# Patient Record
Sex: MALE | URBAN - METROPOLITAN AREA
[De-identification: names, ages, dates, MRNs, and addresses within clinical notes are randomized per-mention and may not be internally consistent; named-entity substitution may affect disease eponyms.]

---

## 2017-12-21 ENCOUNTER — EMERGENCY (EMERGENCY)
Facility: HOSPITAL | Age: 67
LOS: 1 days | Discharge: HOME | End: 2017-12-21
Attending: EMERGENCY MEDICINE
Payer: SELF-PAY

## 2017-12-21 VITALS
RESPIRATION RATE: 20 BRPM | TEMPERATURE: 98 F | DIASTOLIC BLOOD PRESSURE: 95 MMHG | SYSTOLIC BLOOD PRESSURE: 151 MMHG | HEART RATE: 99 BPM | OXYGEN SATURATION: 100 %

## 2017-12-21 VITALS
RESPIRATION RATE: 20 BRPM | DIASTOLIC BLOOD PRESSURE: 94 MMHG | HEART RATE: 93 BPM | OXYGEN SATURATION: 98 % | SYSTOLIC BLOOD PRESSURE: 139 MMHG | TEMPERATURE: 98 F

## 2017-12-21 PROCEDURE — 99283 EMERGENCY DEPT VISIT LOW MDM: CPT

## 2017-12-21 PROCEDURE — 99282 EMERGENCY DEPT VISIT SF MDM: CPT

## 2017-12-21 NOTE — ED PROVIDER NOTE - MEDICAL DECISION MAKING DETAILS
68 y/o M pt presents with inguinal hernia. Will D/C home with recommendation to wear hernia belt. Pt put in f/u book for surgery appointment. 68 y/o M pt presents with inguinal hernia, reduced in Emergency Department by me.  Will D/C home with recommendation to wear hernia belt and outpatient surgery followup Pt put in f/u book for surgery appointment.

## 2017-12-21 NOTE — ED PROVIDER NOTE - OBJECTIVE STATEMENT
66 y/o M pt with no significant PMHx and no significant PSHx presents to the ED c/o R groin pain and swelling x3 months. Pt reports a hernia that keeps coming in and out; pt has been taking Tylenol to no relief. Pt was seen at [place], where he was told to f/u with a surgeon; pt has not yet f/u with a surgeon. Pt denies fever, chills, N/V/D, abd pain or any other complaints. NKDA.

## 2017-12-27 PROBLEM — Z00.00 ENCOUNTER FOR PREVENTIVE HEALTH EXAMINATION: Status: ACTIVE | Noted: 2017-12-27

## 2018-01-08 ENCOUNTER — APPOINTMENT (OUTPATIENT)
Dept: SURGERY | Facility: CLINIC | Age: 68
End: 2018-01-08

## 2018-01-24 ENCOUNTER — APPOINTMENT (OUTPATIENT)
Dept: SURGERY | Facility: CLINIC | Age: 68
End: 2018-01-24
Payer: SELF-PAY

## 2018-01-24 VITALS
WEIGHT: 131 LBS | DIASTOLIC BLOOD PRESSURE: 92 MMHG | HEART RATE: 92 BPM | SYSTOLIC BLOOD PRESSURE: 130 MMHG | TEMPERATURE: 97.8 F | HEIGHT: 63 IN | BODY MASS INDEX: 23.21 KG/M2

## 2018-01-24 DIAGNOSIS — Z78.9 OTHER SPECIFIED HEALTH STATUS: ICD-10-CM

## 2018-01-24 DIAGNOSIS — K40.90 UNILATERAL INGUINAL HERNIA, W/OUT OBSTRUCTION OR GANGRENE, NOT SPECIFIED AS RECURRENT: ICD-10-CM

## 2018-01-24 PROCEDURE — 99203 OFFICE O/P NEW LOW 30 MIN: CPT

## 2018-03-04 ENCOUNTER — EMERGENCY (EMERGENCY)
Facility: HOSPITAL | Age: 68
LOS: 1 days | Discharge: HOME | End: 2018-03-04
Attending: EMERGENCY MEDICINE
Payer: SELF-PAY

## 2018-03-04 VITALS
OXYGEN SATURATION: 97 % | HEIGHT: 66 IN | DIASTOLIC BLOOD PRESSURE: 110 MMHG | SYSTOLIC BLOOD PRESSURE: 168 MMHG | RESPIRATION RATE: 16 BRPM | TEMPERATURE: 98 F | WEIGHT: 132.06 LBS | HEART RATE: 92 BPM

## 2018-03-04 PROCEDURE — 99283 EMERGENCY DEPT VISIT LOW MDM: CPT

## 2018-03-04 PROCEDURE — 99282 EMERGENCY DEPT VISIT SF MDM: CPT

## 2018-03-04 NOTE — ED PROVIDER NOTE - PHYSICAL EXAMINATION
Murmur of aortic stenosis Afebrile, hemodynamically stable  NAD, well appearing  Head NCAT  EOMI grossly  MMM  RRR, nml S1/S2. Murmur of aortic stenosis, which patient states he has a diagnosis of.  Lungs CTAB, no w/r/r  Abd soft, NT, ND, nml BS, no rebound or guarding   (scribe Severo Armas as chaperone): R inguinal hernia noted, soft, easily reducible, no overlying skin changes. No testicular pain, tenderness, hardness, elevation, or discoloration  AAO, CN's 3-12 grossly intact  BHATIA spontaneously, no leg cyanosis or edema  Skin warm, dry

## 2018-03-04 NOTE — ED PROVIDER NOTE - OBJECTIVE STATEMENT
68 y/o M pt with no significant PMHx presents to ED c/o R sided inguinal hernia pain x ~ 7 months. Patient was last seen at [facility] ED on 12/21/17, his hernia was reduced and he was discharged home with instructions to wear a hernia belt and follow up with outpatient surgery. Pt states that he followed up with a surgeon and was told he needed medical clearance from a cardiologist before they could proceed with hernia repair surgery. Pt states he has not followed up with cardiologist yet, but states that he will tomorrow. Pt reports taking 3 Advil PTA. Pt states that his pain is intermittent and currently does not have any pain. Pt denies fever, chills, nausea, vomiting, testicular pain, or any other complaints. Hannah Armas as .  68 y/o M pt with no significant PMHx presents to ED c/o R sided inguinal hernia pain x ~ 7 months. Patient was last seen at [facility] ED on 12/21/17, his hernia was reduced and he was discharged home with instructions to wear a hernia belt and follow up with outpatient surgery. Pt states that he followed up with a surgeon and was told he needed medical clearance from a cardiologist before they could proceed with hernia repair surgery. Pt states he has not followed up with cardiologist yet, but states that he will tomorrow. Pt reports taking 3 Advil PTA. Pt states that his pain is intermittent and currently does not have any pain. Pt denies fever, chills, nausea, vomiting, testicular pain, or any other complaints. Hannah Armsa as .  66 y/o M pt with no significant PMHx presents to ED c/o R sided inguinal hernia pain x ~ 7 months. Patient was last seen at [facility] ED on 12/21/17, his hernia was reduced and he was discharged home with instructions to wear a hernia belt and follow up with outpatient surgery. Pt states that he followed up with a surgeon and was told he needed medical clearance from a cardiologist before they could proceed with hernia repair surgery. Pt states he has not followed up with cardiologist yet, but states that he will tomorrow. Pt reports taking 3 Advil PTA. Pt states that his pain, is R groin, sharp, is intermittent, worsened by standing and improved by manual reduction and laying down. States currently does not have any pain. Pt denies fever, chills, nausea, vomiting, testicular pain, or any other complaints.

## 2018-03-04 NOTE — ED PROVIDER NOTE - MEDICAL DECISION MAKING DETAILS
R inguinal hernia with surgery evaluated in the past, awaiting cardiac clearance and states will f/u tomorrow with cardiology. Noted hernia that is easily reducible, no signs of incarceration. No e/o testicular involvement. Well appearing, hemodynamically stable, agrees that no need for further w/u at this time. Discharged with need for hernia belt and cardio and surgery clearance.

## 2018-03-07 ENCOUNTER — EMERGENCY (EMERGENCY)
Facility: HOSPITAL | Age: 68
LOS: 1 days | Discharge: HOME | End: 2018-03-07
Attending: EMERGENCY MEDICINE
Payer: SELF-PAY

## 2018-03-07 VITALS
HEIGHT: 63 IN | WEIGHT: 136.03 LBS | SYSTOLIC BLOOD PRESSURE: 135 MMHG | TEMPERATURE: 98 F | HEART RATE: 86 BPM | RESPIRATION RATE: 17 BRPM | DIASTOLIC BLOOD PRESSURE: 95 MMHG | OXYGEN SATURATION: 97 %

## 2018-03-07 PROCEDURE — 99283 EMERGENCY DEPT VISIT LOW MDM: CPT

## 2018-03-07 RX ORDER — ACETAMINOPHEN WITH CODEINE 300MG-30MG
1 TABLET ORAL
Qty: 10 | Refills: 0 | OUTPATIENT
Start: 2018-03-07

## 2018-03-07 RX ORDER — IBUPROFEN 200 MG
400 TABLET ORAL ONCE
Qty: 0 | Refills: 0 | Status: COMPLETED | OUTPATIENT
Start: 2018-03-07 | End: 2018-03-07

## 2018-03-07 RX ORDER — ACETAMINOPHEN WITH CODEINE 300MG-30MG
1 TABLET ORAL ONCE
Qty: 0 | Refills: 0 | Status: DISCONTINUED | OUTPATIENT
Start: 2018-03-07 | End: 2018-03-07

## 2018-03-07 RX ADMIN — Medication 1 TABLET(S): at 11:55

## 2018-03-07 RX ADMIN — Medication 400 MILLIGRAM(S): at 11:53

## 2018-03-07 NOTE — ED PROVIDER NOTE - OBJECTIVE STATEMENT
68 y/o M pt w/ no significant PMHx presents to the ED c/o intermittent pain and swelling to the R groin. Pt was diagnosed w/ inguinal hernia and has seen a surgeon however states that he cannot have surgery without cardiology clearance. Pt is currently uninsured. Pt had pain again this morning that resolved after lying down. Pt denies N/V/D, abd pain currently, chest pain, SOB, or any other complaints. Pt is able to pass gas. Pt is currently in the process to get medicaid. Pt is allergic to Penicillin (Rash) 68 y/o M pt w/ no significant PMHx presents to the ED c/o intermittent pain and swelling to the R groin. Pt was diagnosed w/ inguinal hernia and has seen a surgeon however states that he cannot have surgery without cardiology clearance. Pt is currently uninsured. Pt had pain again this morning that resolved after lying down. Pt denies N/V/D, abd pain currently, chest pain, SOB, dizziness or any other complaints. Pt is able to pass gas. Pt is currently in the process to get medicaid. Pt is allergic to Penicillin (Rash)

## 2018-03-07 NOTE — ED PROVIDER NOTE - PROGRESS NOTE DETAILS
discussed w/ [facility] referral coordinator. Pt given cardiology referral however will likely wait until medicaid is active before following up. Pt states he cannot afford to pay cardiologist up front. Educated on s/s hernia needing emergent eval. Answered q's.

## 2018-03-07 NOTE — ED PROVIDER NOTE - MEDICAL DECISION MAKING DETAILS
Reducible hernia here for pain now resolved. Will discuss w/ referral coordinator in obtaining cardiology referral.

## 2018-06-18 ENCOUNTER — INPATIENT (INPATIENT)
Facility: HOSPITAL | Age: 68
LOS: 31 days | DRG: 215 | End: 2018-07-20
Attending: THORACIC SURGERY (CARDIOTHORACIC VASCULAR SURGERY) | Admitting: THORACIC SURGERY (CARDIOTHORACIC VASCULAR SURGERY)
Payer: MEDICARE

## 2018-06-18 VITALS
HEART RATE: 98 BPM | OXYGEN SATURATION: 99 % | DIASTOLIC BLOOD PRESSURE: 87 MMHG | TEMPERATURE: 98 F | RESPIRATION RATE: 18 BRPM | HEIGHT: 65 IN | WEIGHT: 129.19 LBS | SYSTOLIC BLOOD PRESSURE: 102 MMHG

## 2018-06-18 PROCEDURE — 93010 ELECTROCARDIOGRAM REPORT: CPT

## 2018-06-18 PROCEDURE — 71045 X-RAY EXAM CHEST 1 VIEW: CPT | Mod: 26

## 2018-06-18 PROCEDURE — 99223 1ST HOSP IP/OBS HIGH 75: CPT

## 2018-06-18 RX ORDER — SODIUM CHLORIDE 9 MG/ML
3 INJECTION INTRAMUSCULAR; INTRAVENOUS; SUBCUTANEOUS EVERY 8 HOURS
Qty: 0 | Refills: 0 | Status: DISCONTINUED | OUTPATIENT
Start: 2018-06-18 | End: 2018-06-21

## 2018-06-19 DIAGNOSIS — I38 ENDOCARDITIS, VALVE UNSPECIFIED: ICD-10-CM

## 2018-06-19 DIAGNOSIS — I25.10 ATHEROSCLEROTIC HEART DISEASE OF NATIVE CORONARY ARTERY WITHOUT ANGINA PECTORIS: ICD-10-CM

## 2018-06-19 LAB
ALBUMIN SERPL ELPH-MCNC: 3.4 G/DL — SIGNIFICANT CHANGE UP (ref 3.3–5)
ALBUMIN SERPL ELPH-MCNC: 3.6 G/DL — SIGNIFICANT CHANGE UP (ref 3.3–5)
ALP SERPL-CCNC: 46 U/L — SIGNIFICANT CHANGE UP (ref 40–120)
ALP SERPL-CCNC: 46 U/L — SIGNIFICANT CHANGE UP (ref 40–120)
ALT FLD-CCNC: 11 U/L — SIGNIFICANT CHANGE UP (ref 10–45)
ALT FLD-CCNC: 14 U/L — SIGNIFICANT CHANGE UP (ref 10–45)
ANION GAP SERPL CALC-SCNC: 12 MMOL/L — SIGNIFICANT CHANGE UP (ref 5–17)
ANION GAP SERPL CALC-SCNC: 14 MMOL/L — SIGNIFICANT CHANGE UP (ref 5–17)
APPEARANCE UR: CLEAR — SIGNIFICANT CHANGE UP
APTT BLD: 28 SEC — SIGNIFICANT CHANGE UP (ref 27.5–37.4)
APTT BLD: 31.7 SEC — SIGNIFICANT CHANGE UP (ref 27.5–37.4)
AST SERPL-CCNC: 19 U/L — SIGNIFICANT CHANGE UP (ref 10–40)
AST SERPL-CCNC: 23 U/L — SIGNIFICANT CHANGE UP (ref 10–40)
BACTERIA # UR AUTO: PRESENT /HPF
BASOPHILS NFR BLD AUTO: 0.3 % — SIGNIFICANT CHANGE UP (ref 0–2)
BILIRUB SERPL-MCNC: 1.7 MG/DL — HIGH (ref 0.2–1.2)
BILIRUB SERPL-MCNC: 1.9 MG/DL — HIGH (ref 0.2–1.2)
BILIRUB UR-MCNC: ABNORMAL
BLD GP AB SCN SERPL QL: NEGATIVE — SIGNIFICANT CHANGE UP
BUN SERPL-MCNC: 26 MG/DL — HIGH (ref 7–23)
BUN SERPL-MCNC: 27 MG/DL — HIGH (ref 7–23)
CALCIUM SERPL-MCNC: 9.3 MG/DL — SIGNIFICANT CHANGE UP (ref 8.4–10.5)
CALCIUM SERPL-MCNC: 9.4 MG/DL — SIGNIFICANT CHANGE UP (ref 8.4–10.5)
CHLORIDE SERPL-SCNC: 97 MMOL/L — SIGNIFICANT CHANGE UP (ref 96–108)
CHLORIDE SERPL-SCNC: 99 MMOL/L — SIGNIFICANT CHANGE UP (ref 96–108)
CHOLEST SERPL-MCNC: 183 MG/DL — SIGNIFICANT CHANGE UP (ref 10–199)
CO2 SERPL-SCNC: 28 MMOL/L — SIGNIFICANT CHANGE UP (ref 22–31)
CO2 SERPL-SCNC: 29 MMOL/L — SIGNIFICANT CHANGE UP (ref 22–31)
COLOR SPEC: YELLOW — SIGNIFICANT CHANGE UP
CREAT SERPL-MCNC: 1.25 MG/DL — SIGNIFICANT CHANGE UP (ref 0.5–1.3)
CREAT SERPL-MCNC: 1.3 MG/DL — SIGNIFICANT CHANGE UP (ref 0.5–1.3)
DIFF PNL FLD: NEGATIVE — SIGNIFICANT CHANGE UP
EOSINOPHIL NFR BLD AUTO: 0.2 % — SIGNIFICANT CHANGE UP (ref 0–6)
EPI CELLS # UR: SIGNIFICANT CHANGE UP /HPF (ref 0–5)
GLUCOSE SERPL-MCNC: 110 MG/DL — HIGH (ref 70–99)
GLUCOSE SERPL-MCNC: 137 MG/DL — HIGH (ref 70–99)
GLUCOSE UR QL: NEGATIVE — SIGNIFICANT CHANGE UP
HBA1C BLD-MCNC: 6.2 % — HIGH (ref 4–5.6)
HCT VFR BLD CALC: 47.6 % — SIGNIFICANT CHANGE UP (ref 39–50)
HCT VFR BLD CALC: 48 % — SIGNIFICANT CHANGE UP (ref 39–50)
HDLC SERPL-MCNC: 33 MG/DL — LOW (ref 40–125)
HGB BLD-MCNC: 15.3 G/DL — SIGNIFICANT CHANGE UP (ref 13–17)
HGB BLD-MCNC: 15.3 G/DL — SIGNIFICANT CHANGE UP (ref 13–17)
HYALINE CASTS # UR AUTO: SIGNIFICANT CHANGE UP /LPF (ref 0–2)
INR BLD: 1.26 — HIGH (ref 0.88–1.16)
INR BLD: 1.26 — HIGH (ref 0.88–1.16)
KETONES UR-MCNC: NEGATIVE — SIGNIFICANT CHANGE UP
LEUKOCYTE ESTERASE UR-ACNC: NEGATIVE — SIGNIFICANT CHANGE UP
LIPID PNL WITH DIRECT LDL SERPL: 130 MG/DL — HIGH
LYMPHOCYTES # BLD AUTO: 26.8 % — SIGNIFICANT CHANGE UP (ref 13–44)
MAGNESIUM SERPL-MCNC: 2.2 MG/DL — SIGNIFICANT CHANGE UP (ref 1.6–2.6)
MAGNESIUM SERPL-MCNC: 2.2 MG/DL — SIGNIFICANT CHANGE UP (ref 1.6–2.6)
MCHC RBC-ENTMCNC: 29.3 PG — SIGNIFICANT CHANGE UP (ref 27–34)
MCHC RBC-ENTMCNC: 29.6 PG — SIGNIFICANT CHANGE UP (ref 27–34)
MCHC RBC-ENTMCNC: 31.9 G/DL — LOW (ref 32–36)
MCHC RBC-ENTMCNC: 32.1 G/DL — SIGNIFICANT CHANGE UP (ref 32–36)
MCV RBC AUTO: 91.8 FL — SIGNIFICANT CHANGE UP (ref 80–100)
MCV RBC AUTO: 92.1 FL — SIGNIFICANT CHANGE UP (ref 80–100)
MONOCYTES NFR BLD AUTO: 8 % — SIGNIFICANT CHANGE UP (ref 2–14)
NEUTROPHILS NFR BLD AUTO: 64.7 % — SIGNIFICANT CHANGE UP (ref 43–77)
NITRITE UR-MCNC: NEGATIVE — SIGNIFICANT CHANGE UP
NT-PROBNP SERPL-SCNC: 9864 PG/ML — HIGH (ref 0–300)
PH UR: 5.5 — SIGNIFICANT CHANGE UP (ref 5–8)
PHOSPHATE SERPL-MCNC: 3.8 MG/DL — SIGNIFICANT CHANGE UP (ref 2.5–4.5)
PHOSPHATE SERPL-MCNC: 3.9 MG/DL — SIGNIFICANT CHANGE UP (ref 2.5–4.5)
PLATELET # BLD AUTO: 152 K/UL — SIGNIFICANT CHANGE UP (ref 150–400)
PLATELET # BLD AUTO: 166 K/UL — SIGNIFICANT CHANGE UP (ref 150–400)
POTASSIUM SERPL-MCNC: 4 MMOL/L — SIGNIFICANT CHANGE UP (ref 3.5–5.3)
POTASSIUM SERPL-MCNC: 4.5 MMOL/L — SIGNIFICANT CHANGE UP (ref 3.5–5.3)
POTASSIUM SERPL-SCNC: 4 MMOL/L — SIGNIFICANT CHANGE UP (ref 3.5–5.3)
POTASSIUM SERPL-SCNC: 4.5 MMOL/L — SIGNIFICANT CHANGE UP (ref 3.5–5.3)
PROT SERPL-MCNC: 6.3 G/DL — SIGNIFICANT CHANGE UP (ref 6–8.3)
PROT SERPL-MCNC: 6.5 G/DL — SIGNIFICANT CHANGE UP (ref 6–8.3)
PROT UR-MCNC: ABNORMAL MG/DL
PROTHROM AB SERPL-ACNC: 14.1 SEC — HIGH (ref 9.8–12.7)
PROTHROM AB SERPL-ACNC: 14.1 SEC — HIGH (ref 9.8–12.7)
RBC # BLD: 5.17 M/UL — SIGNIFICANT CHANGE UP (ref 4.2–5.8)
RBC # BLD: 5.23 M/UL — SIGNIFICANT CHANGE UP (ref 4.2–5.8)
RBC # FLD: 15.2 % — SIGNIFICANT CHANGE UP (ref 10.3–16.9)
RBC # FLD: 15.6 % — SIGNIFICANT CHANGE UP (ref 10.3–16.9)
RBC CASTS # UR COMP ASSIST: < 5 /HPF — SIGNIFICANT CHANGE UP
RH IG SCN BLD-IMP: POSITIVE — SIGNIFICANT CHANGE UP
SODIUM SERPL-SCNC: 138 MMOL/L — SIGNIFICANT CHANGE UP (ref 135–145)
SODIUM SERPL-SCNC: 141 MMOL/L — SIGNIFICANT CHANGE UP (ref 135–145)
SP GR SPEC: 1.02 — SIGNIFICANT CHANGE UP (ref 1–1.03)
TOTAL CHOLESTEROL/HDL RATIO MEASUREMENT: 5.5 RATIO — SIGNIFICANT CHANGE UP (ref 3.4–9.6)
TRIGL SERPL-MCNC: 99 MG/DL — SIGNIFICANT CHANGE UP (ref 10–149)
TSH SERPL-MCNC: 3.35 UIU/ML — SIGNIFICANT CHANGE UP (ref 0.35–4.94)
UROBILINOGEN FLD QL: 1 E.U./DL — SIGNIFICANT CHANGE UP
WBC # BLD: 6.6 K/UL — SIGNIFICANT CHANGE UP (ref 3.8–10.5)
WBC # BLD: 7.4 K/UL — SIGNIFICANT CHANGE UP (ref 3.8–10.5)
WBC # FLD AUTO: 6.6 K/UL — SIGNIFICANT CHANGE UP (ref 3.8–10.5)
WBC # FLD AUTO: 7.4 K/UL — SIGNIFICANT CHANGE UP (ref 3.8–10.5)
WBC UR QL: < 5 /HPF — SIGNIFICANT CHANGE UP

## 2018-06-19 PROCEDURE — 93306 TTE W/DOPPLER COMPLETE: CPT | Mod: 26

## 2018-06-19 PROCEDURE — 94010 BREATHING CAPACITY TEST: CPT | Mod: 26

## 2018-06-19 RX ORDER — PANTOPRAZOLE SODIUM 20 MG/1
40 TABLET, DELAYED RELEASE ORAL
Qty: 0 | Refills: 0 | Status: DISCONTINUED | OUTPATIENT
Start: 2018-06-19 | End: 2018-06-21

## 2018-06-19 RX ORDER — HEPARIN SODIUM 5000 [USP'U]/ML
5000 INJECTION INTRAVENOUS; SUBCUTANEOUS EVERY 8 HOURS
Qty: 0 | Refills: 0 | Status: DISCONTINUED | OUTPATIENT
Start: 2018-06-19 | End: 2018-06-21

## 2018-06-19 RX ORDER — FUROSEMIDE 40 MG
20 TABLET ORAL DAILY
Qty: 0 | Refills: 0 | Status: DISCONTINUED | OUTPATIENT
Start: 2018-06-19 | End: 2018-06-21

## 2018-06-19 RX ORDER — FUROSEMIDE 40 MG
1 TABLET ORAL
Qty: 0 | Refills: 0 | COMMUNITY

## 2018-06-19 RX ADMIN — SODIUM CHLORIDE 3 MILLILITER(S): 9 INJECTION INTRAMUSCULAR; INTRAVENOUS; SUBCUTANEOUS at 14:01

## 2018-06-19 RX ADMIN — HEPARIN SODIUM 5000 UNIT(S): 5000 INJECTION INTRAVENOUS; SUBCUTANEOUS at 05:48

## 2018-06-19 RX ADMIN — Medication 20 MILLIGRAM(S): at 05:48

## 2018-06-19 RX ADMIN — HEPARIN SODIUM 5000 UNIT(S): 5000 INJECTION INTRAVENOUS; SUBCUTANEOUS at 23:52

## 2018-06-19 RX ADMIN — SODIUM CHLORIDE 3 MILLILITER(S): 9 INJECTION INTRAMUSCULAR; INTRAVENOUS; SUBCUTANEOUS at 05:38

## 2018-06-19 RX ADMIN — PANTOPRAZOLE SODIUM 40 MILLIGRAM(S): 20 TABLET, DELAYED RELEASE ORAL at 07:53

## 2018-06-19 RX ADMIN — SODIUM CHLORIDE 3 MILLILITER(S): 9 INJECTION INTRAMUSCULAR; INTRAVENOUS; SUBCUTANEOUS at 23:53

## 2018-06-19 RX ADMIN — HEPARIN SODIUM 5000 UNIT(S): 5000 INJECTION INTRAVENOUS; SUBCUTANEOUS at 14:01

## 2018-06-19 NOTE — PROGRESS NOTE ADULT - ASSESSMENT
This 67 year old male with PMH significant for aortic surgery with congenital causes through left thoracotomy presented to CT surgery with findings of aortic stenosis and moderate to severe mitral regurgitation and mod tricuspid regurgitation.  In addition, he underwent catheterization revealing 2-V coronary artery disease.   At visit, patient is comfortable.  afebrile.  He denies chest pain, shortness of breath, or syncopal episodes.  He is NPO and will have CTA of chest this evening.  His echo has identified  severe global hypokinesis, EF 15%, severe aortic stenosis, moderate to severe mitral regurgitation, and moderate tricuspid regurgitation.  Along with coronary CAD, patient is considered to have surgical intervention at this admission.

## 2018-06-19 NOTE — H&P ADULT - NSHPREVIEWOFSYSTEMS_GEN_ALL_CORE
CONSTITUTIONAL: No fevers / chills, sweats, fatigue, weight loss, weight gain (+ Loss of appetite.)  NEUROLOGICAL: No parathesias, seizures, syncope, confusion  EYES: No blurry vision, discharge, pain, loss of vision  ENMT: No difficulty hearing, vertigo, dysphagia, epistaxis, recent dental work  CV: No chest pain, palpitations, orthopnea  RESPIRATORY:  No wheezing, cough / sputum, hemoptysis  GI: No nausea, vomiting, diarrhea, constipation, melena  : No hematuria, dysuria, urgency, incontinence  MUSCULOSKELETAL: No arthritis, joint swelling, muscle weakness  SKIN/BREAST: No rash, itching, hair loss, masses  PSYCHIATRY: No depression, anxiety, suicidal ideation  HEMATOLOGY:  No bruising easily, enlarged lymph nodes, tender lymph nodes  ENDOCRINE: No cold intolerance, heat intolerance, polydipsia

## 2018-06-19 NOTE — H&P ADULT - HISTORY OF PRESENT ILLNESS
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 He is / was  being evaluated for ? abdominal hernia repair surgery. Given medical and surgical history cardiology clearance needed. On evaluation with  TTE / Cardiac Catheterization (Completed 06/2018.) EF = 25%, reduced LV function, severe AS, severe MR, severe TR, severe pHTN, and 2V CAD. Patient transferred for [facility] under the care of Dr. Potter for further work up and MGMT. After speaking with patient he does state a decreased in ET and increase in SOB that has been progressing over the last 6-8 month. Details hard to obtain. Patient poor historian.     PMH / PSH:  See HPI.  Appendectomy > 50 year ago.     Home Rx.:  Lasix 40mg PO QD    FH:  N/A    SH:  Lives with family. Not working. Non-smoker. Occasional ETOH use. No IVRDU.    Allergies:  PCN.

## 2018-06-19 NOTE — H&P ADULT - NSHPPHYSICALEXAM_GEN_ALL_CORE
Appearance: Normal	  HEENT:   Normal oral mucosa, PERRL, EOMI	  Neck: Supple, + JVD; - Carotid Bruit   Cardiovascular: Normal S1 S2. + murmurs.  Respiratory: Lungs clear to auscultation B/L. No Rales or Wheezing. Mild Rhonchi thought B/L. 	  Gastrointestinal:  Soft, non-tender, + BS.	  Skin: No rashes. No ecchymoses. No cyanosis.  Extremities: Normal range of motion, no clubbing, cyanosis. 2+ B/L LE edema.  Vascular: Peripheral pulses palpable 2+ bilaterally.  Neurologic: Non-focal.  Psychiatry: A & O x 3, mood & affect appropriate.

## 2018-06-19 NOTE — PROGRESS NOTE ADULT - SUBJECTIVE AND OBJECTIVE BOX
Planned Date of Surgery:                                                                                                                  Surgeon:    Procedure:    HPI:  67y Male    PAST MEDICAL & SURGICAL HISTORY:  No pertinent past medical history  No significant past surgical history      penicillin (Rash)      MEDICATIONS  (STANDING):  furosemide    Tablet 20 milliGRAM(s) Oral daily  heparin  Injectable 5000 Unit(s) SubCutaneous every 8 hours  pantoprazole    Tablet 40 milliGRAM(s) Oral before breakfast  sodium chloride 0.9% lock flush 3 milliLiter(s) IV Push every 8 hours    MEDICATIONS  (PRN):      On Beta Blocker? YES / NO / CONTRAINDICATED Reason_______________    Labs:                        15.3   7.4   )-----------( 166      ( 2018 07:31 )             47.6         138  |  97  |  27<H>  ----------------------------<  137<H>  4.0   |  29  |  1.30    Ca    9.4      2018 07:31  Phos  3.9       Mg     2.2         TPro  6.5  /  Alb  3.6  /  TBili  1.9<H>  /  DBili  x   /  AST  23  /  ALT  11  /  AlkPhos  46      PT/INR - ( 2018 07:31 )   PT: 14.1 sec;   INR: 1.26          PTT - ( 2018 07:31 )  PTT:31.7 sec  Urinalysis Basic - ( 2018 12:46 )    Color: Yellow / Appearance: Clear / S.025 / pH: x  Gluc: x / Ketone: NEGATIVE  / Bili: Small / Urobili: 1.0 E.U./dL   Blood: x / Protein: Trace mg/dL / Nitrite: NEGATIVE   Leuk Esterase: NEGATIVE / RBC: < 5 /HPF / WBC < 5 /HPF   Sq Epi: x / Non Sq Epi: 0-5 /HPF / Bacteria: Present /HPF      Hemoglobin A1C, Whole Blood: 6.2 % (18 @ 00:04)    Hgb A1C:    EKG:    CXR:    CT Scans:    Cath Report:    Echo:    PFT's:    Carotid Duplex:    Consult in Chart?  YES / NO  Consent in Chart? YES / NO  Pre-op Orders Placed? YES / NO  Blood Prodeucts Ordered? YES / NO  NPO ordered? YES / NO Planned Date of Surgery: at this admission                                                                                                                Surgeon: MD Abbey    Procedure: AVR/    HPI:  67y Male    PAST MEDICAL & SURGICAL HISTORY:  No pertinent past medical history  No significant past surgical history      penicillin (Rash)      MEDICATIONS  (STANDING):  furosemide    Tablet 20 milliGRAM(s) Oral daily  heparin  Injectable 5000 Unit(s) SubCutaneous every 8 hours  pantoprazole    Tablet 40 milliGRAM(s) Oral before breakfast  sodium chloride 0.9% lock flush 3 milliLiter(s) IV Push every 8 hours    MEDICATIONS  (PRN):      On Beta Blocker? YES / NO / CONTRAINDICATED Reason_______________    Labs:                        15.3   7.4   )-----------( 166      ( 2018 07:31 )             47.6         138  |  97  |  27<H>  ----------------------------<  137<H>  4.0   |  29  |  1.30    Ca    9.4      2018 07:31  Phos  3.9       Mg     2.2         TPro  6.5  /  Alb  3.6  /  TBili  1.9<H>  /  DBili  x   /  AST  23  /  ALT  11  /  AlkPhos  46      PT/INR - ( 2018 07:31 )   PT: 14.1 sec;   INR: 1.26          PTT - ( 2018 07:31 )  PTT:31.7 sec  Urinalysis Basic - ( 2018 12:46 )    Color: Yellow / Appearance: Clear / S.025 / pH: x  Gluc: x / Ketone: NEGATIVE  / Bili: Small / Urobili: 1.0 E.U./dL   Blood: x / Protein: Trace mg/dL / Nitrite: NEGATIVE   Leuk Esterase: NEGATIVE / RBC: < 5 /HPF / WBC < 5 /HPF   Sq Epi: x / Non Sq Epi: 0-5 /HPF / Bacteria: Present /HPF      Hemoglobin A1C, Whole Blood: 6.2 % (18 @ 00:04)    Hgb A1C:    EKG:    CXR:    CT Scans:    Cath Report:    Echo:    PFT's:    Carotid Duplex:    Consult in Chart?  YES / NO  Consent in Chart? YES / NO  Pre-op Orders Placed? YES / NO  Blood Prodeucts Ordered? YES / NO  NPO ordered? 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. He is / was  being evaluated for ? abdominal hernia repair surgery. Given medical and surgical history cardiology clearance needed. On evaluation with  TTE / Cardiac Catheterization (Completed 2018.) EF = 25%, reduced LV function, severe AS, severe MR, severe TR, severe pHTN, and 2V CAD. [place] [facility] [place]. Potter for further work up and MGMT. After speaking with patient he does state a decreased in ET and increase in SOB that has been progressing over the last 6-8 month. Details hard to obtain. Patient poor historian.   PAST MEDICAL & SURGICAL HISTORY:  No pertinent past medical history  No significant past surgical history      penicillin (Rash)      MEDICATIONS  (STANDING):  furosemide    Tablet 20 milliGRAM(s) Oral daily  heparin  Injectable 5000 Unit(s) SubCutaneous every 8 hours  pantoprazole    Tablet 40 milliGRAM(s) Oral before breakfast  sodium chloride 0.9% lock flush 3 milliLiter(s) IV Push every 8 hours    MEDICATIONS  (PRN):      On Beta Blocker? YES / NO / CONTRAINDICATED Reason_______________    Labs:                        15.3   7.4   )-----------( 166      ( 2018 07:31 )             47.6     -    138  |  97  |  27<H>  ----------------------------<  137<H>  4.0   |  29  |  1.30    Ca    9.4      2018 07:31  Phos  3.9     06-  Mg     2.2     -    TPro  6.5  /  Alb  3.6  /  TBili  1.9<H>  /  DBili  x   /  AST  23  /  ALT  11  /  AlkPhos  46  06-19    PT/INR - ( 2018 07:31 )   PT: 14.1 sec;   INR: 1.26          PTT - ( 2018 07:31 )  PTT:31.7 sec  Urinalysis Basic - ( 2018 12:46 )    Color: Yellow / Appearance: Clear / S.025 / pH: x  Gluc: x / Ketone: NEGATIVE  / Bili: Small / Urobili: 1.0 E.U./dL   Blood: x / Protein: Trace mg/dL / Nitrite: NEGATIVE   Leuk Esterase: NEGATIVE / RBC: < 5 /HPF / WBC < 5 /HPF   Sq Epi: x / Non Sq Epi: 0-5 /HPF / Bacteria: Present /HPF      Hemoglobin A1C, Whole Blood: 6.2 % (18 @ 00:04)    Hgb A1C:    EKG:    CXR:    CT Scans:    Cath Report:    Echo:    PFT's:    Carotid Duplex:    Consult in Chart?  YES / NO  Consent in Chart? YES / NO  Pre-op Orders Placed? YES / NO  Blood Prodeucts Ordered? YES / NO  NPO ordered? YES / NO Patient discussed on morning rounds with       Operation / Date:     SUBJECTIVE ASSESSMENT:  This 67 year old male with PMH significant for aortic surgery with congenital causes through left thoracotomy presented to CT surgery with findings of aortic stenosis and moderate to severe mitral regurgitation and mod tricuspid regurgitation.  In addition, he underwent catheterization revealing 2-V coronary artery disease.    At visit, patient is comfortable.  afebrile.  He denies chest pain, shortness of breath, or syncopal episodes.  He is NPO and will have CTA of chest this evening.      Vital Signs Last 24 Hrs  T(C): 36.3 (2018 18:02), Max: 36.8 (2018 22:00)  T(F): 97.3 (2018 18:02), Max: 98.2 (2018 22:00)  HR: 92 (2018 16:40) (92 - 98)  BP: 124/81 (2018 16:40) (88/73 - 124/81)  BP(mean): 96 (2018 16:40) (82 - 101)  RR: 17 (2018 16:40) (17 - 19)  SpO2: 93% (2018 16:40) (93% - 99%)  I&O's Detail    2018 07:01  -  2018 18:45  --------------------------------------------------------  IN:    Oral Fluid: 300 mL  Total IN: 300 mL    OUT:  Total OUT: 0 mL    Total NET: 300 mL      PHYSICAL EXAM:    General: NAD.  AOx3    Neurological: grossly intact    Cardiovascular: positive murmur best heard at right upper chest    Respiratory: decrease breath sound at base.  no wheezing.  there is left thoracotomy incision from prior surgery    Gastrointestinal: BM and BS are intact    Extremities: positive for edema +1/+1    Vascular: pulses are intact bilaterally     Incision Sites: n/a    LABS:                        15.3   7.4   )-----------( 166      ( 2018 07:31 )             47.6       PT/INR - ( 2018 07:31 )   PT: 14.1 sec;   INR: 1.26     PTT - ( 2018 07:31 )  PTT: 31.7 sec        138  |  97  |  27<H>  ----------------------------<  137<H>  4.0   |  29  |  1.30    Ca    9.4      2018 07:31  Phos  3.9       Mg     2.2         TPro  6.5  /  Alb  3.6  /  TBili  1.9<H>  /  DBili  x   /  AST  23  /  ALT  11  /  AlkPhos  46        Urinalysis Basic - ( 2018 12:46 )    Color: Yellow / Appearance: Clear / S.025 / pH: x  Gluc: x / Ketone: NEGATIVE  / Bili: Small / Urobili: 1.0 E.U./dL   Blood: x / Protein: Trace mg/dL / Nitrite: NEGATIVE   Leuk Esterase: NEGATIVE / RBC: < 5 /HPF / WBC < 5 /HPF   Sq Epi: x / Non Sq Epi: 0-5 /HPF / Bacteria: Present /HPF        MEDICATIONS  (STANDING):  furosemide    Tablet 20 milliGRAM(s) Oral daily  heparin  Injectable 5000 Unit(s) SubCutaneous every 8 hours  pantoprazole    Tablet 40 milliGRAM(s) Oral before breakfast  sodium chloride 0.9% lock flush 3 milliLiter(s) IV Push every 8 hours    MEDICATIONS  (PRN):        RADIOLOGY & ADDITIONAL TESTS:  CTA of chest  pending   Echo  Left ventricular hypertrophy presentThere is severe global hypokinesis of   the   left ventricle.The left ventricular ejection fraction is severely   reduced.The   left ventricular ejection fraction is estimated to be <15%The right   ventricle   is mildly dilated.The right ventricular systolic function is mildly   reduced.The left atrium is moderately dilated.The left atrial volume   index is   43 cc/m2 (normal <34cc/m2)The right atrium is severely dilated.The right   atrial volume index is 58 cc/m2 (normal range 21+/-6 for women, 25 +/- 7   for   men)  Calcified aortic valve.There is mild aortic regurgitation.There is   Severe aortic stenosis.The peak pressure gradient is 72 mmHg.The mean   pressure   gradient is 40 mmHg.The calculated aortic valve area using the continuity   equation is 0.8 cm2.Mitral annular calcification noted.Mildly calcified   mitral   valve leaflets.There is moderate to severe mitral   regurgitation.Structurally   normal tricuspid valve.There is moderate tricuspid regurgitation.There is   severe pulmonary hypertension.The pulmonary artery systolic pressure is   estimated to be 72 mmHg.Structurally normal pulmonic valve.There is mild   pulmonic regurgitation.There is no pericardial effusion.Bilateral pleural   effusion noted. Ascites noted. Patient discussed on morning rounds with Dr. 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  In addition, he underwent catheterization revealing 2-V coronary artery disease.    At visit, patient is comfortable.  afebrile.  He denies chest pain, shortness of breath, or syncopal episodes.  He is NPO and will have CTA of chest this evening.      Vital Signs Last 24 Hrs  T(C): 36.3 (2018 18:02), Max: 36.8 (2018 22:00)  T(F): 97.3 (2018 18:02), Max: 98.2 (2018 22:00)  HR: 92 (2018 16:40) (92 - 98)  BP: 124/81 (2018 16:40) (88/73 - 124/81)  BP(mean): 96 (2018 16:40) (82 - 101)  RR: 17 (2018 16:40) (17 - 19)  SpO2: 93% (2018 16:40) (93% - 99%)  I&O's Detail    2018 07:01  -  2018 18:45  --------------------------------------------------------  IN:    Oral Fluid: 300 mL  Total IN: 300 mL    OUT:  Total OUT: 0 mL    Total NET: 300 mL      PHYSICAL EXAM:    General: NAD.  AOx3    Neurological: grossly intact    Cardiovascular: positive murmur best heard at right upper chest    Respiratory: decrease breath sound at base.  no wheezing.  there is left thoracotomy incision from prior surgery    Gastrointestinal: BM and BS are intact    Extremities: positive for edema +1/+1    Vascular: pulses are intact bilaterally     Incision Sites: n/a    LABS:                        15.3   7.4   )-----------( 166      ( 2018 07:31 )             47.6       PT/INR - ( 2018 07:31 )   PT: 14.1 sec;   INR: 1.26     PTT - ( 2018 07:31 )  PTT: 31.7 sec        138  |  97  |  27<H>  ----------------------------<  137<H>  4.0   |  29  |  1.30    Ca    9.4      2018 07:31  Phos  3.9       Mg     2.2         TPro  6.5  /  Alb  3.6  /  TBili  1.9<H>  /  DBili  x   /  AST  23  /  ALT  11  /  AlkPhos  46        Urinalysis Basic - ( 2018 12:46 )    Color: Yellow / Appearance: Clear / S.025 / pH: x  Gluc: x / Ketone: NEGATIVE  / Bili: Small / Urobili: 1.0 E.U./dL   Blood: x / Protein: Trace mg/dL / Nitrite: NEGATIVE   Leuk Esterase: NEGATIVE / RBC: < 5 /HPF / WBC < 5 /HPF   Sq Epi: x / Non Sq Epi: 0-5 /HPF / Bacteria: Present /HPF        MEDICATIONS  (STANDING):  furosemide    Tablet 20 milliGRAM(s) Oral daily  heparin  Injectable 5000 Unit(s) SubCutaneous every 8 hours  pantoprazole    Tablet 40 milliGRAM(s) Oral before breakfast  sodium chloride 0.9% lock flush 3 milliLiter(s) IV Push every 8 hours    MEDICATIONS  (PRN):        RADIOLOGY & ADDITIONAL TESTS:  CTA of chest  pending   Echo  Left ventricular hypertrophy presentThere is severe global hypokinesis of   the   left ventricle.The left ventricular ejection fraction is severely   reduced.The   left ventricular ejection fraction is estimated to be <15%The right   ventricle   is mildly dilated.The right ventricular systolic function is mildly   reduced.The left atrium is moderately dilated.The left atrial volume   index is   43 cc/m2 (normal <34cc/m2)The right atrium is severely dilated.The right   atrial volume index is 58 cc/m2 (normal range 21+/-6 for women, 25 +/- 7   for   men)  Calcified aortic valve.There is mild aortic regurgitation.There is   Severe aortic stenosis.The peak pressure gradient is 72 mmHg.The mean   pressure   gradient is 40 mmHg.The calculated aortic valve area using the continuity   equation is 0.8 cm2.Mitral annular calcification noted.Mildly calcified   mitral   valve leaflets.There is moderate to severe mitral   regurgitation.Structurally   normal tricuspid valve.There is moderate tricuspid regurgitation.There is   severe pulmonary hypertension.The pulmonary artery systolic pressure is   estimated to be 72 mmHg.Structurally normal pulmonic valve.There is mild   pulmonic regurgitation.There is no pericardial effusion.Bilateral pleural   effusion noted. Ascites noted.

## 2018-06-19 NOTE — PROGRESS NOTE ADULT - PROBLEM SELECTOR PLAN 1
AVR and MVR to be considered at this admission  echo performed  CTA of chest with IV contrast to be performed

## 2018-06-19 NOTE — H&P ADULT - ASSESSMENT
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 He is / was  being evaluated for ? abdominal hernia repair surgery. Given medical and surgical history cardiology clearance needed. On evaluation with  TTE / Cardiac Catheterization (Completed 06/2018.) EF = 25%, reduced LV function, severe AS, severe MR, severe TR, severe pHTN, and 2V CAD. Patient transferred for [facility] under the care of Dr. Potter for further work up and MGMT. After speaking with patient he does state a decreased in ET and increase in SOB that has been progressing over the last 6-8 month. Details hard to obtain. Patient poor historian.     Neurovascular: No delirium. Pain well controlled with current regimen.  -PRN's.    Cardiovascular: Hemodynamically stable. HR controlled. AS CAD. Pre-OR evaluation.   -PST. c/w Lasix.     Respiratory: 02 Sat = 98% on RA.  -If on oxygen wean to RA from for O2 Sat > 93%.  -Encourage C+DB and Use of IS 10x / hr while awake.  -CXR.    GI: Stable.  -PPX.  -PO Diet.    Renal / :  -Monitor renal function.  -Monitor I/O's.    Endocrine:    -A1c.  -TSH.    Hematologic:  -CBC.  -Coagulation Panel.  -T/Sx2.    ID: Stable.  -Temperature.  -CBC.  -Observe for SIRS/Sepsis Syndrome.    Prophylaxis:  -DVT prophylaxis with 5000 SubQ Heparin q8h.  -SCD's    Disposition:  -9L for frequent monitoring.

## 2018-06-20 PROCEDURE — 94010 BREATHING CAPACITY TEST: CPT | Mod: 26

## 2018-06-20 PROCEDURE — 71275 CT ANGIOGRAPHY CHEST: CPT | Mod: 26

## 2018-06-20 PROCEDURE — 93880 EXTRACRANIAL BILAT STUDY: CPT | Mod: 26

## 2018-06-20 RX ORDER — CHLORHEXIDINE GLUCONATE 213 G/1000ML
1 SOLUTION TOPICAL ONCE
Qty: 0 | Refills: 0 | Status: COMPLETED | OUTPATIENT
Start: 2018-06-20 | End: 2018-06-20

## 2018-06-20 RX ORDER — METOPROLOL TARTRATE 50 MG
12.5 TABLET ORAL EVERY 12 HOURS
Qty: 0 | Refills: 0 | Status: DISCONTINUED | OUTPATIENT
Start: 2018-06-20 | End: 2018-06-21

## 2018-06-20 RX ORDER — CHLORHEXIDINE GLUCONATE 213 G/1000ML
1 SOLUTION TOPICAL ONCE
Qty: 0 | Refills: 0 | Status: COMPLETED | OUTPATIENT
Start: 2018-06-21 | End: 2018-06-21

## 2018-06-20 RX ORDER — CHLORHEXIDINE GLUCONATE 213 G/1000ML
25 SOLUTION TOPICAL ONCE
Qty: 0 | Refills: 0 | Status: COMPLETED | OUTPATIENT
Start: 2018-06-20 | End: 2018-06-20

## 2018-06-20 RX ORDER — ASPIRIN/CALCIUM CARB/MAGNESIUM 324 MG
81 TABLET ORAL DAILY
Qty: 0 | Refills: 0 | Status: DISCONTINUED | OUTPATIENT
Start: 2018-06-20 | End: 2018-06-21

## 2018-06-20 RX ADMIN — Medication 12.5 MILLIGRAM(S): at 23:29

## 2018-06-20 RX ADMIN — SODIUM CHLORIDE 3 MILLILITER(S): 9 INJECTION INTRAMUSCULAR; INTRAVENOUS; SUBCUTANEOUS at 20:50

## 2018-06-20 RX ADMIN — SODIUM CHLORIDE 3 MILLILITER(S): 9 INJECTION INTRAMUSCULAR; INTRAVENOUS; SUBCUTANEOUS at 06:26

## 2018-06-20 RX ADMIN — CHLORHEXIDINE GLUCONATE 25 MILLILITER(S): 213 SOLUTION TOPICAL at 16:30

## 2018-06-20 RX ADMIN — CHLORHEXIDINE GLUCONATE 1 APPLICATION(S): 213 SOLUTION TOPICAL at 22:04

## 2018-06-20 RX ADMIN — Medication 81 MILLIGRAM(S): at 23:29

## 2018-06-20 RX ADMIN — CHLORHEXIDINE GLUCONATE 1 APPLICATION(S): 213 SOLUTION TOPICAL at 20:50

## 2018-06-20 RX ADMIN — PANTOPRAZOLE SODIUM 40 MILLIGRAM(S): 20 TABLET, DELAYED RELEASE ORAL at 06:26

## 2018-06-20 RX ADMIN — HEPARIN SODIUM 5000 UNIT(S): 5000 INJECTION INTRAVENOUS; SUBCUTANEOUS at 06:25

## 2018-06-20 RX ADMIN — HEPARIN SODIUM 5000 UNIT(S): 5000 INJECTION INTRAVENOUS; SUBCUTANEOUS at 14:30

## 2018-06-20 RX ADMIN — Medication 20 MILLIGRAM(S): at 06:26

## 2018-06-20 RX ADMIN — SODIUM CHLORIDE 3 MILLILITER(S): 9 INJECTION INTRAMUSCULAR; INTRAVENOUS; SUBCUTANEOUS at 16:30

## 2018-06-20 RX ADMIN — HEPARIN SODIUM 5000 UNIT(S): 5000 INJECTION INTRAVENOUS; SUBCUTANEOUS at 22:04

## 2018-06-20 NOTE — PROGRESS NOTE ADULT - SUBJECTIVE AND OBJECTIVE BOX
Planned Date of Surgery: 18                                                                                                                  Surgeon: Abbey/Lupe Benedict    Procedure: redo sternotomy with AVR    HPI:  67y Male    PAST MEDICAL & SURGICAL HISTORY:  No pertinent past medical history  No significant past surgical history      penicillin (Rash)      MEDICATIONS  (STANDING):  furosemide    Tablet 20 milliGRAM(s) Oral daily  heparin  Injectable 5000 Unit(s) SubCutaneous every 8 hours  pantoprazole    Tablet 40 milliGRAM(s) Oral before breakfast  sodium chloride 0.9% lock flush 3 milliLiter(s) IV Push every 8 hours    MEDICATIONS  (PRN):      On Beta Blocker? NO  Hypotensive SBP at 100-110's    Labs:                        15.3   7.4   )-----------( 166      ( 2018 07:31 )             47.6         138  |  97  |  27<H>  ----------------------------<  137<H>  4.0   |  29  |  1.30    Ca    9.4      2018 07:31  Phos  3.9       Mg     2.2         TPro  6.5  /  Alb  3.6  /  TBili  1.9<H>  /  DBili  x   /  AST  23  /  ALT  11  /  AlkPhos  46      PT/INR - ( 2018 07:31 )   PT: 14.1 sec;   INR: 1.26          PTT - ( 2018 07:31 )  PTT:31.7 sec  Urinalysis Basic - ( 2018 12:46 )    Color: Yellow / Appearance: Clear / S.025 / pH: x  Gluc: x / Ketone: NEGATIVE  / Bili: Small / Urobili: 1.0 E.U./dL   Blood: x / Protein: Trace mg/dL / Nitrite: NEGATIVE   Leuk Esterase: NEGATIVE / RBC: < 5 /HPF / WBC < 5 /HPF   Sq Epi: x / Non Sq Epi: 0-5 /HPF / Bacteria: Present /HPF      Hemoglobin A1C, Whole Blood: 6.2 % (18 @ 00:04)    EKG:  Ventricular Rate 93 BPM    Atrial Rate 93 BPM    P-R Interval 174 ms    QRS Duration 138 ms    Q-T Interval 388 ms    QTC Calculation(Bezet) 482 ms    P Axis 25 degrees    R Axis 171 degrees    T Axis 68 degrees    Diagnosis Line Normal sinus rhythm  Right axis deviation  Nonspecific intraventricular block  CXR:  Cardiomegaly, mild congestion and bilateral pleural effusions, right   greater than left.    Opacity overlying the left lung apex may represent loculated pleural   effusion or a mass. Recommend correlation with CT.  CT Scans:  CTA performed and reviewed  CT chest without con performed and reviewed   Cath Report:  2-V disease   Echo:  Left ventricular hypertrophy presentThere is severe global hypokinesis of   the   left ventricle.The left ventricular ejection fraction is severely   reduced.The   left ventricular ejection fraction is estimated to be <15%The right   ventricle   is mildly dilated.The right ventricular systolic function is mildly   reduced.The left atrium is moderately dilated.The left atrial volume   index is   43 cc/m2 (normal <34cc/m2)The right atrium is severely dilated.The right   atrial volume index is 58 cc/m2 (normal range 21+/-6 for women, 25 +/- 7   for   men)  Calcified aortic valve.There is mild aortic regurgitation.There is   Severe aortic stenosis.The peak pressure gradient is 72 mmHg.The mean   pressure   gradient is 40 mmHg.The calculated aortic valve area using the continuity   equation is 0.8 cm2.Mitral annular calcification noted.Mildly calcified   mitral   valve leaflets.There is moderate to severe mitral   regurgitation.Structurally   normal tricuspid valve.There is moderate tricuspid regurgitation.There is   severe pulmonary hypertension.The pulmonary artery systolic pressure is   estimated to be 72 mmHg.Structurally normal pulmonic valve.There is mild   pulmonic regurgitation.There is no pericardial effusion.Bilateral pleural   effusion noted.Ascites noted  PFT's:  performed   Carotid Duplex:  pending     Consult in Chart? YES  Consent in Chart? YES  Pre-op Orders Placed? YES  Blood Prodeucts Ordered? YES  NPO ordered? YES Planned Date of Surgery: 18                                                                                                                  Surgeon: Abbey/Lupe Benedict    Procedure: redo sternotomy with AVR, MVR, TVR and CABG    HPI:  67y Male with PMH significant for aortic surgery with s/p thoracotomy left presented to aortic center with aortic stenosis and EF of    PAST MEDICAL & SURGICAL HISTORY:  No pertinent past medical history  No significant past surgical history      penicillin (Rash)      MEDICATIONS  (STANDING):  furosemide    Tablet 20 milliGRAM(s) Oral daily  heparin  Injectable 5000 Unit(s) SubCutaneous every 8 hours  pantoprazole    Tablet 40 milliGRAM(s) Oral before breakfast  sodium chloride 0.9% lock flush 3 milliLiter(s) IV Push every 8 hours    MEDICATIONS  (PRN):      On Beta Blocker? NO  Hypotensive SBP at 100-110's    Labs:                        15.3   7.4   )-----------( 166      ( 2018 07:31 )             47.6         138  |  97  |  27<H>  ----------------------------<  137<H>  4.0   |  29  |  1.30    Ca    9.4      2018 07:31  Phos  3.9       Mg     2.2         TPro  6.5  /  Alb  3.6  /  TBili  1.9<H>  /  DBili  x   /  AST  23  /  ALT  11  /  AlkPhos  46      PT/INR - ( 2018 07:31 )   PT: 14.1 sec;   INR: 1.26          PTT - ( 2018 07:31 )  PTT:31.7 sec  Urinalysis Basic - ( 2018 12:46 )    Color: Yellow / Appearance: Clear / S.025 / pH: x  Gluc: x / Ketone: NEGATIVE  / Bili: Small / Urobili: 1.0 E.U./dL   Blood: x / Protein: Trace mg/dL / Nitrite: NEGATIVE   Leuk Esterase: NEGATIVE / RBC: < 5 /HPF / WBC < 5 /HPF   Sq Epi: x / Non Sq Epi: 0-5 /HPF / Bacteria: Present /HPF      Hemoglobin A1C, Whole Blood: 6.2 % (18 @ 00:04)    EKG:  Ventricular Rate 93 BPM    Atrial Rate 93 BPM    P-R Interval 174 ms    QRS Duration 138 ms    Q-T Interval 388 ms    QTC Calculation(Bezet) 482 ms    P Axis 25 degrees    R Axis 171 degrees    T Axis 68 degrees    Diagnosis Line Normal sinus rhythm  Right axis deviation  Nonspecific intraventricular block  CXR:  Cardiomegaly, mild congestion and bilateral pleural effusions, right   greater than left.    Opacity overlying the left lung apex may represent loculated pleural   effusion or a mass. Recommend correlation with CT.  CT Scans:  CTA performed and reviewed  CT chest without con performed and reviewed   Cath Report:  2-V disease   Echo:  Left ventricular hypertrophy presentThere is severe global hypokinesis of   the   left ventricle.The left ventricular ejection fraction is severely   reduced.The   left ventricular ejection fraction is estimated to be <15%The right   ventricle   is mildly dilated.The right ventricular systolic function is mildly   reduced.The left atrium is moderately dilated.The left atrial volume   index is   43 cc/m2 (normal <34cc/m2)The right atrium is severely dilated.The right   atrial volume index is 58 cc/m2 (normal range 21+/-6 for women, 25 +/- 7   for   men)  Calcified aortic valve.There is mild aortic regurgitation.There is   Severe aortic stenosis.The peak pressure gradient is 72 mmHg.The mean   pressure   gradient is 40 mmHg.The calculated aortic valve area using the continuity   equation is 0.8 cm2.Mitral annular calcification noted.Mildly calcified   mitral   valve leaflets.There is moderate to severe mitral   regurgitation.Structurally   normal tricuspid valve.There is moderate tricuspid regurgitation.There is   severe pulmonary hypertension.The pulmonary artery systolic pressure is   estimated to be 72 mmHg.Structurally normal pulmonic valve.There is mild   pulmonic regurgitation.There is no pericardial effusion.Bilateral pleural   effusion noted.Ascites noted  PFT's:  performed   Carotid Duplex:  pending     Consult in Chart? YES  Consent in Chart? YES  Pre-op Orders Placed? YES  Blood Prodeucts Ordered? YES  NPO ordered? YES Planned Date of Surgery: 18                                                                                                                  Surgeon: Abbey/Lupe Benedict    Procedure: redo sternotomy with AVR, MVR, TVR and CABG    HPI:  67y Male with PMH significant for aortic surgery with s/p thoracotomy left presented to aortic center with aortic stenosis and Systolic CHF with EF 15%.  Patient underwent further evaluation revealing CAD along with mitral valve and tricuspid valve regurgitation.    At visit, patient is comfortable.  He denies chest pain, shortness of breath or syncope episodes.      PAST MEDICAL & SURGICAL HISTORY:  No pertinent past medical history  No significant past surgical history      penicillin (Rash)      MEDICATIONS  (STANDING):  furosemide    Tablet 20 milliGRAM(s) Oral daily  heparin  Injectable 5000 Unit(s) SubCutaneous every 8 hours  pantoprazole    Tablet 40 milliGRAM(s) Oral before breakfast  sodium chloride 0.9% lock flush 3 milliLiter(s) IV Push every 8 hours    MEDICATIONS  (PRN):      On Beta Blocker? NO  Hypotensive SBP at 100-110's    Labs:                        15.3   7.4   )-----------( 166      ( 2018 07:31 )             47.6         138  |  97  |  27<H>  ----------------------------<  137<H>  4.0   |  29  |  1.30    Ca    9.4      2018 07:31  Phos  3.9       Mg     2.2         TPro  6.5  /  Alb  3.6  /  TBili  1.9<H>  /  DBili  x   /  AST  23  /  ALT  11  /  AlkPhos  46      PT/INR - ( 2018 07:31 )   PT: 14.1 sec;   INR: 1.26          PTT - ( 2018 07:31 )  PTT:31.7 sec  Urinalysis Basic - ( 2018 12:46 )    Color: Yellow / Appearance: Clear / S.025 / pH: x  Gluc: x / Ketone: NEGATIVE  / Bili: Small / Urobili: 1.0 E.U./dL   Blood: x / Protein: Trace mg/dL / Nitrite: NEGATIVE   Leuk Esterase: NEGATIVE / RBC: < 5 /HPF / WBC < 5 /HPF   Sq Epi: x / Non Sq Epi: 0-5 /HPF / Bacteria: Present /HPF      Hemoglobin A1C, Whole Blood: 6.2 % (18 @ 00:04)    EKG:  Ventricular Rate 93 BPM    Atrial Rate 93 BPM    P-R Interval 174 ms    QRS Duration 138 ms    Q-T Interval 388 ms    QTC Calculation(Bezet) 482 ms    P Axis 25 degrees    R Axis 171 degrees    T Axis 68 degrees    Diagnosis Line Normal sinus rhythm  Right axis deviation  Nonspecific intraventricular block  CXR:  Cardiomegaly, mild congestion and bilateral pleural effusions, right   greater than left.    Opacity overlying the left lung apex may represent loculated pleural   effusion or a mass. Recommend correlation with CT.  CT Scans:  CTA performed and reviewed  CT chest without con performed and reviewed   Cath Report:  2-V disease   Echo:  Left ventricular hypertrophy presentThere is severe global hypokinesis of   the   left ventricle.The left ventricular ejection fraction is severely   reduced.The   left ventricular ejection fraction is estimated to be <15%The right   ventricle   is mildly dilated.The right ventricular systolic function is mildly   reduced.The left atrium is moderately dilated.The left atrial volume   index is   43 cc/m2 (normal <34cc/m2)The right atrium is severely dilated.The right   atrial volume index is 58 cc/m2 (normal range 21+/-6 for women, 25 +/- 7   for   men)  Calcified aortic valve.There is mild aortic regurgitation.There is   Severe aortic stenosis.The peak pressure gradient is 72 mmHg.The mean   pressure   gradient is 40 mmHg.The calculated aortic valve area using the continuity   equation is 0.8 cm2.Mitral annular calcification noted.Mildly calcified   mitral   valve leaflets.There is moderate to severe mitral   regurgitation.Structurally   normal tricuspid valve.There is moderate tricuspid regurgitation.There is   severe pulmonary hypertension.The pulmonary artery systolic pressure is   estimated to be 72 mmHg.Structurally normal pulmonic valve.There is mild   pulmonic regurgitation.There is no pericardial effusion.Bilateral pleural   effusion noted.Ascites noted  PFT's:  performed   Carotid Duplex:  pending     Consult in Chart? YES  Consent in Chart? YES  Pre-op Orders Placed? YES  Blood Prodeucts Ordered? YES  NPO ordered? YES

## 2018-06-20 NOTE — PROGRESS NOTE ADULT - ASSESSMENT
67y Male with PMH significant for aortic surgery with s/p thoracotomy left presented to aortic center with aortic stenosis and Systolic CHF with EF 15%.  Patient underwent further evaluation revealing CAD along with mitral valve and tricuspid valve regurgitation.  At visit, patient is comfortable.  He denies chest pain, shortness of breath or syncope episodes.  The details regarding surgery are explained to the patient.  He understands that.

## 2018-06-21 ENCOUNTER — APPOINTMENT (OUTPATIENT)
Dept: CARDIOTHORACIC SURGERY | Facility: HOSPITAL | Age: 68
End: 2018-06-21
Payer: MEDICARE

## 2018-06-21 ENCOUNTER — RESULT REVIEW (OUTPATIENT)
Age: 68
End: 2018-06-21

## 2018-06-21 LAB
ALBUMIN SERPL ELPH-MCNC: 2.3 G/DL — LOW (ref 3.3–5)
ALP SERPL-CCNC: 33 U/L — LOW (ref 40–120)
ALT FLD-CCNC: 24 U/L — SIGNIFICANT CHANGE UP (ref 10–45)
ANION GAP SERPL CALC-SCNC: 19 MMOL/L — HIGH (ref 5–17)
APTT BLD: 146.3 SEC — CRITICAL HIGH (ref 27.5–37.4)
APTT BLD: 54.1 SEC — HIGH (ref 27.5–37.4)
AST SERPL-CCNC: 72 U/L — HIGH (ref 10–40)
BILIRUB SERPL-MCNC: 2.3 MG/DL — HIGH (ref 0.2–1.2)
BUN SERPL-MCNC: 15 MG/DL — SIGNIFICANT CHANGE UP (ref 7–23)
CALCIUM SERPL-MCNC: 10.7 MG/DL — HIGH (ref 8.4–10.5)
CHLORIDE SERPL-SCNC: 105 MMOL/L — SIGNIFICANT CHANGE UP (ref 96–108)
CO2 SERPL-SCNC: 19 MMOL/L — LOW (ref 22–31)
CREAT SERPL-MCNC: 0.84 MG/DL — SIGNIFICANT CHANGE UP (ref 0.5–1.3)
D DIMER BLD IA.RAPID-MCNC: 884 NG/ML DDU — HIGH
FIBRINOGEN PPP-MCNC: 200 MG/DL — LOW (ref 258–438)
GAS PNL BLDA: SIGNIFICANT CHANGE UP
GAS PNL BLDA: SIGNIFICANT CHANGE UP
GAS PNL BLDV: SIGNIFICANT CHANGE UP
GLUCOSE SERPL-MCNC: 231 MG/DL — HIGH (ref 70–99)
HCT VFR BLD CALC: 28.9 % — LOW (ref 39–50)
HCT VFR BLD CALC: 30.5 % — LOW (ref 39–50)
HCT VFR BLD CALC: 34.8 % — LOW (ref 39–50)
HGB BLD-MCNC: 10 G/DL — LOW (ref 13–17)
HGB BLD-MCNC: 10.2 G/DL — LOW (ref 13–17)
HGB BLD-MCNC: 12 G/DL — LOW (ref 13–17)
INR BLD: 0.75 — LOW (ref 0.88–1.16)
INR BLD: 1.21 — HIGH (ref 0.88–1.16)
LACTATE SERPL-SCNC: 6.5 MMOL/L — CRITICAL HIGH (ref 0.5–2)
LACTATE SERPL-SCNC: 8.2 MMOL/L — CRITICAL HIGH (ref 0.5–2)
LACTATE SERPL-SCNC: 8.7 MMOL/L — CRITICAL HIGH (ref 0.5–2)
LYMPHOCYTES # BLD AUTO: 5.2 % — LOW (ref 13–44)
MCHC RBC-ENTMCNC: 29.3 PG — SIGNIFICANT CHANGE UP (ref 27–34)
MCHC RBC-ENTMCNC: 29.5 PG — SIGNIFICANT CHANGE UP (ref 27–34)
MCHC RBC-ENTMCNC: 29.7 PG — SIGNIFICANT CHANGE UP (ref 27–34)
MCHC RBC-ENTMCNC: 33.4 G/DL — SIGNIFICANT CHANGE UP (ref 32–36)
MCHC RBC-ENTMCNC: 34.5 G/DL — SIGNIFICANT CHANGE UP (ref 32–36)
MCHC RBC-ENTMCNC: 34.6 G/DL — SIGNIFICANT CHANGE UP (ref 32–36)
MCV RBC AUTO: 84.8 FL — SIGNIFICANT CHANGE UP (ref 80–100)
MCV RBC AUTO: 85.5 FL — SIGNIFICANT CHANGE UP (ref 80–100)
MCV RBC AUTO: 88.9 FL — SIGNIFICANT CHANGE UP (ref 80–100)
MONOCYTES NFR BLD AUTO: 17.2 % — HIGH (ref 2–14)
NEUTROPHILS NFR BLD AUTO: 77.6 % — HIGH (ref 43–77)
PLATELET # BLD AUTO: 104 K/UL — LOW (ref 150–400)
PLATELET # BLD AUTO: 104 K/UL — LOW (ref 150–400)
PLATELET # BLD AUTO: 130 K/UL — LOW (ref 150–400)
POTASSIUM SERPL-MCNC: 3.2 MMOL/L — LOW (ref 3.5–5.3)
POTASSIUM SERPL-SCNC: 3.2 MMOL/L — LOW (ref 3.5–5.3)
PROT SERPL-MCNC: 4.2 G/DL — LOW (ref 6–8.3)
PROTHROM AB SERPL-ACNC: 13.5 SEC — HIGH (ref 9.8–12.7)
PROTHROM AB SERPL-ACNC: 8.3 SEC — LOW (ref 9.8–12.7)
RBC # BLD: 3.41 M/UL — LOW (ref 4.2–5.8)
RBC # BLD: 3.43 M/UL — LOW (ref 4.2–5.8)
RBC # BLD: 4.07 M/UL — LOW (ref 4.2–5.8)
RBC # FLD: 14 % — SIGNIFICANT CHANGE UP (ref 10.3–16.9)
RBC # FLD: 14.4 % — SIGNIFICANT CHANGE UP (ref 10.3–16.9)
RBC # FLD: 14.7 % — SIGNIFICANT CHANGE UP (ref 10.3–16.9)
SODIUM SERPL-SCNC: 143 MMOL/L — SIGNIFICANT CHANGE UP (ref 135–145)
WBC # BLD: 4.7 K/UL — SIGNIFICANT CHANGE UP (ref 3.8–10.5)
WBC # BLD: 5 K/UL — SIGNIFICANT CHANGE UP (ref 3.8–10.5)
WBC # BLD: 6.8 K/UL — SIGNIFICANT CHANGE UP (ref 3.8–10.5)
WBC # FLD AUTO: 4.7 K/UL — SIGNIFICANT CHANGE UP (ref 3.8–10.5)
WBC # FLD AUTO: 5 K/UL — SIGNIFICANT CHANGE UP (ref 3.8–10.5)
WBC # FLD AUTO: 6.8 K/UL — SIGNIFICANT CHANGE UP (ref 3.8–10.5)

## 2018-06-21 PROCEDURE — 33860: CPT | Mod: AS

## 2018-06-21 PROCEDURE — 33405 REPLACEMENT AORTIC VALVE OPN: CPT | Mod: 59

## 2018-06-21 PROCEDURE — 33517 CABG ARTERY-VEIN SINGLE: CPT | Mod: 80

## 2018-06-21 PROCEDURE — 33967 INSERT I-AORT PERCUT DEVICE: CPT | Mod: 80,59

## 2018-06-21 PROCEDURE — 33517 CABG ARTERY-VEIN SINGLE: CPT

## 2018-06-21 PROCEDURE — 33860: CPT

## 2018-06-21 PROCEDURE — 33426 REPAIR OF MITRAL VALVE: CPT | Mod: AS

## 2018-06-21 PROCEDURE — 33405 REPLACEMENT AORTIC VALVE OPN: CPT | Mod: AS

## 2018-06-21 PROCEDURE — 99291 CRITICAL CARE FIRST HOUR: CPT

## 2018-06-21 PROCEDURE — 33533 CABG ARTERIAL SINGLE: CPT | Mod: 80,59

## 2018-06-21 PROCEDURE — 33426 REPAIR OF MITRAL VALVE: CPT | Mod: 59

## 2018-06-21 PROCEDURE — 33517 CABG ARTERY-VEIN SINGLE: CPT | Mod: AS

## 2018-06-21 PROCEDURE — 76998 US GUIDE INTRAOP: CPT | Mod: 26,59

## 2018-06-21 PROCEDURE — 33426 REPAIR OF MITRAL VALVE: CPT | Mod: 80,59

## 2018-06-21 PROCEDURE — 33533 CABG ARTERIAL SINGLE: CPT | Mod: 59

## 2018-06-21 PROCEDURE — 99292 CRITICAL CARE ADDL 30 MIN: CPT

## 2018-06-21 PROCEDURE — 33533 CABG ARTERIAL SINGLE: CPT | Mod: AS

## 2018-06-21 PROCEDURE — 71045 X-RAY EXAM CHEST 1 VIEW: CPT | Mod: 26

## 2018-06-21 PROCEDURE — 33967 INSERT I-AORT PERCUT DEVICE: CPT | Mod: AS

## 2018-06-21 PROCEDURE — 33967 INSERT I-AORT PERCUT DEVICE: CPT | Mod: 59

## 2018-06-21 PROCEDURE — 33860: CPT | Mod: 80

## 2018-06-21 PROCEDURE — 33405 REPLACEMENT AORTIC VALVE OPN: CPT | Mod: 80,59

## 2018-06-21 RX ORDER — CISATRACURIUM BESYLATE 2 MG/ML
3 INJECTION INTRAVENOUS
Qty: 200 | Refills: 0 | Status: DISCONTINUED | OUTPATIENT
Start: 2018-06-21 | End: 2018-07-01

## 2018-06-21 RX ORDER — MEPERIDINE HYDROCHLORIDE 50 MG/ML
25 INJECTION INTRAMUSCULAR; INTRAVENOUS; SUBCUTANEOUS ONCE
Qty: 0 | Refills: 0 | Status: DISCONTINUED | OUTPATIENT
Start: 2018-06-21 | End: 2018-06-21

## 2018-06-21 RX ORDER — PROPOFOL 10 MG/ML
5 INJECTION, EMULSION INTRAVENOUS
Qty: 1000 | Refills: 0 | Status: DISCONTINUED | OUTPATIENT
Start: 2018-06-21 | End: 2018-07-01

## 2018-06-21 RX ORDER — VASOPRESSIN 20 [USP'U]/ML
0.04 INJECTION INTRAVENOUS
Qty: 100 | Refills: 0 | Status: DISCONTINUED | OUTPATIENT
Start: 2018-06-21 | End: 2018-06-21

## 2018-06-21 RX ORDER — EPINEPHRINE 0.3 MG/.3ML
0.05 INJECTION INTRAMUSCULAR; SUBCUTANEOUS
Qty: 4 | Refills: 0 | Status: DISCONTINUED | OUTPATIENT
Start: 2018-06-21 | End: 2018-06-22

## 2018-06-21 RX ORDER — ALBUMIN HUMAN 25 %
250 VIAL (ML) INTRAVENOUS
Qty: 0 | Refills: 0 | Status: COMPLETED | OUTPATIENT
Start: 2018-06-21 | End: 2018-06-22

## 2018-06-21 RX ORDER — FENTANYL CITRATE 50 UG/ML
0.5 INJECTION INTRAVENOUS
Qty: 5000 | Refills: 0 | Status: DISCONTINUED | OUTPATIENT
Start: 2018-06-21 | End: 2018-06-21

## 2018-06-21 RX ORDER — DEXTROSE 50 % IN WATER 50 %
50 SYRINGE (ML) INTRAVENOUS
Qty: 0 | Refills: 0 | Status: DISCONTINUED | OUTPATIENT
Start: 2018-06-21 | End: 2018-07-20

## 2018-06-21 RX ORDER — CHLORHEXIDINE GLUCONATE 213 G/1000ML
5 SOLUTION TOPICAL EVERY 4 HOURS
Qty: 0 | Refills: 0 | Status: DISCONTINUED | OUTPATIENT
Start: 2018-06-21 | End: 2018-07-01

## 2018-06-21 RX ORDER — DEXTROSE 50 % IN WATER 50 %
25 SYRINGE (ML) INTRAVENOUS
Qty: 0 | Refills: 0 | Status: DISCONTINUED | OUTPATIENT
Start: 2018-06-21 | End: 2018-07-20

## 2018-06-21 RX ORDER — VASOPRESSIN 20 [USP'U]/ML
0.03 INJECTION INTRAVENOUS
Qty: 100 | Refills: 0 | Status: DISCONTINUED | OUTPATIENT
Start: 2018-06-21 | End: 2018-07-01

## 2018-06-21 RX ORDER — FENTANYL CITRATE 50 UG/ML
0.5 INJECTION INTRAVENOUS
Qty: 2500 | Refills: 0 | Status: DISCONTINUED | OUTPATIENT
Start: 2018-06-21 | End: 2018-06-25

## 2018-06-21 RX ORDER — SODIUM CHLORIDE 9 MG/ML
500 INJECTION, SOLUTION INTRAVENOUS ONCE
Qty: 0 | Refills: 0 | Status: COMPLETED | OUTPATIENT
Start: 2018-06-21 | End: 2018-06-21

## 2018-06-21 RX ORDER — NOREPINEPHRINE BITARTRATE/D5W 8 MG/250ML
0.05 PLASTIC BAG, INJECTION (ML) INTRAVENOUS
Qty: 8 | Refills: 0 | Status: DISCONTINUED | OUTPATIENT
Start: 2018-06-21 | End: 2018-06-28

## 2018-06-21 RX ORDER — INSULIN HUMAN 100 [IU]/ML
5 INJECTION, SOLUTION SUBCUTANEOUS
Qty: 100 | Refills: 0 | Status: DISCONTINUED | OUTPATIENT
Start: 2018-06-21 | End: 2018-07-01

## 2018-06-21 RX ORDER — MILRINONE LACTATE 1 MG/ML
0.5 INJECTION, SOLUTION INTRAVENOUS
Qty: 20 | Refills: 0 | Status: DISCONTINUED | OUTPATIENT
Start: 2018-06-21 | End: 2018-07-01

## 2018-06-21 RX ORDER — SODIUM CHLORIDE 9 MG/ML
1000 INJECTION INTRAMUSCULAR; INTRAVENOUS; SUBCUTANEOUS
Qty: 0 | Refills: 0 | Status: DISCONTINUED | OUTPATIENT
Start: 2018-06-21 | End: 2018-07-20

## 2018-06-21 RX ORDER — POTASSIUM CHLORIDE 20 MEQ
20 PACKET (EA) ORAL
Qty: 0 | Refills: 0 | Status: COMPLETED | OUTPATIENT
Start: 2018-06-21 | End: 2018-06-22

## 2018-06-21 RX ORDER — PANTOPRAZOLE SODIUM 20 MG/1
8 TABLET, DELAYED RELEASE ORAL
Qty: 80 | Refills: 0 | Status: DISCONTINUED | OUTPATIENT
Start: 2018-06-21 | End: 2018-06-23

## 2018-06-21 RX ORDER — HEPARIN SODIUM 5000 [USP'U]/ML
5000 INJECTION INTRAVENOUS; SUBCUTANEOUS EVERY 12 HOURS
Qty: 0 | Refills: 0 | Status: DISCONTINUED | OUTPATIENT
Start: 2018-06-22 | End: 2018-06-22

## 2018-06-21 RX ADMIN — SODIUM CHLORIDE 3 MILLILITER(S): 9 INJECTION INTRAMUSCULAR; INTRAVENOUS; SUBCUTANEOUS at 05:09

## 2018-06-21 RX ADMIN — CHLORHEXIDINE GLUCONATE 1 APPLICATION(S): 213 SOLUTION TOPICAL at 05:07

## 2018-06-21 RX ADMIN — Medication 100 MILLIEQUIVALENT(S): at 22:45

## 2018-06-21 RX ADMIN — Medication 20 MILLIGRAM(S): at 05:07

## 2018-06-21 RX ADMIN — Medication 125 MILLILITER(S): at 23:15

## 2018-06-21 RX ADMIN — PANTOPRAZOLE SODIUM 40 MILLIGRAM(S): 20 TABLET, DELAYED RELEASE ORAL at 05:07

## 2018-06-21 RX ADMIN — Medication 12.5 MILLIGRAM(S): at 05:07

## 2018-06-21 RX ADMIN — Medication 100 MILLIEQUIVALENT(S): at 23:55

## 2018-06-21 RX ADMIN — SODIUM CHLORIDE 1000 MILLILITER(S): 9 INJECTION, SOLUTION INTRAVENOUS at 23:21

## 2018-06-21 RX ADMIN — HEPARIN SODIUM 5000 UNIT(S): 5000 INJECTION INTRAVENOUS; SUBCUTANEOUS at 05:08

## 2018-06-21 RX ADMIN — Medication 125 MILLILITER(S): at 22:45

## 2018-06-21 NOTE — PROGRESS NOTE ADULT - SUBJECTIVE AND OBJECTIVE BOX
Date of surgery : 06/21/18   Surgeon : Dr. Potter    Anesthesia : Rupert Naranjo MD    Procedure : Replacement of AA.                      AVR                      MV Repair                      TV repair                      CABG x 2.     Pump Time :      272                                Aortic X -Clamp :       206                                        Circulatory Arrest :    EF :     Pre OP :                                   Post OP :                                                      Assist Device : Impella.    Airway :      Difficult Airway :                   [ ] Yes     [x ] No      ETT             Size :  8                       Lip Line :   22                        Ventilator setting :              [ ] ON          [ ] BS +           [ ] ETCO2       Mode: AC/ CMV (Assist Control/ Continuous Mandatory Ventilation)  RR (machine): 12  TV (machine): 500  FiO2: 50  PEEP: 5  ITime: 1  MAP: 14  PIP: 28          Lines :      [ x] PA catheter      [ ] Radial Arterial Line              [ x ] Right              [  ] Left       [ ] Femoral Arterial Line          [  ] Right              [  ] Left      [ x] Central Line   IJ                     [ x ] Right              [  ] Left      [ ] Femoral Central line            [  ] Right              [  ] Left     Tubes :      Blakes :                                      [  ] Med.             [  ] Pleural      Chest Tubes :                           [  ] Med.             [  ] Pleural       Pacing     Wires :             [  x ] Atrial                  [ x  ]  Venticular      Pacer Setting :                   Threshold  :                Sensitivity :       Intake     Drips :        IVF :     Albumin :     Product :             [ 12  ]  PRBC       [ 4 ] Platelet     [ 8  ] FFP          [ 20  ] Cryoprecipitate                                  [   ]  Cell saver                                  [   ]  Hemostatic agent : FEIBA                                  [ x  ]   Factors :  VII x 2     Output      EBL :      Urine :       Antibiotics :   ICU Vital Signs Last 24 Hrs  T(C): 34.3 (06-21-18 @ 23:00), Max: 36.2 (06-21-18 @ 01:06)  T(F): 93.7 (06-21-18 @ 23:00), Max: 97.2 (06-21-18 @ 01:06)  HR: 98 (06-21-18 @ 23:00) (96 - 106)  BP: 112/84 (06-21-18 @ 04:43) (107/85 - 112/84)  BP(mean): 95 (06-21-18 @ 04:43) (93 - 95)  ABP: 116/48 (06-21-18 @ 23:00) (98/42 - 132/52)  ABP(mean): 74 (06-21-18 @ 23:00) (64 - 80)  RR: 13 (06-21-18 @ 23:00) (11 - 22)  SpO2: 100% (06-21-18 @ 23:00) (93% - 100%)    I&O's Summary    20 Jun 2018 07:01  -  21 Jun 2018 07:00  --------------------------------------------------------  IN: 700 mL / OUT: 900 mL / NET: -200 mL    21 Jun 2018 07:01  -  21 Jun 2018 23:15  --------------------------------------------------------  IN: 1025.9 mL / OUT: 860 mL / NET: 165.9 mL      ABG - ( 21 Jun 2018 21:32 )  pH: 7.43  /  pCO2: 30    /  pO2: 280   / HCO3: 19    / Base Excess: -4.3  /  SaO2: 99                                      12.0   5.0   )-----------( 130      ( 21 Jun 2018 21:33 )             34.8     21 Jun 2018 21:33    143    |  105    |  15     ----------------------------<  231    3.2     |  19     |  0.84     Ca    10.7       21 Jun 2018 21:33    TPro  4.2    /  Alb  2.3    /  TBili  2.3    /  DBili  x      /  AST  72     /  ALT  24     /  AlkPhos  33     21 Jun 2018 21:33    PT/INR - ( 21 Jun 2018 21:33 )   PT: 8.3 sec;   INR: 0.75          PTT - ( 21 Jun 2018 21:33 )  PTT:146.3 sec  MEDICATIONS  (STANDING):  albumin human  5% IVPB 250 milliLiter(s) IV Intermittent every 10 minutes  chlorhexidine 0.12% Liquid 5 milliLiter(s) Swish and Spit every 4 hours  cisatracurium Infusion 3 MICROgram(s)/kG/Min IV Continuous <Continuous>  dextrose 50% Injectable 50 milliLiter(s) IV Push every 15 minutes  dextrose 50% Injectable 25 milliLiter(s) IV Push every 15 minutes  fentaNYL   Infusion. 0.5 MICROgram(s)/kG/Hr IV Continuous <Continuous>  lactated ringers Bolus 500 milliLiter(s) IV Bolus once  potassium chloride  20 mEq/100 mL IVPB 20 milliEquivalent(s) IV Intermittent every 1 hour  propofol Infusion 5 MICROgram(s)/kG/Min IV Continuous <Continuous>  sodium chloride 0.9%. 1000 milliLiter(s) IV Continuous <Continuous>      PHYSICAL EXAM:  Gen : no acute distress  Neck: No LAD, No JVD  Respiratory: decreased in the bases  Cardiovascular: S1 and S2, RRR, no M/G/R  Gastrointestinal: BS+, soft, NT/ND  Extremities: No peripheral edema  Vascular: 2+ peripheral pulses  Neurological: A/O x 3, no focal deficits  Incision: clean dry/ no sign of infection  Lines: no sign of infection

## 2018-06-21 NOTE — BRIEF OPERATIVE NOTE - OPERATION/FINDINGS
AS, MR, TR, Thin Walled Ascending aorta AS, MR, TR, Thin Walled Ascending aorta  EF15%  Aortic clamp time: 206 mins

## 2018-06-21 NOTE — PROGRESS NOTE ADULT - SUBJECTIVE AND OBJECTIVE BOX
CTICU  CRITICAL  CARE  attending     Hand off received 					   Pertinent clinical, laboratory, radiographic, hemodynamic, echocardiographic, respiratory data, microbiologic data and chart were reviewed and analyzed frequently throughout the course of the day and night  Patient seen and examined with CTS/ SH attending at bedside    Patient is a 67 year old male with history of AV (Congenital heartdisease)s/p AV repair / replacement at the age of 12 in [facility]. H/O coarctation of aorta S/P repair.  He is / was  being evaluated for ? abdominal hernia repair surgery. Given medical and surgical history cardiology clearance needed. On evaluation with  TTE / Cardiac Catheterization (Completed 2018.) EF = 25%, reduced LV function, severe AS, severe MR, severe TR, severe pHTN, and 2V CAD. Patient transferred for [facility] [place] under the care of Dr. Potter for further work up and Management.  After speaking with patient he does state a decreased in Exercise tolerance and increase in SOB that has been progressing over the last 6-8 month. Details hard to obtain.   Patient poor historian.        HEALTH ISSUES - PROBLEM Dx:  CAD (coronary artery disease): CAD (coronary artery disease)  Valvular disease: Valvular disease      FAMILY HISTORY:  PAST MEDICAL & SURGICAL HISTORY:  No pertinent past medical history  No significant past surgical history    Patient is a 67y old  Male who presents with a chief complaint of AS (2018 00:06)      14 system review was unremarkable  acute changes include acute respiratory failure  Vital signs, hemodynamic and respiratory parameters were reviewed from the bedside nursing flow sheet.  ICU Vital Signs Last 24 Hrs  T(C): 34.3 (2018 23:00), Max: 36.2 (2018 01:06)  T(F): 93.7 (2018 23:00), Max: 97.2 (2018 01:06)  HR: 98 (2018 23:00) (96 - 106)  BP: 112/84 (2018 04:43) (107/85 - 112/84)  BP(mean): 95 (2018 04:43) (93 - 95)  ABP: 116/48 (2018 23:00) (98/42 - 132/52)  ABP(mean): 74 (2018 23:00) (64 - 80)  RR: 13 (2018 23:00) (11 - 22)  SpO2: 100% (2018 23:00) (93% - 100%)    Adult Advanced Hemodynamics Last 24 Hrs  CVP(mm Hg): 10 (2018 23:00) (4 - 10)  CVP(cm H2O): --  CO: --  CI: --  PA: --  PA(mean): --  PCWP: --  SVR: --  SVRI: --  PVR: --  PVRI: --, ABG - ( 2018 23:28 )  pH, Arterial: 7.52  pH, Blood: x     /  pCO2: 23    /  pO2: 276   / HCO3: 18    / Base Excess: -3.6  /  SaO2: 99                Mode: AC/ CMV (Assist Control/ Continuous Mandatory Ventilation)  RR (machine): 12  TV (machine): 500  FiO2: 50  PEEP: 5  ITime: 1  MAP: 14  PIP: 28    Intake and output was reviewed and the fluid balance was calculated  Daily Height in cm: 165.1 (2018 01:07)    Daily Weight in k.3 (2018 05:36)  I&O's Summary    2018 07:01  -  2018 07:00  --------------------------------------------------------  IN: 700 mL / OUT: 900 mL / NET: -200 mL    2018 07:01  -  2018 23:34  --------------------------------------------------------  IN: 1025.9 mL / OUT: 860 mL / NET: 165.9 mL        All lines and drain sites were assessed  Glycemic trend was reviewed CAPILLARY BLOOD GLUCOSE        NEURO: No acute change in mental status NOTICED. SEDATED  AND PARALYSED.  CHEST: STERNUM is open. Covered with silastic.  CVS: S1, S2, No S3.  RESPIRATORY: Auscultation of the chest reveals equal breath sounds.  GI: Abdomen is soft  Extremities are warm and well perfused.  VASCULAR: + Distal pulses.  Wounds appear clean and unremarkable  Antibiotics are perioperative cefazolin.    labs  CBC Full  -  ( 2018 21:33 )  WBC Count : 5.0 K/uL  Hemoglobin : 12.0 g/dL  Hematocrit : 34.8 %  Platelet Count - Automated : 130 K/uL  Mean Cell Volume : 85.5 fL  Mean Cell Hemoglobin : 29.5 pg  Mean Cell Hemoglobin Concentration : 34.5 g/dL  Auto Neutrophil # : x  Auto Lymphocyte # : x  Auto Monocyte # : x  Auto Eosinophil # : x  Auto Basophil # : x  Auto Neutrophil % : 77.6 %  Auto Lymphocyte % : 5.2 %  Auto Monocyte % : 17.2 %  Auto Eosinophil % : x  Auto Basophil % : x        143  |  105  |  15  ----------------------------<  231<H>  3.2<L>   |  19<L>  |  0.84    Ca    10.7<H>      2018 21:33    TPro  4.2<L>  /  Alb  2.3<L>  /  TBili  2.3<H>  /  DBili  x   /  AST  72<H>  /  ALT  24  /  AlkPhos  33<L>      PT/INR - ( 2018 21:33 )   PT: 8.3 sec;   INR: 0.75          PTT - ( 2018 21:33 )  PTT:146.3 sec  The current medications were reviewed   MEDICATIONS  (STANDING):  albumin human  5% IVPB 250 milliLiter(s) IV Intermittent every 10 minutes  chlorhexidine 0.12% Liquid 5 milliLiter(s) Swish and Spit every 4 hours  cisatracurium Infusion 3 MICROgram(s)/kG/Min (10.548 mL/Hr) IV Continuous <Continuous>  dextrose 50% Injectable 50 milliLiter(s) IV Push every 15 minutes  dextrose 50% Injectable 25 milliLiter(s) IV Push every 15 minutes  EPINEPHrine     Infusion 0.05 MICROgram(s)/kG/Min (10.988 mL/Hr) IV Continuous <Continuous>  fentaNYL   Infusion. 0.5 MICROgram(s)/kG/Hr (2.93 mL/Hr) IV Continuous <Continuous>  insulin Infusion 5 Unit(s)/Hr (5 mL/Hr) IV Continuous <Continuous>  milrinone Infusion 0.5 MICROgram(s)/kG/Min (8.79 mL/Hr) IV Continuous <Continuous>  norepinephrine Infusion 0.05 MICROgram(s)/kG/Min (5.494 mL/Hr) IV Continuous <Continuous>  pantoprazole Infusion 8 mG/Hr (10 mL/Hr) IV Continuous <Continuous>  potassium chloride  20 mEq/100 mL IVPB 20 milliEquivalent(s) IV Intermittent every 1 hour  propofol Infusion 5 MICROgram(s)/kG/Min (1.758 mL/Hr) IV Continuous <Continuous>  sodium chloride 0.9%. 1000 milliLiter(s) (10 mL/Hr) IV Continuous <Continuous>  vasopressin Infusion 0.03 Unit(s)/Min (1.8 mL/Hr) IV Continuous <Continuous>    MEDICATIONS  (PRN):       PROBLEM LIST/ ASSESSMENT:  HEALTH ISSUES - PROBLEM Dx:  CAD (coronary artery disease): CAD (coronary artery disease)  Valvular disease: Valvular disease        Patient is a 67y old  Male who presents with CHF  S/P AVR, MV REPAIR, TV repair.  S/P CABG X2.  S/P Replacement of AA.  Hemodynamics holding on mechanical and inotropic  support with epinephrine, norepinephrine, milrinone.  + Metabolic acidosis.  Good oxygenation.  Fair urine output.  Overall satisfactory clinical picture.            My plan includes :  Close hemodynamic, ventilatory and drain monitoring and management.  Continue full  vent support and nitric oxide.  Monitor for arrhythmias and monitor parameters for organ perfusion  monitor neurologic status  Head of the bed should remain elevated to 45 deg .   chest PT and IS will be encouraged  monitor adequacy of oxygenation and ventilation and attempt to wean oxygen  Nutritional goals will be met using po eventually , ensure adequate caloric intake and monitor  the same  Stress ulcer and VTE prophylaxis will be achieved    Glycemic control is satisfactory  Electrolytes have been repleted as necessary and wound care has been carried out. Pain control has been achieved.   Aggressive  physical therapy and early mobility and ambulation goals will be met   The family was updated about the course and plan  CRITICAL CARE TIME SPENT in evaluation and management, reassessments, review and interpretation of labs and x-rays, ventilator and hemodynamic management, formulating a plan and coordinating care: ___120____ MIN.  Time does not include procedural time.  CTICU ATTENDING     					    Jaylon Rahman MD

## 2018-06-21 NOTE — BRIEF OPERATIVE NOTE - PROCEDURE
<<-----Click on this checkbox to enter Procedure Ascending aorta graft  06/21/2018    Active  MODONOGUE  CABG  06/21/2018  LIMA-LAD, SVG-Diag  Active  MODONOGUE  Tricuspid valve repair  06/21/2018    Active  MODONOGUE  Mitral valve repair  06/21/2018    Active  MODONOGUE  AVR (aortic valve replacement)  06/21/2018    Active  MODONOGUE

## 2018-06-22 ENCOUNTER — APPOINTMENT (OUTPATIENT)
Dept: CARDIOTHORACIC SURGERY | Facility: HOSPITAL | Age: 68
End: 2018-06-22

## 2018-06-22 LAB
ALBUMIN SERPL ELPH-MCNC: 2.1 G/DL — LOW (ref 3.3–5)
ALBUMIN SERPL ELPH-MCNC: 2.4 G/DL — LOW (ref 3.3–5)
ALBUMIN SERPL ELPH-MCNC: 2.6 G/DL — LOW (ref 3.3–5)
ALBUMIN SERPL ELPH-MCNC: 2.6 G/DL — LOW (ref 3.3–5)
ALBUMIN SERPL ELPH-MCNC: 2.7 G/DL — LOW (ref 3.3–5)
ALBUMIN SERPL ELPH-MCNC: 2.8 G/DL — LOW (ref 3.3–5)
ALBUMIN SERPL ELPH-MCNC: 2.9 G/DL — LOW (ref 3.3–5)
ALP SERPL-CCNC: 30 U/L — LOW (ref 40–120)
ALP SERPL-CCNC: 30 U/L — LOW (ref 40–120)
ALP SERPL-CCNC: 32 U/L — LOW (ref 40–120)
ALP SERPL-CCNC: 35 U/L — LOW (ref 40–120)
ALP SERPL-CCNC: 35 U/L — LOW (ref 40–120)
ALP SERPL-CCNC: 37 U/L — LOW (ref 40–120)
ALP SERPL-CCNC: 37 U/L — LOW (ref 40–120)
ALT FLD-CCNC: 16 U/L — SIGNIFICANT CHANGE UP (ref 10–45)
ALT FLD-CCNC: 19 U/L — SIGNIFICANT CHANGE UP (ref 10–45)
ALT FLD-CCNC: 20 U/L — SIGNIFICANT CHANGE UP (ref 10–45)
ALT FLD-CCNC: 20 U/L — SIGNIFICANT CHANGE UP (ref 10–45)
ALT FLD-CCNC: 21 U/L — SIGNIFICANT CHANGE UP (ref 10–45)
ANION GAP SERPL CALC-SCNC: 10 MMOL/L — SIGNIFICANT CHANGE UP (ref 5–17)
ANION GAP SERPL CALC-SCNC: 11 MMOL/L — SIGNIFICANT CHANGE UP (ref 5–17)
ANION GAP SERPL CALC-SCNC: 12 MMOL/L — SIGNIFICANT CHANGE UP (ref 5–17)
ANION GAP SERPL CALC-SCNC: 15 MMOL/L — SIGNIFICANT CHANGE UP (ref 5–17)
ANION GAP SERPL CALC-SCNC: 16 MMOL/L — SIGNIFICANT CHANGE UP (ref 5–17)
ANION GAP SERPL CALC-SCNC: 19 MMOL/L — HIGH (ref 5–17)
ANION GAP SERPL CALC-SCNC: 23 MMOL/L — HIGH (ref 5–17)
APTT BLD: 106.9 SEC — HIGH (ref 27.5–37.4)
APTT BLD: 116.6 SEC — SIGNIFICANT CHANGE UP (ref 27.5–37.4)
APTT BLD: 68.3 SEC — HIGH (ref 27.5–37.4)
APTT BLD: 75.1 SEC — HIGH (ref 27.5–37.4)
APTT BLD: >200 SEC — CRITICAL HIGH (ref 27.5–37.4)
AST SERPL-CCNC: 68 U/L — HIGH (ref 10–40)
AST SERPL-CCNC: 68 U/L — HIGH (ref 10–40)
AST SERPL-CCNC: 72 U/L — HIGH (ref 10–40)
AST SERPL-CCNC: 82 U/L — HIGH (ref 10–40)
AST SERPL-CCNC: 83 U/L — HIGH (ref 10–40)
AST SERPL-CCNC: 91 U/L — HIGH (ref 10–40)
AST SERPL-CCNC: 93 U/L — HIGH (ref 10–40)
BASE EXCESS BLDA CALC-SCNC: 1.8 MMOL/L — SIGNIFICANT CHANGE UP (ref -2–3)
BASE EXCESS BLDV CALC-SCNC: -0.3 MMOL/L — SIGNIFICANT CHANGE UP
BASE EXCESS BLDV CALC-SCNC: -1 MMOL/L — SIGNIFICANT CHANGE UP
BASE EXCESS BLDV CALC-SCNC: 0.8 MMOL/L — SIGNIFICANT CHANGE UP
BILIRUB DIRECT SERPL-MCNC: 1.1 MG/DL — HIGH (ref 0–0.2)
BILIRUB DIRECT SERPL-MCNC: 2 MG/DL — HIGH (ref 0–0.2)
BILIRUB INDIRECT FLD-MCNC: 2.1 MG/DL — HIGH (ref 0.2–1)
BILIRUB INDIRECT FLD-MCNC: 2.8 MG/DL — HIGH (ref 0.2–1)
BILIRUB SERPL-MCNC: 3 MG/DL — HIGH (ref 0.2–1.2)
BILIRUB SERPL-MCNC: 3.4 MG/DL — HIGH (ref 0.2–1.2)
BILIRUB SERPL-MCNC: 3.7 MG/DL — HIGH (ref 0.2–1.2)
BILIRUB SERPL-MCNC: 3.9 MG/DL — HIGH (ref 0.2–1.2)
BILIRUB SERPL-MCNC: 4 MG/DL — HIGH (ref 0.2–1.2)
BILIRUB SERPL-MCNC: 4 MG/DL — HIGH (ref 0.2–1.2)
BILIRUB SERPL-MCNC: 4.1 MG/DL — HIGH (ref 0.2–1.2)
BUN SERPL-MCNC: 14 MG/DL — SIGNIFICANT CHANGE UP (ref 7–23)
BUN SERPL-MCNC: 16 MG/DL — SIGNIFICANT CHANGE UP (ref 7–23)
CALCIUM SERPL-MCNC: 7.5 MG/DL — LOW (ref 8.4–10.5)
CALCIUM SERPL-MCNC: 8.2 MG/DL — LOW (ref 8.4–10.5)
CALCIUM SERPL-MCNC: 8.6 MG/DL — SIGNIFICANT CHANGE UP (ref 8.4–10.5)
CALCIUM SERPL-MCNC: 8.6 MG/DL — SIGNIFICANT CHANGE UP (ref 8.4–10.5)
CALCIUM SERPL-MCNC: 9.2 MG/DL — SIGNIFICANT CHANGE UP (ref 8.4–10.5)
CALCIUM SERPL-MCNC: 9.6 MG/DL — SIGNIFICANT CHANGE UP (ref 8.4–10.5)
CALCIUM SERPL-MCNC: 9.7 MG/DL — SIGNIFICANT CHANGE UP (ref 8.4–10.5)
CHLORIDE SERPL-SCNC: 103 MMOL/L — SIGNIFICANT CHANGE UP (ref 96–108)
CHLORIDE SERPL-SCNC: 103 MMOL/L — SIGNIFICANT CHANGE UP (ref 96–108)
CHLORIDE SERPL-SCNC: 106 MMOL/L — SIGNIFICANT CHANGE UP (ref 96–108)
CHLORIDE SERPL-SCNC: 106 MMOL/L — SIGNIFICANT CHANGE UP (ref 96–108)
CHLORIDE SERPL-SCNC: 107 MMOL/L — SIGNIFICANT CHANGE UP (ref 96–108)
CHLORIDE SERPL-SCNC: 108 MMOL/L — SIGNIFICANT CHANGE UP (ref 96–108)
CHLORIDE SERPL-SCNC: 108 MMOL/L — SIGNIFICANT CHANGE UP (ref 96–108)
CO2 SERPL-SCNC: 18 MMOL/L — LOW (ref 22–31)
CO2 SERPL-SCNC: 20 MMOL/L — LOW (ref 22–31)
CO2 SERPL-SCNC: 23 MMOL/L — SIGNIFICANT CHANGE UP (ref 22–31)
CO2 SERPL-SCNC: 23 MMOL/L — SIGNIFICANT CHANGE UP (ref 22–31)
CO2 SERPL-SCNC: 24 MMOL/L — SIGNIFICANT CHANGE UP (ref 22–31)
CO2 SERPL-SCNC: 25 MMOL/L — SIGNIFICANT CHANGE UP (ref 22–31)
CO2 SERPL-SCNC: 25 MMOL/L — SIGNIFICANT CHANGE UP (ref 22–31)
CREAT SERPL-MCNC: 0.85 MG/DL — SIGNIFICANT CHANGE UP (ref 0.5–1.3)
CREAT SERPL-MCNC: 0.88 MG/DL — SIGNIFICANT CHANGE UP (ref 0.5–1.3)
CREAT SERPL-MCNC: 0.91 MG/DL — SIGNIFICANT CHANGE UP (ref 0.5–1.3)
CREAT SERPL-MCNC: 0.98 MG/DL — SIGNIFICANT CHANGE UP (ref 0.5–1.3)
GAS PNL BLDA: SIGNIFICANT CHANGE UP
GAS PNL BLDV: SIGNIFICANT CHANGE UP
GLUCOSE BLDC GLUCOMTR-MCNC: 146 MG/DL — HIGH (ref 70–99)
GLUCOSE BLDC GLUCOMTR-MCNC: 158 MG/DL — HIGH (ref 70–99)
GLUCOSE BLDC GLUCOMTR-MCNC: 192 MG/DL — HIGH (ref 70–99)
GLUCOSE BLDC GLUCOMTR-MCNC: 201 MG/DL — HIGH (ref 70–99)
GLUCOSE BLDC GLUCOMTR-MCNC: 234 MG/DL — HIGH (ref 70–99)
GLUCOSE BLDC GLUCOMTR-MCNC: 244 MG/DL — HIGH (ref 70–99)
GLUCOSE BLDC GLUCOMTR-MCNC: 247 MG/DL — HIGH (ref 70–99)
GLUCOSE BLDC GLUCOMTR-MCNC: 253 MG/DL — HIGH (ref 70–99)
GLUCOSE BLDC GLUCOMTR-MCNC: 255 MG/DL — HIGH (ref 70–99)
GLUCOSE BLDC GLUCOMTR-MCNC: 261 MG/DL — HIGH (ref 70–99)
GLUCOSE BLDC GLUCOMTR-MCNC: 60 MG/DL — LOW (ref 70–99)
GLUCOSE BLDC GLUCOMTR-MCNC: 67 MG/DL — LOW (ref 70–99)
GLUCOSE BLDC GLUCOMTR-MCNC: 82 MG/DL — SIGNIFICANT CHANGE UP (ref 70–99)
GLUCOSE BLDC GLUCOMTR-MCNC: 89 MG/DL — SIGNIFICANT CHANGE UP (ref 70–99)
GLUCOSE SERPL-MCNC: 101 MG/DL — HIGH (ref 70–99)
GLUCOSE SERPL-MCNC: 141 MG/DL — HIGH (ref 70–99)
GLUCOSE SERPL-MCNC: 215 MG/DL — HIGH (ref 70–99)
GLUCOSE SERPL-MCNC: 238 MG/DL — HIGH (ref 70–99)
GLUCOSE SERPL-MCNC: 243 MG/DL — HIGH (ref 70–99)
GLUCOSE SERPL-MCNC: 262 MG/DL — HIGH (ref 70–99)
GLUCOSE SERPL-MCNC: 56 MG/DL — LOW (ref 70–99)
HCO3 BLDA-SCNC: 24 MMOL/L — SIGNIFICANT CHANGE UP (ref 21–28)
HCO3 BLDV-SCNC: 21 MMOL/L — SIGNIFICANT CHANGE UP (ref 20–27)
HCO3 BLDV-SCNC: 22 MMOL/L — SIGNIFICANT CHANGE UP (ref 20–27)
HCO3 BLDV-SCNC: 26 MMOL/L — SIGNIFICANT CHANGE UP (ref 20–27)
HCT VFR BLD CALC: 21.7 % — LOW (ref 39–50)
HCT VFR BLD CALC: 22.2 % — LOW (ref 39–50)
HCT VFR BLD CALC: 27.4 % — LOW (ref 39–50)
HCT VFR BLD CALC: 29.6 % — LOW (ref 39–50)
HCT VFR BLD CALC: 29.9 % — LOW (ref 39–50)
HCT VFR BLD CALC: 31.4 % — LOW (ref 39–50)
HGB BLD-MCNC: 10.7 G/DL — LOW (ref 13–17)
HGB BLD-MCNC: 10.7 G/DL — LOW (ref 13–17)
HGB BLD-MCNC: 10.9 G/DL — LOW (ref 13–17)
HGB BLD-MCNC: 7.6 G/DL — LOW (ref 13–17)
HGB BLD-MCNC: 8 G/DL — LOW (ref 13–17)
HGB BLD-MCNC: 9.7 G/DL — LOW (ref 13–17)
INR BLD: 0.94 — SIGNIFICANT CHANGE UP (ref 0.88–1.16)
INR BLD: 0.96 — SIGNIFICANT CHANGE UP (ref 0.88–1.16)
INR BLD: 1.05 — SIGNIFICANT CHANGE UP (ref 0.88–1.16)
INR BLD: 1.3 — HIGH (ref 0.88–1.16)
INR BLD: 1.46 — HIGH (ref 0.88–1.16)
INR BLD: 1.69 — HIGH (ref 0.88–1.16)
LACTATE SERPL-SCNC: 1.9 MMOL/L — SIGNIFICANT CHANGE UP (ref 0.5–2)
LACTATE SERPL-SCNC: 2.1 MMOL/L — HIGH (ref 0.5–2)
LACTATE SERPL-SCNC: 2.3 MMOL/L — HIGH (ref 0.5–2)
LACTATE SERPL-SCNC: 2.6 MMOL/L — HIGH (ref 0.5–2)
LACTATE SERPL-SCNC: 4.1 MMOL/L — CRITICAL HIGH (ref 0.5–2)
LACTATE SERPL-SCNC: 5.5 MMOL/L — CRITICAL HIGH (ref 0.5–2)
LACTATE SERPL-SCNC: 7.6 MMOL/L — CRITICAL HIGH (ref 0.5–2)
MAGNESIUM SERPL-MCNC: 1.7 MG/DL — SIGNIFICANT CHANGE UP (ref 1.6–2.6)
MAGNESIUM SERPL-MCNC: 1.8 MG/DL — SIGNIFICANT CHANGE UP (ref 1.6–2.6)
MAGNESIUM SERPL-MCNC: 1.9 MG/DL — SIGNIFICANT CHANGE UP (ref 1.6–2.6)
MAGNESIUM SERPL-MCNC: 2 MG/DL — SIGNIFICANT CHANGE UP (ref 1.6–2.6)
MAGNESIUM SERPL-MCNC: 2.1 MG/DL — SIGNIFICANT CHANGE UP (ref 1.6–2.6)
MAGNESIUM SERPL-MCNC: 2.3 MG/DL — SIGNIFICANT CHANGE UP (ref 1.6–2.6)
MCHC RBC-ENTMCNC: 28.5 PG — SIGNIFICANT CHANGE UP (ref 27–34)
MCHC RBC-ENTMCNC: 29.2 PG — SIGNIFICANT CHANGE UP (ref 27–34)
MCHC RBC-ENTMCNC: 29.5 PG — SIGNIFICANT CHANGE UP (ref 27–34)
MCHC RBC-ENTMCNC: 29.6 PG — SIGNIFICANT CHANGE UP (ref 27–34)
MCHC RBC-ENTMCNC: 30.1 PG — SIGNIFICANT CHANGE UP (ref 27–34)
MCHC RBC-ENTMCNC: 30.4 PG — SIGNIFICANT CHANGE UP (ref 27–34)
MCHC RBC-ENTMCNC: 34.7 G/DL — SIGNIFICANT CHANGE UP (ref 32–36)
MCHC RBC-ENTMCNC: 35 G/DL — SIGNIFICANT CHANGE UP (ref 32–36)
MCHC RBC-ENTMCNC: 35.4 G/DL — SIGNIFICANT CHANGE UP (ref 32–36)
MCHC RBC-ENTMCNC: 35.8 G/DL — SIGNIFICANT CHANGE UP (ref 32–36)
MCHC RBC-ENTMCNC: 36 G/DL — SIGNIFICANT CHANGE UP (ref 32–36)
MCHC RBC-ENTMCNC: 36.1 G/DL — HIGH (ref 32–36)
MCV RBC AUTO: 82 FL — SIGNIFICANT CHANGE UP (ref 80–100)
MCV RBC AUTO: 82.8 FL — SIGNIFICANT CHANGE UP (ref 80–100)
MCV RBC AUTO: 83.3 FL — SIGNIFICANT CHANGE UP (ref 80–100)
MCV RBC AUTO: 83.4 FL — SIGNIFICANT CHANGE UP (ref 80–100)
MCV RBC AUTO: 83.5 FL — SIGNIFICANT CHANGE UP (ref 80–100)
MCV RBC AUTO: 84.4 FL — SIGNIFICANT CHANGE UP (ref 80–100)
PCO2 BLDA: 30 MMHG — LOW (ref 35–48)
PCO2 BLDV: 25 MMHG — LOW (ref 41–51)
PCO2 BLDV: 30 MMHG — LOW (ref 41–51)
PCO2 BLDV: 45 MMHG — SIGNIFICANT CHANGE UP (ref 41–51)
PH BLDA: 7.52 — HIGH (ref 7.35–7.45)
PH BLDV: 7.38 — SIGNIFICANT CHANGE UP (ref 7.32–7.43)
PH BLDV: 7.49 — HIGH (ref 7.32–7.43)
PH BLDV: 7.54 — HIGH (ref 7.32–7.43)
PHOSPHATE SERPL-MCNC: 1 MG/DL — CRITICAL LOW (ref 2.5–4.5)
PHOSPHATE SERPL-MCNC: 1.2 MG/DL — LOW (ref 2.5–4.5)
PHOSPHATE SERPL-MCNC: 2.1 MG/DL — LOW (ref 2.5–4.5)
PHOSPHATE SERPL-MCNC: 4.1 MG/DL — SIGNIFICANT CHANGE UP (ref 2.5–4.5)
PHOSPHATE SERPL-MCNC: 5.1 MG/DL — HIGH (ref 2.5–4.5)
PHOSPHATE SERPL-MCNC: 5.4 MG/DL — HIGH (ref 2.5–4.5)
PLATELET # BLD AUTO: 118 K/UL — LOW (ref 150–400)
PLATELET # BLD AUTO: 135 K/UL — LOW (ref 150–400)
PLATELET # BLD AUTO: 146 K/UL — LOW (ref 150–400)
PLATELET # BLD AUTO: 157 K/UL — SIGNIFICANT CHANGE UP (ref 150–400)
PLATELET # BLD AUTO: 65 K/UL — LOW (ref 150–400)
PLATELET # BLD AUTO: 93 K/UL — LOW (ref 150–400)
PO2 BLDA: 241 MMHG — HIGH (ref 83–108)
PO2 BLDV: 33 MMHG — SIGNIFICANT CHANGE UP
POTASSIUM SERPL-MCNC: 3.2 MMOL/L — LOW (ref 3.5–5.3)
POTASSIUM SERPL-MCNC: 3.5 MMOL/L — SIGNIFICANT CHANGE UP (ref 3.5–5.3)
POTASSIUM SERPL-MCNC: 3.6 MMOL/L — SIGNIFICANT CHANGE UP (ref 3.5–5.3)
POTASSIUM SERPL-MCNC: 3.7 MMOL/L — SIGNIFICANT CHANGE UP (ref 3.5–5.3)
POTASSIUM SERPL-MCNC: 3.7 MMOL/L — SIGNIFICANT CHANGE UP (ref 3.5–5.3)
POTASSIUM SERPL-MCNC: 4 MMOL/L — SIGNIFICANT CHANGE UP (ref 3.5–5.3)
POTASSIUM SERPL-MCNC: 5.3 MMOL/L — SIGNIFICANT CHANGE UP (ref 3.5–5.3)
POTASSIUM SERPL-SCNC: 3.2 MMOL/L — LOW (ref 3.5–5.3)
POTASSIUM SERPL-SCNC: 3.5 MMOL/L — SIGNIFICANT CHANGE UP (ref 3.5–5.3)
POTASSIUM SERPL-SCNC: 3.6 MMOL/L — SIGNIFICANT CHANGE UP (ref 3.5–5.3)
POTASSIUM SERPL-SCNC: 3.7 MMOL/L — SIGNIFICANT CHANGE UP (ref 3.5–5.3)
POTASSIUM SERPL-SCNC: 3.7 MMOL/L — SIGNIFICANT CHANGE UP (ref 3.5–5.3)
POTASSIUM SERPL-SCNC: 4 MMOL/L — SIGNIFICANT CHANGE UP (ref 3.5–5.3)
POTASSIUM SERPL-SCNC: 5.3 MMOL/L — SIGNIFICANT CHANGE UP (ref 3.5–5.3)
PROT SERPL-MCNC: 3.1 G/DL — LOW (ref 6–8.3)
PROT SERPL-MCNC: 3.5 G/DL — LOW (ref 6–8.3)
PROT SERPL-MCNC: 4 G/DL — LOW (ref 6–8.3)
PROT SERPL-MCNC: 4.1 G/DL — LOW (ref 6–8.3)
PROT SERPL-MCNC: 4.2 G/DL — LOW (ref 6–8.3)
PROT SERPL-MCNC: 4.4 G/DL — LOW (ref 6–8.3)
PROT SERPL-MCNC: 4.7 G/DL — LOW (ref 6–8.3)
PROTHROM AB SERPL-ACNC: 10.4 SEC — SIGNIFICANT CHANGE UP (ref 9.8–12.7)
PROTHROM AB SERPL-ACNC: 10.7 SEC — SIGNIFICANT CHANGE UP (ref 9.8–12.7)
PROTHROM AB SERPL-ACNC: 11.7 SEC — SIGNIFICANT CHANGE UP (ref 9.8–12.7)
PROTHROM AB SERPL-ACNC: 14.5 SEC — HIGH (ref 9.8–12.7)
PROTHROM AB SERPL-ACNC: 16.3 SEC — HIGH (ref 9.8–12.7)
PROTHROM AB SERPL-ACNC: 19 SEC — HIGH (ref 9.8–12.7)
RBC # BLD: 2.6 M/UL — LOW (ref 4.2–5.8)
RBC # BLD: 2.63 M/UL — LOW (ref 4.2–5.8)
RBC # BLD: 3.29 M/UL — LOW (ref 4.2–5.8)
RBC # BLD: 3.55 M/UL — LOW (ref 4.2–5.8)
RBC # BLD: 3.61 M/UL — LOW (ref 4.2–5.8)
RBC # BLD: 3.83 M/UL — LOW (ref 4.2–5.8)
RBC # FLD: 14 % — SIGNIFICANT CHANGE UP (ref 10.3–16.9)
RBC # FLD: 14.2 % — SIGNIFICANT CHANGE UP (ref 10.3–16.9)
RBC # FLD: 14.6 % — SIGNIFICANT CHANGE UP (ref 10.3–16.9)
RBC # FLD: 14.7 % — SIGNIFICANT CHANGE UP (ref 10.3–16.9)
SAO2 % BLDA: 99 % — SIGNIFICANT CHANGE UP (ref 95–100)
SAO2 % BLDV: 62 % — SIGNIFICANT CHANGE UP
SAO2 % BLDV: 72 % — SIGNIFICANT CHANGE UP
SAO2 % BLDV: 73 % — SIGNIFICANT CHANGE UP
SODIUM SERPL-SCNC: 142 MMOL/L — SIGNIFICANT CHANGE UP (ref 135–145)
SODIUM SERPL-SCNC: 144 MMOL/L — SIGNIFICANT CHANGE UP (ref 135–145)
SODIUM SERPL-SCNC: 147 MMOL/L — HIGH (ref 135–145)
WBC # BLD: 4.1 K/UL — SIGNIFICANT CHANGE UP (ref 3.8–10.5)
WBC # BLD: 4.6 K/UL — SIGNIFICANT CHANGE UP (ref 3.8–10.5)
WBC # BLD: 5.2 K/UL — SIGNIFICANT CHANGE UP (ref 3.8–10.5)
WBC # BLD: 6.2 K/UL — SIGNIFICANT CHANGE UP (ref 3.8–10.5)
WBC # BLD: 6.7 K/UL — SIGNIFICANT CHANGE UP (ref 3.8–10.5)
WBC # BLD: 6.7 K/UL — SIGNIFICANT CHANGE UP (ref 3.8–10.5)
WBC # FLD AUTO: 4.1 K/UL — SIGNIFICANT CHANGE UP (ref 3.8–10.5)
WBC # FLD AUTO: 4.6 K/UL — SIGNIFICANT CHANGE UP (ref 3.8–10.5)
WBC # FLD AUTO: 5.2 K/UL — SIGNIFICANT CHANGE UP (ref 3.8–10.5)
WBC # FLD AUTO: 6.2 K/UL — SIGNIFICANT CHANGE UP (ref 3.8–10.5)
WBC # FLD AUTO: 6.7 K/UL — SIGNIFICANT CHANGE UP (ref 3.8–10.5)
WBC # FLD AUTO: 6.7 K/UL — SIGNIFICANT CHANGE UP (ref 3.8–10.5)

## 2018-06-22 PROCEDURE — 99292 CRITICAL CARE ADDL 30 MIN: CPT

## 2018-06-22 PROCEDURE — 71045 X-RAY EXAM CHEST 1 VIEW: CPT | Mod: 26,77

## 2018-06-22 PROCEDURE — 32120 RE-EXPLORATION OF CHEST: CPT | Mod: 78

## 2018-06-22 PROCEDURE — 93010 ELECTROCARDIOGRAM REPORT: CPT

## 2018-06-22 PROCEDURE — 99291 CRITICAL CARE FIRST HOUR: CPT

## 2018-06-22 PROCEDURE — 71045 X-RAY EXAM CHEST 1 VIEW: CPT | Mod: 26

## 2018-06-22 PROCEDURE — 21750 REPAIR OF STERNUM SEPARATION: CPT | Mod: 78

## 2018-06-22 RX ORDER — AZTREONAM 2 G
1000 VIAL (EA) INJECTION EVERY 8 HOURS
Qty: 0 | Refills: 0 | Status: DISCONTINUED | OUTPATIENT
Start: 2018-06-22 | End: 2018-06-22

## 2018-06-22 RX ORDER — PHENYLEPHRINE HYDROCHLORIDE 10 MG/ML
0.5 INJECTION INTRAVENOUS
Qty: 40 | Refills: 0 | Status: DISCONTINUED | OUTPATIENT
Start: 2018-06-22 | End: 2018-06-23

## 2018-06-22 RX ORDER — EPINEPHRINE 0.3 MG/.3ML
0.05 INJECTION INTRAMUSCULAR; SUBCUTANEOUS
Qty: 4 | Refills: 0 | Status: DISCONTINUED | OUTPATIENT
Start: 2018-06-22 | End: 2018-06-23

## 2018-06-22 RX ORDER — METRONIDAZOLE 500 MG
500 TABLET ORAL EVERY 8 HOURS
Qty: 0 | Refills: 0 | Status: DISCONTINUED | OUTPATIENT
Start: 2018-06-22 | End: 2018-07-01

## 2018-06-22 RX ORDER — ALBUMIN HUMAN 25 %
250 VIAL (ML) INTRAVENOUS ONCE
Qty: 0 | Refills: 0 | Status: COMPLETED | OUTPATIENT
Start: 2018-06-22 | End: 2018-06-22

## 2018-06-22 RX ORDER — ALBUMIN HUMAN 25 %
250 VIAL (ML) INTRAVENOUS ONCE
Qty: 0 | Refills: 0 | Status: COMPLETED | OUTPATIENT
Start: 2018-06-22 | End: 2018-06-23

## 2018-06-22 RX ORDER — DOBUTAMINE HCL 250MG/20ML
2.5 VIAL (ML) INTRAVENOUS
Qty: 500 | Refills: 0 | Status: DISCONTINUED | OUTPATIENT
Start: 2018-06-22 | End: 2018-06-23

## 2018-06-22 RX ORDER — HEPARIN SODIUM 5000 [USP'U]/ML
500 INJECTION INTRAVENOUS; SUBCUTANEOUS
Qty: 25000 | Refills: 0 | Status: DISCONTINUED | OUTPATIENT
Start: 2018-06-22 | End: 2018-06-22

## 2018-06-22 RX ORDER — FUROSEMIDE 40 MG
20 TABLET ORAL ONCE
Qty: 0 | Refills: 0 | Status: COMPLETED | OUTPATIENT
Start: 2018-06-22 | End: 2018-06-22

## 2018-06-22 RX ORDER — CALCIUM CHLORIDE
1000 POWDER (GRAM) MISCELLANEOUS ONCE
Qty: 0 | Refills: 0 | Status: COMPLETED | OUTPATIENT
Start: 2018-06-22 | End: 2018-06-22

## 2018-06-22 RX ORDER — POTASSIUM CHLORIDE 20 MEQ
20 PACKET (EA) ORAL
Qty: 0 | Refills: 0 | Status: COMPLETED | OUTPATIENT
Start: 2018-06-22 | End: 2018-06-22

## 2018-06-22 RX ORDER — POTASSIUM PHOSPHATE, MONOBASIC POTASSIUM PHOSPHATE, DIBASIC 236; 224 MG/ML; MG/ML
30 INJECTION, SOLUTION INTRAVENOUS ONCE
Qty: 0 | Refills: 0 | Status: COMPLETED | OUTPATIENT
Start: 2018-06-22 | End: 2018-06-22

## 2018-06-22 RX ORDER — FUROSEMIDE 40 MG
5 TABLET ORAL
Qty: 500 | Refills: 0 | Status: DISCONTINUED | OUTPATIENT
Start: 2018-06-22 | End: 2018-06-28

## 2018-06-22 RX ORDER — POTASSIUM PHOSPHATE, MONOBASIC POTASSIUM PHOSPHATE, DIBASIC 236; 224 MG/ML; MG/ML
15 INJECTION, SOLUTION INTRAVENOUS ONCE
Qty: 0 | Refills: 0 | Status: COMPLETED | OUTPATIENT
Start: 2018-06-22 | End: 2018-06-22

## 2018-06-22 RX ORDER — MAGNESIUM SULFATE 500 MG/ML
2 VIAL (ML) INJECTION ONCE
Qty: 0 | Refills: 0 | Status: COMPLETED | OUTPATIENT
Start: 2018-06-22 | End: 2018-06-22

## 2018-06-22 RX ORDER — AZTREONAM 2 G
VIAL (EA) INJECTION
Qty: 0 | Refills: 0 | Status: DISCONTINUED | OUTPATIENT
Start: 2018-06-22 | End: 2018-06-22

## 2018-06-22 RX ORDER — FLUCONAZOLE 150 MG/1
400 TABLET ORAL EVERY 24 HOURS
Qty: 0 | Refills: 0 | Status: DISCONTINUED | OUTPATIENT
Start: 2018-06-22 | End: 2018-07-01

## 2018-06-22 RX ORDER — AZTREONAM 2 G
1000 VIAL (EA) INJECTION EVERY 8 HOURS
Qty: 0 | Refills: 0 | Status: DISCONTINUED | OUTPATIENT
Start: 2018-06-22 | End: 2018-07-01

## 2018-06-22 RX ORDER — DOPAMINE HYDROCHLORIDE 40 MG/ML
3 INJECTION, SOLUTION, CONCENTRATE INTRAVENOUS
Qty: 400 | Refills: 0 | Status: DISCONTINUED | OUTPATIENT
Start: 2018-06-22 | End: 2018-06-23

## 2018-06-22 RX ORDER — VANCOMYCIN HCL 1 G
1000 VIAL (EA) INTRAVENOUS EVERY 12 HOURS
Qty: 0 | Refills: 0 | Status: DISCONTINUED | OUTPATIENT
Start: 2018-06-22 | End: 2018-06-24

## 2018-06-22 RX ADMIN — FLUCONAZOLE 100 MILLIGRAM(S): 150 TABLET ORAL at 11:30

## 2018-06-22 RX ADMIN — Medication 125 MILLILITER(S): at 21:00

## 2018-06-22 RX ADMIN — Medication 5.49 MICROGRAM(S)/KG/MIN: at 07:21

## 2018-06-22 RX ADMIN — Medication 50 MILLIGRAM(S): at 10:45

## 2018-06-22 RX ADMIN — PANTOPRAZOLE SODIUM 10 MG/HR: 20 TABLET, DELAYED RELEASE ORAL at 07:21

## 2018-06-22 RX ADMIN — CHLORHEXIDINE GLUCONATE 5 MILLILITER(S): 213 SOLUTION TOPICAL at 22:42

## 2018-06-22 RX ADMIN — POTASSIUM PHOSPHATE, MONOBASIC POTASSIUM PHOSPHATE, DIBASIC 62.5 MILLIMOLE(S): 236; 224 INJECTION, SOLUTION INTRAVENOUS at 05:22

## 2018-06-22 RX ADMIN — Medication 20 MILLIGRAM(S): at 03:05

## 2018-06-22 RX ADMIN — DOPAMINE HYDROCHLORIDE 6.59 MICROGRAM(S)/KG/MIN: 40 INJECTION, SOLUTION, CONCENTRATE INTRAVENOUS at 22:34

## 2018-06-22 RX ADMIN — MILRINONE LACTATE 8.79 MICROGRAM(S)/KG/MIN: 1 INJECTION, SOLUTION INTRAVENOUS at 09:15

## 2018-06-22 RX ADMIN — MILRINONE LACTATE 8.79 MICROGRAM(S)/KG/MIN: 1 INJECTION, SOLUTION INTRAVENOUS at 07:24

## 2018-06-22 RX ADMIN — Medication 250 MILLIGRAM(S): at 19:15

## 2018-06-22 RX ADMIN — Medication 250 MILLIGRAM(S): at 07:14

## 2018-06-22 RX ADMIN — Medication 50 GRAM(S): at 02:11

## 2018-06-22 RX ADMIN — CHLORHEXIDINE GLUCONATE 5 MILLILITER(S): 213 SOLUTION TOPICAL at 10:42

## 2018-06-22 RX ADMIN — Medication 50 MILLIGRAM(S): at 02:51

## 2018-06-22 RX ADMIN — CHLORHEXIDINE GLUCONATE 5 MILLILITER(S): 213 SOLUTION TOPICAL at 19:20

## 2018-06-22 RX ADMIN — Medication 20 MILLIGRAM(S): at 04:24

## 2018-06-22 RX ADMIN — EPINEPHRINE 10.99 MICROGRAM(S)/KG/MIN: 0.3 INJECTION INTRAMUSCULAR; SUBCUTANEOUS at 07:20

## 2018-06-22 RX ADMIN — CISATRACURIUM BESYLATE 10 MILLIGRAM(S): 2 INJECTION INTRAVENOUS at 23:00

## 2018-06-22 RX ADMIN — CHLORHEXIDINE GLUCONATE 5 MILLILITER(S): 213 SOLUTION TOPICAL at 03:18

## 2018-06-22 RX ADMIN — SODIUM CHLORIDE 10 MILLILITER(S): 9 INJECTION INTRAMUSCULAR; INTRAVENOUS; SUBCUTANEOUS at 07:17

## 2018-06-22 RX ADMIN — Medication 100 MILLIEQUIVALENT(S): at 10:15

## 2018-06-22 RX ADMIN — EPINEPHRINE 10.99 MICROGRAM(S)/KG/MIN: 0.3 INJECTION INTRAMUSCULAR; SUBCUTANEOUS at 19:00

## 2018-06-22 RX ADMIN — VASOPRESSIN 1.8 UNIT(S)/MIN: 20 INJECTION INTRAVENOUS at 07:25

## 2018-06-22 RX ADMIN — Medication 50 MILLIGRAM(S): at 19:30

## 2018-06-22 RX ADMIN — CHLORHEXIDINE GLUCONATE 5 MILLILITER(S): 213 SOLUTION TOPICAL at 06:18

## 2018-06-22 RX ADMIN — CISATRACURIUM BESYLATE 10.55 MICROGRAM(S)/KG/MIN: 2 INJECTION INTRAVENOUS at 21:45

## 2018-06-22 RX ADMIN — Medication 125 MILLILITER(S): at 21:40

## 2018-06-22 RX ADMIN — Medication 100 MILLIEQUIVALENT(S): at 21:30

## 2018-06-22 RX ADMIN — Medication 1000 MILLIGRAM(S): at 21:20

## 2018-06-22 RX ADMIN — INSULIN HUMAN 5 UNIT(S)/HR: 100 INJECTION, SOLUTION SUBCUTANEOUS at 07:23

## 2018-06-22 RX ADMIN — Medication 20 MILLIGRAM(S): at 21:20

## 2018-06-22 RX ADMIN — FENTANYL CITRATE 2.93 MICROGRAM(S)/KG/HR: 50 INJECTION INTRAVENOUS at 07:24

## 2018-06-22 RX ADMIN — PHENYLEPHRINE HYDROCHLORIDE 10.99 MICROGRAM(S)/KG/MIN: 10 INJECTION INTRAVENOUS at 21:22

## 2018-06-22 RX ADMIN — PROPOFOL 1.76 MICROGRAM(S)/KG/MIN: 10 INJECTION, EMULSION INTRAVENOUS at 07:20

## 2018-06-22 RX ADMIN — CHLORHEXIDINE GLUCONATE 5 MILLILITER(S): 213 SOLUTION TOPICAL at 00:52

## 2018-06-22 RX ADMIN — Medication 2.5 MG/HR: at 22:01

## 2018-06-22 RX ADMIN — Medication 100 MILLIEQUIVALENT(S): at 04:24

## 2018-06-22 NOTE — DIETITIAN INITIAL EVALUATION ADULT. - ENERGY NEEDS
IBW 61.8Kg  %IBW 95%  BMI 21.5    Utilized IBW to calculate needs 2/2 vent. Fluids per team 2/2 CHF.

## 2018-06-22 NOTE — DIETITIAN INITIAL EVALUATION ADULT. - NS AS NUTRI INTERV ENTERAL NUTRITION
Composition/With continued intubation, recommend EN initiation as medically feasible with hemodynamic stability; route per MD; Jevity 1.5 with goal rate of 42ml/hr x 24hr + 2x prostat (1008ml TV, 1712kcal, 94g, 766ml water)./Rate/Route

## 2018-06-22 NOTE — PROGRESS NOTE ADULT - ASSESSMENT
66yo with history of congenital heart disease  s/p AV repair vs replacement at the age of 12 recent cardiac evaluation for pre-op clearance.  On workup pt diagnosed with VHD ( severe prosthetic AS, severe MR, Severe TR) as well 2 vessel CAD in the setting of low EF 20-25%.      Pre-op CT showing left subclavian artery stenosis, possible coarctation of aorta (focal narrowing 3.5 cm segment of proximal descending aorta distal to the subclavian artery)    Pt underwent complex surgery yesterday : AVR, ascending aorta, TV/MV repair, CABG X2.  received multiple blood products/volume arrived with open chest.  IABP and Impella for LV support.      Chest closed today, Impella assist increased form 2 to 4 L/min flow       Problems  1. s/p complex cardiac surgery  2. acute on systolic CHF  3. post op cardiogenic shock on multiple ionotropes/pressors/assist device  4. post op resp failure      Plan   Neuro -- Sedation weaned to assess neuro status on arrival from OR.  Pt moved all extremities followed commands.  Sedation /fentanyl started   CVS - tolerated chest closure LV assist device + (Impella and IABP).  Impella support increased form 2 liters to 4 post chest closure  Discussed with team pt has possible coarctation of descending aorta -- position confirmed at time of placement.    Pulm - vent support, stable gases  Ana Luisa @ 20ppm    GI - NPO, LFTs nl    - monitor UOP, stable Cr, responding briskly to diuretics  Endo - glycemic control,  Heme - heparin infusion through Impella -- goal 50-70  pt s/p massive transfusion   ID - periop antibiotics. -- Aztreonam, flagyl/vanc/fluconazole for open chest managment       Critical post op.    Critical care time spent 50 min 66yo with history of congenital heart disease  s/p AV repair vs replacement at the age of 12 recent cardiac evaluation for pre-op clearance.  On workup pt diagnosed with VHD ( severe prosthetic AS, severe MR, Severe TR) as well 2 vessel CAD in the setting of low EF 20-25%.      Pre-op CT showing left subclavian artery stenosis, possible coarctation of aorta (focal narrowing 3.5 cm segment of proximal descending aorta distal to the subclavian artery)    Pt underwent complex surgery yesterday : AVR, ascending aorta, TV/MV repair, CABG X2.  received multiple blood products/volume arrived with open chest.  IABP and Impella for LV support.      Chest closed today, Impella assist increased form 2 to 4 L/min flow       Problems  1. s/p complex cardiac surgery  2. acute on systolic CHF  3. post op cardiogenic shock on multiple ionotropes/pressors/assist device  4. post op resp failure      Plan   Neuro -- Sedation weaned to assess neuro status on arrival from OR.  Pt moved all extremities followed commands.  Sedation /fentanyl started   CVS - tolerated chest closure LV assist device + (Impella and IABP).  Impella support increased form 2 liters to 4 post chest closure  Discussed with team pt has possible coarctation of descending aorta -- position confirmed at time of placement.    Pulm - vent support, stable gases  Ana Luisa @ 20ppm    GI - NPO, LFTs nl    - monitor UOP, stable Cr, responding briskly to diuretics  Endo - glycemic control,  Heme - heparin infusion through Impella -- goal 50-70  pt s/p massive transfusion   ID - periop antibiotics. -- Aztreonam, flagyl/vanc/fluconazole for open chest managment       Critical post op.    Critical care time spent 90 min

## 2018-06-22 NOTE — PROGRESS NOTE ADULT - SUBJECTIVE AND OBJECTIVE BOX
Date of surgery :    Surgeon :    Anesthesia :    Procedure :    Pump Time :                                      Aortic X -Clamp :                                                Circulatory Arrest :    EF :     Pre OP :                                   Post OP :                                                      Assist Device :    Airway :      Difficult Airway :                   [ ] Yes     [ ] No      ETT             Size :                         Lip Line :                           Ventilator setting :              [ ] ON          [ ] BS +           [ ] ETCO2       Mode: AC/ CMV (Assist Control/ Continuous Mandatory Ventilation)  RR (machine): 13  TV (machine): 500  FiO2: 50  PEEP: 5  ITime: 1  MAP: 10  PIP: 26          Lines :      [ ] PA catheter      [ ] Radial Arterial Line              [  ] Right              [  ] Left       [ ] Femoral Arterial Line          [  ] Right              [  ] Left      [ ] Central Line   IJ                     [  ] Right              [  ] Left      [ ] Femoral Central line            [  ] Right              [  ] Left     Tubes :      Blakes :                                      [  ] Med.             [  ] Pleural      Chest Tubes :                           [  ] Med.             [  ] Pleural       Pacing     Wires :             [   ] Atrial                  [   ]  Venticular      Pacer Setting :                   Threshold  :                Sensitivity :       Intake     Drips :        IVF :     Albumin :     Product :             [   ]  PRBC       [  ] Platelet     [   ] FFP          [   ] Cryoprecipitate                                  [   ]  Cell saver                                  [   ]  Hemostatic agent :                                  [   ]   Factors :       Output      EBL :      Urine :       Antibiotics :   ICU Vital Signs Last 24 Hrs  T(C): 36.9 (06-22-18 @ 13:00), Max: 36.9 (06-22-18 @ 12:00)  T(F): 98.4 (06-22-18 @ 13:00), Max: 98.4 (06-22-18 @ 12:00)  HR: 98 (06-22-18 @ 20:00) (97 - 108)  BP: --  BP(mean): --  ABP: 123/41 (06-22-18 @ 20:00) (94/44 - 164/52)  ABP(mean): 60 (06-22-18 @ 20:00) (60 - 82)  RR: 11 (06-22-18 @ 20:00) (10 - 22)  SpO2: 99% (06-22-18 @ 20:00) (96% - 100%)    I&O's Summary    21 Jun 2018 07:01  -  22 Jun 2018 07:00  --------------------------------------------------------  IN: 3837.6 mL / OUT: 4545 mL / NET: -707.4 mL    22 Jun 2018 07:01  -  22 Jun 2018 20:16  --------------------------------------------------------  IN: 2169.1 mL / OUT: 1920 mL / NET: 249.1 mL      ABG - ( 22 Jun 2018 17:09 )  pH: 7.43  /  pCO2: 36    /  pO2: 221   / HCO3: 24    / Base Excess: -0.3  /  SaO2: 99                                      10.9   6.2   )-----------( 93       ( 22 Jun 2018 17:01 )             31.4     22 Jun 2018 17:01    144    |  108    |  16     ----------------------------<  101    4.0     |  25     |  0.91     Ca    8.2        22 Jun 2018 17:01  Phos  5.1       22 Jun 2018 17:01  Mg     1.7       22 Jun 2018 17:01    TPro  3.5    /  Alb  2.4    /  TBili  4.1    /  DBili  2.0    /  AST  93     /  ALT  20     /  AlkPhos  35     22 Jun 2018 17:01    PT/INR - ( 22 Jun 2018 17:01 )   PT: 16.3 sec;   INR: 1.46          PTT - ( 22 Jun 2018 17:01 )  PTT:68.3 sec  MEDICATIONS  (STANDING):  aztreonam  IVPB 1000 milliGRAM(s) IV Intermittent every 8 hours  chlorhexidine 0.12% Liquid 5 milliLiter(s) Swish and Spit every 4 hours  cisatracurium Infusion 3 MICROgram(s)/kG/Min IV Continuous <Continuous>  dextrose 50% Injectable 50 milliLiter(s) IV Push every 15 minutes  dextrose 50% Injectable 25 milliLiter(s) IV Push every 15 minutes  EPINEPHrine     Infusion 0.05 MICROgram(s)/kG/Min IV Continuous <Continuous>  fentaNYL   Infusion. 0.5 MICROgram(s)/kG/Hr IV Continuous <Continuous>  fluconAZOLE IVPB 400 milliGRAM(s) IV Intermittent every 24 hours  Heparin 56400 Unit(s),Dextrose 5% 1000 milliLiter(s) 05549 milliLiter(s) IV Continuous <Continuous>  insulin Infusion 5 Unit(s)/Hr IV Continuous <Continuous>  metroNIDAZOLE  IVPB 500 milliGRAM(s) IV Intermittent every 8 hours  milrinone Infusion 0.5 MICROgram(s)/kG/Min IV Continuous <Continuous>  norepinephrine Infusion 0.05 MICROgram(s)/kG/Min IV Continuous <Continuous>  pantoprazole Infusion 8 mG/Hr IV Continuous <Continuous>  phenylephrine    Infusion 0.5 MICROgram(s)/kG/Min IV Continuous <Continuous>  potassium phosphate IVPB 30 milliMole(s) IV Intermittent once  propofol Infusion 5 MICROgram(s)/kG/Min IV Continuous <Continuous>  sodium chloride 0.9%. 1000 milliLiter(s) IV Continuous <Continuous>  vancomycin  IVPB 1000 milliGRAM(s) IV Intermittent every 12 hours  vasopressin Infusion 0.03 Unit(s)/Min IV Continuous <Continuous>    CHF  Handoff  MEWS Score  No pertinent past medical history  No pertinent past medical history  Tricuspid valve insufficiency, unspecified etiology  Coronary artery disease with other form of angina pectoris  Mitral valve insufficiency, unspecified etiology  Aortic valve stenosis, etiology of cardiac valve disease unspecified  Tricuspid valve insufficiency, unspecified etiology  Coronary artery disease with other form of angina pectoris  Mitral valve insufficiency, unspecified etiology  Aortic valve stenosis, etiology of cardiac valve disease unspecified  CAD (coronary artery disease)  Valvular disease  Chest surgery  AVR (aortic valve replacement)  Mitral valve repair  Tricuspid valve repair  CABG  Ascending aorta graft  No significant past surgical history  No significant past surgical history      PHYSICAL EXAM:  Gen : no acute distress  Neck: No LAD, No JVD  Respiratory: decreased in the bases  Cardiovascular: S1 and S2, RRR, no M/G/R  Gastrointestinal: BS+, soft, NT/ND  Extremities: No peripheral edema  Vascular: 2+ peripheral pulses  Neurological: A/O x 3, no focal deficits  Incision: clean dry/ no sign of infection  Lines: no sign of infection Date of surgery : 6/22/2018    Surgeon : Abbey    Anesthesia : Judi    Procedure : Chest closure    EF :     Pre OP :     10-15%                             Post OP :   10-15%                                         Assist Device : IABP 1: 3, Impella LV assist device @ 4-4.2 liter/min flow    Airway : Intubated       Mode: AC/ CMV (Assist Control/ Continuous Mandatory Ventilation)  RR (machine): 13  TV (machine): 500  FiO2: 50  PEEP: 5  ITime: 1  MAP: 10  PIP: 26          Lines :      [ ] PA catheter      [x ] Radial Arterial Line              [  ] Right              [ x ] Left       [ ] Femoral Arterial Line          [  ] Right              [  ] Left      [x ] Central Line   IJ                     [ x ] Right              [  ] Left      [ ] Femoral Central line            [  ] Right              [  ] Left     Tubes :      Blakes :                                      [  ] Med.             [  ] Pleural      Chest Tubes :                           [ 3 ] Med.             [ 2 ] Pleural       Pacing     Wires :             [x   ] Atrial                  [ x  ]  Venticular      Pacer Setting :    AV paced @ 90                Threshold  :                Sensitivity :       Intake     Drips :   Levo @ 0.04, Epi 0.08, Milrinone 0.25, Vaso 0.067     IVF :     Albumin :     Product :             [  2 ]  PRBC       [  ] Platelet     [   ] FFP          [   ] Cryoprecipitate                                  [   ]  Cell saver                                  [   ]  Hemostatic agent :                                  [   ]   Factors :       Output      EBL :      Urine :       Antibiotics : Clinda   ICU Vital Signs Last 24 Hrs  T(C): 36.9 (06-22-18 @ 13:00), Max: 36.9 (06-22-18 @ 12:00)  T(F): 98.4 (06-22-18 @ 13:00), Max: 98.4 (06-22-18 @ 12:00)  HR: 98 (06-22-18 @ 20:00) (97 - 108)  ABP: 123/41 (06-22-18 @ 20:00) (94/44 - 164/52)  ABP(mean): 60 (06-22-18 @ 20:00) (60 - 82)  RR: 11 (06-22-18 @ 20:00) (10 - 22)  SpO2: 99% (06-22-18 @ 20:00) (96% - 100%)    I&O's Summary    21 Jun 2018 07:01  -  22 Jun 2018 07:00  --------------------------------------------------------  IN: 3837.6 mL / OUT: 4545 mL / NET: -707.4 mL    22 Jun 2018 07:01  -  22 Jun 2018 20:16  --------------------------------------------------------  IN: 2169.1 mL / OUT: 1920 mL / NET: 249.1 mL      ABG - ( 22 Jun 2018 17:09 )  pH: 7.43  /  pCO2: 36    /  pO2: 221   / HCO3: 24    / Base Excess: -0.3  /  SaO2: 99                     10.9   6.2   )-----------( 93       ( 22 Jun 2018 17:01 )             31.4     22 Jun 2018 17:01    144    |  108    |  16     ----------------------------<  101    4.0     |  25     |  0.91     Ca    8.2        22 Jun 2018 17:01  Phos  5.1       22 Jun 2018 17:01  Mg     1.7       22 Jun 2018 17:01    TPro  3.5    /  Alb  2.4    /  TBili  4.1    /  DBili  2.0    /  AST  93     /  ALT  20     /  AlkPhos  35     22 Jun 2018 17:01    PT/INR - ( 22 Jun 2018 17:01 )   PT: 16.3 sec;   INR: 1.46     PTT - ( 22 Jun 2018 17:01 )  PTT:68.3 sec    MEDICATIONS  (STANDING):  aztreonam  IVPB 1000 milliGRAM(s) IV Intermittent every 8 hours  cisatracurium Infusion 3 MICROgram(s)/kG/Min IV Continuous <Continuous>  EPINEPHrine     Infusion 0.05 MICROgram(s)/kG/Min IV Continuous <Continuous>  fentaNYL   Infusion. 0.5 MICROgram(s)/kG/Hr IV Continuous <Continuous>  fluconAZOLE IVPB 400 milliGRAM(s) IV Intermittent every 24 hours  Heparin 74848 Unit(s),Dextrose 5% 1000 milliLiter(s) 86629 milliLiter(s) IV Continuous <Continuous>  insulin Infusion 5 Unit(s)/Hr IV Continuous <Continuous>  metroNIDAZOLE  IVPB 500 milliGRAM(s) IV Intermittent every 8 hours  milrinone Infusion 0.5 MICROgram(s)/kG/Min IV Continuous <Continuous>  norepinephrine Infusion 0.05 MICROgram(s)/kG/Min IV Continuous <Continuous>  pantoprazole Infusion 8 mG/Hr IV Continuous <Continuous>  phenylephrine    Infusion 0.5 MICROgram(s)/kG/Min IV Continuous <Continuous>  potassium phosphate IVPB 30 milliMole(s) IV Intermittent once  propofol Infusion 5 MICROgram(s)/kG/Min IV Continuous <Continuous>  sodium chloride 0.9%. 1000 milliLiter(s) IV Continuous <Continuous>  vancomycin  IVPB 1000 milliGRAM(s) IV Intermittent every 12 hours  vasopressin Infusion 0.03 Unit(s)/Min IV Continuous <Continuous>    CHF  Mitral valve insufficiency, unspecified etiology  Aortic valve stenosis, etiology of cardiac valve disease unspecified  Tricuspid valve insufficiency, unspecified etiology  Coronary artery disease with other form of angina pectoris  Aortic valve stenosis, etiology of cardiac valve disease unspecified  CAD (coronary artery disease)  Valvular disease  Chest surgery  AVR (aortic valve replacement)  Mitral valve repair  Tricuspid valve repair  CABG      PHYSICAL EXAM:  Gen : intubated/sedated   Respiratory: decreased in the bases  Cardiovascular: S1 and S2, RRR, no M/G/R  Gastrointestinal: BS+, soft, NT/ND  Extremities: + peripheral edema  Left axillary Impella - insertion site + hematoma  left groin IABP  Vascular: 2+ peripheral pulses  Neurological: sedation weaned/ pt followed commands, moved all extremities  Incision: chest closed   Lines: no sign of infection

## 2018-06-22 NOTE — BRIEF OPERATIVE NOTE - PROCEDURE
<<-----Click on this checkbox to enter Procedure Chest surgery  06/22/2018  Chest closure  Active  MODONOGUE

## 2018-06-22 NOTE — DIETITIAN INITIAL EVALUATION ADULT. - OTHER INFO
67y/p M s/p AVR, MV REPAIR, TV repair, CABG x2, replacement of AA. Pt remains intubated and sedated of full vent. Propofol infusing at 10.8ml/hr; provides 285kcal from lipids. With continued intubation, recommend early EN initiation as medically feasible with hemodynamic stability; MAP consistently >65 with pressors and lactate trending down. Will follow.

## 2018-06-22 NOTE — PROGRESS NOTE ADULT - SUBJECTIVE AND OBJECTIVE BOX
CTICU  CRITICAL  CARE  attending     Hand off received 					   Pertinent clinical, laboratory, radiographic, hemodynamic, echocardiographic, respiratory data, microbiologic data and chart were reviewed and analyzed frequently throughout the course of the day and night  Patient seen and examined with CTS/ SH attending at bedside  Pt is a 67y , Male, HEALTH ISSUES - PROBLEM Dx:  CAD (coronary artery disease): CAD (coronary artery disease)  Valvular disease: Valvular disease      , FAMILY HISTORY:  PAST MEDICAL & SURGICAL HISTORY:  No pertinent past medical history  No significant past surgical history    Patient is a 67y old  Male who presents with a chief complaint of AS (19 Jun 2018 00:06)      14 system review was unremarkable  acute changes include acute respiratory failure  Vital signs, hemodynamic and respiratory parameters were reviewed from the bedside nursing flowsheet.  ICU Vital Signs Last 24 Hrs  T(C): 35 (26 Jun 2018 02:00), Max: 36.1 (26 Jun 2018 01:00)  T(F): 95 (26 Jun 2018 02:00), Max: 96.9 (26 Jun 2018 01:00)  HR: 98 (26 Jun 2018 07:00) (88 - 98)  BP: --  BP(mean): --  ABP: 122/60 (26 Jun 2018 07:00) (94/58 - 122/68)  ABP(mean): 78 (26 Jun 2018 07:00) (68 - 84)  RR: 12 (26 Jun 2018 07:00) (12 - 20)  SpO2: 98% (26 Jun 2018 07:00) (97% - 100%)    Adult Advanced Hemodynamics Last 24 Hrs  CVP(mm Hg): 29 (26 Jun 2018 07:00) (23 - 32)  CVP(cm H2O): --  CO: 3.4 (25 Jun 2018 21:00) (3.3 - 3.6)  CI: 2 (25 Jun 2018 21:00) (2 - 2.1)  PA: 49/30 (26 Jun 2018 07:00) (30/27 - 54/32)  PA(mean): 38 (26 Jun 2018 07:00) (29 - 41)  PCWP: --  SVR: 1104 (25 Jun 2018 21:00) (1104 - 1234)  SVRI: 1877 (25 Jun 2018 21:00) (1877 - 2092)  PVR: --  PVRI: --, ABG - ( 26 Jun 2018 06:14 )  pH, Arterial: 7.44  pH, Blood: x     /  pCO2: 33    /  pO2: 170   / HCO3: 21    / Base Excess: -2.2  /  SaO2: 99                Mode: AC/ CMV (Assist Control/ Continuous Mandatory Ventilation)  RR (machine): 12  TV (machine): 400  FiO2: 40  PEEP: 5  ITime: 1.4  MAP: 14  PIP: 29    Intake and output was reviewed and the fluid balance was calculated  Daily     Daily   I&O's Summary    25 Jun 2018 07:01  -  26 Jun 2018 07:00  --------------------------------------------------------  IN: 5083.2 mL / OUT: 5300 mL / NET: -216.8 mL        All lines and drain sites were assessed  Glycemic trend was reviewedCAPILLARY BLOOD GLUCOSE      POCT Blood Glucose.: 106 mg/dL (26 Jun 2018 06:00)    No acute change in mental status  Auscultation of the chest reveals equal bs  Abdomen is soft  Extremities are warm and well perfused  Wounds appear clean and unremarkable  Antibiotics are periop    labs  CBC Full  -  ( 26 Jun 2018 06:25 )  WBC Count : 7.9 K/uL  Hemoglobin : 9.9 g/dL  Hematocrit : 28.6 %  Platelet Count - Automated : 45 K/uL  Mean Cell Volume : 83.4 fL  Mean Cell Hemoglobin : 28.9 pg  Mean Cell Hemoglobin Concentration : 34.6 g/dL  Auto Neutrophil # : x  Auto Lymphocyte # : x  Auto Monocyte # : x  Auto Eosinophil # : x  Auto Basophil # : x  Auto Neutrophil % : x  Auto Lymphocyte % : x  Auto Monocyte % : x  Auto Eosinophil % : x  Auto Basophil % : x    06-26    124<L>  |  88<L>  |  15  ----------------------------<  108<H>  3.6   |  24  |  0.74    Ca    8.2<L>      26 Jun 2018 06:25  Phos  3.1     06-26  Mg     2.3     06-26    TPro  4.5<L>  /  Alb  3.0<L>  /  TBili  10.1<H>  /  DBili  x   /  AST  37  /  ALT  15  /  AlkPhos  45  06-26    PT/INR - ( 26 Jun 2018 06:25 )   PT: 15.4 sec;   INR: 1.38          PTT - ( 26 Jun 2018 06:25 )  PTT:73.5 sec  The current medications were reviewed   MEDICATIONS  (STANDING):  albumin human  5% IVPB 250 milliLiter(s) IV Intermittent once  artificial  tears Solution 1 Drop(s) Both EYES two times a day  aztreonam  IVPB 1000 milliGRAM(s) IV Intermittent every 8 hours  chlorhexidine 0.12% Liquid 5 milliLiter(s) Swish and Spit every 4 hours  cisatracurium Infusion 3 MICROgram(s)/kG/Min (10.548 mL/Hr) IV Continuous <Continuous>  dexmedetomidine Infusion 0.3 MICROgram(s)/kG/Hr (4.395 mL/Hr) IV Continuous <Continuous>  dextrose 50% Injectable 50 milliLiter(s) IV Push every 15 minutes  dextrose 50% Injectable 25 milliLiter(s) IV Push every 15 minutes  dextrose 50% Injectable 25 milliLiter(s) IV Push once  DOBUTamine Infusion 5 MICROgram(s)/kG/Min (8.79 mL/Hr) IV Continuous <Continuous>  EPINEPHrine     Infusion 0.03 MICROgram(s)/kG/Min (6.593 mL/Hr) IV Continuous <Continuous>  Epinephrine 8 milliGRAM(s),D5W 250 milliLiter(s) 8 milliGRAM(s) (6.75 mL/Hr) IV Continuous <Continuous>  epoprostenol Infusion 2 NANOGram(s)/kG/Min (1.406 mL/Hr) IV Continuous <Continuous>  fentaNYL    Injectable 100 MICROGram(s) IV Push once  fentaNYL   Infusion. 0.5 MICROgram(s)/kG/Hr (2.93 mL/Hr) IV Continuous <Continuous>  fluconAZOLE IVPB 400 milliGRAM(s) IV Intermittent every 24 hours  furosemide Infusion 5 mG/Hr (2.5 mL/Hr) IV Continuous <Continuous>  heparin  Infusion 300 Unit(s)/Hr (3 mL/Hr) IV Continuous <Continuous>  Heparin 94199 Unit(s),Dextrose 5% 1000 milliLiter(s) 43460 Unit(s) (25 mL/Hr) IV Continuous <Continuous>  insulin Infusion 5 Unit(s)/Hr (5 mL/Hr) IV Continuous <Continuous>  metroNIDAZOLE  IVPB 500 milliGRAM(s) IV Intermittent every 8 hours  milrinone Infusion 0.5 MICROgram(s)/kG/Min (8.79 mL/Hr) IV Continuous <Continuous>  nitroglycerin  Infusion 5 MICROgram(s)/Min (1.5 mL/Hr) IV Continuous <Continuous>  norepinephrine Infusion 0.05 MICROgram(s)/kG/Min (5.494 mL/Hr) IV Continuous <Continuous>  phenylephrine    Infusion 0.5 MICROgram(s)/kG/Min (5.494 mL/Hr) IV Continuous <Continuous>  propofol Infusion 5 MICROgram(s)/kG/Min (1.758 mL/Hr) IV Continuous <Continuous>  propofol Injectable 50 milliGRAM(s) IV Push once  sodium chloride 0.9%. 1000 milliLiter(s) (10 mL/Hr) IV Continuous <Continuous>  vasopressin Infusion 0.03 Unit(s)/Min (1.8 mL/Hr) IV Continuous <Continuous>    MEDICATIONS  (PRN):  potassium chloride  20 mEq/100 mL IVPB 20 milliEquivalent(s) IV Intermittent every 1 hour PRN K<4.0       PROBLEM LIST/ ASSESSMENT:  HEALTH ISSUES - PROBLEM Dx:  CAD (coronary artery disease): CAD (coronary artery disease)  Valvular disease: Valvular disease      ,   Patient is a 67y old  Male who presents with a chief complaint of AS (19 Jun 2018 00:06)     s/p cardiac surgery      My plan includes :  close hemodynamic, ventilatory and drain monitoring and management per post op routine    Monitor for arrhythmias and monitor parameters for organ perfusion  monitor neurologic status  Head of the bed should remain elevated to 45 deg .   chest PT and IS will be encouraged  monitor adequacy of oxygenation and ventilation and attempt to wean oxygen  Nutritional goals will be met using po eventually , ensure adequate caloric intake and montior the same  Stress ulcer and VTE prophylaxis will be achieved    Glycemic control is satisfactory  Electrolytes have been repleted as necessary and wound care has been carried out. Pain control has been achieved.   agressive physical therapy and early mobility and ambulation goals will be met   The family was updated about the course and plan  CRITICAL CARE TIME SPENT in evaluation and management, reassessments, review and interpretation of labs and x-rays, ventilator and hemodynamic management, formulating a plan and coordinating care: ___90____ MIN.  Time does not include procedural time.  CTICU ATTENDING     					    Clement Ahn MD

## 2018-06-22 NOTE — PROGRESS NOTE ADULT - SUBJECTIVE AND OBJECTIVE BOX
CTICU  CRITICAL  CARE  attending     Hand off received @ 7p					   Pertinent clinical, laboratory, radiographic, hemodynamic, echocardiographic, respiratory data, microbiologic data and chart were reviewed and analyzed frequently throughout the course of the day and night  Patient seen and examined with CTS/ SH attending at bedside    Pt is a 67y , Male,  post op day # 1 s/p AVR, ascending aorta, TV/MV repair, CABG X2.  received multiple blood products/volume arrived with open chest.  IABP and Impella for LV support.      today:    Chest closure  Impella flow increased from 2 to 4 L/min    post chest closure: ( from 8p)    drop in Hct to 21 ( from 31)  escalating pressor requirements  Oliguria  SVO2 of 48 ( down from 62)  CVP 22-24 ( up from mid 10's)    chest opened semi-urgently by CT surgeon at the bedside with almost instant improvement in Hemodynamics  Evacuation of 1L of blood from Right pleural space  Started on lasix drip  Continued Impella support/IABP 1: 3      CAD (coronary artery disease): CAD (coronary artery disease)  Valvular disease: Valvular disease      , FAMILY HISTORY:  PAST MEDICAL & SURGICAL HISTORY:  No pertinent past medical history  No significant past surgical history    Patient is a 67y old  Male who presents with a chief complaint of AS (2018 00:06)      14 system review was unremarkable  acute changes include acute respiratory failure  Vital signs, hemodynamic and respiratory parameters were reviewed from the bedside nursing flowsheet.  ICU Vital Signs Last 24 Hrs  T(C): 37 (2018 03:00), Max: 37.1 (2018 23:00)  T(F): 98.6 (2018 03:00), Max: 98.8 (2018 23:00)  HR: 98 (2018 05:00) (78 - 104)  BP: --  BP(mean): --  ABP: 100/36 (2018 05:00) (86/28 - 164/52)  ABP(mean): 60 (2018 05:00) (42 - 82)  RR: 16 (2018 05:00) (10 - 16)  SpO2: 98% (2018 05:00) (93% - 100%)    Adult Advanced Hemodynamics Last 24 Hrs  CVP(mm Hg): 13 (2018 05:00) (8 - 24)  CVP(cm H2O): --  CO: 2.8 (2018 17:30) (2.8 - 2.8)  CI: 1.7 (2018 17:30) (1.7 - 1.7)  PA: 30/12 (2018 05:00) (23/10 - )  PA(mean): 19 (2018 05:00) (15 - 32)  PCWP: --  SVR: 1483 (2018 17:30) (1483 - 1483)  SVRI: 2444 (2018 17:30) (2444 - 2444)  PVR: --  PVRI: --, ABG - ( 2018 05:27 )  pH, Arterial: 7.52  pH, Blood: x     /  pCO2: 28    /  pO2: 191   / HCO3: 23    / Base Excess: 0.4   /  SaO2: 99                Mode: AC/ CMV (Assist Control/ Continuous Mandatory Ventilation)  RR (machine): 10  TV (machine): 500  FiO2: 50  PEEP: 5  ITime: 1  MAP: 13  PIP: 31    Intake and output was reviewed and the fluid balance was calculated  Daily     Daily Weight in k.8 (2018 15:09)  I&O's Summary    2018 07:  -  2018 07:00  --------------------------------------------------------  IN: 3837.6 mL / OUT: 4545 mL / NET: -707.4 mL    2018 07:01  -  2018 05:38  --------------------------------------------------------  IN: 5424.3 mL / OUT: 5735 mL / NET: -310.7 mL        All lines and drain sites were assessed  Glycemic trend was reviewedCAPILLARY BLOOD GLUCOSE      POCT Blood Glucose.: 163 mg/dL (2018 03:40)    No acute change in mental status  Auscultation of the chest reveals equal bs  Abdomen is soft  Extremities are warm and well perfused  Wounds appear clean and unremarkable  Antibiotics are periop    labs  CBC Full  -  ( 2018 05:26 )  WBC Count : x  Hemoglobin : 8.8 g/dL  Hematocrit : 25.0 %  Platelet Count - Automated : 39 K/uL  Mean Cell Volume : 83.6 fL  Mean Cell Hemoglobin : 29.4 pg  Mean Cell Hemoglobin Concentration : 35.2 g/dL  Auto Neutrophil # : x  Auto Lymphocyte # : x  Auto Monocyte # : x  Auto Eosinophil # : x  Auto Basophil # : x  Auto Neutrophil % : x  Auto Lymphocyte % : x  Auto Monocyte % : x  Auto Eosinophil % : x  Auto Basophil % : x        138  |  102  |  16  ----------------------------<  184<H>  4.7   |  22  |  0.93    Ca    8.4      2018 01:26  Phos  6.0       Mg     1.8         TPro  3.2<L>  /  Alb  2.4<L>  /  TBili  4.9<H>  /  DBili  x   /  AST  68<H>  /  ALT  14  /  AlkPhos  27<L>  23    PT/INR - ( 2018 02:15 )   PT: 20.1 sec;   INR: 1.79          PTT - ( 2018 02:15 )  PTT:108.6 sec  The current medications were reviewed   MEDICATIONS  (STANDING):  aztreonam  IVPB 1000 milliGRAM(s) IV Intermittent every 8 hours  chlorhexidine 0.12% Liquid 5 milliLiter(s) Swish and Spit every 4 hours  cisatracurium Infusion 3 MICROgram(s)/kG/Min (10.548 mL/Hr) IV Continuous <Continuous>  dextrose 50% Injectable 50 milliLiter(s) IV Push every 15 minutes  dextrose 50% Injectable 25 milliLiter(s) IV Push every 15 minutes  DOBUTamine Infusion 2.5 MICROgram(s)/kG/Min (4.395 mL/Hr) IV Continuous <Continuous>  DOPamine Infusion 3 MICROgram(s)/kG/Min (6.593 mL/Hr) IV Continuous <Continuous>  EPINEPHrine     Infusion 0.05 MICROgram(s)/kG/Min (10.988 mL/Hr) IV Continuous <Continuous>  fentaNYL   Infusion. 0.5 MICROgram(s)/kG/Hr (2.93 mL/Hr) IV Continuous <Continuous>  fluconAZOLE IVPB 400 milliGRAM(s) IV Intermittent every 24 hours  furosemide Infusion 5 mG/Hr (2.5 mL/Hr) IV Continuous <Continuous>  Heparin 40497 Unit(s),Dextrose 5% 1000 milliLiter(s) 01344 Unit(s) (25 mL/Hr) IV Continuous <Continuous>  insulin Infusion 5 Unit(s)/Hr (5 mL/Hr) IV Continuous <Continuous>  metroNIDAZOLE  IVPB 500 milliGRAM(s) IV Intermittent every 8 hours  milrinone Infusion 0.5 MICROgram(s)/kG/Min (8.79 mL/Hr) IV Continuous <Continuous>  norepinephrine Infusion 0.05 MICROgram(s)/kG/Min (5.494 mL/Hr) IV Continuous <Continuous>  pantoprazole Infusion 8 mG/Hr (10 mL/Hr) IV Continuous <Continuous>  phenylephrine    Infusion 0.5 MICROgram(s)/kG/Min (10.988 mL/Hr) IV Continuous <Continuous>  potassium phosphate IVPB 30 milliMole(s) IV Intermittent once  propofol Infusion 5 MICROgram(s)/kG/Min (1.758 mL/Hr) IV Continuous <Continuous>  sodium chloride 0.9%. 1000 milliLiter(s) (10 mL/Hr) IV Continuous <Continuous>  vancomycin  IVPB 1000 milliGRAM(s) IV Intermittent every 12 hours  vasopressin Infusion 0.03 Unit(s)/Min (1.8 mL/Hr) IV Continuous <Continuous>    MEDICATIONS  (PRN):       PROBLEM LIST/ ASSESSMENT:  HEALTH ISSUES - PROBLEM Dx:     s/p complex cardiac surgery   acute on systolic CHF   post op cardiogenic shock on multiple ionotropes/pressors/assist device   post op resp failure  Cardiogenic shock post op  LV assist device  CAD (coronary artery disease): CAD (coronary artery disease)  Valvular disease: Valvular disease      ,   Patient is a 67y old  Male who presents with a chief complaint of AS (2018 00:06)     s/p AVR, ascending aorta, TV/MV repair, CABG X2.  received multiple blood products/volume arrived with open chest.  IABP and Impella for LV support.          My plan includes :  close hemodynamic, ventilatory and drain monitoring and management per post op routine    Monitor for arrhythmias and monitor parameters for organ perfusion  monitor neurologic status  Head of the bed should remain elevated to 45 deg .   chest PT and IS will be encouraged  monitor adequacy of oxygenation and ventilation and attempt to wean oxygen  Nutritional goals will be met using po eventually , ensure adequate caloric intake and montior the same  Stress ulcer and VTE prophylaxis will be achieved    Glycemic control is satisfactory  Electrolytes have been repleted as necessary and wound care has been carried out. Pain control has been achieved.   agressive physical therapy and early mobility and ambulation goals will be met   The family was updated about the course and plan  CRITICAL CARE TIME SPENT in evaluation and management, reassessments, review and interpretation of labs and x-rays, ventilator and hemodynamic management, formulating a plan and coordinating care: ___60____ MIN.  Time does not include procedural time.  CTICU ATTENDING     					    Rai Singer M.D.

## 2018-06-23 LAB
ALBUMIN SERPL ELPH-MCNC: 2.1 G/DL — LOW (ref 3.3–5)
ALBUMIN SERPL ELPH-MCNC: 2.1 G/DL — LOW (ref 3.3–5)
ALBUMIN SERPL ELPH-MCNC: 2.2 G/DL — LOW (ref 3.3–5)
ALBUMIN SERPL ELPH-MCNC: 2.3 G/DL — LOW (ref 3.3–5)
ALBUMIN SERPL ELPH-MCNC: 2.3 G/DL — LOW (ref 3.3–5)
ALBUMIN SERPL ELPH-MCNC: 2.4 G/DL — LOW (ref 3.3–5)
ALP SERPL-CCNC: 26 U/L — LOW (ref 40–120)
ALP SERPL-CCNC: 27 U/L — LOW (ref 40–120)
ALP SERPL-CCNC: 31 U/L — LOW (ref 40–120)
ALP SERPL-CCNC: 32 U/L — LOW (ref 40–120)
ALP SERPL-CCNC: 40 U/L — SIGNIFICANT CHANGE UP (ref 40–120)
ALP SERPL-CCNC: 43 U/L — SIGNIFICANT CHANGE UP (ref 40–120)
ALT FLD-CCNC: 14 U/L — SIGNIFICANT CHANGE UP (ref 10–45)
ALT FLD-CCNC: 14 U/L — SIGNIFICANT CHANGE UP (ref 10–45)
ALT FLD-CCNC: 15 U/L — SIGNIFICANT CHANGE UP (ref 10–45)
ALT FLD-CCNC: 15 U/L — SIGNIFICANT CHANGE UP (ref 10–45)
ALT FLD-CCNC: 18 U/L — SIGNIFICANT CHANGE UP (ref 10–45)
ALT FLD-CCNC: 19 U/L — SIGNIFICANT CHANGE UP (ref 10–45)
ANION GAP SERPL CALC-SCNC: 11 MMOL/L — SIGNIFICANT CHANGE UP (ref 5–17)
ANION GAP SERPL CALC-SCNC: 11 MMOL/L — SIGNIFICANT CHANGE UP (ref 5–17)
ANION GAP SERPL CALC-SCNC: 12 MMOL/L — SIGNIFICANT CHANGE UP (ref 5–17)
ANION GAP SERPL CALC-SCNC: 13 MMOL/L — SIGNIFICANT CHANGE UP (ref 5–17)
ANION GAP SERPL CALC-SCNC: 14 MMOL/L — SIGNIFICANT CHANGE UP (ref 5–17)
ANION GAP SERPL CALC-SCNC: 14 MMOL/L — SIGNIFICANT CHANGE UP (ref 5–17)
APTT BLD: 108.6 SEC — HIGH (ref 27.5–37.4)
APTT BLD: 126.2 SEC — CRITICAL HIGH (ref 27.5–37.4)
APTT BLD: 47.2 SEC — HIGH (ref 27.5–37.4)
APTT BLD: 59.6 SEC — HIGH (ref 27.5–37.4)
APTT BLD: 61.6 SEC — HIGH (ref 27.5–37.4)
APTT BLD: 65.5 SEC — HIGH (ref 27.5–37.4)
APTT BLD: 97.7 SEC — HIGH (ref 27.5–37.4)
AST SERPL-CCNC: 65 U/L — HIGH (ref 10–40)
AST SERPL-CCNC: 67 U/L — HIGH (ref 10–40)
AST SERPL-CCNC: 68 U/L — HIGH (ref 10–40)
AST SERPL-CCNC: 74 U/L — HIGH (ref 10–40)
AST SERPL-CCNC: 75 U/L — HIGH (ref 10–40)
AST SERPL-CCNC: 79 U/L — HIGH (ref 10–40)
BILIRUB SERPL-MCNC: 4.1 MG/DL — HIGH (ref 0.2–1.2)
BILIRUB SERPL-MCNC: 4.5 MG/DL — HIGH (ref 0.2–1.2)
BILIRUB SERPL-MCNC: 4.6 MG/DL — HIGH (ref 0.2–1.2)
BILIRUB SERPL-MCNC: 4.9 MG/DL — HIGH (ref 0.2–1.2)
BILIRUB SERPL-MCNC: 6.2 MG/DL — HIGH (ref 0.2–1.2)
BILIRUB SERPL-MCNC: 6.2 MG/DL — HIGH (ref 0.2–1.2)
BLD GP AB SCN SERPL QL: NEGATIVE — SIGNIFICANT CHANGE UP
BUN SERPL-MCNC: 16 MG/DL — SIGNIFICANT CHANGE UP (ref 7–23)
BUN SERPL-MCNC: 17 MG/DL — SIGNIFICANT CHANGE UP (ref 7–23)
CALCIUM SERPL-MCNC: 8.2 MG/DL — LOW (ref 8.4–10.5)
CALCIUM SERPL-MCNC: 8.3 MG/DL — LOW (ref 8.4–10.5)
CALCIUM SERPL-MCNC: 8.4 MG/DL — SIGNIFICANT CHANGE UP (ref 8.4–10.5)
CALCIUM SERPL-MCNC: 8.9 MG/DL — SIGNIFICANT CHANGE UP (ref 8.4–10.5)
CHLORIDE SERPL-SCNC: 102 MMOL/L — SIGNIFICANT CHANGE UP (ref 96–108)
CHLORIDE SERPL-SCNC: 104 MMOL/L — SIGNIFICANT CHANGE UP (ref 96–108)
CHLORIDE SERPL-SCNC: 104 MMOL/L — SIGNIFICANT CHANGE UP (ref 96–108)
CHLORIDE SERPL-SCNC: 106 MMOL/L — SIGNIFICANT CHANGE UP (ref 96–108)
CO2 SERPL-SCNC: 20 MMOL/L — LOW (ref 22–31)
CO2 SERPL-SCNC: 21 MMOL/L — LOW (ref 22–31)
CO2 SERPL-SCNC: 22 MMOL/L — SIGNIFICANT CHANGE UP (ref 22–31)
CO2 SERPL-SCNC: 23 MMOL/L — SIGNIFICANT CHANGE UP (ref 22–31)
CO2 SERPL-SCNC: 23 MMOL/L — SIGNIFICANT CHANGE UP (ref 22–31)
CO2 SERPL-SCNC: 24 MMOL/L — SIGNIFICANT CHANGE UP (ref 22–31)
CREAT SERPL-MCNC: 0.93 MG/DL — SIGNIFICANT CHANGE UP (ref 0.5–1.3)
CREAT SERPL-MCNC: 0.93 MG/DL — SIGNIFICANT CHANGE UP (ref 0.5–1.3)
CREAT SERPL-MCNC: 0.94 MG/DL — SIGNIFICANT CHANGE UP (ref 0.5–1.3)
CREAT SERPL-MCNC: 0.94 MG/DL — SIGNIFICANT CHANGE UP (ref 0.5–1.3)
CREAT SERPL-MCNC: 0.96 MG/DL — SIGNIFICANT CHANGE UP (ref 0.5–1.3)
CREAT SERPL-MCNC: 0.99 MG/DL — SIGNIFICANT CHANGE UP (ref 0.5–1.3)
FIBRINOGEN PPP-MCNC: 280 MG/DL — SIGNIFICANT CHANGE UP (ref 258–438)
GAS PNL BLDA: SIGNIFICANT CHANGE UP
GAS PNL BLDV: SIGNIFICANT CHANGE UP
GLUCOSE BLDC GLUCOMTR-MCNC: 118 MG/DL — HIGH (ref 70–99)
GLUCOSE BLDC GLUCOMTR-MCNC: 139 MG/DL — HIGH (ref 70–99)
GLUCOSE BLDC GLUCOMTR-MCNC: 139 MG/DL — HIGH (ref 70–99)
GLUCOSE BLDC GLUCOMTR-MCNC: 145 MG/DL — HIGH (ref 70–99)
GLUCOSE BLDC GLUCOMTR-MCNC: 147 MG/DL — HIGH (ref 70–99)
GLUCOSE BLDC GLUCOMTR-MCNC: 155 MG/DL — HIGH (ref 70–99)
GLUCOSE BLDC GLUCOMTR-MCNC: 163 MG/DL — HIGH (ref 70–99)
GLUCOSE BLDC GLUCOMTR-MCNC: 164 MG/DL — HIGH (ref 70–99)
GLUCOSE BLDC GLUCOMTR-MCNC: 167 MG/DL — HIGH (ref 70–99)
GLUCOSE BLDC GLUCOMTR-MCNC: 172 MG/DL — HIGH (ref 70–99)
GLUCOSE BLDC GLUCOMTR-MCNC: 173 MG/DL — HIGH (ref 70–99)
GLUCOSE BLDC GLUCOMTR-MCNC: 191 MG/DL — HIGH (ref 70–99)
GLUCOSE BLDC GLUCOMTR-MCNC: 192 MG/DL — HIGH (ref 70–99)
GLUCOSE BLDC GLUCOMTR-MCNC: 207 MG/DL — HIGH (ref 70–99)
GLUCOSE BLDC GLUCOMTR-MCNC: 48 MG/DL — LOW (ref 70–99)
GLUCOSE BLDC GLUCOMTR-MCNC: 52 MG/DL — LOW (ref 70–99)
GLUCOSE SERPL-MCNC: 123 MG/DL — HIGH (ref 70–99)
GLUCOSE SERPL-MCNC: 163 MG/DL — HIGH (ref 70–99)
GLUCOSE SERPL-MCNC: 181 MG/DL — HIGH (ref 70–99)
GLUCOSE SERPL-MCNC: 182 MG/DL — HIGH (ref 70–99)
GLUCOSE SERPL-MCNC: 184 MG/DL — HIGH (ref 70–99)
GLUCOSE SERPL-MCNC: 199 MG/DL — HIGH (ref 70–99)
HCT VFR BLD CALC: 22.5 % — LOW (ref 39–50)
HCT VFR BLD CALC: 25 % — LOW (ref 39–50)
HCT VFR BLD CALC: 28.3 % — LOW (ref 39–50)
HCT VFR BLD CALC: 29.4 % — LOW (ref 39–50)
HCT VFR BLD CALC: 30.4 % — LOW (ref 39–50)
HCT VFR BLD CALC: 32.2 % — LOW (ref 39–50)
HGB BLD-MCNC: 10.5 G/DL — LOW (ref 13–17)
HGB BLD-MCNC: 10.6 G/DL — LOW (ref 13–17)
HGB BLD-MCNC: 11.6 G/DL — LOW (ref 13–17)
HGB BLD-MCNC: 8 G/DL — LOW (ref 13–17)
HGB BLD-MCNC: 8.8 G/DL — LOW (ref 13–17)
HGB BLD-MCNC: 9.7 G/DL — LOW (ref 13–17)
INR BLD: 1.18 — HIGH (ref 0.88–1.16)
INR BLD: 1.22 — HIGH (ref 0.88–1.16)
INR BLD: 1.3 — HIGH (ref 0.88–1.16)
INR BLD: 1.31 — HIGH (ref 0.88–1.16)
INR BLD: 1.61 — HIGH (ref 0.88–1.16)
INR BLD: 1.79 — HIGH (ref 0.88–1.16)
INR BLD: 1.8 — HIGH (ref 0.88–1.16)
LACTATE SERPL-SCNC: 2.7 MMOL/L — HIGH (ref 0.5–2)
LACTATE SERPL-SCNC: 2.8 MMOL/L — HIGH (ref 0.5–2)
LACTATE SERPL-SCNC: 3.1 MMOL/L — HIGH (ref 0.5–2)
LACTATE SERPL-SCNC: 3.1 MMOL/L — HIGH (ref 0.5–2)
LACTATE SERPL-SCNC: 3.3 MMOL/L — HIGH (ref 0.5–2)
LACTATE SERPL-SCNC: 3.6 MMOL/L — HIGH (ref 0.5–2)
MAGNESIUM SERPL-MCNC: 1.7 MG/DL — SIGNIFICANT CHANGE UP (ref 1.6–2.6)
MAGNESIUM SERPL-MCNC: 1.8 MG/DL — SIGNIFICANT CHANGE UP (ref 1.6–2.6)
MAGNESIUM SERPL-MCNC: 1.9 MG/DL — SIGNIFICANT CHANGE UP (ref 1.6–2.6)
MAGNESIUM SERPL-MCNC: 2 MG/DL — SIGNIFICANT CHANGE UP (ref 1.6–2.6)
MAGNESIUM SERPL-MCNC: 2 MG/DL — SIGNIFICANT CHANGE UP (ref 1.6–2.6)
MAGNESIUM SERPL-MCNC: 2.2 MG/DL — SIGNIFICANT CHANGE UP (ref 1.6–2.6)
MCHC RBC-ENTMCNC: 29 PG — SIGNIFICANT CHANGE UP (ref 27–34)
MCHC RBC-ENTMCNC: 29.3 PG — SIGNIFICANT CHANGE UP (ref 27–34)
MCHC RBC-ENTMCNC: 29.4 PG — SIGNIFICANT CHANGE UP (ref 27–34)
MCHC RBC-ENTMCNC: 29.4 PG — SIGNIFICANT CHANGE UP (ref 27–34)
MCHC RBC-ENTMCNC: 29.6 PG — SIGNIFICANT CHANGE UP (ref 27–34)
MCHC RBC-ENTMCNC: 29.9 PG — SIGNIFICANT CHANGE UP (ref 27–34)
MCHC RBC-ENTMCNC: 34.3 G/DL — SIGNIFICANT CHANGE UP (ref 32–36)
MCHC RBC-ENTMCNC: 34.9 G/DL — SIGNIFICANT CHANGE UP (ref 32–36)
MCHC RBC-ENTMCNC: 35.2 G/DL — SIGNIFICANT CHANGE UP (ref 32–36)
MCHC RBC-ENTMCNC: 35.6 G/DL — SIGNIFICANT CHANGE UP (ref 32–36)
MCHC RBC-ENTMCNC: 35.7 G/DL — SIGNIFICANT CHANGE UP (ref 32–36)
MCHC RBC-ENTMCNC: 36 G/DL — SIGNIFICANT CHANGE UP (ref 32–36)
MCV RBC AUTO: 82.1 FL — SIGNIFICANT CHANGE UP (ref 80–100)
MCV RBC AUTO: 82.4 FL — SIGNIFICANT CHANGE UP (ref 80–100)
MCV RBC AUTO: 83.6 FL — SIGNIFICANT CHANGE UP (ref 80–100)
MCV RBC AUTO: 84 FL — SIGNIFICANT CHANGE UP (ref 80–100)
MCV RBC AUTO: 84 FL — SIGNIFICANT CHANGE UP (ref 80–100)
MCV RBC AUTO: 84.7 FL — SIGNIFICANT CHANGE UP (ref 80–100)
PHOSPHATE SERPL-MCNC: 3.7 MG/DL — SIGNIFICANT CHANGE UP (ref 2.5–4.5)
PHOSPHATE SERPL-MCNC: 3.9 MG/DL — SIGNIFICANT CHANGE UP (ref 2.5–4.5)
PHOSPHATE SERPL-MCNC: 4.3 MG/DL — SIGNIFICANT CHANGE UP (ref 2.5–4.5)
PHOSPHATE SERPL-MCNC: 4.6 MG/DL — HIGH (ref 2.5–4.5)
PHOSPHATE SERPL-MCNC: 5.2 MG/DL — HIGH (ref 2.5–4.5)
PHOSPHATE SERPL-MCNC: 6 MG/DL — HIGH (ref 2.5–4.5)
PLATELET # BLD AUTO: 114 K/UL — LOW (ref 150–400)
PLATELET # BLD AUTO: 125 K/UL — LOW (ref 150–400)
PLATELET # BLD AUTO: 39 K/UL — LOW (ref 150–400)
PLATELET # BLD AUTO: 42 K/UL — LOW (ref 150–400)
PLATELET # BLD AUTO: 76 K/UL — LOW (ref 150–400)
PLATELET # BLD AUTO: 80 K/UL — LOW (ref 150–400)
POTASSIUM SERPL-MCNC: 3.5 MMOL/L — SIGNIFICANT CHANGE UP (ref 3.5–5.3)
POTASSIUM SERPL-MCNC: 3.6 MMOL/L — SIGNIFICANT CHANGE UP (ref 3.5–5.3)
POTASSIUM SERPL-MCNC: 3.7 MMOL/L — SIGNIFICANT CHANGE UP (ref 3.5–5.3)
POTASSIUM SERPL-MCNC: 3.9 MMOL/L — SIGNIFICANT CHANGE UP (ref 3.5–5.3)
POTASSIUM SERPL-MCNC: 4 MMOL/L — SIGNIFICANT CHANGE UP (ref 3.5–5.3)
POTASSIUM SERPL-MCNC: 4.7 MMOL/L — SIGNIFICANT CHANGE UP (ref 3.5–5.3)
POTASSIUM SERPL-SCNC: 3.5 MMOL/L — SIGNIFICANT CHANGE UP (ref 3.5–5.3)
POTASSIUM SERPL-SCNC: 3.6 MMOL/L — SIGNIFICANT CHANGE UP (ref 3.5–5.3)
POTASSIUM SERPL-SCNC: 3.7 MMOL/L — SIGNIFICANT CHANGE UP (ref 3.5–5.3)
POTASSIUM SERPL-SCNC: 3.9 MMOL/L — SIGNIFICANT CHANGE UP (ref 3.5–5.3)
POTASSIUM SERPL-SCNC: 4 MMOL/L — SIGNIFICANT CHANGE UP (ref 3.5–5.3)
POTASSIUM SERPL-SCNC: 4.7 MMOL/L — SIGNIFICANT CHANGE UP (ref 3.5–5.3)
PROT SERPL-MCNC: 3.1 G/DL — LOW (ref 6–8.3)
PROT SERPL-MCNC: 3.2 G/DL — LOW (ref 6–8.3)
PROT SERPL-MCNC: 3.3 G/DL — LOW (ref 6–8.3)
PROT SERPL-MCNC: 3.6 G/DL — LOW (ref 6–8.3)
PROT SERPL-MCNC: 3.8 G/DL — LOW (ref 6–8.3)
PROT SERPL-MCNC: 3.9 G/DL — LOW (ref 6–8.3)
PROTHROM AB SERPL-ACNC: 13.1 SEC — HIGH (ref 9.8–12.7)
PROTHROM AB SERPL-ACNC: 13.6 SEC — HIGH (ref 9.8–12.7)
PROTHROM AB SERPL-ACNC: 14.5 SEC — HIGH (ref 9.8–12.7)
PROTHROM AB SERPL-ACNC: 14.6 SEC — HIGH (ref 9.8–12.7)
PROTHROM AB SERPL-ACNC: 18.1 SEC — HIGH (ref 9.8–12.7)
PROTHROM AB SERPL-ACNC: 20.1 SEC — HIGH (ref 9.8–12.7)
PROTHROM AB SERPL-ACNC: 20.2 SEC — HIGH (ref 9.8–12.7)
RBC # BLD: 2.68 M/UL — LOW (ref 4.2–5.8)
RBC # BLD: 2.99 M/UL — LOW (ref 4.2–5.8)
RBC # BLD: 3.34 M/UL — LOW (ref 4.2–5.8)
RBC # BLD: 3.57 M/UL — LOW (ref 4.2–5.8)
RBC # BLD: 3.62 M/UL — LOW (ref 4.2–5.8)
RBC # BLD: 3.92 M/UL — LOW (ref 4.2–5.8)
RBC # FLD: 14.3 % — SIGNIFICANT CHANGE UP (ref 10.3–16.9)
RBC # FLD: 14.6 % — SIGNIFICANT CHANGE UP (ref 10.3–16.9)
RBC # FLD: 14.9 % — SIGNIFICANT CHANGE UP (ref 10.3–16.9)
RBC # FLD: 14.9 % — SIGNIFICANT CHANGE UP (ref 10.3–16.9)
RBC # FLD: 15 % — SIGNIFICANT CHANGE UP (ref 10.3–16.9)
RBC # FLD: 15.3 % — SIGNIFICANT CHANGE UP (ref 10.3–16.9)
RH IG SCN BLD-IMP: POSITIVE — SIGNIFICANT CHANGE UP
SODIUM SERPL-SCNC: 136 MMOL/L — SIGNIFICANT CHANGE UP (ref 135–145)
SODIUM SERPL-SCNC: 136 MMOL/L — SIGNIFICANT CHANGE UP (ref 135–145)
SODIUM SERPL-SCNC: 138 MMOL/L — SIGNIFICANT CHANGE UP (ref 135–145)
SODIUM SERPL-SCNC: 138 MMOL/L — SIGNIFICANT CHANGE UP (ref 135–145)
SODIUM SERPL-SCNC: 139 MMOL/L — SIGNIFICANT CHANGE UP (ref 135–145)
SODIUM SERPL-SCNC: 141 MMOL/L — SIGNIFICANT CHANGE UP (ref 135–145)
VANCOMYCIN TROUGH SERPL-MCNC: 16.4 UG/ML — SIGNIFICANT CHANGE UP (ref 10–20)
WBC # BLD: 10.1 K/UL — SIGNIFICANT CHANGE UP (ref 3.8–10.5)
WBC # BLD: 6.6 K/UL — SIGNIFICANT CHANGE UP (ref 3.8–10.5)
WBC # BLD: 7.2 K/UL — SIGNIFICANT CHANGE UP (ref 3.8–10.5)
WBC # BLD: 7.5 K/UL — SIGNIFICANT CHANGE UP (ref 3.8–10.5)
WBC # BLD: 7.6 K/UL — SIGNIFICANT CHANGE UP (ref 3.8–10.5)
WBC # BLD: 9.3 K/UL — SIGNIFICANT CHANGE UP (ref 3.8–10.5)
WBC # FLD AUTO: 10.1 K/UL — SIGNIFICANT CHANGE UP (ref 3.8–10.5)
WBC # FLD AUTO: 6.6 K/UL — SIGNIFICANT CHANGE UP (ref 3.8–10.5)
WBC # FLD AUTO: 7.2 K/UL — SIGNIFICANT CHANGE UP (ref 3.8–10.5)
WBC # FLD AUTO: 7.5 K/UL — SIGNIFICANT CHANGE UP (ref 3.8–10.5)
WBC # FLD AUTO: 7.6 K/UL — SIGNIFICANT CHANGE UP (ref 3.8–10.5)
WBC # FLD AUTO: 9.3 K/UL — SIGNIFICANT CHANGE UP (ref 3.8–10.5)

## 2018-06-23 PROCEDURE — 99292 CRITICAL CARE ADDL 30 MIN: CPT

## 2018-06-23 PROCEDURE — 32120 RE-EXPLORATION OF CHEST: CPT | Mod: 78

## 2018-06-23 PROCEDURE — 99291 CRITICAL CARE FIRST HOUR: CPT

## 2018-06-23 PROCEDURE — 71045 X-RAY EXAM CHEST 1 VIEW: CPT | Mod: 26

## 2018-06-23 RX ORDER — FUROSEMIDE 40 MG
20 TABLET ORAL ONCE
Qty: 0 | Refills: 0 | Status: COMPLETED | OUTPATIENT
Start: 2018-06-23 | End: 2018-06-22

## 2018-06-23 RX ORDER — POTASSIUM CHLORIDE 20 MEQ
20 PACKET (EA) ORAL
Qty: 0 | Refills: 0 | Status: COMPLETED | OUTPATIENT
Start: 2018-06-23 | End: 2018-06-23

## 2018-06-23 RX ORDER — MIDAZOLAM HYDROCHLORIDE 1 MG/ML
2 INJECTION, SOLUTION INTRAMUSCULAR; INTRAVENOUS ONCE
Qty: 0 | Refills: 0 | Status: DISCONTINUED | OUTPATIENT
Start: 2018-06-23 | End: 2018-06-23

## 2018-06-23 RX ORDER — MAGNESIUM SULFATE 500 MG/ML
2 VIAL (ML) INJECTION ONCE
Qty: 0 | Refills: 0 | Status: COMPLETED | OUTPATIENT
Start: 2018-06-23 | End: 2018-06-23

## 2018-06-23 RX ORDER — POTASSIUM CHLORIDE 20 MEQ
20 PACKET (EA) ORAL ONCE
Qty: 0 | Refills: 0 | Status: COMPLETED | OUTPATIENT
Start: 2018-06-23 | End: 2018-06-23

## 2018-06-23 RX ORDER — DEXMEDETOMIDINE HYDROCHLORIDE IN 0.9% SODIUM CHLORIDE 4 UG/ML
0.3 INJECTION INTRAVENOUS
Qty: 200 | Refills: 0 | Status: DISCONTINUED | OUTPATIENT
Start: 2018-06-23 | End: 2018-06-28

## 2018-06-23 RX ORDER — CISATRACURIUM BESYLATE 2 MG/ML
10 INJECTION INTRAVENOUS
Qty: 0 | Refills: 0 | Status: COMPLETED | OUTPATIENT
Start: 2018-06-23 | End: 2018-06-23

## 2018-06-23 RX ORDER — POTASSIUM CHLORIDE 20 MEQ
20 PACKET (EA) ORAL
Qty: 0 | Refills: 0 | Status: DISCONTINUED | OUTPATIENT
Start: 2018-06-23 | End: 2018-06-23

## 2018-06-23 RX ORDER — CISATRACURIUM BESYLATE 2 MG/ML
10 INJECTION INTRAVENOUS ONCE
Qty: 0 | Refills: 0 | Status: COMPLETED | OUTPATIENT
Start: 2018-06-23 | End: 2018-06-23

## 2018-06-23 RX ORDER — ACETAZOLAMIDE 250 MG/1
250 TABLET ORAL ONCE
Qty: 0 | Refills: 0 | Status: COMPLETED | OUTPATIENT
Start: 2018-06-23 | End: 2018-06-23

## 2018-06-23 RX ORDER — CALCIUM GLUCONATE 100 MG/ML
2 VIAL (ML) INTRAVENOUS ONCE
Qty: 0 | Refills: 0 | Status: COMPLETED | OUTPATIENT
Start: 2018-06-23 | End: 2018-06-23

## 2018-06-23 RX ORDER — FENTANYL CITRATE 50 UG/ML
25 INJECTION INTRAVENOUS ONCE
Qty: 0 | Refills: 0 | Status: DISCONTINUED | OUTPATIENT
Start: 2018-06-23 | End: 2018-06-23

## 2018-06-23 RX ORDER — PHENYLEPHRINE HYDROCHLORIDE 10 MG/ML
0.5 INJECTION INTRAVENOUS
Qty: 160 | Refills: 0 | Status: DISCONTINUED | OUTPATIENT
Start: 2018-06-23 | End: 2018-06-27

## 2018-06-23 RX ORDER — DEXTROSE 50 % IN WATER 50 %
50 SYRINGE (ML) INTRAVENOUS ONCE
Qty: 0 | Refills: 0 | Status: COMPLETED | OUTPATIENT
Start: 2018-06-23 | End: 2018-06-23

## 2018-06-23 RX ORDER — FENTANYL CITRATE 50 UG/ML
50 INJECTION INTRAVENOUS ONCE
Qty: 0 | Refills: 0 | Status: DISCONTINUED | OUTPATIENT
Start: 2018-06-23 | End: 2018-06-23

## 2018-06-23 RX ADMIN — Medication 200 GRAM(S): at 23:52

## 2018-06-23 RX ADMIN — CHLORHEXIDINE GLUCONATE 5 MILLILITER(S): 213 SOLUTION TOPICAL at 06:16

## 2018-06-23 RX ADMIN — Medication 100 MILLIGRAM(S): at 17:30

## 2018-06-23 RX ADMIN — Medication 100 MILLIEQUIVALENT(S): at 18:30

## 2018-06-23 RX ADMIN — Medication 250 MILLIGRAM(S): at 13:00

## 2018-06-23 RX ADMIN — Medication 100 MILLIGRAM(S): at 21:57

## 2018-06-23 RX ADMIN — CISATRACURIUM BESYLATE 10 MILLIGRAM(S): 2 INJECTION INTRAVENOUS at 00:30

## 2018-06-23 RX ADMIN — Medication 50 MILLIGRAM(S): at 18:30

## 2018-06-23 RX ADMIN — VASOPRESSIN 1.8 UNIT(S)/MIN: 20 INJECTION INTRAVENOUS at 13:30

## 2018-06-23 RX ADMIN — MILRINONE LACTATE 8.79 MICROGRAM(S)/KG/MIN: 1 INJECTION, SOLUTION INTRAVENOUS at 02:54

## 2018-06-23 RX ADMIN — Medication 100 MILLIEQUIVALENT(S): at 17:30

## 2018-06-23 RX ADMIN — Medication 50 MILLIGRAM(S): at 13:00

## 2018-06-23 RX ADMIN — FLUCONAZOLE 100 MILLIGRAM(S): 150 TABLET ORAL at 13:00

## 2018-06-23 RX ADMIN — CISATRACURIUM BESYLATE 10.55 MICROGRAM(S)/KG/MIN: 2 INJECTION INTRAVENOUS at 02:21

## 2018-06-23 RX ADMIN — CHLORHEXIDINE GLUCONATE 5 MILLILITER(S): 213 SOLUTION TOPICAL at 02:47

## 2018-06-23 RX ADMIN — Medication 100 MILLIEQUIVALENT(S): at 22:58

## 2018-06-23 RX ADMIN — Medication 100 MILLIEQUIVALENT(S): at 14:00

## 2018-06-23 RX ADMIN — CISATRACURIUM BESYLATE 10 MILLIGRAM(S): 2 INJECTION INTRAVENOUS at 20:08

## 2018-06-23 RX ADMIN — Medication 200 GRAM(S): at 10:00

## 2018-06-23 RX ADMIN — Medication 50 GRAM(S): at 23:09

## 2018-06-23 RX ADMIN — Medication 50 GRAM(S): at 13:15

## 2018-06-23 RX ADMIN — PROPOFOL 1.76 MICROGRAM(S)/KG/MIN: 10 INJECTION, EMULSION INTRAVENOUS at 17:30

## 2018-06-23 RX ADMIN — CHLORHEXIDINE GLUCONATE 5 MILLILITER(S): 213 SOLUTION TOPICAL at 22:51

## 2018-06-23 RX ADMIN — CHLORHEXIDINE GLUCONATE 5 MILLILITER(S): 213 SOLUTION TOPICAL at 13:11

## 2018-06-23 RX ADMIN — INSULIN HUMAN 5 UNIT(S)/HR: 100 INJECTION, SOLUTION SUBCUTANEOUS at 01:01

## 2018-06-23 RX ADMIN — FENTANYL CITRATE 25 MICROGRAM(S): 50 INJECTION INTRAVENOUS at 00:00

## 2018-06-23 RX ADMIN — Medication 100 MILLIGRAM(S): at 00:00

## 2018-06-23 RX ADMIN — Medication 100 MILLIGRAM(S): at 08:00

## 2018-06-23 RX ADMIN — Medication 50 MILLIGRAM(S): at 02:30

## 2018-06-23 RX ADMIN — Medication 50 MILLILITER(S): at 10:30

## 2018-06-23 RX ADMIN — MIDAZOLAM HYDROCHLORIDE 2 MILLIGRAM(S): 1 INJECTION, SOLUTION INTRAMUSCULAR; INTRAVENOUS at 20:08

## 2018-06-23 RX ADMIN — Medication 100 MILLIEQUIVALENT(S): at 21:55

## 2018-06-23 RX ADMIN — PHENYLEPHRINE HYDROCHLORIDE 5.49 MICROGRAM(S)/KG/MIN: 10 INJECTION INTRAVENOUS at 10:00

## 2018-06-23 RX ADMIN — Medication 125 MILLILITER(S): at 00:30

## 2018-06-23 RX ADMIN — CHLORHEXIDINE GLUCONATE 5 MILLILITER(S): 213 SOLUTION TOPICAL at 17:36

## 2018-06-23 RX ADMIN — Medication 200 GRAM(S): at 02:30

## 2018-06-23 RX ADMIN — FENTANYL CITRATE 25 MICROGRAM(S): 50 INJECTION INTRAVENOUS at 00:15

## 2018-06-23 RX ADMIN — ACETAZOLAMIDE 105 MILLIGRAM(S): 250 TABLET ORAL at 15:05

## 2018-06-23 NOTE — PROGRESS NOTE ADULT - SUBJECTIVE AND OBJECTIVE BOX
INTERVAL HPI/OVERNIGHT EVENTS:    POD#2 Reop AVR/ascending aortic replacement/ MV and TV repair; CABG x 2  Impella & IABP placement  EF 15%    Allergies: PCN - reaction unknown  Abx: Aztreonam/Flagyl/Vancomycin    66yo male with Hx congenital AV dz -  repair/replacement (13yo - [facility]) being evaluated for hernia repair.  Preop risk assessment performed  ECHO/Cath (Completed 06/2018.) EF 25%, severe AS, severe MR, severe TR, severe pHTN, and 2V CAD.   Patient with sxs progressive FLORENCE and dec exercise tolerance for several months    transferred to [facility] for assessment and intervention  To OR 6/21 - 12 UpRBC/8 FFP/4 platelet/FEIBA and Mary 7 given  6/22 - washout and chest closure performed  later in day/evening on 6/22 - inc pressor requirements; inc CVP; drop in UO --> tamponade  chest opened at bedside (Dr Benedict) with improvement - reportedly approx 1L blood noted right chest  Impella and IABP remain in place; chest open  still with significant output from mediastinal and sohail tubes (/h)  remains sedate/paralyzed    ICU Vital Signs Last 24 Hrs  T(C): 36.6 (23 Jun 2018 09:55), Max: 37.1 (22 Jun 2018 23:00)  T(F): 97.8 (23 Jun 2018 09:55), Max: 98.8 (22 Jun 2018 23:00)  HR: 92 (23 Jun 2018 09:07) (78 - 104) paced - underlying junctional with drop in BP when assessed  ABP: 108/36 (23 Jun 2018 09:00) (86/28 - 164/52)  ABP(mean): 64 (23 Jun 2018 09:00) (42 - 82)  SpO2: 99% (23 Jun 2018 09:07) (93% - 100%) vent40%/PEEP 4; Ana Luisa (For RV dysfxn) 20PPM    Qtts:   Milrinone 0.25  Epi 0.06  Dio 0.7  Vaso (4) 0.067  Nimbex 4  insulin 3- now off with glucose 45  lasix 2.5 mg/h  propofol  fentanyl 0.714 mcg/kg/h    I&O's Summary    22 Jun 2018 07:01  -  23 Jun 2018 07:00  --------------------------------------------------------  IN: 6030.5 mL / OUT: 6345 mL / NET: -314.5 mL    23 Jun 2018 07:01  -  23 Jun 2018 10:17  --------------------------------------------------------  IN: 819.1 mL / OUT: 585 mL / NET: 234.1 mL    Vent settings: AC 14/500/40/5; Ana Luisa 20 PPM    Physical Exam    Heart - regular (-)rub/gallop  Lungs - dec BS bases; no wheeze  Abd - difficult to auscultate BS; soft - NTND  Ext - cool feet bilaterally; warm legs to ankles bilaterally; dopplerable   Chest  Neuro  Skin    LABS:                        8.8    7.5   )-----------( 39       ( 23 Jun 2018 05:26 )             25.0     06-23    141  |  106  |  17  ----------------------------<  163<H>  4.0   |  23  |  0.99    Ca    8.2<L>      23 Jun 2018 05:26  Phos  4.6     06-23  Mg     2.0     06-23    TPro  3.1<L>  /  Alb  2.2<L>  /  TBili  4.6<H>  /  DBili  x   /  AST  74<H>  /  ALT  15  /  AlkPhos  26<L>  06-23    PT/INR - ( 23 Jun 2018 05:26 )   PT: 18.1 sec;   INR: 1.61          PTT - ( 23 Jun 2018 05:26 )  PTT:97.7 sec    ABG - ( 23 Jun 2018 10:04 )  pH, Arterial: 7.56  pH, Blood: x     /  pCO2: 26    /  pO2: 200   / HCO3: 23    / Base Excess: 1.1   /  SaO2: 99                  RADIOLOGY & ADDITIONAL STUDIES:    I have spent/provided stated minutes of critical care time to this patient: INTERVAL HPI/OVERNIGHT EVENTS:    POD#2 Reop AVR/ascending aortic replacement/ MV and TV repair; CABG x 2  Impella & IABP placement  EF 15%    Allergies: PCN - reaction unknown  Abx: Aztreonam/Flagyl/Vancomycin    66yo male with Hx congenital AV dz -  repair/replacement (13yo - [facility]) being evaluated for hernia repair.  Preop risk assessment performed  ECHO/Cath (Completed 06/2018.) EF 25%, severe AS, severe MR, severe TR, severe pHTN, and 2V CAD.   Patient with sxs progressive FLORENCE and dec exercise tolerance for several months    transferred to [facility] for assessment and intervention  To OR 6/21 - 12 UpRBC/8 FFP/4 platelet/FEIBA and Mary 7 given  6/22 - washout and chest closure performed  later in day/evening on 6/22 - inc pressor requirements; inc CVP; drop in UO --> tamponade  chest opened at bedside (Dr Benedict) with improvement - reportedly approx 1L blood noted right chest  Impella and IABP remain in place; chest open  still with significant output from mediastinal and sohail tubes (/h)  remains sedate/paralyzed    ICU Vital Signs Last 24 Hrs  T(C): 36.6 (23 Jun 2018 09:55), Max: 37.1 (22 Jun 2018 23:00)  T(F): 97.8 (23 Jun 2018 09:55), Max: 98.8 (22 Jun 2018 23:00)  HR: 92 (23 Jun 2018 09:07) (78 - 104) paced - underlying junctional with drop in BP when assessed  ABP: 108/36 (23 Jun 2018 09:00) (86/28 - 164/52)  ABP(mean): 64 (23 Jun 2018 09:00) (42 - 82)  SpO2: 99% (23 Jun 2018 09:07) (93% - 100%) vent40%/PEEP 4; Ana Luisa (For RV dysfxn) 20PPM    Qtts:   Milrinone 0.25  Epi 0.06  Dio 0.7  Vaso (4) 0.067  Nimbex 4  insulin 3- now off with glucose 45  lasix 2.5 mg/h  propofol  fentanyl 0.714 mcg/kg/h    I&O's Summary    22 Jun 2018 07:01  -  23 Jun 2018 07:00  --------------------------------------------------------  IN: 6030.5 mL / OUT: 6345 mL / NET: -314.5 mL    23 Jun 2018 07:01  -  23 Jun 2018 10:17  --------------------------------------------------------  IN: 819.1 mL / OUT: 585 mL / NET: 234.1 mL    Vent settings: AC 14/500/40/5; Ana Luisa 20 PPM    Physical Exam    Heart - regular (-)rub/gallop  Lungs - dec BS bases; no wheeze  Abd - difficult to auscultate BS; soft - NTND  Ext - cool feet bilaterally; warm legs to ankles bilaterally; dopplerable pulses appreciated - unable to palpate pulse - 1-2+ pittting edema  Chest -open chest - bandage intact in place  Neuro - pupils reactive bilaterally; otherwise unable at this time - patient paralyzed  Skin - no rash    LABS:                        8.8    7.5   )-----------( 39       ( 23 Jun 2018 05:26 )             25.0     06-23    141  |  106  |  17  ----------------------------<  163<H>  4.0   |  23  |  0.99    Ca    8.2<L>      23 Jun 2018 05:26  Phos  4.6     06-23  Mg     2.0     06-23    TPro  3.1<L>  /  Alb  2.2<L>  /  TBili  4.6<H>  /  DBili  x   /  AST  74<H>  /  ALT  15  /  AlkPhos  26<L>  06-23    PT/INR - ( 23 Jun 2018 05:26 )   PT: 18.1 sec;   INR: 1.61     PTT - ( 23 Jun 2018 05:26 )  PTT:97.7 sec    ABG - ( 23 Jun 2018 10:04 ) 7.56/26/200/99    RADIOLOGY & ADDITIONAL STUDIES: reviewed  Vanco 16.4    POD#2 Reop AVR/ascending aortic replacement/ MV and TV repair; CABG x 2  Impella & IABP placement  EF 15%    66yo male with Hx congenital AV dz -  repair/replacement (13yo - [facility]) being evaluated for hernia repair found to have EF 25%, severe AS, severe MR, severe TR, severe pHTN, and 2V CAD on Preop risk assessment Now POD#2 with post-op cardiogenic shock/biventricular dysfunction requiring significant support    1. CV  continue impella ; anticipate removal on IABP today but shealth to remain in place in light of coagulopathy and thrombocytopenia  Ana Luisa for RV dysfunction  I have spent/provided stated minutes of critical care time to this patient: 90 min INTERVAL HPI/OVERNIGHT EVENTS:    POD#2 Reop AVR/ascending aortic replacement/ MV and TV repair; CABG x 2  Impella & IABP placement  EF 15%    Allergies: PCN - reaction unknown  Abx: Aztreonam/Flagyl/Vancomycin    68yo male with Hx congenital AV dz -  repair/replacement (13yo - [facility]) being evaluated for hernia repair.  Preop risk assessment performed  ECHO/Cath (Completed 06/2018.) EF 25%, severe AS, severe MR, severe TR, severe pHTN, and 2V CAD.   Patient with sxs progressive FLORENCE and dec exercise tolerance for several months    transferred to [facility] for assessment and intervention  To OR 6/21 - 12 UpRBC/8 FFP/4 platelet/FEIBA and Mary 7 given  6/22 - washout and chest closure performed  later in day/evening on 6/22 - inc pressor requirements; inc CVP; drop in UO --> tamponade  chest opened at bedside (Dr Benedict) with improvement - reportedly approx 1L blood noted right chest  Impella and IABP remain in place; chest open  still with significant output from mediastinal and sohail tubes (/h)  remains sedate/paralyzed    ICU Vital Signs Last 24 Hrs  T(C): 36.6 (23 Jun 2018 09:55), Max: 37.1 (22 Jun 2018 23:00)  T(F): 97.8 (23 Jun 2018 09:55), Max: 98.8 (22 Jun 2018 23:00)  HR: 92 (23 Jun 2018 09:07) (78 - 104) paced - underlying junctional with drop in BP when assessed  ABP: 108/36 (23 Jun 2018 09:00) (86/28 - 164/52)  ABP(mean): 64 (23 Jun 2018 09:00) (42 - 82)  SpO2: 99% (23 Jun 2018 09:07) (93% - 100%) vent40%/PEEP 4; Ana Luisa (For RV dysfxn) 20PPM    Qtts:   Milrinone 0.25  Epi 0.06  Dio 0.7  Vaso (4) 0.067  Nimbex 4  insulin 3- now off with glucose 45  lasix 2.5 mg/h  propofol  fentanyl 0.714 mcg/kg/h    I&O's Summary    22 Jun 2018 07:01  -  23 Jun 2018 07:00  --------------------------------------------------------  IN: 6030.5 mL / OUT: 6345 mL / NET: -314.5 mL    23 Jun 2018 07:01  -  23 Jun 2018 10:17  --------------------------------------------------------  IN: 819.1 mL / OUT: 585 mL / NET: 234.1 mL    Vent settings: AC 14/500/40/5; Ana Luisa 20 PPM    Physical Exam    Heart - regular (-)rub/gallop  Lungs - dec BS bases; no wheeze  Abd - difficult to auscultate BS; soft - NTND  Ext - cool feet bilaterally; warm legs to ankles bilaterally; dopplerable pulses appreciated - unable to palpate pulse - 1-2+ pittting edema  Chest -open chest - bandage intact in place  Neuro - pupils reactive bilaterally; otherwise unable at this time - patient paralyzed  Skin - no rash    LABS:                        8.8    7.5   )-----------( 39       ( 23 Jun 2018 05:26 )             25.0     06-23    141  |  106  |  17  ----------------------------<  163<H>  4.0   |  23  |  0.99    Ca    8.2<L>      23 Jun 2018 05:26  Phos  4.6     06-23  Mg     2.0     06-23    TPro  3.1<L>  /  Alb  2.2<L>  /  TBili  4.6<H>  /  DBili  x   /  AST  74<H>  /  ALT  15  /  AlkPhos  26<L>  06-23    PT/INR - ( 23 Jun 2018 05:26 )   PT: 18.1 sec;   INR: 1.61     PTT - ( 23 Jun 2018 05:26 )  PTT:97.7 sec    ABG - ( 23 Jun 2018 10:04 ) 7.56/26/200/99    RADIOLOGY & ADDITIONAL STUDIES: reviewed  Vanco 16.4    POD#2 Reop AVR/ascending aortic replacement/ MV and TV repair; CABG x 2  Impella & IABP placement  EF 15%    68yo male with Hx congenital AV dz -  repair/replacement (13yo - [facility]) being evaluated for hernia repair found to have EF 25%, severe AS, severe MR, severe TR, severe pHTN, and 2V CAD on Preop risk assessment Now POD#2 with post-op cardiogenic shock/biventricular dysfunction requiring significant support    1. CV  continue impella ; anticipate removal on IABP today but shealth to remain in place in light of coagulopathy and thrombocytopenia  Ana Luisa for RV dysfunction  Heparin adjusted - both concentration and rate for Impella - goal 60-70  plan bedside washout (Hemli) today  con't pacing - underlying rhythm junctional with drop in BP when tested  cont pressors and inotropes - maintain MAP and trend LA; double concentrate infusions as able - d/w pharmacy  eventual chest closure per CTS - Abx to target potential pathogens     2. Pulm   monitor chest tube output - correct coagulopathy as much as able - heparin with pTT goal 65-70  vent with minimal requirements - serial ABG  Ana Luisa for RV dysfunction NOT hypoxemia  monitor fluid balance    3. Renal   on lasix infusion   monitor UO/Lytes and Cr - supplement lytes prn    4. Endocrine  insulin infusion per protocol - noted hypoglycemic this am   dextrose given per protocol and insulin infusion off  HgA1c 6.2 preop   maintain glycemic control/euglycemia    Change IV protonix infusion to IV qd - no evidence of GIB  I have spent/provided stated minutes of critical care time to this patient: 90 min INTERVAL HPI/OVERNIGHT EVENTS:    POD#2 Reop AVR/ascending aortic replacement/ MV and TV repair; CABG x 2  Impella & IABP placement  EF 15%    Allergies: PCN - reaction unknown  Abx: Aztreonam/Flagyl/Vancomycin    68yo male with Hx congenital AV dz -  repair/replacement (11yo - [facility]) being evaluated for hernia repair.  Preop risk assessment performed  ECHO/Cath (Completed 06/2018.) EF 25%, severe AS, severe MR, severe TR, severe pHTN, and 2V CAD.   Patient with sxs progressive FLORENCE and dec exercise tolerance for several months    transferred to [facility] for assessment and intervention  To OR 6/21 - 12 UpRBC/8 FFP/4 platelet/FEIBA and Mary 7 given  6/22 - washout and chest closure performed  later in day/evening on 6/22 - inc pressor requirements; inc CVP; drop in UO --> tamponade  chest opened at bedside (Dr Benedict) with improvement - reportedly approx 1L blood noted right chest  Impella (p8) and IABP (1:3) remain in place; chest open  still with significant output from mediastinal and sohail tubes (/h)  remains sedate/paralyzed    ICU Vital Signs Last 24 Hrs  T(C): 36.6 (23 Jun 2018 09:55), Max: 37.1 (22 Jun 2018 23:00)  T(F): 97.8 (23 Jun 2018 09:55), Max: 98.8 (22 Jun 2018 23:00)  HR: 92 (23 Jun 2018 09:07) (78 - 104) paced - underlying junctional with drop in BP when assessed  ABP: 108/36 (23 Jun 2018 09:00) (86/28 - 164/52)  ABP(mean): 64 (23 Jun 2018 09:00) (42 - 82)  SpO2: 99% (23 Jun 2018 09:07) (93% - 100%) vent40%/PEEP 4; Ana Luisa (For RV dysfxn) 20PPM    Qtts:   Milrinone 0.25  Epi 0.06  Dio 0.7  Vaso (4) 0.067  Nimbex 4  insulin 3- now off with glucose 45  lasix 2.5 mg/h  propofol  fentanyl 0.714 mcg/kg/h    I&O's Summary    22 Jun 2018 07:01  -  23 Jun 2018 07:00  --------------------------------------------------------  IN: 6030.5 mL / OUT: 6345 mL / NET: -314.5 mL    23 Jun 2018 07:01  -  23 Jun 2018 10:17  --------------------------------------------------------  IN: 819.1 mL / OUT: 585 mL / NET: 234.1 mL    Vent settings: AC 14/500/40/5; Ana Luisa 20 PPM    Physical Exam    Heart - regular (-)rub/gallop  Lungs - dec BS bases; no wheeze  Abd - difficult to auscultate BS; soft - NTND  Ext - cool feet bilaterally; warm legs to ankles bilaterally; dopplerable pulses appreciated - unable to palpate pulse - 1-2+ pittting edema  Chest -open chest - bandage intact in place  Neuro - pupils reactive bilaterally; otherwise unable at this time - patient paralyzed  Skin - no rash    LABS:                        8.8    7.5   )-----------( 39       ( 23 Jun 2018 05:26 )             25.0     06-23    141  |  106  |  17  ----------------------------<  163<H>  4.0   |  23  |  0.99    Ca    8.2<L>      23 Jun 2018 05:26  Phos  4.6     06-23  Mg     2.0     06-23    TPro  3.1<L>  /  Alb  2.2<L>  /  TBili  4.6<H>  /  DBili  x   /  AST  74<H>  /  ALT  15  /  AlkPhos  26<L>  06-23    PT/INR - ( 23 Jun 2018 05:26 )   PT: 18.1 sec;   INR: 1.61     PTT - ( 23 Jun 2018 05:26 )  PTT:97.7 sec    ABG - ( 23 Jun 2018 10:04 ) 7.56/26/200/99    RADIOLOGY & ADDITIONAL STUDIES: reviewed  Vanco 16.4    POD#2 Reop AVR/ascending aortic replacement/ MV and TV repair; CABG x 2  Impella & IABP placement  EF 15%    68yo male with Hx congenital AV dz -  repair/replacement (11yo - [facility]) being evaluated for hernia repair found to have EF 25%, severe AS, severe MR, severe TR, severe pHTN, and 2V CAD on Preop risk assessment Now POD#2 with post-op cardiogenic shock/biventricular dysfunction requiring significant support    1. CV  continue impella ; anticipate removal on IABP today but shealth to remain in place in light of coagulopathy and thrombocytopenia  Ana Luisa for RV dysfunction  Heparin adjusted - both concentration and rate for Impella - goal 60-70  plan bedside washout (Hemli) today  con't pacing - underlying rhythm junctional with drop in BP when tested  cont pressors and inotropes - maintain MAP and trend LA; double concentrate infusions as able - d/w pharmacy  eventual chest closure per CTS - Abx to target potential pathogens   maintain Impella at p8 - monitor volume status closely - additional volume (pRBC) given    2. Pulm   monitor chest tube output - correct coagulopathy as much as able - heparin with pTT goal 65-70  vent with minimal requirements - serial ABG  Ana Luisa for RV dysfunction NOT hypoxemia  monitor fluid balance    3. Renal   on lasix infusion   monitor UO/Lytes and Cr - supplement lytes prn    4. Endocrine  insulin infusion per protocol - noted hypoglycemic this am   dextrose given per protocol and insulin infusion off  HgA1c 6.2 preop   maintain glycemic control/euglycemia    Change IV protonix infusion to IV qd - no evidence of GIB and not infusing at this time  d/w staff and CTS    I have spent/provided stated minutes of critical care time to this patient: 90 min

## 2018-06-23 NOTE — BRIEF OPERATIVE NOTE - OPERATION/FINDINGS
Procedure performed in CTICU at bedside.  Timeout performed by Dr. Benedict.  Patient prepped and draped in sterile fashion.  Mediastinal exploration.  Thorough washout of mediastinum.  Clips applied to venous bleeding in AP window.  Chest drains irrigated and placed back in appropriate positions.  Sterile esmark and dressing placed.  IABP removed.

## 2018-06-23 NOTE — PROGRESS NOTE ADULT - SUBJECTIVE AND OBJECTIVE BOX
CTICU  CRITICAL  CARE  attending     Hand off received @ 7p	  				   Pertinent clinical, laboratory, radiographic, hemodynamic, echocardiographic, respiratory data, microbiologic data and chart were reviewed and analyzed frequently throughout the course of the day and night  Patient seen and examined with CTS/ SH attending at bedside    Pt is a 67y , Male,  post op day # 2 s/p AVR, ascending aorta, TV/MV repair, CABG X2.  received multiple blood products/volume arrived with open chest.  IABP and Impella for LV support.      today:    Chest washout in the am  remains on pressor/inotrope support  remains on Impella support  IABP d/cd in the am  on Ana Luisa @ 20 ppm      CAD (coronary artery disease): CAD (coronary artery disease)  Valvular disease: Valvular disease      , FAMILY HISTORY:  PAST MEDICAL & SURGICAL HISTORY:  No pertinent past medical history  No significant past surgical history    Patient is a 67y old  Male who presents with a chief complaint of AS (19 Jun 2018 00:06)      14 system review was unremarkable  acute changes include acute respiratory failure  Vital signs, hemodynamic and respiratory parameters were reviewed from the bedside nursing flowsheet.  ICU Vital Signs Last 24 Hrs  T(C): 36.3 (24 Jun 2018 01:00), Max: 37 (23 Jun 2018 04:00)  T(F): 97.3 (24 Jun 2018 01:00), Max: 98.6 (23 Jun 2018 04:00)  HR: 88 (24 Jun 2018 03:00) (72 - 98)  BP: --  BP(mean): --  ABP: 106/62 (24 Jun 2018 03:00) (88/66 - 158/52)  ABP(mean): 74 (24 Jun 2018 03:00) (54 - 106)  RR: 22 (24 Jun 2018 03:00) (12 - 23)  SpO2: 98% (24 Jun 2018 03:00) (93% - 100%)    Adult Advanced Hemodynamics Last 24 Hrs  CVP(mm Hg): 21 (24 Jun 2018 03:00) (12 - 40)  CVP(cm H2O): --  CO: --  CI: --  PA: 42/24 (24 Jun 2018 03:00) (29/13 - 52/33)  PA(mean): 32 (24 Jun 2018 03:00) (19 - 39)  PCWP: --  SVR: --  SVRI: --  PVR: --  PVRI: --, ABG - ( 24 Jun 2018 02:03 )  pH, Arterial: 7.52  pH, Blood: x     /  pCO2: 26    /  pO2: 200   / HCO3: 20    / Base Excess: -1.0  /  SaO2: 100               Mode: AC/ CMV (Assist Control/ Continuous Mandatory Ventilation)  RR (machine): 12  TV (machine): 500  FiO2: 40  PEEP: 5  ITime: 0.2  MAP: 16  PIP: 32    Intake and output was reviewed and the fluid balance was calculated  Daily     Daily   I&O's Summary    22 Jun 2018 07:01  -  23 Jun 2018 07:00  --------------------------------------------------------  IN: 6030.5 mL / OUT: 6345 mL / NET: -314.5 mL    23 Jun 2018 07:01  -  24 Jun 2018 03:35  --------------------------------------------------------  IN: 6153.7 mL / OUT: 6555 mL / NET: -401.4 mL        All lines and drain sites were assessed  [facility]      POCT Blood Glucose.: 159 mg/dL (24 Jun 2018 02:45)    No acute change in mental status  Auscultation of the chest reveals equal bs  Abdomen is soft  Extremities are warm and well perfused  Wounds appear clean and unremarkable  Antibiotics are periop    labs  CBC Full  -  ( 24 Jun 2018 02:00 )  WBC Count : 10.3 K/uL  Hemoglobin : 12.0 g/dL  Hematocrit : 32.8 %  Platelet Count - Automated : 103 K/uL  Mean Cell Volume : 82.0 fL  Mean Cell Hemoglobin : 30.0 pg  Mean Cell Hemoglobin Concentration : 36.6 g/dL  Auto Neutrophil # : x  Auto Lymphocyte # : x  Auto Monocyte # : x  Auto Eosinophil # : x  Auto Basophil # : x  Auto Neutrophil % : x  Auto Lymphocyte % : x  Auto Monocyte % : x  Auto Eosinophil % : x  Auto Basophil % : x    06-24    133<L>  |  99  |  17  ----------------------------<  202<H>  3.5   |  21<L>  |  0.88    Ca    8.9      24 Jun 2018 02:00  Phos  3.6     06-24  Mg     2.3     06-24    TPro  3.9<L>  /  Alb  2.4<L>  /  TBili  6.5<H>  /  DBili  x   /  AST  80<H>  /  ALT  21  /  AlkPhos  46  06-24    PT/INR - ( 24 Jun 2018 02:00 )   PT: 12.5 sec;   INR: 1.12          PTT - ( 24 Jun 2018 02:00 )  PTT:45.6 sec  The current medications were reviewed   MEDICATIONS  (STANDING):  aztreonam  IVPB 1000 milliGRAM(s) IV Intermittent every 8 hours  chlorhexidine 0.12% Liquid 5 milliLiter(s) Swish and Spit every 4 hours  cisatracurium Infusion 3 MICROgram(s)/kG/Min (10.548 mL/Hr) IV Continuous <Continuous>  dexmedetomidine Infusion 0.3 MICROgram(s)/kG/Hr (4.395 mL/Hr) IV Continuous <Continuous>  dextrose 50% Injectable 50 milliLiter(s) IV Push every 15 minutes  dextrose 50% Injectable 25 milliLiter(s) IV Push every 15 minutes  Epinephrine 8 milliGRAM(s),D5W 250 milliLiter(s) 8 milliGRAM(s) (6.75 mL/Hr) IV Continuous <Continuous>  fentaNYL   Infusion. 0.5 MICROgram(s)/kG/Hr (2.93 mL/Hr) IV Continuous <Continuous>  fluconAZOLE IVPB 400 milliGRAM(s) IV Intermittent every 24 hours  furosemide Infusion 5 mG/Hr (2.5 mL/Hr) IV Continuous <Continuous>  Heparin 40411 Unit(s),Dextrose 5% 1000 milliLiter(s) 48663 Unit(s) (25 mL/Hr) IV Continuous <Continuous>  insulin Infusion 5 Unit(s)/Hr (5 mL/Hr) IV Continuous <Continuous>  metroNIDAZOLE  IVPB 500 milliGRAM(s) IV Intermittent every 8 hours  milrinone Infusion 0.5 MICROgram(s)/kG/Min (8.79 mL/Hr) IV Continuous <Continuous>  norepinephrine Infusion 0.05 MICROgram(s)/kG/Min (5.494 mL/Hr) IV Continuous <Continuous>  phenylephrine    Infusion 0.5 MICROgram(s)/kG/Min (5.494 mL/Hr) IV Continuous <Continuous>  potassium chloride  20 mEq/100 mL IVPB 20 milliEquivalent(s) IV Intermittent every 1 hour  potassium chloride  20 mEq/100 mL IVPB 20 milliEquivalent(s) IV Intermittent once  propofol Infusion 5 MICROgram(s)/kG/Min (1.758 mL/Hr) IV Continuous <Continuous>  sodium chloride 0.9%. 1000 milliLiter(s) (10 mL/Hr) IV Continuous <Continuous>  vancomycin  IVPB 1000 milliGRAM(s) IV Intermittent every 12 hours  vasopressin Infusion 0.03 Unit(s)/Min (1.8 mL/Hr) IV Continuous <Continuous>    MEDICATIONS  (PRN):       PROBLEM LIST/ ASSESSMENT:  HEALTH ISSUES - PROBLEM Dx:  s/p complex cardiac surgery   acute on systolic CHF   post op cardiogenic shock on multiple ionotropes/pressors/assist device   post op resp failure  Cardiogenic shock post op  LV assist device  CAD (coronary artery disease): CAD (coronary artery disease)  Valvular disease: Valvular disease              ,   Patient is a 67y old  Male who presents with a chief complaint of AS (19 Jun 2018 00:06)     s/p  s/p AVR, ascending aorta, TV/MV repair, CABG X2.  acute changes include acute respiratory failure    My plan includes :  close hemodynamic, ventilatory and drain monitoring and management per post op routine    Monitor for arrhythmias and monitor parameters for organ perfusion  monitor neurologic status  Head of the bed should remain elevated to 45 deg .   chest PT and IS will be encouraged  monitor adequacy of oxygenation and ventilation and attempt to wean oxygen  Nutritional goals will be met using po eventually , ensure adequate caloric intake and montior the same  Stress ulcer and VTE prophylaxis will be achieved    Glycemic control is satisfactory  Electrolytes have been repleted as necessary and wound care has been carried out. Pain control has been achieved.   agressive physical therapy and early mobility and ambulation goals will be met   The family was updated about the course and plan  CRITICAL CARE TIME SPENT in evaluation and management, reassessments, review and interpretation of labs and x-rays, ventilator and hemodynamic management, formulating a plan and coordinating care: ___30___ MIN.  Time does not include procedural time.  CTICU ATTENDING     					    Rai Singer MD

## 2018-06-23 NOTE — PROGRESS NOTE ADULT - SUBJECTIVE AND OBJECTIVE BOX
Critical care Attending addendum    IABP paused and then removed - shealth remains in place as mentioned prior    Bedside washout chest performed - given total 3 U pRBC ; impella flows 3.1-3.3    pseudotamponade noted per Dr Benedict with clot noted and removed    additional 60 min spent at bedside monitoring patient/vent during washout

## 2018-06-23 NOTE — BRIEF OPERATIVE NOTE - PROCEDURE
<<-----Click on this checkbox to enter Procedure Washout of thoracic cavity  06/23/2018    Active  SUNIL

## 2018-06-24 LAB
ALBUMIN SERPL ELPH-MCNC: 2.1 G/DL — LOW (ref 3.3–5)
ALBUMIN SERPL ELPH-MCNC: 2.2 G/DL — LOW (ref 3.3–5)
ALBUMIN SERPL ELPH-MCNC: 2.2 G/DL — LOW (ref 3.3–5)
ALBUMIN SERPL ELPH-MCNC: 2.4 G/DL — LOW (ref 3.3–5)
ALBUMIN SERPL ELPH-MCNC: 2.5 G/DL — LOW (ref 3.3–5)
ALP SERPL-CCNC: 46 U/L — SIGNIFICANT CHANGE UP (ref 40–120)
ALP SERPL-CCNC: 52 U/L — SIGNIFICANT CHANGE UP (ref 40–120)
ALP SERPL-CCNC: 54 U/L — SIGNIFICANT CHANGE UP (ref 40–120)
ALP SERPL-CCNC: 60 U/L — SIGNIFICANT CHANGE UP (ref 40–120)
ALP SERPL-CCNC: 62 U/L — SIGNIFICANT CHANGE UP (ref 40–120)
ALT FLD-CCNC: 21 U/L — SIGNIFICANT CHANGE UP (ref 10–45)
ALT FLD-CCNC: 22 U/L — SIGNIFICANT CHANGE UP (ref 10–45)
ALT FLD-CCNC: 22 U/L — SIGNIFICANT CHANGE UP (ref 10–45)
ALT FLD-CCNC: 23 U/L — SIGNIFICANT CHANGE UP (ref 10–45)
ALT FLD-CCNC: 23 U/L — SIGNIFICANT CHANGE UP (ref 10–45)
ANION GAP SERPL CALC-SCNC: 11 MMOL/L — SIGNIFICANT CHANGE UP (ref 5–17)
ANION GAP SERPL CALC-SCNC: 13 MMOL/L — SIGNIFICANT CHANGE UP (ref 5–17)
ANION GAP SERPL CALC-SCNC: 9 MMOL/L — SIGNIFICANT CHANGE UP (ref 5–17)
APTT BLD: 45.6 SEC — HIGH (ref 27.5–37.4)
APTT BLD: 47.2 SEC — HIGH (ref 27.5–37.4)
APTT BLD: 47.8 SEC — HIGH (ref 27.5–37.4)
APTT BLD: 49.2 SEC — HIGH (ref 27.5–37.4)
APTT BLD: 50.3 SEC — HIGH (ref 27.5–37.4)
AST SERPL-CCNC: 56 U/L — HIGH (ref 10–40)
AST SERPL-CCNC: 64 U/L — HIGH (ref 10–40)
AST SERPL-CCNC: 70 U/L — HIGH (ref 10–40)
AST SERPL-CCNC: 75 U/L — HIGH (ref 10–40)
AST SERPL-CCNC: 80 U/L — HIGH (ref 10–40)
BASE EXCESS BLDA CALC-SCNC: -1.8 MMOL/L — SIGNIFICANT CHANGE UP (ref -2–3)
BASE EXCESS BLDA CALC-SCNC: -2.3 MMOL/L — LOW (ref -2–3)
BILIRUB DIRECT SERPL-MCNC: 6 MG/DL — HIGH (ref 0–0.2)
BILIRUB INDIRECT FLD-MCNC: 1.4 MG/DL — HIGH (ref 0.2–1)
BILIRUB SERPL-MCNC: 6.5 MG/DL — HIGH (ref 0.2–1.2)
BILIRUB SERPL-MCNC: 7.1 MG/DL — HIGH (ref 0.2–1.2)
BILIRUB SERPL-MCNC: 7.4 MG/DL — HIGH (ref 0.2–1.2)
BUN SERPL-MCNC: 15 MG/DL — SIGNIFICANT CHANGE UP (ref 7–23)
BUN SERPL-MCNC: 16 MG/DL — SIGNIFICANT CHANGE UP (ref 7–23)
BUN SERPL-MCNC: 17 MG/DL — SIGNIFICANT CHANGE UP (ref 7–23)
CALCIUM SERPL-MCNC: 8.1 MG/DL — LOW (ref 8.4–10.5)
CALCIUM SERPL-MCNC: 8.1 MG/DL — LOW (ref 8.4–10.5)
CALCIUM SERPL-MCNC: 8.5 MG/DL — SIGNIFICANT CHANGE UP (ref 8.4–10.5)
CALCIUM SERPL-MCNC: 8.5 MG/DL — SIGNIFICANT CHANGE UP (ref 8.4–10.5)
CALCIUM SERPL-MCNC: 8.9 MG/DL — SIGNIFICANT CHANGE UP (ref 8.4–10.5)
CHLORIDE SERPL-SCNC: 100 MMOL/L — SIGNIFICANT CHANGE UP (ref 96–108)
CHLORIDE SERPL-SCNC: 101 MMOL/L — SIGNIFICANT CHANGE UP (ref 96–108)
CHLORIDE SERPL-SCNC: 94 MMOL/L — LOW (ref 96–108)
CHLORIDE SERPL-SCNC: 99 MMOL/L — SIGNIFICANT CHANGE UP (ref 96–108)
CHLORIDE SERPL-SCNC: 99 MMOL/L — SIGNIFICANT CHANGE UP (ref 96–108)
CO2 SERPL-SCNC: 21 MMOL/L — LOW (ref 22–31)
CO2 SERPL-SCNC: 22 MMOL/L — SIGNIFICANT CHANGE UP (ref 22–31)
CO2 SERPL-SCNC: 23 MMOL/L — SIGNIFICANT CHANGE UP (ref 22–31)
CREAT SERPL-MCNC: 0.83 MG/DL — SIGNIFICANT CHANGE UP (ref 0.5–1.3)
CREAT SERPL-MCNC: 0.87 MG/DL — SIGNIFICANT CHANGE UP (ref 0.5–1.3)
CREAT SERPL-MCNC: 0.88 MG/DL — SIGNIFICANT CHANGE UP (ref 0.5–1.3)
CREAT SERPL-MCNC: 0.88 MG/DL — SIGNIFICANT CHANGE UP (ref 0.5–1.3)
CREAT SERPL-MCNC: 0.91 MG/DL — SIGNIFICANT CHANGE UP (ref 0.5–1.3)
GAS PNL BLDA: SIGNIFICANT CHANGE UP
GAS PNL BLDV: SIGNIFICANT CHANGE UP
GLUCOSE BLDC GLUCOMTR-MCNC: 101 MG/DL — HIGH (ref 70–99)
GLUCOSE BLDC GLUCOMTR-MCNC: 103 MG/DL — HIGH (ref 70–99)
GLUCOSE BLDC GLUCOMTR-MCNC: 103 MG/DL — HIGH (ref 70–99)
GLUCOSE BLDC GLUCOMTR-MCNC: 107 MG/DL — HIGH (ref 70–99)
GLUCOSE BLDC GLUCOMTR-MCNC: 108 MG/DL — HIGH (ref 70–99)
GLUCOSE BLDC GLUCOMTR-MCNC: 112 MG/DL — HIGH (ref 70–99)
GLUCOSE BLDC GLUCOMTR-MCNC: 113 MG/DL — HIGH (ref 70–99)
GLUCOSE BLDC GLUCOMTR-MCNC: 127 MG/DL — HIGH (ref 70–99)
GLUCOSE BLDC GLUCOMTR-MCNC: 129 MG/DL — HIGH (ref 70–99)
GLUCOSE BLDC GLUCOMTR-MCNC: 132 MG/DL — HIGH (ref 70–99)
GLUCOSE BLDC GLUCOMTR-MCNC: 134 MG/DL — HIGH (ref 70–99)
GLUCOSE BLDC GLUCOMTR-MCNC: 134 MG/DL — HIGH (ref 70–99)
GLUCOSE BLDC GLUCOMTR-MCNC: 136 MG/DL — HIGH (ref 70–99)
GLUCOSE BLDC GLUCOMTR-MCNC: 137 MG/DL — HIGH (ref 70–99)
GLUCOSE BLDC GLUCOMTR-MCNC: 147 MG/DL — HIGH (ref 70–99)
GLUCOSE BLDC GLUCOMTR-MCNC: 159 MG/DL — HIGH (ref 70–99)
GLUCOSE BLDC GLUCOMTR-MCNC: 178 MG/DL — HIGH (ref 70–99)
GLUCOSE BLDC GLUCOMTR-MCNC: 27 MG/DL — CRITICAL LOW (ref 70–99)
GLUCOSE BLDC GLUCOMTR-MCNC: 57 MG/DL — LOW (ref 70–99)
GLUCOSE BLDC GLUCOMTR-MCNC: 83 MG/DL — SIGNIFICANT CHANGE UP (ref 70–99)
GLUCOSE BLDC GLUCOMTR-MCNC: 99 MG/DL — SIGNIFICANT CHANGE UP (ref 70–99)
GLUCOSE SERPL-MCNC: 100 MG/DL — HIGH (ref 70–99)
GLUCOSE SERPL-MCNC: 114 MG/DL — HIGH (ref 70–99)
GLUCOSE SERPL-MCNC: 124 MG/DL — HIGH (ref 70–99)
GLUCOSE SERPL-MCNC: 144 MG/DL — HIGH (ref 70–99)
GLUCOSE SERPL-MCNC: 202 MG/DL — HIGH (ref 70–99)
HCO3 BLDA-SCNC: 22 MMOL/L — SIGNIFICANT CHANGE UP (ref 21–28)
HCO3 BLDA-SCNC: 22 MMOL/L — SIGNIFICANT CHANGE UP (ref 21–28)
HCT VFR BLD CALC: 31.9 % — LOW (ref 39–50)
HCT VFR BLD CALC: 32.8 % — LOW (ref 39–50)
HCT VFR BLD CALC: 33.9 % — LOW (ref 39–50)
HCT VFR BLD CALC: 34.2 % — LOW (ref 39–50)
HCT VFR BLD CALC: 34.3 % — LOW (ref 39–50)
HGB BLD-MCNC: 11.6 G/DL — LOW (ref 13–17)
HGB BLD-MCNC: 12 G/DL — LOW (ref 13–17)
HGB BLD-MCNC: 12.2 G/DL — LOW (ref 13–17)
HGB BLD-MCNC: 12.4 G/DL — LOW (ref 13–17)
HGB BLD-MCNC: 12.5 G/DL — LOW (ref 13–17)
INR BLD: 1.08 — SIGNIFICANT CHANGE UP (ref 0.88–1.16)
INR BLD: 1.09 — SIGNIFICANT CHANGE UP (ref 0.88–1.16)
INR BLD: 1.11 — SIGNIFICANT CHANGE UP (ref 0.88–1.16)
INR BLD: 1.12 — SIGNIFICANT CHANGE UP (ref 0.88–1.16)
INR BLD: 1.13 — SIGNIFICANT CHANGE UP (ref 0.88–1.16)
LACTATE SERPL-SCNC: 1.5 MMOL/L — SIGNIFICANT CHANGE UP (ref 0.5–2)
LACTATE SERPL-SCNC: 1.7 MMOL/L — SIGNIFICANT CHANGE UP (ref 0.5–2)
LACTATE SERPL-SCNC: 1.7 MMOL/L — SIGNIFICANT CHANGE UP (ref 0.5–2)
LACTATE SERPL-SCNC: 2 MMOL/L — SIGNIFICANT CHANGE UP (ref 0.5–2)
LACTATE SERPL-SCNC: 3.3 MMOL/L — HIGH (ref 0.5–2)
MAGNESIUM SERPL-MCNC: 2 MG/DL — SIGNIFICANT CHANGE UP (ref 1.6–2.6)
MAGNESIUM SERPL-MCNC: 2 MG/DL — SIGNIFICANT CHANGE UP (ref 1.6–2.6)
MAGNESIUM SERPL-MCNC: 2.2 MG/DL — SIGNIFICANT CHANGE UP (ref 1.6–2.6)
MAGNESIUM SERPL-MCNC: 2.3 MG/DL — SIGNIFICANT CHANGE UP (ref 1.6–2.6)
MAGNESIUM SERPL-MCNC: 2.3 MG/DL — SIGNIFICANT CHANGE UP (ref 1.6–2.6)
MCHC RBC-ENTMCNC: 29.6 PG — SIGNIFICANT CHANGE UP (ref 27–34)
MCHC RBC-ENTMCNC: 29.8 PG — SIGNIFICANT CHANGE UP (ref 27–34)
MCHC RBC-ENTMCNC: 30 PG — SIGNIFICANT CHANGE UP (ref 27–34)
MCHC RBC-ENTMCNC: 30 PG — SIGNIFICANT CHANGE UP (ref 27–34)
MCHC RBC-ENTMCNC: 30.2 PG — SIGNIFICANT CHANGE UP (ref 27–34)
MCHC RBC-ENTMCNC: 36 G/DL — SIGNIFICANT CHANGE UP (ref 32–36)
MCHC RBC-ENTMCNC: 36.2 G/DL — HIGH (ref 32–36)
MCHC RBC-ENTMCNC: 36.4 G/DL — HIGH (ref 32–36)
MCHC RBC-ENTMCNC: 36.5 G/DL — HIGH (ref 32–36)
MCHC RBC-ENTMCNC: 36.6 G/DL — HIGH (ref 32–36)
MCV RBC AUTO: 81.9 FL — SIGNIFICANT CHANGE UP (ref 80–100)
MCV RBC AUTO: 82 FL — SIGNIFICANT CHANGE UP (ref 80–100)
MCV RBC AUTO: 82 FL — SIGNIFICANT CHANGE UP (ref 80–100)
MCV RBC AUTO: 82.9 FL — SIGNIFICANT CHANGE UP (ref 80–100)
MCV RBC AUTO: 83.1 FL — SIGNIFICANT CHANGE UP (ref 80–100)
PCO2 BLDA: 32 MMHG — LOW (ref 35–48)
PCO2 BLDA: 34 MMHG — LOW (ref 35–48)
PH BLDA: 7.43 — SIGNIFICANT CHANGE UP (ref 7.35–7.45)
PH BLDA: 7.44 — SIGNIFICANT CHANGE UP (ref 7.35–7.45)
PHOSPHATE SERPL-MCNC: 3.1 MG/DL — SIGNIFICANT CHANGE UP (ref 2.5–4.5)
PHOSPHATE SERPL-MCNC: 3.6 MG/DL — SIGNIFICANT CHANGE UP (ref 2.5–4.5)
PHOSPHATE SERPL-MCNC: 3.6 MG/DL — SIGNIFICANT CHANGE UP (ref 2.5–4.5)
PHOSPHATE SERPL-MCNC: 3.7 MG/DL — SIGNIFICANT CHANGE UP (ref 2.5–4.5)
PHOSPHATE SERPL-MCNC: 3.8 MG/DL — SIGNIFICANT CHANGE UP (ref 2.5–4.5)
PLATELET # BLD AUTO: 103 K/UL — LOW (ref 150–400)
PLATELET # BLD AUTO: 55 K/UL — LOW (ref 150–400)
PLATELET # BLD AUTO: 60 K/UL — LOW (ref 150–400)
PLATELET # BLD AUTO: 74 K/UL — LOW (ref 150–400)
PLATELET # BLD AUTO: 94 K/UL — LOW (ref 150–400)
PO2 BLDA: 193 MMHG — HIGH (ref 83–108)
PO2 BLDA: 193 MMHG — HIGH (ref 83–108)
POTASSIUM SERPL-MCNC: 3.3 MMOL/L — LOW (ref 3.5–5.3)
POTASSIUM SERPL-MCNC: 3.5 MMOL/L — SIGNIFICANT CHANGE UP (ref 3.5–5.3)
POTASSIUM SERPL-MCNC: 3.6 MMOL/L — SIGNIFICANT CHANGE UP (ref 3.5–5.3)
POTASSIUM SERPL-MCNC: 3.7 MMOL/L — SIGNIFICANT CHANGE UP (ref 3.5–5.3)
POTASSIUM SERPL-MCNC: 4.4 MMOL/L — SIGNIFICANT CHANGE UP (ref 3.5–5.3)
POTASSIUM SERPL-SCNC: 3.3 MMOL/L — LOW (ref 3.5–5.3)
POTASSIUM SERPL-SCNC: 3.5 MMOL/L — SIGNIFICANT CHANGE UP (ref 3.5–5.3)
POTASSIUM SERPL-SCNC: 3.6 MMOL/L — SIGNIFICANT CHANGE UP (ref 3.5–5.3)
POTASSIUM SERPL-SCNC: 3.7 MMOL/L — SIGNIFICANT CHANGE UP (ref 3.5–5.3)
POTASSIUM SERPL-SCNC: 4.4 MMOL/L — SIGNIFICANT CHANGE UP (ref 3.5–5.3)
PROT SERPL-MCNC: 3.9 G/DL — LOW (ref 6–8.3)
PROT SERPL-MCNC: 3.9 G/DL — LOW (ref 6–8.3)
PROT SERPL-MCNC: 4.1 G/DL — LOW (ref 6–8.3)
PROT SERPL-MCNC: 4.2 G/DL — LOW (ref 6–8.3)
PROT SERPL-MCNC: 4.2 G/DL — LOW (ref 6–8.3)
PROTHROM AB SERPL-ACNC: 12 SEC — SIGNIFICANT CHANGE UP (ref 9.8–12.7)
PROTHROM AB SERPL-ACNC: 12.1 SEC — SIGNIFICANT CHANGE UP (ref 9.8–12.7)
PROTHROM AB SERPL-ACNC: 12.3 SEC — SIGNIFICANT CHANGE UP (ref 9.8–12.7)
PROTHROM AB SERPL-ACNC: 12.5 SEC — SIGNIFICANT CHANGE UP (ref 9.8–12.7)
PROTHROM AB SERPL-ACNC: 12.6 SEC — SIGNIFICANT CHANGE UP (ref 9.8–12.7)
RBC # BLD: 3.84 M/UL — LOW (ref 4.2–5.8)
RBC # BLD: 4 M/UL — LOW (ref 4.2–5.8)
RBC # BLD: 4.09 M/UL — LOW (ref 4.2–5.8)
RBC # BLD: 4.17 M/UL — LOW (ref 4.2–5.8)
RBC # BLD: 4.19 M/UL — LOW (ref 4.2–5.8)
RBC # FLD: 14.7 % — SIGNIFICANT CHANGE UP (ref 10.3–16.9)
RBC # FLD: 15.1 % — SIGNIFICANT CHANGE UP (ref 10.3–16.9)
RBC # FLD: 15.5 % — SIGNIFICANT CHANGE UP (ref 10.3–16.9)
RBC # FLD: 15.9 % — SIGNIFICANT CHANGE UP (ref 10.3–16.9)
RBC # FLD: 16 % — SIGNIFICANT CHANGE UP (ref 10.3–16.9)
SAO2 % BLDA: 98 % — SIGNIFICANT CHANGE UP (ref 95–100)
SAO2 % BLDA: 99 % — SIGNIFICANT CHANGE UP (ref 95–100)
SODIUM SERPL-SCNC: 127 MMOL/L — LOW (ref 135–145)
SODIUM SERPL-SCNC: 132 MMOL/L — LOW (ref 135–145)
SODIUM SERPL-SCNC: 132 MMOL/L — LOW (ref 135–145)
SODIUM SERPL-SCNC: 133 MMOL/L — LOW (ref 135–145)
SODIUM SERPL-SCNC: 134 MMOL/L — LOW (ref 135–145)
VANCOMYCIN FLD-MCNC: 20.7 UG/ML — SIGNIFICANT CHANGE UP
VANCOMYCIN FLD-MCNC: 71.3 UG/ML
WBC # BLD: 10.2 K/UL — SIGNIFICANT CHANGE UP (ref 3.8–10.5)
WBC # BLD: 10.3 K/UL — SIGNIFICANT CHANGE UP (ref 3.8–10.5)
WBC # BLD: 11 K/UL — HIGH (ref 3.8–10.5)
WBC # BLD: 11.4 K/UL — HIGH (ref 3.8–10.5)
WBC # BLD: 11.5 K/UL — HIGH (ref 3.8–10.5)
WBC # FLD AUTO: 10.2 K/UL — SIGNIFICANT CHANGE UP (ref 3.8–10.5)
WBC # FLD AUTO: 10.3 K/UL — SIGNIFICANT CHANGE UP (ref 3.8–10.5)
WBC # FLD AUTO: 11 K/UL — HIGH (ref 3.8–10.5)
WBC # FLD AUTO: 11.4 K/UL — HIGH (ref 3.8–10.5)
WBC # FLD AUTO: 11.5 K/UL — HIGH (ref 3.8–10.5)

## 2018-06-24 PROCEDURE — 99291 CRITICAL CARE FIRST HOUR: CPT

## 2018-06-24 PROCEDURE — 99292 CRITICAL CARE ADDL 30 MIN: CPT

## 2018-06-24 PROCEDURE — 71045 X-RAY EXAM CHEST 1 VIEW: CPT | Mod: 26

## 2018-06-24 RX ORDER — POTASSIUM CHLORIDE 20 MEQ
20 PACKET (EA) ORAL ONCE
Qty: 0 | Refills: 0 | Status: COMPLETED | OUTPATIENT
Start: 2018-06-24 | End: 2018-06-24

## 2018-06-24 RX ORDER — MIDAZOLAM HYDROCHLORIDE 1 MG/ML
2 INJECTION, SOLUTION INTRAMUSCULAR; INTRAVENOUS ONCE
Qty: 0 | Refills: 0 | Status: DISCONTINUED | OUTPATIENT
Start: 2018-06-24 | End: 2018-06-24

## 2018-06-24 RX ORDER — POTASSIUM CHLORIDE 20 MEQ
20 PACKET (EA) ORAL
Qty: 0 | Refills: 0 | Status: COMPLETED | OUTPATIENT
Start: 2018-06-25 | End: 2018-06-25

## 2018-06-24 RX ORDER — VANCOMYCIN HCL 1 G
750 VIAL (EA) INTRAVENOUS EVERY 12 HOURS
Qty: 0 | Refills: 0 | Status: DISCONTINUED | OUTPATIENT
Start: 2018-06-24 | End: 2018-06-25

## 2018-06-24 RX ORDER — MIDAZOLAM HYDROCHLORIDE 1 MG/ML
5 INJECTION, SOLUTION INTRAMUSCULAR; INTRAVENOUS ONCE
Qty: 0 | Refills: 0 | Status: DISCONTINUED | OUTPATIENT
Start: 2018-06-24 | End: 2018-06-24

## 2018-06-24 RX ORDER — CALCIUM GLUCONATE 100 MG/ML
2 VIAL (ML) INTRAVENOUS ONCE
Qty: 0 | Refills: 0 | Status: COMPLETED | OUTPATIENT
Start: 2018-06-24 | End: 2018-06-24

## 2018-06-24 RX ORDER — CISATRACURIUM BESYLATE 2 MG/ML
10 INJECTION INTRAVENOUS ONCE
Qty: 0 | Refills: 0 | Status: COMPLETED | OUTPATIENT
Start: 2018-06-24 | End: 2018-06-24

## 2018-06-24 RX ORDER — MAGNESIUM SULFATE 500 MG/ML
2 VIAL (ML) INJECTION ONCE
Qty: 0 | Refills: 0 | Status: COMPLETED | OUTPATIENT
Start: 2018-06-24 | End: 2018-06-24

## 2018-06-24 RX ORDER — POTASSIUM CHLORIDE 20 MEQ
20 PACKET (EA) ORAL
Qty: 0 | Refills: 0 | Status: COMPLETED | OUTPATIENT
Start: 2018-06-24 | End: 2018-06-25

## 2018-06-24 RX ORDER — POTASSIUM CHLORIDE 20 MEQ
20 PACKET (EA) ORAL
Qty: 0 | Refills: 0 | Status: COMPLETED | OUTPATIENT
Start: 2018-06-24 | End: 2018-06-24

## 2018-06-24 RX ADMIN — Medication 100 MILLIEQUIVALENT(S): at 22:57

## 2018-06-24 RX ADMIN — Medication 100 MILLIGRAM(S): at 22:57

## 2018-06-24 RX ADMIN — Medication 250 MILLIGRAM(S): at 00:51

## 2018-06-24 RX ADMIN — Medication 100 MILLIGRAM(S): at 06:45

## 2018-06-24 RX ADMIN — CHLORHEXIDINE GLUCONATE 5 MILLILITER(S): 213 SOLUTION TOPICAL at 02:39

## 2018-06-24 RX ADMIN — CISATRACURIUM BESYLATE 10.55 MICROGRAM(S)/KG/MIN: 2 INJECTION INTRAVENOUS at 12:15

## 2018-06-24 RX ADMIN — Medication 100 MILLIEQUIVALENT(S): at 06:00

## 2018-06-24 RX ADMIN — Medication 250 MILLIGRAM(S): at 17:45

## 2018-06-24 RX ADMIN — Medication 100 MILLIEQUIVALENT(S): at 12:00

## 2018-06-24 RX ADMIN — CHLORHEXIDINE GLUCONATE 5 MILLILITER(S): 213 SOLUTION TOPICAL at 10:39

## 2018-06-24 RX ADMIN — VASOPRESSIN 1.8 UNIT(S)/MIN: 20 INJECTION INTRAVENOUS at 18:00

## 2018-06-24 RX ADMIN — FLUCONAZOLE 100 MILLIGRAM(S): 150 TABLET ORAL at 10:45

## 2018-06-24 RX ADMIN — Medication 100 MILLIEQUIVALENT(S): at 02:30

## 2018-06-24 RX ADMIN — MIDAZOLAM HYDROCHLORIDE 5 MILLIGRAM(S): 1 INJECTION, SOLUTION INTRAMUSCULAR; INTRAVENOUS at 10:30

## 2018-06-24 RX ADMIN — FENTANYL CITRATE 2.93 MICROGRAM(S)/KG/HR: 50 INJECTION INTRAVENOUS at 09:30

## 2018-06-24 RX ADMIN — INSULIN HUMAN 5 UNIT(S)/HR: 100 INJECTION, SOLUTION SUBCUTANEOUS at 09:30

## 2018-06-24 RX ADMIN — DEXMEDETOMIDINE HYDROCHLORIDE IN 0.9% SODIUM CHLORIDE 4.39 MICROGRAM(S)/KG/HR: 4 INJECTION INTRAVENOUS at 09:30

## 2018-06-24 RX ADMIN — CHLORHEXIDINE GLUCONATE 5 MILLILITER(S): 213 SOLUTION TOPICAL at 06:46

## 2018-06-24 RX ADMIN — FENTANYL CITRATE 50 MICROGRAM(S): 50 INJECTION INTRAVENOUS at 00:41

## 2018-06-24 RX ADMIN — Medication 100 MILLIGRAM(S): at 13:30

## 2018-06-24 RX ADMIN — CISATRACURIUM BESYLATE 10 MILLIGRAM(S): 2 INJECTION INTRAVENOUS at 01:00

## 2018-06-24 RX ADMIN — Medication 50 MILLIGRAM(S): at 10:30

## 2018-06-24 RX ADMIN — MIDAZOLAM HYDROCHLORIDE 2 MILLIGRAM(S): 1 INJECTION, SOLUTION INTRAMUSCULAR; INTRAVENOUS at 01:25

## 2018-06-24 RX ADMIN — Medication 50 MILLIGRAM(S): at 02:39

## 2018-06-24 RX ADMIN — Medication 100 MILLIEQUIVALENT(S): at 03:35

## 2018-06-24 RX ADMIN — Medication 50 MILLIGRAM(S): at 17:00

## 2018-06-24 RX ADMIN — CHLORHEXIDINE GLUCONATE 5 MILLILITER(S): 213 SOLUTION TOPICAL at 13:20

## 2018-06-24 RX ADMIN — PROPOFOL 1.76 MICROGRAM(S)/KG/MIN: 10 INJECTION, EMULSION INTRAVENOUS at 12:15

## 2018-06-24 RX ADMIN — FENTANYL CITRATE 50 MICROGRAM(S): 50 INJECTION INTRAVENOUS at 00:00

## 2018-06-24 RX ADMIN — CHLORHEXIDINE GLUCONATE 5 MILLILITER(S): 213 SOLUTION TOPICAL at 17:08

## 2018-06-24 NOTE — PROGRESS NOTE ADULT - SUBJECTIVE AND OBJECTIVE BOX
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 He is / was  being evaluated for ? abdominal hernia repair surgery. Given medical and surgical history cardiology clearance needed. On evaluation with  TTE / Cardiac Catheterization (Completed 06/2018.) EF = 25%, reduced LV function, severe AS, severe MR, severe TR, severe pHTN, and 2V CAD. Patient transferred for [facility] under the care of Dr. Potter for further work up and MGMT. After speaking with patient he does state a decreased in ET and increase in SOB that has been progressing over the last 6-8 month. Details hard to obtain. Patient poor historian.         HEALTH ISSUES - PROBLEM Dx:  CAD (coronary artery disease): CAD (coronary artery disease)  Valvular disease: Valvular disease      , FAMILY HISTORY:  PAST MEDICAL & SURGICAL HISTORY:  No pertinent past medical history  No significant past surgical history    Patient is a 67y old  Male who presents with a chief complaint of AS (19 Jun 2018 00:06)      14 system review was unremarkable  acute changes include acute respiratory failure  Vital signs, hemodynamic and respiratory parameters were reviewed from the bedside nursing flowsheet.  ICU Vital Signs Last 24 Hrs  T(C): 35.7 (24 Jun 2018 11:00), Max: 36.3 (24 Jun 2018 01:00)  T(F): 96.3 (24 Jun 2018 11:00), Max: 97.3 (24 Jun 2018 01:00)  HR: 88 (24 Jun 2018 21:00) (71 - 92)  BP: --  BP(mean): --  ABP: 136/70 (24 Jun 2018 21:00) (94/74 - 144/78)  ABP(mean): 88 (24 Jun 2018 21:00) (74 - 96)  RR: 18 (24 Jun 2018 21:00) (18 - 23)  SpO2: 98% (24 Jun 2018 21:00) (96% - 98%)    Adult Advanced Hemodynamics Last 24 Hrs  CVP(mm Hg): 13 (24 Jun 2018 21:00) (12 - 26)  CVP(cm H2O): --  CO: 2 (24 Jun 2018 20:00) (2 - 2.1)  CI: 1.2 (24 Jun 2018 20:00) (1.2 - 1.2)  PA: 36/18 (24 Jun 2018 21:00) (34/13 - 52/33)  PA(mean): 26 (24 Jun 2018 21:00) (21 - 86)  PCWP: --  SVR: 3036 (24 Jun 2018 20:00) (2244 - 3036)  SVRI: 5060 (24 Jun 2018 20:00) (7333 - 5061)  PVR: --  PVRI: --, ABG - ( 24 Jun 2018 15:57 )  pH, Arterial: 7.43  pH, Blood: x     /  pCO2: 34    /  pO2: 193   / HCO3: 22    / Base Excess: -1.8  /  SaO2: 99                Mode: AC/ CMV (Assist Control/ Continuous Mandatory Ventilation)  RR (machine): 12  TV (machine): 370  FiO2: 40  PEEP: 5  ITime: 1.4  MAP: 14  PIP: 26    Intake and output was reviewed and the fluid balance was calculated  Daily     Daily   I&O's Summary    23 Jun 2018 07:01  -  24 Jun 2018 07:00  --------------------------------------------------------  IN: 7010.2 mL / OUT: 7335 mL / NET: -324.9 mL    24 Jun 2018 07:01  -  24 Jun 2018 22:31  --------------------------------------------------------  IN: 2085.4 mL / OUT: 3020 mL / NET: -934.6 mL        All lines and drain sites were assessed  Glycemic trend was reviewedCAPILLARY BLOOD GLUCOSE      POCT Blood Glucose.: 134 mg/dL (24 Jun 2018 21:07)    NEURo; No acute change in mental status. PUPILS 2-3 mm (reactive).  CHEST: STERNUM is open. No significant accumulation in the pericardial well.  CVS: s1, s2, nO s3.  RESPIRATORY: Auscultation of the chest reveals equal breath sounds.  Abdomen is soft. No tenderness. + Bowel sounds.  Extremities are warm and well perfused  Wounds appear clean and unremarkable  Antibiotics are perioperative azreonam, vanco, diflucan  and metronidazole.    labs  CBC Full  -  ( 24 Jun 2018 16:00 )  WBC Count : 11.0 K/uL  Hemoglobin : 12.4 g/dL  Hematocrit : 34.3 %  Platelet Count - Automated : 60 K/uL  Mean Cell Volume : 81.9 fL  Mean Cell Hemoglobin : 29.6 pg  Mean Cell Hemoglobin Concentration : 36.2 g/dL  Auto Neutrophil # : x  Auto Lymphocyte # : x  Auto Monocyte # : x  Auto Eosinophil # : x  Auto Basophil # : x  Auto Neutrophil % : x  Auto Lymphocyte % : x  Auto Monocyte % : x  Auto Eosinophil % : x  Auto Basophil % : x    06-24    132<L>  |  99  |  16  ----------------------------<  100<H>  3.6   |  22  |  0.87    Ca    8.1<L>      24 Jun 2018 16:00  Phos  3.6     06-24  Mg     2.0     06-24    TPro  4.1<L>  /  Alb  2.1<L>  /  TBili  7.4<H>  /  DBili  6.0<H>  /  AST  64<H>  /  ALT  23  /  AlkPhos  62  06-24    PT/INR - ( 24 Jun 2018 16:00 )   PT: 12.3 sec;   INR: 1.11          PTT - ( 24 Jun 2018 16:00 )  PTT:47.8 sec  The current medications were reviewed   MEDICATIONS  (STANDING):  aztreonam  IVPB 1000 milliGRAM(s) IV Intermittent every 8 hours  chlorhexidine 0.12% Liquid 5 milliLiter(s) Swish and Spit every 4 hours  cisatracurium Infusion 3 MICROgram(s)/kG/Min (10.548 mL/Hr) IV Continuous <Continuous>  dexmedetomidine Infusion 0.3 MICROgram(s)/kG/Hr (4.395 mL/Hr) IV Continuous <Continuous>  dextrose 50% Injectable 50 milliLiter(s) IV Push every 15 minutes  dextrose 50% Injectable 25 milliLiter(s) IV Push every 15 minutes  Epinephrine 8 milliGRAM(s),D5W 250 milliLiter(s) 8 milliGRAM(s) (6.75 mL/Hr) IV Continuous <Continuous>  fentaNYL   Infusion. 0.5 MICROgram(s)/kG/Hr (2.93 mL/Hr) IV Continuous <Continuous>  fluconAZOLE IVPB 400 milliGRAM(s) IV Intermittent every 24 hours  furosemide Infusion 5 mG/Hr (2.5 mL/Hr) IV Continuous <Continuous>  Heparin 54152 Unit(s),Dextrose 5% 1000 milliLiter(s) 91330 Unit(s) (25 mL/Hr) IV Continuous <Continuous>  insulin Infusion 5 Unit(s)/Hr (5 mL/Hr) IV Continuous <Continuous>  metroNIDAZOLE  IVPB 500 milliGRAM(s) IV Intermittent every 8 hours  milrinone Infusion 0.5 MICROgram(s)/kG/Min (8.79 mL/Hr) IV Continuous <Continuous>  norepinephrine Infusion 0.05 MICROgram(s)/kG/Min (5.494 mL/Hr) IV Continuous <Continuous>  phenylephrine    Infusion 0.5 MICROgram(s)/kG/Min (5.494 mL/Hr) IV Continuous <Continuous>  propofol Infusion 5 MICROgram(s)/kG/Min (1.758 mL/Hr) IV Continuous <Continuous>  sodium chloride 0.9%. 1000 milliLiter(s) (10 mL/Hr) IV Continuous <Continuous>  vancomycin  IVPB 750 milliGRAM(s) IV Intermittent every 12 hours  vasopressin Infusion 0.03 Unit(s)/Min (1.8 mL/Hr) IV Continuous <Continuous>    MEDICATIONS  (PRN):       PROBLEM LIST/ ASSESSMENT:  HEALTH ISSUES - PROBLEM Dx:  CAD (coronary artery disease): CAD (coronary artery disease)  Valvular disease: Valvular disease        Patient is a 67y old  Male who presented with  AS       POD#3 Reop AVR/ascending aortic replacement/ MV and TV repair; CABG x 2  Impella & IABP placement - IABP out 6/23; impella remains in place  EF 15%    Allergies: PCN - reaction unknown  Abx: Aztreonam/Flagyl/Vancomycin    68yo male with Hx congenital AV dz -  repair/replacement (11yo - [facility]) being evaluated for hernia repair.  Preop risk assessment performed  ECHO/Cath (Completed 06/2018.) EF 25%, severe AS, severe MR, severe TR, severe pHTN, and 2V CAD.   Patient with sxs progressive FLORENCE and dec exercise tolerance for several months    transferred to [facility] for assessment and intervention  To OR 6/21 - 12 UpRBC/8 FFP/4 platelet/FEIBA and Mary 7 given  6/22 - washout and chest closure performed  later in day/evening on 6/22 - inc pressor requirements; inc CVP; drop in UO --> tamponade  chest opened at bedside (Dr Benedict) with improvement - reportedly approx 1L blood noted right chest  repeat wash out performed 6/23 with removal of clot per d/w Dr Benedict - pseudo tamponade  IABP and sheath removed - required 3 U pRBC and platelets given 6/23    alkalotic - adjustments made to vent and sedation with improvement  drips concentrated - and titrating down, obligate hourly input (not including Abx) - 95.7cc  good urine output  no acute events reported overnight - impella titrated down and now at p4; remains AV paced   Hemodynamically stable.  Good oxygenation.   Diuresing well.  Overall satisfactory progress.      My plan includes :  close hemodynamic, ventilatory and drain monitoring and management.  Continue IV antibiotics.  Attempt chest closure in AM.  Monitor for arrhythmias and monitor parameters for organ perfusion  Monitor neurologic status  Head of the bed should remain elevated to 45 deg .   Chest PT and IS will be encouraged  Monitor adequacy of oxygenation and ventilation and attempt to wean oxygen  Nutritional goals will be met using po eventually , ensure adequate caloric intake and monitor the same  Stress ulcer and VTE prophylaxis will be achieved    Glycemic control is satisfactory  Electrolytes have been repleted as necessary and wound care has been carried out. Pain control has been achieved.   Aggressive physical therapy and early mobility and ambulation goals will be met   The family was updated about the course and plan  CRITICAL CARE TIME SPENT in evaluation and management, reassessments, review and interpretation of labs and x-rays, ventilator and hemodynamic management, formulating a plan and coordinating care: ___30____ MIN.  Time does not include procedural time.  CTICU ATTENDING     					    Jaylon Rahman MD

## 2018-06-24 NOTE — PROGRESS NOTE ADULT - SUBJECTIVE AND OBJECTIVE BOX
INTERVAL HPI/OVERNIGHT EVENTS:    POD#3 Reop AVR/ascending aortic replacement/ MV and TV repair; CABG x 2  Impella & IABP placement - IABP out 6/23; impella remains in place  EF 15%    Allergies: PCN - reaction unknown  Abx: Aztreonam/Flagyl/Vancomycin    66yo male with Hx congenital AV dz -  repair/replacement (11yo - [facility]) being evaluated for hernia repair.  Preop risk assessment performed  ECHO/Cath (Completed 06/2018.) EF 25%, severe AS, severe MR, severe TR, severe pHTN, and 2V CAD.   Patient with sxs progressive FLORENCE and dec exercise tolerance for several months    transferred to [facility] for assessment and intervention  To OR 6/21 - 12 UpRBC/8 FFP/4 platelet/FEIBA and Mary 7 given  6/22 - washout and chest closure performed  later in day/evening on 6/22 - inc pressor requirements; inc CVP; drop in UO --> tamponade  chest opened at bedside (Dr Benedict) with improvement - reportedly approx 1L blood noted right chest  repeat wash out performed 6/23 with removal of clot per d/w Dr Benedict - pseudotamponade  IABP and sheath removed - required 3 U pRBC and platelets given 6/23    alkalotic - adjustments made to vent and sedation with improvement  drips concentrated - and titrating down, obligate hourly input (not including Abx) - 95.7cc  good urine output  no acute events reported overnight - impella titrated down and now at p4; remains AV paced    ICU Vital Signs Last 24 Hrs  T(C): 36 (24 Jun 2018 05:00), Max: 36.5 (23 Jun 2018 11:30)  T(F): 96.8 (24 Jun 2018 05:00), Max: 97.7 (23 Jun 2018 11:30)  HR: 88 (24 Jun 2018 09:00) (71 - 92)  BP: --  BP(mean): --  ABP: 106/64 (24 Jun 2018 09:00) (88/66 - 158/52)  ABP(mean): 76 (24 Jun 2018 09:00) (54 - 106)  RR: 21 (24 Jun 2018 09:00) (12 - 23)  SpO2: 96% (24 Jun 2018 09:00) (93% - 100%)    Qtts:     I&O's Summary    23 Jun 2018 07:01  -  24 Jun 2018 07:00  --------------------------------------------------------  IN: 7010.2 mL / OUT: 7335 mL / NET: -324.9 mL    24 Jun 2018 07:01  -  24 Jun 2018 09:56  --------------------------------------------------------  IN: 275.7 mL / OUT: 350 mL / NET: -74.3 mL        Mode: AC/ CMV (Assist Control/ Continuous Mandatory Ventilation)  RR (machine): 12  TV (machine): 370  FiO2: 40  PEEP: 7  ITime: 1.4  MAP: 16  PIP: 27      Physical Exam    Heart  Lungs  Abd  Ext  Chest  Neuro  Skin    LABS:                        12.5   11.4  )-----------( 94       ( 24 Jun 2018 06:30 )             34.2     06-24    134<L>  |  101  |  17  ----------------------------<  144<H>  4.4   |  22  |  0.91    Ca    8.5      24 Jun 2018 06:29  Phos  3.8     06-24  Mg     2.3     06-24    TPro  4.2<L>  /  Alb  2.5<L>  /  TBili  7.4<H>  /  DBili  x   /  AST  75<H>  /  ALT  23  /  AlkPhos  54  06-24    PT/INR - ( 24 Jun 2018 06:30 )   PT: 12.0 sec;   INR: 1.08          PTT - ( 24 Jun 2018 06:30 )  PTT:47.2 sec    ABG - ( 24 Jun 2018 06:30 )  pH, Arterial: 7.44  pH, Blood: x     /  pCO2: 31    /  pO2: 188   / HCO3: 20    / Base Excess: -2.6  /  SaO2: 99                  RADIOLOGY & ADDITIONAL STUDIES:    I have spent/provided stated minutes of critical care time to this patient: INTERVAL HPI/OVERNIGHT EVENTS:    POD#3 Reop AVR/ascending aortic replacement/ MV and TV repair; CABG x 2  Impella & IABP placement - IABP out 6/23; impella remains in place  EF 15%    Allergies: PCN - reaction unknown  Abx: Aztreonam/Flagyl/Vancomycin    66yo male with Hx congenital AV dz -  repair/replacement (13yo - [facility]) being evaluated for hernia repair.  Preop risk assessment performed  ECHO/Cath (Completed 06/2018.) EF 25%, severe AS, severe MR, severe TR, severe pHTN, and 2V CAD.   Patient with sxs progressive FLORENCE and dec exercise tolerance for several months    transferred to [facility] for assessment and intervention  To OR 6/21 - 12 UpRBC/8 FFP/4 platelet/FEIBA and Mary 7 given  6/22 - washout and chest closure performed  later in day/evening on 6/22 - inc pressor requirements; inc CVP; drop in UO --> tamponade  chest opened at bedside (Dr Benedict) with improvement - reportedly approx 1L blood noted right chest  repeat wash out performed 6/23 with removal of clot per d/w Dr Benedict - pseudotamponade  IABP and sheath removed - required 3 U pRBC and platelets given 6/23    alkalotic - adjustments made to vent and sedation with improvement  drips concentrated - and titrating down, obligate hourly input (not including Abx) - 95.7cc  good urine output  no acute events reported overnight - impella titrated down and now at p4; remains AV paced     ICU Vital Signs Last 24 Hrs  T(C): 36 (24 Jun 2018 05:00), Max: 36.5 (23 Jun 2018 11:30)  T(F): 96.8 (24 Jun 2018 05:00), Max: 97.7 (23 Jun 2018 11:30)  HR: 88 (24 Jun 2018 09:00) (71 - 92) - AV paced - underlying native rhythm accelerated junctional   ABP: 106/64 (24 Jun 2018 09:00) (88/66 - 158/52)  ABP(mean): 76 (24 Jun 2018 09:00) (54 - 106)  SpO2: 96% (24 Jun 2018 09:00) (93% - 100%) Fi02 40/PEEP 7 Ana Luisa 20PPM    Qtts:   Precedex 0.6  Insulin  Milrinone 0.25  Vaso 3 (0.05)  nimbex 7  lasix 5mg/h  propofol 45  Fentanyl 1.4  Dio 0.2   Epi0.03    I&O's Summary    23 Jun 2018 07:01  -  24 Jun 2018 07:00  --------------------------------------------------------  IN: 7010.2 mL / OUT: 7335 mL / NET: -324.9 mL    24 Jun 2018 07:01  -  24 Jun 2018 09:56  --------------------------------------------------------  IN: 275.7 mL / OUT: 350 mL / NET: -74.3 mL    Vent settings: AC 12/370/40/7    Mode: AC/ CMV (Assist Control/ Continuous Mandatory Ventilation)    Physical Exam    Heart - regular (-)rub/gallop  Lungs - lateral - dec bs bases (-)wheeze  Abd - soft NTND  Ext (+)pitting edema all extremities; RLE warm - (+)palpable pulse; LLE warm to ankle - foot cool - dopplerable pulse appreciated (unchanged from prior) serous oozing at prior intro/IABP right femoral - no palpable hematoma/fluctuance  Chest - open ; bandage intact - flat  Neuro - pupils reactive bilateral -scleral swelling; otherwise unable  Skin - no rash    LABS: repeat labs pending                        12.5   11.4  )-----------( 94       ( 24 Jun 2018 06:30 )             34.2     06-24    134<L>  |  101  |  17  ----------------------------<  144<H>  4.4   |  22  |  0.91    Ca    8.5      24 Jun 2018 06:29  Phos  3.8     06-24  Mg     2.3     06-24    TPro  4.2<L>  /  Alb  2.5<L>  /  TBili  7.4<H>  /  DBili  x   /  AST  75<H>  /  ALT  23  /  AlkPhos  54  06-24    PT/INR - ( 24 Jun 2018 06:30 )   PT: 12.0 sec;   INR: 1.08     PTT - ( 24 Jun 2018 06:30 )  PTT:47.2 sec    ABG - ( 24 Jun 2018 06:30 ) 7.44/31/188/99  Vanco random - 71 (?) - to be rechecked for possible lab error    RADIOLOGY & ADDITIONAL STUDIES: reviewed    POD#3 Reop AVR/ascending aortic replacement/ MV and TV repair; CABG x 2  Impella & IABP placement  EF 15%    66yo male with Hx congenital AV dz -  repair/replacement (13yo - [facility]) being evaluated for hernia repair found to have EF 25%, severe AS, severe MR, severe TR, severe pHTN, and 2V CAD on Preop risk assessment Now POD#2 with post-op cardiogenic shock/biventricular dysfunction requiring significant support    1. CV  continue impella ; IABP removed 6/23 - [facility] out - monitor site  Ana Luisa for RV dysfunction  Heparin adjusted - both concentration and rate for Impella - goal 60-70  bedside washout (Hemli) 6/22 and 6/23 - plan OR wash out in am 6/25 with possible chest closure  con't pacing - underlying rhythm accelerated junctional - cont to monitor native rhythm  cont pressors and inotropes - maintain MAP and trend LA; drips concentrated - obligate intake 95cc/h  Abx inlight of open chest - normal Cr and excellent UO - vanco random level noted - ? lab error - to repeat =- hold vanco pending repeat level  Impella at p4 - monitor volume status closely   plan negative I/O to facilitate potential chest closure    2. Pulm   monitor chest tube output - correct coagulopathy as much as able - heparin with pTT goal 65-70  vent with minimal requirements - serial ABG  Ana Luisa for RV dysfunction NOT hypoxemia  monitor fluid balance  alkalosis 6/23 - responded to inc sedation/paralytic and vent adjustments - serial abg to monitor.     3. Renal   on lasix infusion   monitor UO/Lytes and Cr - supplement lytes prn  diuresis and negative fluid balance the goal over next 24 hours    4. Endocrine  insulin infusion per protocol - noted hypoglycemic this am   dextrose given per protocol and insulin infusion off  HgA1c 6.2 preop   maintain glycemic control/euglycemia - 112/136; noted glucose 27 error    Change IV protonix infusion to IV qd - no evidence of GIB and not infusing at this time  d/w staff and CTS    I have spent/provided stated minutes of critical care time to this patient: 90 min

## 2018-06-25 ENCOUNTER — APPOINTMENT (OUTPATIENT)
Dept: CARDIOTHORACIC SURGERY | Facility: HOSPITAL | Age: 68
End: 2018-06-25

## 2018-06-25 LAB
ALBUMIN SERPL ELPH-MCNC: 2.1 G/DL — LOW (ref 3.3–5)
ALBUMIN SERPL ELPH-MCNC: 2.4 G/DL — LOW (ref 3.3–5)
ALBUMIN SERPL ELPH-MCNC: 2.6 G/DL — LOW (ref 3.3–5)
ALBUMIN SERPL ELPH-MCNC: 2.6 G/DL — LOW (ref 3.3–5)
ALBUMIN SERPL ELPH-MCNC: 2.9 G/DL — LOW (ref 3.3–5)
ALBUMIN SERPL ELPH-MCNC: 3 G/DL — LOW (ref 3.3–5)
ALP SERPL-CCNC: 46 U/L — SIGNIFICANT CHANGE UP (ref 40–120)
ALP SERPL-CCNC: 52 U/L — SIGNIFICANT CHANGE UP (ref 40–120)
ALP SERPL-CCNC: 52 U/L — SIGNIFICANT CHANGE UP (ref 40–120)
ALP SERPL-CCNC: 55 U/L — SIGNIFICANT CHANGE UP (ref 40–120)
ALP SERPL-CCNC: 57 U/L — SIGNIFICANT CHANGE UP (ref 40–120)
ALP SERPL-CCNC: 63 U/L — SIGNIFICANT CHANGE UP (ref 40–120)
ALT FLD-CCNC: 15 U/L — SIGNIFICANT CHANGE UP (ref 10–45)
ALT FLD-CCNC: 16 U/L — SIGNIFICANT CHANGE UP (ref 10–45)
ALT FLD-CCNC: 17 U/L — SIGNIFICANT CHANGE UP (ref 10–45)
ALT FLD-CCNC: 17 U/L — SIGNIFICANT CHANGE UP (ref 10–45)
ALT FLD-CCNC: 20 U/L — SIGNIFICANT CHANGE UP (ref 10–45)
ALT FLD-CCNC: 22 U/L — SIGNIFICANT CHANGE UP (ref 10–45)
ANION GAP SERPL CALC-SCNC: 10 MMOL/L — SIGNIFICANT CHANGE UP (ref 5–17)
ANION GAP SERPL CALC-SCNC: 10 MMOL/L — SIGNIFICANT CHANGE UP (ref 5–17)
ANION GAP SERPL CALC-SCNC: 11 MMOL/L — SIGNIFICANT CHANGE UP (ref 5–17)
ANION GAP SERPL CALC-SCNC: 11 MMOL/L — SIGNIFICANT CHANGE UP (ref 5–17)
ANION GAP SERPL CALC-SCNC: 12 MMOL/L — SIGNIFICANT CHANGE UP (ref 5–17)
ANION GAP SERPL CALC-SCNC: 7 MMOL/L — SIGNIFICANT CHANGE UP (ref 5–17)
APTT BLD: 51.3 SEC — HIGH (ref 27.5–37.4)
APTT BLD: 52.2 SEC — HIGH (ref 27.5–37.4)
APTT BLD: 52.5 SEC — HIGH (ref 27.5–37.4)
APTT BLD: 62 SEC — HIGH (ref 27.5–37.4)
APTT BLD: 70.1 SEC — HIGH (ref 27.5–37.4)
APTT BLD: 73.5 SEC — HIGH (ref 27.5–37.4)
AST SERPL-CCNC: 39 U/L — SIGNIFICANT CHANGE UP (ref 10–40)
AST SERPL-CCNC: 39 U/L — SIGNIFICANT CHANGE UP (ref 10–40)
AST SERPL-CCNC: 43 U/L — HIGH (ref 10–40)
AST SERPL-CCNC: 43 U/L — HIGH (ref 10–40)
AST SERPL-CCNC: 48 U/L — HIGH (ref 10–40)
AST SERPL-CCNC: 55 U/L — HIGH (ref 10–40)
BASE EXCESS BLDA CALC-SCNC: -1.2 MMOL/L — SIGNIFICANT CHANGE UP (ref -2–3)
BASE EXCESS BLDV CALC-SCNC: -0.3 MMOL/L — SIGNIFICANT CHANGE UP
BASE EXCESS BLDV CALC-SCNC: -1.7 MMOL/L — SIGNIFICANT CHANGE UP
BILIRUB SERPL-MCNC: 6.8 MG/DL — HIGH (ref 0.2–1.2)
BILIRUB SERPL-MCNC: 7.2 MG/DL — HIGH (ref 0.2–1.2)
BILIRUB SERPL-MCNC: 7.6 MG/DL — HIGH (ref 0.2–1.2)
BILIRUB SERPL-MCNC: 8.4 MG/DL — HIGH (ref 0.2–1.2)
BILIRUB SERPL-MCNC: 8.6 MG/DL — HIGH (ref 0.2–1.2)
BILIRUB SERPL-MCNC: 9 MG/DL — HIGH (ref 0.2–1.2)
BUN SERPL-MCNC: 14 MG/DL — SIGNIFICANT CHANGE UP (ref 7–23)
BUN SERPL-MCNC: 14 MG/DL — SIGNIFICANT CHANGE UP (ref 7–23)
BUN SERPL-MCNC: 15 MG/DL — SIGNIFICANT CHANGE UP (ref 7–23)
CA-I SERPL-SCNC: 1.04 MMOL/L — LOW (ref 1.12–1.3)
CA-I SERPL-SCNC: 1.12 MMOL/L — SIGNIFICANT CHANGE UP (ref 1.12–1.3)
CALCIUM SERPL-MCNC: 8 MG/DL — LOW (ref 8.4–10.5)
CALCIUM SERPL-MCNC: 8.2 MG/DL — LOW (ref 8.4–10.5)
CALCIUM SERPL-MCNC: 8.2 MG/DL — LOW (ref 8.4–10.5)
CALCIUM SERPL-MCNC: 8.3 MG/DL — LOW (ref 8.4–10.5)
CALCIUM SERPL-MCNC: 8.3 MG/DL — LOW (ref 8.4–10.5)
CALCIUM SERPL-MCNC: 9 MG/DL — SIGNIFICANT CHANGE UP (ref 8.4–10.5)
CHLORIDE SERPL-SCNC: 91 MMOL/L — LOW (ref 96–108)
CHLORIDE SERPL-SCNC: 92 MMOL/L — LOW (ref 96–108)
CHLORIDE SERPL-SCNC: 93 MMOL/L — LOW (ref 96–108)
CHLORIDE SERPL-SCNC: 93 MMOL/L — LOW (ref 96–108)
CO2 SERPL-SCNC: 22 MMOL/L — SIGNIFICANT CHANGE UP (ref 22–31)
CO2 SERPL-SCNC: 22 MMOL/L — SIGNIFICANT CHANGE UP (ref 22–31)
CO2 SERPL-SCNC: 23 MMOL/L — SIGNIFICANT CHANGE UP (ref 22–31)
CO2 SERPL-SCNC: 24 MMOL/L — SIGNIFICANT CHANGE UP (ref 22–31)
CO2 SERPL-SCNC: 24 MMOL/L — SIGNIFICANT CHANGE UP (ref 22–31)
CO2 SERPL-SCNC: 32 MMOL/L — HIGH (ref 22–31)
CREAT SERPL-MCNC: 0.73 MG/DL — SIGNIFICANT CHANGE UP (ref 0.5–1.3)
CREAT SERPL-MCNC: 0.74 MG/DL — SIGNIFICANT CHANGE UP (ref 0.5–1.3)
CREAT SERPL-MCNC: 0.75 MG/DL — SIGNIFICANT CHANGE UP (ref 0.5–1.3)
CREAT SERPL-MCNC: 0.78 MG/DL — SIGNIFICANT CHANGE UP (ref 0.5–1.3)
CREAT SERPL-MCNC: 0.78 MG/DL — SIGNIFICANT CHANGE UP (ref 0.5–1.3)
CREAT SERPL-MCNC: 0.79 MG/DL — SIGNIFICANT CHANGE UP (ref 0.5–1.3)
GAS PNL BLDA: SIGNIFICANT CHANGE UP
GAS PNL BLDV: 123 MMOL/L — LOW (ref 138–146)
GAS PNL BLDV: 124 MMOL/L — LOW (ref 138–146)
GAS PNL BLDV: SIGNIFICANT CHANGE UP
GLUCOSE BLDC GLUCOMTR-MCNC: 100 MG/DL — HIGH (ref 70–99)
GLUCOSE BLDC GLUCOMTR-MCNC: 101 MG/DL — HIGH (ref 70–99)
GLUCOSE BLDC GLUCOMTR-MCNC: 109 MG/DL — HIGH (ref 70–99)
GLUCOSE BLDC GLUCOMTR-MCNC: 114 MG/DL — HIGH (ref 70–99)
GLUCOSE BLDC GLUCOMTR-MCNC: 126 MG/DL — HIGH (ref 70–99)
GLUCOSE BLDC GLUCOMTR-MCNC: 137 MG/DL — HIGH (ref 70–99)
GLUCOSE BLDC GLUCOMTR-MCNC: 146 MG/DL — HIGH (ref 70–99)
GLUCOSE BLDC GLUCOMTR-MCNC: 155 MG/DL — HIGH (ref 70–99)
GLUCOSE BLDC GLUCOMTR-MCNC: 157 MG/DL — HIGH (ref 70–99)
GLUCOSE BLDC GLUCOMTR-MCNC: 168 MG/DL — HIGH (ref 70–99)
GLUCOSE BLDC GLUCOMTR-MCNC: 87 MG/DL — SIGNIFICANT CHANGE UP (ref 70–99)
GLUCOSE BLDC GLUCOMTR-MCNC: 92 MG/DL — SIGNIFICANT CHANGE UP (ref 70–99)
GLUCOSE BLDC GLUCOMTR-MCNC: 97 MG/DL — SIGNIFICANT CHANGE UP (ref 70–99)
GLUCOSE SERPL-MCNC: 109 MG/DL — HIGH (ref 70–99)
GLUCOSE SERPL-MCNC: 109 MG/DL — HIGH (ref 70–99)
GLUCOSE SERPL-MCNC: 117 MG/DL — HIGH (ref 70–99)
GLUCOSE SERPL-MCNC: 145 MG/DL — HIGH (ref 70–99)
GLUCOSE SERPL-MCNC: 145 MG/DL — HIGH (ref 70–99)
GLUCOSE SERPL-MCNC: 171 MG/DL — HIGH (ref 70–99)
HCO3 BLDA-SCNC: 23 MMOL/L — SIGNIFICANT CHANGE UP (ref 21–28)
HCO3 BLDV-SCNC: 24 MMOL/L — SIGNIFICANT CHANGE UP (ref 20–27)
HCO3 BLDV-SCNC: 24 MMOL/L — SIGNIFICANT CHANGE UP (ref 20–27)
HCT VFR BLD CALC: 28.2 % — LOW (ref 39–50)
HCT VFR BLD CALC: 29.3 % — LOW (ref 39–50)
HCT VFR BLD CALC: 30.3 % — LOW (ref 39–50)
HCT VFR BLD CALC: 31.1 % — LOW (ref 39–50)
HCT VFR BLD CALC: 31.5 % — LOW (ref 39–50)
HCT VFR BLD CALC: 32.6 % — LOW (ref 39–50)
HGB BLD-MCNC: 10.1 G/DL — LOW (ref 13–17)
HGB BLD-MCNC: 10.3 G/DL — LOW (ref 13–17)
HGB BLD-MCNC: 10.4 G/DL — LOW (ref 13–17)
HGB BLD-MCNC: 11 G/DL — LOW (ref 13–17)
HGB BLD-MCNC: 11.2 G/DL — LOW (ref 13–17)
HGB BLD-MCNC: 11.5 G/DL — LOW (ref 13–17)
INR BLD: 1.11 — SIGNIFICANT CHANGE UP (ref 0.88–1.16)
INR BLD: 1.18 — HIGH (ref 0.88–1.16)
INR BLD: 1.2 — HIGH (ref 0.88–1.16)
INR BLD: 1.21 — HIGH (ref 0.88–1.16)
INR BLD: 1.29 — HIGH (ref 0.88–1.16)
INR BLD: 1.32 — HIGH (ref 0.88–1.16)
LACTATE SERPL-SCNC: 1.3 MMOL/L — SIGNIFICANT CHANGE UP (ref 0.5–2)
LACTATE SERPL-SCNC: 1.4 MMOL/L — SIGNIFICANT CHANGE UP (ref 0.5–2)
LACTATE SERPL-SCNC: 1.6 MMOL/L — SIGNIFICANT CHANGE UP (ref 0.5–2)
LACTATE SERPL-SCNC: 1.6 MMOL/L — SIGNIFICANT CHANGE UP (ref 0.5–2)
LACTATE SERPL-SCNC: 1.8 MMOL/L — SIGNIFICANT CHANGE UP (ref 0.5–2)
MAGNESIUM SERPL-MCNC: 2 MG/DL — SIGNIFICANT CHANGE UP (ref 1.6–2.6)
MAGNESIUM SERPL-MCNC: 2.4 MG/DL — SIGNIFICANT CHANGE UP (ref 1.6–2.6)
MAGNESIUM SERPL-MCNC: 2.6 MG/DL — SIGNIFICANT CHANGE UP (ref 1.6–2.6)
MCHC RBC-ENTMCNC: 29.2 PG — SIGNIFICANT CHANGE UP (ref 27–34)
MCHC RBC-ENTMCNC: 29.5 PG — SIGNIFICANT CHANGE UP (ref 27–34)
MCHC RBC-ENTMCNC: 29.6 PG — SIGNIFICANT CHANGE UP (ref 27–34)
MCHC RBC-ENTMCNC: 29.7 PG — SIGNIFICANT CHANGE UP (ref 27–34)
MCHC RBC-ENTMCNC: 29.9 PG — SIGNIFICANT CHANGE UP (ref 27–34)
MCHC RBC-ENTMCNC: 29.9 PG — SIGNIFICANT CHANGE UP (ref 27–34)
MCHC RBC-ENTMCNC: 34.3 G/DL — SIGNIFICANT CHANGE UP (ref 32–36)
MCHC RBC-ENTMCNC: 35.2 G/DL — SIGNIFICANT CHANGE UP (ref 32–36)
MCHC RBC-ENTMCNC: 35.3 G/DL — SIGNIFICANT CHANGE UP (ref 32–36)
MCHC RBC-ENTMCNC: 35.4 G/DL — SIGNIFICANT CHANGE UP (ref 32–36)
MCHC RBC-ENTMCNC: 35.6 G/DL — SIGNIFICANT CHANGE UP (ref 32–36)
MCHC RBC-ENTMCNC: 35.8 G/DL — SIGNIFICANT CHANGE UP (ref 32–36)
MCV RBC AUTO: 82.9 FL — SIGNIFICANT CHANGE UP (ref 80–100)
MCV RBC AUTO: 83.4 FL — SIGNIFICANT CHANGE UP (ref 80–100)
MCV RBC AUTO: 83.8 FL — SIGNIFICANT CHANGE UP (ref 80–100)
MCV RBC AUTO: 84.2 FL — SIGNIFICANT CHANGE UP (ref 80–100)
MCV RBC AUTO: 85.1 FL — SIGNIFICANT CHANGE UP (ref 80–100)
MCV RBC AUTO: 85.2 FL — SIGNIFICANT CHANGE UP (ref 80–100)
PCO2 BLDA: 35 MMHG — SIGNIFICANT CHANGE UP (ref 35–48)
PCO2 BLDV: 35 MMHG — LOW (ref 41–51)
PCO2 BLDV: 48 MMHG — SIGNIFICANT CHANGE UP (ref 41–51)
PH BLDA: 7.43 — SIGNIFICANT CHANGE UP (ref 7.35–7.45)
PH BLDV: 7.33 — SIGNIFICANT CHANGE UP (ref 7.32–7.43)
PH BLDV: 7.44 — HIGH (ref 7.32–7.43)
PHOSPHATE SERPL-MCNC: 2.9 MG/DL — SIGNIFICANT CHANGE UP (ref 2.5–4.5)
PHOSPHATE SERPL-MCNC: 3 MG/DL — SIGNIFICANT CHANGE UP (ref 2.5–4.5)
PHOSPHATE SERPL-MCNC: 3.1 MG/DL — SIGNIFICANT CHANGE UP (ref 2.5–4.5)
PHOSPHATE SERPL-MCNC: 3.2 MG/DL — SIGNIFICANT CHANGE UP (ref 2.5–4.5)
PHOSPHATE SERPL-MCNC: 3.3 MG/DL — SIGNIFICANT CHANGE UP (ref 2.5–4.5)
PLATELET # BLD AUTO: 47 K/UL — LOW (ref 150–400)
PLATELET # BLD AUTO: 50 K/UL — LOW (ref 150–400)
PLATELET # BLD AUTO: 54 K/UL — LOW (ref 150–400)
PLATELET # BLD AUTO: 63 K/UL — LOW (ref 150–400)
PLATELET # BLD AUTO: 70 K/UL — LOW (ref 150–400)
PLATELET # BLD AUTO: 82 K/UL — LOW (ref 150–400)
PO2 BLDA: 195 MMHG — HIGH (ref 83–108)
PO2 BLDV: 40 MMHG — SIGNIFICANT CHANGE UP
PO2 BLDV: 45 MMHG — SIGNIFICANT CHANGE UP
POTASSIUM BLDV-SCNC: 3.6 MMOL/L — SIGNIFICANT CHANGE UP (ref 3.5–4.9)
POTASSIUM BLDV-SCNC: 3.8 MMOL/L — SIGNIFICANT CHANGE UP (ref 3.5–4.9)
POTASSIUM SERPL-MCNC: 3.4 MMOL/L — LOW (ref 3.5–5.3)
POTASSIUM SERPL-MCNC: 3.4 MMOL/L — LOW (ref 3.5–5.3)
POTASSIUM SERPL-MCNC: 3.6 MMOL/L — SIGNIFICANT CHANGE UP (ref 3.5–5.3)
POTASSIUM SERPL-MCNC: 3.9 MMOL/L — SIGNIFICANT CHANGE UP (ref 3.5–5.3)
POTASSIUM SERPL-MCNC: 4.2 MMOL/L — SIGNIFICANT CHANGE UP (ref 3.5–5.3)
POTASSIUM SERPL-MCNC: 4.6 MMOL/L — SIGNIFICANT CHANGE UP (ref 3.5–5.3)
POTASSIUM SERPL-SCNC: 3.4 MMOL/L — LOW (ref 3.5–5.3)
POTASSIUM SERPL-SCNC: 3.4 MMOL/L — LOW (ref 3.5–5.3)
POTASSIUM SERPL-SCNC: 3.6 MMOL/L — SIGNIFICANT CHANGE UP (ref 3.5–5.3)
POTASSIUM SERPL-SCNC: 3.9 MMOL/L — SIGNIFICANT CHANGE UP (ref 3.5–5.3)
POTASSIUM SERPL-SCNC: 4.2 MMOL/L — SIGNIFICANT CHANGE UP (ref 3.5–5.3)
POTASSIUM SERPL-SCNC: 4.6 MMOL/L — SIGNIFICANT CHANGE UP (ref 3.5–5.3)
PROT SERPL-MCNC: 4 G/DL — LOW (ref 6–8.3)
PROT SERPL-MCNC: 4 G/DL — LOW (ref 6–8.3)
PROT SERPL-MCNC: 4.1 G/DL — LOW (ref 6–8.3)
PROT SERPL-MCNC: 4.4 G/DL — LOW (ref 6–8.3)
PROT SERPL-MCNC: 4.4 G/DL — LOW (ref 6–8.3)
PROT SERPL-MCNC: 4.6 G/DL — LOW (ref 6–8.3)
PROTHROM AB SERPL-ACNC: 12.4 SEC — SIGNIFICANT CHANGE UP (ref 9.8–12.7)
PROTHROM AB SERPL-ACNC: 13.1 SEC — HIGH (ref 9.8–12.7)
PROTHROM AB SERPL-ACNC: 13.4 SEC — HIGH (ref 9.8–12.7)
PROTHROM AB SERPL-ACNC: 13.5 SEC — HIGH (ref 9.8–12.7)
PROTHROM AB SERPL-ACNC: 14.4 SEC — HIGH (ref 9.8–12.7)
PROTHROM AB SERPL-ACNC: 14.7 SEC — HIGH (ref 9.8–12.7)
RBC # BLD: 3.4 M/UL — LOW (ref 4.2–5.8)
RBC # BLD: 3.44 M/UL — LOW (ref 4.2–5.8)
RBC # BLD: 3.56 M/UL — LOW (ref 4.2–5.8)
RBC # BLD: 3.73 M/UL — LOW (ref 4.2–5.8)
RBC # BLD: 3.74 M/UL — LOW (ref 4.2–5.8)
RBC # BLD: 3.89 M/UL — LOW (ref 4.2–5.8)
RBC # FLD: 15.6 % — SIGNIFICANT CHANGE UP (ref 10.3–16.9)
RBC # FLD: 15.8 % — SIGNIFICANT CHANGE UP (ref 10.3–16.9)
RBC # FLD: 15.8 % — SIGNIFICANT CHANGE UP (ref 10.3–16.9)
RBC # FLD: 16.1 % — SIGNIFICANT CHANGE UP (ref 10.3–16.9)
RBC # FLD: 16.1 % — SIGNIFICANT CHANGE UP (ref 10.3–16.9)
RBC # FLD: 16.2 % — SIGNIFICANT CHANGE UP (ref 10.3–16.9)
SAO2 % BLDA: 99 % — SIGNIFICANT CHANGE UP (ref 95–100)
SAO2 % BLDV: 74 % — SIGNIFICANT CHANGE UP
SAO2 % BLDV: 81 % — SIGNIFICANT CHANGE UP
SODIUM SERPL-SCNC: 125 MMOL/L — LOW (ref 135–145)
SODIUM SERPL-SCNC: 126 MMOL/L — LOW (ref 135–145)
SODIUM SERPL-SCNC: 127 MMOL/L — LOW (ref 135–145)
SODIUM SERPL-SCNC: 131 MMOL/L — LOW (ref 135–145)
VANCOMYCIN TROUGH SERPL-MCNC: 17 UG/ML — SIGNIFICANT CHANGE UP (ref 10–20)
WBC # BLD: 10.1 K/UL — SIGNIFICANT CHANGE UP (ref 3.8–10.5)
WBC # BLD: 10.8 K/UL — HIGH (ref 3.8–10.5)
WBC # BLD: 11.1 K/UL — HIGH (ref 3.8–10.5)
WBC # BLD: 7.7 K/UL — SIGNIFICANT CHANGE UP (ref 3.8–10.5)
WBC # BLD: 7.9 K/UL — SIGNIFICANT CHANGE UP (ref 3.8–10.5)
WBC # BLD: 8.4 K/UL — SIGNIFICANT CHANGE UP (ref 3.8–10.5)
WBC # FLD AUTO: 10.1 K/UL — SIGNIFICANT CHANGE UP (ref 3.8–10.5)
WBC # FLD AUTO: 10.8 K/UL — HIGH (ref 3.8–10.5)
WBC # FLD AUTO: 11.1 K/UL — HIGH (ref 3.8–10.5)
WBC # FLD AUTO: 7.7 K/UL — SIGNIFICANT CHANGE UP (ref 3.8–10.5)
WBC # FLD AUTO: 7.9 K/UL — SIGNIFICANT CHANGE UP (ref 3.8–10.5)
WBC # FLD AUTO: 8.4 K/UL — SIGNIFICANT CHANGE UP (ref 3.8–10.5)

## 2018-06-25 PROCEDURE — 99291 CRITICAL CARE FIRST HOUR: CPT

## 2018-06-25 PROCEDURE — 71045 X-RAY EXAM CHEST 1 VIEW: CPT | Mod: 26,76

## 2018-06-25 PROCEDURE — 99292 CRITICAL CARE ADDL 30 MIN: CPT

## 2018-06-25 PROCEDURE — 32120 RE-EXPLORATION OF CHEST: CPT | Mod: 78

## 2018-06-25 RX ORDER — CALCIUM GLUCONATE 100 MG/ML
2 VIAL (ML) INTRAVENOUS ONCE
Qty: 0 | Refills: 0 | Status: COMPLETED | OUTPATIENT
Start: 2018-06-25 | End: 2018-06-25

## 2018-06-25 RX ORDER — ALBUMIN HUMAN 25 %
50 VIAL (ML) INTRAVENOUS ONCE
Qty: 0 | Refills: 0 | Status: COMPLETED | OUTPATIENT
Start: 2018-06-25 | End: 2018-06-25

## 2018-06-25 RX ORDER — EPOPROSTENOL 0.5 MG/10ML
2 INJECTION, POWDER, LYOPHILIZED, FOR SOLUTION INTRAVENOUS
Qty: 500 | Refills: 0 | Status: DISCONTINUED | OUTPATIENT
Start: 2018-06-25 | End: 2018-06-28

## 2018-06-25 RX ORDER — HEPARIN SODIUM 5000 [USP'U]/ML
300 INJECTION INTRAVENOUS; SUBCUTANEOUS
Qty: 25000 | Refills: 0 | Status: DISCONTINUED | OUTPATIENT
Start: 2018-06-25 | End: 2018-06-26

## 2018-06-25 RX ORDER — SODIUM BICARBONATE 1 MEQ/ML
50 SYRINGE (ML) INTRAVENOUS ONCE
Qty: 0 | Refills: 0 | Status: COMPLETED | OUTPATIENT
Start: 2018-06-25 | End: 2018-06-25

## 2018-06-25 RX ORDER — ALBUMIN HUMAN 25 %
250 VIAL (ML) INTRAVENOUS
Qty: 0 | Refills: 0 | Status: COMPLETED | OUTPATIENT
Start: 2018-06-25 | End: 2018-06-25

## 2018-06-25 RX ORDER — FENTANYL CITRATE 50 UG/ML
100 INJECTION INTRAVENOUS ONCE
Qty: 0 | Refills: 0 | Status: DISCONTINUED | OUTPATIENT
Start: 2018-06-25 | End: 2018-06-25

## 2018-06-25 RX ORDER — ALBUMIN HUMAN 25 %
250 VIAL (ML) INTRAVENOUS ONCE
Qty: 0 | Refills: 0 | Status: COMPLETED | OUTPATIENT
Start: 2018-06-25 | End: 2018-06-26

## 2018-06-25 RX ORDER — EPINEPHRINE 0.3 MG/.3ML
0.03 INJECTION INTRAMUSCULAR; SUBCUTANEOUS
Qty: 4 | Refills: 0 | Status: DISCONTINUED | OUTPATIENT
Start: 2018-06-25 | End: 2018-06-27

## 2018-06-25 RX ORDER — POTASSIUM CHLORIDE 20 MEQ
20 PACKET (EA) ORAL
Qty: 0 | Refills: 0 | Status: DISCONTINUED | OUTPATIENT
Start: 2018-06-25 | End: 2018-06-27

## 2018-06-25 RX ORDER — FENTANYL CITRATE 50 UG/ML
100 INJECTION INTRAVENOUS ONCE
Qty: 0 | Refills: 0 | Status: DISCONTINUED | OUTPATIENT
Start: 2018-06-25 | End: 2018-06-27

## 2018-06-25 RX ORDER — VANCOMYCIN HCL 1 G
500 VIAL (EA) INTRAVENOUS ONCE
Qty: 0 | Refills: 0 | Status: COMPLETED | OUTPATIENT
Start: 2018-06-25 | End: 2018-06-25

## 2018-06-25 RX ORDER — POTASSIUM CHLORIDE 20 MEQ
20 PACKET (EA) ORAL
Qty: 0 | Refills: 0 | Status: COMPLETED | OUTPATIENT
Start: 2018-06-25 | End: 2018-06-26

## 2018-06-25 RX ORDER — POTASSIUM CHLORIDE 20 MEQ
20 PACKET (EA) ORAL
Qty: 0 | Refills: 0 | Status: COMPLETED | OUTPATIENT
Start: 2018-06-25 | End: 2018-06-25

## 2018-06-25 RX ORDER — ALBUMIN HUMAN 25 %
50 VIAL (ML) INTRAVENOUS
Qty: 0 | Refills: 0 | Status: COMPLETED | OUTPATIENT
Start: 2018-06-25 | End: 2018-06-25

## 2018-06-25 RX ORDER — PROPOFOL 10 MG/ML
50 INJECTION, EMULSION INTRAVENOUS ONCE
Qty: 0 | Refills: 0 | Status: DISCONTINUED | OUTPATIENT
Start: 2018-06-25 | End: 2018-06-27

## 2018-06-25 RX ORDER — DOBUTAMINE HCL 250MG/20ML
5 VIAL (ML) INTRAVENOUS
Qty: 500 | Refills: 0 | Status: DISCONTINUED | OUTPATIENT
Start: 2018-06-25 | End: 2018-07-01

## 2018-06-25 RX ORDER — NITROGLYCERIN 6.5 MG
5 CAPSULE, EXTENDED RELEASE ORAL
Qty: 50 | Refills: 0 | Status: DISCONTINUED | OUTPATIENT
Start: 2018-06-25 | End: 2018-06-28

## 2018-06-25 RX ADMIN — Medication 50 GRAM(S): at 00:01

## 2018-06-25 RX ADMIN — Medication 100 MILLIEQUIVALENT(S): at 00:59

## 2018-06-25 RX ADMIN — Medication 50 MILLILITER(S): at 16:12

## 2018-06-25 RX ADMIN — Medication 100 MILLIEQUIVALENT(S): at 14:57

## 2018-06-25 RX ADMIN — Medication 1 DROP(S): at 17:24

## 2018-06-25 RX ADMIN — Medication 50 MILLIEQUIVALENT(S): at 09:30

## 2018-06-25 RX ADMIN — Medication 250 MILLIGRAM(S): at 05:44

## 2018-06-25 RX ADMIN — FENTANYL CITRATE 100 MICROGRAM(S): 50 INJECTION INTRAVENOUS at 08:30

## 2018-06-25 RX ADMIN — Medication 2.5 MG/HR: at 21:38

## 2018-06-25 RX ADMIN — Medication 8.79 MICROGRAM(S)/KG/MIN: at 10:55

## 2018-06-25 RX ADMIN — CHLORHEXIDINE GLUCONATE 5 MILLILITER(S): 213 SOLUTION TOPICAL at 11:29

## 2018-06-25 RX ADMIN — EPOPROSTENOL 1.41 NANOGRAM(S)/KG/MIN: 0.5 INJECTION, POWDER, LYOPHILIZED, FOR SOLUTION INTRAVENOUS at 11:17

## 2018-06-25 RX ADMIN — Medication 100 MILLIGRAM(S): at 21:35

## 2018-06-25 RX ADMIN — Medication 100 MILLIEQUIVALENT(S): at 03:05

## 2018-06-25 RX ADMIN — Medication 100 MILLIEQUIVALENT(S): at 04:02

## 2018-06-25 RX ADMIN — Medication 50 MILLIGRAM(S): at 02:10

## 2018-06-25 RX ADMIN — HEPARIN SODIUM 3 UNIT(S)/HR: 5000 INJECTION INTRAVENOUS; SUBCUTANEOUS at 21:36

## 2018-06-25 RX ADMIN — FENTANYL CITRATE 2.93 MICROGRAM(S)/KG/HR: 50 INJECTION INTRAVENOUS at 15:09

## 2018-06-25 RX ADMIN — MILRINONE LACTATE 8.79 MICROGRAM(S)/KG/MIN: 1 INJECTION, SOLUTION INTRAVENOUS at 16:12

## 2018-06-25 RX ADMIN — CISATRACURIUM BESYLATE 10.55 MICROGRAM(S)/KG/MIN: 2 INJECTION INTRAVENOUS at 06:21

## 2018-06-25 RX ADMIN — PROPOFOL 1.76 MICROGRAM(S)/KG/MIN: 10 INJECTION, EMULSION INTRAVENOUS at 09:49

## 2018-06-25 RX ADMIN — PROPOFOL 1.76 MICROGRAM(S)/KG/MIN: 10 INJECTION, EMULSION INTRAVENOUS at 21:35

## 2018-06-25 RX ADMIN — Medication 125 MILLILITER(S): at 04:48

## 2018-06-25 RX ADMIN — MILRINONE LACTATE 8.79 MICROGRAM(S)/KG/MIN: 1 INJECTION, SOLUTION INTRAVENOUS at 21:40

## 2018-06-25 RX ADMIN — CHLORHEXIDINE GLUCONATE 5 MILLILITER(S): 213 SOLUTION TOPICAL at 00:02

## 2018-06-25 RX ADMIN — Medication 100 MILLIEQUIVALENT(S): at 23:24

## 2018-06-25 RX ADMIN — CHLORHEXIDINE GLUCONATE 5 MILLILITER(S): 213 SOLUTION TOPICAL at 17:24

## 2018-06-25 RX ADMIN — INSULIN HUMAN 5 UNIT(S)/HR: 100 INJECTION, SOLUTION SUBCUTANEOUS at 15:09

## 2018-06-25 RX ADMIN — FENTANYL CITRATE 100 MICROGRAM(S): 50 INJECTION INTRAVENOUS at 08:24

## 2018-06-25 RX ADMIN — CISATRACURIUM BESYLATE 10.55 MICROGRAM(S)/KG/MIN: 2 INJECTION INTRAVENOUS at 15:09

## 2018-06-25 RX ADMIN — CISATRACURIUM BESYLATE 10.55 MICROGRAM(S)/KG/MIN: 2 INJECTION INTRAVENOUS at 21:38

## 2018-06-25 RX ADMIN — Medication 50 MILLILITER(S): at 10:44

## 2018-06-25 RX ADMIN — FLUCONAZOLE 100 MILLIGRAM(S): 150 TABLET ORAL at 12:47

## 2018-06-25 RX ADMIN — Medication 50 MILLILITER(S): at 15:51

## 2018-06-25 RX ADMIN — Medication 8.79 MICROGRAM(S)/KG/MIN: at 21:37

## 2018-06-25 RX ADMIN — FENTANYL CITRATE 2.93 MICROGRAM(S)/KG/HR: 50 INJECTION INTRAVENOUS at 21:37

## 2018-06-25 RX ADMIN — CHLORHEXIDINE GLUCONATE 5 MILLILITER(S): 213 SOLUTION TOPICAL at 13:45

## 2018-06-25 RX ADMIN — HEPARIN SODIUM 3 UNIT(S)/HR: 5000 INJECTION INTRAVENOUS; SUBCUTANEOUS at 10:57

## 2018-06-25 RX ADMIN — INSULIN HUMAN 5 UNIT(S)/HR: 100 INJECTION, SOLUTION SUBCUTANEOUS at 05:44

## 2018-06-25 RX ADMIN — Medication 100 MILLIGRAM(S): at 05:44

## 2018-06-25 RX ADMIN — Medication 50 MILLIGRAM(S): at 17:24

## 2018-06-25 RX ADMIN — FENTANYL CITRATE 100 MICROGRAM(S): 50 INJECTION INTRAVENOUS at 10:12

## 2018-06-25 RX ADMIN — CHLORHEXIDINE GLUCONATE 5 MILLILITER(S): 213 SOLUTION TOPICAL at 05:44

## 2018-06-25 RX ADMIN — Medication 200 GRAM(S): at 00:01

## 2018-06-25 RX ADMIN — CHLORHEXIDINE GLUCONATE 5 MILLILITER(S): 213 SOLUTION TOPICAL at 03:12

## 2018-06-25 RX ADMIN — INSULIN HUMAN 5 UNIT(S)/HR: 100 INJECTION, SOLUTION SUBCUTANEOUS at 21:35

## 2018-06-25 RX ADMIN — FENTANYL CITRATE 100 MICROGRAM(S): 50 INJECTION INTRAVENOUS at 10:33

## 2018-06-25 RX ADMIN — Medication 1 DROP(S): at 05:44

## 2018-06-25 RX ADMIN — CHLORHEXIDINE GLUCONATE 5 MILLILITER(S): 213 SOLUTION TOPICAL at 23:24

## 2018-06-25 RX ADMIN — Medication 200 GRAM(S): at 06:21

## 2018-06-25 RX ADMIN — EPOPROSTENOL 1.41 NANOGRAM(S)/KG/MIN: 0.5 INJECTION, POWDER, LYOPHILIZED, FOR SOLUTION INTRAVENOUS at 21:36

## 2018-06-25 RX ADMIN — Medication 125 MILLILITER(S): at 03:08

## 2018-06-25 RX ADMIN — VASOPRESSIN 1.8 UNIT(S)/MIN: 20 INJECTION INTRAVENOUS at 21:39

## 2018-06-25 RX ADMIN — Medication 100 MILLIGRAM(S): at 13:45

## 2018-06-25 RX ADMIN — Medication 100 MILLIEQUIVALENT(S): at 11:18

## 2018-06-25 RX ADMIN — Medication 50 MILLIGRAM(S): at 11:29

## 2018-06-25 RX ADMIN — Medication 200 GRAM(S): at 16:56

## 2018-06-25 NOTE — PROGRESS NOTE ADULT - SUBJECTIVE AND OBJECTIVE BOX
CTICU  CRITICAL  CARE  attending     Hand off received 					   Pertinent clinical, laboratory, radiographic, hemodynamic, echocardiographic, respiratory data, microbiologic data and chart were reviewed and analyzed frequently throughout the course of the day and night  Patient seen and examined with CTS/ SH attending at bedside  Pt is a 67y , Male, HEALTH ISSUES - PROBLEM Dx:  CAD (coronary artery disease): CAD (coronary artery disease)  Valvular disease: Valvular disease      , FAMILY HISTORY:  PAST MEDICAL & SURGICAL HISTORY:  No pertinent past medical history  No significant past surgical history    Patient is a 67y old  Male who presents with a chief complaint of AS (19 Jun 2018 00:06)      14 system review was unremarkable  acute changes include acute respiratory failure  Vital signs, hemodynamic and respiratory parameters were reviewed from the bedside nursing flowsheet.  ICU Vital Signs Last 24 Hrs  T(C): 35.5 (25 Jun 2018 10:00), Max: 35.6 (24 Jun 2018 23:52)  T(F): 95.9 (25 Jun 2018 10:00), Max: 96.1 (24 Jun 2018 23:52)  HR: 89 (25 Jun 2018 12:08) (88 - 89)  BP: 118/62 (24 Jun 2018 23:52) (118/62 - 118/62)  BP(mean): --  ABP: 116/70 (25 Jun 2018 12:00) (96/56 - 150/94)  ABP(mean): 84 (25 Jun 2018 12:00) (66 - 112)  RR: 19 (25 Jun 2018 12:00) (17 - 22)  SpO2: 100% (25 Jun 2018 12:08) (96% - 100%)    Adult Advanced Hemodynamics Last 24 Hrs  CVP(mm Hg): 29 (25 Jun 2018 12:00) (11 - 31)  CVP(cm H2O): --  CO: 3.6 (25 Jun 2018 12:00) (2 - 3.6)  CI: 2.1 (25 Jun 2018 12:00) (1.2 - 2.1)  PA: 49/27 (25 Jun 2018 12:00) (31/15 - 56/33)  PA(mean): 36 (25 Jun 2018 12:00) (21 - 86)  PCWP: --  SVR: 1220 (25 Jun 2018 12:00) (1220 - 3036)  SVRI: 2092 (25 Jun 2018 12:00) (2092 - 5060)  PVR: --  PVRI: --, ABG - ( 25 Jun 2018 12:25 )  pH, Arterial: 7.47  pH, Blood: x     /  pCO2: 32    /  pO2: 216   / HCO3: 23    / Base Excess: -0.3  /  SaO2: 100               Mode: AC/ CMV (Assist Control/ Continuous Mandatory Ventilation)  RR (machine): 12  TV (machine): 350  FiO2: 100  PEEP: 5  ITime: 1.4  MAP: 14  PIP: 26    Intake and output was reviewed and the fluid balance was calculated  Daily Height in cm: 165.1 (24 Jun 2018 23:52)    Daily   I&O's Summary    24 Jun 2018 07:01  -  25 Jun 2018 07:00  --------------------------------------------------------  IN: 5073.5 mL / OUT: 6155 mL / NET: -1081.5 mL    25 Jun 2018 07:01  -  25 Jun 2018 12:47  --------------------------------------------------------  IN: 929.8 mL / OUT: 1525 mL / NET: -595.2 mL        All lines and drain sites were assessed  Glycemic trend was reviewedCAPILLARY BLOOD GLUCOSE      POCT Blood Glucose.: 92 mg/dL (25 Jun 2018 05:18)    No acute change in mental status  Auscultation of the chest reveals equal bs  Abdomen is soft  Extremities are warm and well perfused  Wounds appear clean and unremarkable  Antibiotics are periop    labs  CBC Full  -  ( 25 Jun 2018 12:10 )  WBC Count : 7.9 K/uL  Hemoglobin : 11.0 g/dL  Hematocrit : 31.1 %  Platelet Count - Automated : 70 K/uL  Mean Cell Volume : 83.4 fL  Mean Cell Hemoglobin : 29.5 pg  Mean Cell Hemoglobin Concentration : 35.4 g/dL  Auto Neutrophil # : x  Auto Lymphocyte # : x  Auto Monocyte # : x  Auto Eosinophil # : x  Auto Basophil # : x  Auto Neutrophil % : x  Auto Lymphocyte % : x  Auto Monocyte % : x  Auto Eosinophil % : x  Auto Basophil % : x    06-25    131<L>  |  92<L>  |  14  ----------------------------<  145<H>  3.6   |  32<H>  |  0.75    Ca    8.2<L>      25 Jun 2018 08:49  Phos  2.9     06-25  Mg     2.0     06-25    TPro  4.0<L>  /  Alb  2.4<L>  /  TBili  7.2<H>  /  DBili  x   /  AST  43<H>  /  ALT  17  /  AlkPhos  52  06-25    PT/INR - ( 25 Jun 2018 12:10 )   PT: 13.4 sec;   INR: 1.20          PTT - ( 25 Jun 2018 12:10 )  PTT:62.0 sec  The current medications were reviewed   MEDICATIONS  (STANDING):  artificial  tears Solution 1 Drop(s) Both EYES two times a day  aztreonam  IVPB 1000 milliGRAM(s) IV Intermittent every 8 hours  chlorhexidine 0.12% Liquid 5 milliLiter(s) Swish and Spit every 4 hours  cisatracurium Infusion 3 MICROgram(s)/kG/Min (10.548 mL/Hr) IV Continuous <Continuous>  dexmedetomidine Infusion 0.3 MICROgram(s)/kG/Hr (4.395 mL/Hr) IV Continuous <Continuous>  dextrose 50% Injectable 50 milliLiter(s) IV Push every 15 minutes  dextrose 50% Injectable 25 milliLiter(s) IV Push every 15 minutes  DOBUTamine Infusion 5 MICROgram(s)/kG/Min (8.79 mL/Hr) IV Continuous <Continuous>  Epinephrine 8 milliGRAM(s),D5W 250 milliLiter(s) 8 milliGRAM(s) (6.75 mL/Hr) IV Continuous <Continuous>  epoprostenol Infusion 2 NANOGram(s)/kG/Min (1.406 mL/Hr) IV Continuous <Continuous>  fentaNYL    Injectable 100 MICROGram(s) IV Push once  fentaNYL   Infusion. 0.5 MICROgram(s)/kG/Hr (2.93 mL/Hr) IV Continuous <Continuous>  fluconAZOLE IVPB 400 milliGRAM(s) IV Intermittent every 24 hours  furosemide Infusion 5 mG/Hr (2.5 mL/Hr) IV Continuous <Continuous>  heparin  Infusion 300 Unit(s)/Hr (3 mL/Hr) IV Continuous <Continuous>  Heparin 23166 Unit(s),Dextrose 5% 1000 milliLiter(s) 46397 Unit(s) (25 mL/Hr) IV Continuous <Continuous>  insulin Infusion 5 Unit(s)/Hr (5 mL/Hr) IV Continuous <Continuous>  metroNIDAZOLE  IVPB 500 milliGRAM(s) IV Intermittent every 8 hours  milrinone Infusion 0.5 MICROgram(s)/kG/Min (8.79 mL/Hr) IV Continuous <Continuous>  nitroglycerin  Infusion 5 MICROgram(s)/Min (1.5 mL/Hr) IV Continuous <Continuous>  norepinephrine Infusion 0.05 MICROgram(s)/kG/Min (5.494 mL/Hr) IV Continuous <Continuous>  phenylephrine    Infusion 0.5 MICROgram(s)/kG/Min (5.494 mL/Hr) IV Continuous <Continuous>  potassium chloride  20 mEq/100 mL IVPB 20 milliEquivalent(s) IV Intermittent every 1 hour  propofol Infusion 5 MICROgram(s)/kG/Min (1.758 mL/Hr) IV Continuous <Continuous>  propofol Injectable 50 milliGRAM(s) IV Push once  sodium chloride 0.9%. 1000 milliLiter(s) (10 mL/Hr) IV Continuous <Continuous>  vancomycin  IVPB 750 milliGRAM(s) IV Intermittent every 12 hours  vasopressin Infusion 0.03 Unit(s)/Min (1.8 mL/Hr) IV Continuous <Continuous>    MEDICATIONS  (PRN):       PROBLEM LIST/ ASSESSMENT:  HEALTH ISSUES - PROBLEM Dx:  CAD (coronary artery disease): CAD (coronary artery disease)  Valvular disease: Valvular disease      ,   Patient is a 67y old  Male who presents with a chief complaint of AS (19 Jun 2018 00:06)     s/p cardiac surgery      My plan includes :  close hemodynamic, ventilatory and drain monitoring and management per post op routine  rise in phtn and drop in bp   Monitor for arrhythmias and monitor parameters for organ perfusion  monitor neurologic status  Head of the bed should remain elevated to 45 deg .   chest PT and IS will be encouraged  monitor adequacy of oxygenation and ventilation and attempt to wean oxygen  Nutritional goals will be met using po eventually , ensure adequate caloric intake and montior the same  Stress ulcer and VTE prophylaxis will be achieved    Glycemic control is satisfactory  Electrolytes have been repleted as necessary and wound care has been carried out. Pain control has been achieved.   agressive physical therapy and early mobility and ambulation goals will be met   The family was updated about the course and plan  CRITICAL CARE TIME SPENT in evaluation and management, reassessments, review and interpretation of labs and x-rays, ventilator and hemodynamic management, formulating a plan and coordinating care: ___90____ MIN.  Time does not include procedural time.  CTICU ATTENDING     					    Clement hAn MD

## 2018-06-25 NOTE — CONSULT NOTE ADULT - ASSESSMENT
67 M with h/o congenital AV dz (repair/ replacement at 13y/o), found to have severe AS/MR/TR/ pulm HTN and 2vCAD.  6/21: s/p Reop AVR/ascending aortic replacement/ MV and TV repair; CABG x 2 with Impella & IABP placement.  EF 15%.  6/22: chest opened at bedside 2nd to tamponade. 6/23: repeat thoracic cavity washout with removal of clot (bedside).  IABP was removed. Pt on multiple pressors.  Has AV temporary epicardial pacing set at 80 bpm postop.  Underlying rhythm is likely sinus with heart block and ventricular bigeminy.  This morning, while preparing the pt to go to the OR for chest wall closure, the patient suddenly became hypotensive.  Bedside chest wall opened without evidence of tamponade.  During this hypotensive episode he remained pacing via temp epicardial wires.   EPS is called to comment on his rhythm condition and to evaluate for low EF.     Tele with frequent PVC so we increased pacing rate to 95 bpm and extended the AVD out. Still intubate 67 M with h/o congenital AV dz (repair/ replacement at 11y/o), found to have severe AS/MR/TR/ pulm HTN and 2vCAD.  6/21: s/p Reop AVR/ascending aortic replacement/ MV and TV repair; CABG x 2 with Impella & IABP placement.  EF 15%.  6/22: chest opened at bedside 2nd to tamponade. 6/23: repeat thoracic cavity washout with removal of clot (bedside).  IABP was removed. Pt on multiple pressors.  Has AV temporary epicardial pacing set at 80 bpm postop.  Underlying rhythm is likely sinus with heart block and ventricular bigeminy.  This morning, while preparing the pt to go to the OR for chest wall closure, the patient suddenly became hypotensive.  Bedside chest wall opened without evidence of tamponade.  During this hypotensive episode he remained pacing via temp epicardial wires.   - Episode this morning not related to rhythm issue as he was still having AV pacing and no VT / VF noted. He does have PVCs which have seemed to "quiet down" after PPM pacing rate was increased from 80 bpm to 95 bpm and AV delay was extended out.  No EP procedural intervention at this time as he is still in critical stage postop.  Once he is more stable and extubated, we can discuss permanent biventricular pacing option +/- defibrillator given LBBB and low EF.

## 2018-06-25 NOTE — PROGRESS NOTE ADULT - SUBJECTIVE AND OBJECTIVE BOX
CTICU  CRITICAL  CARE  attending     Hand off received 					   Pertinent clinical, laboratory, radiographic, hemodynamic, echocardiographic, respiratory data, microbiologic data and chart were reviewed and analyzed frequently throughout the course of the day and night  Patient seen and examined with CTS/ SH attending at bedside        Patient is a 67 year old male with history of AV (Congenital.) disease s/p AV repair / replacement at the age of 12 in [facility]. He is / was  being evaluated for  abdominal hernia repair surgery. He required medical clearance  On evaluation with  TTE / Cardiac Catheterization (Completed 06/2018.) EF = 25%, reduced LV function, severe AS, severe MR, severe TR, severe pHTN, and 2V CAD. Patient transferred for [facility] under the care of Dr. oPtter for further work up and MGMT. After speaking with patient he does state a decreased in ET and increase in SOB that has been progressing over the last 6-8 month. Details hard to obtain. Patient poor historian.         HEALTH ISSUES - PROBLEM Dx:  CAD (coronary artery disease): CAD (coronary artery disease)  Valvular disease: Valvular disease      FAMILY HISTORY:  PAST MEDICAL & SURGICAL HISTORY:  No pertinent past medical history  No significant past surgical history    Patient is a 67y old  Male who presents with a chief complaint of AS (19 Jun 2018 00:06)      14 system review was unremarkable  acute changes include acute respiratory failure  Vital signs, hemodynamic and respiratory parameters were reviewed from the bedside nursing flow sheet.  ICU Vital Signs Last 24 Hrs  T(C): 35.2 (25 Jun 2018 21:00), Max: 35.6 (24 Jun 2018 23:52)  T(F): 95.4 (25 Jun 2018 21:00), Max: 96.1 (24 Jun 2018 23:52)  HR: 98 (25 Jun 2018 21:32) (88 - 98)  BP: 118/62 (24 Jun 2018 23:52) (118/62 - 118/62)  BP(mean): --  ABP: 96/60 (25 Jun 2018 21:00) (96/56 - 150/94)  ABP(mean): 70 (25 Jun 2018 21:00) (66 - 112)  RR: 16 (25 Jun 2018 22:00) (13 - 22)  SpO2: 97% (25 Jun 2018 21:32) (96% - 100%)    Adult Advanced Hemodynamics Last 24 Hrs  CVP(mm Hg): 23 (25 Jun 2018 21:00) (11 - 31)  CVP(cm H2O): --  CO: 3.4 (25 Jun 2018 21:00) (2.1 - 3.6)  CI: 2 (25 Jun 2018 21:00) (1.3 - 2.1)  PA: 43/28 (25 Jun 2018 21:00) (30/27 - 56/33)  PA(mean): 34 (25 Jun 2018 21:00) (21 - 43)  PCWP: --  SVR: 1104 (25 Jun 2018 21:00) (1104 - 2267)  SVRI: 1877 (25 Jun 2018 21:00) (0867 - 8891)  PVR: --  PVRI: --, ABG - ( 25 Jun 2018 21:27 )  pH, Arterial: 7.43  pH, Blood: x     /  pCO2: 35    /  pO2: 195   / HCO3: 23    / Base Excess: -1.2  /  SaO2: 99                Mode: AC/ CMV (Assist Control/ Continuous Mandatory Ventilation)  RR (machine): 12  TV (machine): 400  FiO2: 40  PEEP: 5  ITime: 1.4  MAP: 13  PIP: 26    Intake and output was reviewed and the fluid balance was calculated  Daily Height in cm: 165.1 (24 Jun 2018 23:52)    Daily   I&O's Summary    24 Jun 2018 07:01  -  25 Jun 2018 07:00  --------------------------------------------------------  IN: 5073.5 mL / OUT: 6155 mL / NET: -1081.5 mL    25 Jun 2018 07:01  -  25 Jun 2018 22:25  --------------------------------------------------------  IN: 2751.8 mL / OUT: 3535 mL / NET: -783.2 mL        All lines and drain sites were assessed  Glycemic trend was reviewed CAPILLARY BLOOD GLUCOSE      POCT Blood Glucose.: 109 mg/dL (25 Jun 2018 22:15)        NEURO; No acute change in mental status. PUPILS 2-3 mm (reactive). + ICTERUS.  CHEST: STERNUM is open. No significant accumulation in the pericardial well.  CVS: S1, S2, No  S3.  RESPIRATORY: Auscultation of the chest reveals equal breath sounds.  Abdomen is soft. No tenderness. + Bowel sounds.  Extremities are warm and well perfused. + lower  extremity edema.  Wounds appear clean and unremarkable.  VASCULAR: + Distal pulses.  Antibiotics are perioperative aztreonam, vanco, diflucan  and metronidazole.         labs  CBC Full  -  ( 25 Jun 2018 21:18 )  WBC Count : 7.7 K/uL  Hemoglobin : 10.3 g/dL  Hematocrit : 29.3 %  Platelet Count - Automated : 54 K/uL  Mean Cell Volume : 85.2 fL  Mean Cell Hemoglobin : 29.9 pg  Mean Cell Hemoglobin Concentration : 35.2 g/dL  Auto Neutrophil # : x  Auto Lymphocyte # : x  Auto Monocyte # : x  Auto Eosinophil # : x  Auto Basophil # : x  Auto Neutrophil % : x  Auto Lymphocyte % : x  Auto Monocyte % : x  Auto Eosinophil % : x  Auto Basophil % : x    06-25    126<L>  |  91<L>  |  15  ----------------------------<  117<H>  3.4<L>   |  24  |  0.78    Ca    8.3<L>      25 Jun 2018 21:18  Phos  3.0     06-25  Mg     2.0     06-25    TPro  4.1<L>  /  Alb  2.6<L>  /  TBili  8.4<H>  /  DBili  x   /  AST  39  /  ALT  15  /  AlkPhos  46  06-25    PT/INR - ( 25 Jun 2018 21:18 )   PT: 14.7 sec;   INR: 1.32          PTT - ( 25 Jun 2018 21:18 )  PTT:73.5 sec  The current medications were reviewed   MEDICATIONS  (STANDING):  artificial  tears Solution 1 Drop(s) Both EYES two times a day  aztreonam  IVPB 1000 milliGRAM(s) IV Intermittent every 8 hours  chlorhexidine 0.12% Liquid 5 milliLiter(s) Swish and Spit every 4 hours  cisatracurium Infusion 3 MICROgram(s)/kG/Min (10.548 mL/Hr) IV Continuous <Continuous>  dexmedetomidine Infusion 0.3 MICROgram(s)/kG/Hr (4.395 mL/Hr) IV Continuous <Continuous>  dextrose 50% Injectable 50 milliLiter(s) IV Push every 15 minutes  dextrose 50% Injectable 25 milliLiter(s) IV Push every 15 minutes  DOBUTamine Infusion 5 MICROgram(s)/kG/Min (8.79 mL/Hr) IV Continuous <Continuous>  EPINEPHrine     Infusion 0.03 MICROgram(s)/kG/Min (6.593 mL/Hr) IV Continuous <Continuous>  Epinephrine 8 milliGRAM(s),D5W 250 milliLiter(s) 8 milliGRAM(s) (6.75 mL/Hr) IV Continuous <Continuous>  epoprostenol Infusion 2 NANOGram(s)/kG/Min (1.406 mL/Hr) IV Continuous <Continuous>  fentaNYL    Injectable 100 MICROGram(s) IV Push once  fentaNYL   Infusion. 0.5 MICROgram(s)/kG/Hr (2.93 mL/Hr) IV Continuous <Continuous>  fluconAZOLE IVPB 400 milliGRAM(s) IV Intermittent every 24 hours  furosemide Infusion 5 mG/Hr (2.5 mL/Hr) IV Continuous <Continuous>  heparin  Infusion 300 Unit(s)/Hr (3 mL/Hr) IV Continuous <Continuous>  Heparin 20497 Unit(s),Dextrose 5% 1000 milliLiter(s) 19463 Unit(s) (25 mL/Hr) IV Continuous <Continuous>  insulin Infusion 5 Unit(s)/Hr (5 mL/Hr) IV Continuous <Continuous>  metroNIDAZOLE  IVPB 500 milliGRAM(s) IV Intermittent every 8 hours  milrinone Infusion 0.5 MICROgram(s)/kG/Min (8.79 mL/Hr) IV Continuous <Continuous>  nitroglycerin  Infusion 5 MICROgram(s)/Min (1.5 mL/Hr) IV Continuous <Continuous>  norepinephrine Infusion 0.05 MICROgram(s)/kG/Min (5.494 mL/Hr) IV Continuous <Continuous>  phenylephrine    Infusion 0.5 MICROgram(s)/kG/Min (5.494 mL/Hr) IV Continuous <Continuous>  potassium chloride  20 mEq/100 mL IVPB 20 milliEquivalent(s) IV Intermittent every 1 hour  potassium chloride  20 mEq/100 mL IVPB 20 milliEquivalent(s) IV Intermittent every 2 hours  propofol Infusion 5 MICROgram(s)/kG/Min (1.758 mL/Hr) IV Continuous <Continuous>  propofol Injectable 50 milliGRAM(s) IV Push once  sodium chloride 0.9%. 1000 milliLiter(s) (10 mL/Hr) IV Continuous <Continuous>  vasopressin Infusion 0.03 Unit(s)/Min (1.8 mL/Hr) IV Continuous <Continuous>    MEDICATIONS  (PRN):  potassium chloride  20 mEq/100 mL IVPB 20 milliEquivalent(s) IV Intermittent every 1 hour PRN K<4.0       PROBLEM LIST/ ASSESSMENT:  HEALTH ISSUES - PROBLEM Dx:  CAD (coronary artery disease): CAD (coronary artery disease)  Valvular disease: Valvular disease        66yo male with Hx congenital AV dz -  repair/replacement (11yo - [facility]) being evaluated for hernia repair.  Preop risk assessment performed  ECHO/Cath (Completed 06/2018.) EF 25%, severe AS, severe MR, severe TR, severe pHTN, and 2V CAD.   Patient with sxs progressive FLORENCE and dec exercise tolerance for several months    transferred to [facility] for REPLACEMENT OF ASCENDING AORTA, MITRAL AND TRICUSPID REPAIR.  To OR 6/21 - 12 UpRBC/8 FFP/4 platelet/FEIBA and Mary 7 given  6/22 - washout and chest closure performed  later in day/evening on 6/22 - inc pressor requirements; inc CVP; drop in UO --> tamponade  chest opened at bedside (Dr Benedict) with improvement - reportedly approx 1L blood noted right chest  repeat wash out performed 6/23 with removal of clot per d/w Dr Benedict - pseudo tamponade  IABP and sheath removed - required 3 U pRBC and platelets given 6/23      drips concentrated - and titrating down, obligate hourly input (not including Abx) - 95.7cc    + Jaundice secondary to multiple transfusions.   DIURESING well.  Hypokalemic and hyponatremic metabolic alkalosis.  Impella titrated down and now at p5;   Stable hemodynamics  (AV paced).   Hemodynamically stable.  Good oxygenation.   Diuresing well.          My plan includes :  Close hemodynamic, ventilatory and drain monitoring and management.  Continue full vent support and nitric oxide.  Broad spectrum IV antibiotics.  attempt chest closure in AM.  Monitor for arrhythmias and monitor parameters for organ perfusion  Monitor neurologic status  Head of the bed should remain elevated to 45 deg .   Chest PT and IS will be encouraged  Monitor adequacy of oxygenation and ventilation and attempt to wean oxygen  Nutritional goals will be met using po eventually , ensure adequate caloric intake and monitor the same  Stress ulcer and VTE prophylaxis will be achieved    Glycemic control is satisfactory  Electrolytes have been repleted as necessary and wound care has been carried out. Pain control has been achieved.   Aggressive physical therapy and early mobility and ambulation goals will be met   The family was updated about the course and plan  CRITICAL CARE TIME SPENT in evaluation and management, reassessments, review and interpretation of labs and x-rays, ventilator and hemodynamic management, formulating a plan and coordinating care: ___30____ MIN.  Time does not include procedural time.  CTICU ATTENDING     					    Jaylon Rahman MD

## 2018-06-25 NOTE — CONSULT NOTE ADULT - SUBJECTIVE AND OBJECTIVE BOX
HISTORY OF PRESENT ILLNESS:   67 M with h/o congenital AV dz (repair/ replacement at 13y/o), found to have severe AS/MR/TR/ pulm HTN and 2vCAD.  6/21: s/p Reop AVR/ascending aortic replacement/ MV and TV repair; CABG x 2 with Impella & IABP placement.  EF 15%.  6/22: chest opened at bedside 2nd to tamponade. 6/23: repeat thoracic cavity washout with removal of clot (bedside).  IABP was removed. Pt on multiple pressors.  Has AV temporary epicardial pacing set at 80 bpm postop.  Underlying rhythm is likely sinus with heart block and ventricular bigeminy.  This morning, while preparing the pt to go to the OR for chest wall closure, the patient suddenly became hypotensive.  Bedside chest wall opened without evidence of tamponade.  During this hypotensive episode he remained pacing via temp epicardial wires.   EPS is called to comment on his rhythm condition and to   Tele with frequent PVC so we increased pacing rate to 95 bpm and extended the AVD out. Still intubate    PMH / PSH:  See HPI.  Appendectomy > 50 year ago.     Home Rx.:  Lasix 40mg PO QD    FH:  N/A    SH:  Lives with family. Not working. Non-smoker. Occasional ETOH use. No IVRDU.    Allergies:  PCN. (19 Jun 2018 00:06)          PAST MEDICAL AND SURGICAL HISTORY:  PAST MEDICAL & SURGICAL HISTORY:  No pertinent past medical history  No significant past surgical history      FAMILY HISTORY: FAMILY HISTORY:      SOCIAL HISTORY:   Denies ETOH  Denies TOB  Denies illicit drugs    REVIEW OF SYSTEM: REVIEW OF SYSTEMS:    CONSTITUTIONAL: No weakness, fevers or chills  EYES/ENT: No visual changes;  No vertigo or throat pain   NECK: No pain or stiffness  RESPIRATORY: No cough, wheezing, hemoptysis; No shortness of breath  CARDIOVASCULAR: No chest pain or palpitations  GASTROINTESTINAL: No abdominal or epigastric pain. No nausea, vomiting, or hematemesis; No diarrhea or constipation. No melena or hematochezia.  GENITOURINARY: No dysuria, frequency or hematuria  NEUROLOGICAL: No numbness or weakness  SKIN: No itching, burning, rashes, or lesions   All other review of systems is negative unless indicated above.    ALLERGY:  penicillin (Rash)      INPATIENT MEDICATIONS:  artificial  tears Solution 1 Drop(s) Both EYES two times a day  aztreonam  IVPB 1000 milliGRAM(s) IV Intermittent every 8 hours  chlorhexidine 0.12% Liquid 5 milliLiter(s) Swish and Spit every 4 hours  cisatracurium Infusion 3 MICROgram(s)/kG/Min IV Continuous <Continuous>  dexmedetomidine Infusion 0.3 MICROgram(s)/kG/Hr IV Continuous <Continuous>  dextrose 50% Injectable 50 milliLiter(s) IV Push every 15 minutes  dextrose 50% Injectable 25 milliLiter(s) IV Push every 15 minutes  DOBUTamine Infusion 5 MICROgram(s)/kG/Min IV Continuous <Continuous>  EPINEPHrine     Infusion 0.03 MICROgram(s)/kG/Min IV Continuous <Continuous>  Epinephrine 8 milliGRAM(s),D5W 250 milliLiter(s) 8 milliGRAM(s) IV Continuous <Continuous>  epoprostenol Infusion 2 NANOGram(s)/kG/Min IV Continuous <Continuous>  fentaNYL    Injectable 100 MICROGram(s) IV Push once  fentaNYL   Infusion. 0.5 MICROgram(s)/kG/Hr IV Continuous <Continuous>  fluconAZOLE IVPB 400 milliGRAM(s) IV Intermittent every 24 hours  furosemide Infusion 5 mG/Hr IV Continuous <Continuous>  heparin  Infusion 300 Unit(s)/Hr IV Continuous <Continuous>  Heparin 70461 Unit(s),Dextrose 5% 1000 milliLiter(s) 56932 Unit(s) IV Continuous <Continuous>  insulin Infusion 5 Unit(s)/Hr IV Continuous <Continuous>  metroNIDAZOLE  IVPB 500 milliGRAM(s) IV Intermittent every 8 hours  milrinone Infusion 0.5 MICROgram(s)/kG/Min IV Continuous <Continuous>  nitroglycerin  Infusion 5 MICROgram(s)/Min IV Continuous <Continuous>  norepinephrine Infusion 0.05 MICROgram(s)/kG/Min IV Continuous <Continuous>  phenylephrine    Infusion 0.5 MICROgram(s)/kG/Min IV Continuous <Continuous>  potassium chloride  20 mEq/100 mL IVPB 20 milliEquivalent(s) IV Intermittent every 1 hour  propofol Infusion 5 MICROgram(s)/kG/Min IV Continuous <Continuous>  propofol Injectable 50 milliGRAM(s) IV Push once  sodium chloride 0.9%. 1000 milliLiter(s) IV Continuous <Continuous>  vancomycin  IVPB 750 milliGRAM(s) IV Intermittent every 12 hours  vasopressin Infusion 0.03 Unit(s)/Min IV Continuous <Continuous>      VITAL SIGNS:   T(C): 35.3 (06-25-18 @ 13:00), Max: 35.6 (06-24-18 @ 23:52)  HR: 98 (06-25-18 @ 13:00) (88 - 98)  BP: 118/62 (06-24-18 @ 23:52) (118/62 - 118/62)  RR: 16 (06-25-18 @ 13:00) (16 - 22)  SpO2: 97% (06-25-18 @ 13:00) (96% - 100%)  Wt(kg): --    PHYSICAL EXAM:   Appearance:   CVS: S1/S2 normal,   Pulm:   Abd:  +BS, Soft, NT/ND	  Ext: No edema    LABS:                        11.0   7.9   )-----------( 70       ( 25 Jun 2018 12:10 )             31.1     06-25    127<L>  |  92<L>  |  14  ----------------------------<  171<H>  3.9   |  23  |  0.73    Ca    8.2<L>      25 Jun 2018 12:10  Phos  3.1     06-25  Mg     2.0     06-25    TPro  4.4<L>  /  Alb  3.0<L>  /  TBili  8.6<H>  /  DBili  x   /  AST  43<H>  /  ALT  17  /  AlkPhos  55  06-25    PT/INR - ( 25 Jun 2018 12:10 )   PT: 13.4 sec;   INR: 1.20          PTT - ( 25 Jun 2018 12:10 )  PTT:62.0 sec  TSH  Troponin   LIVER FUNCTIONS - ( 25 Jun 2018 12:10 )  Alb: 3.0 g/dL / Pro: 4.4 g/dL / ALK PHOS: 55 U/L / ALT: 17 U/L / AST: 43 U/L / GGT: x             EKG:    Telemetry:    ECHO: HISTORY OF PRESENT ILLNESS:   67 M with h/o congenital AV dz (repair/ replacement at 13y/o), found to have severe AS/MR/TR/ pulm HTN and 2vCAD when he went for clearance for hernia repair.  6/21: s/p Reop AVR/ascending aortic replacement/ MV and TV repair; CABG x 2 with Impella & IABP placement.  EF 15%.  6/22: chest opened at bedside 2nd to tamponade. 6/23: repeat thoracic cavity washout with removal of clot (bedside).  IABP was removed. Pt on multiple pressors.  Has AV temporary epicardial pacing set at 80 bpm postop.  Underlying rhythm is likely sinus with heart block and ventricular bigeminy.  This morning, while preparing the pt to go to the OR for chest wall closure, the patient suddenly became hypotensive.  Bedside chest wall opened without evidence of tamponade.  During this hypotensive episode he remained pacing via temp epicardial wires.   EPS is called to comment on his rhythm condition and to evaluate for low EF.         PAST MEDICAL AND SURGICAL HISTORY:  as stated in HPI   Hernia (awaiting repair)   Appendectomy > 50 year ago.     FAMILY HISTORY:  No cardiac disease in immediate family     SOCIAL HISTORY:   Lives with family. Not working.   Non-smoker.   Occasional ETOH use. No IVRDU.      REVIEW OF SYSTEMS: -- unable to obtain as pt is still intubated and sedated.     ALLERGY:  penicillin (Rash)      INPATIENT MEDICATIONS:  artificial  tears Solution 1 Drop(s) Both EYES two times a day  aztreonam  IVPB 1000 milliGRAM(s) IV Intermittent every 8 hours  chlorhexidine 0.12% Liquid 5 milliLiter(s) Swish and Spit every 4 hours  cisatracurium Infusion 3 MICROgram(s)/kG/Min IV Continuous <Continuous>  dexmedetomidine Infusion 0.3 MICROgram(s)/kG/Hr IV Continuous <Continuous>  DOBUTamine Infusion 5 MICROgram(s)/kG/Min IV Continuous <Continuous>  EPINEPHrine     Infusion 0.03 MICROgram(s)/kG/Min IV Continuous <Continuous>  Epinephrine 8 milliGRAM(s),D5W 250 milliLiter(s) 8 milliGRAM(s) IV Continuous <Continuous>  epoprostenol Infusion 2 NANOGram(s)/kG/Min IV Continuous <Continuous>  fentaNYL    Injectable 100 MICROGram(s) IV Push once  fentaNYL   Infusion. 0.5 MICROgram(s)/kG/Hr IV Continuous <Continuous>  fluconAZOLE IVPB 400 milliGRAM(s) IV Intermittent every 24 hours  furosemide Infusion 5 mG/Hr IV Continuous <Continuous>  heparin  Infusion 300 Unit(s)/Hr IV Continuous <Continuous>  Heparin 99794 Unit(s),Dextrose 5% 1000 milliLiter(s) 29535 Unit(s) IV Continuous <Continuous>  insulin Infusion 5 Unit(s)/Hr IV Continuous <Continuous>  metroNIDAZOLE  IVPB 500 milliGRAM(s) IV Intermittent every 8 hours  milrinone Infusion 0.5 MICROgram(s)/kG/Min IV Continuous <Continuous>  nitroglycerin  Infusion 5 MICROgram(s)/Min IV Continuous <Continuous>  norepinephrine Infusion 0.05 MICROgram(s)/kG/Min IV Continuous <Continuous>  phenylephrine    Infusion 0.5 MICROgram(s)/kG/Min IV Continuous <Continuous>  potassium chloride  20 mEq/100 mL IVPB 20 milliEquivalent(s) IV Intermittent every 1 hour  propofol Infusion 5 MICROgram(s)/kG/Min IV Continuous <Continuous>  propofol Injectable 50 milliGRAM(s) IV Push once  sodium chloride 0.9%. 1000 milliLiter(s) IV Continuous <Continuous>  vancomycin  IVPB 750 milliGRAM(s) IV Intermittent every 12 hours  vasopressin Infusion 0.03 Unit(s)/Min IV Continuous <Continuous>      VITAL SIGNS:   T(C): 35.3 (06-25-18 @ 13:00), Max: 35.6 (06-24-18 @ 23:52)  HR: 98 (06-25-18 @ 13:00) (88 - 98)  BP: 118/62 (06-24-18 @ 23:52) (118/62 - 118/62)  RR: 16 (06-25-18 @ 13:00) (16 - 22)  SpO2: 97% (06-25-18 @ 13:00) (96% - 100%)      PHYSICAL EXAM:   Appearance: sedated / intubated. Multiple lines  CVS: S1/S2 normal, pacing/ regular. Sternal wall open; covered with bandage. Epicardial pacing wires connected to external pacing system.  Pulm: CTA anteriorly. Chest tubes and mediastinal sohail drains  Abd:  +BS, Soft, ND.    Ext: Edema in all extremities.       LABS:                        11.0   7.9   )-----------( 70       ( 25 Jun 2018 12:10 )             31.1     06-25    127<L>  |  92<L>  |  14  ----------------------------<  171<H>  3.9   |  23  |  0.73    Ca    8.2<L>      25 Jun 2018 12:10  Phos  3.1     06-25  Mg     2.0     06-25    TPro  4.4<L>  /  Alb  3.0<L>  /  TBili  8.6<H>  /  DBili  x   /  AST  43<H>  /  ALT  17  /  AlkPhos  55  06-25    PT/INR - ( 25 Jun 2018 12:10 )   PT: 13.4 sec;   INR: 1.20          PTT - ( 25 Jun 2018 12:10 )  PTT:62.0 sec   LIVER FUNCTIONS - ( 25 Jun 2018 12:10 )  Alb: 3.0 g/dL / Pro: 4.4 g/dL / ALK PHOS: 55 U/L / ALT: 17 U/L / AST: 43 U/L / GGT: x             EKG:    Telemetry:    ECHO: (06.19.18 @ 11:52)   Left ventricular hypertrophy present. There is severe global hypokinesis of   the left ventricle. The left ventricular ejection fraction is severely   reduced. The left ventricular ejection fraction is estimated to be <15%. The right   ventricle is mildly dilated. The right ventricular systolic function is mildly   reduced. The left atrium is moderately dilated. The left atrial volume   index is 43 cc/m2 (normal <34cc/m2)The right atrium is severely dilated. The right   atrial volume index is 58 cc/m2 (normal range 21+/-6 for women, 25 +/- 7   for men)  Calcified aortic valve. There is mild aortic regurgitation. There is   Severe aortic stenosis. The peak pressure gradient is 72 mmHg. The mean   pressure gradient is 40 mmHg.The calculated aortic valve area using the continuity   equation is 0.8 cm2.Mitral annular calcification noted.Mildly calcified   mitral valve leaflets. There is moderate to severe mitral   regurgitation. Structurally normal tricuspid valve. There is moderate tricuspid regurgitation.  There is severe pulmonary hypertension. The pulmonary artery systolic pressure is   estimated to be 72 mmHg. Structurally normal pulmonic valve. There is mild   pulmonic regurgitation .There is no pericardial effusion.Bilateral pleural   effusion noted. Ascites noted. HISTORY OF PRESENT ILLNESS:   67 M with h/o congenital AV dz (repair/ replacement at 13y/o), found to have severe AS/MR/TR/ pulm HTN and 2vCAD when he went for clearance for hernia repair.  : s/p Reop AVR/ascending aortic replacement/ MV and TV repair; CABG x 2 with Impella & IABP placement.  EF 15%.  : chest opened at bedside 2nd to tamponade. : repeat thoracic cavity washout with removal of clot (bedside).  IABP was removed. Pt on multiple pressors.  Has AV temporary epicardial pacing set at 80 bpm postop.  Underlying rhythm is likely sinus with heart block and ventricular bigeminy.  This morning, while preparing the pt to go to the OR for chest wall closure, the patient suddenly became hypotensive.  Bedside chest wall opened without evidence of tamponade.  During this hypotensive episode he remained pacing via temp epicardial wires.   EPS is called to comment on his rhythm condition and to evaluate for low EF.         PAST MEDICAL AND SURGICAL HISTORY:  as stated in HPI   Hernia (awaiting repair)   Appendectomy > 50 year ago.     FAMILY HISTORY:  No cardiac disease in immediate family     SOCIAL HISTORY:   Lives with family. Not working.   Non-smoker.   Occasional ETOH use. No IVRDU.      REVIEW OF SYSTEMS: -- unable to obtain as pt is still intubated and sedated.     ALLERGY:  penicillin (Rash)      INPATIENT MEDICATIONS:  artificial  tears Solution 1 Drop(s) Both EYES two times a day  aztreonam  IVPB 1000 milliGRAM(s) IV Intermittent every 8 hours  chlorhexidine 0.12% Liquid 5 milliLiter(s) Swish and Spit every 4 hours  cisatracurium Infusion 3 MICROgram(s)/kG/Min IV Continuous <Continuous>  dexmedetomidine Infusion 0.3 MICROgram(s)/kG/Hr IV Continuous <Continuous>  DOBUTamine Infusion 5 MICROgram(s)/kG/Min IV Continuous <Continuous>  EPINEPHrine     Infusion 0.03 MICROgram(s)/kG/Min IV Continuous <Continuous>  Epinephrine 8 milliGRAM(s),D5W 250 milliLiter(s) 8 milliGRAM(s) IV Continuous <Continuous>  epoprostenol Infusion 2 NANOGram(s)/kG/Min IV Continuous <Continuous>  fentaNYL    Injectable 100 MICROGram(s) IV Push once  fentaNYL   Infusion. 0.5 MICROgram(s)/kG/Hr IV Continuous <Continuous>  fluconAZOLE IVPB 400 milliGRAM(s) IV Intermittent every 24 hours  furosemide Infusion 5 mG/Hr IV Continuous <Continuous>  heparin  Infusion 300 Unit(s)/Hr IV Continuous <Continuous>  Heparin 24754 Unit(s),Dextrose 5% 1000 milliLiter(s) 76380 Unit(s) IV Continuous <Continuous>  insulin Infusion 5 Unit(s)/Hr IV Continuous <Continuous>  metroNIDAZOLE  IVPB 500 milliGRAM(s) IV Intermittent every 8 hours  milrinone Infusion 0.5 MICROgram(s)/kG/Min IV Continuous <Continuous>  nitroglycerin  Infusion 5 MICROgram(s)/Min IV Continuous <Continuous>  norepinephrine Infusion 0.05 MICROgram(s)/kG/Min IV Continuous <Continuous>  phenylephrine    Infusion 0.5 MICROgram(s)/kG/Min IV Continuous <Continuous>  potassium chloride  20 mEq/100 mL IVPB 20 milliEquivalent(s) IV Intermittent every 1 hour  propofol Infusion 5 MICROgram(s)/kG/Min IV Continuous <Continuous>  propofol Injectable 50 milliGRAM(s) IV Push once  sodium chloride 0.9%. 1000 milliLiter(s) IV Continuous <Continuous>  vancomycin  IVPB 750 milliGRAM(s) IV Intermittent every 12 hours  vasopressin Infusion 0.03 Unit(s)/Min IV Continuous <Continuous>      VITAL SIGNS:   T(C): 35.3 (18 @ 13:00), Max: 35.6 (18 @ 23:52)  HR: 98 (18 @ 13:00) (88 - 98)  BP: 118/62 (18 @ 23:52) (118/62 - 118/62)  RR: 16 (18 @ 13:00) (16 - 22)  SpO2: 97% (18 @ 13:00) (96% - 100%)      PHYSICAL EXAM:   Appearance: sedated / intubated. Multiple lines  CVS: S1/S2 normal, pacing/ regular. Sternal wall open; covered with bandage. Epicardial pacing wires connected to external pacing system.  Pulm: CTA anteriorly. Chest tubes and mediastinal sohail drains  Abd:  +BS, Soft, ND.    Ext: Edema in all extremities.       LABS:                        11.0   7.9   )-----------( 70       ( 2018 12:10 )             31.1     06-25    127<L>  |  92<L>  |  14  ----------------------------<  171<H>  3.9   |  23  |  0.73    Ca    8.2<L>      2018 12:10  Phos  3.1       Mg     2.0         TPro  4.4<L>  /  Alb  3.0<L>  /  TBili  8.6<H>  /  DBili  x   /  AST  43<H>  /  ALT  17  /  AlkPhos  55      PT/INR - ( 2018 12:10 )   PT: 13.4 sec;   INR: 1.20          PTT - ( 2018 12:10 )  PTT:62.0 sec   LIVER FUNCTIONS - ( 2018 12:10 )  Alb: 3.0 g/dL / Pro: 4.4 g/dL / ALK PHOS: 55 U/L / ALT: 17 U/L / AST: 43 U/L / GGT: x             EK18: NSR 93 bpm. LBBB  ms.     ECHO: (18 @ 11:52)   Left ventricular hypertrophy present. There is severe global hypokinesis of   the left ventricle. The left ventricular ejection fraction is severely   reduced. The left ventricular ejection fraction is estimated to be <15%. The right   ventricle is mildly dilated. The right ventricular systolic function is mildly   reduced. The left atrium is moderately dilated. The left atrial volume   index is 43 cc/m2 (normal <34cc/m2)The right atrium is severely dilated. The right   atrial volume index is 58 cc/m2 (normal range 21+/-6 for women, 25 +/- 7   for men)  Calcified aortic valve. There is mild aortic regurgitation. There is   Severe aortic stenosis. The peak pressure gradient is 72 mmHg. The mean   pressure gradient is 40 mmHg.The calculated aortic valve area using the continuity   equation is 0.8 cm2.Mitral annular calcification noted.Mildly calcified   mitral valve leaflets. There is moderate to severe mitral   regurgitation. Structurally normal tricuspid valve. There is moderate tricuspid regurgitation.  There is severe pulmonary hypertension. The pulmonary artery systolic pressure is   estimated to be 72 mmHg. Structurally normal pulmonic valve. There is mild   pulmonic regurgitation .There is no pericardial effusion.Bilateral pleural   effusion noted. Ascites noted.

## 2018-06-26 ENCOUNTER — APPOINTMENT (OUTPATIENT)
Dept: CARDIOTHORACIC SURGERY | Facility: HOSPITAL | Age: 68
End: 2018-06-26

## 2018-06-26 LAB
ALBUMIN SERPL ELPH-MCNC: 2.1 G/DL — LOW (ref 3.3–5)
ALBUMIN SERPL ELPH-MCNC: 2.3 G/DL — LOW (ref 3.3–5)
ALBUMIN SERPL ELPH-MCNC: 2.5 G/DL — LOW (ref 3.3–5)
ALBUMIN SERPL ELPH-MCNC: 3 G/DL — LOW (ref 3.3–5)
ALBUMIN SERPL ELPH-MCNC: 3 G/DL — LOW (ref 3.3–5)
ALP SERPL-CCNC: 27 U/L — LOW (ref 40–120)
ALP SERPL-CCNC: 35 U/L — LOW (ref 40–120)
ALP SERPL-CCNC: 45 U/L — SIGNIFICANT CHANGE UP (ref 40–120)
ALP SERPL-CCNC: 45 U/L — SIGNIFICANT CHANGE UP (ref 40–120)
ALP SERPL-CCNC: 47 U/L — SIGNIFICANT CHANGE UP (ref 40–120)
ALT FLD-CCNC: 10 U/L — SIGNIFICANT CHANGE UP (ref 10–45)
ALT FLD-CCNC: 11 U/L — SIGNIFICANT CHANGE UP (ref 10–45)
ALT FLD-CCNC: 14 U/L — SIGNIFICANT CHANGE UP (ref 10–45)
ALT FLD-CCNC: 14 U/L — SIGNIFICANT CHANGE UP (ref 10–45)
ALT FLD-CCNC: 15 U/L — SIGNIFICANT CHANGE UP (ref 10–45)
AMYLASE P1 CFR SERPL: 76 U/L — SIGNIFICANT CHANGE UP (ref 25–125)
ANION GAP SERPL CALC-SCNC: 12 MMOL/L — SIGNIFICANT CHANGE UP (ref 5–17)
ANION GAP SERPL CALC-SCNC: 12 MMOL/L — SIGNIFICANT CHANGE UP (ref 5–17)
ANION GAP SERPL CALC-SCNC: 13 MMOL/L — SIGNIFICANT CHANGE UP (ref 5–17)
ANION GAP SERPL CALC-SCNC: 14 MMOL/L — SIGNIFICANT CHANGE UP (ref 5–17)
APTT BLD: 57.7 SEC — HIGH (ref 27.5–37.4)
APTT BLD: 68.8 SEC — HIGH (ref 27.5–37.4)
APTT BLD: 69.5 SEC — HIGH (ref 27.5–37.4)
APTT BLD: 73.5 SEC — HIGH (ref 27.5–37.4)
APTT BLD: 75.8 SEC — HIGH (ref 27.5–37.4)
APTT BLD: 83.8 SEC — HIGH (ref 27.5–37.4)
APTT BLD: >200 SEC — CRITICAL HIGH (ref 27.5–37.4)
AST SERPL-CCNC: 35 U/L — SIGNIFICANT CHANGE UP (ref 10–40)
AST SERPL-CCNC: 37 U/L — SIGNIFICANT CHANGE UP (ref 10–40)
AST SERPL-CCNC: 37 U/L — SIGNIFICANT CHANGE UP (ref 10–40)
AST SERPL-CCNC: 38 U/L — SIGNIFICANT CHANGE UP (ref 10–40)
AST SERPL-CCNC: 42 U/L — HIGH (ref 10–40)
BASE EXCESS BLDA CALC-SCNC: -2.2 MMOL/L — LOW (ref -2–3)
BASE EXCESS BLDA CALC-SCNC: -3.3 MMOL/L — LOW (ref -2–3)
BASE EXCESS BLDA CALC-SCNC: -3.3 MMOL/L — LOW (ref -2–3)
BASE EXCESS BLDV CALC-SCNC: -4.4 MMOL/L — SIGNIFICANT CHANGE UP
BILIRUB DIRECT SERPL-MCNC: 7 MG/DL — HIGH (ref 0–0.2)
BILIRUB INDIRECT FLD-MCNC: 2.2 MG/DL — HIGH (ref 0.2–1)
BILIRUB SERPL-MCNC: 10 MG/DL — HIGH (ref 0.2–1.2)
BILIRUB SERPL-MCNC: 10.1 MG/DL — HIGH (ref 0.2–1.2)
BILIRUB SERPL-MCNC: 8.7 MG/DL — HIGH (ref 0.2–1.2)
BILIRUB SERPL-MCNC: 9.2 MG/DL — HIGH (ref 0.2–1.2)
BILIRUB SERPL-MCNC: 9.4 MG/DL — HIGH (ref 0.2–1.2)
BUN SERPL-MCNC: 14 MG/DL — SIGNIFICANT CHANGE UP (ref 7–23)
BUN SERPL-MCNC: 14 MG/DL — SIGNIFICANT CHANGE UP (ref 7–23)
BUN SERPL-MCNC: 15 MG/DL — SIGNIFICANT CHANGE UP (ref 7–23)
CALCIUM SERPL-MCNC: 7.7 MG/DL — LOW (ref 8.4–10.5)
CALCIUM SERPL-MCNC: 8 MG/DL — LOW (ref 8.4–10.5)
CALCIUM SERPL-MCNC: 8.2 MG/DL — LOW (ref 8.4–10.5)
CALCIUM SERPL-MCNC: 8.6 MG/DL — SIGNIFICANT CHANGE UP (ref 8.4–10.5)
CALCIUM SERPL-MCNC: 8.8 MG/DL — SIGNIFICANT CHANGE UP (ref 8.4–10.5)
CALCIUM SERPL-MCNC: 9.2 MG/DL — SIGNIFICANT CHANGE UP (ref 8.4–10.5)
CHLORIDE SERPL-SCNC: 88 MMOL/L — LOW (ref 96–108)
CHLORIDE SERPL-SCNC: 88 MMOL/L — LOW (ref 96–108)
CHLORIDE SERPL-SCNC: 89 MMOL/L — LOW (ref 96–108)
CHLORIDE SERPL-SCNC: 90 MMOL/L — LOW (ref 96–108)
CHLORIDE SERPL-SCNC: 91 MMOL/L — LOW (ref 96–108)
CHLORIDE SERPL-SCNC: 93 MMOL/L — LOW (ref 96–108)
CO2 SERPL-SCNC: 20 MMOL/L — LOW (ref 22–31)
CO2 SERPL-SCNC: 22 MMOL/L — SIGNIFICANT CHANGE UP (ref 22–31)
CO2 SERPL-SCNC: 23 MMOL/L — SIGNIFICANT CHANGE UP (ref 22–31)
CO2 SERPL-SCNC: 24 MMOL/L — SIGNIFICANT CHANGE UP (ref 22–31)
CREAT SERPL-MCNC: 0.71 MG/DL — SIGNIFICANT CHANGE UP (ref 0.5–1.3)
CREAT SERPL-MCNC: 0.72 MG/DL — SIGNIFICANT CHANGE UP (ref 0.5–1.3)
CREAT SERPL-MCNC: 0.74 MG/DL — SIGNIFICANT CHANGE UP (ref 0.5–1.3)
CREAT SERPL-MCNC: 0.75 MG/DL — SIGNIFICANT CHANGE UP (ref 0.5–1.3)
CREAT SERPL-MCNC: 0.76 MG/DL — SIGNIFICANT CHANGE UP (ref 0.5–1.3)
CREAT SERPL-MCNC: 0.78 MG/DL — SIGNIFICANT CHANGE UP (ref 0.5–1.3)
GAS PNL BLDA: SIGNIFICANT CHANGE UP
GAS PNL BLDV: SIGNIFICANT CHANGE UP
GLUCOSE BLDC GLUCOMTR-MCNC: 102 MG/DL — HIGH (ref 70–99)
GLUCOSE BLDC GLUCOMTR-MCNC: 106 MG/DL — HIGH (ref 70–99)
GLUCOSE BLDC GLUCOMTR-MCNC: 106 MG/DL — HIGH (ref 70–99)
GLUCOSE BLDC GLUCOMTR-MCNC: 120 MG/DL — HIGH (ref 70–99)
GLUCOSE BLDC GLUCOMTR-MCNC: 125 MG/DL — HIGH (ref 70–99)
GLUCOSE BLDC GLUCOMTR-MCNC: 127 MG/DL — HIGH (ref 70–99)
GLUCOSE BLDC GLUCOMTR-MCNC: 135 MG/DL — HIGH (ref 70–99)
GLUCOSE BLDC GLUCOMTR-MCNC: 135 MG/DL — HIGH (ref 70–99)
GLUCOSE BLDC GLUCOMTR-MCNC: 142 MG/DL — HIGH (ref 70–99)
GLUCOSE BLDC GLUCOMTR-MCNC: 156 MG/DL — HIGH (ref 70–99)
GLUCOSE BLDC GLUCOMTR-MCNC: 177 MG/DL — HIGH (ref 70–99)
GLUCOSE BLDC GLUCOMTR-MCNC: 61 MG/DL — LOW (ref 70–99)
GLUCOSE BLDC GLUCOMTR-MCNC: 70 MG/DL — SIGNIFICANT CHANGE UP (ref 70–99)
GLUCOSE BLDC GLUCOMTR-MCNC: 71 MG/DL — SIGNIFICANT CHANGE UP (ref 70–99)
GLUCOSE BLDC GLUCOMTR-MCNC: 85 MG/DL — SIGNIFICANT CHANGE UP (ref 70–99)
GLUCOSE BLDC GLUCOMTR-MCNC: 92 MG/DL — SIGNIFICANT CHANGE UP (ref 70–99)
GLUCOSE BLDC GLUCOMTR-MCNC: 93 MG/DL — SIGNIFICANT CHANGE UP (ref 70–99)
GLUCOSE SERPL-MCNC: 108 MG/DL — HIGH (ref 70–99)
GLUCOSE SERPL-MCNC: 120 MG/DL — HIGH (ref 70–99)
GLUCOSE SERPL-MCNC: 127 MG/DL — HIGH (ref 70–99)
GLUCOSE SERPL-MCNC: 143 MG/DL — HIGH (ref 70–99)
GLUCOSE SERPL-MCNC: 152 MG/DL — HIGH (ref 70–99)
GLUCOSE SERPL-MCNC: 73 MG/DL — SIGNIFICANT CHANGE UP (ref 70–99)
HCO3 BLDA-SCNC: 20 MMOL/L — LOW (ref 21–28)
HCO3 BLDA-SCNC: 21 MMOL/L — SIGNIFICANT CHANGE UP (ref 21–28)
HCO3 BLDA-SCNC: 22 MMOL/L — SIGNIFICANT CHANGE UP (ref 21–28)
HCO3 BLDV-SCNC: 20 MMOL/L — SIGNIFICANT CHANGE UP (ref 20–27)
HCT VFR BLD CALC: 28.1 % — LOW (ref 39–50)
HCT VFR BLD CALC: 28.6 % — LOW (ref 39–50)
HCT VFR BLD CALC: 29.6 % — LOW (ref 39–50)
HCT VFR BLD CALC: 30.7 % — LOW (ref 39–50)
HCT VFR BLD CALC: 31.3 % — LOW (ref 39–50)
HGB BLD-MCNC: 10.2 G/DL — LOW (ref 13–17)
HGB BLD-MCNC: 10.7 G/DL — LOW (ref 13–17)
HGB BLD-MCNC: 11.1 G/DL — LOW (ref 13–17)
HGB BLD-MCNC: 9.7 G/DL — LOW (ref 13–17)
HGB BLD-MCNC: 9.9 G/DL — LOW (ref 13–17)
INR BLD: 1.38 — HIGH (ref 0.88–1.16)
INR BLD: 1.4 — HIGH (ref 0.88–1.16)
INR BLD: 1.6 — HIGH (ref 0.88–1.16)
INR BLD: 1.61 — HIGH (ref 0.88–1.16)
INR BLD: 1.68 — HIGH (ref 0.88–1.16)
INR BLD: 1.68 — HIGH (ref 0.88–1.16)
INR BLD: 1.73 — HIGH (ref 0.88–1.16)
LACTATE SERPL-SCNC: 1.3 MMOL/L — SIGNIFICANT CHANGE UP (ref 0.5–2)
LACTATE SERPL-SCNC: 1.6 MMOL/L — SIGNIFICANT CHANGE UP (ref 0.5–2)
LACTATE SERPL-SCNC: 3.1 MMOL/L — HIGH (ref 0.5–2)
LACTATE SERPL-SCNC: 3.5 MMOL/L — HIGH (ref 0.5–2)
LACTATE SERPL-SCNC: 4.1 MMOL/L — CRITICAL HIGH (ref 0.5–2)
LACTATE SERPL-SCNC: 4.9 MMOL/L — CRITICAL HIGH (ref 0.5–2)
LIDOCAIN IGE QN: 12 U/L — SIGNIFICANT CHANGE UP (ref 7–60)
MAGNESIUM SERPL-MCNC: 1.7 MG/DL — SIGNIFICANT CHANGE UP (ref 1.6–2.6)
MAGNESIUM SERPL-MCNC: 1.8 MG/DL — SIGNIFICANT CHANGE UP (ref 1.6–2.6)
MAGNESIUM SERPL-MCNC: 2 MG/DL — SIGNIFICANT CHANGE UP (ref 1.6–2.6)
MAGNESIUM SERPL-MCNC: 2 MG/DL — SIGNIFICANT CHANGE UP (ref 1.6–2.6)
MAGNESIUM SERPL-MCNC: 2.3 MG/DL — SIGNIFICANT CHANGE UP (ref 1.6–2.6)
MCHC RBC-ENTMCNC: 28.9 PG — SIGNIFICANT CHANGE UP (ref 27–34)
MCHC RBC-ENTMCNC: 29.4 PG — SIGNIFICANT CHANGE UP (ref 27–34)
MCHC RBC-ENTMCNC: 29.4 PG — SIGNIFICANT CHANGE UP (ref 27–34)
MCHC RBC-ENTMCNC: 29.5 PG — SIGNIFICANT CHANGE UP (ref 27–34)
MCHC RBC-ENTMCNC: 29.6 PG — SIGNIFICANT CHANGE UP (ref 27–34)
MCHC RBC-ENTMCNC: 34.5 G/DL — SIGNIFICANT CHANGE UP (ref 32–36)
MCHC RBC-ENTMCNC: 34.5 G/DL — SIGNIFICANT CHANGE UP (ref 32–36)
MCHC RBC-ENTMCNC: 34.6 G/DL — SIGNIFICANT CHANGE UP (ref 32–36)
MCHC RBC-ENTMCNC: 34.9 G/DL — SIGNIFICANT CHANGE UP (ref 32–36)
MCHC RBC-ENTMCNC: 35.5 G/DL — SIGNIFICANT CHANGE UP (ref 32–36)
MCV RBC AUTO: 83.4 FL — SIGNIFICANT CHANGE UP (ref 80–100)
MCV RBC AUTO: 83.5 FL — SIGNIFICANT CHANGE UP (ref 80–100)
MCV RBC AUTO: 84.3 FL — SIGNIFICANT CHANGE UP (ref 80–100)
MCV RBC AUTO: 85.2 FL — SIGNIFICANT CHANGE UP (ref 80–100)
MCV RBC AUTO: 85.5 FL — SIGNIFICANT CHANGE UP (ref 80–100)
PCO2 BLDA: 30 MMHG — LOW (ref 35–48)
PCO2 BLDA: 33 MMHG — LOW (ref 35–48)
PCO2 BLDA: 41 MMHG — SIGNIFICANT CHANGE UP (ref 35–48)
PCO2 BLDV: 35 MMHG — LOW (ref 41–51)
PH BLDA: 7.35 — SIGNIFICANT CHANGE UP (ref 7.35–7.45)
PH BLDA: 7.44 — SIGNIFICANT CHANGE UP (ref 7.35–7.45)
PH BLDA: 7.45 — SIGNIFICANT CHANGE UP (ref 7.35–7.45)
PH BLDV: 7.38 — SIGNIFICANT CHANGE UP (ref 7.32–7.43)
PHOSPHATE SERPL-MCNC: 3 MG/DL — SIGNIFICANT CHANGE UP (ref 2.5–4.5)
PHOSPHATE SERPL-MCNC: 3.1 MG/DL — SIGNIFICANT CHANGE UP (ref 2.5–4.5)
PHOSPHATE SERPL-MCNC: 3.4 MG/DL — SIGNIFICANT CHANGE UP (ref 2.5–4.5)
PHOSPHATE SERPL-MCNC: 4.7 MG/DL — HIGH (ref 2.5–4.5)
PLATELET # BLD AUTO: 23 K/UL — LOW (ref 150–400)
PLATELET # BLD AUTO: 31 K/UL — LOW (ref 150–400)
PLATELET # BLD AUTO: 34 K/UL — LOW (ref 150–400)
PLATELET # BLD AUTO: 45 K/UL — LOW (ref 150–400)
PLATELET # BLD AUTO: 47 K/UL — LOW (ref 150–400)
PO2 BLDA: 139 MMHG — HIGH (ref 83–108)
PO2 BLDA: 170 MMHG — HIGH (ref 83–108)
PO2 BLDA: 87 MMHG — SIGNIFICANT CHANGE UP (ref 83–108)
PO2 BLDV: 38 MMHG — SIGNIFICANT CHANGE UP
POTASSIUM SERPL-MCNC: 3.4 MMOL/L — LOW (ref 3.5–5.3)
POTASSIUM SERPL-MCNC: 3.5 MMOL/L — SIGNIFICANT CHANGE UP (ref 3.5–5.3)
POTASSIUM SERPL-MCNC: 3.6 MMOL/L — SIGNIFICANT CHANGE UP (ref 3.5–5.3)
POTASSIUM SERPL-MCNC: 3.7 MMOL/L — SIGNIFICANT CHANGE UP (ref 3.5–5.3)
POTASSIUM SERPL-MCNC: 3.8 MMOL/L — SIGNIFICANT CHANGE UP (ref 3.5–5.3)
POTASSIUM SERPL-MCNC: 4.3 MMOL/L — SIGNIFICANT CHANGE UP (ref 3.5–5.3)
POTASSIUM SERPL-SCNC: 3.4 MMOL/L — LOW (ref 3.5–5.3)
POTASSIUM SERPL-SCNC: 3.5 MMOL/L — SIGNIFICANT CHANGE UP (ref 3.5–5.3)
POTASSIUM SERPL-SCNC: 3.6 MMOL/L — SIGNIFICANT CHANGE UP (ref 3.5–5.3)
POTASSIUM SERPL-SCNC: 3.7 MMOL/L — SIGNIFICANT CHANGE UP (ref 3.5–5.3)
POTASSIUM SERPL-SCNC: 3.8 MMOL/L — SIGNIFICANT CHANGE UP (ref 3.5–5.3)
POTASSIUM SERPL-SCNC: 4.3 MMOL/L — SIGNIFICANT CHANGE UP (ref 3.5–5.3)
PROT SERPL-MCNC: 3.2 G/DL — LOW (ref 6–8.3)
PROT SERPL-MCNC: 3.2 G/DL — LOW (ref 6–8.3)
PROT SERPL-MCNC: 3.6 G/DL — LOW (ref 6–8.3)
PROT SERPL-MCNC: 4.3 G/DL — LOW (ref 6–8.3)
PROT SERPL-MCNC: 4.5 G/DL — LOW (ref 6–8.3)
PROTHROM AB SERPL-ACNC: 15.4 SEC — HIGH (ref 9.8–12.7)
PROTHROM AB SERPL-ACNC: 15.6 SEC — HIGH (ref 9.8–12.7)
PROTHROM AB SERPL-ACNC: 17.9 SEC — HIGH (ref 9.8–12.7)
PROTHROM AB SERPL-ACNC: 18 SEC — HIGH (ref 9.8–12.7)
PROTHROM AB SERPL-ACNC: 18.8 SEC — HIGH (ref 9.8–12.7)
PROTHROM AB SERPL-ACNC: 18.8 SEC — HIGH (ref 9.8–12.7)
PROTHROM AB SERPL-ACNC: 19.4 SEC — HIGH (ref 9.8–12.7)
RBC # BLD: 3.3 M/UL — LOW (ref 4.2–5.8)
RBC # BLD: 3.43 M/UL — LOW (ref 4.2–5.8)
RBC # BLD: 3.46 M/UL — LOW (ref 4.2–5.8)
RBC # BLD: 3.64 M/UL — LOW (ref 4.2–5.8)
RBC # BLD: 3.75 M/UL — LOW (ref 4.2–5.8)
RBC # FLD: 14.9 % — SIGNIFICANT CHANGE UP (ref 10.3–16.9)
RBC # FLD: 15.2 % — SIGNIFICANT CHANGE UP (ref 10.3–16.9)
RBC # FLD: 15.2 % — SIGNIFICANT CHANGE UP (ref 10.3–16.9)
RBC # FLD: 15.7 % — SIGNIFICANT CHANGE UP (ref 10.3–16.9)
RBC # FLD: 16.2 % — SIGNIFICANT CHANGE UP (ref 10.3–16.9)
SAO2 % BLDA: 97 % — SIGNIFICANT CHANGE UP (ref 95–100)
SAO2 % BLDA: 99 % — SIGNIFICANT CHANGE UP (ref 95–100)
SAO2 % BLDA: 99 % — SIGNIFICANT CHANGE UP (ref 95–100)
SAO2 % BLDV: 76 % — SIGNIFICANT CHANGE UP
SODIUM SERPL-SCNC: 123 MMOL/L — LOW (ref 135–145)
SODIUM SERPL-SCNC: 124 MMOL/L — LOW (ref 135–145)
SODIUM SERPL-SCNC: 124 MMOL/L — LOW (ref 135–145)
SODIUM SERPL-SCNC: 125 MMOL/L — LOW (ref 135–145)
SODIUM SERPL-SCNC: 126 MMOL/L — LOW (ref 135–145)
SODIUM SERPL-SCNC: 127 MMOL/L — LOW (ref 135–145)
WBC # BLD: 6.9 K/UL — SIGNIFICANT CHANGE UP (ref 3.8–10.5)
WBC # BLD: 7.9 K/UL — SIGNIFICANT CHANGE UP (ref 3.8–10.5)
WBC # BLD: 7.9 K/UL — SIGNIFICANT CHANGE UP (ref 3.8–10.5)
WBC # BLD: 8.9 K/UL — SIGNIFICANT CHANGE UP (ref 3.8–10.5)
WBC # BLD: 9.7 K/UL — SIGNIFICANT CHANGE UP (ref 3.8–10.5)
WBC # FLD AUTO: 6.9 K/UL — SIGNIFICANT CHANGE UP (ref 3.8–10.5)
WBC # FLD AUTO: 7.9 K/UL — SIGNIFICANT CHANGE UP (ref 3.8–10.5)
WBC # FLD AUTO: 7.9 K/UL — SIGNIFICANT CHANGE UP (ref 3.8–10.5)
WBC # FLD AUTO: 8.9 K/UL — SIGNIFICANT CHANGE UP (ref 3.8–10.5)
WBC # FLD AUTO: 9.7 K/UL — SIGNIFICANT CHANGE UP (ref 3.8–10.5)

## 2018-06-26 PROCEDURE — 21750 REPAIR OF STERNUM SEPARATION: CPT | Mod: 78,59

## 2018-06-26 PROCEDURE — 99291 CRITICAL CARE FIRST HOUR: CPT

## 2018-06-26 PROCEDURE — 99292 CRITICAL CARE ADDL 30 MIN: CPT

## 2018-06-26 PROCEDURE — 94621 CARDIOPULM EXERCISE TESTING: CPT | Mod: 26

## 2018-06-26 PROCEDURE — 32120 RE-EXPLORATION OF CHEST: CPT | Mod: 58

## 2018-06-26 PROCEDURE — 71045 X-RAY EXAM CHEST 1 VIEW: CPT | Mod: 26,77

## 2018-06-26 PROCEDURE — 33202 INSERT EPICARD ELTRD OPEN: CPT | Mod: 58

## 2018-06-26 PROCEDURE — 71045 X-RAY EXAM CHEST 1 VIEW: CPT | Mod: 26

## 2018-06-26 PROCEDURE — 99222 1ST HOSP IP/OBS MODERATE 55: CPT

## 2018-06-26 PROCEDURE — 32110 EXPLORE/REPAIR CHEST: CPT | Mod: 80,58

## 2018-06-26 RX ORDER — POTASSIUM CHLORIDE 20 MEQ
20 PACKET (EA) ORAL ONCE
Qty: 0 | Refills: 0 | Status: COMPLETED | OUTPATIENT
Start: 2018-06-26 | End: 2018-06-26

## 2018-06-26 RX ORDER — ALBUMIN HUMAN 25 %
250 VIAL (ML) INTRAVENOUS ONCE
Qty: 0 | Refills: 0 | Status: COMPLETED | OUTPATIENT
Start: 2018-06-26 | End: 2018-06-26

## 2018-06-26 RX ORDER — PANTOPRAZOLE SODIUM 20 MG/1
40 TABLET, DELAYED RELEASE ORAL DAILY
Qty: 0 | Refills: 0 | Status: DISCONTINUED | OUTPATIENT
Start: 2018-06-26 | End: 2018-07-01

## 2018-06-26 RX ORDER — MAGNESIUM SULFATE 500 MG/ML
2 VIAL (ML) INJECTION ONCE
Qty: 0 | Refills: 0 | Status: COMPLETED | OUTPATIENT
Start: 2018-06-26 | End: 2018-06-27

## 2018-06-26 RX ORDER — HYDROCORTISONE 20 MG
100 TABLET ORAL EVERY 8 HOURS
Qty: 0 | Refills: 0 | Status: DISCONTINUED | OUTPATIENT
Start: 2018-06-26 | End: 2018-06-30

## 2018-06-26 RX ORDER — FUROSEMIDE 40 MG
20 TABLET ORAL ONCE
Qty: 0 | Refills: 0 | Status: COMPLETED | OUTPATIENT
Start: 2018-06-26 | End: 2018-06-26

## 2018-06-26 RX ORDER — CALCIUM GLUCONATE 100 MG/ML
2 VIAL (ML) INTRAVENOUS ONCE
Qty: 0 | Refills: 0 | Status: COMPLETED | OUTPATIENT
Start: 2018-06-26 | End: 2018-06-26

## 2018-06-26 RX ORDER — MAGNESIUM SULFATE 500 MG/ML
2 VIAL (ML) INJECTION ONCE
Qty: 0 | Refills: 0 | Status: COMPLETED | OUTPATIENT
Start: 2018-06-26 | End: 2018-06-26

## 2018-06-26 RX ORDER — PHENYLEPHRINE HYDROCHLORIDE 10 MG/ML
0.5 INJECTION INTRAVENOUS
Qty: 160 | Refills: 0 | Status: DISCONTINUED | OUTPATIENT
Start: 2018-06-26 | End: 2018-07-01

## 2018-06-26 RX ORDER — DEXTROSE 50 % IN WATER 50 %
25 SYRINGE (ML) INTRAVENOUS ONCE
Qty: 0 | Refills: 0 | Status: DISCONTINUED | OUTPATIENT
Start: 2018-06-26 | End: 2018-06-28

## 2018-06-26 RX ORDER — POTASSIUM CHLORIDE 20 MEQ
20 PACKET (EA) ORAL
Qty: 0 | Refills: 0 | Status: COMPLETED | OUTPATIENT
Start: 2018-06-26 | End: 2018-06-27

## 2018-06-26 RX ADMIN — Medication 100 MILLIGRAM(S): at 21:51

## 2018-06-26 RX ADMIN — Medication 125 MILLILITER(S): at 00:28

## 2018-06-26 RX ADMIN — Medication 200 GRAM(S): at 13:50

## 2018-06-26 RX ADMIN — VASOPRESSIN 1.8 UNIT(S)/MIN: 20 INJECTION INTRAVENOUS at 21:52

## 2018-06-26 RX ADMIN — Medication 100 MILLIEQUIVALENT(S): at 17:57

## 2018-06-26 RX ADMIN — Medication 50 GRAM(S): at 03:03

## 2018-06-26 RX ADMIN — PROPOFOL 1.76 MICROGRAM(S)/KG/MIN: 10 INJECTION, EMULSION INTRAVENOUS at 21:51

## 2018-06-26 RX ADMIN — Medication 100 MILLIGRAM(S): at 06:34

## 2018-06-26 RX ADMIN — Medication 50 MILLIEQUIVALENT(S): at 00:27

## 2018-06-26 RX ADMIN — PANTOPRAZOLE SODIUM 40 MILLIGRAM(S): 20 TABLET, DELAYED RELEASE ORAL at 16:07

## 2018-06-26 RX ADMIN — CISATRACURIUM BESYLATE 10.55 MICROGRAM(S)/KG/MIN: 2 INJECTION INTRAVENOUS at 21:52

## 2018-06-26 RX ADMIN — Medication 8.79 MICROGRAM(S)/KG/MIN: at 21:53

## 2018-06-26 RX ADMIN — CHLORHEXIDINE GLUCONATE 5 MILLILITER(S): 213 SOLUTION TOPICAL at 19:42

## 2018-06-26 RX ADMIN — Medication 50 MILLIGRAM(S): at 01:03

## 2018-06-26 RX ADMIN — Medication 1 DROP(S): at 06:34

## 2018-06-26 RX ADMIN — Medication 50 MILLIEQUIVALENT(S): at 23:06

## 2018-06-26 RX ADMIN — Medication 125 MILLILITER(S): at 01:30

## 2018-06-26 RX ADMIN — Medication 100 MILLIEQUIVALENT(S): at 16:17

## 2018-06-26 RX ADMIN — SODIUM CHLORIDE 10 MILLILITER(S): 9 INJECTION INTRAMUSCULAR; INTRAVENOUS; SUBCUTANEOUS at 21:52

## 2018-06-26 RX ADMIN — Medication 100 MILLIGRAM(S): at 16:26

## 2018-06-26 RX ADMIN — CHLORHEXIDINE GLUCONATE 5 MILLILITER(S): 213 SOLUTION TOPICAL at 06:34

## 2018-06-26 RX ADMIN — Medication 1 DROP(S): at 19:45

## 2018-06-26 RX ADMIN — Medication 20 MILLIGRAM(S): at 16:00

## 2018-06-26 RX ADMIN — FLUCONAZOLE 100 MILLIGRAM(S): 150 TABLET ORAL at 14:45

## 2018-06-26 RX ADMIN — MILRINONE LACTATE 8.79 MICROGRAM(S)/KG/MIN: 1 INJECTION, SOLUTION INTRAVENOUS at 21:51

## 2018-06-26 RX ADMIN — CHLORHEXIDINE GLUCONATE 5 MILLILITER(S): 213 SOLUTION TOPICAL at 15:52

## 2018-06-26 RX ADMIN — Medication 50 MILLIEQUIVALENT(S): at 01:02

## 2018-06-26 RX ADMIN — Medication 50 MILLIGRAM(S): at 19:48

## 2018-06-26 RX ADMIN — CHLORHEXIDINE GLUCONATE 5 MILLILITER(S): 213 SOLUTION TOPICAL at 01:03

## 2018-06-26 RX ADMIN — Medication 125 MILLILITER(S): at 13:00

## 2018-06-26 RX ADMIN — CHLORHEXIDINE GLUCONATE 5 MILLILITER(S): 213 SOLUTION TOPICAL at 21:52

## 2018-06-26 RX ADMIN — INSULIN HUMAN 5 UNIT(S)/HR: 100 INJECTION, SOLUTION SUBCUTANEOUS at 21:53

## 2018-06-26 RX ADMIN — Medication 2.5 MG/HR: at 21:53

## 2018-06-26 NOTE — PROGRESS NOTE ADULT - SUBJECTIVE AND OBJECTIVE BOX
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 He is / was  being evaluated for  abdominal hernia repair surgery. He required medical clearance  On evaluation with  TTE / Cardiac Catheterization (Completed 06/2018.) EF = 25%, reduced LV function, severe AS, severe MR, severe TR, severe pHTN, and 2V CAD. Patient transferred for [facility] under the care of Dr. Potter for further work up and MGMT. After speaking with patient he does state a decreased in ET and increase in SOB that has been progressing over the last 6-8 month. Details hard to obtain. Patient poor historian.         HEALTH ISSUES - PROBLEM Dx:  CAD (coronary artery disease): CAD (coronary artery disease)  Valvular disease: Valvular disease      , FAMILY HISTORY:  PAST MEDICAL & SURGICAL HISTORY:  No pertinent past medical history  No significant past surgical history    Patient is a 67y old  Male who presents with a chief complaint of AS (19 Jun 2018 00:06)      14 system review was unremarkable  acute changes include acute respiratory failure  Vital signs, hemodynamic and respiratory parameters were reviewed from the bedside nursing flow sheet.  ICU Vital Signs Last 24 Hrs  T(C): 35.9 (26 Jun 2018 20:57), Max: 36.1 (26 Jun 2018 01:00)  T(F): 96.7 (26 Jun 2018 20:57), Max: 96.9 (26 Jun 2018 01:00)  HR: 94 (26 Jun 2018 22:00) (94 - 98)  BP: --  BP(mean): --  ABP: 100/66 (26 Jun 2018 22:00) (94/58 - 186/78)  ABP(mean): 74 (26 Jun 2018 22:00) (56 - 104)  RR: 19 (26 Jun 2018 21:00) (12 - 19)  SpO2: 97% (26 Jun 2018 22:00) (91% - 99%)    Adult Advanced Hemodynamics Last 24 Hrs  CVP(mm Hg): 20 (26 Jun 2018 22:00) (12 - 32)  CVP(cm H2O): --  CO: --  CI: --  PA: 40/21 (26 Jun 2018 22:00) (33/15 - 63/31)  PA(mean): 29 (26 Jun 2018 22:00) (22 - 42)  PCWP: --  SVR: --  SVRI: --  PVR: --  PVRI: --, ABG - ( 26 Jun 2018 22:21 )  pH, Arterial: 7.45  pH, Blood: x     /  pCO2: 30    /  pO2: 139   / HCO3: 20    / Base Excess: -3.3  /  SaO2: 99                Mode: AC/ CMV (Assist Control/ Continuous Mandatory Ventilation)  RR (machine): 18  TV (machine): 400  FiO2: 40  PEEP: 5  ITime: 1.4  MAP: 14  PIP: 29    Intake and output was reviewed and the fluid balance was calculated  Daily     Daily   I&O's Summary    25 Jun 2018 07:01  -  26 Jun 2018 07:00  --------------------------------------------------------  IN: 5083.2 mL / OUT: 5300 mL / NET: -216.8 mL    26 Jun 2018 07:01  -  26 Jun 2018 23:14  --------------------------------------------------------  IN: 4470.6 mL / OUT: 3355 mL / NET: 1115.6 mL        All lines and drain sites were assessed  [facility]      POCT Blood Glucose.: 120 mg/dL (26 Jun 2018 22:04)      NEURO; No acute change in mental status. PUPILS 2-3 mm (reactive). + ICTERUS.  CHEST: STERNUM is closed. + wound vac in sternal incision.  CVS: S1, S2, No  S3.  RESPIRATORY: Auscultation of the chest reveals equal breath sounds.  Abdomen is soft. No tenderness. Hypoactive Bowel sounds. + Scrotal edema.  Extremities are warm and well perfused. + lower  extremity edema.  Wounds appear clean and unremarkable.  VASCULAR: + Distal pulses.  Antibiotics are perioperative aztreonam, vanco, diflucan  and metronidazole.        labs  CBC Full  -  ( 26 Jun 2018 18:25 )  WBC Count : 6.9 K/uL  Hemoglobin : 10.7 g/dL  Hematocrit : 30.7 %  Platelet Count - Automated : 23 K/uL  Mean Cell Volume : 84.3 fL  Mean Cell Hemoglobin : 29.4 pg  Mean Cell Hemoglobin Concentration : 34.9 g/dL  Auto Neutrophil # : x  Auto Lymphocyte # : x  Auto Monocyte # : x  Auto Eosinophil # : x  Auto Basophil # : x  Auto Neutrophil % : x  Auto Lymphocyte % : x  Auto Monocyte % : x  Auto Eosinophil % : x  Auto Basophil % : x    06-26    126<L>  |  91<L>  |  15  ----------------------------<  127<H>  3.4<L>   |  22  |  0.72    Ca    8.6      26 Jun 2018 22:24  Phos  3.4     06-26  Mg     1.7     06-26    TPro  3.2<L>  /  Alb  2.3<L>  /  TBili  9.2<H>  /  DBili  7.0<H>  /  AST  35  /  ALT  10  /  AlkPhos  27<L>  06-26    PT/INR - ( 26 Jun 2018 22:23 )   PT: 18.8 sec;   INR: 1.68          PTT - ( 26 Jun 2018 22:23 )  PTT:57.7 sec  The current medications were reviewed   MEDICATIONS  (STANDING):  artificial  tears Solution 1 Drop(s) Both EYES two times a day  aztreonam  IVPB 1000 milliGRAM(s) IV Intermittent every 8 hours  chlorhexidine 0.12% Liquid 5 milliLiter(s) Swish and Spit every 4 hours  cisatracurium Infusion 3 MICROgram(s)/kG/Min (10.548 mL/Hr) IV Continuous <Continuous>  dexmedetomidine Infusion 0.3 MICROgram(s)/kG/Hr (4.395 mL/Hr) IV Continuous <Continuous>  dextrose 50% Injectable 50 milliLiter(s) IV Push every 15 minutes  dextrose 50% Injectable 25 milliLiter(s) IV Push every 15 minutes  dextrose 50% Injectable 25 milliLiter(s) IV Push once  DOBUTamine Infusion 5 MICROgram(s)/kG/Min (8.79 mL/Hr) IV Continuous <Continuous>  EPINEPHrine     Infusion 0.03 MICROgram(s)/kG/Min (6.593 mL/Hr) IV Continuous <Continuous>  Epinephrine 8 milliGRAM(s),D5W 250 milliLiter(s) 8 milliGRAM(s) (6.75 mL/Hr) IV Continuous <Continuous>  epoprostenol Infusion 2 NANOGram(s)/kG/Min (1.406 mL/Hr) IV Continuous <Continuous>  fentaNYL    Injectable 100 MICROGram(s) IV Push once  fentaNYL   Infusion. 0.5 MICROgram(s)/kG/Hr (2.93 mL/Hr) IV Continuous <Continuous>  fluconAZOLE IVPB 400 milliGRAM(s) IV Intermittent every 24 hours  furosemide Infusion 5 mG/Hr (2.5 mL/Hr) IV Continuous <Continuous>  Heparin 64343 Unit(s),Dextrose 5% 1000 milliLiter(s) 14498 Unit(s) (25 mL/Hr) IV Continuous <Continuous>  hydrocortisone sodium succinate Injectable 100 milliGRAM(s) IV Push every 8 hours  insulin Infusion 5 Unit(s)/Hr (5 mL/Hr) IV Continuous <Continuous>  magnesium sulfate  IVPB 2 Gram(s) IV Intermittent once  metroNIDAZOLE  IVPB 500 milliGRAM(s) IV Intermittent every 8 hours  milrinone Infusion 0.5 MICROgram(s)/kG/Min (8.79 mL/Hr) IV Continuous <Continuous>  nitroglycerin  Infusion 5 MICROgram(s)/Min (1.5 mL/Hr) IV Continuous <Continuous>  norepinephrine Infusion 0.05 MICROgram(s)/kG/Min (5.494 mL/Hr) IV Continuous <Continuous>  pantoprazole  Injectable 40 milliGRAM(s) IV Push daily  phenylephrine    Infusion 0.5 MICROgram(s)/kG/Min (5.494 mL/Hr) IV Continuous <Continuous>  potassium chloride  20 mEq/100 mL IVPB 20 milliEquivalent(s) IV Intermittent every 2 hours  propofol Infusion 5 MICROgram(s)/kG/Min (1.758 mL/Hr) IV Continuous <Continuous>  propofol Injectable 50 milliGRAM(s) IV Push once  sodium chloride 0.9%. 1000 milliLiter(s) (10 mL/Hr) IV Continuous <Continuous>  vasopressin Infusion 0.03 Unit(s)/Min (1.8 mL/Hr) IV Continuous <Continuous>    MEDICATIONS  (PRN):  potassium chloride  20 mEq/100 mL IVPB 20 milliEquivalent(s) IV Intermittent every 1 hour PRN K<4.0       PROBLEM LIST/ ASSESSMENT:  HEALTH ISSUES - PROBLEM Dx:  CAD (coronary artery disease): CAD (coronary artery disease)  Valvular disease: Valvular disease          66yo male with Hx congenital AV dz -> coarctation of the aorta S/P   repair at age 13yo -> [place] [facility])   He was being evaluated for hernia repair.  Preop risk assessment performed for medical clearance.  ECHO/Cath (Completed 06/2018.) EF 25%, severe AS, severe MR, severe TR, severe pHTN, and 2V CAD.   Patient with sxs progressive FLORENCE and dec exercise tolerance for several months    06/21/18: S/P  REPLACEMENT OF ASCENDING AORTA, MITRAL AND TRICUSPID REPAIR. S/P CABG x 2.  Got 12 UpRBC/8 FFP/4 platelet/FEIBA and Mary 7 intraoperatively.   6/22 - washout and chest closure performed  Later in day/evening on 6/22 - inc pressor requirements; inc CVP; drop in UO --> tamponade  chest opened at bedside (Dr Benedict) with improvement - reportedly approx 1L blood noted right chest  repeat wash out performed 6/23 with removal of clot per d/w Dr Benedict - pseudo tamponade  IABP and sheath removed - required 3 U pRBC and platelets given 6/23.  06/26/18: Placement of epicardial pacemaker leads and chest closure after mediastinal washout.  Hemodynamically stable on multiple drips.  Decreasing jaundice.  + ANASARCA.  Hypokalemic metabolic acidosis.  Good oxygenation.   Diuresing well.  + Thrombocytopenia.  Decreasing lactic acidosis.   Overall  satisfactory progress.        My plan includes :  Close hemodynamic, ventilatory and drain monitoring and management.  Continue Full vent support.  IV antibiotics.  Monitor for arrhythmias and monitor parameters for organ perfusion.  Try to mobilize the third space.  Consider Aquaphoresis.   Monitor Renal function.  Monitor neurologic status  Head of the bed should remain elevated to 45 deg .   Chest PT and IS will be encouraged  Monitor adequacy of oxygenation and ventilation and attempt to wean oxygen  Nutritional goals will be met using po eventually , ensure adequate caloric intake and monitor the same  Stress ulcer and VTE prophylaxis will be achieved    Glycemic control is satisfactory  Electrolytes have been repleted as necessary and wound care has been carried out. Pain control has been achieved.   Aggressive physical therapy and early mobility and ambulation goals will be met   The family was updated about the course and plan  CRITICAL CARE TIME SPENT in evaluation and management, reassessments, review and interpretation of labs and x-rays, ventilator and hemodynamic management, formulating a plan and coordinating care: ___60____ MIN.  Time does not include procedural time.  CTICU ATTENDING     					    Jaylon Rahman MD

## 2018-06-26 NOTE — PROGRESS NOTE ADULT - SUBJECTIVE AND OBJECTIVE BOX
CTICU  CRITICAL  CARE  attending     Hand off received 					   Pertinent clinical, laboratory, radiographic, hemodynamic, echocardiographic, respiratory data, microbiologic data and chart were reviewed and analyzed frequently throughout the course of the day and night  Patient seen and examined with CTS/ SH attending at bedside  Pt is a 67y , Male, HEALTH ISSUES - PROBLEM Dx:  CAD (coronary artery disease): CAD (coronary artery disease)  Valvular disease: Valvular disease      , FAMILY HISTORY:  PAST MEDICAL & SURGICAL HISTORY:  No pertinent past medical history  No significant past surgical history    Patient is a 67y old  Male who presents with a chief complaint of AS (19 Jun 2018 00:06)      14 system review was unremarkable  acute changes include acute respiratory failure  Vital signs, hemodynamic and respiratory parameters were reviewed from the bedside nursing flowsheet.  ICU Vital Signs Last 24 Hrs  T(C): 35.9 (26 Jun 2018 16:51), Max: 36.1 (26 Jun 2018 01:00)  T(F): 96.7 (26 Jun 2018 16:51), Max: 96.9 (26 Jun 2018 01:00)  HR: 94 (26 Jun 2018 18:00) (94 - 98)  BP: --  BP(mean): --  ABP: 174/78 (26 Jun 2018 18:00) (94/58 - 186/78)  ABP(mean): 104 (26 Jun 2018 18:00) (56 - 104)  RR: 12 (26 Jun 2018 18:00) (12 - 20)  SpO2: 96% (26 Jun 2018 18:00) (91% - 99%)    Adult Advanced Hemodynamics Last 24 Hrs  CVP(mm Hg): 25 (26 Jun 2018 18:00) (12 - 32)  CVP(cm H2O): --  CO: 3.4 (25 Jun 2018 21:00) (3.4 - 3.4)  CI: 2 (25 Jun 2018 21:00) (2 - 2)  PA: 52/27 (26 Jun 2018 18:00) (34/15 - 63/31)  PA(mean): 36 (26 Jun 2018 18:00) (22 - 42)  PCWP: --  SVR: 1104 (25 Jun 2018 21:00) (1104 - 1104)  SVRI: 1877 (25 Jun 2018 21:00) (1877 - 1877)  PVR: --  PVRI: --, ABG - ( 26 Jun 2018 18:19 )  pH, Arterial: 7.41  pH, Blood: x     /  pCO2: 32    /  pO2: 102   / HCO3: 20    / Base Excess: -4.2  /  SaO2: 98                Mode: AC/ CMV (Assist Control/ Continuous Mandatory Ventilation)  RR (machine): 18  TV (machine): 400  FiO2: 40  PEEP: 5  ITime: 1.4  MAP: 15  PIP: 31    Intake and output was reviewed and the fluid balance was calculated  Daily     Daily   I&O's Summary    25 Jun 2018 07:01  -  26 Jun 2018 07:00  --------------------------------------------------------  IN: 5083.2 mL / OUT: 5300 mL / NET: -216.8 mL    26 Jun 2018 07:01  -  26 Jun 2018 19:31  --------------------------------------------------------  IN: 3063.5 mL / OUT: 1935 mL / NET: 1128.5 mL        All lines and drain sites were assessed  [facility]      POCT Blood Glucose.: 177 mg/dL (26 Jun 2018 19:20)    No acute change in mental status  Auscultation of the chest reveals equal bs  Abdomen is soft  Extremities are warm and well perfused  Wounds appear clean and unremarkable  Antibiotics are periop    labs  CBC Full  -  ( 26 Jun 2018 18:25 )  WBC Count : 6.9 K/uL  Hemoglobin : 10.7 g/dL  Hematocrit : 30.7 %  Platelet Count - Automated : 23 K/uL  Mean Cell Volume : 84.3 fL  Mean Cell Hemoglobin : 29.4 pg  Mean Cell Hemoglobin Concentration : 34.9 g/dL  Auto Neutrophil # : x  Auto Lymphocyte # : x  Auto Monocyte # : x  Auto Eosinophil # : x  Auto Basophil # : x  Auto Neutrophil % : x  Auto Lymphocyte % : x  Auto Monocyte % : x  Auto Eosinophil % : x  Auto Basophil % : x    06-26    127<L>  |  93<L>  |  14  ----------------------------<  152<H>  3.8   |  20<L>  |  0.71    Ca    7.7<L>      26 Jun 2018 18:25  Phos  4.7     06-26  Mg     2.0     06-26    TPro  3.2<L>  /  Alb  2.3<L>  /  TBili  9.2<H>  /  DBili  7.0<H>  /  AST  35  /  ALT  10  /  AlkPhos  27<L>  06-26    PT/INR - ( 26 Jun 2018 18:25 )   PT: 19.4 sec;   INR: 1.73          PTT - ( 26 Jun 2018 18:25 )  PTT:68.8 sec  The current medications were reviewed   MEDICATIONS  (STANDING):  albumin human  5% IVPB 250 milliLiter(s) IV Intermittent once  albumin human  5% IVPB 250 milliLiter(s) IV Intermittent once  artificial  tears Solution 1 Drop(s) Both EYES two times a day  aztreonam  IVPB 1000 milliGRAM(s) IV Intermittent every 8 hours  chlorhexidine 0.12% Liquid 5 milliLiter(s) Swish and Spit every 4 hours  cisatracurium Infusion 3 MICROgram(s)/kG/Min (10.548 mL/Hr) IV Continuous <Continuous>  dexmedetomidine Infusion 0.3 MICROgram(s)/kG/Hr (4.395 mL/Hr) IV Continuous <Continuous>  dextrose 50% Injectable 50 milliLiter(s) IV Push every 15 minutes  dextrose 50% Injectable 25 milliLiter(s) IV Push every 15 minutes  dextrose 50% Injectable 25 milliLiter(s) IV Push once  DOBUTamine Infusion 5 MICROgram(s)/kG/Min (8.79 mL/Hr) IV Continuous <Continuous>  EPINEPHrine     Infusion 0.03 MICROgram(s)/kG/Min (6.593 mL/Hr) IV Continuous <Continuous>  Epinephrine 8 milliGRAM(s),D5W 250 milliLiter(s) 8 milliGRAM(s) (6.75 mL/Hr) IV Continuous <Continuous>  epoprostenol Infusion 2 NANOGram(s)/kG/Min (1.406 mL/Hr) IV Continuous <Continuous>  fentaNYL    Injectable 100 MICROGram(s) IV Push once  fentaNYL   Infusion. 0.5 MICROgram(s)/kG/Hr (2.93 mL/Hr) IV Continuous <Continuous>  fluconAZOLE IVPB 400 milliGRAM(s) IV Intermittent every 24 hours  furosemide Infusion 5 mG/Hr (2.5 mL/Hr) IV Continuous <Continuous>  Heparin 75319 Unit(s),Dextrose 5% 1000 milliLiter(s) 43073 Unit(s) (25 mL/Hr) IV Continuous <Continuous>  insulin Infusion 5 Unit(s)/Hr (5 mL/Hr) IV Continuous <Continuous>  metroNIDAZOLE  IVPB 500 milliGRAM(s) IV Intermittent every 8 hours  milrinone Infusion 0.5 MICROgram(s)/kG/Min (8.79 mL/Hr) IV Continuous <Continuous>  nitroglycerin  Infusion 5 MICROgram(s)/Min (1.5 mL/Hr) IV Continuous <Continuous>  norepinephrine Infusion 0.05 MICROgram(s)/kG/Min (5.494 mL/Hr) IV Continuous <Continuous>  pantoprazole  Injectable 40 milliGRAM(s) IV Push daily  phenylephrine    Infusion 0.5 MICROgram(s)/kG/Min (5.494 mL/Hr) IV Continuous <Continuous>  propofol Infusion 5 MICROgram(s)/kG/Min (1.758 mL/Hr) IV Continuous <Continuous>  propofol Injectable 50 milliGRAM(s) IV Push once  sodium chloride 0.9%. 1000 milliLiter(s) (10 mL/Hr) IV Continuous <Continuous>  vasopressin Infusion 0.03 Unit(s)/Min (1.8 mL/Hr) IV Continuous <Continuous>    MEDICATIONS  (PRN):  potassium chloride  20 mEq/100 mL IVPB 20 milliEquivalent(s) IV Intermittent every 1 hour PRN K<4.0       PROBLEM LIST/ ASSESSMENT:  HEALTH ISSUES - PROBLEM Dx:  CAD (coronary artery disease): CAD (coronary artery disease)  Valvular disease: Valvular disease      ,   Patient is a 67y old  Male who presents with a chief complaint of AS (19 Jun 2018 00:06)     s/p cardiac surgery      My plan includes :  close hemodynamic, ventilatory and drain monitoring and management per post op routine  s/p chest closure today     Monitor for arrhythmias and monitor parameters for organ perfusion  monitor neurologic status  Head of the bed should remain elevated to 45 deg .   chest PT and IS will be encouraged  monitor adequacy of oxygenation and ventilation and attempt to wean oxygen  Nutritional goals will be met using po eventually , ensure adequate caloric intake and montior the same  Stress ulcer and VTE prophylaxis will be achieved    Glycemic control is satisfactory  Electrolytes have been repleted as necessary and wound care has been carried out. Pain control has been achieved.   thaissive physical therapy and early mobility and ambulation goals will be met   The family was updated about the course and plan  CRITICAL CARE TIME SPENT in evaluation and management, reassessments, review and interpretation of labs and x-rays, ventilator and hemodynamic management, formulating a plan and coordinating care: ___90____ MIN.  Time does not include procedural time.  CTICU ATTENDING     					    Clement Ahn MD

## 2018-06-26 NOTE — BRIEF OPERATIVE NOTE - COMMENTS
EF:
Arrived to CTICU HD stable on multiple pressors, impella, intubated (Vaso, Epi, Milrinone, Dobutamine)

## 2018-06-26 NOTE — BRIEF OPERATIVE NOTE - PROCEDURE
<<-----Click on this checkbox to enter Procedure Washout of thoracic cavity  06/23/2018    Active  Anthony Barrios Washout of thoracic cavity  06/23/2018  and placement of permanent pacemaker  Active  Anthony Barrios

## 2018-06-27 LAB
ALBUMIN SERPL ELPH-MCNC: 2.2 G/DL — LOW (ref 3.3–5)
ALBUMIN SERPL ELPH-MCNC: 2.6 G/DL — LOW (ref 3.3–5)
ALBUMIN SERPL ELPH-MCNC: 2.6 G/DL — LOW (ref 3.3–5)
ALBUMIN SERPL ELPH-MCNC: 2.7 G/DL — LOW (ref 3.3–5)
ALBUMIN SERPL ELPH-MCNC: 3 G/DL — LOW (ref 3.3–5)
ALBUMIN SERPL ELPH-MCNC: 3 G/DL — LOW (ref 3.3–5)
ALP SERPL-CCNC: 24 U/L — LOW (ref 40–120)
ALP SERPL-CCNC: 24 U/L — LOW (ref 40–120)
ALP SERPL-CCNC: 29 U/L — LOW (ref 40–120)
ALP SERPL-CCNC: 29 U/L — LOW (ref 40–120)
ALP SERPL-CCNC: 31 U/L — LOW (ref 40–120)
ALP SERPL-CCNC: 35 U/L — LOW (ref 40–120)
ALT FLD-CCNC: 10 U/L — SIGNIFICANT CHANGE UP (ref 10–45)
ALT FLD-CCNC: 12 U/L — SIGNIFICANT CHANGE UP (ref 10–45)
ALT FLD-CCNC: 12 U/L — SIGNIFICANT CHANGE UP (ref 10–45)
ALT FLD-CCNC: 13 U/L — SIGNIFICANT CHANGE UP (ref 10–45)
ALT FLD-CCNC: 14 U/L — SIGNIFICANT CHANGE UP (ref 10–45)
ALT FLD-CCNC: 18 U/L — SIGNIFICANT CHANGE UP (ref 10–45)
ANION GAP SERPL CALC-SCNC: 11 MMOL/L — SIGNIFICANT CHANGE UP (ref 5–17)
ANION GAP SERPL CALC-SCNC: 11 MMOL/L — SIGNIFICANT CHANGE UP (ref 5–17)
ANION GAP SERPL CALC-SCNC: 12 MMOL/L — SIGNIFICANT CHANGE UP (ref 5–17)
ANION GAP SERPL CALC-SCNC: 14 MMOL/L — SIGNIFICANT CHANGE UP (ref 5–17)
ANION GAP SERPL CALC-SCNC: 14 MMOL/L — SIGNIFICANT CHANGE UP (ref 5–17)
APTT BLD: 30.2 SEC — SIGNIFICANT CHANGE UP (ref 27.5–37.4)
APTT BLD: 38.2 SEC — HIGH (ref 27.5–37.4)
APTT BLD: 40 SEC — HIGH (ref 27.5–37.4)
APTT BLD: 40.3 SEC — HIGH (ref 27.5–37.4)
APTT BLD: 43.6 SEC — HIGH (ref 27.5–37.4)
APTT BLD: 48.8 SEC — HIGH (ref 27.5–37.4)
APTT BLD: 70.6 SEC — HIGH (ref 27.5–37.4)
AST SERPL-CCNC: 38 U/L — SIGNIFICANT CHANGE UP (ref 10–40)
AST SERPL-CCNC: 39 U/L — SIGNIFICANT CHANGE UP (ref 10–40)
AST SERPL-CCNC: 43 U/L — HIGH (ref 10–40)
AST SERPL-CCNC: 55 U/L — HIGH (ref 10–40)
AST SERPL-CCNC: 59 U/L — HIGH (ref 10–40)
AST SERPL-CCNC: 60 U/L — HIGH (ref 10–40)
BASE EXCESS BLDA CALC-SCNC: -1.2 MMOL/L — SIGNIFICANT CHANGE UP (ref -2–3)
BASE EXCESS BLDA CALC-SCNC: -1.7 MMOL/L — SIGNIFICANT CHANGE UP (ref -2–3)
BASE EXCESS BLDV CALC-SCNC: -1.6 MMOL/L — SIGNIFICANT CHANGE UP
BASE EXCESS BLDV CALC-SCNC: -4 MMOL/L — SIGNIFICANT CHANGE UP
BILIRUB SERPL-MCNC: 10.4 MG/DL — HIGH (ref 0.2–1.2)
BILIRUB SERPL-MCNC: 10.6 MG/DL — HIGH (ref 0.2–1.2)
BILIRUB SERPL-MCNC: 10.8 MG/DL — HIGH (ref 0.2–1.2)
BILIRUB SERPL-MCNC: 11.8 MG/DL — HIGH (ref 0.2–1.2)
BILIRUB SERPL-MCNC: 12.7 MG/DL — HIGH (ref 0.2–1.2)
BILIRUB SERPL-MCNC: 9 MG/DL — HIGH (ref 0.2–1.2)
BUN SERPL-MCNC: 16 MG/DL — SIGNIFICANT CHANGE UP (ref 7–23)
BUN SERPL-MCNC: 16 MG/DL — SIGNIFICANT CHANGE UP (ref 7–23)
BUN SERPL-MCNC: 17 MG/DL — SIGNIFICANT CHANGE UP (ref 7–23)
BUN SERPL-MCNC: 17 MG/DL — SIGNIFICANT CHANGE UP (ref 7–23)
BUN SERPL-MCNC: 20 MG/DL — SIGNIFICANT CHANGE UP (ref 7–23)
BUN SERPL-MCNC: 23 MG/DL — SIGNIFICANT CHANGE UP (ref 7–23)
BUN SERPL-MCNC: 23 MG/DL — SIGNIFICANT CHANGE UP (ref 7–23)
CA-I BLDA-SCNC: 0.87 MMOL/L — LOW (ref 1.12–1.3)
CA-I SERPL-SCNC: 0.9 MMOL/L — LOW (ref 1.12–1.3)
CALCIUM SERPL-MCNC: 7.5 MG/DL — LOW (ref 8.4–10.5)
CALCIUM SERPL-MCNC: 7.5 MG/DL — LOW (ref 8.4–10.5)
CALCIUM SERPL-MCNC: 7.6 MG/DL — LOW (ref 8.4–10.5)
CALCIUM SERPL-MCNC: 7.7 MG/DL — LOW (ref 8.4–10.5)
CALCIUM SERPL-MCNC: 8.6 MG/DL — SIGNIFICANT CHANGE UP (ref 8.4–10.5)
CALCIUM SERPL-MCNC: 8.6 MG/DL — SIGNIFICANT CHANGE UP (ref 8.4–10.5)
CALCIUM SERPL-MCNC: 9.1 MG/DL — SIGNIFICANT CHANGE UP (ref 8.4–10.5)
CHLORIDE SERPL-SCNC: 87 MMOL/L — LOW (ref 96–108)
CHLORIDE SERPL-SCNC: 89 MMOL/L — LOW (ref 96–108)
CHLORIDE SERPL-SCNC: 90 MMOL/L — LOW (ref 96–108)
CHLORIDE SERPL-SCNC: 91 MMOL/L — LOW (ref 96–108)
CHLORIDE SERPL-SCNC: 93 MMOL/L — LOW (ref 96–108)
CO2 SERPL-SCNC: 21 MMOL/L — LOW (ref 22–31)
CO2 SERPL-SCNC: 22 MMOL/L — SIGNIFICANT CHANGE UP (ref 22–31)
CO2 SERPL-SCNC: 23 MMOL/L — SIGNIFICANT CHANGE UP (ref 22–31)
CO2 SERPL-SCNC: 24 MMOL/L — SIGNIFICANT CHANGE UP (ref 22–31)
COHGB MFR BLDA: 0.8 % — SIGNIFICANT CHANGE UP
CREAT SERPL-MCNC: 0.71 MG/DL — SIGNIFICANT CHANGE UP (ref 0.5–1.3)
CREAT SERPL-MCNC: 0.74 MG/DL — SIGNIFICANT CHANGE UP (ref 0.5–1.3)
CREAT SERPL-MCNC: 0.79 MG/DL — SIGNIFICANT CHANGE UP (ref 0.5–1.3)
CREAT SERPL-MCNC: 0.8 MG/DL — SIGNIFICANT CHANGE UP (ref 0.5–1.3)
CREAT SERPL-MCNC: 0.91 MG/DL — SIGNIFICANT CHANGE UP (ref 0.5–1.3)
CREAT SERPL-MCNC: 1.01 MG/DL — SIGNIFICANT CHANGE UP (ref 0.5–1.3)
CREAT SERPL-MCNC: 1.04 MG/DL — SIGNIFICANT CHANGE UP (ref 0.5–1.3)
EXTRA BLUE TOP TUBE: SIGNIFICANT CHANGE UP
FIBRINOGEN PPP-MCNC: 206 MG/DL — LOW (ref 258–438)
GAS PNL BLDA: SIGNIFICANT CHANGE UP
GAS PNL BLDV: 126 MMOL/L — LOW (ref 138–146)
GAS PNL BLDV: SIGNIFICANT CHANGE UP
GLUCOSE BLDC GLUCOMTR-MCNC: 104 MG/DL — HIGH (ref 70–99)
GLUCOSE BLDC GLUCOMTR-MCNC: 106 MG/DL — HIGH (ref 70–99)
GLUCOSE BLDC GLUCOMTR-MCNC: 130 MG/DL — HIGH (ref 70–99)
GLUCOSE BLDC GLUCOMTR-MCNC: 66 MG/DL — LOW (ref 70–99)
GLUCOSE BLDC GLUCOMTR-MCNC: 75 MG/DL — SIGNIFICANT CHANGE UP (ref 70–99)
GLUCOSE BLDC GLUCOMTR-MCNC: 93 MG/DL — SIGNIFICANT CHANGE UP (ref 70–99)
GLUCOSE BLDC GLUCOMTR-MCNC: 94 MG/DL — SIGNIFICANT CHANGE UP (ref 70–99)
GLUCOSE SERPL-MCNC: 134 MG/DL — HIGH (ref 70–99)
GLUCOSE SERPL-MCNC: 188 MG/DL — HIGH (ref 70–99)
GLUCOSE SERPL-MCNC: 193 MG/DL — HIGH (ref 70–99)
GLUCOSE SERPL-MCNC: 231 MG/DL — HIGH (ref 70–99)
GLUCOSE SERPL-MCNC: 258 MG/DL — HIGH (ref 70–99)
GLUCOSE SERPL-MCNC: 285 MG/DL — HIGH (ref 70–99)
GLUCOSE SERPL-MCNC: 98 MG/DL — SIGNIFICANT CHANGE UP (ref 70–99)
HCO3 BLDA-SCNC: 22 MMOL/L — SIGNIFICANT CHANGE UP (ref 21–28)
HCO3 BLDA-SCNC: 23 MMOL/L — SIGNIFICANT CHANGE UP (ref 21–28)
HCO3 BLDV-SCNC: 22 MMOL/L — SIGNIFICANT CHANGE UP (ref 20–27)
HCO3 BLDV-SCNC: 25 MMOL/L — SIGNIFICANT CHANGE UP (ref 20–27)
HCT VFR BLD CALC: 21.3 % — LOW (ref 39–50)
HCT VFR BLD CALC: 25.4 % — LOW (ref 39–50)
HCT VFR BLD CALC: 25.9 % — LOW (ref 39–50)
HCT VFR BLD CALC: 27.3 % — LOW (ref 39–50)
HCT VFR BLD CALC: 27.7 % — LOW (ref 39–50)
HCT VFR BLD CALC: 28.7 % — LOW (ref 39–50)
HGB BLD-MCNC: 10.1 G/DL — LOW (ref 13–17)
HGB BLD-MCNC: 10.2 G/DL — LOW (ref 13–17)
HGB BLD-MCNC: 7.7 G/DL — LOW (ref 13–17)
HGB BLD-MCNC: 9 G/DL — LOW (ref 13–17)
HGB BLD-MCNC: 9.1 G/DL — LOW (ref 13–17)
HGB BLD-MCNC: 9.8 G/DL — LOW (ref 13–17)
HGB BLDA-MCNC: 10.5 G/DL — LOW (ref 13–17)
INR BLD: 1.18 — HIGH (ref 0.88–1.16)
INR BLD: 1.46 — HIGH (ref 0.88–1.16)
INR BLD: 1.49 — HIGH (ref 0.88–1.16)
INR BLD: 1.52 — HIGH (ref 0.88–1.16)
INR BLD: 1.53 — HIGH (ref 0.88–1.16)
INR BLD: 1.92 — HIGH (ref 0.88–1.16)
LACTATE SERPL-SCNC: 1.6 MMOL/L — SIGNIFICANT CHANGE UP (ref 0.5–2)
LACTATE SERPL-SCNC: 1.9 MMOL/L — SIGNIFICANT CHANGE UP (ref 0.5–2)
LACTATE SERPL-SCNC: 2.3 MMOL/L — HIGH (ref 0.5–2)
LACTATE SERPL-SCNC: 2.4 MMOL/L — HIGH (ref 0.5–2)
LACTATE SERPL-SCNC: 2.6 MMOL/L — HIGH (ref 0.5–2)
LACTATE SERPL-SCNC: 3.1 MMOL/L — HIGH (ref 0.5–2)
LACTATE SERPL-SCNC: 3.3 MMOL/L — HIGH (ref 0.5–2)
LACTATE SERPL-SCNC: 3.4 MMOL/L — HIGH (ref 0.5–2)
MAGNESIUM SERPL-MCNC: 1.9 MG/DL — SIGNIFICANT CHANGE UP (ref 1.6–2.6)
MAGNESIUM SERPL-MCNC: 1.9 MG/DL — SIGNIFICANT CHANGE UP (ref 1.6–2.6)
MAGNESIUM SERPL-MCNC: 2 MG/DL — SIGNIFICANT CHANGE UP (ref 1.6–2.6)
MAGNESIUM SERPL-MCNC: 2 MG/DL — SIGNIFICANT CHANGE UP (ref 1.6–2.6)
MAGNESIUM SERPL-MCNC: 2.4 MG/DL — SIGNIFICANT CHANGE UP (ref 1.6–2.6)
MAGNESIUM SERPL-MCNC: 2.6 MG/DL — SIGNIFICANT CHANGE UP (ref 1.6–2.6)
MCHC RBC-ENTMCNC: 29.1 PG — SIGNIFICANT CHANGE UP (ref 27–34)
MCHC RBC-ENTMCNC: 29.3 PG — SIGNIFICANT CHANGE UP (ref 27–34)
MCHC RBC-ENTMCNC: 29.7 PG — SIGNIFICANT CHANGE UP (ref 27–34)
MCHC RBC-ENTMCNC: 29.7 PG — SIGNIFICANT CHANGE UP (ref 27–34)
MCHC RBC-ENTMCNC: 29.8 PG — SIGNIFICANT CHANGE UP (ref 27–34)
MCHC RBC-ENTMCNC: 29.9 PG — SIGNIFICANT CHANGE UP (ref 27–34)
MCHC RBC-ENTMCNC: 35.1 G/DL — SIGNIFICANT CHANGE UP (ref 32–36)
MCHC RBC-ENTMCNC: 35.4 G/DL — SIGNIFICANT CHANGE UP (ref 32–36)
MCHC RBC-ENTMCNC: 35.5 G/DL — SIGNIFICANT CHANGE UP (ref 32–36)
MCHC RBC-ENTMCNC: 35.9 G/DL — SIGNIFICANT CHANGE UP (ref 32–36)
MCHC RBC-ENTMCNC: 36.2 G/DL — HIGH (ref 32–36)
MCHC RBC-ENTMCNC: 36.5 G/DL — HIGH (ref 32–36)
MCV RBC AUTO: 81.7 FL — SIGNIFICANT CHANGE UP (ref 80–100)
MCV RBC AUTO: 82 FL — SIGNIFICANT CHANGE UP (ref 80–100)
MCV RBC AUTO: 82.2 FL — SIGNIFICANT CHANGE UP (ref 80–100)
MCV RBC AUTO: 82.7 FL — SIGNIFICANT CHANGE UP (ref 80–100)
MCV RBC AUTO: 83.8 FL — SIGNIFICANT CHANGE UP (ref 80–100)
MCV RBC AUTO: 83.9 FL — SIGNIFICANT CHANGE UP (ref 80–100)
METHGB MFR BLDA: 0.4 % — SIGNIFICANT CHANGE UP
O2 CT VFR BLDA CALC: SIGNIFICANT CHANGE UP (ref 15–23)
OXYHGB MFR BLDA: 98 % — SIGNIFICANT CHANGE UP (ref 94–100)
PCO2 BLDA: 33 MMHG — LOW (ref 35–48)
PCO2 BLDA: 36 MMHG — SIGNIFICANT CHANGE UP (ref 35–48)
PCO2 BLDA: 64 MMHG — CRITICAL HIGH (ref 35–48)
PCO2 BLDV: 46 MMHG — SIGNIFICANT CHANGE UP (ref 41–51)
PCO2 BLDV: 52 MMHG — HIGH (ref 41–51)
PH BLDA: 7.13 — CRITICAL LOW (ref 7.35–7.45)
PH BLDA: 7.42 — SIGNIFICANT CHANGE UP (ref 7.35–7.45)
PH BLDA: 7.44 — SIGNIFICANT CHANGE UP (ref 7.35–7.45)
PH BLDV: 7.3 — LOW (ref 7.32–7.43)
PH BLDV: 7.3 — LOW (ref 7.32–7.43)
PHOSPHATE SERPL-MCNC: 3.5 MG/DL — SIGNIFICANT CHANGE UP (ref 2.5–4.5)
PHOSPHATE SERPL-MCNC: 4.6 MG/DL — HIGH (ref 2.5–4.5)
PHOSPHATE SERPL-MCNC: 4.9 MG/DL — HIGH (ref 2.5–4.5)
PHOSPHATE SERPL-MCNC: 5.2 MG/DL — HIGH (ref 2.5–4.5)
PHOSPHATE SERPL-MCNC: 5.5 MG/DL — HIGH (ref 2.5–4.5)
PHOSPHATE SERPL-MCNC: 5.7 MG/DL — HIGH (ref 2.5–4.5)
PLATELET # BLD AUTO: 40 K/UL — LOW (ref 150–400)
PLATELET # BLD AUTO: 53 K/UL — LOW (ref 150–400)
PLATELET # BLD AUTO: 53 K/UL — LOW (ref 150–400)
PLATELET # BLD AUTO: 59 K/UL — LOW (ref 150–400)
PLATELET # BLD AUTO: 71 K/UL — LOW (ref 150–400)
PLATELET # BLD AUTO: 89 K/UL — LOW (ref 150–400)
PO2 BLDA: 113 MMHG — HIGH (ref 83–108)
PO2 BLDA: 127 MMHG — HIGH (ref 83–108)
PO2 BLDA: 170 MMHG — HIGH (ref 83–108)
PO2 BLDV: 39 MMHG — SIGNIFICANT CHANGE UP
PO2 BLDV: 44 MMHG — SIGNIFICANT CHANGE UP
POTASSIUM BLDA-SCNC: 4.9 MMOL/L — SIGNIFICANT CHANGE UP (ref 3.5–4.9)
POTASSIUM BLDV-SCNC: 4.1 MMOL/L — SIGNIFICANT CHANGE UP (ref 3.5–4.9)
POTASSIUM SERPL-MCNC: 3.6 MMOL/L — SIGNIFICANT CHANGE UP (ref 3.5–5.3)
POTASSIUM SERPL-MCNC: 4.2 MMOL/L — SIGNIFICANT CHANGE UP (ref 3.5–5.3)
POTASSIUM SERPL-MCNC: 4.4 MMOL/L — SIGNIFICANT CHANGE UP (ref 3.5–5.3)
POTASSIUM SERPL-MCNC: 4.5 MMOL/L — SIGNIFICANT CHANGE UP (ref 3.5–5.3)
POTASSIUM SERPL-MCNC: 4.6 MMOL/L — SIGNIFICANT CHANGE UP (ref 3.5–5.3)
POTASSIUM SERPL-MCNC: 4.7 MMOL/L — SIGNIFICANT CHANGE UP (ref 3.5–5.3)
POTASSIUM SERPL-MCNC: 5.1 MMOL/L — SIGNIFICANT CHANGE UP (ref 3.5–5.3)
POTASSIUM SERPL-SCNC: 3.6 MMOL/L — SIGNIFICANT CHANGE UP (ref 3.5–5.3)
POTASSIUM SERPL-SCNC: 4.2 MMOL/L — SIGNIFICANT CHANGE UP (ref 3.5–5.3)
POTASSIUM SERPL-SCNC: 4.4 MMOL/L — SIGNIFICANT CHANGE UP (ref 3.5–5.3)
POTASSIUM SERPL-SCNC: 4.5 MMOL/L — SIGNIFICANT CHANGE UP (ref 3.5–5.3)
POTASSIUM SERPL-SCNC: 4.6 MMOL/L — SIGNIFICANT CHANGE UP (ref 3.5–5.3)
POTASSIUM SERPL-SCNC: 4.7 MMOL/L — SIGNIFICANT CHANGE UP (ref 3.5–5.3)
POTASSIUM SERPL-SCNC: 5.1 MMOL/L — SIGNIFICANT CHANGE UP (ref 3.5–5.3)
PROT SERPL-MCNC: 3.3 G/DL — LOW (ref 6–8.3)
PROT SERPL-MCNC: 3.6 G/DL — LOW (ref 6–8.3)
PROT SERPL-MCNC: 3.7 G/DL — LOW (ref 6–8.3)
PROT SERPL-MCNC: 3.8 G/DL — LOW (ref 6–8.3)
PROT SERPL-MCNC: 4.2 G/DL — LOW (ref 6–8.3)
PROT SERPL-MCNC: 4.2 G/DL — LOW (ref 6–8.3)
PROTHROM AB SERPL-ACNC: 13.1 SEC — HIGH (ref 9.8–12.7)
PROTHROM AB SERPL-ACNC: 16.3 SEC — HIGH (ref 9.8–12.7)
PROTHROM AB SERPL-ACNC: 16.7 SEC — HIGH (ref 9.8–12.7)
PROTHROM AB SERPL-ACNC: 17 SEC — HIGH (ref 9.8–12.7)
PROTHROM AB SERPL-ACNC: 17.1 SEC — HIGH (ref 9.8–12.7)
PROTHROM AB SERPL-ACNC: 21.6 SEC — HIGH (ref 9.8–12.7)
RBC # BLD: 2.59 M/UL — LOW (ref 4.2–5.8)
RBC # BLD: 3.03 M/UL — LOW (ref 4.2–5.8)
RBC # BLD: 3.13 M/UL — LOW (ref 4.2–5.8)
RBC # BLD: 3.34 M/UL — LOW (ref 4.2–5.8)
RBC # BLD: 3.38 M/UL — LOW (ref 4.2–5.8)
RBC # BLD: 3.42 M/UL — LOW (ref 4.2–5.8)
RBC # FLD: 14 % — SIGNIFICANT CHANGE UP (ref 10.3–16.9)
RBC # FLD: 14.1 % — SIGNIFICANT CHANGE UP (ref 10.3–16.9)
RBC # FLD: 14.4 % — SIGNIFICANT CHANGE UP (ref 10.3–16.9)
RBC # FLD: 14.8 % — SIGNIFICANT CHANGE UP (ref 10.3–16.9)
RBC # FLD: 14.9 % — SIGNIFICANT CHANGE UP (ref 10.3–16.9)
RBC # FLD: 14.9 % — SIGNIFICANT CHANGE UP (ref 10.3–16.9)
SAO2 % BLDA: 98 % — SIGNIFICANT CHANGE UP (ref 95–100)
SAO2 % BLDA: 99 % — SIGNIFICANT CHANGE UP (ref 95–100)
SAO2 % BLDA: 99 % — SIGNIFICANT CHANGE UP (ref 95–100)
SAO2 % BLDV: 72 % — SIGNIFICANT CHANGE UP
SAO2 % BLDV: 79 % — SIGNIFICANT CHANGE UP
SODIUM BLDA-SCNC: 123 MMOL/L — LOW (ref 138–146)
SODIUM SERPL-SCNC: 122 MMOL/L — LOW (ref 135–145)
SODIUM SERPL-SCNC: 122 MMOL/L — LOW (ref 135–145)
SODIUM SERPL-SCNC: 123 MMOL/L — LOW (ref 135–145)
SODIUM SERPL-SCNC: 124 MMOL/L — LOW (ref 135–145)
SODIUM SERPL-SCNC: 126 MMOL/L — LOW (ref 135–145)
SODIUM SERPL-SCNC: 127 MMOL/L — LOW (ref 135–145)
SODIUM SERPL-SCNC: 129 MMOL/L — LOW (ref 135–145)
TSH SERPL-MCNC: 0.32 UIU/ML — LOW (ref 0.35–4.94)
WBC # BLD: 6.2 K/UL — SIGNIFICANT CHANGE UP (ref 3.8–10.5)
WBC # BLD: 7.3 K/UL — SIGNIFICANT CHANGE UP (ref 3.8–10.5)
WBC # BLD: 7.3 K/UL — SIGNIFICANT CHANGE UP (ref 3.8–10.5)
WBC # BLD: 7.8 K/UL — SIGNIFICANT CHANGE UP (ref 3.8–10.5)
WBC # BLD: 8 K/UL — SIGNIFICANT CHANGE UP (ref 3.8–10.5)
WBC # BLD: 9.2 K/UL — SIGNIFICANT CHANGE UP (ref 3.8–10.5)
WBC # FLD AUTO: 6.2 K/UL — SIGNIFICANT CHANGE UP (ref 3.8–10.5)
WBC # FLD AUTO: 7.3 K/UL — SIGNIFICANT CHANGE UP (ref 3.8–10.5)
WBC # FLD AUTO: 7.3 K/UL — SIGNIFICANT CHANGE UP (ref 3.8–10.5)
WBC # FLD AUTO: 7.8 K/UL — SIGNIFICANT CHANGE UP (ref 3.8–10.5)
WBC # FLD AUTO: 8 K/UL — SIGNIFICANT CHANGE UP (ref 3.8–10.5)
WBC # FLD AUTO: 9.2 K/UL — SIGNIFICANT CHANGE UP (ref 3.8–10.5)

## 2018-06-27 PROCEDURE — 32120 RE-EXPLORATION OF CHEST: CPT | Mod: 78

## 2018-06-27 PROCEDURE — 99292 CRITICAL CARE ADDL 30 MIN: CPT

## 2018-06-27 PROCEDURE — 71045 X-RAY EXAM CHEST 1 VIEW: CPT | Mod: 26

## 2018-06-27 PROCEDURE — 99291 CRITICAL CARE FIRST HOUR: CPT

## 2018-06-27 RX ORDER — CISATRACURIUM BESYLATE 2 MG/ML
10 INJECTION INTRAVENOUS ONCE
Qty: 0 | Refills: 0 | Status: COMPLETED | OUTPATIENT
Start: 2018-06-27 | End: 2018-06-27

## 2018-06-27 RX ORDER — POTASSIUM CHLORIDE 20 MEQ
20 PACKET (EA) ORAL
Qty: 0 | Refills: 0 | Status: COMPLETED | OUTPATIENT
Start: 2018-06-27 | End: 2018-06-27

## 2018-06-27 RX ORDER — ALBUMIN HUMAN 25 %
250 VIAL (ML) INTRAVENOUS ONCE
Qty: 0 | Refills: 0 | Status: COMPLETED | OUTPATIENT
Start: 2018-06-27 | End: 2018-06-27

## 2018-06-27 RX ORDER — FENTANYL CITRATE 50 UG/ML
25 INJECTION INTRAVENOUS ONCE
Qty: 0 | Refills: 0 | Status: DISCONTINUED | OUTPATIENT
Start: 2018-06-27 | End: 2018-06-27

## 2018-06-27 RX ORDER — DESMOPRESSIN ACETATE 0.1 MG/1
20 TABLET ORAL ONCE
Qty: 0 | Refills: 0 | Status: COMPLETED | OUTPATIENT
Start: 2018-06-27 | End: 2018-06-27

## 2018-06-27 RX ORDER — BUMETANIDE 0.25 MG/ML
4 INJECTION INTRAMUSCULAR; INTRAVENOUS ONCE
Qty: 0 | Refills: 0 | Status: COMPLETED | OUTPATIENT
Start: 2018-06-27 | End: 2018-06-27

## 2018-06-27 RX ORDER — CALCIUM CHLORIDE
1000 POWDER (GRAM) MISCELLANEOUS ONCE
Qty: 0 | Refills: 0 | Status: COMPLETED | OUTPATIENT
Start: 2018-06-27 | End: 2018-06-27

## 2018-06-27 RX ORDER — MAGNESIUM SULFATE 500 MG/ML
2 VIAL (ML) INJECTION ONCE
Qty: 0 | Refills: 0 | Status: COMPLETED | OUTPATIENT
Start: 2018-06-27 | End: 2018-06-27

## 2018-06-27 RX ORDER — ALBUMIN HUMAN 25 %
50 VIAL (ML) INTRAVENOUS
Qty: 0 | Refills: 0 | Status: COMPLETED | OUTPATIENT
Start: 2018-06-27 | End: 2018-06-27

## 2018-06-27 RX ORDER — CALCIUM GLUCONATE 100 MG/ML
2 VIAL (ML) INTRAVENOUS ONCE
Qty: 0 | Refills: 0 | Status: COMPLETED | OUTPATIENT
Start: 2018-06-27 | End: 2018-06-27

## 2018-06-27 RX ADMIN — Medication 50 MILLIGRAM(S): at 18:30

## 2018-06-27 RX ADMIN — Medication 50 MILLILITER(S): at 08:06

## 2018-06-27 RX ADMIN — Medication 1000 MILLIGRAM(S): at 19:30

## 2018-06-27 RX ADMIN — PANTOPRAZOLE SODIUM 40 MILLIGRAM(S): 20 TABLET, DELAYED RELEASE ORAL at 13:26

## 2018-06-27 RX ADMIN — FENTANYL CITRATE 25 MICROGRAM(S): 50 INJECTION INTRAVENOUS at 08:12

## 2018-06-27 RX ADMIN — Medication 100 MILLIGRAM(S): at 22:28

## 2018-06-27 RX ADMIN — Medication 100 MILLIGRAM(S): at 13:32

## 2018-06-27 RX ADMIN — Medication 50 MILLILITER(S): at 07:30

## 2018-06-27 RX ADMIN — Medication 50 MILLIGRAM(S): at 03:07

## 2018-06-27 RX ADMIN — Medication 125 MILLILITER(S): at 08:07

## 2018-06-27 RX ADMIN — DESMOPRESSIN ACETATE 220 MICROGRAM(S): 0.1 TABLET ORAL at 23:37

## 2018-06-27 RX ADMIN — Medication 100 MILLIGRAM(S): at 16:30

## 2018-06-27 RX ADMIN — Medication 8.79 MICROGRAM(S)/KG/MIN: at 23:04

## 2018-06-27 RX ADMIN — BUMETANIDE 4 MILLIGRAM(S): 0.25 INJECTION INTRAMUSCULAR; INTRAVENOUS at 22:25

## 2018-06-27 RX ADMIN — Medication 50 MILLIEQUIVALENT(S): at 06:41

## 2018-06-27 RX ADMIN — CHLORHEXIDINE GLUCONATE 5 MILLILITER(S): 213 SOLUTION TOPICAL at 03:08

## 2018-06-27 RX ADMIN — Medication 100 MILLIGRAM(S): at 05:40

## 2018-06-27 RX ADMIN — CHLORHEXIDINE GLUCONATE 5 MILLILITER(S): 213 SOLUTION TOPICAL at 05:40

## 2018-06-27 RX ADMIN — Medication 1 DROP(S): at 06:10

## 2018-06-27 RX ADMIN — Medication 50 MILLIGRAM(S): at 11:00

## 2018-06-27 RX ADMIN — FENTANYL CITRATE 25 MICROGRAM(S): 50 INJECTION INTRAVENOUS at 07:30

## 2018-06-27 RX ADMIN — Medication 50 MILLIEQUIVALENT(S): at 02:07

## 2018-06-27 RX ADMIN — CISATRACURIUM BESYLATE 10 MILLIGRAM(S): 2 INJECTION INTRAVENOUS at 02:05

## 2018-06-27 RX ADMIN — Medication 100 MILLIGRAM(S): at 23:37

## 2018-06-27 RX ADMIN — FENTANYL CITRATE 25 MICROGRAM(S): 50 INJECTION INTRAVENOUS at 13:00

## 2018-06-27 RX ADMIN — Medication 50 MILLIEQUIVALENT(S): at 04:31

## 2018-06-27 RX ADMIN — Medication 50 GRAM(S): at 22:26

## 2018-06-27 RX ADMIN — CHLORHEXIDINE GLUCONATE 5 MILLILITER(S): 213 SOLUTION TOPICAL at 22:29

## 2018-06-27 RX ADMIN — FENTANYL CITRATE 25 MICROGRAM(S): 50 INJECTION INTRAVENOUS at 13:30

## 2018-06-27 RX ADMIN — Medication 50 GRAM(S): at 00:07

## 2018-06-27 RX ADMIN — Medication 1 DROP(S): at 19:30

## 2018-06-27 RX ADMIN — CHLORHEXIDINE GLUCONATE 5 MILLILITER(S): 213 SOLUTION TOPICAL at 14:00

## 2018-06-27 RX ADMIN — CHLORHEXIDINE GLUCONATE 5 MILLILITER(S): 213 SOLUTION TOPICAL at 17:27

## 2018-06-27 RX ADMIN — FLUCONAZOLE 100 MILLIGRAM(S): 150 TABLET ORAL at 13:28

## 2018-06-27 RX ADMIN — Medication 50 MILLIEQUIVALENT(S): at 05:40

## 2018-06-27 RX ADMIN — CHLORHEXIDINE GLUCONATE 5 MILLILITER(S): 213 SOLUTION TOPICAL at 17:26

## 2018-06-27 RX ADMIN — Medication 200 GRAM(S): at 08:12

## 2018-06-27 RX ADMIN — Medication 50 MILLIEQUIVALENT(S): at 00:06

## 2018-06-27 NOTE — PROGRESS NOTE ADULT - SUBJECTIVE AND OBJECTIVE BOX
INTERVAL HPI/OVERNIGHT EVENTS:    POD#6 Reop AVR/ascending aortic replacement/ MV and TV repair; CABG x 2  Impella & IABP placement - IABP out 6/23; impella remains in place  EF 15%    Allergies: PCN - reaction unknown  Abx: Aztreonam/Flagyl/Vancomycin    68yo male with Hx congenital AV dz -  repair/replacement (11yo - [facility]) being evaluated for hernia repair.  Preop risk assessment performed  ECHO/Cath (Completed 06/2018.) EF 25%, severe AS, severe MR, severe TR, severe pHTN, and 2V CAD.   Patient with sxs progressive FLORENCE and dec exercise tolerance for several months    transferred to [facility] for assessment and intervention  To OR 6/21 - 12 UpRBC/8 FFP/4 platelet/FEIBA and Mary 7 given  6/22 - washout and chest closure performed  later in day/evening on 6/22 - inc pressor requirements; inc CVP; drop in UO --> tamponade  chest opened at bedside (Dr Benedict) with improvement - reportedly approx 1L blood noted right chest  repeat wash out performed 6/23 with removal of clot per d/w Dr Benedict - pseudotamponade  IABP and sheath removed - 6/23    serial attempts at closure, but reopened chest 22/23 - closure 6/25  overnight - increase in PA systolic pressure (near systemic); inc pressor requirements/inotropic support and difficulty ventilating patient    HCP - son Hernando contacted and informed of clinical deterioration and poor prognosis prior to re-opening chest today  significant clot noted in chest tubes and bleeding from chest wall - given 4 UpRBC/1 platelet/1 FFP/5 Cryo during procedure  defer details of findings to CTS procedure note    chest left open   still with significant pressor requirements and       ICU Vital Signs Last 24 Hrs  T(C): 36.2 (27 Jun 2018 10:00), Max: 36.2 (27 Jun 2018 10:00)  T(F): 97.2 (27 Jun 2018 10:00), Max: 97.2 (27 Jun 2018 10:00)  HR: 94 (27 Jun 2018 13:00) (94 - 99)  BP: --  BP(mean): --  ABP: 114/72 (27 Jun 2018 13:00) (88/58 - 186/78)  ABP(mean): 82 (27 Jun 2018 13:00) (62 - 104)  RR: 20 (27 Jun 2018 11:00) (12 - 20)  SpO2: 98% (27 Jun 2018 13:00) (90% - 100%)    Qtts:     I&O's Summary    26 Jun 2018 07:01  -  27 Jun 2018 07:00  --------------------------------------------------------  IN: 7583.5 mL / OUT: 5680 mL / NET: 1903.5 mL    27 Jun 2018 07:01  -  27 Jun 2018 13:50  --------------------------------------------------------  IN: 3204.3 mL / OUT: 2535 mL / NET: 669.3 mL        Mode: AC/ CMV (Assist Control/ Continuous Mandatory Ventilation)  RR (machine): 20  TV (machine): 400  FiO2: 100  PEEP: 5  ITime: 10  MAP: 16  PIP: 36      Physical Exam    Heart  Lungs  Abd  Ext  Chest  Neuro  Skin    LABS:                        9.0    6.2   )-----------( 89       ( 27 Jun 2018 10:59 )             25.4     06-27    126<L>  |  91<L>  |  17  ----------------------------<  193<H>  4.2   |  23  |  0.80    Ca    7.5<L>      27 Jun 2018 10:59  Phos  4.6     06-27  Mg     2.0     06-27    TPro  3.8<L>  /  Alb  2.6<L>  /  TBili  9.0<H>  /  DBili  x   /  AST  38  /  ALT  12  /  AlkPhos  24<L>  06-27    PT/INR - ( 27 Jun 2018 10:59 )   PT: 17.0 sec;   INR: 1.52          PTT - ( 27 Jun 2018 10:59 )  PTT:43.6 sec    ABG - ( 27 Jun 2018 10:53 )  pH, Arterial: 7.38  pH, Blood: x     /  pCO2: 41    /  pO2: 225   / HCO3: 24    / Base Excess: -1.3  /  SaO2: 99                  RADIOLOGY & ADDITIONAL STUDIES:    I have spent/provided stated minutes of critical care time to this patient: INTERVAL HPI/OVERNIGHT EVENTS:    POD#6 Reop AVR/ascending aortic replacement/ MV and TV repair; CABG x 2  Impella & IABP placement - IABP out ; impella remains in place  EF 15%    Allergies: PCN - reaction unknown  Abx: Aztreonam/Flagyl/Vancomycin    68yo male with Hx congenital AV dz -  repair/replacement (11yo - [facility]) being evaluated for hernia repair.  Preop risk assessment performed  ECHO/Cath (Completed 2018.) EF 25%, severe AS, severe MR, severe TR, severe pHTN, and 2V CAD.   Patient with sxs progressive FLORENCE and dec exercise tolerance for several months    transferred to [facility] for assessment and intervention  To OR  - 12 UpRBC/8 FFP/4 platelet/FEIBA and Mary 7 given   - washout and chest closure performed  later in day/evening on  - inc pressor requirements; inc CVP; drop in UO --> tamponade  chest opened at bedside (Dr Benedict) with improvement - reportedly approx 1L blood noted right chest  repeat wash out performed  with removal of clot per d/w Dr Benedict - pseudotamponade  IABP and sheath removed -     serial attempts at closure, but reopened chest  - closure   overnight - increase in PA systolic pressure (near systemic); inc pressor requirements/inotropic support and difficulty ventilating patient    HCP - son Hernando contacted and informed of clinical deterioration and poor prognosis prior to re-opening chest today  significant clot noted in chest tubes and bleeding from chest wall - given 4 UpRBC/1 platelet/1 FFP/5 Cryo during procedure  defer details of findings to CTS procedure note    chest left open   still with significant pressor requirements and open chest bandage suggestive of ongoing bleeding  family updated at bedside -     ICU Vital Signs Last 24 Hrs  T(C): 36.2 (2018 10:00), Max: 36.2 (2018 10:00)  T(F): 97.2 (2018 10:00), Max: 97.2 (2018 10:00)  HR: 94 (2018 13:00) (94 - 99) paced  ABP: 114/72 (2018 13:00) (88/58 - 186/78)  ABP(mean): 82 (2018 13:00) (62 - 104)  SpO2: 98% (2018 13:00) (90% - 100%)    Qtts:   Epi 0.1  Dio 1.5  Vaso 6   15  Primacor 0.5  Nimbex    I&O's Summary    2018 07:  -  2018 07:00  --------------------------------------------------------  IN: 7583.5 mL / OUT: 5680 mL / NET: 1903.5 mL    2018 07:  -  2018 13:50  --------------------------------------------------------  IN: 3204.3 mL / OUT: 2535 mL / NET: 669.3 mL    Vent settings AC 20/400/100/5 Ana Luisa 20 PPM    Physical Exam    Heart - regular (-)rub/gallop  Lungs - scattered rhonchi - no wheeze - BS appreciated bilaterally  Abd - dec BS - ND - (+)edema  Ext - cool to touch; dopplerable pulses LE (unchanged from prior)  Chest - open chest - good seal  Neuro - pupils small but reactive; otherwise unable at this time  Skin - no rash    LABS:                        9.0    6.2   )-----------( 89       ( 2018 10:59 )             25.4         126<L>  |  91<L>  |  17  ----------------------------<  193<H>  4.2   |  23  |  0.80    Ca    7.5<L>      2018 10:59  Phos  4.6       Mg     2.0         TPro  3.8<L>  /  Alb  2.6<L>  /  TBili  9.0<H>  /  DBili  x   /  AST  38  /  ALT  12  /  AlkPhos  24<L>      PT/INR - ( 2018 10:59 )   PT: 17.0 sec;   INR: 1.52     PTT - ( 2018 10:59 )  PTT:43.6 sec    ABG - ( 2018 10:53 ) 7.38/41/225/99    RADIOLOGY & ADDITIONAL STUDIES: reviewed    POD#3 Reop AVR/ascending aortic replacement/ MV and TV repair; CABG x 2  Impella & IABP placement  EF 15%    68yo male with Hx congenital AV dz -  repair/replacement (11yo - [facility]) being evaluated for hernia repair found to have EF 25%, severe AS, severe MR, severe TR, severe pHTN, and 2V CAD on Preop risk assessment Now POD#6 with post-op cardiogenic shock/biventricular dysfunction requiring significant support and ongoing deterioration despite multiple pressors/inotropes/mechanical assist device and serial washouts    1. CV  profound cardiogenic shock - ongoing deterioration despite near max supportive efforts - family aware of poor prognosis   remains on impella ; dobutamine inc to 15 and primacor inc to 0.5  paced; remains on Ana Luisa for RV dysfunction  Heparin adjusted - and placed back in impella circuit - pTT correlated with ACT with perfusionist assistance  bedside washout (Hemli)  and  and again today - significant clot- plan OR wash out in am  with possible chest closure -   maintain comfort - fentanyl restarted farzad while remains on paralytic  Abx in light of open chest - normal Cr and excellent UO - vanco random level noted - ? lab error - to repeat =- hold vanco pending repeat level  Impella inc back up to p8 in light of deterioration     2. Pulm   chest tube clotted this am - replaced by CTS  output - correct coagulopathy as much as able - heparin with pTT goal 65-70  attempted serial vent adjustments and modes with minimal requirements - serial ABG  Ana Luisa for RV dysfunction NOT hypoxemia  monitor fluid balance  alkalosis  - responded to inc sedation/paralytic and vent adjustments - serial abg to monitor.     3. Renal   on lasix infusion   monitor UO/Lytes and Cr - supplement lytes prn  diuresis and negative fluid balance the goal over next 24 hours    4. Endocrine  insulin infusion per protocol - noted hypoglycemic this am   dextrose given per protocol and insulin infusion off  HgA1c 6.2 preop   maintain glycemic control/euglycemia - 112/136; noted glucose 27 error    Change IV protonix infusion to IV qd - no evidence of GIB and not infusing at this time  d/w staff and CTS        I have spent/provided stated minutes of critical care time to this patient: 2.5 hours INTERVAL HPI/OVERNIGHT EVENTS:    POD#6 Reop AVR/ascending aortic replacement/ MV and TV repair; CABG x 2  Impella & IABP placement - IABP out ; impella remains in place  EF 15%    Allergies: PCN - reaction unknown  Abx: Aztreonam/Flagyl/Vancomycin    68yo male with Hx congenital AV dz -  repair/replacement (13yo - [facility]) being evaluated for hernia repair.  Preop risk assessment performed  ECHO/Cath (Completed 2018.) EF 25%, severe AS, severe MR, severe TR, severe pHTN, and 2V CAD.   Patient with sxs progressive FLORENCE and dec exercise tolerance for several months    transferred to [facility] for assessment and intervention  To OR  - 12 UpRBC/8 FFP/4 platelet/FEIBA and Mary 7 given   - washout and chest closure performed  later in day/evening on  - inc pressor requirements; inc CVP; drop in UO --> tamponade  chest opened at bedside (Dr Benedict) with improvement - reportedly approx 1L blood noted right chest  repeat wash out performed  with removal of clot per d/w Dr Benedict - pseudotamponade  IABP and sheath removed -     serial attempts at closure, but reopened chest  - closure   overnight - increase in PA systolic pressure (near systemic); inc pressor requirements/inotropic support and difficulty ventilating patient    HCP - son Hernando contacted and informed of clinical deterioration and poor prognosis prior to re-opening chest today  significant clot noted in chest tubes and bleeding from chest wall - given 4 UpRBC/1 platelet/1 FFP/5 Cryo during procedure  defer details of findings to CTS procedure note    chest left open   still with significant pressor requirements and open chest bandage suggestive of ongoing bleeding  now not making urine or responsive to diuretic given  - infusion stil running and no response to 100mg lasix  HD cath placed  family updated at bedside -     ICU Vital Signs Last 24 Hrs  T(C): 36.2 (2018 10:00), Max: 36.2 (2018 10:00)  T(F): 97.2 (2018 10:00), Max: 97.2 (2018 10:00)  HR: 94 (2018 13:00) (94 - 99) paced  ABP: 114/72 (2018 13:00) (88/58 - 186/78)  ABP(mean): 82 (2018 13:00) (62 - 104)  SpO2: 98% (2018 13:00) (90% - 100%)    Qtts:   Epi 0.1  Dio 1.5  Vaso 6   15  Primacor 0.5  Nimbex    I&O's Summary    2018 07:  -  2018 07:00  --------------------------------------------------------  IN: 7583.5 mL / OUT: 5680 mL / NET: 1903.5 mL    2018 07:  -  2018 13:50  --------------------------------------------------------  IN: 3204.3 mL / OUT: 2535 mL / NET: 669.3 mL    Vent settings AC 20/400/100/5 Ana Luisa 20 PPM    Physical Exam    Heart - regular (-)rub/gallop  Lungs - scattered rhonchi - no wheeze - BS appreciated bilaterally  Abd - dec BS - ND - (+)edema  Ext - cool to touch; dopplerable pulses LE (unchanged from prior)  Chest - open chest - good seal  Neuro - pupils small but reactive; otherwise unable at this time  Skin - no rash    LABS:                        9.0    6.2   )-----------( 89       ( 2018 10:59 )             25.4     06-27    126<L>  |  91<L>  |  17  ----------------------------<  193<H>  4.2   |  23  |  0.80    Ca    7.5<L>      2018 10:59  Phos  4.6       Mg     2.0         TPro  3.8<L>  /  Alb  2.6<L>  /  TBili  9.0<H>  /  DBili  x   /  AST  38  /  ALT  12  /  AlkPhos  24<L>  06-27    PT/INR - ( 2018 10:59 )   PT: 17.0 sec;   INR: 1.52     PTT - ( 2018 10:59 )  PTT:43.6 sec    ABG - ( 2018 10:53 ) 7.38/41/225/99    RADIOLOGY & ADDITIONAL STUDIES: reviewed    POD#3 Reop AVR/ascending aortic replacement/ MV and TV repair; CABG x 2  Impella & IABP placement  EF 15%    68yo male with Hx congenital AV dz -  repair/replacement (13yo - [facility]) being evaluated for hernia repair found to have EF 25%, severe AS, severe MR, severe TR, severe pHTN, and 2V CAD on Preop risk assessment Now POD#6 with post-op cardiogenic shock/biventricular dysfunction requiring significant support and ongoing deterioration despite multiple pressors/inotropes/mechanical assist device and serial washouts    1. CV  profound cardiogenic shock - ongoing deterioration despite near max supportive efforts - family aware of poor prognosis   remains on impella ; dobutamine inc to 15 and primacor inc to 0.5  paced; remains on Ana Luisa for RV dysfunction  Heparin adjusted - and placed back in impella circuit - pTT correlated with ACT with perfusionist assistance  bedside washout (Hemli)  and  and again today - significant clot- plan OR wash out in am  with possible chest closure -   maintain comfort - fentanyl restarted farzad while remains on paralytic  Abx in light of open chest -      2. Pulm   chest tube clotted this am - stripped/additional CT placed and chest opened  multiple vent modes attempted in light of high airway pressures/difficulty ventilating  attempted serial vent adjustments and modes with minimal requirements - serial ABG  Ana Luisa for RV dysfunction     3. Renal   on lasix infusion ; HD cath placed - aquapharesis started - may need CVVH  monitor UO/Lytes and Cr -     4. Endocrine  insulin infusion per protocol - noted hypoglycemic this am   dextrose given per protocol and insulin infusion off  HgA1c 6.2 preop   maintain glycemic control/euglycemia     ongoing clinical deterioration despite supportive efforts - family made aware of very guarded prognosis  d/w multiple family members, staff and CTS    I have spent/provided stated minutes of critical care time to this patient: 2.5 hours

## 2018-06-27 NOTE — PROGRESS NOTE ADULT - SUBJECTIVE AND OBJECTIVE BOX
CTICU  CRITICAL  CARE  attending     Hand off received @ 7p					   Pertinent clinical, laboratory, radiographic, hemodynamic, echocardiographic, respiratory data, microbiologic data and chart were reviewed and analyzed frequently throughout the course of the day and night  Patient seen and examined with CTS/ SH attending at bedside    Pt is a 67y , Male,  CAD (coronary artery disease): CAD (coronary artery disease)  Valvular disease: Valvular disease      , FAMILY HISTORY:  PAST MEDICAL & SURGICAL HISTORY:  No pertinent past medical history  No significant past surgical history    Patient is a 67y old  Male who presents with a chief complaint of AS (19 Jun 2018 00:06)      14 system review was unremarkable  acute changes include acute respiratory failure  Vital signs, hemodynamic and respiratory parameters were reviewed from the bedside nursing flowsheet.  ICU Vital Signs Last 24 Hrs  T(C): 35.7 (27 Jun 2018 20:00), Max: 36.2 (27 Jun 2018 10:00)  T(F): 96.3 (27 Jun 2018 20:00), Max: 97.2 (27 Jun 2018 10:00)  HR: 100 (27 Jun 2018 23:00) (94 - 100)  BP: --  BP(mean): --  ABP: 138/74 (27 Jun 2018 23:00) (72/60 - 178/114)  ABP(mean): 90 (27 Jun 2018 23:00) (62 - 130)  RR: 20 (27 Jun 2018 21:00) (18 - 20)  SpO2: 88% (27 Jun 2018 23:00) (73% - 100%)    Adult Advanced Hemodynamics Last 24 Hrs  CVP(mm Hg): 25 (27 Jun 2018 23:00) (17 - 44)  CVP(cm H2O): --  CO: --  CI: --  PA: 53/30 (27 Jun 2018 23:00) (40/20 - 99/42)  PA(mean): 40 (27 Jun 2018 23:00) (28 - 60)  PCWP: --  SVR: --  SVRI: --  PVR: --  PVRI: --, ABG - ( 27 Jun 2018 23:17 )  pH, Arterial: 7.39  pH, Blood: x     /  pCO2: 36    /  pO2: 125   / HCO3: 21    / Base Excess: -3.1  /  SaO2: 99                Mode: AC/ CMV (Assist Control/ Continuous Mandatory Ventilation)  RR (machine): 20  TV (machine): 400  FiO2: 100  PEEP: 5  ITime: 1.2  MAP: 17  PIP: 37    Intake and output was reviewed and the fluid balance was calculated  Daily     Daily   I&O's Summary    26 Jun 2018 07:01  -  27 Jun 2018 07:00  --------------------------------------------------------  IN: 7583.5 mL / OUT: 5680 mL / NET: 1903.5 mL    27 Jun 2018 07:01  -  27 Jun 2018 23:40  --------------------------------------------------------  IN: 5463.5 mL / OUT: 4966 mL / NET: 497.5 mL        All lines and drain sites were assessed  [facility]      POCT Blood Glucose.: 130 mg/dL (27 Jun 2018 06:47)    No acute change in mental status  Auscultation of the chest reveals equal bs  Abdomen is soft  Extremities are warm and well perfused  Wounds appear clean and unremarkable  Antibiotics are periop    labs  CBC Full  -  ( 27 Jun 2018 19:01 )  WBC Count : 7.8 K/uL  Hemoglobin : 7.7 g/dL  Hematocrit : 21.3 %  Platelet Count - Automated : 59 K/uL  Mean Cell Volume : 82.2 fL  Mean Cell Hemoglobin : 29.7 pg  Mean Cell Hemoglobin Concentration : 36.2 g/dL  Auto Neutrophil # : x  Auto Lymphocyte # : x  Auto Monocyte # : x  Auto Eosinophil # : x  Auto Basophil # : x  Auto Neutrophil % : x  Auto Lymphocyte % : x  Auto Monocyte % : x  Auto Eosinophil % : x  Auto Basophil % : x    06-27    122<L>  |  89<L>  |  23  ----------------------------<  258<H>  4.6   |  22  |  1.01    Ca    7.7<L>      27 Jun 2018 19:01  Phos  4.9     06-27  Mg     1.9     06-27    TPro  3.3<L>  /  Alb  2.2<L>  /  TBili  10.8<H>  /  DBili  x   /  AST  59<H>  /  ALT  14  /  AlkPhos  31<L>  06-27    PT/INR - ( 27 Jun 2018 19:01 )   PT: 16.3 sec;   INR: 1.46          PTT - ( 27 Jun 2018 19:01 )  PTT:38.2 sec  The current medications were reviewed   MEDICATIONS  (STANDING):  artificial  tears Solution 1 Drop(s) Both EYES two times a day  aztreonam  IVPB 1000 milliGRAM(s) IV Intermittent every 8 hours  chlorhexidine 0.12% Liquid 5 milliLiter(s) Swish and Spit every 4 hours  cisatracurium Infusion 3 MICROgram(s)/kG/Min (10.548 mL/Hr) IV Continuous <Continuous>  dexmedetomidine Infusion 0.3 MICROgram(s)/kG/Hr (4.395 mL/Hr) IV Continuous <Continuous>  dextrose 50% Injectable 50 milliLiter(s) IV Push every 15 minutes  dextrose 50% Injectable 25 milliLiter(s) IV Push every 15 minutes  dextrose 50% Injectable 25 milliLiter(s) IV Push once  DOBUTamine Infusion 5 MICROgram(s)/kG/Min (8.79 mL/Hr) IV Continuous <Continuous>  epinephrine 8 milliGRAM(s),dextrose 5% in water 250 milliLiter(s) 8 milliGRAM(s) (10.42 mL/Hr) IV Continuous <Continuous>  epoprostenol Infusion 2 NANOGram(s)/kG/Min (1.406 mL/Hr) IV Continuous <Continuous>  fentaNYL   Infusion. 0.5 MICROgram(s)/kG/Hr (2.93 mL/Hr) IV Continuous <Continuous>  fluconAZOLE IVPB 400 milliGRAM(s) IV Intermittent every 24 hours  furosemide Infusion 5 mG/Hr (2.5 mL/Hr) IV Continuous <Continuous>  Heparin 22375 Unit(s),Dextrose 5% 1000 milliLiter(s) 66288 Unit(s) (25 mL/Hr) IV Continuous <Continuous>  hydrocortisone sodium succinate Injectable 100 milliGRAM(s) IV Push every 8 hours  insulin Infusion 5 Unit(s)/Hr (5 mL/Hr) IV Continuous <Continuous>  metroNIDAZOLE  IVPB 500 milliGRAM(s) IV Intermittent every 8 hours  milrinone Infusion 0.5 MICROgram(s)/kG/Min (8.79 mL/Hr) IV Continuous <Continuous>  nitroglycerin  Infusion 5 MICROgram(s)/Min (1.5 mL/Hr) IV Continuous <Continuous>  norepinephrine Infusion 0.05 MICROgram(s)/kG/Min (5.494 mL/Hr) IV Continuous <Continuous>  pantoprazole  Injectable 40 milliGRAM(s) IV Push daily  phenylephrine    Infusion 0.5 MICROgram(s)/kG/Min (5.494 mL/Hr) IV Continuous <Continuous>  propofol Infusion 5 MICROgram(s)/kG/Min (1.758 mL/Hr) IV Continuous <Continuous>  sodium chloride 0.9%. 1000 milliLiter(s) (10 mL/Hr) IV Continuous <Continuous>  vasopressin Infusion 0.03 Unit(s)/Min (1.8 mL/Hr) IV Continuous <Continuous>    MEDICATIONS  (PRN):       PROBLEM LIST/ ASSESSMENT:  HEALTH ISSUES - PROBLEM Dx:  CAD (coronary artery disease): CAD (coronary artery disease)  Valvular disease: Valvular disease      ,   Patient is a 67y old  Male who presents with a chief complaint of AS (19 Jun 2018 00:06)     s/p   acute changes include acute respiratory failure    My plan includes :  close hemodynamic, ventilatory and drain monitoring and management per post op routine    Monitor for arrhythmias and monitor parameters for organ perfusion  monitor neurologic status  Head of the bed should remain elevated to 45 deg .   chest PT and IS will be encouraged  monitor adequacy of oxygenation and ventilation and attempt to wean oxygen  Nutritional goals will be met using po eventually , ensure adequate caloric intake and montior the same  Stress ulcer and VTE prophylaxis will be achieved    Glycemic control is satisfactory  Electrolytes have been repleted as necessary and wound care has been carried out. Pain control has been achieved.   agressive physical therapy and early mobility and ambulation goals will be met   The family was updated about the course and plan  CRITICAL CARE TIME SPENT in evaluation and management, reassessments, review and interpretation of labs and x-rays, ventilator and hemodynamic management, formulating a plan and coordinating care: __30__ MIN.  Time does not include procedural time.  CTICU ATTENDING     					    Rai Singer MD CTICU  CRITICAL  CARE  attending     Hand off received @ 7p					   Pertinent clinical, laboratory, radiographic, hemodynamic, echocardiographic, respiratory data, microbiologic data and chart were reviewed and analyzed frequently throughout the course of the day and night  Patient seen and examined with CTS/ SH attending at bedside    Pt is a 67y , Male,  post op day # 6 s/p AVR, ascending aorta, TV/MV repair, CABG X2.  received multiple blood products/volume arrived with open chest.  IABP and Impella for LV support.        current problems:    cardiogenic shock  LV assist with Impella support (p6)  Bleeding    7-11p    4 units PRBC  Cryo precipitate 10 units  1 unit each platelets/plasma  Aquapheresis UF rate up to 150 ml/he    CAD (coronary artery disease): CAD (coronary artery disease)  Valvular disease: Valvular disease      , FAMILY HISTORY:  PAST MEDICAL & SURGICAL HISTORY:  No pertinent past medical history  No significant past surgical history    Patient is a 67y old  Male who presents with a chief complaint of AS (19 Jun 2018 00:06)      14 system review was unremarkable  acute changes include acute respiratory failure  Vital signs, hemodynamic and respiratory parameters were reviewed from the bedside nursing flowsheet.  ICU Vital Signs Last 24 Hrs  T(C): 35.7 (27 Jun 2018 20:00), Max: 36.2 (27 Jun 2018 10:00)  T(F): 96.3 (27 Jun 2018 20:00), Max: 97.2 (27 Jun 2018 10:00)  HR: 100 (27 Jun 2018 23:00) (94 - 100)  BP: --  BP(mean): --  ABP: 138/74 (27 Jun 2018 23:00) (72/60 - 178/114)  ABP(mean): 90 (27 Jun 2018 23:00) (62 - 130)  RR: 20 (27 Jun 2018 21:00) (18 - 20)  SpO2: 88% (27 Jun 2018 23:00) (73% - 100%)    Adult Advanced Hemodynamics Last 24 Hrs  CVP(mm Hg): 25 (27 Jun 2018 23:00) (17 - 44)  CVP(cm H2O): --  CO: --  CI: --  PA: 53/30 (27 Jun 2018 23:00) (40/20 - 99/42)  PA(mean): 40 (27 Jun 2018 23:00) (28 - 60)  PCWP: --  SVR: --  SVRI: --  PVR: --  PVRI: --, ABG - ( 27 Jun 2018 23:17 )  pH, Arterial: 7.39  pH, Blood: x     /  pCO2: 36    /  pO2: 125   / HCO3: 21    / Base Excess: -3.1  /  SaO2: 99                Mode: AC/ CMV (Assist Control/ Continuous Mandatory Ventilation)  RR (machine): 20  TV (machine): 400  FiO2: 100  PEEP: 5  ITime: 1.2  MAP: 17  PIP: 37    Intake and output was reviewed and the fluid balance was calculated  Daily     Daily   I&O's Summary    26 Jun 2018 07:01  -  27 Jun 2018 07:00  --------------------------------------------------------  IN: 7583.5 mL / OUT: 5680 mL / NET: 1903.5 mL    27 Jun 2018 07:01  -  27 Jun 2018 23:40  --------------------------------------------------------  IN: 5463.5 mL / OUT: 4966 mL / NET: 497.5 mL        All lines and drain sites were assessed  [facility]      POCT Blood Glucose.: 130 mg/dL (27 Jun 2018 06:47)    No acute change in mental status  Auscultation of the chest reveals equal bs  Abdomen is soft  Extremities are warm and well perfused  Wounds appear clean and unremarkable  Antibiotics are periop    labs  CBC Full  -  ( 27 Jun 2018 19:01 )  WBC Count : 7.8 K/uL  Hemoglobin : 7.7 g/dL  Hematocrit : 21.3 %  Platelet Count - Automated : 59 K/uL  Mean Cell Volume : 82.2 fL  Mean Cell Hemoglobin : 29.7 pg  Mean Cell Hemoglobin Concentration : 36.2 g/dL  Auto Neutrophil # : x  Auto Lymphocyte # : x  Auto Monocyte # : x  Auto Eosinophil # : x  Auto Basophil # : x  Auto Neutrophil % : x  Auto Lymphocyte % : x  Auto Monocyte % : x  Auto Eosinophil % : x  Auto Basophil % : x    06-27    122<L>  |  89<L>  |  23  ----------------------------<  258<H>  4.6   |  22  |  1.01    Ca    7.7<L>      27 Jun 2018 19:01  Phos  4.9     06-27  Mg     1.9     06-27    TPro  3.3<L>  /  Alb  2.2<L>  /  TBili  10.8<H>  /  DBili  x   /  AST  59<H>  /  ALT  14  /  AlkPhos  31<L>  06-27    PT/INR - ( 27 Jun 2018 19:01 )   PT: 16.3 sec;   INR: 1.46          PTT - ( 27 Jun 2018 19:01 )  PTT:38.2 sec  The current medications were reviewed   MEDICATIONS  (STANDING):  artificial  tears Solution 1 Drop(s) Both EYES two times a day  aztreonam  IVPB 1000 milliGRAM(s) IV Intermittent every 8 hours  chlorhexidine 0.12% Liquid 5 milliLiter(s) Swish and Spit every 4 hours  cisatracurium Infusion 3 MICROgram(s)/kG/Min (10.548 mL/Hr) IV Continuous <Continuous>  dexmedetomidine Infusion 0.3 MICROgram(s)/kG/Hr (4.395 mL/Hr) IV Continuous <Continuous>  dextrose 50% Injectable 50 milliLiter(s) IV Push every 15 minutes  dextrose 50% Injectable 25 milliLiter(s) IV Push every 15 minutes  dextrose 50% Injectable 25 milliLiter(s) IV Push once  DOBUTamine Infusion 5 MICROgram(s)/kG/Min (8.79 mL/Hr) IV Continuous <Continuous>  epinephrine 8 milliGRAM(s),dextrose 5% in water 250 milliLiter(s) 8 milliGRAM(s) (10.42 mL/Hr) IV Continuous <Continuous>  epoprostenol Infusion 2 NANOGram(s)/kG/Min (1.406 mL/Hr) IV Continuous <Continuous>  fentaNYL   Infusion. 0.5 MICROgram(s)/kG/Hr (2.93 mL/Hr) IV Continuous <Continuous>  fluconAZOLE IVPB 400 milliGRAM(s) IV Intermittent every 24 hours  furosemide Infusion 5 mG/Hr (2.5 mL/Hr) IV Continuous <Continuous>  Heparin 56709 Unit(s),Dextrose 5% 1000 milliLiter(s) 85834 Unit(s) (25 mL/Hr) IV Continuous <Continuous>  hydrocortisone sodium succinate Injectable 100 milliGRAM(s) IV Push every 8 hours  insulin Infusion 5 Unit(s)/Hr (5 mL/Hr) IV Continuous <Continuous>  metroNIDAZOLE  IVPB 500 milliGRAM(s) IV Intermittent every 8 hours  milrinone Infusion 0.5 MICROgram(s)/kG/Min (8.79 mL/Hr) IV Continuous <Continuous>  nitroglycerin  Infusion 5 MICROgram(s)/Min (1.5 mL/Hr) IV Continuous <Continuous>  norepinephrine Infusion 0.05 MICROgram(s)/kG/Min (5.494 mL/Hr) IV Continuous <Continuous>  pantoprazole  Injectable 40 milliGRAM(s) IV Push daily  phenylephrine    Infusion 0.5 MICROgram(s)/kG/Min (5.494 mL/Hr) IV Continuous <Continuous>  propofol Infusion 5 MICROgram(s)/kG/Min (1.758 mL/Hr) IV Continuous <Continuous>  sodium chloride 0.9%. 1000 milliLiter(s) (10 mL/Hr) IV Continuous <Continuous>  vasopressin Infusion 0.03 Unit(s)/Min (1.8 mL/Hr) IV Continuous <Continuous>    MEDICATIONS  (PRN):       PROBLEM LIST/ ASSESSMENT:  HEALTH ISSUES - PROBLEM Dx:    cardiogenic shock  acute post hemorrhagic anemia  resp failure with hypoxemia  fluid over;load  acute oliguric renal failure      CAD (coronary artery disease): CAD (coronary artery disease)  Valvular disease: Valvular disease      ,   Patient is a 67y old  Male who presents with a chief complaint of AS (19 Jun 2018 00:06)     s/p AVR, ascending aorta, TV/MV repair, CABG X2.      My plan includes :  close hemodynamic, ventilatory and drain monitoring and management per post op routine    Monitor for arrhythmias and monitor parameters for organ perfusion  monitor neurologic status  Head of the bed should remain elevated to 45 deg .   chest PT and IS will be encouraged  monitor adequacy of oxygenation and ventilation and attempt to wean oxygen  Nutritional goals will be met using po eventually , ensure adequate caloric intake and montior the same  Stress ulcer and VTE prophylaxis will be achieved    Glycemic control is satisfactory  Electrolytes have been repleted as necessary and wound care has been carried out. Pain control has been achieved.   agressive physical therapy and early mobility and ambulation goals will be met   The family was updated about the course and plan  CRITICAL CARE TIME SPENT in evaluation and management, reassessments, review and interpretation of labs and x-rays, ventilator and hemodynamic management, formulating a plan and coordinating care: __30__ MIN.  Time does not include procedural time.  CTICU ATTENDING     					    Rai Singer MD

## 2018-06-28 DIAGNOSIS — D64.9 ANEMIA, UNSPECIFIED: ICD-10-CM

## 2018-06-28 DIAGNOSIS — E87.1 HYPO-OSMOLALITY AND HYPONATREMIA: ICD-10-CM

## 2018-06-28 DIAGNOSIS — N17.9 ACUTE KIDNEY FAILURE, UNSPECIFIED: ICD-10-CM

## 2018-06-28 LAB
ALBUMIN SERPL ELPH-MCNC: 2.6 G/DL — LOW (ref 3.3–5)
ALBUMIN SERPL ELPH-MCNC: 3.2 G/DL — LOW (ref 3.3–5)
ALBUMIN SERPL ELPH-MCNC: 3.2 G/DL — LOW (ref 3.3–5)
ALP SERPL-CCNC: 32 U/L — LOW (ref 40–120)
ALP SERPL-CCNC: 34 U/L — LOW (ref 40–120)
ALP SERPL-CCNC: 36 U/L — LOW (ref 40–120)
ALT FLD-CCNC: 18 U/L — SIGNIFICANT CHANGE UP (ref 10–45)
ALT FLD-CCNC: 19 U/L — SIGNIFICANT CHANGE UP (ref 10–45)
ALT FLD-CCNC: 20 U/L — SIGNIFICANT CHANGE UP (ref 10–45)
ANION GAP SERPL CALC-SCNC: 13 MMOL/L — SIGNIFICANT CHANGE UP (ref 5–17)
ANION GAP SERPL CALC-SCNC: 9 MMOL/L — SIGNIFICANT CHANGE UP (ref 5–17)
APTT BLD: 29.4 SEC — SIGNIFICANT CHANGE UP (ref 27.5–37.4)
APTT BLD: 29.8 SEC — SIGNIFICANT CHANGE UP (ref 27.5–37.4)
APTT BLD: 29.9 SEC — SIGNIFICANT CHANGE UP (ref 27.5–37.4)
APTT BLD: 31.1 SEC — SIGNIFICANT CHANGE UP (ref 27.5–37.4)
AST SERPL-CCNC: 69 U/L — HIGH (ref 10–40)
AST SERPL-CCNC: 69 U/L — HIGH (ref 10–40)
AST SERPL-CCNC: 79 U/L — HIGH (ref 10–40)
BILIRUB SERPL-MCNC: 11.8 MG/DL — HIGH (ref 0.2–1.2)
BILIRUB SERPL-MCNC: 12.8 MG/DL — HIGH (ref 0.2–1.2)
BILIRUB SERPL-MCNC: 14.3 MG/DL — HIGH (ref 0.2–1.2)
BUN SERPL-MCNC: 26 MG/DL — HIGH (ref 7–23)
BUN SERPL-MCNC: 28 MG/DL — HIGH (ref 7–23)
BUN SERPL-MCNC: 29 MG/DL — HIGH (ref 7–23)
BUN SERPL-MCNC: 32 MG/DL — HIGH (ref 7–23)
CALCIUM SERPL-MCNC: 10.1 MG/DL — SIGNIFICANT CHANGE UP (ref 8.4–10.5)
CALCIUM SERPL-MCNC: 9.2 MG/DL — SIGNIFICANT CHANGE UP (ref 8.4–10.5)
CALCIUM SERPL-MCNC: 9.5 MG/DL — SIGNIFICANT CHANGE UP (ref 8.4–10.5)
CALCIUM SERPL-MCNC: 9.6 MG/DL — SIGNIFICANT CHANGE UP (ref 8.4–10.5)
CHLORIDE SERPL-SCNC: 88 MMOL/L — LOW (ref 96–108)
CHLORIDE SERPL-SCNC: 90 MMOL/L — LOW (ref 96–108)
CHLORIDE SERPL-SCNC: 92 MMOL/L — LOW (ref 96–108)
CHLORIDE SERPL-SCNC: 94 MMOL/L — LOW (ref 96–108)
CO2 SERPL-SCNC: 21 MMOL/L — LOW (ref 22–31)
CO2 SERPL-SCNC: 21 MMOL/L — LOW (ref 22–31)
CO2 SERPL-SCNC: 23 MMOL/L — SIGNIFICANT CHANGE UP (ref 22–31)
CO2 SERPL-SCNC: 24 MMOL/L — SIGNIFICANT CHANGE UP (ref 22–31)
CORTIS AM PEAK SERPL-MCNC: 166.3 UG/DL — HIGH (ref 3.9–37.5)
CREAT SERPL-MCNC: 1.14 MG/DL — SIGNIFICANT CHANGE UP (ref 0.5–1.3)
CREAT SERPL-MCNC: 1.15 MG/DL — SIGNIFICANT CHANGE UP (ref 0.5–1.3)
CREAT SERPL-MCNC: 1.23 MG/DL — SIGNIFICANT CHANGE UP (ref 0.5–1.3)
CREAT SERPL-MCNC: 1.35 MG/DL — HIGH (ref 0.5–1.3)
GAS PNL BLDA: SIGNIFICANT CHANGE UP
GAS PNL BLDV: SIGNIFICANT CHANGE UP
GLUCOSE BLDC GLUCOMTR-MCNC: 101 MG/DL — HIGH (ref 70–99)
GLUCOSE BLDC GLUCOMTR-MCNC: 131 MG/DL — HIGH (ref 70–99)
GLUCOSE BLDC GLUCOMTR-MCNC: 143 MG/DL — HIGH (ref 70–99)
GLUCOSE BLDC GLUCOMTR-MCNC: 154 MG/DL — HIGH (ref 70–99)
GLUCOSE BLDC GLUCOMTR-MCNC: 162 MG/DL — HIGH (ref 70–99)
GLUCOSE BLDC GLUCOMTR-MCNC: 201 MG/DL — HIGH (ref 70–99)
GLUCOSE BLDC GLUCOMTR-MCNC: 253 MG/DL — HIGH (ref 70–99)
GLUCOSE BLDC GLUCOMTR-MCNC: 316 MG/DL — HIGH (ref 70–99)
GLUCOSE BLDC GLUCOMTR-MCNC: 54 MG/DL — LOW (ref 70–99)
GLUCOSE BLDC GLUCOMTR-MCNC: 56 MG/DL — LOW (ref 70–99)
GLUCOSE BLDC GLUCOMTR-MCNC: 83 MG/DL — SIGNIFICANT CHANGE UP (ref 70–99)
GLUCOSE BLDC GLUCOMTR-MCNC: 92 MG/DL — SIGNIFICANT CHANGE UP (ref 70–99)
GLUCOSE BLDC GLUCOMTR-MCNC: 93 MG/DL — SIGNIFICANT CHANGE UP (ref 70–99)
GLUCOSE SERPL-MCNC: 134 MG/DL — HIGH (ref 70–99)
GLUCOSE SERPL-MCNC: 198 MG/DL — HIGH (ref 70–99)
GLUCOSE SERPL-MCNC: 253 MG/DL — HIGH (ref 70–99)
GLUCOSE SERPL-MCNC: 56 MG/DL — LOW (ref 70–99)
HCT VFR BLD CALC: 26.1 % — LOW (ref 39–50)
HCT VFR BLD CALC: 27.9 % — LOW (ref 39–50)
HCT VFR BLD CALC: 28.8 % — LOW (ref 39–50)
HCT VFR BLD CALC: 30.1 % — LOW (ref 39–50)
HCT VFR BLD CALC: 30.6 % — LOW (ref 39–50)
HGB BLD-MCNC: 10 G/DL — LOW (ref 13–17)
HGB BLD-MCNC: 10.3 G/DL — LOW (ref 13–17)
HGB BLD-MCNC: 10.8 G/DL — LOW (ref 13–17)
HGB BLD-MCNC: 11 G/DL — LOW (ref 13–17)
HGB BLD-MCNC: 9.5 G/DL — LOW (ref 13–17)
INR BLD: 1.07 — SIGNIFICANT CHANGE UP (ref 0.88–1.16)
INR BLD: 1.11 — SIGNIFICANT CHANGE UP (ref 0.88–1.16)
INR BLD: 1.12 — SIGNIFICANT CHANGE UP (ref 0.88–1.16)
INR BLD: 1.16 — SIGNIFICANT CHANGE UP (ref 0.88–1.16)
LACTATE SERPL-SCNC: 2.2 MMOL/L — HIGH (ref 0.5–2)
LACTATE SERPL-SCNC: 2.6 MMOL/L — HIGH (ref 0.5–2)
LACTATE SERPL-SCNC: 2.8 MMOL/L — HIGH (ref 0.5–2)
LACTATE SERPL-SCNC: 3 MMOL/L — HIGH (ref 0.5–2)
MAGNESIUM SERPL-MCNC: 2.3 MG/DL — SIGNIFICANT CHANGE UP (ref 1.6–2.6)
MAGNESIUM SERPL-MCNC: 2.4 MG/DL — SIGNIFICANT CHANGE UP (ref 1.6–2.6)
MCHC RBC-ENTMCNC: 29.8 PG — SIGNIFICANT CHANGE UP (ref 27–34)
MCHC RBC-ENTMCNC: 30.2 PG — SIGNIFICANT CHANGE UP (ref 27–34)
MCHC RBC-ENTMCNC: 30.3 PG — SIGNIFICANT CHANGE UP (ref 27–34)
MCHC RBC-ENTMCNC: 30.3 PG — SIGNIFICANT CHANGE UP (ref 27–34)
MCHC RBC-ENTMCNC: 30.5 PG — SIGNIFICANT CHANGE UP (ref 27–34)
MCHC RBC-ENTMCNC: 35.8 G/DL — SIGNIFICANT CHANGE UP (ref 32–36)
MCHC RBC-ENTMCNC: 35.8 G/DL — SIGNIFICANT CHANGE UP (ref 32–36)
MCHC RBC-ENTMCNC: 35.9 G/DL — SIGNIFICANT CHANGE UP (ref 32–36)
MCHC RBC-ENTMCNC: 35.9 G/DL — SIGNIFICANT CHANGE UP (ref 32–36)
MCHC RBC-ENTMCNC: 36.4 G/DL — HIGH (ref 32–36)
MCV RBC AUTO: 81.8 FL — SIGNIFICANT CHANGE UP (ref 80–100)
MCV RBC AUTO: 84.3 FL — SIGNIFICANT CHANGE UP (ref 80–100)
MCV RBC AUTO: 84.3 FL — SIGNIFICANT CHANGE UP (ref 80–100)
MCV RBC AUTO: 84.7 FL — SIGNIFICANT CHANGE UP (ref 80–100)
MCV RBC AUTO: 84.8 FL — SIGNIFICANT CHANGE UP (ref 80–100)
PHOSPHATE SERPL-MCNC: 4.8 MG/DL — HIGH (ref 2.5–4.5)
PHOSPHATE SERPL-MCNC: 4.9 MG/DL — HIGH (ref 2.5–4.5)
PHOSPHATE SERPL-MCNC: 4.9 MG/DL — HIGH (ref 2.5–4.5)
PHOSPHATE SERPL-MCNC: 5.2 MG/DL — HIGH (ref 2.5–4.5)
PLATELET # BLD AUTO: 51 K/UL — LOW (ref 150–400)
PLATELET # BLD AUTO: 55 K/UL — LOW (ref 150–400)
PLATELET # BLD AUTO: 57 K/UL — LOW (ref 150–400)
PLATELET # BLD AUTO: 58 K/UL — LOW (ref 150–400)
PLATELET # BLD AUTO: 61 K/UL — LOW (ref 150–400)
POTASSIUM SERPL-MCNC: 3.8 MMOL/L — SIGNIFICANT CHANGE UP (ref 3.5–5.3)
POTASSIUM SERPL-MCNC: 4.2 MMOL/L — SIGNIFICANT CHANGE UP (ref 3.5–5.3)
POTASSIUM SERPL-MCNC: 4.3 MMOL/L — SIGNIFICANT CHANGE UP (ref 3.5–5.3)
POTASSIUM SERPL-MCNC: 4.5 MMOL/L — SIGNIFICANT CHANGE UP (ref 3.5–5.3)
POTASSIUM SERPL-SCNC: 3.8 MMOL/L — SIGNIFICANT CHANGE UP (ref 3.5–5.3)
POTASSIUM SERPL-SCNC: 4.2 MMOL/L — SIGNIFICANT CHANGE UP (ref 3.5–5.3)
POTASSIUM SERPL-SCNC: 4.3 MMOL/L — SIGNIFICANT CHANGE UP (ref 3.5–5.3)
POTASSIUM SERPL-SCNC: 4.5 MMOL/L — SIGNIFICANT CHANGE UP (ref 3.5–5.3)
PROT SERPL-MCNC: 4.1 G/DL — LOW (ref 6–8.3)
PROT SERPL-MCNC: 4.3 G/DL — LOW (ref 6–8.3)
PROT SERPL-MCNC: 4.3 G/DL — LOW (ref 6–8.3)
PROTHROM AB SERPL-ACNC: 11.9 SEC — SIGNIFICANT CHANGE UP (ref 9.8–12.7)
PROTHROM AB SERPL-ACNC: 12.4 SEC — SIGNIFICANT CHANGE UP (ref 9.8–12.7)
PROTHROM AB SERPL-ACNC: 12.5 SEC — SIGNIFICANT CHANGE UP (ref 9.8–12.7)
PROTHROM AB SERPL-ACNC: 12.9 SEC — HIGH (ref 9.8–12.7)
RBC # BLD: 3.19 M/UL — LOW (ref 4.2–5.8)
RBC # BLD: 3.31 M/UL — LOW (ref 4.2–5.8)
RBC # BLD: 3.4 M/UL — LOW (ref 4.2–5.8)
RBC # BLD: 3.57 M/UL — LOW (ref 4.2–5.8)
RBC # BLD: 3.61 M/UL — LOW (ref 4.2–5.8)
RBC # FLD: 14.9 % — SIGNIFICANT CHANGE UP (ref 10.3–16.9)
RBC # FLD: 15 % — SIGNIFICANT CHANGE UP (ref 10.3–16.9)
RBC # FLD: 15 % — SIGNIFICANT CHANGE UP (ref 10.3–16.9)
RBC # FLD: 15.2 % — SIGNIFICANT CHANGE UP (ref 10.3–16.9)
RBC # FLD: 15.2 % — SIGNIFICANT CHANGE UP (ref 10.3–16.9)
SODIUM SERPL-SCNC: 122 MMOL/L — LOW (ref 135–145)
SODIUM SERPL-SCNC: 124 MMOL/L — LOW (ref 135–145)
SODIUM SERPL-SCNC: 127 MMOL/L — LOW (ref 135–145)
SODIUM SERPL-SCNC: 128 MMOL/L — LOW (ref 135–145)
VANCOMYCIN TROUGH SERPL-MCNC: 8.4 UG/ML — LOW (ref 10–20)
WBC # BLD: 6.8 K/UL — SIGNIFICANT CHANGE UP (ref 3.8–10.5)
WBC # BLD: 7.3 K/UL — SIGNIFICANT CHANGE UP (ref 3.8–10.5)
WBC # BLD: 7.5 K/UL — SIGNIFICANT CHANGE UP (ref 3.8–10.5)
WBC # BLD: 8.5 K/UL — SIGNIFICANT CHANGE UP (ref 3.8–10.5)
WBC # BLD: 9.3 K/UL — SIGNIFICANT CHANGE UP (ref 3.8–10.5)
WBC # FLD AUTO: 6.8 K/UL — SIGNIFICANT CHANGE UP (ref 3.8–10.5)
WBC # FLD AUTO: 7.3 K/UL — SIGNIFICANT CHANGE UP (ref 3.8–10.5)
WBC # FLD AUTO: 7.5 K/UL — SIGNIFICANT CHANGE UP (ref 3.8–10.5)
WBC # FLD AUTO: 8.5 K/UL — SIGNIFICANT CHANGE UP (ref 3.8–10.5)
WBC # FLD AUTO: 9.3 K/UL — SIGNIFICANT CHANGE UP (ref 3.8–10.5)

## 2018-06-28 PROCEDURE — 99291 CRITICAL CARE FIRST HOUR: CPT

## 2018-06-28 PROCEDURE — 71045 X-RAY EXAM CHEST 1 VIEW: CPT | Mod: 26

## 2018-06-28 RX ORDER — SODIUM BICARBONATE 1 MEQ/ML
50 SYRINGE (ML) INTRAVENOUS ONCE
Qty: 0 | Refills: 0 | Status: COMPLETED | OUTPATIENT
Start: 2018-06-28 | End: 2018-06-28

## 2018-06-28 RX ORDER — DEXTROSE 50 % IN WATER 50 %
50 SYRINGE (ML) INTRAVENOUS ONCE
Qty: 0 | Refills: 0 | Status: COMPLETED | OUTPATIENT
Start: 2018-06-28 | End: 2018-06-28

## 2018-06-28 RX ORDER — SODIUM BICARBONATE 1 MEQ/ML
0.04 SYRINGE (ML) INTRAVENOUS
Qty: 50 | Refills: 0 | Status: DISCONTINUED | OUTPATIENT
Start: 2018-06-28 | End: 2018-06-29

## 2018-06-28 RX ORDER — BUMETANIDE 0.25 MG/ML
4 INJECTION INTRAMUSCULAR; INTRAVENOUS
Qty: 10 | Refills: 0 | Status: DISCONTINUED | OUTPATIENT
Start: 2018-06-28 | End: 2018-06-28

## 2018-06-28 RX ORDER — ALBUMIN HUMAN 25 %
50 VIAL (ML) INTRAVENOUS ONCE
Qty: 0 | Refills: 0 | Status: COMPLETED | OUTPATIENT
Start: 2018-06-28 | End: 2018-06-28

## 2018-06-28 RX ORDER — MORPHINE SULFATE 50 MG/1
1 CAPSULE, EXTENDED RELEASE ORAL ONCE
Qty: 0 | Refills: 0 | Status: DISCONTINUED | OUTPATIENT
Start: 2018-06-28 | End: 2018-06-28

## 2018-06-28 RX ORDER — FENTANYL CITRATE 50 UG/ML
0.5 INJECTION INTRAVENOUS
Qty: 2500 | Refills: 0 | Status: DISCONTINUED | OUTPATIENT
Start: 2018-06-29 | End: 2018-07-05

## 2018-06-28 RX ORDER — POTASSIUM CHLORIDE 20 MEQ
20 PACKET (EA) ORAL ONCE
Qty: 0 | Refills: 0 | Status: COMPLETED | OUTPATIENT
Start: 2018-06-28 | End: 2018-06-28

## 2018-06-28 RX ORDER — EPINEPHRINE 0.3 MG/.3ML
0.1 INJECTION INTRAMUSCULAR; SUBCUTANEOUS
Qty: 4 | Refills: 0 | Status: DISCONTINUED | OUTPATIENT
Start: 2018-06-28 | End: 2018-06-29

## 2018-06-28 RX ADMIN — Medication 100 MILLIGRAM(S): at 06:20

## 2018-06-28 RX ADMIN — Medication 50 MILLIGRAM(S): at 01:49

## 2018-06-28 RX ADMIN — Medication 50 MILLILITER(S): at 13:39

## 2018-06-28 RX ADMIN — Medication 50 MILLILITER(S): at 04:14

## 2018-06-28 RX ADMIN — CHLORHEXIDINE GLUCONATE 5 MILLILITER(S): 213 SOLUTION TOPICAL at 06:21

## 2018-06-28 RX ADMIN — CHLORHEXIDINE GLUCONATE 5 MILLILITER(S): 213 SOLUTION TOPICAL at 01:47

## 2018-06-28 RX ADMIN — CHLORHEXIDINE GLUCONATE 5 MILLILITER(S): 213 SOLUTION TOPICAL at 22:28

## 2018-06-28 RX ADMIN — CHLORHEXIDINE GLUCONATE 5 MILLILITER(S): 213 SOLUTION TOPICAL at 10:45

## 2018-06-28 RX ADMIN — MORPHINE SULFATE 1 MILLIGRAM(S): 50 CAPSULE, EXTENDED RELEASE ORAL at 23:31

## 2018-06-28 RX ADMIN — Medication 50 MILLILITER(S): at 04:13

## 2018-06-28 RX ADMIN — Medication 50 MILLIGRAM(S): at 10:45

## 2018-06-28 RX ADMIN — CHLORHEXIDINE GLUCONATE 5 MILLILITER(S): 213 SOLUTION TOPICAL at 14:09

## 2018-06-28 RX ADMIN — CHLORHEXIDINE GLUCONATE 5 MILLILITER(S): 213 SOLUTION TOPICAL at 18:14

## 2018-06-28 RX ADMIN — PANTOPRAZOLE SODIUM 40 MILLIGRAM(S): 20 TABLET, DELAYED RELEASE ORAL at 11:59

## 2018-06-28 RX ADMIN — Medication 100 MILLIGRAM(S): at 22:28

## 2018-06-28 RX ADMIN — BUMETANIDE 40 MG/HR: 0.25 INJECTION INTRAMUSCULAR; INTRAVENOUS at 02:45

## 2018-06-28 RX ADMIN — Medication 1 DROP(S): at 18:17

## 2018-06-28 RX ADMIN — Medication 1 DROP(S): at 06:21

## 2018-06-28 RX ADMIN — Medication 100 MILLIGRAM(S): at 14:10

## 2018-06-28 RX ADMIN — Medication 50 MILLIEQUIVALENT(S): at 07:36

## 2018-06-28 RX ADMIN — FLUCONAZOLE 100 MILLIGRAM(S): 150 TABLET ORAL at 11:59

## 2018-06-28 RX ADMIN — Medication 100 MILLIEQUIVALENT(S): at 05:21

## 2018-06-28 RX ADMIN — Medication 100 MILLIGRAM(S): at 14:09

## 2018-06-28 RX ADMIN — Medication 50 MILLIGRAM(S): at 18:14

## 2018-06-28 NOTE — CONSULT NOTE ADULT - SUBJECTIVE AND OBJECTIVE BOX
This is 67 year old male with PMH for s/p Repair of AA, AVR, MV repair, TV repair, CABG x2; currently with open chest. The patient is s/p washout of the thoracic cacvity. Now in shock requiring mutiple pressors. In the past 24 hours noticed declining of the renal function in the setting of ongoing shock, the patient in the morning oliguric despite the diuretic therapy. The CT ICU team started the patient on aquaphoresis. In the morning when I saw the patient on mutiple pressors , on Aquaphoresis - blood flow 40 ml/min and UF of 20 ml/h. The patient intubated and in shock, no family member at bedside at the time of the consult. No known history for renal disease      Patient is a 67y Male admitted for     PAST MEDICAL & SURGICAL HISTORY:  No pertinent past medical history  No significant past surgical history      MEDICATIONS  (STANDING):  artificial  tears Solution 1 Drop(s) Both EYES two times a day  aztreonam  IVPB 1000 milliGRAM(s) IV Intermittent every 8 hours  chlorhexidine 0.12% Liquid 5 milliLiter(s) Swish and Spit every 4 hours  cisatracurium Infusion 3 MICROgram(s)/kG/Min (10.548 mL/Hr) IV Continuous <Continuous>  dextrose 50% Injectable 50 milliLiter(s) IV Push every 15 minutes  dextrose 50% Injectable 25 milliLiter(s) IV Push every 15 minutes  DOBUTamine Infusion 5 MICROgram(s)/kG/Min (8.79 mL/Hr) IV Continuous <Continuous>  EPINEPHrine     Infusion 0.1 MICROgram(s)/kG/Min (21.975 mL/Hr) IV Continuous <Continuous>  epinephrine 8 milliGRAM(s),dextrose 5% in water 250 milliLiter(s) 8 milliGRAM(s) (10.42 mL/Hr) IV Continuous <Continuous>  fentaNYL   Infusion. 0.5 MICROgram(s)/kG/Hr (2.93 mL/Hr) IV Continuous <Continuous>  fluconAZOLE IVPB 400 milliGRAM(s) IV Intermittent every 24 hours  Heparin 66858 Unit(s),Dextrose 5% 1000 milliLiter(s) 37935 Unit(s) (25 mL/Hr) IV Continuous <Continuous>  hydrocortisone sodium succinate Injectable 100 milliGRAM(s) IV Push every 8 hours  insulin Infusion 5 Unit(s)/Hr (5 mL/Hr) IV Continuous <Continuous>  metroNIDAZOLE  IVPB 500 milliGRAM(s) IV Intermittent every 8 hours  milrinone Infusion 0.5 MICROgram(s)/kG/Min (8.79 mL/Hr) IV Continuous <Continuous>  pantoprazole  Injectable 40 milliGRAM(s) IV Push daily  phenylephrine    Infusion 0.5 MICROgram(s)/kG/Min (5.494 mL/Hr) IV Continuous <Continuous>  propofol Infusion 5 MICROgram(s)/kG/Min (1.758 mL/Hr) IV Continuous <Continuous>  PureFlow Dialysate RFP-400 (K 2 / Ca 3) 5000 milliLiter(s) (1500 mL/Hr) CRRT <Continuous>  sodium bicarbonate  Infusion 0.043 mEq/kG/Hr (50 mL/Hr) IV Continuous <Continuous>  sodium chloride 0.9%. 1000 milliLiter(s) (10 mL/Hr) IV Continuous <Continuous>  vasopressin Infusion 0.03 Unit(s)/Min (1.8 mL/Hr) IV Continuous <Continuous>    MEDICATIONS  (PRN):      Allergies    penicillin (Rash)    Intolerances        SOCIAL HISTORY:    FAMILY HISTORY:      T(C): , Max: 37.2 (06-28-18 @ 15:00)  T(F): , Max: 99 (06-28-18 @ 15:00)  HR: 94 (06-28-18 @ 20:00)  BP: --  BP(mean): --  RR: 26 (06-28-18 @ 20:00)  SpO2: 81% (06-28-18 @ 20:00)  Wt(kg): --    06-27 @ 07:01  -  06-28 @ 07:00  --------------------------------------------------------  IN: 7518.8 mL / OUT: 6741 mL / NET: 777.8 mL    06-28 @ 07:01  -  06-28 @ 20:50  --------------------------------------------------------  IN: 1926.6 mL / OUT: 1201 mL / NET: 725.6 mL      Physical exam:   Intubated and sedated   ANASARCA   difficult to evaluate for JVD   Chest: s/p thoractomy, open chest, multiple drainages from the chest   RRR, normal s1/s2, systolic murmur  Abdmen - swollen, not tender, not distended   EXtremities: 3+ peripheral edema  Hd cathter   Desai minimal urine output     LABS:                        9.5    9.3   )-----------( 58       ( 28 Jun 2018 13:55 )             26.1     06-28    128<L>  |  92<L>  |  32<H>  ----------------------------<  56<L>  4.3   |  23  |  1.35<H>    Ca    9.2      28 Jun 2018 13:55  Phos  4.9     06-28  Mg     2.3     06-28    TPro  4.3<L>  /  Alb  3.2<L>  /  TBili  14.3<H>  /  DBili  x   /  AST  79<H>  /  ALT  19  /  AlkPhos  34<L>  06-28      PT/INR - ( 28 Jun 2018 13:55 )   PT: 11.9 sec;   INR: 1.07          PTT - ( 28 Jun 2018 13:55 )  PTT:29.4 sec          RADIOLOGY & ADDITIONAL STUDIES:

## 2018-06-28 NOTE — CONSULT NOTE ADULT - PROBLEM SELECTOR RECOMMENDATION 2
- hypervolemic hyponatremia   - please check the serum osm   - from fluid overload   - try to concetrate the infusion   - on aquaphoresis, also possibility to be started on CVVHD

## 2018-06-28 NOTE — PROGRESS NOTE ADULT - SUBJECTIVE AND OBJECTIVE BOX
Critical Care attending addendum    lengthy conversation initially with HCP (son - Hernando) updating him on current status and progressive deterioration  options discussed with him including withdrawl of care ; keeping current measures; comfort care etc    other family members joined conversation at HCP request    family members not on same page with some wanting to withdraw care and others wanting to continue current course    all members agree that they wish to keep patient comfortable and do not want chest compressions or shock  they wish to let "nature take its course if God decides to take him"    HCP confirms this is the course of action they wish to pursue at this time   overall prognosis poor and minimal to no chance or a meaning;ful recovery    staff made aware of HCP/family decision

## 2018-06-28 NOTE — CONSULT NOTE ADULT - ASSESSMENT
The patient is 67 year old male with PMH stated above, now s/p  Repair of AA, AVR, MV repair, TV repair, CABG x2, now in shock requiring pressor support - mutiple pressors. The patient with deterioration of the renal function in the setting of the shock - oliguric, no response from the diuretics. The CT ICU team started the patient on aquaphoresis and today requested CVVHD. The ordered placed from the renal and ICU team can initiated at nay time when the patient's hemodynamic can support this mode of renal replacement therapy

## 2018-06-28 NOTE — CONSULT NOTE ADULT - PROBLEM SELECTOR RECOMMENDATION 9
- the oliguric EMILY due to ATN - most likely due to cardiogenic shock, can not exclude also a septic shock   - oliguric in the morning   - the primary team started aquaphoresis in the morning   - and the renal service was called with request from the primary team   - the patient with overt fluid overload - anasarca   - the CVVHD ordered placed from the renal fellow in the morning   - this mode of RRT - CVVHD can be initiated at any moment when the patient's hemodynamics can support it. Minimal ordered for UF - the UF can be titrated up from the primary team if the MAP is above 65 mmHg   - electrolytes noted   - the patient of bicar drip - please consider stopping it - serum bicarb accepatable and if acidosis is getting worse the order for CVVHD is active can be satrted on that mode of RRT   - in and outs   - daily weight   - renal diet when the patient is not NPO

## 2018-06-28 NOTE — PROGRESS NOTE ADULT - SUBJECTIVE AND OBJECTIVE BOX
CTICU  CRITICAL  CARE  ATTENDING:   				   Pertinent clinical, laboratory, radiographic, hemodynamic, echocardiographic, respiratory data, microbiologic data and chart were reviewed and analyzed frequently throughout the course of the day and night    Patient seen and examined with CTS/ SH attending at bedside    Pt is a 67y male admitted s/p Repair of AA, AVR, MV repair, TV repair, CABG x2; currently with open chest and aquapharesis.;      HEALTH ISSUES - PROBLEM Dx:  CAD (coronary artery disease): CAD (coronary artery disease)  Valvular disease: Valvular disease         FAMILY HISTORY:  PAST MEDICAL & SURGICAL HISTORY:  No pertinent past medical history  No significant past surgical history      14 system review was unremarkable  acute changes include acute respiratory failure    Vital signs, hemodynamic and respiratory parameters were reviewed from the bedside nursing flowsheet.  ICU Vital Signs Last 24 Hrs  T(C): 37.2 (28 Jun 2018 16:00), Max: 37.2 (28 Jun 2018 16:00)  T(F): 99 (28 Jun 2018 16:00), Max: 99 (28 Jun 2018 16:00)  HR: 94 (28 Jun 2018 16:00) (84 - 102)  BP: --  BP(mean): --  ABP: 88/54 (28 Jun 2018 16:00) (62/54 - 144/82)  ABP(mean): 62 (28 Jun 2018 16:00) (52 - 100)  RR: 26 (28 Jun 2018 16:00) (20 - 26)  SpO2: 90% (28 Jun 2018 16:00) (63% - 100%)    Adult Advanced Hemodynamics Last 24 Hrs  CVP(mm Hg): 22 (28 Jun 2018 16:00) (17 - 31)  CVP(cm H2O): --  CO: --  CI: --  PA: 41/36 (28 Jun 2018 16:00) (41/21 - 70/31)  PA(mean): 39 (28 Jun 2018 16:00) (29 - 49)  PCWP: --  SVR: --  SVRI: --  PVR: --  PVRI: --, ABG - ( 28 Jun 2018 14:01 )  pH, Arterial: 7.33  pH, Blood: x     /  pCO2: 46    /  pO2: 222   / HCO3: 24    / Base Excess: -2.4  /  SaO2: 100               Mode: AC/ CMV (Assist Control/ Continuous Mandatory Ventilation)  RR (machine): 26  TV (machine): 370  FiO2: 100  PEEP: 5  ITime: 1  MAP: 17  PIP: 36    Intake and output was reviewed and the fluid balance was calculated  Daily     Daily   I&O's Summary    27 Jun 2018 07:01  -  28 Jun 2018 07:00  --------------------------------------------------------  IN: 7518.8 mL / OUT: 6741 mL / NET: 777.8 mL    28 Jun 2018 07:01  -  28 Jun 2018 16:50  --------------------------------------------------------  IN: 1431.8 mL / OUT: 905 mL / NET: 526.8 mL        All lines and drain sites were assessed  [facility]      POCT Blood Glucose.: 83 mg/dL (28 Jun 2018 16:27)        Exam:   Gen: gross anasarca  Neuro: pt sedated and intubated; was on paralytics.   Lungs: Auscultation of the chest reveals equal bs  Abd: gross anasarca  Ext: b/l upper and lower ext swollen with pitting edema  Antibiotics are periop      labs  CBC Full  -  ( 28 Jun 2018 13:55 )  WBC Count : 9.3 K/uL  Hemoglobin : 9.5 g/dL  Hematocrit : 26.1 %  Platelet Count - Automated : 58 K/uL  Mean Cell Volume : 81.8 fL  Mean Cell Hemoglobin : 29.8 pg  Mean Cell Hemoglobin Concentration : 36.4 g/dL  Auto Neutrophil # : x  Auto Lymphocyte # : x  Auto Monocyte # : x  Auto Eosinophil # : x  Auto Basophil # : x  Auto Neutrophil % : x  Auto Lymphocyte % : x  Auto Monocyte % : x  Auto Eosinophil % : x  Auto Basophil % : x    06-28    128<L>  |  92<L>  |  32<H>  ----------------------------<  56<L>  4.3   |  23  |  1.35<H>    Ca    9.2      28 Jun 2018 13:55  Phos  4.9     06-28  Mg     2.3     06-28    TPro  4.3<L>  /  Alb  3.2<L>  /  TBili  14.3<H>  /  DBili  x   /  AST  79<H>  /  ALT  19  /  AlkPhos  34<L>  06-28    PT/INR - ( 28 Jun 2018 13:55 )   PT: 11.9 sec;   INR: 1.07          PTT - ( 28 Jun 2018 13:55 )  PTT:29.4 sec    The current medications were reviewed   MEDICATIONS  (STANDING):  artificial  tears Solution 1 Drop(s) Both EYES two times a day  aztreonam  IVPB 1000 milliGRAM(s) IV Intermittent every 8 hours  chlorhexidine 0.12% Liquid 5 milliLiter(s) Swish and Spit every 4 hours  cisatracurium Infusion 3 MICROgram(s)/kG/Min (10.548 mL/Hr) IV Continuous <Continuous>  dextrose 50% Injectable 50 milliLiter(s) IV Push every 15 minutes  dextrose 50% Injectable 25 milliLiter(s) IV Push every 15 minutes  DOBUTamine Infusion 5 MICROgram(s)/kG/Min (8.79 mL/Hr) IV Continuous <Continuous>  EPINEPHrine     Infusion 0.1 MICROgram(s)/kG/Min (21.975 mL/Hr) IV Continuous <Continuous>  epinephrine 8 milliGRAM(s),dextrose 5% in water 250 milliLiter(s) 8 milliGRAM(s) (10.42 mL/Hr) IV Continuous <Continuous>  fentaNYL   Infusion. 0.5 MICROgram(s)/kG/Hr (2.93 mL/Hr) IV Continuous <Continuous>  fluconAZOLE IVPB 400 milliGRAM(s) IV Intermittent every 24 hours  Heparin 34206 Unit(s),Dextrose 5% 1000 milliLiter(s) 17835 Unit(s) (25 mL/Hr) IV Continuous <Continuous>  hydrocortisone sodium succinate Injectable 100 milliGRAM(s) IV Push every 8 hours  insulin Infusion 5 Unit(s)/Hr (5 mL/Hr) IV Continuous <Continuous>  metroNIDAZOLE  IVPB 500 milliGRAM(s) IV Intermittent every 8 hours  milrinone Infusion 0.5 MICROgram(s)/kG/Min (8.79 mL/Hr) IV Continuous <Continuous>  pantoprazole  Injectable 40 milliGRAM(s) IV Push daily  phenylephrine    Infusion 0.5 MICROgram(s)/kG/Min (5.494 mL/Hr) IV Continuous <Continuous>  propofol Infusion 5 MICROgram(s)/kG/Min (1.758 mL/Hr) IV Continuous <Continuous>  PureFlow Dialysate RFP-400 (K 2 / Ca 3) 5000 milliLiter(s) (1500 mL/Hr) CRRT <Continuous>  sodium bicarbonate  Infusion 0.043 mEq/kG/Hr (50 mL/Hr) IV Continuous <Continuous>  sodium chloride 0.9%. 1000 milliLiter(s) (10 mL/Hr) IV Continuous <Continuous>  vasopressin Infusion 0.03 Unit(s)/Min (1.8 mL/Hr) IV Continuous <Continuous>    MEDICATIONS  (PRN):       PROBLEM LIST/ ASSESSMENT:  HEALTH ISSUES - PROBLEM Dx:  CAD (coronary artery disease): CAD (coronary artery disease)  Valvular disease: Valvular disease         Pt is a 67y male admitted s/p Repair of AA, AVR, MV repair, TV repair, CABG x2; currently with open chest and aquapharesis;     This morning pt was noted to be hypotensive despite being on maximal pressors. Discussed at length with the entire team and will continue current management. Pt aneuric and with gross anasarca and would ideally require CVVHD, but with low BP in the 50s and 60s, pt cannot tolerate CVVHD. Renal was consulted as well. Diuretics d/c-ed as pt is not making any urine despite being on them overnight. Aquapharesis UF was decreased due to severe hypotension. Family notified and upto date on the overall prognosis of the patient.     My plan includes :  -Close hemodynamic, ventilatory and drain monitoring and management per post op routine  -Monitor for arrhythmias and monitor parameters for organ perfusion  -Monitor neurologic status  -Head of the bed should remain elevated to 45 deg .   -Chest PT and IS will be encouraged  -Monitor adequacy of oxygenation and ventilation and attempt to wean oxygen  -Nutritional goals will be met using po eventually , ensure adequate caloric intake and montior the same  -Stress ulcer and VTE prophylaxis will be achieved    -Glycemic control is satisfactory  -Electrolytes have been repleated as necessary and wound care has been carried out. Pain control has been achieved.   -Agressive physical therapy and early mobility and ambulation goals will be met   The family was updated about the course and plan    CRITICAL CARE TIME SPENT in evaluation and management, reassessments, review and interpretation of labs and x-rays, ventilator and hemodynamic management, formulating a plan and coordinating care: ___60____ MIN.  Time does not include procedural time.      CTICU ATTENDING:      		  Uriel Camarillo MD

## 2018-06-29 LAB
ALBUMIN SERPL ELPH-MCNC: 2.6 G/DL — LOW (ref 3.3–5)
ALBUMIN SERPL ELPH-MCNC: 2.7 G/DL — LOW (ref 3.3–5)
ALBUMIN SERPL ELPH-MCNC: 2.8 G/DL — LOW (ref 3.3–5)
ALP SERPL-CCNC: 38 U/L — LOW (ref 40–120)
ALP SERPL-CCNC: 42 U/L — SIGNIFICANT CHANGE UP (ref 40–120)
ALP SERPL-CCNC: 50 U/L — SIGNIFICANT CHANGE UP (ref 40–120)
ALT FLD-CCNC: 25 U/L — SIGNIFICANT CHANGE UP (ref 10–45)
ALT FLD-CCNC: <5 U/L — LOW (ref 10–45)
ALT FLD-CCNC: <5 U/L — LOW (ref 10–45)
ANION GAP SERPL CALC-SCNC: 16 MMOL/L — SIGNIFICANT CHANGE UP (ref 5–17)
ANION GAP SERPL CALC-SCNC: 16 MMOL/L — SIGNIFICANT CHANGE UP (ref 5–17)
ANION GAP SERPL CALC-SCNC: 17 MMOL/L — SIGNIFICANT CHANGE UP (ref 5–17)
APTT BLD: 26.8 SEC — LOW (ref 27.5–37.4)
APTT BLD: 27.1 SEC — LOW (ref 27.5–37.4)
APTT BLD: 28.4 SEC — SIGNIFICANT CHANGE UP (ref 27.5–37.4)
APTT BLD: 28.4 SEC — SIGNIFICANT CHANGE UP (ref 27.5–37.4)
AST SERPL-CCNC: 121 U/L — HIGH (ref 10–40)
AST SERPL-CCNC: 148 U/L — HIGH (ref 10–40)
AST SERPL-CCNC: 149 U/L — HIGH (ref 10–40)
BASE EXCESS BLDV CALC-SCNC: -4.9 MMOL/L — SIGNIFICANT CHANGE UP
BILIRUB DIRECT SERPL-MCNC: >10 MG/DL — HIGH (ref 0–0.2)
BILIRUB DIRECT SERPL-MCNC: >10 MG/DL — SIGNIFICANT CHANGE UP (ref 0–0.2)
BILIRUB DIRECT SERPL-MCNC: >13.9 MG/DL — HIGH (ref 0–0.2)
BILIRUB INDIRECT FLD-MCNC: < 7.4 MG/DL — SIGNIFICANT CHANGE UP (ref 0.2–1)
BILIRUB INDIRECT FLD-MCNC: <6.8 MG/DL — HIGH (ref 0.2–1)
BILIRUB INDIRECT FLD-MCNC: <9.4 MG/DL — HIGH (ref 0.2–1)
BILIRUB SERPL-MCNC: 17.4 MG/DL — HIGH (ref 0.2–1.2)
BILIRUB SERPL-MCNC: 19.4 MG/DL — HIGH (ref 0.2–1.2)
BILIRUB SERPL-MCNC: 20.7 MG/DL — HIGH (ref 0.2–1.2)
BUN SERPL-MCNC: 46 MG/DL — HIGH (ref 7–23)
BUN SERPL-MCNC: 49 MG/DL — HIGH (ref 7–23)
BUN SERPL-MCNC: 49 MG/DL — HIGH (ref 7–23)
CALCIUM SERPL-MCNC: 9 MG/DL — SIGNIFICANT CHANGE UP (ref 8.4–10.5)
CALCIUM SERPL-MCNC: 9.2 MG/DL — SIGNIFICANT CHANGE UP (ref 8.4–10.5)
CALCIUM SERPL-MCNC: 9.3 MG/DL — SIGNIFICANT CHANGE UP (ref 8.4–10.5)
CHLORIDE SERPL-SCNC: 84 MMOL/L — LOW (ref 96–108)
CHLORIDE SERPL-SCNC: 87 MMOL/L — LOW (ref 96–108)
CHLORIDE SERPL-SCNC: 88 MMOL/L — LOW (ref 96–108)
CO2 SERPL-SCNC: 19 MMOL/L — LOW (ref 22–31)
CO2 SERPL-SCNC: 20 MMOL/L — LOW (ref 22–31)
CO2 SERPL-SCNC: 21 MMOL/L — LOW (ref 22–31)
CREAT SERPL-MCNC: 1.75 MG/DL — HIGH (ref 0.5–1.3)
CREAT SERPL-MCNC: 1.83 MG/DL — HIGH (ref 0.5–1.3)
CREAT SERPL-MCNC: 1.89 MG/DL — HIGH (ref 0.5–1.3)
GAS PNL BLDA: SIGNIFICANT CHANGE UP
GAS PNL BLDV: SIGNIFICANT CHANGE UP
GLUCOSE BLDC GLUCOMTR-MCNC: 116 MG/DL — HIGH (ref 70–99)
GLUCOSE BLDC GLUCOMTR-MCNC: 117 MG/DL — HIGH (ref 70–99)
GLUCOSE BLDC GLUCOMTR-MCNC: 122 MG/DL — HIGH (ref 70–99)
GLUCOSE BLDC GLUCOMTR-MCNC: 122 MG/DL — HIGH (ref 70–99)
GLUCOSE BLDC GLUCOMTR-MCNC: 134 MG/DL — HIGH (ref 70–99)
GLUCOSE BLDC GLUCOMTR-MCNC: 136 MG/DL — HIGH (ref 70–99)
GLUCOSE BLDC GLUCOMTR-MCNC: 137 MG/DL — HIGH (ref 70–99)
GLUCOSE BLDC GLUCOMTR-MCNC: 139 MG/DL — HIGH (ref 70–99)
GLUCOSE BLDC GLUCOMTR-MCNC: 147 MG/DL — HIGH (ref 70–99)
GLUCOSE BLDC GLUCOMTR-MCNC: 153 MG/DL — HIGH (ref 70–99)
GLUCOSE BLDC GLUCOMTR-MCNC: 161 MG/DL — HIGH (ref 70–99)
GLUCOSE BLDC GLUCOMTR-MCNC: 170 MG/DL — HIGH (ref 70–99)
GLUCOSE BLDC GLUCOMTR-MCNC: 181 MG/DL — HIGH (ref 70–99)
GLUCOSE BLDC GLUCOMTR-MCNC: 49 MG/DL — LOW (ref 70–99)
GLUCOSE BLDC GLUCOMTR-MCNC: 99 MG/DL — SIGNIFICANT CHANGE UP (ref 70–99)
GLUCOSE SERPL-MCNC: 129 MG/DL — HIGH (ref 70–99)
GLUCOSE SERPL-MCNC: 132 MG/DL — HIGH (ref 70–99)
GLUCOSE SERPL-MCNC: 169 MG/DL — HIGH (ref 70–99)
HCO3 BLDV-SCNC: 22 MMOL/L — SIGNIFICANT CHANGE UP (ref 20–27)
HCT VFR BLD CALC: 22.7 % — LOW (ref 39–50)
HCT VFR BLD CALC: 24.4 % — LOW (ref 39–50)
HCT VFR BLD CALC: 24.7 % — LOW (ref 39–50)
HCT VFR BLD CALC: 24.8 % — LOW (ref 39–50)
HGB BLD-MCNC: 8.5 G/DL — LOW (ref 13–17)
HGB BLD-MCNC: 9 G/DL — LOW (ref 13–17)
HGB BLD-MCNC: 9.1 G/DL — LOW (ref 13–17)
HGB BLD-MCNC: 9.1 G/DL — LOW (ref 13–17)
INR BLD: 1.01 — SIGNIFICANT CHANGE UP (ref 0.88–1.16)
INR BLD: 1.01 — SIGNIFICANT CHANGE UP (ref 0.88–1.16)
INR BLD: 1.02 — SIGNIFICANT CHANGE UP (ref 0.88–1.16)
LACTATE SERPL-SCNC: 1.6 MMOL/L — SIGNIFICANT CHANGE UP (ref 0.5–2)
LACTATE SERPL-SCNC: 1.8 MMOL/L — SIGNIFICANT CHANGE UP (ref 0.5–2)
LACTATE SERPL-SCNC: 1.9 MMOL/L — SIGNIFICANT CHANGE UP (ref 0.5–2)
LACTATE SERPL-SCNC: 2.3 MMOL/L — HIGH (ref 0.5–2)
LACTATE SERPL-SCNC: 2.6 MMOL/L — HIGH (ref 0.5–2)
MAGNESIUM SERPL-MCNC: 2.4 MG/DL — SIGNIFICANT CHANGE UP (ref 1.6–2.6)
MAGNESIUM SERPL-MCNC: 2.4 MG/DL — SIGNIFICANT CHANGE UP (ref 1.6–2.6)
MCHC RBC-ENTMCNC: 30 PG — SIGNIFICANT CHANGE UP (ref 27–34)
MCHC RBC-ENTMCNC: 30.7 PG — SIGNIFICANT CHANGE UP (ref 27–34)
MCHC RBC-ENTMCNC: 31 PG — SIGNIFICANT CHANGE UP (ref 27–34)
MCHC RBC-ENTMCNC: 31.2 PG — SIGNIFICANT CHANGE UP (ref 27–34)
MCHC RBC-ENTMCNC: 36.7 G/DL — HIGH (ref 32–36)
MCHC RBC-ENTMCNC: 36.8 G/DL — HIGH (ref 32–36)
MCHC RBC-ENTMCNC: 36.9 G/DL — HIGH (ref 32–36)
MCHC RBC-ENTMCNC: 37.4 G/DL — HIGH (ref 32–36)
MCV RBC AUTO: 81.8 FL — SIGNIFICANT CHANGE UP (ref 80–100)
MCV RBC AUTO: 81.9 FL — SIGNIFICANT CHANGE UP (ref 80–100)
MCV RBC AUTO: 84.1 FL — SIGNIFICANT CHANGE UP (ref 80–100)
MCV RBC AUTO: 84.6 FL — SIGNIFICANT CHANGE UP (ref 80–100)
PCO2 BLDV: 47 MMHG — SIGNIFICANT CHANGE UP (ref 41–51)
PH BLDV: 7.28 — LOW (ref 7.32–7.43)
PLATELET # BLD AUTO: 47 K/UL — LOW (ref 150–400)
PLATELET # BLD AUTO: 47 K/UL — LOW (ref 150–400)
PLATELET # BLD AUTO: 58 K/UL — LOW (ref 150–400)
PLATELET # BLD AUTO: 74 K/UL — LOW (ref 150–400)
PO2 BLDV: 38 MMHG — SIGNIFICANT CHANGE UP
POTASSIUM SERPL-MCNC: 4.6 MMOL/L — SIGNIFICANT CHANGE UP (ref 3.5–5.3)
POTASSIUM SERPL-MCNC: 4.8 MMOL/L — SIGNIFICANT CHANGE UP (ref 3.5–5.3)
POTASSIUM SERPL-MCNC: 4.8 MMOL/L — SIGNIFICANT CHANGE UP (ref 3.5–5.3)
POTASSIUM SERPL-SCNC: 4.6 MMOL/L — SIGNIFICANT CHANGE UP (ref 3.5–5.3)
POTASSIUM SERPL-SCNC: 4.8 MMOL/L — SIGNIFICANT CHANGE UP (ref 3.5–5.3)
POTASSIUM SERPL-SCNC: 4.8 MMOL/L — SIGNIFICANT CHANGE UP (ref 3.5–5.3)
PROT SERPL-MCNC: 4 G/DL — LOW (ref 6–8.3)
PROT SERPL-MCNC: 4 G/DL — LOW (ref 6–8.3)
PROT SERPL-MCNC: 4.2 G/DL — LOW (ref 6–8.3)
PROTHROM AB SERPL-ACNC: 11.2 SEC — SIGNIFICANT CHANGE UP (ref 9.8–12.7)
PROTHROM AB SERPL-ACNC: 11.2 SEC — SIGNIFICANT CHANGE UP (ref 9.8–12.7)
PROTHROM AB SERPL-ACNC: 11.3 SEC — SIGNIFICANT CHANGE UP (ref 9.8–12.7)
RBC # BLD: 2.77 M/UL — LOW (ref 4.2–5.8)
RBC # BLD: 2.9 M/UL — LOW (ref 4.2–5.8)
RBC # BLD: 2.92 M/UL — LOW (ref 4.2–5.8)
RBC # BLD: 3.03 M/UL — LOW (ref 4.2–5.8)
RBC # FLD: 15.4 % — SIGNIFICANT CHANGE UP (ref 10.3–16.9)
RBC # FLD: 15.4 % — SIGNIFICANT CHANGE UP (ref 10.3–16.9)
RBC # FLD: 15.5 % — SIGNIFICANT CHANGE UP (ref 10.3–16.9)
RBC # FLD: 15.8 % — SIGNIFICANT CHANGE UP (ref 10.3–16.9)
SAO2 % BLDV: 66 % — SIGNIFICANT CHANGE UP
SODIUM SERPL-SCNC: 119 MMOL/L — CRITICAL LOW (ref 135–145)
SODIUM SERPL-SCNC: 124 MMOL/L — LOW (ref 135–145)
SODIUM SERPL-SCNC: 125 MMOL/L — LOW (ref 135–145)
SURGICAL PATHOLOGY STUDY: SIGNIFICANT CHANGE UP
WBC # BLD: 16.6 K/UL — HIGH (ref 3.8–10.5)
WBC # BLD: 16.8 K/UL — HIGH (ref 3.8–10.5)
WBC # BLD: 17 K/UL — HIGH (ref 3.8–10.5)
WBC # BLD: 18.8 K/UL — HIGH (ref 3.8–10.5)
WBC # FLD AUTO: 16.6 K/UL — HIGH (ref 3.8–10.5)
WBC # FLD AUTO: 16.8 K/UL — HIGH (ref 3.8–10.5)
WBC # FLD AUTO: 17 K/UL — HIGH (ref 3.8–10.5)
WBC # FLD AUTO: 18.8 K/UL — HIGH (ref 3.8–10.5)

## 2018-06-29 PROCEDURE — 71045 X-RAY EXAM CHEST 1 VIEW: CPT | Mod: 26

## 2018-06-29 PROCEDURE — 99292 CRITICAL CARE ADDL 30 MIN: CPT

## 2018-06-29 PROCEDURE — 99291 CRITICAL CARE FIRST HOUR: CPT

## 2018-06-29 RX ORDER — CISATRACURIUM BESYLATE 2 MG/ML
10 INJECTION INTRAVENOUS ONCE
Qty: 0 | Refills: 0 | Status: COMPLETED | OUTPATIENT
Start: 2018-06-29 | End: 2018-06-29

## 2018-06-29 RX ORDER — VANCOMYCIN HCL 1 G
1000 VIAL (EA) INTRAVENOUS EVERY 24 HOURS
Qty: 0 | Refills: 0 | Status: DISCONTINUED | OUTPATIENT
Start: 2018-06-29 | End: 2018-07-03

## 2018-06-29 RX ORDER — FUROSEMIDE 40 MG
100 TABLET ORAL ONCE
Qty: 0 | Refills: 0 | Status: COMPLETED | OUTPATIENT
Start: 2018-06-29 | End: 2018-06-29

## 2018-06-29 RX ORDER — ALBUMIN HUMAN 25 %
250 VIAL (ML) INTRAVENOUS ONCE
Qty: 0 | Refills: 0 | Status: COMPLETED | OUTPATIENT
Start: 2018-06-29 | End: 2018-06-29

## 2018-06-29 RX ORDER — GENTAMICIN SULFATE 40 MG/ML
80 VIAL (ML) INJECTION ONCE
Qty: 0 | Refills: 0 | Status: COMPLETED | OUTPATIENT
Start: 2018-06-29 | End: 2018-06-29

## 2018-06-29 RX ORDER — SODIUM CHLORIDE 5 G/100ML
500 INJECTION, SOLUTION INTRAVENOUS
Qty: 0 | Refills: 0 | Status: DISCONTINUED | OUTPATIENT
Start: 2018-06-29 | End: 2018-07-01

## 2018-06-29 RX ORDER — ALBUMIN HUMAN 25 %
50 VIAL (ML) INTRAVENOUS ONCE
Qty: 0 | Refills: 0 | Status: COMPLETED | OUTPATIENT
Start: 2018-06-29 | End: 2018-06-29

## 2018-06-29 RX ADMIN — Medication 250 MILLIGRAM(S): at 13:40

## 2018-06-29 RX ADMIN — PHENYLEPHRINE HYDROCHLORIDE 5.49 MICROGRAM(S)/KG/MIN: 10 INJECTION INTRAVENOUS at 23:29

## 2018-06-29 RX ADMIN — INSULIN HUMAN 5 UNIT(S)/HR: 100 INJECTION, SOLUTION SUBCUTANEOUS at 23:24

## 2018-06-29 RX ADMIN — CHLORHEXIDINE GLUCONATE 5 MILLILITER(S): 213 SOLUTION TOPICAL at 05:35

## 2018-06-29 RX ADMIN — Medication 100 MILLIGRAM(S): at 21:39

## 2018-06-29 RX ADMIN — CISATRACURIUM BESYLATE 10 MILLIGRAM(S): 2 INJECTION INTRAVENOUS at 15:58

## 2018-06-29 RX ADMIN — CISATRACURIUM BESYLATE 10.55 MICROGRAM(S)/KG/MIN: 2 INJECTION INTRAVENOUS at 16:20

## 2018-06-29 RX ADMIN — Medication 120 MILLIGRAM(S): at 20:25

## 2018-06-29 RX ADMIN — PHENYLEPHRINE HYDROCHLORIDE 5.49 MICROGRAM(S)/KG/MIN: 10 INJECTION INTRAVENOUS at 13:21

## 2018-06-29 RX ADMIN — PROPOFOL 1.76 MICROGRAM(S)/KG/MIN: 10 INJECTION, EMULSION INTRAVENOUS at 06:39

## 2018-06-29 RX ADMIN — Medication 100 MILLIGRAM(S): at 05:35

## 2018-06-29 RX ADMIN — Medication 50 MILLILITER(S): at 19:41

## 2018-06-29 RX ADMIN — Medication 50 MILLIGRAM(S): at 02:01

## 2018-06-29 RX ADMIN — PROPOFOL 1.76 MICROGRAM(S)/KG/MIN: 10 INJECTION, EMULSION INTRAVENOUS at 23:28

## 2018-06-29 RX ADMIN — CHLORHEXIDINE GLUCONATE 5 MILLILITER(S): 213 SOLUTION TOPICAL at 09:43

## 2018-06-29 RX ADMIN — MILRINONE LACTATE 8.79 MICROGRAM(S)/KG/MIN: 1 INJECTION, SOLUTION INTRAVENOUS at 23:24

## 2018-06-29 RX ADMIN — PANTOPRAZOLE SODIUM 40 MILLIGRAM(S): 20 TABLET, DELAYED RELEASE ORAL at 11:21

## 2018-06-29 RX ADMIN — CHLORHEXIDINE GLUCONATE 5 MILLILITER(S): 213 SOLUTION TOPICAL at 13:22

## 2018-06-29 RX ADMIN — CHLORHEXIDINE GLUCONATE 5 MILLILITER(S): 213 SOLUTION TOPICAL at 02:15

## 2018-06-29 RX ADMIN — INSULIN HUMAN 5 UNIT(S)/HR: 100 INJECTION, SOLUTION SUBCUTANEOUS at 05:34

## 2018-06-29 RX ADMIN — CHLORHEXIDINE GLUCONATE 5 MILLILITER(S): 213 SOLUTION TOPICAL at 23:18

## 2018-06-29 RX ADMIN — MORPHINE SULFATE 1 MILLIGRAM(S): 50 CAPSULE, EXTENDED RELEASE ORAL at 00:00

## 2018-06-29 RX ADMIN — FENTANYL CITRATE 2.93 MICROGRAM(S)/KG/HR: 50 INJECTION INTRAVENOUS at 23:32

## 2018-06-29 RX ADMIN — CHLORHEXIDINE GLUCONATE 5 MILLILITER(S): 213 SOLUTION TOPICAL at 17:44

## 2018-06-29 RX ADMIN — Medication 1 DROP(S): at 17:44

## 2018-06-29 RX ADMIN — Medication 50 MILLIGRAM(S): at 09:43

## 2018-06-29 RX ADMIN — CISATRACURIUM BESYLATE 10 MILLIGRAM(S): 2 INJECTION INTRAVENOUS at 14:37

## 2018-06-29 RX ADMIN — Medication 200 MILLIGRAM(S): at 15:58

## 2018-06-29 RX ADMIN — Medication 1 DROP(S): at 05:36

## 2018-06-29 RX ADMIN — Medication 100 MILLIGRAM(S): at 14:37

## 2018-06-29 RX ADMIN — Medication 8.79 MICROGRAM(S)/KG/MIN: at 06:40

## 2018-06-29 RX ADMIN — MILRINONE LACTATE 8.79 MICROGRAM(S)/KG/MIN: 1 INJECTION, SOLUTION INTRAVENOUS at 17:49

## 2018-06-29 RX ADMIN — Medication 100 MILLIGRAM(S): at 13:21

## 2018-06-29 RX ADMIN — SODIUM CHLORIDE 50 MILLILITER(S): 5 INJECTION, SOLUTION INTRAVENOUS at 23:23

## 2018-06-29 RX ADMIN — Medication 125 MILLILITER(S): at 20:26

## 2018-06-29 RX ADMIN — FLUCONAZOLE 100 MILLIGRAM(S): 150 TABLET ORAL at 11:21

## 2018-06-29 RX ADMIN — CISATRACURIUM BESYLATE 10.55 MICROGRAM(S)/KG/MIN: 2 INJECTION INTRAVENOUS at 23:31

## 2018-06-29 RX ADMIN — VASOPRESSIN 1.8 UNIT(S)/MIN: 20 INJECTION INTRAVENOUS at 23:22

## 2018-06-29 RX ADMIN — MILRINONE LACTATE 8.79 MICROGRAM(S)/KG/MIN: 1 INJECTION, SOLUTION INTRAVENOUS at 01:38

## 2018-06-29 RX ADMIN — Medication 50 MILLIGRAM(S): at 17:45

## 2018-06-29 NOTE — PROGRESS NOTE ADULT - ASSESSMENT
The patient is 67 year old male with PMH stated above, now s/p  Repair of AA, AVR, MV repair, TV repair, CABG x2, now in shock requiring pressor support - mutiple pressors. The patient with deterioration of the renal function in the setting of the shock - oliguric, no response from the diuretics. The CT ICU team started the patient on aquaphoresis and the CVVHD ordered, never been started from the primary team the BP unstable. Still on aquaphoresis in the morning 40 ml of uf per hour

## 2018-06-29 NOTE — PROGRESS NOTE ADULT - SUBJECTIVE AND OBJECTIVE BOX
66yo with history of congenital heart disease  s/p AV repair vs replacement at the age of 12 recent cardiac evaluation for pre-op clearance.  On workup pt diagnosed with VHD (severe prosthetic AS, severe MR, Severe TR) as well 2 vessel CAD in the setting of low EF 20-25%.      Pre-op CT showing left subclavian artery stenosis, possible coarctation of aorta (focal narrowing 3.5 cm segment of proximal descending aorta distal to the subclavian artery)     AVR, ascending aorta, TV/MV repair, CABG X2.  received multiple blood products/volume arrived with open chest.  IABP and Impella for LV support.     Chest closure but re-explored for tamponade on  Chest closure but again re-explored by bedside for acute hemodynamic failure    Impella @ 3.5 L flow    Drips   Epi 0.2 -- now down to 0.1   @ 15 --> off  milrinone @ 0.25mcg/min  Vaso @ 0.067unit/min  Dio @ 2mcg/kg/min        PMH :  CHF  Cardiac tamponade  Tricuspid valve insufficiency, unspecified etiology  Coronary artery disease with other form of angina pectoris  Mitral valve insufficiency, unspecified etiology  Aortic valve stenosis, etiology of cardiac valve disease unspecified  Open wound of chest wall, unspecified laterality, sequela  Cardiac tamponade  Hyponatremia  Acute kidney failure  CAD (coronary artery disease)  Valvular disease  Washout of thoracic cavity  Chest surgery  AVR (aortic valve replacement)  Mitral valve repair  Tricuspid valve repair  CABG      ROS intubated sedated   All other systems reviewed and negative.    ICU Vital Signs Last 24 Hrs  T(C): 34.8 (18 @ 15:00), Max: 36.9 (18 @ 18:00)  T(F): 94.6 (18 @ 15:00), Max: 98.4 (18 @ 18:00)  HR: 94 (18 @ 17:00) (94 - 95)  BP: 125/68 (18 @ 11:00) (117/62 - 125/68)  BP(mean): 90 (18 @ 11:00) (90 - 93)  ABP: 112/68 (18 @ 17:00) (84/72 - 146/68)  ABP(mean): 80 (18 @ 17:00) (60 - 86)  RR: 26 (18 @ 17:00) (26 - 30)  SpO2: 100% (18 @ 17:00) (72% - 100%)    I&O's Summary    2018 07:  -  2018 07:00  --------------------------------------------------------  IN: 3166.1 mL / OUT: 2179 mL / NET: 987.1 mL    2018 07:  -  2018 17:43  --------------------------------------------------------  IN: 1345.1 mL / OUT: 2665 mL / NET: -1319.9 mL      Mode: AC/ CMV (Assist Control/ Continuous Mandatory Ventilation)  RR (machine): 26  TV (machine): 370  FiO2: 60  PEEP: 6  ITime: 1  MAP: 16  PIP: 30    ABG - ( 2018 14:52 )  pH: 7.42  /  pCO2: 31    /  pO2: 141   / HCO3: 20    / Base Excess: -4.2  /  SaO2: 99                            8.5    17.0  )-----------( 47       ( 2018 14:53 )             22.7     2018 14:53    119    |  84     |  49     ----------------------------<  132    4.6     |  19     |  1.89     Ca    9.2        2018 14:53  Phos  4.9       2018 13:55  Mg     2.4       2018 14:53    TPro  4.0    /  Alb  2.7    /  TBili  20.7   /  DBili  >13.9  /  AST  149    /  ALT  <5     /  AlkPhos  50     2018 14:53    PT/INR - ( 2018 14:53 )   PT: 11.2 sec;   INR: 1.01     PTT - ( 2018 14:53 )  PTT:28.4 sec    MEDICATIONS  (STANDING):  aztreonam  IVPB 1000 milliGRAM(s) IV Intermittent every 8 hours  cisatracurium Infusion 3 MICROgram(s)/kG/Min IV Continuous <Continuous>  DOBUTamine Infusion 5 MICROgram(s)/kG/Min IV Continuous <Continuous>  epinephrine 8 milliGRAM(s),dextrose 5% in water 250 milliLiter(s) 8 milliGRAM(s) IV Continuous <Continuous>  fentaNYL   Infusion. 0.5 MICROgram(s)/kG/Hr IV Continuous <Continuous>  fluconAZOLE IVPB 400 milliGRAM(s) IV Intermittent every 24 hours  Heparin 97616 Unit(s),Dextrose 5% 1000 milliLiter(s) 70137 Unit(s) IV Continuous <Continuous>  hydrocortisone sodium succinate Injectable 100 milliGRAM(s) IV Push every 8 hours  insulin Infusion 5 Unit(s)/Hr IV Continuous <Continuous>  metroNIDAZOLE  IVPB 500 milliGRAM(s) IV Intermittent every 8 hours  milrinone Infusion 0.5 MICROgram(s)/kG/Min IV Continuous <Continuous>  pantoprazole  Injectable 40 milliGRAM(s) IV Push daily  phenylephrine    Infusion 0.5 MICROgram(s)/kG/Min IV Continuous <Continuous>  propofol Infusion 5 MICROgram(s)/kG/Min IV Continuous <Continuous>  vancomycin  IVPB 1000 milliGRAM(s) IV Intermittent every 24 hours  vasopressin Infusion 0.03 Unit(s)/Min IV Continuous <Continuous>    Home Medications:  Lasix 40 mg oral tablet: 1 tab(s) orally once a day (2018 00:25)    PHYSICAL EXAM:  Gen : Anasarca, + jaundice  Neck: No LAD, No JVD  Respiratory: decreased in the bases  Cardiovascular: S1 and S2, RRR, no M/G/R  Gastrointestinal: BS+, soft, NT/ND  Extremities: +edema , wheeping serous fluid   Vascular: 2+ peripheral pulses  Incision: open chest   Lines: no sign of infection

## 2018-06-29 NOTE — PROGRESS NOTE ADULT - SUBJECTIVE AND OBJECTIVE BOX
Seen in the morning, still intubated, requiring pressor support, on aquaphoresis, the CVVHD was never satrted from the primary team     The renal service follows the case for EMILY      Patient seen and examined at bedside.     artificial  tears Solution 1 Drop(s) two times a day  aztreonam  IVPB 1000 milliGRAM(s) every 8 hours  chlorhexidine 0.12% Liquid 5 milliLiter(s) every 4 hours  cisatracurium Infusion 3 MICROgram(s)/kG/Min <Continuous>  dextrose 50% Injectable 50 milliLiter(s) every 15 minutes  dextrose 50% Injectable 25 milliLiter(s) every 15 minutes  DOBUTamine Infusion 5 MICROgram(s)/kG/Min <Continuous>  EPINEPHrine     Infusion 0.1 MICROgram(s)/kG/Min <Continuous>  epinephrine 8 milliGRAM(s),dextrose 5% in water 250 milliLiter(s) 8 milliGRAM(s) <Continuous>  fentaNYL   Infusion. 0.5 MICROgram(s)/kG/Hr <Continuous>  fluconAZOLE IVPB 400 milliGRAM(s) every 24 hours  Heparin 06733 Unit(s),Dextrose 5% 1000 milliLiter(s) 77965 Unit(s) <Continuous>  hydrocortisone sodium succinate Injectable 100 milliGRAM(s) every 8 hours  insulin Infusion 5 Unit(s)/Hr <Continuous>  metroNIDAZOLE  IVPB 500 milliGRAM(s) every 8 hours  milrinone Infusion 0.5 MICROgram(s)/kG/Min <Continuous>  pantoprazole  Injectable 40 milliGRAM(s) daily  phenylephrine    Infusion 0.5 MICROgram(s)/kG/Min <Continuous>  propofol Infusion 5 MICROgram(s)/kG/Min <Continuous>  PureFlow Dialysate RFP-400 (K 2 / Ca 3) 5000 milliLiter(s) <Continuous>  sodium bicarbonate  Infusion 0.043 mEq/kG/Hr <Continuous>  sodium chloride 0.9%. 1000 milliLiter(s) <Continuous>  vasopressin Infusion 0.03 Unit(s)/Min <Continuous>      Allergies    penicillin (Rash)    Intolerances        T(C): , Max: 37.2 (06-28-18 @ 15:00)  T(F): , Max: 99 (06-28-18 @ 15:00)  HR: 94 (06-29-18 @ 10:00)  BP: 117/62 (06-29-18 @ 09:00)  BP(mean): 93 (06-29-18 @ 09:00)  RR: 27 (06-29-18 @ 10:00)  SpO2: 96% (06-29-18 @ 10:00)  Wt(kg): --    06-28 @ 07:01  -  06-29 @ 07:00  --------------------------------------------------------  IN:    cisatracurium Infusion: 56.5 mL    DOBUTamine Infusion: 638.4 mL    EPINEPHrine Infusion: 477.5 mL    fentaNYL Infusion.: 28 mL    fentaNYL Infusion.: 107.2 mL    freetext medication -  Infusion: 22.1 mL    insulin Infusion: 33 mL    milrinone  Infusion: 105.6 mL    phenylephrine   Infusion: 503.8 mL    propofol Infusion: 178 mL    sodium chloride 0.9%.: 420 mL    Solution: 500 mL    vasopressin Infusion: 96 mL  Total IN: 3166.1 mL    OUT:    Bulb: 800 mL    Bulb: 75 mL    Chest Tube: 190 mL    Chest Tube: 205 mL    Indwelling Catheter - Urethral: 15 mL    Nasoenteral Tube: 10 mL    Other: 884 mL  Total OUT: 2179 mL    Total NET: 987.1 mL      06-29 @ 07:01  -  06-29 @ 10:59  --------------------------------------------------------  IN:    DOBUTamine Infusion: 79.8 mL    EPINEPHrine Infusion: 53.1 mL    fentaNYL Infusion.: 16.8 mL    insulin Infusion: 6 mL    milrinone  Infusion: 13.2 mL    phenylephrine   Infusion: 65.4 mL    propofol Infusion: 25.2 mL    sodium chloride 0.9%.: 30 mL    vasopressin Infusion: 12 mL  Total IN: 301.5 mL    OUT:    Bulb: 100 mL    Chest Tube: 30 mL    Chest Tube: 40 mL    Indwelling Catheter - Urethral: 20 mL    Other: 282 mL  Total OUT: 472 mL    Total NET: -170.5 mL      Physical exam:   Intubated and sedated   ANASARCA   difficult to evaluate for JVD   Chest: s/p thoractomy, open chest, multiple drainages from the chest   RRR, normal s1/s2, systolic murmur  Abdmen - swollen, not tender, not distended   EXtremities: 3+ peripheral edema  Hd cathter   Desai minimal urine output         LABS:                        9.0    16.8  )-----------( 74       ( 29 Jun 2018 10:16 )             24.4     06-29    124<L>  |  87<L>  |  49<H>  ----------------------------<  129<H>  4.8   |  20<L>  |  1.83<H>    Ca    9.3      29 Jun 2018 10:16  Phos  4.9     06-28  Mg     2.4     06-29    TPro  4.0<L>  /  Alb  2.8<L>  /  TBili  17.4<H>  /  DBili  >10.0  /  AST  121<H>  /  ALT  25  /  AlkPhos  38<L>  06-29      PT/INR - ( 29 Jun 2018 10:16 )   PT: 11.3 sec;   INR: 1.02          PTT - ( 29 Jun 2018 10:16 )  PTT:26.8 sec          RADIOLOGY & ADDITIONAL STUDIES:

## 2018-06-29 NOTE — PROGRESS NOTE ADULT - PROBLEM SELECTOR PLAN 3
- hemoglobin stable   - - from the primary disease   - no need for EPO at present  - blood transfusion as per primary team.

## 2018-06-29 NOTE — PROGRESS NOTE ADULT - ASSESSMENT
68yo with history of congenital hyman disease s/p AVR @ age of 12, Ef 20% on preop cardiac clearance diagnosed with 2 vessel CAD, VHD (severe prosthetic AS, Severe MR, severe TR, severe pulm hypertension.).    Pt s/p complex surgery on 6/21 with CABG x2, AVR, MV/TV repair, Impella insertion, IABP  -- open chest  6/22 Chest closed  6/23 Chest re-explored for tamponade  6/26 chest closure   6/27 chest re-explored by bedside for pseudo-tamponade.      Pt remains critically ill in multiorgan failure.  Multiple family discussions held by CTICU team regarding prognosis.  Continuing current care with no escalation.  Over the course of the day, improvement in pressor requirement.  Able to tolerate fluid removal with Ultrafiltration pt negative 1300 ml     Problems  1. Cardiogenic shock, supported with pressors/iontropes, Impella  2. Open chest  3. Multiorgan failure    Plan   Neuro -- sedated and paralyzed.  Noted to have frequent vagal events without paralytics. which were resumed.   CVS - open chest --  possible chest washout tmr  Continue Impella support @ P6/3.5L flow  Weaning pressors as tolerated   Pulm - vent support  - vent down to 60% FIO2  GI - GI proph, elevated Bili likely due to hemolysis.  AST/ALT normal    - anuric - did not tolerate CVVH yesterday.  Tolerating fluid removal for now.  Currently no metabolic indication for CVVH but likely will need CVVHD tmr  Endo - glycemic control  Heme - heparin stopped previous times due to bleeding.  Currently no active bleeding.  Will monitor pt count.   ID - On empiric antibiotics -- Aztreonam, fluc, vanc, flagyl and One dose of gent given for open chest and multiple bedside washouts.       Critical post op.    Critical care time spent 80 min   Wife updated, remains critical with poor prognosis but will continue aggressive care.

## 2018-06-29 NOTE — PROGRESS NOTE ADULT - PROBLEM SELECTOR PLAN 1
- the oliguric EMILY due to ATN - most likely due to cardiogenic shock, can not exclude also a septic shock, requiring pressors - epinephrine, vasopressin, phenylephrine. on dobutamine, on milrinone   - oliguric in the morning - anuric   - the primary team started aquaphoresis now on 40 ml/h of UF  - the CVVHD ordered from renal fellow but never been started from the primary team yesterday the BP borderline   - the patient with overt fluid overload - anasarca   - this mode of RRT - CVVHD can be initiated at any moment when the patient's hemodynamics can support it. Minimal ordered for UF - the UF can be titrated up from the primary team if the MAP is above 65 mmHg   - electrolytes noted   - the patient of bicar drip - please consider stopping it - serum bicarb accepatable and if acidosis is getting worse the order for CVVHD is active can be satrted on that mode of RRT   - in and outs   - daily weight

## 2018-06-30 LAB
ALBUMIN SERPL ELPH-MCNC: 3 G/DL — LOW (ref 3.3–5)
ALBUMIN SERPL ELPH-MCNC: 3.1 G/DL — LOW (ref 3.3–5)
ALBUMIN SERPL ELPH-MCNC: 3.2 G/DL — LOW (ref 3.3–5)
ALBUMIN SERPL ELPH-MCNC: 3.4 G/DL — SIGNIFICANT CHANGE UP (ref 3.3–5)
ALP SERPL-CCNC: 51 U/L — SIGNIFICANT CHANGE UP (ref 40–120)
ALP SERPL-CCNC: 52 U/L — SIGNIFICANT CHANGE UP (ref 40–120)
ALP SERPL-CCNC: 69 U/L — SIGNIFICANT CHANGE UP (ref 40–120)
ALP SERPL-CCNC: 81 U/L — SIGNIFICANT CHANGE UP (ref 40–120)
ALT FLD-CCNC: 23 U/L — SIGNIFICANT CHANGE UP (ref 10–45)
ALT FLD-CCNC: 26 U/L — SIGNIFICANT CHANGE UP (ref 10–45)
ALT FLD-CCNC: 27 U/L — SIGNIFICANT CHANGE UP (ref 10–45)
ALT FLD-CCNC: <5 U/L — LOW (ref 10–45)
ANION GAP SERPL CALC-SCNC: 17 MMOL/L — SIGNIFICANT CHANGE UP (ref 5–17)
ANION GAP SERPL CALC-SCNC: 17 MMOL/L — SIGNIFICANT CHANGE UP (ref 5–17)
ANION GAP SERPL CALC-SCNC: 18 MMOL/L — HIGH (ref 5–17)
ANION GAP SERPL CALC-SCNC: 18 MMOL/L — HIGH (ref 5–17)
ANION GAP SERPL CALC-SCNC: 19 MMOL/L — HIGH (ref 5–17)
APTT BLD: 28.8 SEC — SIGNIFICANT CHANGE UP (ref 27.5–37.4)
APTT BLD: 30.6 SEC — SIGNIFICANT CHANGE UP (ref 27.5–37.4)
APTT BLD: 33.3 SEC — SIGNIFICANT CHANGE UP (ref 27.5–37.4)
APTT BLD: 34.7 SEC — SIGNIFICANT CHANGE UP (ref 27.5–37.4)
APTT BLD: 36.6 SEC — SIGNIFICANT CHANGE UP (ref 27.5–37.4)
AST SERPL-CCNC: 103 U/L — HIGH (ref 10–40)
AST SERPL-CCNC: 128 U/L — HIGH (ref 10–40)
AST SERPL-CCNC: 133 U/L — HIGH (ref 10–40)
AST SERPL-CCNC: 92 U/L — HIGH (ref 10–40)
BASE EXCESS BLDV CALC-SCNC: -7.7 MMOL/L — SIGNIFICANT CHANGE UP
BASE EXCESS BLDV CALC-SCNC: -8.9 MMOL/L — SIGNIFICANT CHANGE UP
BASOPHILS NFR BLD AUTO: 0.3 % — SIGNIFICANT CHANGE UP (ref 0–2)
BILIRUB DIRECT SERPL-MCNC: >10 MG/DL — HIGH (ref 0–0.2)
BILIRUB INDIRECT FLD-MCNC: <10.4 MG/DL — HIGH (ref 0.2–1)
BILIRUB SERPL-MCNC: 20.3 MG/DL — HIGH (ref 0.2–1.2)
BILIRUB SERPL-MCNC: 20.4 MG/DL — HIGH (ref 0.2–1.2)
BILIRUB SERPL-MCNC: 21.5 MG/DL — HIGH (ref 0.2–1.2)
BILIRUB SERPL-MCNC: 24.2 MG/DL — HIGH (ref 0.2–1.2)
BLD GP AB SCN SERPL QL: NEGATIVE — SIGNIFICANT CHANGE UP
BUN SERPL-MCNC: 51 MG/DL — HIGH (ref 7–23)
BUN SERPL-MCNC: 53 MG/DL — HIGH (ref 7–23)
BUN SERPL-MCNC: 56 MG/DL — HIGH (ref 7–23)
BUN SERPL-MCNC: 56 MG/DL — HIGH (ref 7–23)
BUN SERPL-MCNC: 58 MG/DL — HIGH (ref 7–23)
CALCIUM SERPL-MCNC: 8.5 MG/DL — SIGNIFICANT CHANGE UP (ref 8.4–10.5)
CALCIUM SERPL-MCNC: 8.6 MG/DL — SIGNIFICANT CHANGE UP (ref 8.4–10.5)
CALCIUM SERPL-MCNC: 9 MG/DL — SIGNIFICANT CHANGE UP (ref 8.4–10.5)
CALCIUM SERPL-MCNC: 9.1 MG/DL — SIGNIFICANT CHANGE UP (ref 8.4–10.5)
CALCIUM SERPL-MCNC: 9.1 MG/DL — SIGNIFICANT CHANGE UP (ref 8.4–10.5)
CHLORIDE SERPL-SCNC: 89 MMOL/L — LOW (ref 96–108)
CHLORIDE SERPL-SCNC: 90 MMOL/L — LOW (ref 96–108)
CHLORIDE SERPL-SCNC: 90 MMOL/L — LOW (ref 96–108)
CHLORIDE SERPL-SCNC: 94 MMOL/L — LOW (ref 96–108)
CHLORIDE SERPL-SCNC: 95 MMOL/L — LOW (ref 96–108)
CO2 SERPL-SCNC: 16 MMOL/L — LOW (ref 22–31)
CO2 SERPL-SCNC: 17 MMOL/L — LOW (ref 22–31)
CO2 SERPL-SCNC: 17 MMOL/L — LOW (ref 22–31)
CO2 SERPL-SCNC: 18 MMOL/L — LOW (ref 22–31)
CO2 SERPL-SCNC: 18 MMOL/L — LOW (ref 22–31)
CREAT SERPL-MCNC: 1.7 MG/DL — HIGH (ref 0.5–1.3)
CREAT SERPL-MCNC: 1.83 MG/DL — HIGH (ref 0.5–1.3)
CREAT SERPL-MCNC: 1.9 MG/DL — HIGH (ref 0.5–1.3)
CREAT SERPL-MCNC: 1.98 MG/DL — HIGH (ref 0.5–1.3)
CREAT SERPL-MCNC: 2.02 MG/DL — HIGH (ref 0.5–1.3)
EOSINOPHIL NFR BLD AUTO: 0.4 % — SIGNIFICANT CHANGE UP (ref 0–6)
GAS PNL BLDA: SIGNIFICANT CHANGE UP
GAS PNL BLDV: SIGNIFICANT CHANGE UP
GLUCOSE BLDC GLUCOMTR-MCNC: 100 MG/DL — HIGH (ref 70–99)
GLUCOSE BLDC GLUCOMTR-MCNC: 103 MG/DL — HIGH (ref 70–99)
GLUCOSE BLDC GLUCOMTR-MCNC: 104 MG/DL — HIGH (ref 70–99)
GLUCOSE BLDC GLUCOMTR-MCNC: 110 MG/DL — HIGH (ref 70–99)
GLUCOSE BLDC GLUCOMTR-MCNC: 110 MG/DL — HIGH (ref 70–99)
GLUCOSE BLDC GLUCOMTR-MCNC: 120 MG/DL — HIGH (ref 70–99)
GLUCOSE BLDC GLUCOMTR-MCNC: 126 MG/DL — HIGH (ref 70–99)
GLUCOSE BLDC GLUCOMTR-MCNC: 129 MG/DL — HIGH (ref 70–99)
GLUCOSE BLDC GLUCOMTR-MCNC: 134 MG/DL — HIGH (ref 70–99)
GLUCOSE BLDC GLUCOMTR-MCNC: 150 MG/DL — HIGH (ref 70–99)
GLUCOSE BLDC GLUCOMTR-MCNC: 151 MG/DL — HIGH (ref 70–99)
GLUCOSE BLDC GLUCOMTR-MCNC: 154 MG/DL — HIGH (ref 70–99)
GLUCOSE BLDC GLUCOMTR-MCNC: 71 MG/DL — SIGNIFICANT CHANGE UP (ref 70–99)
GLUCOSE BLDC GLUCOMTR-MCNC: 73 MG/DL — SIGNIFICANT CHANGE UP (ref 70–99)
GLUCOSE BLDC GLUCOMTR-MCNC: 77 MG/DL — SIGNIFICANT CHANGE UP (ref 70–99)
GLUCOSE SERPL-MCNC: 102 MG/DL — HIGH (ref 70–99)
GLUCOSE SERPL-MCNC: 112 MG/DL — HIGH (ref 70–99)
GLUCOSE SERPL-MCNC: 134 MG/DL — HIGH (ref 70–99)
GLUCOSE SERPL-MCNC: 148 MG/DL — HIGH (ref 70–99)
GLUCOSE SERPL-MCNC: 88 MG/DL — SIGNIFICANT CHANGE UP (ref 70–99)
HCO3 BLDV-SCNC: 17 MMOL/L — LOW (ref 20–27)
HCO3 BLDV-SCNC: 18 MMOL/L — LOW (ref 20–27)
HCT VFR BLD CALC: 29 % — LOW (ref 39–50)
HCT VFR BLD CALC: 29.3 % — LOW (ref 39–50)
HCT VFR BLD CALC: 29.4 % — LOW (ref 39–50)
HCT VFR BLD CALC: 29.7 % — LOW (ref 39–50)
HCT VFR BLD CALC: 29.8 % — LOW (ref 39–50)
HGB BLD-MCNC: 10.5 G/DL — LOW (ref 13–17)
HGB BLD-MCNC: 10.6 G/DL — LOW (ref 13–17)
HGB BLD-MCNC: 10.6 G/DL — LOW (ref 13–17)
HGB BLD-MCNC: 10.7 G/DL — LOW (ref 13–17)
HGB BLD-MCNC: 10.8 G/DL — LOW (ref 13–17)
INR BLD: 0.98 — SIGNIFICANT CHANGE UP (ref 0.88–1.16)
INR BLD: 1.13 — SIGNIFICANT CHANGE UP (ref 0.88–1.16)
INR BLD: 1.15 — SIGNIFICANT CHANGE UP (ref 0.88–1.16)
INR BLD: 1.2 — HIGH (ref 0.88–1.16)
INR BLD: 1.26 — HIGH (ref 0.88–1.16)
LACTATE SERPL-SCNC: 1.7 MMOL/L — SIGNIFICANT CHANGE UP (ref 0.5–2)
LACTATE SERPL-SCNC: 2.1 MMOL/L — HIGH (ref 0.5–2)
LACTATE SERPL-SCNC: 2.1 MMOL/L — HIGH (ref 0.5–2)
LACTATE SERPL-SCNC: 2.8 MMOL/L — HIGH (ref 0.5–2)
LACTATE SERPL-SCNC: 2.9 MMOL/L — HIGH (ref 0.5–2)
LACTATE SERPL-SCNC: 3 MMOL/L — HIGH (ref 0.5–2)
LYMPHOCYTES # BLD AUTO: 3.8 % — LOW (ref 13–44)
MAGNESIUM SERPL-MCNC: 2.2 MG/DL — SIGNIFICANT CHANGE UP (ref 1.6–2.6)
MAGNESIUM SERPL-MCNC: 2.5 MG/DL — SIGNIFICANT CHANGE UP (ref 1.6–2.6)
MAGNESIUM SERPL-MCNC: 2.5 MG/DL — SIGNIFICANT CHANGE UP (ref 1.6–2.6)
MCHC RBC-ENTMCNC: 29.7 PG — SIGNIFICANT CHANGE UP (ref 27–34)
MCHC RBC-ENTMCNC: 29.8 PG — SIGNIFICANT CHANGE UP (ref 27–34)
MCHC RBC-ENTMCNC: 29.9 PG — SIGNIFICANT CHANGE UP (ref 27–34)
MCHC RBC-ENTMCNC: 30.2 PG — SIGNIFICANT CHANGE UP (ref 27–34)
MCHC RBC-ENTMCNC: 30.6 PG — SIGNIFICANT CHANGE UP (ref 27–34)
MCHC RBC-ENTMCNC: 35.6 G/DL — SIGNIFICANT CHANGE UP (ref 32–36)
MCHC RBC-ENTMCNC: 35.7 G/DL — SIGNIFICANT CHANGE UP (ref 32–36)
MCHC RBC-ENTMCNC: 36.2 G/DL — HIGH (ref 32–36)
MCHC RBC-ENTMCNC: 36.4 G/DL — HIGH (ref 32–36)
MCHC RBC-ENTMCNC: 36.9 G/DL — HIGH (ref 32–36)
MCV RBC AUTO: 82.3 FL — SIGNIFICANT CHANGE UP (ref 80–100)
MCV RBC AUTO: 82.9 FL — SIGNIFICANT CHANGE UP (ref 80–100)
MCV RBC AUTO: 83 FL — SIGNIFICANT CHANGE UP (ref 80–100)
MCV RBC AUTO: 83.1 FL — SIGNIFICANT CHANGE UP (ref 80–100)
MCV RBC AUTO: 83.9 FL — SIGNIFICANT CHANGE UP (ref 80–100)
MONOCYTES NFR BLD AUTO: 2.2 % — SIGNIFICANT CHANGE UP (ref 2–14)
NEUTROPHILS NFR BLD AUTO: 93.3 % — HIGH (ref 43–77)
PCO2 BLDV: 35 MMHG — LOW (ref 41–51)
PCO2 BLDV: 36 MMHG — LOW (ref 41–51)
PH BLDV: 7.29 — LOW (ref 7.32–7.43)
PH BLDV: 7.32 — SIGNIFICANT CHANGE UP (ref 7.32–7.43)
PHOSPHATE SERPL-MCNC: 4.5 MG/DL — SIGNIFICANT CHANGE UP (ref 2.5–4.5)
PHOSPHATE SERPL-MCNC: 4.7 MG/DL — HIGH (ref 2.5–4.5)
PHOSPHATE SERPL-MCNC: 4.8 MG/DL — HIGH (ref 2.5–4.5)
PLATELET # BLD AUTO: 116 K/UL — LOW (ref 150–400)
PLATELET # BLD AUTO: 139 K/UL — LOW (ref 150–400)
PLATELET # BLD AUTO: 154 K/UL — SIGNIFICANT CHANGE UP (ref 150–400)
PLATELET # BLD AUTO: 39 K/UL — LOW (ref 150–400)
PLATELET # BLD AUTO: 82 K/UL — LOW (ref 150–400)
PO2 BLDV: 35 MMHG — SIGNIFICANT CHANGE UP
PO2 BLDV: 39 MMHG — SIGNIFICANT CHANGE UP
POTASSIUM SERPL-MCNC: 3.8 MMOL/L — SIGNIFICANT CHANGE UP (ref 3.5–5.3)
POTASSIUM SERPL-MCNC: 4.4 MMOL/L — SIGNIFICANT CHANGE UP (ref 3.5–5.3)
POTASSIUM SERPL-MCNC: 4.4 MMOL/L — SIGNIFICANT CHANGE UP (ref 3.5–5.3)
POTASSIUM SERPL-MCNC: 4.5 MMOL/L — SIGNIFICANT CHANGE UP (ref 3.5–5.3)
POTASSIUM SERPL-MCNC: 4.5 MMOL/L — SIGNIFICANT CHANGE UP (ref 3.5–5.3)
POTASSIUM SERPL-SCNC: 3.8 MMOL/L — SIGNIFICANT CHANGE UP (ref 3.5–5.3)
POTASSIUM SERPL-SCNC: 4.4 MMOL/L — SIGNIFICANT CHANGE UP (ref 3.5–5.3)
POTASSIUM SERPL-SCNC: 4.4 MMOL/L — SIGNIFICANT CHANGE UP (ref 3.5–5.3)
POTASSIUM SERPL-SCNC: 4.5 MMOL/L — SIGNIFICANT CHANGE UP (ref 3.5–5.3)
POTASSIUM SERPL-SCNC: 4.5 MMOL/L — SIGNIFICANT CHANGE UP (ref 3.5–5.3)
PROT SERPL-MCNC: 4.4 G/DL — LOW (ref 6–8.3)
PROT SERPL-MCNC: 4.6 G/DL — LOW (ref 6–8.3)
PROTHROM AB SERPL-ACNC: 10.9 SEC — SIGNIFICANT CHANGE UP (ref 9.8–12.7)
PROTHROM AB SERPL-ACNC: 12.6 SEC — SIGNIFICANT CHANGE UP (ref 9.8–12.7)
PROTHROM AB SERPL-ACNC: 12.8 SEC — HIGH (ref 9.8–12.7)
PROTHROM AB SERPL-ACNC: 13.4 SEC — HIGH (ref 9.8–12.7)
PROTHROM AB SERPL-ACNC: 14 SEC — HIGH (ref 9.8–12.7)
RBC # BLD: 3.5 M/UL — LOW (ref 4.2–5.8)
RBC # BLD: 3.54 M/UL — LOW (ref 4.2–5.8)
RBC # BLD: 3.55 M/UL — LOW (ref 4.2–5.8)
RBC # BLD: 3.56 M/UL — LOW (ref 4.2–5.8)
RBC # BLD: 3.58 M/UL — LOW (ref 4.2–5.8)
RBC # FLD: 15.9 % — SIGNIFICANT CHANGE UP (ref 10.3–16.9)
RBC # FLD: 16 % — SIGNIFICANT CHANGE UP (ref 10.3–16.9)
RBC # FLD: 16.1 % — SIGNIFICANT CHANGE UP (ref 10.3–16.9)
RBC # FLD: 16.3 % — SIGNIFICANT CHANGE UP (ref 10.3–16.9)
RBC # FLD: 16.5 % — SIGNIFICANT CHANGE UP (ref 10.3–16.9)
RH IG SCN BLD-IMP: POSITIVE — SIGNIFICANT CHANGE UP
SAO2 % BLDV: 69 % — SIGNIFICANT CHANGE UP
SAO2 % BLDV: 73 % — SIGNIFICANT CHANGE UP
SODIUM SERPL-SCNC: 124 MMOL/L — LOW (ref 135–145)
SODIUM SERPL-SCNC: 125 MMOL/L — LOW (ref 135–145)
SODIUM SERPL-SCNC: 126 MMOL/L — LOW (ref 135–145)
SODIUM SERPL-SCNC: 128 MMOL/L — LOW (ref 135–145)
SODIUM SERPL-SCNC: 130 MMOL/L — LOW (ref 135–145)
WBC # BLD: 20.1 K/UL — HIGH (ref 3.8–10.5)
WBC # BLD: 20.4 K/UL — HIGH (ref 3.8–10.5)
WBC # BLD: 20.7 K/UL — HIGH (ref 3.8–10.5)
WBC # BLD: 21.7 K/UL — HIGH (ref 3.8–10.5)
WBC # BLD: 24.1 K/UL — HIGH (ref 3.8–10.5)
WBC # FLD AUTO: 20.1 K/UL — HIGH (ref 3.8–10.5)
WBC # FLD AUTO: 20.4 K/UL — HIGH (ref 3.8–10.5)
WBC # FLD AUTO: 20.7 K/UL — HIGH (ref 3.8–10.5)
WBC # FLD AUTO: 21.7 K/UL — HIGH (ref 3.8–10.5)
WBC # FLD AUTO: 24.1 K/UL — HIGH (ref 3.8–10.5)

## 2018-06-30 PROCEDURE — 99291 CRITICAL CARE FIRST HOUR: CPT

## 2018-06-30 PROCEDURE — 99292 CRITICAL CARE ADDL 30 MIN: CPT

## 2018-06-30 PROCEDURE — 99292 CRITICAL CARE ADDL 30 MIN: CPT | Mod: 25

## 2018-06-30 PROCEDURE — 31622 DX BRONCHOSCOPE/WASH: CPT

## 2018-06-30 PROCEDURE — 71045 X-RAY EXAM CHEST 1 VIEW: CPT | Mod: 26,76

## 2018-06-30 RX ORDER — CARDIOPLEGIC SOLUTION NO.1 K+=16MEQ/L
1000 PLASTIC BAG, PERFUSION (ML) PERFUSION
Qty: 0 | Refills: 0 | Status: COMPLETED | OUTPATIENT
Start: 2018-06-30 | End: 2018-06-30

## 2018-06-30 RX ORDER — MIDAZOLAM HYDROCHLORIDE 1 MG/ML
5 INJECTION, SOLUTION INTRAMUSCULAR; INTRAVENOUS ONCE
Qty: 0 | Refills: 0 | Status: DISCONTINUED | OUTPATIENT
Start: 2018-06-30 | End: 2018-06-30

## 2018-06-30 RX ORDER — SODIUM BICARBONATE 1 MEQ/ML
50 SYRINGE (ML) INTRAVENOUS ONCE
Qty: 0 | Refills: 0 | Status: COMPLETED | OUTPATIENT
Start: 2018-06-30 | End: 2018-06-30

## 2018-06-30 RX ORDER — ALBUMIN HUMAN 25 %
250 VIAL (ML) INTRAVENOUS ONCE
Qty: 0 | Refills: 0 | Status: COMPLETED | OUTPATIENT
Start: 2018-06-30 | End: 2018-06-30

## 2018-06-30 RX ORDER — HYDROCORTISONE 20 MG
50 TABLET ORAL EVERY 8 HOURS
Qty: 0 | Refills: 0 | Status: DISCONTINUED | OUTPATIENT
Start: 2018-06-30 | End: 2018-07-13

## 2018-06-30 RX ADMIN — CISATRACURIUM BESYLATE 10.55 MICROGRAM(S)/KG/MIN: 2 INJECTION INTRAVENOUS at 22:48

## 2018-06-30 RX ADMIN — Medication 50 MILLIEQUIVALENT(S): at 20:30

## 2018-06-30 RX ADMIN — PROPOFOL 1.76 MICROGRAM(S)/KG/MIN: 10 INJECTION, EMULSION INTRAVENOUS at 22:50

## 2018-06-30 RX ADMIN — Medication 1 DROP(S): at 17:01

## 2018-06-30 RX ADMIN — CHLORHEXIDINE GLUCONATE 5 MILLILITER(S): 213 SOLUTION TOPICAL at 02:19

## 2018-06-30 RX ADMIN — Medication 50 MILLIGRAM(S): at 02:19

## 2018-06-30 RX ADMIN — MIDAZOLAM HYDROCHLORIDE 5 MILLIGRAM(S): 1 INJECTION, SOLUTION INTRAMUSCULAR; INTRAVENOUS at 10:33

## 2018-06-30 RX ADMIN — CHLORHEXIDINE GLUCONATE 5 MILLILITER(S): 213 SOLUTION TOPICAL at 17:01

## 2018-06-30 RX ADMIN — CHLORHEXIDINE GLUCONATE 5 MILLILITER(S): 213 SOLUTION TOPICAL at 10:33

## 2018-06-30 RX ADMIN — Medication 125 MILLILITER(S): at 09:00

## 2018-06-30 RX ADMIN — PHENYLEPHRINE HYDROCHLORIDE 5.49 MICROGRAM(S)/KG/MIN: 10 INJECTION INTRAVENOUS at 22:50

## 2018-06-30 RX ADMIN — PROPOFOL 1.76 MICROGRAM(S)/KG/MIN: 10 INJECTION, EMULSION INTRAVENOUS at 15:10

## 2018-06-30 RX ADMIN — MILRINONE LACTATE 8.79 MICROGRAM(S)/KG/MIN: 1 INJECTION, SOLUTION INTRAVENOUS at 22:49

## 2018-06-30 RX ADMIN — CHLORHEXIDINE GLUCONATE 5 MILLILITER(S): 213 SOLUTION TOPICAL at 06:24

## 2018-06-30 RX ADMIN — Medication 1000 MILLILITER(S): at 09:00

## 2018-06-30 RX ADMIN — VASOPRESSIN 1.8 UNIT(S)/MIN: 20 INJECTION INTRAVENOUS at 22:52

## 2018-06-30 RX ADMIN — CHLORHEXIDINE GLUCONATE 5 MILLILITER(S): 213 SOLUTION TOPICAL at 22:24

## 2018-06-30 RX ADMIN — PANTOPRAZOLE SODIUM 40 MILLIGRAM(S): 20 TABLET, DELAYED RELEASE ORAL at 11:43

## 2018-06-30 RX ADMIN — Medication 1 DROP(S): at 06:25

## 2018-06-30 RX ADMIN — Medication 100 MILLIGRAM(S): at 06:24

## 2018-06-30 RX ADMIN — SODIUM CHLORIDE 50 MILLILITER(S): 5 INJECTION, SOLUTION INTRAVENOUS at 22:51

## 2018-06-30 RX ADMIN — INSULIN HUMAN 5 UNIT(S)/HR: 100 INJECTION, SOLUTION SUBCUTANEOUS at 22:49

## 2018-06-30 RX ADMIN — Medication 100 MILLIGRAM(S): at 22:24

## 2018-06-30 RX ADMIN — FENTANYL CITRATE 2.93 MICROGRAM(S)/KG/HR: 50 INJECTION INTRAVENOUS at 22:48

## 2018-06-30 RX ADMIN — CHLORHEXIDINE GLUCONATE 5 MILLILITER(S): 213 SOLUTION TOPICAL at 13:25

## 2018-06-30 RX ADMIN — Medication 100 MILLIGRAM(S): at 13:24

## 2018-06-30 RX ADMIN — FLUCONAZOLE 100 MILLIGRAM(S): 150 TABLET ORAL at 12:43

## 2018-06-30 RX ADMIN — Medication 50 MILLIGRAM(S): at 22:24

## 2018-06-30 RX ADMIN — Medication 100 MILLIGRAM(S): at 13:25

## 2018-06-30 RX ADMIN — CISATRACURIUM BESYLATE 10.55 MICROGRAM(S)/KG/MIN: 2 INJECTION INTRAVENOUS at 10:00

## 2018-06-30 RX ADMIN — Medication 50 MILLIGRAM(S): at 17:01

## 2018-06-30 RX ADMIN — PHENYLEPHRINE HYDROCHLORIDE 5.49 MICROGRAM(S)/KG/MIN: 10 INJECTION INTRAVENOUS at 16:14

## 2018-06-30 RX ADMIN — Medication 250 MILLIGRAM(S): at 11:43

## 2018-06-30 RX ADMIN — Medication 50 MILLIGRAM(S): at 10:33

## 2018-06-30 NOTE — PROGRESS NOTE ADULT - PROBLEM SELECTOR PLAN 1
CVVHD initiated this AM  UF rate started at 100cc/hr since was already tolerating aquapheresis  currently hemodynamically stable  maintain negative fluid balance to offset anasarca and volume that he is currently receiving with all drips.  -2.7L I past 24 hours

## 2018-06-30 NOTE — PROGRESS NOTE ADULT - SUBJECTIVE AND OBJECTIVE BOX
CTICU  CRITICAL  CARE  attending     Hand off received 					   Pertinent clinical, laboratory, radiographic, hemodynamic, echocardiographic, respiratory data, microbiologic data and chart were reviewed and analyzed frequently throughout the course of the day and night  Patient seen and examined with CTS/ SH attending at bedside  Pt is a 67y , Male, HEALTH ISSUES - PROBLEM Dx:  Anemia: Anemia  Hyponatremia: Hyponatremia  Acute kidney failure: Acute kidney failure  CAD (coronary artery disease): CAD (coronary artery disease)  Valvular disease: Valvular disease      , FAMILY HISTORY:  PAST MEDICAL & SURGICAL HISTORY:  No pertinent past medical history  No significant past surgical history    Patient is a 67y old  Male who presents with a chief complaint of AS (19 Jun 2018 00:06)      14 system review was unremarkable  acute changes include acute respiratory failure  Vital signs, hemodynamic and respiratory parameters were reviewed from the bedside nursing flowsheet.  ICU Vital Signs Last 24 Hrs  T(C): 35.6 (30 Jun 2018 04:00), Max: 35.6 (30 Jun 2018 02:00)  T(F): 96 (30 Jun 2018 04:00), Max: 96.1 (30 Jun 2018 02:00)  HR: 94 (30 Jun 2018 06:00) (94 - 95)  BP: 125/68 (29 Jun 2018 11:00) (117/62 - 125/68)  BP(mean): 90 (29 Jun 2018 11:00) (90 - 93)  ABP: 128/68 (30 Jun 2018 06:00) (100/56 - 128/76)  ABP(mean): 86 (30 Jun 2018 06:00) (68 - 90)  RR: 26 (30 Jun 2018 06:00) (26 - 28)  SpO2: 100% (30 Jun 2018 06:00) (95% - 100%)    Adult Advanced Hemodynamics Last 24 Hrs  CVP(mm Hg): 24 (30 Jun 2018 06:00) (20 - 27)  CVP(cm H2O): --  CO: --  CI: --  PA: 47/26 (30 Jun 2018 06:00) (42/23 - 53/25)  PA(mean): 34 (30 Jun 2018 06:00) (30 - 36)  PCWP: --  SVR: --  SVRI: --  PVR: --  PVRI: --, ABG - ( 30 Jun 2018 03:41 )  pH, Arterial: 7.36  pH, Blood: x     /  pCO2: 34    /  pO2: 134   / HCO3: 19    / Base Excess: -5.6  /  SaO2: 99                Mode: AC/ CMV (Assist Control/ Continuous Mandatory Ventilation)  RR (machine): 26  TV (machine): 370  FiO2: 60  PEEP: 6  ITime: 1  MAP: 16  PIP: 31    Intake and output was reviewed and the fluid balance was calculated  Daily     Daily   I&O's Summary    29 Jun 2018 07:01  -  30 Jun 2018 07:00  --------------------------------------------------------  IN: 3199.7 mL / OUT: 5969 mL / NET: -2769.3 mL        All lines and drain sites were assessed  [facility]      POCT Blood Glucose.: 71 mg/dL (30 Jun 2018 06:16)    No acute change in mental status  Auscultation of the chest reveals equal bs  Abdomen is soft  Extremities are warm and well perfused  Wounds appear clean and unremarkable  Antibiotics are periop    labs  CBC Full  -  ( 30 Jun 2018 03:43 )  WBC Count : 20.1 K/uL  Hemoglobin : 10.5 g/dL  Hematocrit : 29.4 %  Platelet Count - Automated : 39 K/uL  Mean Cell Volume : 83.1 fL  Mean Cell Hemoglobin : 29.7 pg  Mean Cell Hemoglobin Concentration : 35.7 g/dL  Auto Neutrophil # : x  Auto Lymphocyte # : x  Auto Monocyte # : x  Auto Eosinophil # : x  Auto Basophil # : x  Auto Neutrophil % : x  Auto Lymphocyte % : x  Auto Monocyte % : x  Auto Eosinophil % : x  Auto Basophil % : x    06-30    125<L>  |  90<L>  |  56<H>  ----------------------------<  88  4.5   |  18<L>  |  2.02<H>    Ca    9.1      30 Jun 2018 03:43  Phos  4.8     06-30  Mg     2.5     06-30    TPro  4.6<L>  /  Alb  3.2<L>  /  TBili  24.2<H>  /  DBili  x   /  AST  128<H>  /  ALT  <5<L>  /  AlkPhos  51  06-30    PT/INR - ( 30 Jun 2018 03:43 )   PT: 10.9 sec;   INR: 0.98          PTT - ( 30 Jun 2018 03:43 )  PTT:28.8 sec  The current medications were reviewed   MEDICATIONS  (STANDING):  artificial  tears Solution 1 Drop(s) Both EYES two times a day  aztreonam  IVPB 1000 milliGRAM(s) IV Intermittent every 8 hours  chlorhexidine 0.12% Liquid 5 milliLiter(s) Swish and Spit every 4 hours  cisatracurium Infusion 3 MICROgram(s)/kG/Min (10.548 mL/Hr) IV Continuous <Continuous>  dextrose 50% Injectable 50 milliLiter(s) IV Push every 15 minutes  dextrose 50% Injectable 25 milliLiter(s) IV Push every 15 minutes  DOBUTamine Infusion 5 MICROgram(s)/kG/Min (8.79 mL/Hr) IV Continuous <Continuous>  epinephrine 8 milliGRAM(s),dextrose 5% in water 250 milliLiter(s) 8 milliGRAM(s) (10.42 mL/Hr) IV Continuous <Continuous>  fentaNYL   Infusion. 0.5 MICROgram(s)/kG/Hr (2.93 mL/Hr) IV Continuous <Continuous>  fluconAZOLE IVPB 400 milliGRAM(s) IV Intermittent every 24 hours  Heparin 87784 Unit(s),Dextrose 5% 1000 milliLiter(s) 65331 Unit(s) (25 mL/Hr) IV Continuous <Continuous>  hydrocortisone sodium succinate Injectable 100 milliGRAM(s) IV Push every 8 hours  insulin Infusion 5 Unit(s)/Hr (5 mL/Hr) IV Continuous <Continuous>  metroNIDAZOLE  IVPB 500 milliGRAM(s) IV Intermittent every 8 hours  milrinone Infusion 0.5 MICROgram(s)/kG/Min (8.79 mL/Hr) IV Continuous <Continuous>  pantoprazole  Injectable 40 milliGRAM(s) IV Push daily  phenylephrine    Infusion 0.5 MICROgram(s)/kG/Min (5.494 mL/Hr) IV Continuous <Continuous>  propofol Infusion 5 MICROgram(s)/kG/Min (1.758 mL/Hr) IV Continuous <Continuous>  PureFlow Dialysate RFP-400 (K 2 / Ca 3) 5000 milliLiter(s) (1500 mL/Hr) CRRT <Continuous>  sodium chloride 0.9% Irrigation 1000 milliLiter(s) (1000 mL/Hr) Continuous Irrigation <Continuous>  sodium chloride 0.9%. 1000 milliLiter(s) (10 mL/Hr) IV Continuous <Continuous>  sodium chloride 3%. 500 milliLiter(s) (50 mL/Hr) IV Continuous <Continuous>  vancomycin  IVPB 1000 milliGRAM(s) IV Intermittent every 24 hours  vasopressin Infusion 0.03 Unit(s)/Min (1.8 mL/Hr) IV Continuous <Continuous>    MEDICATIONS  (PRN):       PROBLEM LIST/ ASSESSMENT:  HEALTH ISSUES - PROBLEM Dx:  Anemia: Anemia  Hyponatremia: Hyponatremia  Acute kidney failure: Acute kidney failure  CAD (coronary artery disease): CAD (coronary artery disease)  Valvular disease: Valvular disease      ,   Patient is a 67y old  Male who presents with a chief complaint of AS (19 Jun 2018 00:06)     s/p   acute changes include acute respiratory failure    My plan includes :  close hemodynamic, ventilatory and drain monitoring and management per post op routine    Monitor for arrhythmias and monitor parameters for organ perfusion  monitor neurologic status  Head of the bed should remain elevated to 45 deg .   chest PT and IS will be encouraged  monitor adequacy of oxygenation and ventilation and attempt to wean oxygen  Nutritional goals will be met using po eventually , ensure adequate caloric intake and montior the same  Stress ulcer and VTE prophylaxis will be achieved    Glycemic control is satisfactory  Electrolytes have been repleted as necessary and wound care has been carried out. Pain control has been achieved.   agressive physical therapy and early mobility and ambulation goals will be met   The family was updated about the course and plan  CRITICAL CARE TIME SPENT in evaluation and management, reassessments, review and interpretation of labs and x-rays, ventilator and hemodynamic management, formulating a plan and coordinating care: ___90____ MIN.  Time does not include procedural time.  CTICU ATTENDING     					    Rai Singer MD CTICU  CRITICAL  CARE  attending     Hand off received 					   Pertinent clinical, laboratory, radiographic, hemodynamic, echocardiographic, respiratory data, microbiologic data and chart were reviewed and analyzed frequently throughout the course of the day and night  Patient seen and examined with CTS/ SH attending at bedside    Pt is a 67y , Male, post op day # 9 s/p repl of ascending aorta; AVR; MV repair; TV repair; CABG x 2V    post op:    cardiogenic shock  Impella assist  did not tolerate chest closure x 2  anuric renal failure    currently:    on inotropes/pressors  sedated/paralysed  full vent support; Fio2 60%  Aquapharesis with UF rate 200-200/hr  Impella @ P4 ( down from P6)  CVVHD initiated in the am; for worsening acidosis      Anemia: Anemia  Hyponatremia: Hyponatremia  Acute kidney failure: Acute kidney failure  CAD (coronary artery disease): CAD (coronary artery disease)  Valvular disease: Valvular disease      , FAMILY HISTORY:  PAST MEDICAL & SURGICAL HISTORY:  No pertinent past medical history  No significant past surgical history    Patient is a 67y old  Male who presents with a chief complaint of AS (19 Jun 2018 00:06)      14 system review was unremarkable  acute changes include acute respiratory failure  Vital signs, hemodynamic and respiratory parameters were reviewed from the bedside nursing flowsheet.  ICU Vital Signs Last 24 Hrs  T(C): 35.6 (30 Jun 2018 04:00), Max: 35.6 (30 Jun 2018 02:00)  T(F): 96 (30 Jun 2018 04:00), Max: 96.1 (30 Jun 2018 02:00)  HR: 94 (30 Jun 2018 06:00) (94 - 95)  BP: 125/68 (29 Jun 2018 11:00) (117/62 - 125/68)  BP(mean): 90 (29 Jun 2018 11:00) (90 - 93)  ABP: 128/68 (30 Jun 2018 06:00) (100/56 - 128/76)  ABP(mean): 86 (30 Jun 2018 06:00) (68 - 90)  RR: 26 (30 Jun 2018 06:00) (26 - 28)  SpO2: 100% (30 Jun 2018 06:00) (95% - 100%)    Adult Advanced Hemodynamics Last 24 Hrs  CVP(mm Hg): 24 (30 Jun 2018 06:00) (20 - 27)  CVP(cm H2O): --  CO: --  CI: --  PA: 47/26 (30 Jun 2018 06:00) (42/23 - 53/25)  PA(mean): 34 (30 Jun 2018 06:00) (30 - 36)  PCWP: --  SVR: --  SVRI: --  PVR: --  PVRI: --, ABG - ( 30 Jun 2018 03:41 )  pH, Arterial: 7.36  pH, Blood: x     /  pCO2: 34    /  pO2: 134   / HCO3: 19    / Base Excess: -5.6  /  SaO2: 99                Mode: AC/ CMV (Assist Control/ Continuous Mandatory Ventilation)  RR (machine): 26  TV (machine): 370  FiO2: 60  PEEP: 6  ITime: 1  MAP: 16  PIP: 31    Intake and output was reviewed and the fluid balance was calculated  Daily     Daily   I&O's Summary    29 Jun 2018 07:01  -  30 Jun 2018 07:00  --------------------------------------------------------  IN: 3199.7 mL / OUT: 5969 mL / NET: -2769.3 mL        All lines and drain sites were assessed  [facility]      POCT Blood Glucose.: 71 mg/dL (30 Jun 2018 06:16)    No acute change in mental status  Auscultation of the chest reveals equal bs  Abdomen is soft  Extremities are warm and well perfused  Wounds appear clean and unremarkable  Antibiotics are periop    labs  CBC Full  -  ( 30 Jun 2018 03:43 )  WBC Count : 20.1 K/uL  Hemoglobin : 10.5 g/dL  Hematocrit : 29.4 %  Platelet Count - Automated : 39 K/uL  Mean Cell Volume : 83.1 fL  Mean Cell Hemoglobin : 29.7 pg  Mean Cell Hemoglobin Concentration : 35.7 g/dL  Auto Neutrophil # : x  Auto Lymphocyte # : x  Auto Monocyte # : x  Auto Eosinophil # : x  Auto Basophil # : x  Auto Neutrophil % : x  Auto Lymphocyte % : x  Auto Monocyte % : x  Auto Eosinophil % : x  Auto Basophil % : x    06-30    125<L>  |  90<L>  |  56<H>  ----------------------------<  88  4.5   |  18<L>  |  2.02<H>    Ca    9.1      30 Jun 2018 03:43  Phos  4.8     06-30  Mg     2.5     06-30    TPro  4.6<L>  /  Alb  3.2<L>  /  TBili  24.2<H>  /  DBili  x   /  AST  128<H>  /  ALT  <5<L>  /  AlkPhos  51  06-30    PT/INR - ( 30 Jun 2018 03:43 )   PT: 10.9 sec;   INR: 0.98          PTT - ( 30 Jun 2018 03:43 )  PTT:28.8 sec  The current medications were reviewed   MEDICATIONS  (STANDING):  artificial  tears Solution 1 Drop(s) Both EYES two times a day  aztreonam  IVPB 1000 milliGRAM(s) IV Intermittent every 8 hours  chlorhexidine 0.12% Liquid 5 milliLiter(s) Swish and Spit every 4 hours  cisatracurium Infusion 3 MICROgram(s)/kG/Min (10.548 mL/Hr) IV Continuous <Continuous>  dextrose 50% Injectable 50 milliLiter(s) IV Push every 15 minutes  dextrose 50% Injectable 25 milliLiter(s) IV Push every 15 minutes  DOBUTamine Infusion 5 MICROgram(s)/kG/Min (8.79 mL/Hr) IV Continuous <Continuous>  epinephrine 8 milliGRAM(s),dextrose 5% in water 250 milliLiter(s) 8 milliGRAM(s) (10.42 mL/Hr) IV Continuous <Continuous>  fentaNYL   Infusion. 0.5 MICROgram(s)/kG/Hr (2.93 mL/Hr) IV Continuous <Continuous>  fluconAZOLE IVPB 400 milliGRAM(s) IV Intermittent every 24 hours  Heparin 50959 Unit(s),Dextrose 5% 1000 milliLiter(s) 09978 Unit(s) (25 mL/Hr) IV Continuous <Continuous>  hydrocortisone sodium succinate Injectable 100 milliGRAM(s) IV Push every 8 hours  insulin Infusion 5 Unit(s)/Hr (5 mL/Hr) IV Continuous <Continuous>  metroNIDAZOLE  IVPB 500 milliGRAM(s) IV Intermittent every 8 hours  milrinone Infusion 0.5 MICROgram(s)/kG/Min (8.79 mL/Hr) IV Continuous <Continuous>  pantoprazole  Injectable 40 milliGRAM(s) IV Push daily  phenylephrine    Infusion 0.5 MICROgram(s)/kG/Min (5.494 mL/Hr) IV Continuous <Continuous>  propofol Infusion 5 MICROgram(s)/kG/Min (1.758 mL/Hr) IV Continuous <Continuous>  PureFlow Dialysate RFP-400 (K 2 / Ca 3) 5000 milliLiter(s) (1500 mL/Hr) CRRT <Continuous>  sodium chloride 0.9% Irrigation 1000 milliLiter(s) (1000 mL/Hr) Continuous Irrigation <Continuous>  sodium chloride 0.9%. 1000 milliLiter(s) (10 mL/Hr) IV Continuous <Continuous>  sodium chloride 3%. 500 milliLiter(s) (50 mL/Hr) IV Continuous <Continuous>  vancomycin  IVPB 1000 milliGRAM(s) IV Intermittent every 24 hours  vasopressin Infusion 0.03 Unit(s)/Min (1.8 mL/Hr) IV Continuous <Continuous>    MEDICATIONS  (PRN):       PROBLEM LIST/ ASSESSMENT:  HEALTH ISSUES - PROBLEM Dx:  Cardiogenic shock  LV assist device  Anuric renal failure  Anemia: Anemia  Hyponatremia: Hyponatremia  Acute kidney failure: Acute kidney failure  CAD (coronary artery disease): CAD (coronary artery disease)  Valvular disease: Valvular disease      ,   Patient is a 67y old  Male who presents with a chief complaint of AS (19 Jun 2018 00:06)    s/p repl of ascending aorta; AVR; MV repair; TV repair; CABG x 2V  acute changes include acute respiratory failure; and acute renal failure    My plan includes :  close hemodynamic, ventilatory and drain monitoring and management per post op routine  Initiate CVVHD with starting UF rate at 100 ml/hr; goal to be negative 1-1.5L/24 hrs  For bedside chest washout today  continue LV assist at p4  Monitor for arrhythmias and monitor parameters for organ perfusion  monitor neurologic status  Head of the bed should remain elevated to 45 deg .   chest PT and IS will be encouraged  monitor adequacy of oxygenation and ventilation and attempt to wean oxygen  Nutritional goals will be met using po eventually , ensure adequate caloric intake and montior the same  Stress ulcer and VTE prophylaxis will be achieved    Glycemic control is satisfactory  Electrolytes have been repleted as necessary and wound care has been carried out. Pain control has been achieved.   agressive physical therapy and early mobility and ambulation goals will be met   The family was updated about the course and plan  CRITICAL CARE TIME SPENT in evaluation and management, reassessments, review and interpretation of labs and x-rays, ventilator and hemodynamic management, formulating a plan and coordinating care: ___90____ MIN.  Time does not include procedural time.  CTICU ATTENDING     					    Rai Singer MD

## 2018-06-30 NOTE — PROGRESS NOTE ADULT - ASSESSMENT
67 year old male s/p  Repair of AA, AVR, MV repair, TV repair, CABG x2, now in shock requiring multiple pressor support (IV Dobutamine discontinued)  had been on aquapheresis and now hemodynamically stable with pressor dosing still to tolerate CVVHD

## 2018-06-30 NOTE — PROGRESS NOTE ADULT - SUBJECTIVE AND OBJECTIVE BOX
68yo with history of congenital heart disease s/p AV repair vs replacement at the age of 12 recent cardiac evaluation for pre-op clearance.  On workup pt diagnosed with VHD (severe prosthetic AS, severe MR, Severe TR) as well 2 vessel CAD in the setting of low EF 20-25%.      Pre-op CT showing left subclavian artery stenosis, possible coarctation of aorta (focal narrowing 3.5 cm segment of proximal descending aorta distal to the subclavian artery)    6/21 AVR, ascending aorta, TV/MV repair, CABG X2.  recieved multiple blood products/volume arrived with open chest.  IABP and Impella for LV support.    6/22 Chest closure but re-explored for tamponade on 6/23 6/26 Chest closure but again re-explored by bedside for acute hemodynamic failure    24 hours: some improvement in hemodynamics  tolerated fluid removal  chest washout this AM by bedside  Bronchoscopy for acute desaturation -- s/p removal of thick old bloody secretions  CVVH resumed.     Impella @ 2.5 L flow (P4)  CVVH with UF of 100ml removal     Drips :  Epi 0.08mcg/kg/min  Milrinone @ 0.25mcg/min  Vaso @ 0.067unit/min  Dio @ 2mcg/kg/min  Propofol/nimbex/fentanyl   3% NS @ 50cc/hr      PMH :  CHF  Cardiac tamponade  Tricuspid valve insufficiency, unspecified etiology  Coronary artery disease with other form of angina pectoris  Mitral valve insufficiency, unspecified etiology  Aortic valve stenosis, etiology of cardiac valve disease unspecified  Anemia  Hyponatremia  Acute kidney failure  AVR (aortic valve replacement)  Mitral valve repair  Tricuspid valve repair  CABG    ICU Vital Signs Last 24 Hrs  T(C): 35.6 (06-30-18 @ 07:00), Max: 35.6 (06-30-18 @ 02:00)  T(F): 96 (06-30-18 @ 07:00), Max: 96.1 (06-30-18 @ 02:00)  HR: 70 (06-30-18 @ 14:00) (70 - 95)  ABP: 136/70 (06-30-18 @ 14:00) (100/64 - 138/72)  ABP(mean): 88 (06-30-18 @ 14:00) (72 - 90)  RR: 26 (06-30-18 @ 13:00) (26 - 27)  SpO2: 93% (06-30-18 @ 14:00) (91% - 100%)    I&O's Summary    29 Jun 2018 07:01  -  30 Jun 2018 07:00  --------------------------------------------------------  IN: 3382.3 mL / OUT: 6322 mL / NET: -2939.7 mL    30 Jun 2018 07:01  -  30 Jun 2018 14:20  --------------------------------------------------------  IN: 2402.3 mL / OUT: 399 mL / NET: 2003.3 mL      Mode: AC/ CMV (Assist Control/ Continuous Mandatory Ventilation)  RR (machine): 26  TV (machine): 500  FiO2: 100  PEEP: 6  ITime: 1  MAP: 16  PIP: 32    ABG - ( 30 Jun 2018 13:45 )  pH: 7.38  /  pCO2: 30    /  pO2: 293   / HCO3: 17    / Base Excess: -7.1  /  SaO2: 100                             10.7   20.7  )-----------( 139      ( 30 Jun 2018 13:46 )             29.0     30 Jun 2018 10:11    126    |  90     |  53     ----------------------------<  102    4.4     |  17     |  1.83     Ca    9.0        30 Jun 2018 10:11  Phos  4.5       30 Jun 2018 10:11  Mg     2.2       30 Jun 2018 10:11    TPro  4.6    /  Alb  3.4    /  TBili  20.3   /  DBili  x      /  AST  103    /  ALT  27     /  AlkPhos  81     30 Jun 2018 10:11    PT/INR - ( 30 Jun 2018 13:46 )   PT: 12.8 sec;   INR: 1.15     PTT - ( 30 Jun 2018 13:46 )  PTT:33.3 sec    MEDICATIONS  (STANDING):  artificial  tears Solution 1 Drop(s) Both EYES two times a day  aztreonam  IVPB 1000 milliGRAM(s) IV Intermittent every 8 hours  cisatracurium Infusion 3 MICROgram(s)/kG/Min IV Continuous <Continuous>  epinephrine 8 milliGRAM(s),dextrose 5% in water 250 milliLiter(s) 8 milliGRAM(s) IV Continuous <Continuous>  fentaNYL   Infusion. 0.5 MICROgram(s)/kG/Hr IV Continuous <Continuous>  fluconAZOLE IVPB 400 milliGRAM(s) IV Intermittent every 24 hours  Heparin 07147 Unit(s),Dextrose 5% 1000 milliLiter(s) 49993 Unit(s) IV Continuous <Continuous>  hydrocortisone sodium succinate Injectable 100 milliGRAM(s) IV Push every 8 hours  insulin Infusion 5 Unit(s)/Hr IV Continuous <Continuous>  metroNIDAZOLE  IVPB 500 milliGRAM(s) IV Intermittent every 8 hours  milrinone Infusion 0.5 MICROgram(s)/kG/Min IV Continuous <Continuous>  pantoprazole  Injectable 40 milliGRAM(s) IV Push daily  phenylephrine    Infusion 0.5 MICROgram(s)/kG/Min IV Continuous <Continuous>  propofol Infusion 5 MICROgram(s)/kG/Min IV Continuous <Continuous>  vancomycin  IVPB 1000 milliGRAM(s) IV Intermittent every 24 hours  vasopressin Infusion 0.03 Unit(s)/Min IV Continuous <Continuous>    Home Medications:  Lasix 40 mg oral tablet: 1 tab(s) orally once a day (19 Jun 2018 00:25)    PHYSICAL EXAM:  Gen : intubated sedated, + jaundice  Respiratory: decreased in the bases  Cardiovascular: S1 and S2, RRR, no M/G/R  Gastrointestinal: BS+, soft, NT/ND  Extremities: ++ edema   Vascular: 2+ peripheral pulses  Neurological: on Nimbex TOF 2/4  Incision: clean dry/ no sign of infection  Lines: no sign of infection

## 2018-06-30 NOTE — PROGRESS NOTE ADULT - ASSESSMENT
66yo with history of congenital heart disease s/p AVR @ age of 12, Ef 20% on preop cardiac clearance diagnosed with 2 vessel CAD, VHD (severe prosthetic AS, Severe MR, severe TR, severe pulm hypertension.).    Pt s/p complex surgery on 6/21 with CABG x2, AVR, MV/TV repair, Impella insertion, IABP  -- open chest  6/22 Chest closed  6/23 Chest re-explored for tamponade  6/26 chest closure   6/27 chest re-explored by bedside for pseudo-tamponade.  6/30 chest washout      Problems  1. Cardiogenic shock, supported with pressors/iontropes, Impella  2. Open chest s/p washout  3. Multiorgan failure    Plan   Neuro -- sedated and paralyzed.    CVS - open chest --  s/p washout.  continuing empiric antibiotics  Continue Impella support @ P4/2.5L flow  Weaning pressors as tolerated   Pulm - vent support  - s/p emergent bronch for acute desaturation.  Thick copious dark blood secretions removed from b/l lungs   GI - Trickle feeds started   Jaundice/conjugated hyperbilirubinemia likely from hemolysis.  Pt s/p massive transfusion intra op and multiple transfusions post    - anuric -CVVH started  Hyponatremia management on 3% NaCL with slow rise in Na.  Likely etiology is hypervolemic hyponatremia.  Pt grossly anasarca   Endo - glycemic control, taper stress dose steriods.   Heme - continuing heparin flush with impella  Likely can challenge with anticoagulation next week   ID - On empiric antibiotics -- Aztreonam, fluc, vanc, flagyl and One dose of gent given for open chest and multiple bedside washouts.       Critical post op.    Critical care time spent 70 min   Family updated.

## 2018-06-30 NOTE — PROGRESS NOTE ADULT - SUBJECTIVE AND OBJECTIVE BOX
CC: CHF      INTERVAL HISTORY:  intubated and sedated  on multiple pressors for hemodynamic stability  CVVHD started this AM      ROS: No chest pain, no sob, no abd pain. No n/v/d    PAST MEDICAL & SURGICAL HISTORY:  No pertinent past medical history  No significant past surgical history      PHYSICAL EXAM:  T(C): 35.6 (06-30-18 @ 04:00), Max: 35.6 (06-30-18 @ 02:00)  HR: 94 (06-30-18 @ 06:00)  BP: 125/68 (06-29-18 @ 11:00) (117/62 - 125/68)  RR: 26 (06-30-18 @ 06:00)  SpO2: 100% (06-30-18 @ 06:00)  Wt(kg): --  I&O's Summary    29 Jun 2018 07:01  -  30 Jun 2018 07:00  --------------------------------------------------------  IN: 3199.7 mL / OUT: 5969 mL / NET: -2769.3 mL      Weight 58.6 (06-24 @ 23:52)  General:intubated/sedated  HEENT: moist mucous membranes, no pallor/cyanosis.  Neck: no JVD visible.  Cardiac: S1, S2. RRR. No murmurs ; thoracotomy  Respratory: CTA b/l, no access muscle use.   Abdomen: soft. nontender. nondistended  Skin: no rashes.  Extremities: anasarca  Access:       DATA:                        10.5<L>  20.1<H> )-----------( 39<L>    ( 30 Jun 2018 03:43 )             29.4<L>        125<L>  |  90<L>  |  56<H>  ----------------------------<  88     Ca:9.1   (30 Jun 2018 03:43)  4.5     |  18<L>  |  2.02<H>      eGFR if Non : 33 <L>  eGFR if : 38 <L>    TPro  4.6<L>  /  Alb  3.2<L>  /  TBili  24.2<H>  /  DBili  x      /  AST  128<H>  /  ALT  <5<L>  /  AlkPhos  51     30 Jun 2018 03:43                    MEDICATIONS  (STANDING):  artificial  tears Solution 1 Drop(s) Both EYES two times a day  aztreonam  IVPB 1000 milliGRAM(s) IV Intermittent every 8 hours  chlorhexidine 0.12% Liquid 5 milliLiter(s) Swish and Spit every 4 hours  cisatracurium Infusion 3 MICROgram(s)/kG/Min (10.548 mL/Hr) IV Continuous <Continuous>  dextrose 50% Injectable 50 milliLiter(s) IV Push every 15 minutes  dextrose 50% Injectable 25 milliLiter(s) IV Push every 15 minutes  DOBUTamine Infusion 5 MICROgram(s)/kG/Min (8.79 mL/Hr) IV Continuous <Continuous>  epinephrine 8 milliGRAM(s),dextrose 5% in water 250 milliLiter(s) 8 milliGRAM(s) (10.42 mL/Hr) IV Continuous <Continuous>  fentaNYL   Infusion. 0.5 MICROgram(s)/kG/Hr (2.93 mL/Hr) IV Continuous <Continuous>  fluconAZOLE IVPB 400 milliGRAM(s) IV Intermittent every 24 hours  Heparin 90615 Unit(s),Dextrose 5% 1000 milliLiter(s) 17103 Unit(s) (25 mL/Hr) IV Continuous <Continuous>  hydrocortisone sodium succinate Injectable 100 milliGRAM(s) IV Push every 8 hours  insulin Infusion 5 Unit(s)/Hr (5 mL/Hr) IV Continuous <Continuous>  metroNIDAZOLE  IVPB 500 milliGRAM(s) IV Intermittent every 8 hours  milrinone Infusion 0.5 MICROgram(s)/kG/Min (8.79 mL/Hr) IV Continuous <Continuous>  pantoprazole  Injectable 40 milliGRAM(s) IV Push daily  phenylephrine    Infusion 0.5 MICROgram(s)/kG/Min (5.494 mL/Hr) IV Continuous <Continuous>  propofol Infusion 5 MICROgram(s)/kG/Min (1.758 mL/Hr) IV Continuous <Continuous>  PureFlow Dialysate RFP-400 (K 2 / Ca 3) 5000 milliLiter(s) (1500 mL/Hr) CRRT <Continuous>  sodium chloride 0.9% Irrigation 1000 milliLiter(s) (1000 mL/Hr) Continuous Irrigation <Continuous>  sodium chloride 0.9%. 1000 milliLiter(s) (10 mL/Hr) IV Continuous <Continuous>  sodium chloride 3%. 500 milliLiter(s) (50 mL/Hr) IV Continuous <Continuous>  vancomycin  IVPB 1000 milliGRAM(s) IV Intermittent every 24 hours  vasopressin Infusion 0.03 Unit(s)/Min (1.8 mL/Hr) IV Continuous <Continuous>    MEDICATIONS  (PRN):

## 2018-07-01 LAB
ALBUMIN SERPL ELPH-MCNC: 2.6 G/DL — LOW (ref 3.3–5)
ALBUMIN SERPL ELPH-MCNC: 2.8 G/DL — LOW (ref 3.3–5)
ALBUMIN SERPL ELPH-MCNC: 2.9 G/DL — LOW (ref 3.3–5)
ALBUMIN SERPL ELPH-MCNC: 3.1 G/DL — LOW (ref 3.3–5)
ALP SERPL-CCNC: 66 U/L — SIGNIFICANT CHANGE UP (ref 40–120)
ALP SERPL-CCNC: 68 U/L — SIGNIFICANT CHANGE UP (ref 40–120)
ALP SERPL-CCNC: 69 U/L — SIGNIFICANT CHANGE UP (ref 40–120)
ALP SERPL-CCNC: 69 U/L — SIGNIFICANT CHANGE UP (ref 40–120)
ALT FLD-CCNC: 28 U/L — SIGNIFICANT CHANGE UP (ref 10–45)
ALT FLD-CCNC: 28 U/L — SIGNIFICANT CHANGE UP (ref 10–45)
ALT FLD-CCNC: 29 U/L — SIGNIFICANT CHANGE UP (ref 10–45)
ALT FLD-CCNC: 29 U/L — SIGNIFICANT CHANGE UP (ref 10–45)
ANION GAP SERPL CALC-SCNC: 15 MMOL/L — SIGNIFICANT CHANGE UP (ref 5–17)
ANION GAP SERPL CALC-SCNC: 16 MMOL/L — SIGNIFICANT CHANGE UP (ref 5–17)
ANION GAP SERPL CALC-SCNC: 16 MMOL/L — SIGNIFICANT CHANGE UP (ref 5–17)
ANION GAP SERPL CALC-SCNC: 17 MMOL/L — SIGNIFICANT CHANGE UP (ref 5–17)
APTT BLD: 35.9 SEC — SIGNIFICANT CHANGE UP (ref 27.5–37.4)
APTT BLD: 36.2 SEC — SIGNIFICANT CHANGE UP (ref 27.5–37.4)
APTT BLD: 37.9 SEC — HIGH (ref 27.5–37.4)
APTT BLD: 38.8 SEC — HIGH (ref 27.5–37.4)
AST SERPL-CCNC: 82 U/L — HIGH (ref 10–40)
AST SERPL-CCNC: 86 U/L — HIGH (ref 10–40)
AST SERPL-CCNC: 90 U/L — HIGH (ref 10–40)
AST SERPL-CCNC: 91 U/L — HIGH (ref 10–40)
BILIRUB SERPL-MCNC: 17.5 MG/DL — HIGH (ref 0.2–1.2)
BILIRUB SERPL-MCNC: 19.1 MG/DL — HIGH (ref 0.2–1.2)
BILIRUB SERPL-MCNC: 19.2 MG/DL — HIGH (ref 0.2–1.2)
BILIRUB SERPL-MCNC: 20.7 MG/DL — HIGH (ref 0.2–1.2)
BUN SERPL-MCNC: 43 MG/DL — HIGH (ref 7–23)
BUN SERPL-MCNC: 44 MG/DL — HIGH (ref 7–23)
BUN SERPL-MCNC: 45 MG/DL — HIGH (ref 7–23)
BUN SERPL-MCNC: 47 MG/DL — HIGH (ref 7–23)
CALCIUM SERPL-MCNC: 8.4 MG/DL — SIGNIFICANT CHANGE UP (ref 8.4–10.5)
CALCIUM SERPL-MCNC: 8.6 MG/DL — SIGNIFICANT CHANGE UP (ref 8.4–10.5)
CALCIUM SERPL-MCNC: 8.7 MG/DL — SIGNIFICANT CHANGE UP (ref 8.4–10.5)
CALCIUM SERPL-MCNC: 8.8 MG/DL — SIGNIFICANT CHANGE UP (ref 8.4–10.5)
CHLORIDE SERPL-SCNC: 96 MMOL/L — SIGNIFICANT CHANGE UP (ref 96–108)
CHLORIDE SERPL-SCNC: 98 MMOL/L — SIGNIFICANT CHANGE UP (ref 96–108)
CHLORIDE SERPL-SCNC: 98 MMOL/L — SIGNIFICANT CHANGE UP (ref 96–108)
CHLORIDE SERPL-SCNC: 99 MMOL/L — SIGNIFICANT CHANGE UP (ref 96–108)
CO2 SERPL-SCNC: 19 MMOL/L — LOW (ref 22–31)
CO2 SERPL-SCNC: 21 MMOL/L — LOW (ref 22–31)
CREAT SERPL-MCNC: 1.4 MG/DL — HIGH (ref 0.5–1.3)
CREAT SERPL-MCNC: 1.47 MG/DL — HIGH (ref 0.5–1.3)
CREAT SERPL-MCNC: 1.59 MG/DL — HIGH (ref 0.5–1.3)
CREAT SERPL-MCNC: 1.63 MG/DL — HIGH (ref 0.5–1.3)
GAS PNL BLDA: SIGNIFICANT CHANGE UP
GAS PNL BLDV: SIGNIFICANT CHANGE UP
GLUCOSE BLDC GLUCOMTR-MCNC: 101 MG/DL — HIGH (ref 70–99)
GLUCOSE BLDC GLUCOMTR-MCNC: 105 MG/DL — HIGH (ref 70–99)
GLUCOSE BLDC GLUCOMTR-MCNC: 105 MG/DL — HIGH (ref 70–99)
GLUCOSE BLDC GLUCOMTR-MCNC: 107 MG/DL — HIGH (ref 70–99)
GLUCOSE BLDC GLUCOMTR-MCNC: 111 MG/DL — HIGH (ref 70–99)
GLUCOSE BLDC GLUCOMTR-MCNC: 114 MG/DL — HIGH (ref 70–99)
GLUCOSE BLDC GLUCOMTR-MCNC: 115 MG/DL — HIGH (ref 70–99)
GLUCOSE BLDC GLUCOMTR-MCNC: 116 MG/DL — HIGH (ref 70–99)
GLUCOSE BLDC GLUCOMTR-MCNC: 123 MG/DL — HIGH (ref 70–99)
GLUCOSE BLDC GLUCOMTR-MCNC: 62 MG/DL — LOW (ref 70–99)
GLUCOSE BLDC GLUCOMTR-MCNC: 70 MG/DL — SIGNIFICANT CHANGE UP (ref 70–99)
GLUCOSE BLDC GLUCOMTR-MCNC: 72 MG/DL — SIGNIFICANT CHANGE UP (ref 70–99)
GLUCOSE BLDC GLUCOMTR-MCNC: 78 MG/DL — SIGNIFICANT CHANGE UP (ref 70–99)
GLUCOSE BLDC GLUCOMTR-MCNC: 78 MG/DL — SIGNIFICANT CHANGE UP (ref 70–99)
GLUCOSE BLDC GLUCOMTR-MCNC: 85 MG/DL — SIGNIFICANT CHANGE UP (ref 70–99)
GLUCOSE BLDC GLUCOMTR-MCNC: 89 MG/DL — SIGNIFICANT CHANGE UP (ref 70–99)
GLUCOSE BLDC GLUCOMTR-MCNC: 91 MG/DL — SIGNIFICANT CHANGE UP (ref 70–99)
GLUCOSE BLDC GLUCOMTR-MCNC: 94 MG/DL — SIGNIFICANT CHANGE UP (ref 70–99)
GLUCOSE BLDC GLUCOMTR-MCNC: 98 MG/DL — SIGNIFICANT CHANGE UP (ref 70–99)
GLUCOSE SERPL-MCNC: 134 MG/DL — HIGH (ref 70–99)
GLUCOSE SERPL-MCNC: 82 MG/DL — SIGNIFICANT CHANGE UP (ref 70–99)
GLUCOSE SERPL-MCNC: 90 MG/DL — SIGNIFICANT CHANGE UP (ref 70–99)
GLUCOSE SERPL-MCNC: 98 MG/DL — SIGNIFICANT CHANGE UP (ref 70–99)
GRAM STN FLD: SIGNIFICANT CHANGE UP
HCT VFR BLD CALC: 25.9 % — LOW (ref 39–50)
HCT VFR BLD CALC: 27.3 % — LOW (ref 39–50)
HCT VFR BLD CALC: 28.1 % — LOW (ref 39–50)
HCT VFR BLD CALC: 28.3 % — LOW (ref 39–50)
HGB BLD-MCNC: 10.2 G/DL — LOW (ref 13–17)
HGB BLD-MCNC: 10.3 G/DL — LOW (ref 13–17)
HGB BLD-MCNC: 9.2 G/DL — LOW (ref 13–17)
HGB BLD-MCNC: 9.7 G/DL — LOW (ref 13–17)
INR BLD: 1.19 — HIGH (ref 0.88–1.16)
INR BLD: 1.22 — HIGH (ref 0.88–1.16)
INR BLD: 1.25 — HIGH (ref 0.88–1.16)
INR BLD: 1.29 — HIGH (ref 0.88–1.16)
LACTATE SERPL-SCNC: 2.1 MMOL/L — HIGH (ref 0.5–2)
LACTATE SERPL-SCNC: 2.1 MMOL/L — HIGH (ref 0.5–2)
LACTATE SERPL-SCNC: 2.4 MMOL/L — HIGH (ref 0.5–2)
LACTATE SERPL-SCNC: 2.5 MMOL/L — HIGH (ref 0.5–2)
MAGNESIUM SERPL-MCNC: 1.9 MG/DL — SIGNIFICANT CHANGE UP (ref 1.6–2.6)
MAGNESIUM SERPL-MCNC: 2 MG/DL — SIGNIFICANT CHANGE UP (ref 1.6–2.6)
MAGNESIUM SERPL-MCNC: 2 MG/DL — SIGNIFICANT CHANGE UP (ref 1.6–2.6)
MAGNESIUM SERPL-MCNC: 2.1 MG/DL — SIGNIFICANT CHANGE UP (ref 1.6–2.6)
MCHC RBC-ENTMCNC: 29.4 PG — SIGNIFICANT CHANGE UP (ref 27–34)
MCHC RBC-ENTMCNC: 29.4 PG — SIGNIFICANT CHANGE UP (ref 27–34)
MCHC RBC-ENTMCNC: 29.5 PG — SIGNIFICANT CHANGE UP (ref 27–34)
MCHC RBC-ENTMCNC: 29.9 PG — SIGNIFICANT CHANGE UP (ref 27–34)
MCHC RBC-ENTMCNC: 35.5 G/DL — SIGNIFICANT CHANGE UP (ref 32–36)
MCHC RBC-ENTMCNC: 35.5 G/DL — SIGNIFICANT CHANGE UP (ref 32–36)
MCHC RBC-ENTMCNC: 36.3 G/DL — HIGH (ref 32–36)
MCHC RBC-ENTMCNC: 36.4 G/DL — HIGH (ref 32–36)
MCV RBC AUTO: 81.2 FL — SIGNIFICANT CHANGE UP (ref 80–100)
MCV RBC AUTO: 82 FL — SIGNIFICANT CHANGE UP (ref 80–100)
MCV RBC AUTO: 82.7 FL — SIGNIFICANT CHANGE UP (ref 80–100)
MCV RBC AUTO: 82.7 FL — SIGNIFICANT CHANGE UP (ref 80–100)
PHOSPHATE SERPL-MCNC: 2.2 MG/DL — LOW (ref 2.5–4.5)
PHOSPHATE SERPL-MCNC: 2.3 MG/DL — LOW (ref 2.5–4.5)
PLATELET # BLD AUTO: 41 K/UL — LOW (ref 150–400)
PLATELET # BLD AUTO: 56 K/UL — LOW (ref 150–400)
PLATELET # BLD AUTO: 58 K/UL — LOW (ref 150–400)
PLATELET # BLD AUTO: 69 K/UL — LOW (ref 150–400)
POTASSIUM SERPL-MCNC: 3.3 MMOL/L — LOW (ref 3.5–5.3)
POTASSIUM SERPL-MCNC: 3.6 MMOL/L — SIGNIFICANT CHANGE UP (ref 3.5–5.3)
POTASSIUM SERPL-MCNC: 3.6 MMOL/L — SIGNIFICANT CHANGE UP (ref 3.5–5.3)
POTASSIUM SERPL-MCNC: 3.8 MMOL/L — SIGNIFICANT CHANGE UP (ref 3.5–5.3)
POTASSIUM SERPL-SCNC: 3.3 MMOL/L — LOW (ref 3.5–5.3)
POTASSIUM SERPL-SCNC: 3.6 MMOL/L — SIGNIFICANT CHANGE UP (ref 3.5–5.3)
POTASSIUM SERPL-SCNC: 3.6 MMOL/L — SIGNIFICANT CHANGE UP (ref 3.5–5.3)
POTASSIUM SERPL-SCNC: 3.8 MMOL/L — SIGNIFICANT CHANGE UP (ref 3.5–5.3)
PROT SERPL-MCNC: 4.1 G/DL — LOW (ref 6–8.3)
PROT SERPL-MCNC: 4.3 G/DL — LOW (ref 6–8.3)
PROTHROM AB SERPL-ACNC: 13.3 SEC — HIGH (ref 9.8–12.7)
PROTHROM AB SERPL-ACNC: 13.6 SEC — HIGH (ref 9.8–12.7)
PROTHROM AB SERPL-ACNC: 13.9 SEC — HIGH (ref 9.8–12.7)
PROTHROM AB SERPL-ACNC: 14.4 SEC — HIGH (ref 9.8–12.7)
RBC # BLD: 3.13 M/UL — LOW (ref 4.2–5.8)
RBC # BLD: 3.3 M/UL — LOW (ref 4.2–5.8)
RBC # BLD: 3.45 M/UL — LOW (ref 4.2–5.8)
RBC # BLD: 3.46 M/UL — LOW (ref 4.2–5.8)
RBC # FLD: 15.7 % — SIGNIFICANT CHANGE UP (ref 10.3–16.9)
RBC # FLD: 16 % — SIGNIFICANT CHANGE UP (ref 10.3–16.9)
RBC # FLD: 16.5 % — SIGNIFICANT CHANGE UP (ref 10.3–16.9)
RBC # FLD: 16.6 % — SIGNIFICANT CHANGE UP (ref 10.3–16.9)
SODIUM SERPL-SCNC: 132 MMOL/L — LOW (ref 135–145)
SODIUM SERPL-SCNC: 133 MMOL/L — LOW (ref 135–145)
SODIUM SERPL-SCNC: 133 MMOL/L — LOW (ref 135–145)
SODIUM SERPL-SCNC: 135 MMOL/L — SIGNIFICANT CHANGE UP (ref 135–145)
SPECIMEN SOURCE: SIGNIFICANT CHANGE UP
WBC # BLD: 21 K/UL — HIGH (ref 3.8–10.5)
WBC # BLD: 22.1 K/UL — HIGH (ref 3.8–10.5)
WBC # BLD: 23.4 K/UL — HIGH (ref 3.8–10.5)
WBC # BLD: 23.8 K/UL — HIGH (ref 3.8–10.5)
WBC # FLD AUTO: 21 K/UL — HIGH (ref 3.8–10.5)
WBC # FLD AUTO: 22.1 K/UL — HIGH (ref 3.8–10.5)
WBC # FLD AUTO: 23.4 K/UL — HIGH (ref 3.8–10.5)
WBC # FLD AUTO: 23.8 K/UL — HIGH (ref 3.8–10.5)

## 2018-07-01 PROCEDURE — 71045 X-RAY EXAM CHEST 1 VIEW: CPT | Mod: 26,77

## 2018-07-01 PROCEDURE — 99292 CRITICAL CARE ADDL 30 MIN: CPT

## 2018-07-01 PROCEDURE — 99291 CRITICAL CARE FIRST HOUR: CPT

## 2018-07-01 PROCEDURE — 71045 X-RAY EXAM CHEST 1 VIEW: CPT | Mod: 26

## 2018-07-01 PROCEDURE — 74018 RADEX ABDOMEN 1 VIEW: CPT | Mod: 26

## 2018-07-01 RX ORDER — PROPOFOL 10 MG/ML
5 INJECTION, EMULSION INTRAVENOUS
Qty: 1000 | Refills: 0 | Status: DISCONTINUED | OUTPATIENT
Start: 2018-07-01 | End: 2018-07-11

## 2018-07-01 RX ORDER — GENTAMICIN SULFATE 40 MG/ML
120 VIAL (ML) INJECTION ONCE
Qty: 0 | Refills: 0 | Status: COMPLETED | OUTPATIENT
Start: 2018-07-01 | End: 2018-07-01

## 2018-07-01 RX ORDER — METRONIDAZOLE 500 MG
500 TABLET ORAL EVERY 8 HOURS
Qty: 0 | Refills: 0 | Status: DISCONTINUED | OUTPATIENT
Start: 2018-07-01 | End: 2018-07-06

## 2018-07-01 RX ORDER — PANTOPRAZOLE SODIUM 20 MG/1
40 TABLET, DELAYED RELEASE ORAL DAILY
Qty: 0 | Refills: 0 | Status: DISCONTINUED | OUTPATIENT
Start: 2018-07-01 | End: 2018-07-11

## 2018-07-01 RX ORDER — CHLORHEXIDINE GLUCONATE 213 G/1000ML
15 SOLUTION TOPICAL EVERY 12 HOURS
Qty: 0 | Refills: 0 | Status: DISCONTINUED | OUTPATIENT
Start: 2018-07-01 | End: 2018-07-20

## 2018-07-01 RX ORDER — CISATRACURIUM BESYLATE 2 MG/ML
3 INJECTION INTRAVENOUS
Qty: 200 | Refills: 0 | Status: DISCONTINUED | OUTPATIENT
Start: 2018-07-01 | End: 2018-07-14

## 2018-07-01 RX ORDER — PHENYLEPHRINE HYDROCHLORIDE 10 MG/ML
2 INJECTION INTRAVENOUS
Qty: 160 | Refills: 0 | Status: DISCONTINUED | OUTPATIENT
Start: 2018-07-01 | End: 2018-07-20

## 2018-07-01 RX ORDER — FLUCONAZOLE 150 MG/1
400 TABLET ORAL EVERY 24 HOURS
Qty: 0 | Refills: 0 | Status: DISCONTINUED | OUTPATIENT
Start: 2018-07-01 | End: 2018-07-06

## 2018-07-01 RX ORDER — ALBUMIN HUMAN 25 %
50 VIAL (ML) INTRAVENOUS EVERY 6 HOURS
Qty: 0 | Refills: 0 | Status: COMPLETED | OUTPATIENT
Start: 2018-07-01 | End: 2018-07-04

## 2018-07-01 RX ORDER — POTASSIUM CHLORIDE 20 MEQ
20 PACKET (EA) ORAL ONCE
Qty: 0 | Refills: 0 | Status: COMPLETED | OUTPATIENT
Start: 2018-07-01 | End: 2018-07-01

## 2018-07-01 RX ORDER — ALBUMIN HUMAN 25 %
50 VIAL (ML) INTRAVENOUS ONCE
Qty: 0 | Refills: 0 | Status: COMPLETED | OUTPATIENT
Start: 2018-07-01 | End: 2018-07-01

## 2018-07-01 RX ORDER — MILRINONE LACTATE 1 MG/ML
0.25 INJECTION, SOLUTION INTRAVENOUS
Qty: 20 | Refills: 0 | Status: DISCONTINUED | OUTPATIENT
Start: 2018-07-01 | End: 2018-07-14

## 2018-07-01 RX ORDER — INSULIN HUMAN 100 [IU]/ML
5 INJECTION, SOLUTION SUBCUTANEOUS
Qty: 100 | Refills: 0 | Status: DISCONTINUED | OUTPATIENT
Start: 2018-07-01 | End: 2018-07-05

## 2018-07-01 RX ORDER — AZTREONAM 2 G
1000 VIAL (EA) INJECTION EVERY 8 HOURS
Qty: 0 | Refills: 0 | Status: DISCONTINUED | OUTPATIENT
Start: 2018-07-01 | End: 2018-07-01

## 2018-07-01 RX ORDER — AZTREONAM 2 G
1000 VIAL (EA) INJECTION EVERY 8 HOURS
Qty: 0 | Refills: 0 | Status: DISCONTINUED | OUTPATIENT
Start: 2018-07-01 | End: 2018-07-06

## 2018-07-01 RX ADMIN — Medication 50 MILLIGRAM(S): at 01:14

## 2018-07-01 RX ADMIN — Medication 100 MILLIEQUIVALENT(S): at 00:30

## 2018-07-01 RX ADMIN — Medication 50 MILLIGRAM(S): at 10:14

## 2018-07-01 RX ADMIN — FENTANYL CITRATE 2.93 MICROGRAM(S)/KG/HR: 50 INJECTION INTRAVENOUS at 22:50

## 2018-07-01 RX ADMIN — CISATRACURIUM BESYLATE 10.55 MICROGRAM(S)/KG/MIN: 2 INJECTION INTRAVENOUS at 22:49

## 2018-07-01 RX ADMIN — Medication 50 MILLILITER(S): at 19:28

## 2018-07-01 RX ADMIN — Medication 50 MILLIGRAM(S): at 14:07

## 2018-07-01 RX ADMIN — Medication 100 MILLIGRAM(S): at 14:07

## 2018-07-01 RX ADMIN — PANTOPRAZOLE SODIUM 40 MILLIGRAM(S): 20 TABLET, DELAYED RELEASE ORAL at 11:28

## 2018-07-01 RX ADMIN — Medication 100 MILLIGRAM(S): at 05:47

## 2018-07-01 RX ADMIN — Medication 50 MILLILITER(S): at 13:21

## 2018-07-01 RX ADMIN — Medication 1 DROP(S): at 17:06

## 2018-07-01 RX ADMIN — Medication 50 MILLIGRAM(S): at 22:37

## 2018-07-01 RX ADMIN — Medication 50 MILLILITER(S): at 22:41

## 2018-07-01 RX ADMIN — Medication 50 MILLIGRAM(S): at 05:47

## 2018-07-01 RX ADMIN — Medication 250 MILLIGRAM(S): at 11:28

## 2018-07-01 RX ADMIN — PHENYLEPHRINE HYDROCHLORIDE 21.98 MICROGRAM(S)/KG/MIN: 10 INJECTION INTRAVENOUS at 17:06

## 2018-07-01 RX ADMIN — CHLORHEXIDINE GLUCONATE 5 MILLILITER(S): 213 SOLUTION TOPICAL at 05:48

## 2018-07-01 RX ADMIN — FLUCONAZOLE 100 MILLIGRAM(S): 150 TABLET ORAL at 11:10

## 2018-07-01 RX ADMIN — Medication 1 DROP(S): at 05:48

## 2018-07-01 RX ADMIN — MILRINONE LACTATE 4.39 MICROGRAM(S)/KG/MIN: 1 INJECTION, SOLUTION INTRAVENOUS at 17:06

## 2018-07-01 RX ADMIN — PROPOFOL 1.76 MICROGRAM(S)/KG/MIN: 10 INJECTION, EMULSION INTRAVENOUS at 17:06

## 2018-07-01 RX ADMIN — Medication 50 MILLIGRAM(S): at 17:06

## 2018-07-01 RX ADMIN — CHLORHEXIDINE GLUCONATE 5 MILLILITER(S): 213 SOLUTION TOPICAL at 04:07

## 2018-07-01 RX ADMIN — Medication 100 MILLIGRAM(S): at 22:36

## 2018-07-01 RX ADMIN — CHLORHEXIDINE GLUCONATE 15 MILLILITER(S): 213 SOLUTION TOPICAL at 17:07

## 2018-07-01 RX ADMIN — Medication 200 MILLIGRAM(S): at 20:33

## 2018-07-01 NOTE — PROGRESS NOTE ADULT - ASSESSMENT
67 year old male s/p  Repair of AA, AVR, MV repair, TV repair, CABG x2, now in shock requiring multiple pressor support  started on CVVHD yesterday in AM since able to maintain hemodynamics with persistent use of pressor support

## 2018-07-01 NOTE — PROGRESS NOTE ADULT - SUBJECTIVE AND OBJECTIVE BOX
66yo with history of congenital heart disease s/p AV repair vs replacement at the age of 12 recent cardiac evaluation for pre-op clearance.  On workup pt diagnosed with VHD (severe prosthetic AS, severe MR, Severe TR) as well 2 vessel CAD in the setting of low EF 20-25%.      Pre-op CT showing left subclavian artery stenosis, possible coarctation of aorta (focal narrowing 3.5 cm segment of proximal descending aorta distal to the subclavian artery)    6/21 AVR, ascending aorta, TV/MV repair, CABG X2.  recieved multiple blood products/volume arrived with open chest.  IABP and Impella for LV support.    6/22 Chest closure but re-explored for tamponade on 6/23 6/26 Chest closure but again re-explored by bedside for acute hemodynamic failure    24 hours: some improvement in hemodynamics  tolerated fluid removal  chest washout this AM by bedside  Bronchoscopy for acute desaturation -- s/p removal of thick old bloody secretions  CVVH resumed.     Impella @ 2.5 L flow (P4)  CVVH with UF of 100ml removal     Drips :  Epi 0.08mcg/kg/min  Milrinone @ 0.25mcg/min  Dio @ 1.5mcg/kg/min  Propofol/nimbex/fentanyl         PMH :  CHF  Complete heart block  Cardiac tamponade  Tricuspid valve insufficiency, unspecified etiology  Coronary artery disease with other form of angina pectoris  Mitral valve insufficiency, unspecified etiology  Aortic valve stenosis, etiology of cardiac valve disease unspecified  Complete heart block  Open wound of chest wall, unspecified laterality, sequela  Cardiac tamponade  Tricuspid valve insufficiency, unspecified etiology  Coronary artery disease with other form of angina pectoris  Mitral valve insufficiency, unspecified etiology  Aortic valve stenosis, etiology of cardiac valve disease unspecified  Anemia  Hyponatremia  Acute kidney failure    ROS no issues.   All other systems reviewed and negative.    ICU Vital Signs Last 24 Hrs  T(C): 35.7 (07-01-18 @ 14:02), Max: 35.7 (07-01-18 @ 14:02)  T(F): 96.2 (07-01-18 @ 14:02), Max: 96.2 (07-01-18 @ 14:02)  HR: 80 (07-01-18 @ 16:00) (68 - 98)  BP: 100/54 (07-01-18 @ 10:00) (100/54 - 100/54)  BP(mean): 65 (07-01-18 @ 10:00) (65 - 65)  ABP: 120/58 (07-01-18 @ 16:00) (96/60 - 134/66)  ABP(mean): 74 (07-01-18 @ 16:00) (70 - 92)  RR: 20 (07-01-18 @ 16:00) (20 - 20)  SpO2: 98% (07-01-18 @ 16:00) (95% - 100%)    I&O's Summary    30 Jun 2018 07:01  -  01 Jul 2018 07:00  --------------------------------------------------------  IN: 4759.8 mL / OUT: 4345 mL / NET: 414.8 mL    01 Jul 2018 07:01  -  01 Jul 2018 16:35  --------------------------------------------------------  IN: 1499 mL / OUT: 2288 mL / NET: -789 mL      Mode: AC/ CMV (Assist Control/ Continuous Mandatory Ventilation)  RR (machine): 20  TV (machine): 500  FiO2: 50  PEEP: 5  ITime: 1  MAP: 14  PIP: 34    ABG - ( 01 Jul 2018 12:19 )  pH: 7.47  /  pCO2: 29    /  pO2: 166   / HCO3: 20    / Base Excess: -2.3  /  SaO2: 100                             10.2   23.8  )-----------( 69       ( 01 Jul 2018 12:02 )             28.1     01 Jul 2018 12:02    135    |  99     |  43     ----------------------------<  134    3.6     |  21     |  1.40     Ca    8.8        01 Jul 2018 12:02  Phos  2.3       01 Jul 2018 03:53  Mg     2.0       01 Jul 2018 12:02    TPro  4.1    /  Alb  2.6    /  TBili  17.5   /  DBili  x      /  AST  91     /  ALT  29     /  AlkPhos  69     01 Jul 2018 12:02    PT/INR - ( 01 Jul 2018 12:02 )   PT: 13.3 sec;   INR: 1.19     PTT - ( 01 Jul 2018 12:02 )  PTT:35.9 sec    MEDICATIONS  (STANDING):  albumin human 25% IVPB 50 milliLiter(s) IV Intermittent every 6 hours  artificial  tears Solution 1 Drop(s) Both EYES two times a day  aztreonam  IVPB 1000 milliGRAM(s) IV Intermittent every 8 hours  cisatracurium Infusion 3 MICROgram(s)/kG/Min IV Continuous <Continuous>  epinephrine 8 milliGRAM(s),dextrose 5% in water 250 milliLiter(s) 8 milliGRAM(s) IV Continuous <Continuous>  fentaNYL   Infusion. 0.5 MICROgram(s)/kG/Hr IV Continuous <Continuous>  fluconAZOLE IVPB 400 milliGRAM(s) IV Intermittent every 24 hours  Heparin 16865 Unit(s),Dextrose 5% 1000 milliLiter(s) 82984 Unit(s) IV Continuous <Continuous>  hydrocortisone sodium succinate Injectable 50 milliGRAM(s) IV Push every 8 hours  insulin Infusion 5 Unit(s)/Hr IV Continuous <Continuous>  metroNIDAZOLE  IVPB 500 milliGRAM(s) IV Intermittent every 8 hours  milrinone Infusion 0.25 MICROgram(s)/kG/Min IV Continuous <Continuous>  pantoprazole  Injectable 40 milliGRAM(s) IV Push daily  phenylephrine    Infusion 2 MICROgram(s)/kG/Min IV Continuous <Continuous>  propofol Infusion 5 MICROgram(s)/kG/Min IV Continuous <Continuous>  PureFlow Dialysate RFP-400 (K 2 / Ca 3) 5000 milliLiter(s) CRRT <Continuous>  vancomycin  IVPB 1000 milliGRAM(s) IV Intermittent every 24 hours    Home Medications:  Lasix 40 mg oral tablet: 1 tab(s) orally once a day (19 Jun 2018 00:25)    PHYSICAL EXAM:  Gen : + jaundice/anasarca  Respiratory: decreased in the bases  Cardiovascular: S1 and S2, RRR, no M/G/R  Gastrointestinal: BS+, soft, NT/ND  Extremities: +4 edema  Vascular: 2+ peripheral pulses  Incision: open chest   Lines: no sign of infection

## 2018-07-01 NOTE — PROGRESS NOTE ADULT - SUBJECTIVE AND OBJECTIVE BOX
CC: CHF      INTERVAL HISTORY: s/p chest washout yesterday at bedside      ROS: No chest pain, no sob, no abd pain. No n/v/d    PAST MEDICAL & SURGICAL HISTORY:  No pertinent past medical history  No significant past surgical history      PHYSICAL EXAM:  T(C): 35.6 (07-01-18 @ 05:00), Max: 35.6 (07-01-18 @ 01:01)  HR: 94 (07-01-18 @ 07:00)  BP: --  RR: 20 (07-01-18 @ 07:00)  SpO2: 100% (07-01-18 @ 07:00)  Wt(kg): --  I&O's Summary    30 Jun 2018 07:01  -  01 Jul 2018 07:00  --------------------------------------------------------  IN: 4759.8 mL / OUT: 4345 mL / NET: 414.8 mL      Weight 58.6 (06-24 @ 23:52)intubated/sedated   AAO x 3,  NAD.  HEENT: moist mucous membranes, no pallor/cyanosis.  Neck: no JVD visible.  Cardiac: S1, S2. RRR. No murmurs   Respratory: CTA b/l, no access muscle use.   Abdomen: soft. nontender. nondistended  Skin:jaundiced  Extremities:+ anasarca  Access:       DATA:                        10.3<L>  23.4<H> )-----------( 56<L>    ( 01 Jul 2018 03:53 )             28.3<L>        133<L>  |  98     |  47<H>  ----------------------------<  82     Ca:8.4   (01 Jul 2018 03:53)  3.6     |  19<L>  |  1.59<H>      eGFR if Non : 44 <L>  eGFR if : 51 <L>    TPro  4.3<L>  /  Alb  2.8<L>  /  TBili  19.2<H>  /  DBili  x      /  AST  86<H>  /  ALT  29     /  AlkPhos  68     01 Jul 2018 03:53                    MEDICATIONS  (STANDING):  artificial  tears Solution 1 Drop(s) Both EYES two times a day  aztreonam  IVPB 1000 milliGRAM(s) IV Intermittent every 8 hours  chlorhexidine 0.12% Liquid 5 milliLiter(s) Swish and Spit every 4 hours  cisatracurium Infusion 3 MICROgram(s)/kG/Min (10.548 mL/Hr) IV Continuous <Continuous>  dextrose 50% Injectable 50 milliLiter(s) IV Push every 15 minutes  dextrose 50% Injectable 25 milliLiter(s) IV Push every 15 minutes  epinephrine 8 milliGRAM(s),dextrose 5% in water 250 milliLiter(s) 8 milliGRAM(s) (10.42 mL/Hr) IV Continuous <Continuous>  fentaNYL   Infusion. 0.5 MICROgram(s)/kG/Hr (2.93 mL/Hr) IV Continuous <Continuous>  fluconAZOLE IVPB 400 milliGRAM(s) IV Intermittent every 24 hours  Heparin 80950 Unit(s),Dextrose 5% 1000 milliLiter(s) 92935 Unit(s) (25 mL/Hr) IV Continuous <Continuous>  hydrocortisone sodium succinate Injectable 50 milliGRAM(s) IV Push every 8 hours  insulin Infusion 5 Unit(s)/Hr (5 mL/Hr) IV Continuous <Continuous>  metroNIDAZOLE  IVPB 500 milliGRAM(s) IV Intermittent every 8 hours  milrinone Infusion 0.5 MICROgram(s)/kG/Min (8.79 mL/Hr) IV Continuous <Continuous>  pantoprazole  Injectable 40 milliGRAM(s) IV Push daily  phenylephrine    Infusion 0.5 MICROgram(s)/kG/Min (5.494 mL/Hr) IV Continuous <Continuous>  propofol Infusion 5 MICROgram(s)/kG/Min (1.758 mL/Hr) IV Continuous <Continuous>  PureFlow Dialysate RFP-400 (K 2 / Ca 3) 5000 milliLiter(s) (1500 mL/Hr) CRRT <Continuous>  sodium chloride 0.9%. 1000 milliLiter(s) (10 mL/Hr) IV Continuous <Continuous>  vancomycin  IVPB 1000 milliGRAM(s) IV Intermittent every 24 hours  vasopressin Infusion 0.03 Unit(s)/Min (1.8 mL/Hr) IV Continuous <Continuous>    MEDICATIONS  (PRN):

## 2018-07-01 NOTE — PROGRESS NOTE ADULT - ASSESSMENT
66yo with history of congenital heart disease s/p AVR @ age of 12, Ef 20% on preop cardiac clearance diagnosed with 2 vessel CAD, VHD (severe prosthetic AS, Severe MR, severe TR, severe pulm hypertension.).    Pt s/p complex surgery on 6/21 with CABG x2, AVR, MV/TV repair, Impella insertion, IABP  -- open chest  6/22 Chest closed  6/23 Chest re-explored for tamponade  6/26 chest closure   6/27 chest re-explored by bedside for pseudo-tamponade.  6/30 chest washout      Problems  1. Cardiogenic shock, supported with pressors/iontropes, Impella  2. Open chest s/p washout  3. Multiorgan failure    Plan   Neuro -- sedated and paralyzed.    CVS - open chest --  s/p washout.  continuing empiric antibiotics  Continue Impella support @ P4/2.5L flow  Weaning pressors as tolerated   Pulm - vent support  - s/p emergent bronch for acute desaturation.  Thick copious dark blood secretions removed from b/l lungs   GI - Trickle feeds started   Jaundice/conjugated hyperbilirubinemia likely from hemolysis.  Pt s/p massive transfusion intra op and multiple transfusions post    - anuric -CVVH started  Hyponatremia management on 3% NaCL with slow rise in Na.  Likely etiology is hypervolemic hyponatremia.  Pt grossly anasarca   Endo - glycemic control, taper stress dose steriods.   Heme - continuing heparin flush with impella  Likely can challenge with anticoagulation next week   ID - On empiric antibiotics -- Aztreonam, fluc, vanc, flagyl and One dose of gent given for open chest and multiple bedside washouts.     Critical post op.    Critical care time spent 70 min   Family updated. 66yo with history of congenital heart disease s/p AVR @ age of 12, Ef 20% on preop cardiac clearance diagnosed with 2 vessel CAD, VHD (severe prosthetic AS, Severe MR, severe TR, severe pulm hypertension.).    Pt s/p complex surgery on 6/21 with CABG x2, AVR, MV/TV repair, Impella insertion, IABP  -- open chest  6/22 Chest closed  6/23 Chest re-explored for tamponade  6/26 chest closure   6/27 chest re-explored by bedside for pseudo-tamponade.  6/30 chest washout      Problems  1. Cardiogenic shock, supported with pressors/iontropes, Impella  2. Open chest s/p washout  3. Multiorgan failure    Plan   Neuro -- sedated and paralyzed.    CVS - open chest --  s/p washout 6/30.  continuing empiric antibiotics  Continue Impella support @ P4/2.5L flow  Weaning pressors as tolerated   Pulm - vent support  -improved oxygenation.  Bronch specimen so far negative     GI - Trickle feeds started   Jaundice/conjugated hyperbilirubinemia likely from hemolysis.  Pt s/p massive transfusion intra op and multiple transfusions post    - anuric -CVVH ongoing with aggressive fluid removal   Hyponatremia improved.  Off 3% NACL   Endo - glycemic control, taper stress dose steriods.   Heme - continuing heparin flush with impella  Likely can challenge with anticoagulation next week   ID - On empiric antibiotics -- Aztreonam, fluc, vanc, flagyl and gent     Critical post op.    Critical care time spent 70 min   Family updated.  wife by bedside

## 2018-07-01 NOTE — PROGRESS NOTE ADULT - PROBLEM SELECTOR PLAN 1
continue CVVHD with pressor support  UF rate at 250cc/hr with good fluid removal  aim for negative fluid balance

## 2018-07-02 DIAGNOSIS — R57.9 SHOCK, UNSPECIFIED: ICD-10-CM

## 2018-07-02 LAB
ALBUMIN SERPL ELPH-MCNC: 3.4 G/DL — SIGNIFICANT CHANGE UP (ref 3.3–5)
ALBUMIN SERPL ELPH-MCNC: 3.4 G/DL — SIGNIFICANT CHANGE UP (ref 3.3–5)
ALBUMIN SERPL ELPH-MCNC: 3.8 G/DL — SIGNIFICANT CHANGE UP (ref 3.3–5)
ALBUMIN SERPL ELPH-MCNC: 3.8 G/DL — SIGNIFICANT CHANGE UP (ref 3.3–5)
ALBUMIN SERPL ELPH-MCNC: 3.9 G/DL — SIGNIFICANT CHANGE UP (ref 3.3–5)
ALP SERPL-CCNC: 64 U/L — SIGNIFICANT CHANGE UP (ref 40–120)
ALP SERPL-CCNC: 65 U/L — SIGNIFICANT CHANGE UP (ref 40–120)
ALP SERPL-CCNC: 76 U/L — SIGNIFICANT CHANGE UP (ref 40–120)
ALP SERPL-CCNC: 78 U/L — SIGNIFICANT CHANGE UP (ref 40–120)
ALP SERPL-CCNC: 84 U/L — SIGNIFICANT CHANGE UP (ref 40–120)
ALT FLD-CCNC: 28 U/L — SIGNIFICANT CHANGE UP (ref 10–45)
ALT FLD-CCNC: 28 U/L — SIGNIFICANT CHANGE UP (ref 10–45)
ALT FLD-CCNC: 34 U/L — SIGNIFICANT CHANGE UP (ref 10–45)
ALT FLD-CCNC: <5 U/L — LOW (ref 10–45)
ALT FLD-CCNC: <5 U/L — LOW (ref 10–45)
ANION GAP SERPL CALC-SCNC: 18 MMOL/L — HIGH (ref 5–17)
ANION GAP SERPL CALC-SCNC: 18 MMOL/L — HIGH (ref 5–17)
ANION GAP SERPL CALC-SCNC: 22 MMOL/L — HIGH (ref 5–17)
ANION GAP SERPL CALC-SCNC: 23 MMOL/L — HIGH (ref 5–17)
ANION GAP SERPL CALC-SCNC: 23 MMOL/L — HIGH (ref 5–17)
APTT BLD: 28.3 SEC — SIGNIFICANT CHANGE UP (ref 27.5–37.4)
APTT BLD: 29.5 SEC — SIGNIFICANT CHANGE UP (ref 27.5–37.4)
APTT BLD: 30.2 SEC — SIGNIFICANT CHANGE UP (ref 27.5–37.4)
APTT BLD: 35.4 SEC — SIGNIFICANT CHANGE UP (ref 27.5–37.4)
AST SERPL-CCNC: 107 U/L — HIGH (ref 10–40)
AST SERPL-CCNC: 110 U/L — HIGH (ref 10–40)
AST SERPL-CCNC: 78 U/L — HIGH (ref 10–40)
AST SERPL-CCNC: 79 U/L — HIGH (ref 10–40)
AST SERPL-CCNC: 82 U/L — HIGH (ref 10–40)
BASE EXCESS BLDV CALC-SCNC: -5.9 MMOL/L — SIGNIFICANT CHANGE UP
BASE EXCESS BLDV CALC-SCNC: -7.4 MMOL/L — SIGNIFICANT CHANGE UP
BILIRUB DIRECT SERPL-MCNC: >10 MG/DL — HIGH (ref 0–0.2)
BILIRUB DIRECT SERPL-MCNC: >10 MG/DL — HIGH (ref 0–0.2)
BILIRUB INDIRECT FLD-MCNC: <11.5 MG/DL — HIGH (ref 0.2–1)
BILIRUB INDIRECT FLD-MCNC: <9.8 MG/DL — HIGH (ref 0.2–1)
BILIRUB SERPL-MCNC: 19.8 MG/DL — HIGH (ref 0.2–1.2)
BILIRUB SERPL-MCNC: 19.8 MG/DL — HIGH (ref 0.2–1.2)
BILIRUB SERPL-MCNC: 20.1 MG/DL — HIGH (ref 0.2–1.2)
BILIRUB SERPL-MCNC: 21.5 MG/DL — HIGH (ref 0.2–1.2)
BILIRUB SERPL-MCNC: 23.1 MG/DL — HIGH (ref 0.2–1.2)
BUN SERPL-MCNC: 46 MG/DL — HIGH (ref 7–23)
BUN SERPL-MCNC: 47 MG/DL — HIGH (ref 7–23)
BUN SERPL-MCNC: 51 MG/DL — HIGH (ref 7–23)
BUN SERPL-MCNC: 52 MG/DL — HIGH (ref 7–23)
BUN SERPL-MCNC: 55 MG/DL — HIGH (ref 7–23)
CALCIUM SERPL-MCNC: 10 MG/DL — SIGNIFICANT CHANGE UP (ref 8.4–10.5)
CALCIUM SERPL-MCNC: 10.3 MG/DL — SIGNIFICANT CHANGE UP (ref 8.4–10.5)
CALCIUM SERPL-MCNC: 8.9 MG/DL — SIGNIFICANT CHANGE UP (ref 8.4–10.5)
CALCIUM SERPL-MCNC: 8.9 MG/DL — SIGNIFICANT CHANGE UP (ref 8.4–10.5)
CALCIUM SERPL-MCNC: 9.7 MG/DL — SIGNIFICANT CHANGE UP (ref 8.4–10.5)
CHLORIDE SERPL-SCNC: 94 MMOL/L — LOW (ref 96–108)
CHLORIDE SERPL-SCNC: 95 MMOL/L — LOW (ref 96–108)
CHLORIDE SERPL-SCNC: 95 MMOL/L — LOW (ref 96–108)
CHLORIDE SERPL-SCNC: 96 MMOL/L — SIGNIFICANT CHANGE UP (ref 96–108)
CHLORIDE SERPL-SCNC: 96 MMOL/L — SIGNIFICANT CHANGE UP (ref 96–108)
CO2 SERPL-SCNC: 18 MMOL/L — LOW (ref 22–31)
CO2 SERPL-SCNC: 19 MMOL/L — LOW (ref 22–31)
CO2 SERPL-SCNC: 20 MMOL/L — LOW (ref 22–31)
CREAT SERPL-MCNC: 1.54 MG/DL — HIGH (ref 0.5–1.3)
CREAT SERPL-MCNC: 1.61 MG/DL — HIGH (ref 0.5–1.3)
CREAT SERPL-MCNC: 1.65 MG/DL — HIGH (ref 0.5–1.3)
CREAT SERPL-MCNC: 1.66 MG/DL — HIGH (ref 0.5–1.3)
CREAT SERPL-MCNC: 1.74 MG/DL — HIGH (ref 0.5–1.3)
CULTURE RESULTS: NO GROWTH — SIGNIFICANT CHANGE UP
GAS PNL BLDA: SIGNIFICANT CHANGE UP
GAS PNL BLDV: SIGNIFICANT CHANGE UP
GLUCOSE BLDC GLUCOMTR-MCNC: 101 MG/DL — HIGH (ref 70–99)
GLUCOSE BLDC GLUCOMTR-MCNC: 107 MG/DL — HIGH (ref 70–99)
GLUCOSE BLDC GLUCOMTR-MCNC: 112 MG/DL — HIGH (ref 70–99)
GLUCOSE BLDC GLUCOMTR-MCNC: 117 MG/DL — HIGH (ref 70–99)
GLUCOSE BLDC GLUCOMTR-MCNC: 142 MG/DL — HIGH (ref 70–99)
GLUCOSE BLDC GLUCOMTR-MCNC: 84 MG/DL — SIGNIFICANT CHANGE UP (ref 70–99)
GLUCOSE BLDC GLUCOMTR-MCNC: 94 MG/DL — SIGNIFICANT CHANGE UP (ref 70–99)
GLUCOSE SERPL-MCNC: 100 MG/DL — HIGH (ref 70–99)
GLUCOSE SERPL-MCNC: 113 MG/DL — HIGH (ref 70–99)
GLUCOSE SERPL-MCNC: 119 MG/DL — HIGH (ref 70–99)
GLUCOSE SERPL-MCNC: 122 MG/DL — HIGH (ref 70–99)
GLUCOSE SERPL-MCNC: 124 MG/DL — HIGH (ref 70–99)
HCO3 BLDV-SCNC: 19 MMOL/L — LOW (ref 20–27)
HCO3 BLDV-SCNC: 20 MMOL/L — SIGNIFICANT CHANGE UP (ref 20–27)
HCT VFR BLD CALC: 24.6 % — LOW (ref 39–50)
HCT VFR BLD CALC: 36 % — LOW (ref 39–50)
HCT VFR BLD CALC: 36.4 % — LOW (ref 39–50)
HCT VFR BLD CALC: 38.4 % — LOW (ref 39–50)
HGB BLD-MCNC: 12.6 G/DL — LOW (ref 13–17)
HGB BLD-MCNC: 13.2 G/DL — SIGNIFICANT CHANGE UP (ref 13–17)
HGB BLD-MCNC: 14 G/DL — SIGNIFICANT CHANGE UP (ref 13–17)
HGB BLD-MCNC: 9.1 G/DL — LOW (ref 13–17)
INR BLD: 1.12 — SIGNIFICANT CHANGE UP (ref 0.88–1.16)
INR BLD: 1.14 — SIGNIFICANT CHANGE UP (ref 0.88–1.16)
INR BLD: 1.21 — HIGH (ref 0.88–1.16)
INR BLD: 1.21 — HIGH (ref 0.88–1.16)
LACTATE SERPL-SCNC: 2.4 MMOL/L — HIGH (ref 0.5–2)
LACTATE SERPL-SCNC: 3.9 MMOL/L — HIGH (ref 0.5–2)
LACTATE SERPL-SCNC: 4.6 MMOL/L — CRITICAL HIGH (ref 0.5–2)
LACTATE SERPL-SCNC: 4.7 MMOL/L — CRITICAL HIGH (ref 0.5–2)
MAGNESIUM SERPL-MCNC: 1.8 MG/DL — SIGNIFICANT CHANGE UP (ref 1.6–2.6)
MAGNESIUM SERPL-MCNC: 1.9 MG/DL — SIGNIFICANT CHANGE UP (ref 1.6–2.6)
MAGNESIUM SERPL-MCNC: 2 MG/DL — SIGNIFICANT CHANGE UP (ref 1.6–2.6)
MAGNESIUM SERPL-MCNC: 2 MG/DL — SIGNIFICANT CHANGE UP (ref 1.6–2.6)
MCHC RBC-ENTMCNC: 29.2 PG — SIGNIFICANT CHANGE UP (ref 27–34)
MCHC RBC-ENTMCNC: 29.2 PG — SIGNIFICANT CHANGE UP (ref 27–34)
MCHC RBC-ENTMCNC: 29.7 PG — SIGNIFICANT CHANGE UP (ref 27–34)
MCHC RBC-ENTMCNC: 30.3 PG — SIGNIFICANT CHANGE UP (ref 27–34)
MCHC RBC-ENTMCNC: 35 G/DL — SIGNIFICANT CHANGE UP (ref 32–36)
MCHC RBC-ENTMCNC: 36.3 G/DL — HIGH (ref 32–36)
MCHC RBC-ENTMCNC: 36.5 G/DL — HIGH (ref 32–36)
MCHC RBC-ENTMCNC: 37 G/DL — HIGH (ref 32–36)
MCV RBC AUTO: 80.5 FL — SIGNIFICANT CHANGE UP (ref 80–100)
MCV RBC AUTO: 81.5 FL — SIGNIFICANT CHANGE UP (ref 80–100)
MCV RBC AUTO: 82 FL — SIGNIFICANT CHANGE UP (ref 80–100)
MCV RBC AUTO: 83.3 FL — SIGNIFICANT CHANGE UP (ref 80–100)
PCO2 BLDV: 35 MMHG — LOW (ref 41–51)
PCO2 BLDV: 45 MMHG — SIGNIFICANT CHANGE UP (ref 41–51)
PH BLDV: 7.26 — LOW (ref 7.32–7.43)
PH BLDV: 7.35 — SIGNIFICANT CHANGE UP (ref 7.32–7.43)
PHOSPHATE SERPL-MCNC: 2.7 MG/DL — SIGNIFICANT CHANGE UP (ref 2.5–4.5)
PHOSPHATE SERPL-MCNC: 2.9 MG/DL — SIGNIFICANT CHANGE UP (ref 2.5–4.5)
PHOSPHATE SERPL-MCNC: 3.6 MG/DL — SIGNIFICANT CHANGE UP (ref 2.5–4.5)
PHOSPHATE SERPL-MCNC: 5.7 MG/DL — HIGH (ref 2.5–4.5)
PLATELET # BLD AUTO: 132 K/UL — LOW (ref 150–400)
PLATELET # BLD AUTO: 141 K/UL — LOW (ref 150–400)
PLATELET # BLD AUTO: 157 K/UL — SIGNIFICANT CHANGE UP (ref 150–400)
PLATELET # BLD AUTO: 35 K/UL — LOW (ref 150–400)
PO2 BLDV: 36 MMHG — SIGNIFICANT CHANGE UP
PO2 BLDV: 39 MMHG — SIGNIFICANT CHANGE UP
POTASSIUM SERPL-MCNC: 4.2 MMOL/L — SIGNIFICANT CHANGE UP (ref 3.5–5.3)
POTASSIUM SERPL-MCNC: 4.5 MMOL/L — SIGNIFICANT CHANGE UP (ref 3.5–5.3)
POTASSIUM SERPL-MCNC: 4.6 MMOL/L — SIGNIFICANT CHANGE UP (ref 3.5–5.3)
POTASSIUM SERPL-MCNC: 5 MMOL/L — SIGNIFICANT CHANGE UP (ref 3.5–5.3)
POTASSIUM SERPL-MCNC: 5 MMOL/L — SIGNIFICANT CHANGE UP (ref 3.5–5.3)
POTASSIUM SERPL-SCNC: 4.2 MMOL/L — SIGNIFICANT CHANGE UP (ref 3.5–5.3)
POTASSIUM SERPL-SCNC: 4.5 MMOL/L — SIGNIFICANT CHANGE UP (ref 3.5–5.3)
POTASSIUM SERPL-SCNC: 4.6 MMOL/L — SIGNIFICANT CHANGE UP (ref 3.5–5.3)
POTASSIUM SERPL-SCNC: 5 MMOL/L — SIGNIFICANT CHANGE UP (ref 3.5–5.3)
POTASSIUM SERPL-SCNC: 5 MMOL/L — SIGNIFICANT CHANGE UP (ref 3.5–5.3)
PROT SERPL-MCNC: 4.7 G/DL — LOW (ref 6–8.3)
PROT SERPL-MCNC: 4.8 G/DL — LOW (ref 6–8.3)
PROT SERPL-MCNC: 5.6 G/DL — LOW (ref 6–8.3)
PROTHROM AB SERPL-ACNC: 12.5 SEC — SIGNIFICANT CHANGE UP (ref 9.8–12.7)
PROTHROM AB SERPL-ACNC: 12.7 SEC — SIGNIFICANT CHANGE UP (ref 9.8–12.7)
PROTHROM AB SERPL-ACNC: 13.5 SEC — HIGH (ref 9.8–12.7)
PROTHROM AB SERPL-ACNC: 13.5 SEC — HIGH (ref 9.8–12.7)
RBC # BLD: 3 M/UL — LOW (ref 4.2–5.8)
RBC # BLD: 4.32 M/UL — SIGNIFICANT CHANGE UP (ref 4.2–5.8)
RBC # BLD: 4.52 M/UL — SIGNIFICANT CHANGE UP (ref 4.2–5.8)
RBC # BLD: 4.71 M/UL — SIGNIFICANT CHANGE UP (ref 4.2–5.8)
RBC # FLD: 15.8 % — SIGNIFICANT CHANGE UP (ref 10.3–16.9)
RBC # FLD: 16.1 % — SIGNIFICANT CHANGE UP (ref 10.3–16.9)
RBC # FLD: 16.3 % — SIGNIFICANT CHANGE UP (ref 10.3–16.9)
RBC # FLD: 16.4 % — SIGNIFICANT CHANGE UP (ref 10.3–16.9)
SAO2 % BLDV: 71 % — SIGNIFICANT CHANGE UP
SAO2 % BLDV: 71 % — SIGNIFICANT CHANGE UP
SODIUM SERPL-SCNC: 132 MMOL/L — LOW (ref 135–145)
SODIUM SERPL-SCNC: 132 MMOL/L — LOW (ref 135–145)
SODIUM SERPL-SCNC: 136 MMOL/L — SIGNIFICANT CHANGE UP (ref 135–145)
SODIUM SERPL-SCNC: 136 MMOL/L — SIGNIFICANT CHANGE UP (ref 135–145)
SODIUM SERPL-SCNC: 137 MMOL/L — SIGNIFICANT CHANGE UP (ref 135–145)
SPECIMEN SOURCE: SIGNIFICANT CHANGE UP
WBC # BLD: 17.6 K/UL — HIGH (ref 3.8–10.5)
WBC # BLD: 20.4 K/UL — HIGH (ref 3.8–10.5)
WBC # BLD: 21.4 K/UL — HIGH (ref 3.8–10.5)
WBC # BLD: 21.8 K/UL — HIGH (ref 3.8–10.5)
WBC # FLD AUTO: 17.6 K/UL — HIGH (ref 3.8–10.5)
WBC # FLD AUTO: 20.4 K/UL — HIGH (ref 3.8–10.5)
WBC # FLD AUTO: 21.4 K/UL — HIGH (ref 3.8–10.5)
WBC # FLD AUTO: 21.8 K/UL — HIGH (ref 3.8–10.5)

## 2018-07-02 PROCEDURE — 32120 RE-EXPLORATION OF CHEST: CPT | Mod: AS

## 2018-07-02 PROCEDURE — 32120 RE-EXPLORATION OF CHEST: CPT | Mod: 58

## 2018-07-02 PROCEDURE — 99292 CRITICAL CARE ADDL 30 MIN: CPT

## 2018-07-02 PROCEDURE — 99291 CRITICAL CARE FIRST HOUR: CPT

## 2018-07-02 PROCEDURE — 71045 X-RAY EXAM CHEST 1 VIEW: CPT | Mod: 26,76

## 2018-07-02 PROCEDURE — 21750 REPAIR OF STERNUM SEPARATION: CPT | Mod: AS

## 2018-07-02 PROCEDURE — 93306 TTE W/DOPPLER COMPLETE: CPT | Mod: 26

## 2018-07-02 PROCEDURE — 33202 INSERT EPICARD ELTRD OPEN: CPT | Mod: 58,59

## 2018-07-02 PROCEDURE — 99233 SBSQ HOSP IP/OBS HIGH 50: CPT | Mod: GC

## 2018-07-02 PROCEDURE — 21750 REPAIR OF STERNUM SEPARATION: CPT | Mod: 58

## 2018-07-02 RX ORDER — DIGOXIN 250 MCG
0.25 TABLET ORAL ONCE
Qty: 0 | Refills: 0 | Status: COMPLETED | OUTPATIENT
Start: 2018-07-02 | End: 2018-07-02

## 2018-07-02 RX ORDER — SODIUM BICARBONATE 1 MEQ/ML
50 SYRINGE (ML) INTRAVENOUS ONCE
Qty: 0 | Refills: 0 | Status: COMPLETED | OUTPATIENT
Start: 2018-07-02 | End: 2018-07-02

## 2018-07-02 RX ORDER — FENTANYL CITRATE 50 UG/ML
25 INJECTION INTRAVENOUS ONCE
Qty: 0 | Refills: 0 | Status: DISCONTINUED | OUTPATIENT
Start: 2018-07-02 | End: 2018-07-04

## 2018-07-02 RX ORDER — VASOPRESSIN 20 [USP'U]/ML
0.07 INJECTION INTRAVENOUS
Qty: 100 | Refills: 0 | Status: DISCONTINUED | OUTPATIENT
Start: 2018-07-02 | End: 2018-07-04

## 2018-07-02 RX ORDER — CALCIUM GLUCONATE 100 MG/ML
2 VIAL (ML) INTRAVENOUS ONCE
Qty: 0 | Refills: 0 | Status: COMPLETED | OUTPATIENT
Start: 2018-07-02 | End: 2018-07-02

## 2018-07-02 RX ORDER — SODIUM BICARBONATE 1 MEQ/ML
75 SYRINGE (ML) INTRAVENOUS ONCE
Qty: 0 | Refills: 0 | Status: COMPLETED | OUTPATIENT
Start: 2018-07-02 | End: 2018-07-02

## 2018-07-02 RX ORDER — EPOPROSTENOL 0.5 MG/10ML
1.99 INJECTION, POWDER, LYOPHILIZED, FOR SOLUTION INTRAVENOUS
Qty: 500 | Refills: 0 | Status: DISCONTINUED | OUTPATIENT
Start: 2018-07-02 | End: 2018-07-05

## 2018-07-02 RX ADMIN — Medication 50 MILLIEQUIVALENT(S): at 19:48

## 2018-07-02 RX ADMIN — CISATRACURIUM BESYLATE 10.55 MICROGRAM(S)/KG/MIN: 2 INJECTION INTRAVENOUS at 20:01

## 2018-07-02 RX ADMIN — Medication 50 MILLIGRAM(S): at 22:16

## 2018-07-02 RX ADMIN — Medication 50 MILLIGRAM(S): at 02:09

## 2018-07-02 RX ADMIN — Medication 1 DROP(S): at 18:09

## 2018-07-02 RX ADMIN — Medication 50 MILLIGRAM(S): at 05:57

## 2018-07-02 RX ADMIN — EPOPROSTENOL 1.41 NANOGRAM(S)/KG/MIN: 0.5 INJECTION, POWDER, LYOPHILIZED, FOR SOLUTION INTRAVENOUS at 17:00

## 2018-07-02 RX ADMIN — PANTOPRAZOLE SODIUM 40 MILLIGRAM(S): 20 TABLET, DELAYED RELEASE ORAL at 14:30

## 2018-07-02 RX ADMIN — Medication 50 MILLIGRAM(S): at 18:08

## 2018-07-02 RX ADMIN — Medication 200 GRAM(S): at 14:15

## 2018-07-02 RX ADMIN — Medication 75 MILLIEQUIVALENT(S): at 14:00

## 2018-07-02 RX ADMIN — FLUCONAZOLE 100 MILLIGRAM(S): 150 TABLET ORAL at 11:00

## 2018-07-02 RX ADMIN — CHLORHEXIDINE GLUCONATE 15 MILLILITER(S): 213 SOLUTION TOPICAL at 18:09

## 2018-07-02 RX ADMIN — Medication 0.25 MILLIGRAM(S): at 18:07

## 2018-07-02 RX ADMIN — Medication 100 MILLIGRAM(S): at 05:56

## 2018-07-02 RX ADMIN — Medication 50 MILLIEQUIVALENT(S): at 18:08

## 2018-07-02 RX ADMIN — MILRINONE LACTATE 4.39 MICROGRAM(S)/KG/MIN: 1 INJECTION, SOLUTION INTRAVENOUS at 18:33

## 2018-07-02 RX ADMIN — CHLORHEXIDINE GLUCONATE 15 MILLILITER(S): 213 SOLUTION TOPICAL at 05:56

## 2018-07-02 RX ADMIN — Medication 100 MILLIGRAM(S): at 22:16

## 2018-07-02 RX ADMIN — Medication 50 MILLILITER(S): at 05:00

## 2018-07-02 RX ADMIN — Medication 1 DROP(S): at 05:56

## 2018-07-02 RX ADMIN — Medication 100 MILLIGRAM(S): at 14:49

## 2018-07-02 RX ADMIN — Medication 50 MILLIGRAM(S): at 10:00

## 2018-07-02 RX ADMIN — Medication 50 MILLIGRAM(S): at 14:49

## 2018-07-02 RX ADMIN — Medication 250 MILLIGRAM(S): at 11:00

## 2018-07-02 NOTE — PROGRESS NOTE ADULT - ASSESSMENT
The patient is 67 year old male with PMH stated above, now s/p  Repair of AA, AVR, MV repair, TV repair, CABG x2, now in shock requiring pressor support - mutiple pressors. The patient with deterioration of the renal function in the setting of the shock - oliguric, no response from the diuretics. The CT ICU team started the patient on aquaphoresis and the CVVHD ordered, never been started from the primary team the BP unstable. Still on aquaphoresis in the morning 40 ml of 200 ml/h. Talked to intensivist to be switch to CVVHD - more clearance may need in the next hours - lactate is trending up and the potassium borderline in the setting to get more blood products. Talked to the intensivist to discontinue aquaphoresis if the patient is not able to tolerate the CVVHD to be placed back on CVVHD

## 2018-07-02 NOTE — BRIEF OPERATIVE NOTE - PROCEDURE
<<-----Click on this checkbox to enter Procedure Closure of chest wound in adult  07/02/2018    Active  SUNIL

## 2018-07-02 NOTE — PROGRESS NOTE ADULT - PROBLEM SELECTOR PLAN 4
- cardiogenic and possible component sepsis   - aztreonam, metronidazole and vancomycin   - please follow up the vanco trought

## 2018-07-02 NOTE — BRIEF OPERATIVE NOTE - PROCEDURE POST
<<-----Click on this checkbox to enter Post-Op Dx

## 2018-07-02 NOTE — BRIEF OPERATIVE NOTE - PRE-OP DX
Aortic valve stenosis, etiology of cardiac valve disease unspecified  06/21/2018    Katty Arriola  Cardiac tamponade  06/23/2018    Active  Anthony Barrios  Coronary artery disease with other form of angina pectoris  06/21/2018    Active  Katty Mccoy  Mitral valve insufficiency, unspecified etiology  06/21/2018    Active  Katty Mccoy  Tricuspid valve insufficiency, unspecified etiology  06/21/2018    Active  Katty Mccoy
Aortic valve stenosis, etiology of cardiac valve disease unspecified  06/21/2018    Active  Katty Mccoy  Cardiac tamponade  06/23/2018    Active  Anthony Barrios  Complete heart block  06/26/2018    Active  Marjorie Gutierrez  Coronary artery disease with other form of angina pectoris  06/21/2018    Active  Katty Mccoy  Mitral valve insufficiency, unspecified etiology  06/21/2018    Active  Katty Mccoy  Open wound of chest wall, unspecified laterality, sequela  06/26/2018    Active  Marjorie Gutierrez  Tricuspid valve insufficiency, unspecified etiology  06/21/2018    Active  Katty Mccoy
Aortic valve stenosis, etiology of cardiac valve disease unspecified  06/21/2018    Active  Katty Mccoy  Cardiac tamponade  06/23/2018    Active  Anthony Barrios  Complete heart block  06/26/2018    Active  Marjorie Gutierrez  Coronary artery disease with other form of angina pectoris  06/21/2018    Active  Katty Mccoy  Mitral valve insufficiency, unspecified etiology  06/21/2018    Active  Katty Mccoy  Open chest wound  07/02/2018    Active  Anthony Barrios  Open wound of chest wall, unspecified laterality, sequela  06/26/2018    Active  Marjorie Gutierrez  Tricuspid valve insufficiency, unspecified etiology  06/21/2018    Active  Katty Mccoy
Aortic valve stenosis, etiology of cardiac valve disease unspecified  06/21/2018    Katty Arriola  Coronary artery disease with other form of angina pectoris  06/21/2018    Katty Arriola  Mitral valve insufficiency, unspecified etiology  06/21/2018    Katty Arriola  Tricuspid valve insufficiency, unspecified etiology  06/21/2018    Katty Arriola
Aortic valve stenosis, etiology of cardiac valve disease unspecified  06/21/2018    Katty Arriola  Coronary artery disease with other form of angina pectoris  06/21/2018    Katty Arriola  Mitral valve insufficiency, unspecified etiology  06/21/2018    Katty Arriola  Tricuspid valve insufficiency, unspecified etiology  06/21/2018    Katty Arriola

## 2018-07-02 NOTE — PROGRESS NOTE ADULT - SUBJECTIVE AND OBJECTIVE BOX
Seen in the afternoon, still intubated, requiring pressor support. S/p surgery closing the chest wall, got - 1 liter of crystalloids, 2 FFPs, 2 plts     The renal service follows the case for EMILY         Patient seen and examined at bedside.     albumin human 25% IVPB 50 milliLiter(s) every 6 hours  artificial  tears Solution 1 Drop(s) two times a day  aztreonam  IVPB 1000 milliGRAM(s) every 8 hours  chlorhexidine 0.12% Liquid 15 milliLiter(s) every 12 hours  cisatracurium Infusion 3 MICROgram(s)/kG/Min <Continuous>  dextrose 50% Injectable 50 milliLiter(s) every 15 minutes  dextrose 50% Injectable 25 milliLiter(s) every 15 minutes  epinephrine 8 milliGRAM(s),dextrose 5% in water 250 milliLiter(s) 8 milliGRAM(s) <Continuous>  epoprostenol Infusion 1.986 NANOGram(s)/kG/Min <Continuous>  fentaNYL   Infusion. 0.5 MICROgram(s)/kG/Hr <Continuous>  fluconAZOLE IVPB 400 milliGRAM(s) every 24 hours  Heparin 89691 Unit(s),Dextrose 5% 1000 milliLiter(s) 84240 Unit(s) <Continuous>  hydrocortisone sodium succinate Injectable 50 milliGRAM(s) every 8 hours  insulin Infusion 5 Unit(s)/Hr <Continuous>  metroNIDAZOLE  IVPB 500 milliGRAM(s) every 8 hours  milrinone Infusion 0.25 MICROgram(s)/kG/Min <Continuous>  pantoprazole  Injectable 40 milliGRAM(s) daily  phenylephrine    Infusion 2 MICROgram(s)/kG/Min <Continuous>  propofol Infusion 5 MICROgram(s)/kG/Min <Continuous>  PureFlow Dialysate RFP-400 (K 2 / Ca 3) 5000 milliLiter(s) <Continuous>  sodium chloride 0.9%. 1000 milliLiter(s) <Continuous>  vancomycin  IVPB 1000 milliGRAM(s) every 24 hours  vasopressin Infusion 0.067 Unit(s)/Min <Continuous>      Allergies    penicillin (Rash)    Intolerances        T(C): , Max: 36.1 (07-02-18 @ 01:00)  T(F): , Max: 97 (07-02-18 @ 05:00)  HR: 88 (07-02-18 @ 14:30)  BP: 118/54 (07-02-18 @ 07:19)  BP(mean): --  RR: 20 (07-02-18 @ 07:19)  SpO2: 97% (07-02-18 @ 14:30)  Wt(kg): --    07-01 @ 07:01  -  07-02 @ 07:00  --------------------------------------------------------  IN:    Albumin 25%: 100 mL    Albumin 5%  - 250 mL: 50 mL    cisatracurium Infusion: 228.8 mL    cisatracurium Infusion: 20.8 mL    fentaNYL Infusion.: 139.2 mL    freetext medication -  Infusion: 438 mL    freetext medication -  Infusion: 213.6 mL    insulin Infusion: 25 mL    insulin Infusion: 2 mL    Jevity: 180 mL    milrinone  Infusion: 96.8 mL    milrinone  Infusion: 8.8 mL    phenylephrine   Infusion: 334.5 mL    phenylephrine   Infusion: 43.6 mL    Platelets - Single Donor: 292 mL    propofol Infusion: 147.4 mL    propofol Infusion: 13.4 mL    sodium chloride 0.9%.: 240 mL    Solution: 900 mL  Total IN: 3473.9 mL    OUT:    Bulb: 250 mL    Bulb: 25 mL    Chest Tube: 40 mL    Chest Tube: 40 mL    Indwelling Catheter - Urethral: 10 mL    Other: 2088 mL    Other: 4042 mL  Total OUT: 6495 mL    Total NET: -3021.1 mL      07-02 @ 07:01  -  07-02 @ 15:35  --------------------------------------------------------  IN:    milrinone  Infusion: 4.4 mL    Packed Red Blood Cells: 650 mL    phenylephrine   Infusion: 10.9 mL    sodium chloride 0.9%.: 10 mL  Total IN: 675.3 mL    OUT:    Bulb: 25 mL    Chest Tube: 15 mL    Chest Tube: 20 mL  Total OUT: 60 mL    Total NET: 615.3 mL      Physical exam:   Intubated and sedated   ANASARCA   difficult to evaluate for JVD   Chest: s/p thoractomy, closed chest ultiple drainages from the chest   RRR, normal s1/s2, systolic murmur  Abdmen - swollen, not tender, not distended   EXtremities: 3+ peripheral edema  Hd cathter - on the left IJ  Desai     Height (cm): 165 (07-02 @ 07:41)  Weight (kg): 59 (07-02 @ 07:41)  BMI (kg/m2): 21.7 (07-02 @ 07:41)  BSA (m2): 1.65 (07-02 @ 07:41)      LABS:                        12.6   16.8  )-----------( 141      ( 02 Jul 2018 13:59 )             36.0     07-02    136  |  95<L>  |  51<H>  ----------------------------<  113<H>  5.0   |  19<L>  |  1.66<H>    Ca    10.0      02 Jul 2018 13:59  Phos  5.7     07-02  Mg     2.0     07-02    TPro  5.6<L>  /  Alb  3.8  /  TBili  19.8<H>  /  DBili  >10.0<H>  /  AST  82<H>  /  ALT  34  /  AlkPhos  78  07-02      PT/INR - ( 02 Jul 2018 13:59 )   PT: 12.5 sec;   INR: 1.12          PTT - ( 02 Jul 2018 13:59 )  PTT:28.3 sec          RADIOLOGY & ADDITIONAL STUDIES:

## 2018-07-02 NOTE — PROGRESS NOTE ADULT - SUBJECTIVE AND OBJECTIVE BOX
CTICU  CRITICAL  CARE  attending     Hand off received 					   Pertinent clinical, laboratory, radiographic, hemodynamic, echocardiographic, respiratory data, microbiologic data and chart were reviewed and analyzed frequently throughout the course of the day and night  Patient seen and examined with CTS/ SH attending at bedside  Pt is a 67y , Male, HEALTH ISSUES - PROBLEM Dx:  Shock: Shock  Anemia: Anemia  Hyponatremia: Hyponatremia  Acute kidney failure: Acute kidney failure  CAD (coronary artery disease): CAD (coronary artery disease)  Valvular disease: Valvular disease      , FAMILY HISTORY:  PAST MEDICAL & SURGICAL HISTORY:  No pertinent past medical history  No significant past surgical history    Patient is a 67y old  Male who presents with a chief complaint of AS (19 Jun 2018 00:06)      14 system review was unremarkable  acute changes include acute respiratory failure  Vital signs, hemodynamic and respiratory parameters were reviewed from the bedside nursing flowsheet.  ICU Vital Signs Last 24 Hrs  T(C): 36.1 (02 Jul 2018 18:00), Max: 36.6 (02 Jul 2018 15:00)  T(F): 97 (02 Jul 2018 18:00), Max: 97.8 (02 Jul 2018 15:00)  HR: 84 (02 Jul 2018 18:00) (79 - 114)  BP: 118/54 (02 Jul 2018 07:19) (118/54 - 118/54)  BP(mean): --  ABP: 120/74 (02 Jul 2018 18:00) (98/66 - 148/80)  ABP(mean): 90 (02 Jul 2018 18:00) (66 - 98)  RR: 20 (02 Jul 2018 07:19) (20 - 20)  SpO2: 96% (02 Jul 2018 18:00) (93% - 99%)    Adult Advanced Hemodynamics Last 24 Hrs  CVP(mm Hg): 32 (02 Jul 2018 18:00) (20 - 62)  CVP(cm H2O): --  CO: --  CI: --  PA: 55/28 (02 Jul 2018 18:00) (40/19 - 70/14)  PA(mean): 37 (02 Jul 2018 18:00) (27 - 40)  PCWP: --  SVR: --  SVRI: --  PVR: --  PVRI: --, ABG - ( 02 Jul 2018 16:30 )  pH, Arterial: 7.39  pH, Blood: x     /  pCO2: 28    /  pO2: 221   / HCO3: 16    / Base Excess: -7.0  /  SaO2: 100               Mode: AC/ CMV (Assist Control/ Continuous Mandatory Ventilation)  RR (machine): 22  TV (machine): 470  FiO2: 100  PEEP: 5  ITime: 1  MAP: 15  PIP: 35    Intake and output was reviewed and the fluid balance was calculated  Daily Height in cm: 165 (02 Jul 2018 07:19)    Daily   I&O's Summary    01 Jul 2018 07:01  -  02 Jul 2018 07:00  --------------------------------------------------------  IN: 3473.9 mL / OUT: 6495 mL / NET: -3021.1 mL    02 Jul 2018 07:01  -  02 Jul 2018 18:38  --------------------------------------------------------  IN: 2303.3 mL / OUT: 345 mL / NET: 1958.3 mL        All lines and drain sites were assessed  [facility]      POCT Blood Glucose.: 84 mg/dL (02 Jul 2018 07:02)    No acute change in mental status  Auscultation of the chest reveals equal bs  Abdomen is soft  Extremities are warm and well perfused  Wounds appear clean and unremarkable  Antibiotics are periop    labs  CBC Full  -  ( 02 Jul 2018 16:32 )  WBC Count : 17.6 K/uL  Hemoglobin : 14.0 g/dL  Hematocrit : 38.4 %  Platelet Count - Automated : 157 K/uL  Mean Cell Volume : 81.5 fL  Mean Cell Hemoglobin : 29.7 pg  Mean Cell Hemoglobin Concentration : 36.5 g/dL  Auto Neutrophil # : x  Auto Lymphocyte # : x  Auto Monocyte # : x  Auto Eosinophil # : x  Auto Basophil # : x  Auto Neutrophil % : x  Auto Lymphocyte % : x  Auto Monocyte % : x  Auto Eosinophil % : x  Auto Basophil % : x    07-02    136  |  95<L>  |  55<H>  ----------------------------<  124<H>  5.0   |  18<L>  |  1.74<H>    Ca    10.3      02 Jul 2018 16:32  Phos  5.7     07-02  Mg     2.0     07-02    TPro  5.6<L>  /  Alb  3.8  /  TBili  21.5<H>  /  DBili  >10.0<H>  /  AST  107<H>  /  ALT  <5<L>  /  AlkPhos  84  07-02    PT/INR - ( 02 Jul 2018 16:32 )   PT: 12.7 sec;   INR: 1.14          PTT - ( 02 Jul 2018 16:32 )  PTT:29.5 sec  The current medications were reviewed   MEDICATIONS  (STANDING):  albumin human 25% IVPB 50 milliLiter(s) IV Intermittent every 6 hours  artificial  tears Solution 1 Drop(s) Both EYES two times a day  aztreonam  IVPB 1000 milliGRAM(s) IV Intermittent every 8 hours  chlorhexidine 0.12% Liquid 15 milliLiter(s) Swish and Spit every 12 hours  cisatracurium Infusion 3 MICROgram(s)/kG/Min (10.548 mL/Hr) IV Continuous <Continuous>  dextrose 50% Injectable 50 milliLiter(s) IV Push every 15 minutes  dextrose 50% Injectable 25 milliLiter(s) IV Push every 15 minutes  epinephrine 8 milliGRAM(s),dextrose 5% in water 250 milliLiter(s) 8 milliGRAM(s) (10.42 mL/Hr) IV Continuous <Continuous>  epoprostenol Infusion 1.986 NANOGram(s)/kG/Min (1.406 mL/Hr) IV Continuous <Continuous>  fentaNYL    Injectable 25 MICROGram(s) IV Push once  fentaNYL    Injectable 25 MICROGram(s) IV Push once  fentaNYL   Infusion. 0.5 MICROgram(s)/kG/Hr (2.93 mL/Hr) IV Continuous <Continuous>  fluconAZOLE IVPB 400 milliGRAM(s) IV Intermittent every 24 hours  Heparin 20385 Unit(s),Dextrose 5% 1000 milliLiter(s) 37210 Unit(s) (25 mL/Hr) IV Continuous <Continuous>  hydrocortisone sodium succinate Injectable 50 milliGRAM(s) IV Push every 8 hours  insulin Infusion 5 Unit(s)/Hr (5 mL/Hr) IV Continuous <Continuous>  metroNIDAZOLE  IVPB 500 milliGRAM(s) IV Intermittent every 8 hours  milrinone Infusion 0.25 MICROgram(s)/kG/Min (4.395 mL/Hr) IV Continuous <Continuous>  pantoprazole  Injectable 40 milliGRAM(s) IV Push daily  phenylephrine    Infusion 2 MICROgram(s)/kG/Min (21.975 mL/Hr) IV Continuous <Continuous>  propofol Infusion 5 MICROgram(s)/kG/Min (1.758 mL/Hr) IV Continuous <Continuous>  PureFlow Dialysate RFP-400 (K 2 / Ca 3) 5000 milliLiter(s) (1500 mL/Hr) CRRT <Continuous>  sodium bicarbonate  Injectable 50 milliEquivalent(s) IV Push once  sodium chloride 0.9%. 1000 milliLiter(s) (10 mL/Hr) IV Continuous <Continuous>  vancomycin  IVPB 1000 milliGRAM(s) IV Intermittent every 24 hours  vasopressin Infusion 0.067 Unit(s)/Min (1 mL/Hr) IV Continuous <Continuous>    MEDICATIONS  (PRN):       PROBLEM LIST/ ASSESSMENT:  HEALTH ISSUES - PROBLEM Dx:  Shock: Shock  Anemia: Anemia  Hyponatremia: Hyponatremia  Acute kidney failure: Acute kidney failure  CAD (coronary artery disease): CAD (coronary artery disease)  Valvular disease: Valvular disease      ,   Patient is a 67y old  Male who presents with a chief complaint of AS (19 Jun 2018 00:06)     s/p cardiac surgery      My plan includes :  close hemodynamic, ventilatory and drain monitoring and management per post op routine    Monitor for arrhythmias and monitor parameters for organ perfusion  monitor neurologic status  Head of the bed should remain elevated to 45 deg .   chest PT and IS will be encouraged  monitor adequacy of oxygenation and ventilation and attempt to wean oxygen  Nutritional goals will be met using po eventually , ensure adequate caloric intake and montior the same  Stress ulcer and VTE prophylaxis will be achieved    Glycemic control is satisfactory  Electrolytes have been repleted as necessary and wound care has been carried out. Pain control has been achieved.   agressive physical therapy and early mobility and ambulation goals will be met   The family was updated about the course and plan  CRITICAL CARE TIME SPENT in evaluation and management, reassessments, review and interpretation of labs and x-rays, ventilator and hemodynamic management, formulating a plan and coordinating care: ___90____ MIN.  Time does not include procedural time.  CTICU ATTENDING     					    Clement Ahn MD

## 2018-07-02 NOTE — BRIEF OPERATIVE NOTE - POST-OP DX
Aortic valve stenosis, etiology of cardiac valve disease unspecified  06/21/2018    Katty Arriola  Cardiac tamponade  06/23/2018    Active  Anthony Barrios  Coronary artery disease with other form of angina pectoris  06/21/2018    Active  Katty Mccoy  Mitral valve insufficiency, unspecified etiology  06/21/2018    Active  Katty Mccoy  Tricuspid valve insufficiency, unspecified etiology  06/21/2018    Active  Katty Mccoy
Complete heart block  06/26/2018    Active  Marjorie Gutierrez  Open wound of chest wall, unspecified laterality, sequela  06/26/2018    Active  Marjorie Gutierrez
Aortic valve stenosis, etiology of cardiac valve disease unspecified  06/21/2018    Katty Arriola  Coronary artery disease with other form of angina pectoris  06/21/2018    Katty Arriola  Mitral valve insufficiency, unspecified etiology  06/21/2018    Katty Arriola  Tricuspid valve insufficiency, unspecified etiology  06/21/2018    Katty Arriola
Complete heart block  06/26/2018    Active  Marjorie Gutierrez  Open wound of chest wall, unspecified laterality, sequela  06/26/2018    Active  Marjorie Gutierrez
Aortic valve stenosis, etiology of cardiac valve disease unspecified  06/21/2018    Katty Arriola  Coronary artery disease with other form of angina pectoris  06/21/2018    Katty Arriola  Mitral valve insufficiency, unspecified etiology  06/21/2018    Katty Arriola  Tricuspid valve insufficiency, unspecified etiology  06/21/2018    Katty Arriola

## 2018-07-02 NOTE — BRIEF OPERATIVE NOTE - OPERATION/FINDINGS
Once pt arrived in OR, epicardial pacemaker not functioning properly.  [employer] rep notified and interrogated device.  Threshold >10 on LV lead.    New RV epicardial lead placed and interrogated Functioning well now, DDD pacing at 80.  Threshold 5.  Mediastinum irrigated, new sohail drains placed, temporary pacing wires removed.  Chest closed using CTS suture.  No sternal wires placed.  Sternum approximated well.  No change in hemodynamics with chest closure.  Transported on Epi .1, Milrinone 0.25, Dio 1.  Impella 1.8-2.0L.  P-6

## 2018-07-02 NOTE — BRIEF OPERATIVE NOTE - PRE-OP
<<-----Click on this checkbox to enter Pre-Op Dx

## 2018-07-02 NOTE — BRIEF OPERATIVE NOTE - IV INFUSIONS - BLOOD PRODUCTS
1 unit PRBC
3 PRBC
2u PRBC
4 PRBC, 2 PLT, 2 FFP
12units PRBC, 8unit FFP, 4unit platelet, 4 units cryo and 1000cc fieba.  2m of factor 7 and

## 2018-07-03 LAB
ALBUMIN SERPL ELPH-MCNC: 3.1 G/DL — LOW (ref 3.3–5)
ALBUMIN SERPL ELPH-MCNC: 3.1 G/DL — LOW (ref 3.3–5)
ALBUMIN SERPL ELPH-MCNC: 3.5 G/DL — SIGNIFICANT CHANGE UP (ref 3.3–5)
ALBUMIN SERPL ELPH-MCNC: 3.7 G/DL — SIGNIFICANT CHANGE UP (ref 3.3–5)
ALBUMIN SERPL ELPH-MCNC: 3.8 G/DL — SIGNIFICANT CHANGE UP (ref 3.3–5)
ALP SERPL-CCNC: 66 U/L — SIGNIFICANT CHANGE UP (ref 40–120)
ALP SERPL-CCNC: 69 U/L — SIGNIFICANT CHANGE UP (ref 40–120)
ALP SERPL-CCNC: 69 U/L — SIGNIFICANT CHANGE UP (ref 40–120)
ALP SERPL-CCNC: 74 U/L — SIGNIFICANT CHANGE UP (ref 40–120)
ALP SERPL-CCNC: 77 U/L — SIGNIFICANT CHANGE UP (ref 40–120)
ALT FLD-CCNC: <5 U/L — LOW (ref 10–45)
ANION GAP SERPL CALC-SCNC: 17 MMOL/L — SIGNIFICANT CHANGE UP (ref 5–17)
ANION GAP SERPL CALC-SCNC: 19 MMOL/L — HIGH (ref 5–17)
ANION GAP SERPL CALC-SCNC: 21 MMOL/L — HIGH (ref 5–17)
ANION GAP SERPL CALC-SCNC: 21 MMOL/L — HIGH (ref 5–17)
ANION GAP SERPL CALC-SCNC: 22 MMOL/L — HIGH (ref 5–17)
APTT BLD: 30.2 SEC — SIGNIFICANT CHANGE UP (ref 27.5–37.4)
APTT BLD: 31.1 SEC — SIGNIFICANT CHANGE UP (ref 27.5–37.4)
APTT BLD: 31.5 SEC — SIGNIFICANT CHANGE UP (ref 27.5–37.4)
APTT BLD: 32.1 SEC — SIGNIFICANT CHANGE UP (ref 27.5–37.4)
APTT BLD: 36.8 SEC — SIGNIFICANT CHANGE UP (ref 27.5–37.4)
AST SERPL-CCNC: 126 U/L — HIGH (ref 10–40)
AST SERPL-CCNC: 148 U/L — HIGH (ref 10–40)
AST SERPL-CCNC: 150 U/L — HIGH (ref 10–40)
AST SERPL-CCNC: 153 U/L — HIGH (ref 10–40)
AST SERPL-CCNC: 157 U/L — HIGH (ref 10–40)
BASE EXCESS BLDV CALC-SCNC: -2.5 MMOL/L — SIGNIFICANT CHANGE UP
BILIRUB DIRECT SERPL-MCNC: >10 MG/DL — HIGH (ref 0–0.2)
BILIRUB INDIRECT FLD-MCNC: <13.9 MG/DL — HIGH (ref 0.2–1)
BILIRUB SERPL-MCNC: 22.1 MG/DL — HIGH (ref 0.2–1.2)
BILIRUB SERPL-MCNC: 23.9 MG/DL — HIGH (ref 0.2–1.2)
BILIRUB SERPL-MCNC: 23.9 MG/DL — HIGH (ref 0.2–1.2)
BILIRUB SERPL-MCNC: 26.4 MG/DL — HIGH (ref 0.2–1.2)
BILIRUB SERPL-MCNC: 28.5 MG/DL — HIGH (ref 0.2–1.2)
BUN SERPL-MCNC: 38 MG/DL — HIGH (ref 7–23)
BUN SERPL-MCNC: 39 MG/DL — HIGH (ref 7–23)
BUN SERPL-MCNC: 40 MG/DL — HIGH (ref 7–23)
BUN SERPL-MCNC: 43 MG/DL — HIGH (ref 7–23)
BUN SERPL-MCNC: 49 MG/DL — HIGH (ref 7–23)
CALCIUM SERPL-MCNC: 8.8 MG/DL — SIGNIFICANT CHANGE UP (ref 8.4–10.5)
CALCIUM SERPL-MCNC: 9.2 MG/DL — SIGNIFICANT CHANGE UP (ref 8.4–10.5)
CALCIUM SERPL-MCNC: 9.4 MG/DL — SIGNIFICANT CHANGE UP (ref 8.4–10.5)
CALCIUM SERPL-MCNC: 9.6 MG/DL — SIGNIFICANT CHANGE UP (ref 8.4–10.5)
CALCIUM SERPL-MCNC: 9.7 MG/DL — SIGNIFICANT CHANGE UP (ref 8.4–10.5)
CHLORIDE SERPL-SCNC: 94 MMOL/L — LOW (ref 96–108)
CHLORIDE SERPL-SCNC: 96 MMOL/L — SIGNIFICANT CHANGE UP (ref 96–108)
CHLORIDE SERPL-SCNC: 97 MMOL/L — SIGNIFICANT CHANGE UP (ref 96–108)
CHLORIDE SERPL-SCNC: 97 MMOL/L — SIGNIFICANT CHANGE UP (ref 96–108)
CHLORIDE SERPL-SCNC: 99 MMOL/L — SIGNIFICANT CHANGE UP (ref 96–108)
CO2 SERPL-SCNC: 18 MMOL/L — LOW (ref 22–31)
CO2 SERPL-SCNC: 19 MMOL/L — LOW (ref 22–31)
CO2 SERPL-SCNC: 20 MMOL/L — LOW (ref 22–31)
CREAT SERPL-MCNC: 1.1 MG/DL — SIGNIFICANT CHANGE UP (ref 0.5–1.3)
CREAT SERPL-MCNC: 1.15 MG/DL — SIGNIFICANT CHANGE UP (ref 0.5–1.3)
CREAT SERPL-MCNC: 1.23 MG/DL — SIGNIFICANT CHANGE UP (ref 0.5–1.3)
CREAT SERPL-MCNC: 1.3 MG/DL — SIGNIFICANT CHANGE UP (ref 0.5–1.3)
CREAT SERPL-MCNC: 1.56 MG/DL — HIGH (ref 0.5–1.3)
GAS PNL BLDA: SIGNIFICANT CHANGE UP
GAS PNL BLDV: SIGNIFICANT CHANGE UP
GLUCOSE BLDC GLUCOMTR-MCNC: 119 MG/DL — HIGH (ref 70–99)
GLUCOSE BLDC GLUCOMTR-MCNC: 124 MG/DL — HIGH (ref 70–99)
GLUCOSE BLDC GLUCOMTR-MCNC: 140 MG/DL — HIGH (ref 70–99)
GLUCOSE BLDC GLUCOMTR-MCNC: 149 MG/DL — HIGH (ref 70–99)
GLUCOSE SERPL-MCNC: 123 MG/DL — HIGH (ref 70–99)
GLUCOSE SERPL-MCNC: 126 MG/DL — HIGH (ref 70–99)
GLUCOSE SERPL-MCNC: 135 MG/DL — HIGH (ref 70–99)
GLUCOSE SERPL-MCNC: 139 MG/DL — HIGH (ref 70–99)
GLUCOSE SERPL-MCNC: 147 MG/DL — HIGH (ref 70–99)
HCO3 BLDV-SCNC: 23 MMOL/L — SIGNIFICANT CHANGE UP (ref 20–27)
HCT VFR BLD CALC: 31.6 % — LOW (ref 39–50)
HCT VFR BLD CALC: 31.7 % — LOW (ref 39–50)
HCT VFR BLD CALC: 34.5 % — LOW (ref 39–50)
HCT VFR BLD CALC: 36.1 % — LOW (ref 39–50)
HCT VFR BLD CALC: 37.5 % — LOW (ref 39–50)
HGB BLD-MCNC: 11.4 G/DL — LOW (ref 13–17)
HGB BLD-MCNC: 11.4 G/DL — LOW (ref 13–17)
HGB BLD-MCNC: 12.5 G/DL — LOW (ref 13–17)
HGB BLD-MCNC: 12.8 G/DL — LOW (ref 13–17)
HGB BLD-MCNC: 13.4 G/DL — SIGNIFICANT CHANGE UP (ref 13–17)
INR BLD: 1.17 — HIGH (ref 0.88–1.16)
INR BLD: 1.2 — HIGH (ref 0.88–1.16)
INR BLD: 1.2 — HIGH (ref 0.88–1.16)
INR BLD: 1.21 — HIGH (ref 0.88–1.16)
INR BLD: 1.26 — HIGH (ref 0.88–1.16)
LACTATE SERPL-SCNC: 2.8 MMOL/L — HIGH (ref 0.5–2)
LACTATE SERPL-SCNC: 3 MMOL/L — HIGH (ref 0.5–2)
LACTATE SERPL-SCNC: 3.3 MMOL/L — HIGH (ref 0.5–2)
LACTATE SERPL-SCNC: 3.4 MMOL/L — HIGH (ref 0.5–2)
LACTATE SERPL-SCNC: 3.6 MMOL/L — HIGH (ref 0.5–2)
MAGNESIUM SERPL-MCNC: 1.8 MG/DL — SIGNIFICANT CHANGE UP (ref 1.6–2.6)
MAGNESIUM SERPL-MCNC: 2 MG/DL — SIGNIFICANT CHANGE UP (ref 1.6–2.6)
MAGNESIUM SERPL-MCNC: 2.1 MG/DL — SIGNIFICANT CHANGE UP (ref 1.6–2.6)
MAGNESIUM SERPL-MCNC: 2.1 MG/DL — SIGNIFICANT CHANGE UP (ref 1.6–2.6)
MCHC RBC-ENTMCNC: 29 PG — SIGNIFICANT CHANGE UP (ref 27–34)
MCHC RBC-ENTMCNC: 29.5 PG — SIGNIFICANT CHANGE UP (ref 27–34)
MCHC RBC-ENTMCNC: 29.9 PG — SIGNIFICANT CHANGE UP (ref 27–34)
MCHC RBC-ENTMCNC: 30 PG — SIGNIFICANT CHANGE UP (ref 27–34)
MCHC RBC-ENTMCNC: 30 PG — SIGNIFICANT CHANGE UP (ref 27–34)
MCHC RBC-ENTMCNC: 35.5 G/DL — SIGNIFICANT CHANGE UP (ref 32–36)
MCHC RBC-ENTMCNC: 35.7 G/DL — SIGNIFICANT CHANGE UP (ref 32–36)
MCHC RBC-ENTMCNC: 36 G/DL — SIGNIFICANT CHANGE UP (ref 32–36)
MCHC RBC-ENTMCNC: 36.1 G/DL — HIGH (ref 32–36)
MCHC RBC-ENTMCNC: 36.2 G/DL — HIGH (ref 32–36)
MCV RBC AUTO: 81.7 FL — SIGNIFICANT CHANGE UP (ref 80–100)
MCV RBC AUTO: 82.6 FL — SIGNIFICANT CHANGE UP (ref 80–100)
MCV RBC AUTO: 82.7 FL — SIGNIFICANT CHANGE UP (ref 80–100)
MCV RBC AUTO: 83.2 FL — SIGNIFICANT CHANGE UP (ref 80–100)
MCV RBC AUTO: 83.2 FL — SIGNIFICANT CHANGE UP (ref 80–100)
PCO2 BLDV: 41 MMHG — SIGNIFICANT CHANGE UP (ref 41–51)
PH BLDV: 7.36 — SIGNIFICANT CHANGE UP (ref 7.32–7.43)
PHOSPHATE SERPL-MCNC: 2.3 MG/DL — LOW (ref 2.5–4.5)
PHOSPHATE SERPL-MCNC: 2.5 MG/DL — SIGNIFICANT CHANGE UP (ref 2.5–4.5)
PHOSPHATE SERPL-MCNC: 2.6 MG/DL — SIGNIFICANT CHANGE UP (ref 2.5–4.5)
PHOSPHATE SERPL-MCNC: 3 MG/DL — SIGNIFICANT CHANGE UP (ref 2.5–4.5)
PLATELET # BLD AUTO: 117 K/UL — LOW (ref 150–400)
PLATELET # BLD AUTO: 62 K/UL — LOW (ref 150–400)
PLATELET # BLD AUTO: 68 K/UL — LOW (ref 150–400)
PLATELET # BLD AUTO: 76 K/UL — LOW (ref 150–400)
PLATELET # BLD AUTO: 91 K/UL — LOW (ref 150–400)
PO2 BLDV: 35 MMHG — SIGNIFICANT CHANGE UP
POTASSIUM SERPL-MCNC: 3.7 MMOL/L — SIGNIFICANT CHANGE UP (ref 3.5–5.3)
POTASSIUM SERPL-MCNC: 3.8 MMOL/L — SIGNIFICANT CHANGE UP (ref 3.5–5.3)
POTASSIUM SERPL-MCNC: 4.3 MMOL/L — SIGNIFICANT CHANGE UP (ref 3.5–5.3)
POTASSIUM SERPL-SCNC: 3.7 MMOL/L — SIGNIFICANT CHANGE UP (ref 3.5–5.3)
POTASSIUM SERPL-SCNC: 3.8 MMOL/L — SIGNIFICANT CHANGE UP (ref 3.5–5.3)
POTASSIUM SERPL-SCNC: 4.3 MMOL/L — SIGNIFICANT CHANGE UP (ref 3.5–5.3)
PROT SERPL-MCNC: 4.7 G/DL — LOW (ref 6–8.3)
PROT SERPL-MCNC: 4.9 G/DL — LOW (ref 6–8.3)
PROT SERPL-MCNC: 5.3 G/DL — LOW (ref 6–8.3)
PROT SERPL-MCNC: 5.4 G/DL — LOW (ref 6–8.3)
PROT SERPL-MCNC: 5.4 G/DL — LOW (ref 6–8.3)
PROTHROM AB SERPL-ACNC: 13 SEC — HIGH (ref 9.8–12.7)
PROTHROM AB SERPL-ACNC: 13.4 SEC — HIGH (ref 9.8–12.7)
PROTHROM AB SERPL-ACNC: 13.4 SEC — HIGH (ref 9.8–12.7)
PROTHROM AB SERPL-ACNC: 13.5 SEC — HIGH (ref 9.8–12.7)
PROTHROM AB SERPL-ACNC: 14.1 SEC — HIGH (ref 9.8–12.7)
RBC # BLD: 3.8 M/UL — LOW (ref 4.2–5.8)
RBC # BLD: 3.81 M/UL — LOW (ref 4.2–5.8)
RBC # BLD: 4.17 M/UL — LOW (ref 4.2–5.8)
RBC # BLD: 4.42 M/UL — SIGNIFICANT CHANGE UP (ref 4.2–5.8)
RBC # BLD: 4.54 M/UL — SIGNIFICANT CHANGE UP (ref 4.2–5.8)
RBC # FLD: 16.4 % — SIGNIFICANT CHANGE UP (ref 10.3–16.9)
RBC # FLD: 17.2 % — HIGH (ref 10.3–16.9)
RBC # FLD: 17.3 % — HIGH (ref 10.3–16.9)
RBC # FLD: 17.4 % — HIGH (ref 10.3–16.9)
RBC # FLD: 17.6 % — HIGH (ref 10.3–16.9)
SAO2 % BLDV: 62 % — SIGNIFICANT CHANGE UP
SODIUM SERPL-SCNC: 135 MMOL/L — SIGNIFICANT CHANGE UP (ref 135–145)
SODIUM SERPL-SCNC: 135 MMOL/L — SIGNIFICANT CHANGE UP (ref 135–145)
SODIUM SERPL-SCNC: 136 MMOL/L — SIGNIFICANT CHANGE UP (ref 135–145)
VANCOMYCIN TROUGH SERPL-MCNC: 20 UG/ML — SIGNIFICANT CHANGE UP (ref 10–20)
WBC # BLD: 19.4 K/UL — HIGH (ref 3.8–10.5)
WBC # BLD: 19.8 K/UL — HIGH (ref 3.8–10.5)
WBC # BLD: 20 K/UL — HIGH (ref 3.8–10.5)
WBC # BLD: 20.1 K/UL — HIGH (ref 3.8–10.5)
WBC # BLD: 20.3 K/UL — HIGH (ref 3.8–10.5)
WBC # FLD AUTO: 19.4 K/UL — HIGH (ref 3.8–10.5)
WBC # FLD AUTO: 19.8 K/UL — HIGH (ref 3.8–10.5)
WBC # FLD AUTO: 20 K/UL — HIGH (ref 3.8–10.5)
WBC # FLD AUTO: 20.1 K/UL — HIGH (ref 3.8–10.5)
WBC # FLD AUTO: 20.3 K/UL — HIGH (ref 3.8–10.5)

## 2018-07-03 PROCEDURE — 71045 X-RAY EXAM CHEST 1 VIEW: CPT | Mod: 26

## 2018-07-03 PROCEDURE — 76937 US GUIDE VASCULAR ACCESS: CPT | Mod: 26

## 2018-07-03 PROCEDURE — 90945 DIALYSIS ONE EVALUATION: CPT | Mod: GC

## 2018-07-03 PROCEDURE — 99291 CRITICAL CARE FIRST HOUR: CPT

## 2018-07-03 PROCEDURE — 71045 X-RAY EXAM CHEST 1 VIEW: CPT | Mod: 26,77

## 2018-07-03 PROCEDURE — 99292 CRITICAL CARE ADDL 30 MIN: CPT

## 2018-07-03 PROCEDURE — 36556 INSERT NON-TUNNEL CV CATH: CPT

## 2018-07-03 RX ORDER — ALBUMIN HUMAN 25 %
250 VIAL (ML) INTRAVENOUS ONCE
Qty: 0 | Refills: 0 | Status: COMPLETED | OUTPATIENT
Start: 2018-07-03 | End: 2018-07-03

## 2018-07-03 RX ORDER — HEPARIN SODIUM 5000 [USP'U]/ML
200 INJECTION INTRAVENOUS; SUBCUTANEOUS
Qty: 25000 | Refills: 0 | Status: DISCONTINUED | OUTPATIENT
Start: 2018-07-03 | End: 2018-07-10

## 2018-07-03 RX ORDER — ALBUMIN HUMAN 25 %
50 VIAL (ML) INTRAVENOUS
Qty: 0 | Refills: 0 | Status: COMPLETED | OUTPATIENT
Start: 2018-07-03 | End: 2018-07-03

## 2018-07-03 RX ORDER — VANCOMYCIN HCL 1 G
VIAL (EA) INTRAVENOUS
Qty: 0 | Refills: 0 | Status: DISCONTINUED | OUTPATIENT
Start: 2018-07-03 | End: 2018-07-03

## 2018-07-03 RX ORDER — VANCOMYCIN HCL 1 G
750 VIAL (EA) INTRAVENOUS EVERY 24 HOURS
Qty: 0 | Refills: 0 | Status: DISCONTINUED | OUTPATIENT
Start: 2018-07-04 | End: 2018-07-16

## 2018-07-03 RX ORDER — MAGNESIUM SULFATE 500 MG/ML
2 VIAL (ML) INJECTION ONCE
Qty: 0 | Refills: 0 | Status: COMPLETED | OUTPATIENT
Start: 2018-07-03 | End: 2018-07-03

## 2018-07-03 RX ADMIN — Medication 50 MILLIGRAM(S): at 10:10

## 2018-07-03 RX ADMIN — FENTANYL CITRATE 2.93 MICROGRAM(S)/KG/HR: 50 INJECTION INTRAVENOUS at 03:29

## 2018-07-03 RX ADMIN — Medication 50 MILLIGRAM(S): at 19:46

## 2018-07-03 RX ADMIN — Medication 1 DROP(S): at 19:43

## 2018-07-03 RX ADMIN — FLUCONAZOLE 100 MILLIGRAM(S): 150 TABLET ORAL at 10:20

## 2018-07-03 RX ADMIN — Medication 50 MILLILITER(S): at 16:45

## 2018-07-03 RX ADMIN — Medication 125 MILLILITER(S): at 19:05

## 2018-07-03 RX ADMIN — Medication 50 GRAM(S): at 12:34

## 2018-07-03 RX ADMIN — Medication 50 MILLIGRAM(S): at 16:44

## 2018-07-03 RX ADMIN — Medication 50 MILLIGRAM(S): at 05:55

## 2018-07-03 RX ADMIN — Medication 50 MILLILITER(S): at 12:35

## 2018-07-03 RX ADMIN — PANTOPRAZOLE SODIUM 40 MILLIGRAM(S): 20 TABLET, DELAYED RELEASE ORAL at 16:44

## 2018-07-03 RX ADMIN — Medication 100 MILLIGRAM(S): at 05:55

## 2018-07-03 RX ADMIN — CHLORHEXIDINE GLUCONATE 15 MILLILITER(S): 213 SOLUTION TOPICAL at 05:54

## 2018-07-03 RX ADMIN — Medication 100 MILLIGRAM(S): at 16:45

## 2018-07-03 RX ADMIN — CHLORHEXIDINE GLUCONATE 15 MILLILITER(S): 213 SOLUTION TOPICAL at 19:43

## 2018-07-03 RX ADMIN — Medication 50 MILLIGRAM(S): at 01:49

## 2018-07-03 RX ADMIN — CISATRACURIUM BESYLATE 10.55 MICROGRAM(S)/KG/MIN: 2 INJECTION INTRAVENOUS at 19:46

## 2018-07-03 RX ADMIN — Medication 50 MILLILITER(S): at 17:58

## 2018-07-03 RX ADMIN — Medication 250 MILLIGRAM(S): at 12:35

## 2018-07-03 RX ADMIN — Medication 1 DROP(S): at 05:54

## 2018-07-03 NOTE — PROGRESS NOTE ADULT - ASSESSMENT
The patient is 67 year old male with PMH stated above, now s/p  Repair of AA, AVR, MV repair, TV repair, CABG x2, now in shock requiring pressor support - mutiple pressors. The patient with deterioration of the renal function in the setting of the shock - oliguric, no response from the diuretics. The CT ICU team started the patient on aquaphoresis and the CVVHD ordered, never been started from the primary team the BP unstable. The cvvhd started yesterday - 7/2 good tolerance so far - no issues overnight - we will continue today - UF increased to 200 ml/h

## 2018-07-03 NOTE — PROGRESS NOTE ADULT - SUBJECTIVE AND OBJECTIVE BOX
Seen in the morning, still intubated, requiring pressor support. S/p surgery closing the chest wall, done on 7/2 - started on CVVHD after  that, the aquaphoresis stopped. No issues with CVVHD over night - we will continue with that for now - the UF increased to 200 ml/h in the  morning     Patient seen and examined at bedside.     Patient seen and examined at bedside.     albumin human 25% IVPB 50 milliLiter(s) every 6 hours  artificial  tears Solution 1 Drop(s) two times a day  aztreonam  IVPB 1000 milliGRAM(s) every 8 hours  chlorhexidine 0.12% Liquid 15 milliLiter(s) every 12 hours  cisatracurium Infusion 3 MICROgram(s)/kG/Min <Continuous>  dextrose 50% Injectable 50 milliLiter(s) every 15 minutes  dextrose 50% Injectable 25 milliLiter(s) every 15 minutes  epinephrine 8 milliGRAM(s),dextrose 5% in water 250 milliLiter(s) 8 milliGRAM(s) <Continuous>  epoprostenol Infusion 1.986 NANOGram(s)/kG/Min <Continuous>  fentaNYL    Injectable 25 MICROGram(s) once  fentaNYL    Injectable 25 MICROGram(s) once  fentaNYL   Infusion. 0.5 MICROgram(s)/kG/Hr <Continuous>  fluconAZOLE IVPB 400 milliGRAM(s) every 24 hours  heparin  Infusion 200 Unit(s)/Hr <Continuous>  Heparin 29980 Unit(s),Dextrose 5% 1000 milliLiter(s) 57317 Unit(s) <Continuous>  hydrocortisone sodium succinate Injectable 50 milliGRAM(s) every 8 hours  insulin Infusion 5 Unit(s)/Hr <Continuous>  metroNIDAZOLE  IVPB 500 milliGRAM(s) every 8 hours  milrinone Infusion 0.25 MICROgram(s)/kG/Min <Continuous>  pantoprazole  Injectable 40 milliGRAM(s) daily  phenylephrine    Infusion 2 MICROgram(s)/kG/Min <Continuous>  propofol Infusion 5 MICROgram(s)/kG/Min <Continuous>  PureFlow Dialysate RFP-400 (K 2 / Ca 3) 5000 milliLiter(s) <Continuous>  sodium chloride 0.9%. 1000 milliLiter(s) <Continuous>  vancomycin  IVPB 1000 milliGRAM(s) every 24 hours  vasopressin Infusion 0.067 Unit(s)/Min <Continuous>      Allergies    penicillin (Rash)    Intolerances        T(C): , Max: 36.7 (07-03-18 @ 09:00)  T(F): , Max: 98.1 (07-03-18 @ 09:00)  HR: 80 (07-03-18 @ 12:00)  BP: --  BP(mean): --  RR: 24 (07-03-18 @ 12:00)  SpO2: 95% (07-03-18 @ 12:00)  Wt(kg): --    07-02 @ 07:01  -  07-03 @ 07:00  --------------------------------------------------------  IN:    cisatracurium Infusion: 267.7 mL    epoprostenol Infusion: 22.4 mL    fentaNYL Infusion.: 117.6 mL    freetext medication -  Infusion: 114 mL    milrinone  Infusion: 83.6 mL    Packed Red Blood Cells: 1250 mL    phenylephrine   Infusion: 119.9 mL    Platelets - Single Donor: 210 mL    propofol Infusion: 287.5 mL    sodium chloride 0.9%.: 190 mL    Solution: 900 mL  Total IN: 3562.7 mL    OUT:    Bulb: 260 mL    Chest Tube: 90 mL    Chest Tube: 185 mL    Indwelling Catheter - Urethral: 10 mL    Other: 1958 mL    Other: 548 mL    VAC (Vacuum Assisted Closure) System: 180 mL  Total OUT: 3231 mL    Total NET: 331.7 mL      07-03 @ 07:01  -  07-03 @ 12:42  --------------------------------------------------------  IN:    0.9% NaCl: 25 mL    Albumin 25%: 50 mL    cisatracurium Infusion: 104.4 mL    epoprostenol Infusion: 8.4 mL    fentaNYL Infusion.: 27.6 mL    freetext medication -  Infusion: 33 mL    heparin Infusion: 6 mL    milrinone  Infusion: 26.4 mL    phenylephrine   Infusion: 31.8 mL    propofol Infusion: 70.8 mL    sodium chloride 0.9%.: 60 mL    Solution: 500 mL  Total IN: 943.4 mL    OUT:    Bulb: 40 mL    Chest Tube: 5 mL    Chest Tube: 10 mL    Other: 914 mL  Total OUT: 969 mL    Total NET: -25.6 mL    Physical exam:   Intubated and sedated   ANASARCA   difficult to evaluate for JVD   Chest: s/p thoractomy, closed chest ultiple drainages from the chest   RRR, normal s1/s2, systolic murmur  Abdmen - swollen, not tender, not distended   EXtremities: 3+ peripheral edema  Hd cathter - on the left IJ  Desai             LABS:                        13.4   20.3  )-----------( 91       ( 03 Jul 2018 10:17 )             37.5     07-03    135  |  97  |  43<H>  ----------------------------<  123<H>  3.8   |  19<L>  |  1.30    Ca    9.4      03 Jul 2018 10:17  Phos  2.3     07-03  Mg     1.8     07-03    TPro  4.7<L>  /  Alb  3.1<L>  /  TBili  22.1<H>  /  DBili  x   /  AST  157<H>  /  ALT  <5<L>  /  AlkPhos  74  07-03      PT/INR - ( 03 Jul 2018 10:17 )   PT: 13.0 sec;   INR: 1.17          PTT - ( 03 Jul 2018 10:17 )  PTT:31.5 sec          RADIOLOGY & ADDITIONAL STUDIES:

## 2018-07-03 NOTE — PROGRESS NOTE ADULT - PROBLEM SELECTOR PLAN 1
- the oliguric EMILY due to ATN - most likely due to cardiogenic shock, can not exclude also a septic shock, requiring pressors - epinephrine phenylephrine, on vassopresin  on milrinone   - oliguric in the morning - anuric   - now on CVVHD - 200 ml/h good tolerance we will continue   - the patient with overt fluid overload - anasarca  - renal diet when he is not NPO  - hypophosphatemia - please replace with KpO4 15 mEq iv and follow up - the oliguric EMILY due to ATN - most likely due to cardiogenic shock, can not exclude also a septic shock, requiring pressors - epinephrine phenylephrine, on vasopresin  on milrinone   - oliguric in the morning - anuric   - now on CVVHD - 200 ml/h good tolerance we will continue   - the patient with overt fluid overload - anasarca  - renal diet when he is not NPO  - hypophosphatemia - please replace with KpO4 15 mEq iv and follow up

## 2018-07-03 NOTE — PROGRESS NOTE ADULT - SUBJECTIVE AND OBJECTIVE BOX
CTICU  CRITICAL  CARE  attending     Hand off received 					   Pertinent clinical, laboratory, radiographic, hemodynamic, echocardiographic, respiratory data, microbiologic data and chart were reviewed and analyzed frequently throughout the course of the day and night  Patient seen and examined with CTS/ SH attending at bedside  Pt is a 67y , Male, HEALTH ISSUES - PROBLEM Dx:  Shock: Shock  Anemia: Anemia  Hyponatremia: Hyponatremia  Acute kidney failure: Acute kidney failure  CAD (coronary artery disease): CAD (coronary artery disease)  Valvular disease: Valvular disease      , FAMILY HISTORY:  PAST MEDICAL & SURGICAL HISTORY:  No pertinent past medical history  No significant past surgical history    Patient is a 67y old  Male who presents with a chief complaint of AS (19 Jun 2018 00:06)      14 system review was unremarkable  acute changes include acute respiratory failure  Vital signs, hemodynamic and respiratory parameters were reviewed from the bedside nursing flowsheet.  ICU Vital Signs Last 24 Hrs  T(C): 35.9 (03 Jul 2018 20:56), Max: 36.7 (03 Jul 2018 09:00)  T(F): 96.7 (03 Jul 2018 20:56), Max: 98.1 (03 Jul 2018 09:00)  HR: 80 (03 Jul 2018 21:00) (70 - 114)  BP: --  BP(mean): --  ABP: 132/89 (03 Jul 2018 21:00) (92/62 - 158/89)  ABP(mean): 100 (03 Jul 2018 21:00) (72 - 126)  RR: 24 (03 Jul 2018 21:00) (20 - 25)  SpO2: 93% (03 Jul 2018 21:00) (92% - 97%)    Adult Advanced Hemodynamics Last 24 Hrs  CVP(mm Hg): 12 (03 Jul 2018 14:00) (-6 - 32)  CVP(cm H2O): --  CO: --  CI: --  PA: 10/4 (03 Jul 2018 14:00) (10/4 - 67/17)  PA(mean): 8 (03 Jul 2018 14:00) (6 - 41)  PCWP: --  SVR: --  SVRI: --  PVR: --  PVRI: --, ABG - ( 03 Jul 2018 21:32 )  pH, Arterial: 7.41  pH, Blood: x     /  pCO2: 32    /  pO2: 256   / HCO3: 20    / Base Excess: -4.0  /  SaO2: 100               Mode: AC/ CMV (Assist Control/ Continuous Mandatory Ventilation)  RR (machine): 24  TV (machine): 400  FiO2: 80  PEEP: 5  ITime: 1  MAP: 16  PIP: 35    Intake and output was reviewed and the fluid balance was calculated  Daily     Daily   I&O's Summary    02 Jul 2018 07:01  -  03 Jul 2018 07:00  --------------------------------------------------------  IN: 3562.7 mL / OUT: 3231 mL / NET: 331.7 mL    03 Jul 2018 07:01  -  03 Jul 2018 21:43  --------------------------------------------------------  IN: 2776.7 mL / OUT: 3117 mL / NET: -340.3 mL        All lines and drain sites were assessed  [facility]      POCT Blood Glucose.: 149 mg/dL (03 Jul 2018 14:37)    No acute change in mental status  Auscultation of the chest reveals equal bs  Abdomen is soft  Extremities are warm and well perfused  Wounds appear clean and unremarkable  Antibiotics are periop    labs  CBC Full  -  ( 03 Jul 2018 18:25 )  WBC Count : 19.4 K/uL  Hemoglobin : 11.4 g/dL  Hematocrit : 31.7 %  Platelet Count - Automated : 62 K/uL  Mean Cell Volume : 83.2 fL  Mean Cell Hemoglobin : 29.9 pg  Mean Cell Hemoglobin Concentration : 36.0 g/dL  Auto Neutrophil # : x  Auto Lymphocyte # : x  Auto Monocyte # : x  Auto Eosinophil # : x  Auto Basophil # : x  Auto Neutrophil % : x  Auto Lymphocyte % : x  Auto Monocyte % : x  Auto Eosinophil % : x  Auto Basophil % : x    07-03    136  |  97  |  38<H>  ----------------------------<  135<H>  3.7   |  18<L>  |  1.15    Ca    9.2      03 Jul 2018 18:25  Phos  2.5     07-03  Mg     2.1     07-03    TPro  5.4<L>  /  Alb  3.7  /  TBili  26.4<H>  /  DBili  x   /  AST  148<H>  /  ALT  <5<L>  /  AlkPhos  66  07-03    PT/INR - ( 03 Jul 2018 18:25 )   PT: 13.4 sec;   INR: 1.20          PTT - ( 03 Jul 2018 18:25 )  PTT:36.8 sec  The current medications were reviewed   MEDICATIONS  (STANDING):  albumin human 25% IVPB 50 milliLiter(s) IV Intermittent every 6 hours  artificial  tears Solution 1 Drop(s) Both EYES two times a day  aztreonam  IVPB 1000 milliGRAM(s) IV Intermittent every 8 hours  chlorhexidine 0.12% Liquid 15 milliLiter(s) Swish and Spit every 12 hours  cisatracurium Infusion 3 MICROgram(s)/kG/Min (10.548 mL/Hr) IV Continuous <Continuous>  dextrose 50% Injectable 50 milliLiter(s) IV Push every 15 minutes  dextrose 50% Injectable 25 milliLiter(s) IV Push every 15 minutes  epinephrine 8 milliGRAM(s),dextrose 5% in water 250 milliLiter(s) 8 milliGRAM(s) (10.42 mL/Hr) IV Continuous <Continuous>  epoprostenol Infusion 1.986 NANOGram(s)/kG/Min (1.406 mL/Hr) IV Continuous <Continuous>  fentaNYL    Injectable 25 MICROGram(s) IV Push once  fentaNYL    Injectable 25 MICROGram(s) IV Push once  fentaNYL   Infusion. 0.5 MICROgram(s)/kG/Hr (2.93 mL/Hr) IV Continuous <Continuous>  fluconAZOLE IVPB 400 milliGRAM(s) IV Intermittent every 24 hours  heparin  Infusion 200 Unit(s)/Hr (2 mL/Hr) IV Continuous <Continuous>  Heparin 64257 Unit(s),Dextrose 5% 1000 milliLiter(s) 75843 Unit(s) (25 mL/Hr) IV Continuous <Continuous>  hydrocortisone sodium succinate Injectable 50 milliGRAM(s) IV Push every 8 hours  insulin Infusion 5 Unit(s)/Hr (5 mL/Hr) IV Continuous <Continuous>  metroNIDAZOLE  IVPB 500 milliGRAM(s) IV Intermittent every 8 hours  milrinone Infusion 0.25 MICROgram(s)/kG/Min (4.395 mL/Hr) IV Continuous <Continuous>  pantoprazole  Injectable 40 milliGRAM(s) IV Push daily  phenylephrine    Infusion 2 MICROgram(s)/kG/Min (21.975 mL/Hr) IV Continuous <Continuous>  propofol Infusion 5 MICROgram(s)/kG/Min (1.758 mL/Hr) IV Continuous <Continuous>  PureFlow Dialysate RFP-400 (K 2 / Ca 3) 5000 milliLiter(s) (1500 mL/Hr) CRRT <Continuous>  sodium chloride 0.9%. 1000 milliLiter(s) (10 mL/Hr) IV Continuous <Continuous>  vasopressin Infusion 0.067 Unit(s)/Min (1 mL/Hr) IV Continuous <Continuous>    MEDICATIONS  (PRN):       PROBLEM LIST/ ASSESSMENT:  HEALTH ISSUES - PROBLEM Dx:  Shock: Shock  Anemia: Anemia  Hyponatremia: Hyponatremia  Acute kidney failure: Acute kidney failure  CAD (coronary artery disease): CAD (coronary artery disease)  Valvular disease: Valvular disease      ,   Patient is a 67y old  Male who presents with a chief complaint of AS (19 Jun 2018 00:06)     s/p cardiac surgery      My plan includes :  close hemodynamic, ventilatory and drain monitoring and management per post op routine    Monitor for arrhythmias and monitor parameters for organ perfusion  monitor neurologic status  Head of the bed should remain elevated to 45 deg .   chest PT and IS will be encouraged  monitor adequacy of oxygenation and ventilation and attempt to wean oxygen  Nutritional goals will be met using po eventually , ensure adequate caloric intake and montior the same  Stress ulcer and VTE prophylaxis will be achieved    Glycemic control is satisfactory  Electrolytes have been repleted as necessary and wound care has been carried out. Pain control has been achieved.   agressive physical therapy and early mobility and ambulation goals will be met   The family was updated about the course and plan  CRITICAL CARE TIME SPENT in evaluation and management, reassessments, review and interpretation of labs and x-rays, ventilator and hemodynamic management, formulating a plan and coordinating care: ___90____ MIN.  Time does not include procedural time.  CTICU ATTENDING     					    Clement Ahn MD

## 2018-07-04 LAB
ALBUMIN SERPL ELPH-MCNC: 3.3 G/DL — SIGNIFICANT CHANGE UP (ref 3.3–5)
ALBUMIN SERPL ELPH-MCNC: 3.5 G/DL — SIGNIFICANT CHANGE UP (ref 3.3–5)
ALBUMIN SERPL ELPH-MCNC: 3.6 G/DL — SIGNIFICANT CHANGE UP (ref 3.3–5)
ALP SERPL-CCNC: 67 U/L — SIGNIFICANT CHANGE UP (ref 40–120)
ALP SERPL-CCNC: 68 U/L — SIGNIFICANT CHANGE UP (ref 40–120)
ALP SERPL-CCNC: 86 U/L — SIGNIFICANT CHANGE UP (ref 40–120)
ALT FLD-CCNC: 30 U/L — SIGNIFICANT CHANGE UP (ref 10–45)
ALT FLD-CCNC: 31 U/L — SIGNIFICANT CHANGE UP (ref 10–45)
ALT FLD-CCNC: <5 U/L — LOW (ref 10–45)
ANION GAP SERPL CALC-SCNC: 18 MMOL/L — HIGH (ref 5–17)
ANION GAP SERPL CALC-SCNC: 19 MMOL/L — HIGH (ref 5–17)
ANION GAP SERPL CALC-SCNC: 19 MMOL/L — HIGH (ref 5–17)
APTT BLD: 32.6 SEC — SIGNIFICANT CHANGE UP (ref 27.5–37.4)
APTT BLD: 35.5 SEC — SIGNIFICANT CHANGE UP (ref 27.5–37.4)
APTT BLD: 35.6 SEC — SIGNIFICANT CHANGE UP (ref 27.5–37.4)
AST SERPL-CCNC: 137 U/L — HIGH (ref 10–40)
AST SERPL-CCNC: 96 U/L — HIGH (ref 10–40)
AST SERPL-CCNC: 97 U/L — HIGH (ref 10–40)
BASE EXCESS BLDV CALC-SCNC: -2 MMOL/L — SIGNIFICANT CHANGE UP
BILIRUB SERPL-MCNC: 26.6 MG/DL — HIGH (ref 0.2–1.2)
BILIRUB SERPL-MCNC: 27 MG/DL — HIGH (ref 0.2–1.2)
BILIRUB SERPL-MCNC: 30.2 MG/DL — HIGH (ref 0.2–1.2)
BLOOD GAS SOURCE: SIGNIFICANT CHANGE UP
BUN SERPL-MCNC: 33 MG/DL — HIGH (ref 7–23)
BUN SERPL-MCNC: 33 MG/DL — HIGH (ref 7–23)
BUN SERPL-MCNC: 37 MG/DL — HIGH (ref 7–23)
CALCIUM SERPL-MCNC: 9.2 MG/DL — SIGNIFICANT CHANGE UP (ref 8.4–10.5)
CALCIUM SERPL-MCNC: 9.4 MG/DL — SIGNIFICANT CHANGE UP (ref 8.4–10.5)
CALCIUM SERPL-MCNC: 9.6 MG/DL — SIGNIFICANT CHANGE UP (ref 8.4–10.5)
CHLORIDE SERPL-SCNC: 96 MMOL/L — SIGNIFICANT CHANGE UP (ref 96–108)
CHLORIDE SERPL-SCNC: 97 MMOL/L — SIGNIFICANT CHANGE UP (ref 96–108)
CHLORIDE SERPL-SCNC: 97 MMOL/L — SIGNIFICANT CHANGE UP (ref 96–108)
CO2 SERPL-SCNC: 19 MMOL/L — LOW (ref 22–31)
CO2 SERPL-SCNC: 20 MMOL/L — LOW (ref 22–31)
CO2 SERPL-SCNC: 20 MMOL/L — LOW (ref 22–31)
CORTICOSTEROID BINDING GLOBULIN RESULT: 0.9 MG/DL — LOW
CORTIS F/TOTAL MFR SERPL: 51 % — SIGNIFICANT CHANGE UP
CORTIS SERPL-MCNC: 12 UG/DL — SIGNIFICANT CHANGE UP
CORTISOL, FREE RESULT: 6.1 UG/DL — HIGH
CREAT SERPL-MCNC: 0.84 MG/DL — SIGNIFICANT CHANGE UP (ref 0.5–1.3)
CREAT SERPL-MCNC: 0.97 MG/DL — SIGNIFICANT CHANGE UP (ref 0.5–1.3)
CREAT SERPL-MCNC: 1.04 MG/DL — SIGNIFICANT CHANGE UP (ref 0.5–1.3)
GAS PNL BLDA: SIGNIFICANT CHANGE UP
GAS PNL BLDV: SIGNIFICANT CHANGE UP
GLUCOSE BLDC GLUCOMTR-MCNC: 141 MG/DL — HIGH (ref 70–99)
GLUCOSE BLDC GLUCOMTR-MCNC: 179 MG/DL — HIGH (ref 70–99)
GLUCOSE SERPL-MCNC: 146 MG/DL — HIGH (ref 70–99)
GLUCOSE SERPL-MCNC: 169 MG/DL — HIGH (ref 70–99)
GLUCOSE SERPL-MCNC: 195 MG/DL — HIGH (ref 70–99)
HCO3 BLDV-SCNC: 23 MMOL/L — SIGNIFICANT CHANGE UP (ref 20–27)
HCT VFR BLD CALC: 29.6 % — LOW (ref 39–50)
HCT VFR BLD CALC: 29.8 % — LOW (ref 39–50)
HCT VFR BLD CALC: 29.9 % — LOW (ref 39–50)
HGB BLD CALC-MCNC: 10.7 G/DL — LOW (ref 13–17)
HGB BLD-MCNC: 10.5 G/DL — LOW (ref 13–17)
HGB BLD-MCNC: 10.7 G/DL — LOW (ref 13–17)
HGB BLD-MCNC: 11.1 G/DL — LOW (ref 13–17)
INR BLD: 1.28 — HIGH (ref 0.88–1.16)
INR BLD: 1.29 — HIGH (ref 0.88–1.16)
INR BLD: 1.3 — HIGH (ref 0.88–1.16)
LACTATE SERPL-SCNC: 3.1 MMOL/L — HIGH (ref 0.5–2)
LACTATE SERPL-SCNC: 3.7 MMOL/L — HIGH (ref 0.5–2)
LACTATE SERPL-SCNC: 4.2 MMOL/L — CRITICAL HIGH (ref 0.5–2)
MAGNESIUM SERPL-MCNC: 1.9 MG/DL — SIGNIFICANT CHANGE UP (ref 1.6–2.6)
MAGNESIUM SERPL-MCNC: 2 MG/DL — SIGNIFICANT CHANGE UP (ref 1.6–2.6)
MAGNESIUM SERPL-MCNC: 2 MG/DL — SIGNIFICANT CHANGE UP (ref 1.6–2.6)
MCHC RBC-ENTMCNC: 29.2 PG — SIGNIFICANT CHANGE UP (ref 27–34)
MCHC RBC-ENTMCNC: 29.6 PG — SIGNIFICANT CHANGE UP (ref 27–34)
MCHC RBC-ENTMCNC: 30 PG — SIGNIFICANT CHANGE UP (ref 27–34)
MCHC RBC-ENTMCNC: 35.5 G/DL — SIGNIFICANT CHANGE UP (ref 32–36)
MCHC RBC-ENTMCNC: 35.8 G/DL — SIGNIFICANT CHANGE UP (ref 32–36)
MCHC RBC-ENTMCNC: 37.2 G/DL — HIGH (ref 32–36)
MCV RBC AUTO: 80.5 FL — SIGNIFICANT CHANGE UP (ref 80–100)
MCV RBC AUTO: 81.7 FL — SIGNIFICANT CHANGE UP (ref 80–100)
MCV RBC AUTO: 83.4 FL — SIGNIFICANT CHANGE UP (ref 80–100)
METHGB MFR BLDV: 1.5 % — SIGNIFICANT CHANGE UP
PCO2 BLDV: 39 MMHG — LOW (ref 41–51)
PH BLDV: 7.38 — SIGNIFICANT CHANGE UP (ref 7.32–7.43)
PHOSPHATE SERPL-MCNC: 2 MG/DL — LOW (ref 2.5–4.5)
PHOSPHATE SERPL-MCNC: 2.2 MG/DL — LOW (ref 2.5–4.5)
PHOSPHATE SERPL-MCNC: 2.5 MG/DL — SIGNIFICANT CHANGE UP (ref 2.5–4.5)
PLATELET # BLD AUTO: 51 K/UL — LOW (ref 150–400)
PLATELET # BLD AUTO: 51 K/UL — LOW (ref 150–400)
PLATELET # BLD AUTO: 70 K/UL — LOW (ref 150–400)
PO2 BLDV: 36 MMHG — SIGNIFICANT CHANGE UP
POTASSIUM SERPL-MCNC: 3.6 MMOL/L — SIGNIFICANT CHANGE UP (ref 3.5–5.3)
POTASSIUM SERPL-SCNC: 3.6 MMOL/L — SIGNIFICANT CHANGE UP (ref 3.5–5.3)
PROT SERPL-MCNC: 4.8 G/DL — LOW (ref 6–8.3)
PROT SERPL-MCNC: 5 G/DL — LOW (ref 6–8.3)
PROT SERPL-MCNC: 5.1 G/DL — LOW (ref 6–8.3)
PROTHROM AB SERPL-ACNC: 14.3 SEC — HIGH (ref 9.8–12.7)
PROTHROM AB SERPL-ACNC: 14.4 SEC — HIGH (ref 9.8–12.7)
PROTHROM AB SERPL-ACNC: 14.5 SEC — HIGH (ref 9.8–12.7)
RBC # BLD: 3.55 M/UL — LOW (ref 4.2–5.8)
RBC # BLD: 3.66 M/UL — LOW (ref 4.2–5.8)
RBC # BLD: 3.7 M/UL — LOW (ref 4.2–5.8)
RBC # FLD: 17.3 % — HIGH (ref 10.3–16.9)
RBC # FLD: 18 % — HIGH (ref 10.3–16.9)
RBC # FLD: 18.3 % — HIGH (ref 10.3–16.9)
SAO2 % BLDV: 66 % — SIGNIFICANT CHANGE UP
SODIUM SERPL-SCNC: 135 MMOL/L — SIGNIFICANT CHANGE UP (ref 135–145)
VANCOMYCIN TROUGH SERPL-MCNC: 14.5 UG/ML — SIGNIFICANT CHANGE UP (ref 10–20)
WBC # BLD: 14.7 K/UL — HIGH (ref 3.8–10.5)
WBC # BLD: 16 K/UL — HIGH (ref 3.8–10.5)
WBC # BLD: 20.3 K/UL — HIGH (ref 3.8–10.5)
WBC # FLD AUTO: 14.7 K/UL — HIGH (ref 3.8–10.5)
WBC # FLD AUTO: 16 K/UL — HIGH (ref 3.8–10.5)
WBC # FLD AUTO: 20.3 K/UL — HIGH (ref 3.8–10.5)

## 2018-07-04 PROCEDURE — 71045 X-RAY EXAM CHEST 1 VIEW: CPT | Mod: 26

## 2018-07-04 PROCEDURE — 99292 CRITICAL CARE ADDL 30 MIN: CPT

## 2018-07-04 PROCEDURE — 99291 CRITICAL CARE FIRST HOUR: CPT

## 2018-07-04 PROCEDURE — 90945 DIALYSIS ONE EVALUATION: CPT | Mod: GC

## 2018-07-04 RX ORDER — POTASSIUM CHLORIDE 20 MEQ
10 PACKET (EA) ORAL ONCE
Qty: 0 | Refills: 0 | Status: COMPLETED | OUTPATIENT
Start: 2018-07-04 | End: 2018-07-04

## 2018-07-04 RX ORDER — POTASSIUM CHLORIDE 20 MEQ
20 PACKET (EA) ORAL ONCE
Qty: 0 | Refills: 0 | Status: COMPLETED | OUTPATIENT
Start: 2018-07-04 | End: 2018-07-04

## 2018-07-04 RX ORDER — POTASSIUM PHOSPHATE, MONOBASIC POTASSIUM PHOSPHATE, DIBASIC 236; 224 MG/ML; MG/ML
15 INJECTION, SOLUTION INTRAVENOUS ONCE
Qty: 0 | Refills: 0 | Status: COMPLETED | OUTPATIENT
Start: 2018-07-04 | End: 2018-07-04

## 2018-07-04 RX ORDER — CALCIUM GLUCONATE 100 MG/ML
1 VIAL (ML) INTRAVENOUS ONCE
Qty: 0 | Refills: 0 | Status: COMPLETED | OUTPATIENT
Start: 2018-07-04 | End: 2018-07-04

## 2018-07-04 RX ORDER — POTASSIUM CHLORIDE 20 MEQ
20 PACKET (EA) ORAL
Qty: 0 | Refills: 0 | Status: COMPLETED | OUTPATIENT
Start: 2018-07-04 | End: 2018-07-05

## 2018-07-04 RX ORDER — MAGNESIUM SULFATE 500 MG/ML
1 VIAL (ML) INJECTION ONCE
Qty: 0 | Refills: 0 | Status: COMPLETED | OUTPATIENT
Start: 2018-07-04 | End: 2018-07-04

## 2018-07-04 RX ADMIN — Medication 50 MILLILITER(S): at 05:13

## 2018-07-04 RX ADMIN — Medication 50 MILLIGRAM(S): at 00:09

## 2018-07-04 RX ADMIN — CISATRACURIUM BESYLATE 10.55 MICROGRAM(S)/KG/MIN: 2 INJECTION INTRAVENOUS at 19:23

## 2018-07-04 RX ADMIN — Medication 100 MILLIGRAM(S): at 00:08

## 2018-07-04 RX ADMIN — Medication 100 MILLIEQUIVALENT(S): at 04:28

## 2018-07-04 RX ADMIN — Medication 100 MILLIGRAM(S): at 23:01

## 2018-07-04 RX ADMIN — CHLORHEXIDINE GLUCONATE 15 MILLILITER(S): 213 SOLUTION TOPICAL at 06:57

## 2018-07-04 RX ADMIN — PROPOFOL 1.76 MICROGRAM(S)/KG/MIN: 10 INJECTION, EMULSION INTRAVENOUS at 19:23

## 2018-07-04 RX ADMIN — Medication 200 GRAM(S): at 14:08

## 2018-07-04 RX ADMIN — Medication 50 MILLIGRAM(S): at 02:42

## 2018-07-04 RX ADMIN — Medication 50 MILLILITER(S): at 10:46

## 2018-07-04 RX ADMIN — Medication 50 MILLIGRAM(S): at 18:48

## 2018-07-04 RX ADMIN — MILRINONE LACTATE 4.39 MICROGRAM(S)/KG/MIN: 1 INJECTION, SOLUTION INTRAVENOUS at 19:24

## 2018-07-04 RX ADMIN — Medication 100 MILLIGRAM(S): at 13:48

## 2018-07-04 RX ADMIN — Medication 100 GRAM(S): at 13:50

## 2018-07-04 RX ADMIN — Medication 250 MILLIGRAM(S): at 12:55

## 2018-07-04 RX ADMIN — Medication 50 MILLIGRAM(S): at 06:58

## 2018-07-04 RX ADMIN — Medication 100 MILLIEQUIVALENT(S): at 12:55

## 2018-07-04 RX ADMIN — Medication 100 MILLIEQUIVALENT(S): at 15:07

## 2018-07-04 RX ADMIN — Medication 1 DROP(S): at 18:49

## 2018-07-04 RX ADMIN — Medication 1 DROP(S): at 06:57

## 2018-07-04 RX ADMIN — CHLORHEXIDINE GLUCONATE 15 MILLILITER(S): 213 SOLUTION TOPICAL at 18:48

## 2018-07-04 RX ADMIN — Medication 100 MILLIGRAM(S): at 06:58

## 2018-07-04 RX ADMIN — Medication 50 MILLIGRAM(S): at 09:25

## 2018-07-04 RX ADMIN — FLUCONAZOLE 100 MILLIGRAM(S): 150 TABLET ORAL at 10:46

## 2018-07-04 RX ADMIN — CISATRACURIUM BESYLATE 10.55 MICROGRAM(S)/KG/MIN: 2 INJECTION INTRAVENOUS at 09:26

## 2018-07-04 RX ADMIN — Medication 50 MILLIGRAM(S): at 22:25

## 2018-07-04 RX ADMIN — PHENYLEPHRINE HYDROCHLORIDE 21.98 MICROGRAM(S)/KG/MIN: 10 INJECTION INTRAVENOUS at 09:26

## 2018-07-04 RX ADMIN — PANTOPRAZOLE SODIUM 40 MILLIGRAM(S): 20 TABLET, DELAYED RELEASE ORAL at 11:30

## 2018-07-04 RX ADMIN — PHENYLEPHRINE HYDROCHLORIDE 21.98 MICROGRAM(S)/KG/MIN: 10 INJECTION INTRAVENOUS at 19:24

## 2018-07-04 RX ADMIN — Medication 50 MILLIGRAM(S): at 13:48

## 2018-07-04 RX ADMIN — POTASSIUM PHOSPHATE, MONOBASIC POTASSIUM PHOSPHATE, DIBASIC 62.5 MILLIMOLE(S): 236; 224 INJECTION, SOLUTION INTRAVENOUS at 06:57

## 2018-07-04 RX ADMIN — FENTANYL CITRATE 2.93 MICROGRAM(S)/KG/HR: 50 INJECTION INTRAVENOUS at 19:23

## 2018-07-04 NOTE — PROGRESS NOTE ADULT - SUBJECTIVE AND OBJECTIVE BOX
67y yo Male with PAST MEDICAL & SURGICAL HISTORY: congenital AV dz s/p repair/replacement who was being evaluated for hernia repair. Pre-Op risk assessment showed Severe AS, EF 25%, Severe MR, Severe TR, Severe pHTN and 2V CAD.     6/21 - s/p Rep of AA, AVR, MV repair, TV repair and CABG x2.   Post operatively with multiple take backs to OR and impella placement.   7/2 Chest closure   7/3 Started on CVVHD    Currently on: Nimbex, Propofol, Fentanyl, Milrionone, Epi, Dio-synephrine and Ana Luisa. Impella    ICU Vital Signs Last 24 Hrs    T(C): 35.4 (04 Jul 2018 16:31), Max: 35.9 (04 Jul 2018 01:01)  T(F): 95.8 (04 Jul 2018 16:31), Max: 96.7 (04 Jul 2018 01:01)  HR: 80 (04 Jul 2018 21:00) (78 - 87)  BP: --  BP(mean): --  ABP: 130/74 (04 Jul 2018 21:00) (11/76 - 140/82)  ABP(mean): 88 (04 Jul 2018 21:00) (74 - 96)  RR: 24 (04 Jul 2018 20:45) (20 - 24)  SpO2: 95% (04 Jul 2018 21:00) (94% - 96%)    I&O's Detail    03 Jul 2018 07:01  -  04 Jul 2018 07:00  --------------------------------------------------------  IN:    0.9% NaCl: 175 mL    Albumin 25%: 150 mL    Albumin 5%  - 250 mL: 250 mL    cisatracurium Infusion: 417.6 mL    epoprostenol Infusion: 15.4 mL    fentaNYL Infusion.: 110.4 mL    freetext medication -  Infusion: 146.6 mL    heparin Infusion: 6 mL    Jevity: 60 mL    milrinone  Infusion: 105.6 mL    phenylephrine   Infusion: 179.1 mL    propofol Infusion: 283.2 mL    sodium chloride 0.9%.: 1060 mL    Solution: 950 mL  Total IN: 3908.9 mL    OUT:    Bulb: 205 mL    Chest Tube: 230 mL    Chest Tube: 80 mL    Indwelling Catheter - Urethral: 10 mL    Other: 4597 mL    VAC (Vacuum Assisted Closure) System: 90 mL  Total OUT: 5212 mL    Total NET: -1303.1 mL      04 Jul 2018 07:01  -  04 Jul 2018 21:41  --------------------------------------------------------  IN:    0.9% NaCl: 50 mL    cisatracurium Infusion: 243.6 mL    fentaNYL Infusion.: 64.4 mL    freetext medication -  Infusion: 77 mL    Jevity: 400 mL    milrinone  Infusion: 61.6 mL    phenylephrine   Infusion: 117.6 mL    propofol Infusion: 173.6 mL    sodium chloride 0.9%.: 140 mL    Solution: 450 mL    Solution: 750 mL  Total IN: 2527.8 mL    OUT:    Bulb: 125 mL    Chest Tube: 10 mL    Chest Tube: 680 mL    Indwelling Catheter - Urethral: 10 mL    Other: 2938 mL    VAC (Vacuum Assisted Closure) System: 40 mL  Total OUT: 3803 mL    Total NET: -1275.2 mL    Mode: AC/ CMV (Assist Control/ Continuous Mandatory Ventilation), RR (machine): 24, TV (machine): 400, FiO2: 60, PEEP: 5, ITime: 1, MAP: 17, PIP: 37    Phys exam  Paralyzed and sedated. Multiple catheters.  chest closed with dressing  + peripheral edema, warm right leg with palpable pulses.    ABG - ( 04 Jul 2018 18:55 )  pH, Arterial: 7.38  pH, Blood: x     /  pCO2: 34    /  pO2: 138   / HCO3: 20    / Base Excess: -4.2  /  SaO2: 100       a/p:    Shock, Cardiogenic and possibly distributive    will continue with sedation and paralysis to optimized PaO2/FiO2  his hemodynamic support will continue as well and we are going to try to decrease his dio-synephrine  Impella at P4 with 2.2-2.4 liter flows  cont with CVVHD  cont with all antimicrobial agents- Vanco, fluconazole, Aztreonam, and Metronidazole  Feeds at 30 ml/hr, Gi proph, glycemic control  GI proph    I have personally examined this critically ill patient at bedside, reviewed the medical record, bedside flowsheet, lab data, pertinent imaging studies and discussed the care with the critical care team on rounds. The care required multiple reassessments to optimize care. My personal Total Critical Care time spent was 60 minutes, minus procedures and/or teaching.

## 2018-07-04 NOTE — PROGRESS NOTE ADULT - SUBJECTIVE AND OBJECTIVE BOX
CTICU  CRITICAL  CARE  ATTENDING:   				   Pertinent clinical, laboratory, radiographic, hemodynamic, echocardiographic, respiratory data, microbiologic data and chart were reviewed and analyzed frequently throughout the course of the day and night    Patient seen and examined with CTS/ SH attending at bedside    Pt is a 67y Male s/p AAA repair, AVR, MVR, TVR, CABGx2, Impella now s/p multiple open chest washout     HEALTH ISSUES - PROBLEM Dx:  Shock: Shock  Anemia: Anemia  Hyponatremia: Hyponatremia  Acute kidney failure: Acute kidney failure  CAD (coronary artery disease): CAD (coronary artery disease)  Valvular disease: Valvular disease         FAMILY HISTORY:  PAST MEDICAL & SURGICAL HISTORY:  No pertinent past medical history  No significant past surgical history      14 system review was unremarkable  acute changes include acute respiratory failure    Vital signs, hemodynamic and respiratory parameters were reviewed from the bedside nursing flowsheet.  ICU Vital Signs Last 24 Hrs  T(C): 35.7 (04 Jul 2018 05:00), Max: 36 (03 Jul 2018 16:48)  T(F): 96.3 (04 Jul 2018 05:00), Max: 96.8 (03 Jul 2018 16:48)  HR: 80 (04 Jul 2018 12:00) (70 - 87)  BP: --  BP(mean): --  ABP: 115/74 (04 Jul 2018 12:00) (90/6 - 158/89)  ABP(mean): 88 (04 Jul 2018 12:00) (72 - 126)  RR: 24 (04 Jul 2018 12:00) (21 - 25)  SpO2: 96% (04 Jul 2018 12:00) (92% - 96%)    Adult Advanced Hemodynamics Last 24 Hrs  CVP(mm Hg): 30 (04 Jul 2018 00:00) (-6 - 31)  CVP(cm H2O): --  CO: --  CI: --  PA: 10/4 (03 Jul 2018 14:00) (10/4 - 10/4)  PA(mean): 8 (03 Jul 2018 14:00) (7 - 8)  PCWP: --  SVR: --  SVRI: --  PVR: --  PVRI: --, ABG - ( 04 Jul 2018 11:31 )  pH, Arterial: 7.43  pH, Blood: x     /  pCO2: 31    /  pO2: 158   / HCO3: 20    / Base Excess: -3.1  /  SaO2: 100               Mode: AC/ CMV (Assist Control/ Continuous Mandatory Ventilation)  RR (machine): 24  TV (machine): 400  FiO2: 60  PEEP: 5  ITime: 1  MAP: 16  PIP: 34    Intake and output was reviewed and the fluid balance was calculated  Daily     Daily   I&O's Summary    03 Jul 2018 07:01  -  04 Jul 2018 07:00  --------------------------------------------------------  IN: 3908.9 mL / OUT: 5212 mL / NET: -1303.1 mL    04 Jul 2018 07:01  -  04 Jul 2018 12:04  --------------------------------------------------------  IN: 740.5 mL / OUT: 840 mL / NET: -99.5 mL        All lines and drain sites were assessed  [facility]      POCT Blood Glucose.: 141 mg/dL (04 Jul 2018 02:48)        Exam:   Gen: NAD   Neuro: unable to assess as pt is intubated and paralyzed  Lungs: Auscultation of the chest reveals equal bs  Abd: soft, nt/nd  Ext: warm and well perfused  Wound: appears clean and unremarkable  Antibiotics are periop      labs  CBC Full  -  ( 04 Jul 2018 03:05 )  WBC Count : 20.3 K/uL  Hemoglobin : 11.1 g/dL  Hematocrit : 29.8 %  Platelet Count - Automated : 70 K/uL  Mean Cell Volume : 80.5 fL  Mean Cell Hemoglobin : 30.0 pg  Mean Cell Hemoglobin Concentration : 37.2 g/dL  Auto Neutrophil # : x  Auto Lymphocyte # : x  Auto Monocyte # : x  Auto Eosinophil # : x  Auto Basophil # : x  Auto Neutrophil % : x  Auto Lymphocyte % : x  Auto Monocyte % : x  Auto Eosinophil % : x  Auto Basophil % : x    07-04    135  |  96  |  37<H>  ----------------------------<  146<H>  3.6   |  20<L>  |  1.04    Ca    9.6      04 Jul 2018 03:05  Phos  2.2     07-04  Mg     2.0     07-04    TPro  4.8<L>  /  Alb  3.5  /  TBili  26.6<H>  /  DBili  x   /  AST  137<H>  /  ALT  <5<L>  /  AlkPhos  67  07-04    PT/INR - ( 04 Jul 2018 03:05 )   PT: 14.5 sec;   INR: 1.30          PTT - ( 04 Jul 2018 03:05 )  PTT:32.6 sec    The current medications were reviewed   MEDICATIONS  (STANDING):  artificial  tears Solution 1 Drop(s) Both EYES two times a day  aztreonam  IVPB 1000 milliGRAM(s) IV Intermittent every 8 hours  chlorhexidine 0.12% Liquid 15 milliLiter(s) Swish and Spit every 12 hours  cisatracurium Infusion 3 MICROgram(s)/kG/Min (10.548 mL/Hr) IV Continuous <Continuous>  dextrose 50% Injectable 50 milliLiter(s) IV Push every 15 minutes  dextrose 50% Injectable 25 milliLiter(s) IV Push every 15 minutes  epinephrine 8 milliGRAM(s),dextrose 5% in water 250 milliLiter(s) 8 milliGRAM(s) (10.42 mL/Hr) IV Continuous <Continuous>  epoprostenol Infusion 1.986 NANOGram(s)/kG/Min (1.406 mL/Hr) IV Continuous <Continuous>  fentaNYL   Infusion. 0.5 MICROgram(s)/kG/Hr (2.93 mL/Hr) IV Continuous <Continuous>  fluconAZOLE IVPB 400 milliGRAM(s) IV Intermittent every 24 hours  heparin  Infusion 200 Unit(s)/Hr (2 mL/Hr) IV Continuous <Continuous>  Heparin 09669 Unit(s),Dextrose 5% 1000 milliLiter(s) 20411 Unit(s) (25 mL/Hr) IV Continuous <Continuous>  hydrocortisone sodium succinate Injectable 50 milliGRAM(s) IV Push every 8 hours  insulin Infusion 5 Unit(s)/Hr (5 mL/Hr) IV Continuous <Continuous>  metroNIDAZOLE  IVPB 500 milliGRAM(s) IV Intermittent every 8 hours  milrinone Infusion 0.25 MICROgram(s)/kG/Min (4.395 mL/Hr) IV Continuous <Continuous>  pantoprazole  Injectable 40 milliGRAM(s) IV Push daily  phenylephrine    Infusion 2 MICROgram(s)/kG/Min (21.975 mL/Hr) IV Continuous <Continuous>  propofol Infusion 5 MICROgram(s)/kG/Min (1.758 mL/Hr) IV Continuous <Continuous>  PureFlow Dialysate RFP-400 (K 2 / Ca 3) 5000 milliLiter(s) (1500 mL/Hr) CRRT <Continuous>  sodium chloride 0.9%. 1000 milliLiter(s) (10 mL/Hr) IV Continuous <Continuous>  vancomycin  IVPB 750 milliGRAM(s) IV Intermittent every 24 hours    MEDICATIONS  (PRN):       PROBLEM LIST/ ASSESSMENT:  HEALTH ISSUES - PROBLEM Dx:  Shock: Shock  Anemia: Anemia  Hyponatremia: Hyponatremia  Acute kidney failure: Acute kidney failure  CAD (coronary artery disease): CAD (coronary artery disease)  Valvular disease: Valvular disease         Pt is a 67y Male s/p AAA repair, AVR, MVR, TVR, CABGx2, Impella now s/p multiple open chest washout    My plan includes :  -cont. on current abx; cont. current drips: Milrinone, Epi, Dio, Nimbex, Fentany, and propofol;   -Methhemoglobin levels normal; cont. NO for now.   -Tube feeds increased  -Close hemodynamic, ventilatory and drain monitoring and management per post op routine  -Monitor for arrhythmias and monitor parameters for organ perfusion  -Monitor neurologic status  -Head of the bed should remain elevated to 45 deg .   -Chest PT and IS will be encouraged  -Monitor adequacy of oxygenation and ventilation and attempt to wean oxygen  -Nutritional goals will be met using po eventually , ensure adequate caloric intake and montior the same  -Stress ulcer and VTE prophylaxis will be achieved    -Glycemic control is satisfactory  -Electrolytes have been repleated as necessary and wound care has been carried out. Pain control has been achieved.   -Agressive physical therapy and early mobility and ambulation goals will be met   The family was updated about the course and plan    CRITICAL CARE TIME SPENT in evaluation and management, reassessments, review and interpretation of labs and x-rays, ventilator and hemodynamic management, formulating a plan and coordinating care: ___60____ MIN.  Time does not include procedural time.      CTICU ATTENDING:      		  Uriel Camarillo MD

## 2018-07-04 NOTE — PROGRESS NOTE ADULT - SUBJECTIVE AND OBJECTIVE BOX
Patient is a 67y Male seen and evaluated at bedside. Patient still remains critically ill on ventilatory support sedated and intubated and multiple IV pressors at present along with CVVHD for anuric EMILY. Patient in past 24 hours with I/o net negative 1.3L on CVVHD. Patient tolerating CVVHD overnight.  FIO2 60% at present. Volume status improving and electrolytes stable at present.    albumin human 25% IVPB 50 every 6 hours  artificial  tears Solution 1 two times a day  aztreonam  IVPB 1000 every 8 hours  chlorhexidine 0.12% Liquid 15 every 12 hours  cisatracurium Infusion 3 <Continuous>  dextrose 50% Injectable 50 every 15 minutes  dextrose 50% Injectable 25 every 15 minutes  epinephrine 8 milliGRAM(s),dextrose 5% in water 250 milliLiter(s) 8 <Continuous>  epoprostenol Infusion 1.986 <Continuous>  fentaNYL    Injectable 25 once  fentaNYL    Injectable 25 once  fentaNYL   Infusion. 0.5 <Continuous>  fluconAZOLE IVPB 400 every 24 hours  heparin  Infusion 200 <Continuous>  Heparin 98557 Unit(s),Dextrose 5% 1000 milliLiter(s) 35819 <Continuous>  hydrocortisone sodium succinate Injectable 50 every 8 hours  insulin Infusion 5 <Continuous>  metroNIDAZOLE  IVPB 500 every 8 hours  milrinone Infusion 0.25 <Continuous>  pantoprazole  Injectable 40 daily  phenylephrine    Infusion 2 <Continuous>  propofol Infusion 5 <Continuous>  PureFlow Dialysate RFP-400 (K 2 / Ca 3) 5000 <Continuous>  sodium chloride 0.9%. 1000 <Continuous>  vancomycin  IVPB 750 every 24 hours  vasopressin Infusion 0.067 <Continuous>      Allergies    penicillin (Rash)    Intolerances        T(C): , Max: 36.7 (07-03-18 @ 09:00)  T(F): , Max: 98.1 (07-03-18 @ 09:00)  HR: 80 (07-04-18 @ 08:00)  BP: --  BP(mean): --  RR: 24 (07-04-18 @ 08:00)  SpO2: 95% (07-04-18 @ 08:00)  Wt(kg): --    07-03 @ 07:01  -  07-04 @ 07:00  --------------------------------------------------------  IN: 3908.9 mL / OUT: 5212 mL / NET: -1303.1 mL    07-04 @ 07:01  -  07-04 @ 08:10  --------------------------------------------------------  IN: 154.6 mL / OUT: 245 mL / NET: -90.4 mL          Review of Systems:  Limited. Patient sedated and intubated on ventilatory support    PHYSICAL EXAM:  GENERAL: Ill appearing, sedated and intuabted on ventilatory support in no acute distress at present  HEAD:  Atraumatic, Normocephalic,   EYES: Bilateral conjuctival and scleral pallor++nt  Oral cavity: Oral mucosa moist and pale  NECK: Neck supple, No JVD, ET Tube in place  CHEST/LUNG: Bilateral decreased breath sounds, Bibasilar rales and rhonchi, no wheezing  HEART: Regular rate and rhythm. PAKO II/VI at LPSB, No gallop, no rub, Multiple surgical scars present.  ABDOMEN: Soft, Nontender, non distended, BS+nt, No flank tenderness.   EXTREMITIES: Bilatearl thigh and dependent abdominal wall edema+nt, No cyanosis, no clubbing, distal pulses palpable  Neurology: AAOx3, no focal neurological deficit  SKIN: No rashes or lesions    ACCESS: Right sided IJ HD catheter present.    LABS:                        11.1   20.3  )-----------( 70       ( 04 Jul 2018 03:05 )             29.8     07-04    135  |  96  |  37<H>  ----------------------------<  146<H>  3.6   |  20<L>  |  1.04    Ca    9.6      04 Jul 2018 03:05  Phos  2.2     07-04  Mg     2.0     07-04    TPro  4.8<L>  /  Alb  3.5  /  TBili  26.6<H>  /  DBili  x   /  AST  137<H>  /  ALT  <5<L>  /  AlkPhos  67  07-04      PT/INR - ( 04 Jul 2018 03:05 )   PT: 14.5 sec;   INR: 1.30          PTT - ( 04 Jul 2018 03:05 )  PTT:32.6 sec          RADIOLOGY & ADDITIONAL STUDIES:  < from: Xray Chest 1 View- PORTABLE-Routine (07.03.18 @ 04:45) >    EXAM:  XR CHEST PORTABLE ROUTINE 1V                          PROCEDURE DATE:  07/03/2018          INTERPRETATION:  Chest x-ray    Indication: ICU patient    A portable frontal view of the chest is compared to the prior study dated   7/2/2018. Tip of [place] catheter has been advanced now overlying the   main right pulmonary artery. ET tube, intraventricular assist device and   other support devices are unchanged. Pulmonary congestion has slightly   improved. Probable small effusions. No pneumothorax.      IMPRESSION: As above.            "Thank you for the opportunity to participate in the care of this   patient."        WILBER MCDOWELL M.D., ATTENDING RADIOLOGIST  This document has been electronically signed. Jul  3 2018  9:16AM                  < end of copied text > Patient is a 67y Male seen and evaluated at bedside. Patient still remains critically ill on ventilatory support sedated and intubated and multiple IV pressors at present along with CVVHD for anuric EMILY. Patient in past 24 hours with I/o net negative 1.3L on CVVHD. Patient tolerating CVVHD overnight.  FIO2 60% at present. Volume status improving and electrolytes stable at present. remains on Impella support     albumin human 25% IVPB 50 every 6 hours  artificial  tears Solution 1 two times a day  aztreonam  IVPB 1000 every 8 hours  chlorhexidine 0.12% Liquid 15 every 12 hours  cisatracurium Infusion 3 <Continuous>  dextrose 50% Injectable 50 every 15 minutes  dextrose 50% Injectable 25 every 15 minutes  epinephrine 8 milliGRAM(s),dextrose 5% in water 250 milliLiter(s) 8 <Continuous>  epoprostenol Infusion 1.986 <Continuous>  fentaNYL    Injectable 25 once  fentaNYL    Injectable 25 once  fentaNYL   Infusion. 0.5 <Continuous>  fluconAZOLE IVPB 400 every 24 hours  heparin  Infusion 200 <Continuous>  Heparin 42150 Unit(s),Dextrose 5% 1000 milliLiter(s) 80959 <Continuous>  hydrocortisone sodium succinate Injectable 50 every 8 hours  insulin Infusion 5 <Continuous>  metroNIDAZOLE  IVPB 500 every 8 hours  milrinone Infusion 0.25 <Continuous>  pantoprazole  Injectable 40 daily  phenylephrine    Infusion 2 <Continuous>  propofol Infusion 5 <Continuous>  PureFlow Dialysate RFP-400 (K 2 / Ca 3) 5000 <Continuous>  sodium chloride 0.9%. 1000 <Continuous>  vancomycin  IVPB 750 every 24 hours  vasopressin Infusion 0.067 <Continuous>      Allergies    penicillin (Rash)    Intolerances        T(C): , Max: 36.7 (07-03-18 @ 09:00)  T(F): , Max: 98.1 (07-03-18 @ 09:00)  HR: 80 (07-04-18 @ 08:00)  BP: -- 122/70  BP(mean): --  RR: 24 (07-04-18 @ 08:00)  SpO2: 95% (07-04-18 @ 08:00)  Wt(kg): --    07-03 @ 07:01  -  07-04 @ 07:00  --------------------------------------------------------  IN: 3908.9 mL / OUT: 5212 mL / NET: -1303.1 mL    07-04 @ 07:01  -  07-04 @ 08:10  --------------------------------------------------------  IN: 154.6 mL / OUT: 245 mL / NET: -90.4 mL          Review of Systems:  Limited. Patient sedated and intubated on ventilatory support    PHYSICAL EXAM:  GENERAL: Ill appearing, sedated and intuabted on ventilatory support in no acute distress at present  HEAD:  Atraumatic, Normocephalic,   EYES: Bilateral conjuctival and scleral pallor++nt  Oral cavity: Oral mucosa moist and pale  NECK: Neck supple, No JVD, ET Tube in place  CHEST/LUNG: Bilateral decreased breath sounds, Bibasilar rales and rhonchi, no wheezing  HEART: Regular rate and rhythm. PAKO II/VI at LPSB, No gallop, no rub, Multiple surgical scars present.  ABDOMEN: Soft, Nontender, non distended, BS+nt, No flank tenderness.   EXTREMITIES: Bilatearl thigh and dependent abdominal wall edema+nt, No cyanosis, no clubbing, distal pulses palpable  Neurology: AAOx3, no focal neurological deficit  SKIN: No rashes or lesions    ACCESS: Right sided IJ HD catheter present.    LABS:                        11.1   20.3  )-----------( 70       ( 04 Jul 2018 03:05 )             29.8     07-04    135  |  96  |  37<H>  ----------------------------<  146<H>  3.6   |  20<L>  |  1.04    Ca    9.6      04 Jul 2018 03:05  Phos  2.2     07-04  Mg     2.0     07-04    TPro  4.8<L>  /  Alb  3.5  /  TBili  26.6<H>  /  DBili  x   /  AST  137<H>  /  ALT  <5<L>  /  AlkPhos  67  07-04      PT/INR - ( 04 Jul 2018 03:05 )   PT: 14.5 sec;   INR: 1.30          PTT - ( 04 Jul 2018 03:05 )  PTT:32.6 sec          RADIOLOGY & ADDITIONAL STUDIES:  < from: Xray Chest 1 View- PORTABLE-Routine (07.03.18 @ 04:45) >    EXAM:  XR CHEST PORTABLE ROUTINE 1V                          PROCEDURE DATE:  07/03/2018          INTERPRETATION:  Chest x-ray    Indication: ICU patient    A portable frontal view of the chest is compared to the prior study dated   7/2/2018. Tip of [place] catheter has been advanced now overlying the   main right pulmonary artery. ET tube, intraventricular assist device and   other support devices are unchanged. Pulmonary congestion has slightly   improved. Probable small effusions. No pneumothorax.      IMPRESSION: As above.            "Thank you for the opportunity to participate in the care of this   patient."        WILBER MCDOWELL M.D., ATTENDING RADIOLOGIST  This document has been electronically signed. Jul  3 2018  9:16AM                  < end of copied text >

## 2018-07-04 NOTE — PROGRESS NOTE ADULT - ASSESSMENT
The patient is 67 year old male with PMH stated above, now s/p  Repair of AA, AVR, MV repair, TV repair, CABG x2, now in shock requiring pressor support - mutiple pressors. The patient with deterioration of the renal function in the setting of the shock - oliguric, no response from the diuretics. The CT ICU team started the patient CVVHD at present

## 2018-07-04 NOTE — PROGRESS NOTE ADULT - PROBLEM SELECTOR PLAN 1
Anuric Aries likely ischemic ATN due to cardiogenic shock on CVVHD    CVVHD:   RBF: 200ml/min  Dialysate flow: 1.5L/hr  UF:200ml/hr    Patient with net negative 1.3L In past 24 hours duration still remains with volume overload state with improvement.  Patient receiving approximately 125 to 130 cc/hr intake including all IV drips   Concentrate IV drips as possible  Will continue current CVVHD at present with goal fluid balance to be net negative 1 to 1.5L In next 24 hours  Monitor electrolytes every 6 hrly (BMP, Phosphorus, Mag, Calcium) while on CVVHD  Replete phosphorus   Avoid nephrotoxic agents  Adjust antibiotics as per renal dose clearance on CVVHD  Continue inotropic and pressors support at present per primary team  Continue ventilatory support.  Treatment of underlying cardiac disease per primary team.

## 2018-07-04 NOTE — PROGRESS NOTE ADULT - PROBLEM SELECTOR PLAN 3
- cardiogenic and possible component sepsis   - aztreonam, metronidazole and vancomycin   - please follow up the vanco trough level and dose accordingly while being on CVVHD.

## 2018-07-05 LAB
ALBUMIN SERPL ELPH-MCNC: 3.4 G/DL — SIGNIFICANT CHANGE UP (ref 3.3–5)
ALBUMIN SERPL ELPH-MCNC: 3.4 G/DL — SIGNIFICANT CHANGE UP (ref 3.3–5)
ALBUMIN SERPL ELPH-MCNC: 3.5 G/DL — SIGNIFICANT CHANGE UP (ref 3.3–5)
ALBUMIN SERPL ELPH-MCNC: 3.8 G/DL — SIGNIFICANT CHANGE UP (ref 3.3–5)
ALP SERPL-CCNC: 108 U/L — SIGNIFICANT CHANGE UP (ref 40–120)
ALP SERPL-CCNC: 110 U/L — SIGNIFICANT CHANGE UP (ref 40–120)
ALP SERPL-CCNC: 91 U/L — SIGNIFICANT CHANGE UP (ref 40–120)
ALP SERPL-CCNC: 93 U/L — SIGNIFICANT CHANGE UP (ref 40–120)
ALT FLD-CCNC: 32 U/L — SIGNIFICANT CHANGE UP (ref 10–45)
ALT FLD-CCNC: 33 U/L — SIGNIFICANT CHANGE UP (ref 10–45)
ALT FLD-CCNC: 34 U/L — SIGNIFICANT CHANGE UP (ref 10–45)
ALT FLD-CCNC: 37 U/L — SIGNIFICANT CHANGE UP (ref 10–45)
ANION GAP SERPL CALC-SCNC: 19 MMOL/L — HIGH (ref 5–17)
ANION GAP SERPL CALC-SCNC: 20 MMOL/L — HIGH (ref 5–17)
ANION GAP SERPL CALC-SCNC: 20 MMOL/L — HIGH (ref 5–17)
ANION GAP SERPL CALC-SCNC: 21 MMOL/L — HIGH (ref 5–17)
ANISOCYTOSIS BLD QL: SLIGHT — SIGNIFICANT CHANGE UP
APTT BLD: 34.1 SEC — SIGNIFICANT CHANGE UP (ref 27.5–37.4)
APTT BLD: 34.4 SEC — SIGNIFICANT CHANGE UP (ref 27.5–37.4)
APTT BLD: 35.8 SEC — SIGNIFICANT CHANGE UP (ref 27.5–37.4)
APTT BLD: 36.2 SEC — SIGNIFICANT CHANGE UP (ref 27.5–37.4)
APTT BLD: 37.2 SEC — SIGNIFICANT CHANGE UP (ref 27.5–37.4)
APTT BLD: 42 SEC — HIGH (ref 27.5–37.4)
AST SERPL-CCNC: 80 U/L — HIGH (ref 10–40)
AST SERPL-CCNC: 83 U/L — HIGH (ref 10–40)
AST SERPL-CCNC: 84 U/L — HIGH (ref 10–40)
AST SERPL-CCNC: 87 U/L — HIGH (ref 10–40)
BASE EXCESS BLDA CALC-SCNC: -4.9 MMOL/L — LOW (ref -2–3)
BASE EXCESS BLDA CALC-SCNC: -5.2 MMOL/L — LOW (ref -2–3)
BILIRUB DIRECT SERPL-MCNC: >10 MG/DL — HIGH (ref 0–0.2)
BILIRUB DIRECT SERPL-MCNC: >10 MG/DL — HIGH (ref 0–0.2)
BILIRUB INDIRECT FLD-MCNC: <17.7 MG/DL — HIGH (ref 0.2–1)
BILIRUB INDIRECT FLD-MCNC: <18 MG/DL — HIGH (ref 0.2–1)
BILIRUB SERPL-MCNC: 27.7 MG/DL — HIGH (ref 0.2–1.2)
BILIRUB SERPL-MCNC: 28 MG/DL — HIGH (ref 0.2–1.2)
BILIRUB SERPL-MCNC: 28.1 MG/DL — HIGH (ref 0.2–1.2)
BILIRUB SERPL-MCNC: 31.4 MG/DL — HIGH (ref 0.2–1.2)
BLD GP AB SCN SERPL QL: NEGATIVE — SIGNIFICANT CHANGE UP
BUN SERPL-MCNC: 29 MG/DL — HIGH (ref 7–23)
BUN SERPL-MCNC: 31 MG/DL — HIGH (ref 7–23)
BURR CELLS BLD QL SMEAR: SIGNIFICANT CHANGE UP
CALCIUM SERPL-MCNC: 10.2 MG/DL — SIGNIFICANT CHANGE UP (ref 8.4–10.5)
CALCIUM SERPL-MCNC: 9.3 MG/DL — SIGNIFICANT CHANGE UP (ref 8.4–10.5)
CALCIUM SERPL-MCNC: 9.5 MG/DL — SIGNIFICANT CHANGE UP (ref 8.4–10.5)
CALCIUM SERPL-MCNC: 9.7 MG/DL — SIGNIFICANT CHANGE UP (ref 8.4–10.5)
CHLORIDE SERPL-SCNC: 97 MMOL/L — SIGNIFICANT CHANGE UP (ref 96–108)
CHLORIDE SERPL-SCNC: 97 MMOL/L — SIGNIFICANT CHANGE UP (ref 96–108)
CHLORIDE SERPL-SCNC: 98 MMOL/L — SIGNIFICANT CHANGE UP (ref 96–108)
CHLORIDE SERPL-SCNC: 98 MMOL/L — SIGNIFICANT CHANGE UP (ref 96–108)
CHLORIDE SERPL-SCNC: 99 MMOL/L — SIGNIFICANT CHANGE UP (ref 96–108)
CHLORIDE SERPL-SCNC: 99 MMOL/L — SIGNIFICANT CHANGE UP (ref 96–108)
CO2 SERPL-SCNC: 19 MMOL/L — LOW (ref 22–31)
CO2 SERPL-SCNC: 20 MMOL/L — LOW (ref 22–31)
CO2 SERPL-SCNC: 21 MMOL/L — LOW (ref 22–31)
CREAT SERPL-MCNC: 0.71 MG/DL — SIGNIFICANT CHANGE UP (ref 0.5–1.3)
CREAT SERPL-MCNC: 0.72 MG/DL — SIGNIFICANT CHANGE UP (ref 0.5–1.3)
CREAT SERPL-MCNC: 0.73 MG/DL — SIGNIFICANT CHANGE UP (ref 0.5–1.3)
CREAT SERPL-MCNC: 0.73 MG/DL — SIGNIFICANT CHANGE UP (ref 0.5–1.3)
CREAT SERPL-MCNC: 0.76 MG/DL — SIGNIFICANT CHANGE UP (ref 0.5–1.3)
CREAT SERPL-MCNC: 0.8 MG/DL — SIGNIFICANT CHANGE UP (ref 0.5–1.3)
GAS PNL BLDA: SIGNIFICANT CHANGE UP
GLUCOSE BLDC GLUCOMTR-MCNC: 138 MG/DL — HIGH (ref 70–99)
GLUCOSE BLDC GLUCOMTR-MCNC: 160 MG/DL — HIGH (ref 70–99)
GLUCOSE BLDC GLUCOMTR-MCNC: 172 MG/DL — HIGH (ref 70–99)
GLUCOSE BLDC GLUCOMTR-MCNC: 173 MG/DL — HIGH (ref 70–99)
GLUCOSE BLDC GLUCOMTR-MCNC: 174 MG/DL — HIGH (ref 70–99)
GLUCOSE SERPL-MCNC: 143 MG/DL — HIGH (ref 70–99)
GLUCOSE SERPL-MCNC: 153 MG/DL — HIGH (ref 70–99)
GLUCOSE SERPL-MCNC: 161 MG/DL — HIGH (ref 70–99)
GLUCOSE SERPL-MCNC: 168 MG/DL — HIGH (ref 70–99)
GLUCOSE SERPL-MCNC: 173 MG/DL — HIGH (ref 70–99)
GLUCOSE SERPL-MCNC: 190 MG/DL — HIGH (ref 70–99)
HCO3 BLDA-SCNC: 19 MMOL/L — LOW (ref 21–28)
HCO3 BLDA-SCNC: 21 MMOL/L — SIGNIFICANT CHANGE UP (ref 21–28)
HCT VFR BLD CALC: 25.5 % — LOW (ref 39–50)
HCT VFR BLD CALC: 25.8 % — LOW (ref 39–50)
HCT VFR BLD CALC: 27.4 % — LOW (ref 39–50)
HCT VFR BLD CALC: 29 % — LOW (ref 39–50)
HCT VFR BLD CALC: 30.4 % — LOW (ref 39–50)
HCT VFR BLD CALC: 30.7 % — LOW (ref 39–50)
HGB BLD-MCNC: 10.2 G/DL — LOW (ref 13–17)
HGB BLD-MCNC: 10.9 G/DL — LOW (ref 13–17)
HGB BLD-MCNC: 10.9 G/DL — LOW (ref 13–17)
HGB BLD-MCNC: 8.8 G/DL — LOW (ref 13–17)
HGB BLD-MCNC: 9.2 G/DL — LOW (ref 13–17)
HGB BLD-MCNC: 9.7 G/DL — LOW (ref 13–17)
INR BLD: 1.23 — HIGH (ref 0.88–1.16)
INR BLD: 1.23 — HIGH (ref 0.88–1.16)
INR BLD: 1.26 — HIGH (ref 0.88–1.16)
INR BLD: 1.26 — HIGH (ref 0.88–1.16)
INR BLD: 1.27 — HIGH (ref 0.88–1.16)
INR BLD: 1.29 — HIGH (ref 0.88–1.16)
LACTATE SERPL-SCNC: 4.3 MMOL/L — CRITICAL HIGH (ref 0.5–2)
LACTATE SERPL-SCNC: 5 MMOL/L — CRITICAL HIGH (ref 0.5–2)
LACTATE SERPL-SCNC: 5.1 MMOL/L — CRITICAL HIGH (ref 0.5–2)
LACTATE SERPL-SCNC: 5.2 MMOL/L — CRITICAL HIGH (ref 0.5–2)
LACTATE SERPL-SCNC: 5.3 MMOL/L — CRITICAL HIGH (ref 0.5–2)
LACTATE SERPL-SCNC: 6.4 MMOL/L — CRITICAL HIGH (ref 0.5–2)
LYMPHOCYTES # BLD AUTO: 3 % — LOW (ref 13–44)
MACROCYTES BLD QL: SLIGHT — SIGNIFICANT CHANGE UP
MAGNESIUM SERPL-MCNC: 1.8 MG/DL — SIGNIFICANT CHANGE UP (ref 1.6–2.6)
MAGNESIUM SERPL-MCNC: 1.9 MG/DL — SIGNIFICANT CHANGE UP (ref 1.6–2.6)
MAGNESIUM SERPL-MCNC: 2 MG/DL — SIGNIFICANT CHANGE UP (ref 1.6–2.6)
MAGNESIUM SERPL-MCNC: 2.7 MG/DL — HIGH (ref 1.6–2.6)
MANUAL DIF COMMENT BLD-IMP: SIGNIFICANT CHANGE UP
MANUAL SMEAR VERIFICATION: SIGNIFICANT CHANGE UP
MCHC RBC-ENTMCNC: 28.9 PG — SIGNIFICANT CHANGE UP (ref 27–34)
MCHC RBC-ENTMCNC: 29.1 PG — SIGNIFICANT CHANGE UP (ref 27–34)
MCHC RBC-ENTMCNC: 29.1 PG — SIGNIFICANT CHANGE UP (ref 27–34)
MCHC RBC-ENTMCNC: 29.2 PG — SIGNIFICANT CHANGE UP (ref 27–34)
MCHC RBC-ENTMCNC: 29.4 PG — SIGNIFICANT CHANGE UP (ref 27–34)
MCHC RBC-ENTMCNC: 29.5 PG — SIGNIFICANT CHANGE UP (ref 27–34)
MCHC RBC-ENTMCNC: 34.5 G/DL — SIGNIFICANT CHANGE UP (ref 32–36)
MCHC RBC-ENTMCNC: 35.2 G/DL — SIGNIFICANT CHANGE UP (ref 32–36)
MCHC RBC-ENTMCNC: 35.4 G/DL — SIGNIFICANT CHANGE UP (ref 32–36)
MCHC RBC-ENTMCNC: 35.5 G/DL — SIGNIFICANT CHANGE UP (ref 32–36)
MCHC RBC-ENTMCNC: 35.7 G/DL — SIGNIFICANT CHANGE UP (ref 32–36)
MCHC RBC-ENTMCNC: 35.9 G/DL — SIGNIFICANT CHANGE UP (ref 32–36)
MCV RBC AUTO: 81.4 FL — SIGNIFICANT CHANGE UP (ref 80–100)
MCV RBC AUTO: 81.5 FL — SIGNIFICANT CHANGE UP (ref 80–100)
MCV RBC AUTO: 82.6 FL — SIGNIFICANT CHANGE UP (ref 80–100)
MCV RBC AUTO: 82.7 FL — SIGNIFICANT CHANGE UP (ref 80–100)
MCV RBC AUTO: 83 FL — SIGNIFICANT CHANGE UP (ref 80–100)
MCV RBC AUTO: 84.4 FL — SIGNIFICANT CHANGE UP (ref 80–100)
METAMYELOCYTES # FLD: 1 % — HIGH
MICROCYTES BLD QL: SLIGHT — SIGNIFICANT CHANGE UP
MYELOCYTES NFR BLD: 1 % — HIGH
NEUTROPHILS NFR BLD AUTO: 75 % — SIGNIFICANT CHANGE UP (ref 43–77)
NEUTS BAND # BLD: 20 % — HIGH
NRBC # BLD: 20 /100 WBCS — HIGH
OVALOCYTES BLD QL SMEAR: SLIGHT — SIGNIFICANT CHANGE UP
PCO2 BLDA: 32 MMHG — LOW (ref 35–48)
PCO2 BLDA: 44 MMHG — SIGNIFICANT CHANGE UP (ref 35–48)
PH BLDA: 7.3 — LOW (ref 7.35–7.45)
PH BLDA: 7.39 — SIGNIFICANT CHANGE UP (ref 7.35–7.45)
PHOSPHATE SERPL-MCNC: 2.2 MG/DL — LOW (ref 2.5–4.5)
PLAT MORPH BLD: ABNORMAL
PLATELET # BLD AUTO: 135 K/UL — LOW (ref 150–400)
PLATELET # BLD AUTO: 36 K/UL — LOW (ref 150–400)
PLATELET # BLD AUTO: 43 K/UL — LOW (ref 150–400)
PLATELET # BLD AUTO: 61 K/UL — LOW (ref 150–400)
PLATELET # BLD AUTO: 67 K/UL — LOW (ref 150–400)
PLATELET # BLD AUTO: 93 K/UL — LOW (ref 150–400)
PO2 BLDA: 137 MMHG — HIGH (ref 83–108)
PO2 BLDA: 80 MMHG — LOW (ref 83–108)
POIKILOCYTOSIS BLD QL AUTO: SIGNIFICANT CHANGE UP
POLYCHROMASIA BLD QL SMEAR: SLIGHT — SIGNIFICANT CHANGE UP
POTASSIUM SERPL-MCNC: 3.1 MMOL/L — LOW (ref 3.5–5.3)
POTASSIUM SERPL-MCNC: 3.3 MMOL/L — LOW (ref 3.5–5.3)
POTASSIUM SERPL-MCNC: 3.6 MMOL/L — SIGNIFICANT CHANGE UP (ref 3.5–5.3)
POTASSIUM SERPL-MCNC: 3.8 MMOL/L — SIGNIFICANT CHANGE UP (ref 3.5–5.3)
POTASSIUM SERPL-MCNC: 3.9 MMOL/L — SIGNIFICANT CHANGE UP (ref 3.5–5.3)
POTASSIUM SERPL-MCNC: 3.9 MMOL/L — SIGNIFICANT CHANGE UP (ref 3.5–5.3)
POTASSIUM SERPL-SCNC: 3.1 MMOL/L — LOW (ref 3.5–5.3)
POTASSIUM SERPL-SCNC: 3.3 MMOL/L — LOW (ref 3.5–5.3)
POTASSIUM SERPL-SCNC: 3.6 MMOL/L — SIGNIFICANT CHANGE UP (ref 3.5–5.3)
POTASSIUM SERPL-SCNC: 3.8 MMOL/L — SIGNIFICANT CHANGE UP (ref 3.5–5.3)
POTASSIUM SERPL-SCNC: 3.9 MMOL/L — SIGNIFICANT CHANGE UP (ref 3.5–5.3)
POTASSIUM SERPL-SCNC: 3.9 MMOL/L — SIGNIFICANT CHANGE UP (ref 3.5–5.3)
PROT SERPL-MCNC: 4.7 G/DL — LOW (ref 6–8.3)
PROT SERPL-MCNC: 4.8 G/DL — LOW (ref 6–8.3)
PROT SERPL-MCNC: 4.8 G/DL — LOW (ref 6–8.3)
PROT SERPL-MCNC: 5.1 G/DL — LOW (ref 6–8.3)
PROTHROM AB SERPL-ACNC: 13.7 SEC — HIGH (ref 9.8–12.7)
PROTHROM AB SERPL-ACNC: 13.7 SEC — HIGH (ref 9.8–12.7)
PROTHROM AB SERPL-ACNC: 14 SEC — HIGH (ref 9.8–12.7)
PROTHROM AB SERPL-ACNC: 14 SEC — HIGH (ref 9.8–12.7)
PROTHROM AB SERPL-ACNC: 14.2 SEC — HIGH (ref 9.8–12.7)
PROTHROM AB SERPL-ACNC: 14.4 SEC — HIGH (ref 9.8–12.7)
RBC # BLD: 3.02 M/UL — LOW (ref 4.2–5.8)
RBC # BLD: 3.12 M/UL — LOW (ref 4.2–5.8)
RBC # BLD: 3.3 M/UL — LOW (ref 4.2–5.8)
RBC # BLD: 3.51 M/UL — LOW (ref 4.2–5.8)
RBC # BLD: 3.73 M/UL — LOW (ref 4.2–5.8)
RBC # BLD: 3.77 M/UL — LOW (ref 4.2–5.8)
RBC # FLD: 18.2 % — HIGH (ref 10.3–16.9)
RBC # FLD: 18.3 % — HIGH (ref 10.3–16.9)
RBC # FLD: 18.4 % — HIGH (ref 10.3–16.9)
RBC # FLD: 18.4 % — HIGH (ref 10.3–16.9)
RBC # FLD: 18.5 % — HIGH (ref 10.3–16.9)
RBC # FLD: 18.7 % — HIGH (ref 10.3–16.9)
RBC BLD AUTO: ABNORMAL
RH IG SCN BLD-IMP: POSITIVE — SIGNIFICANT CHANGE UP
SAO2 % BLDA: 94 % — LOW (ref 95–100)
SAO2 % BLDA: 99 % — SIGNIFICANT CHANGE UP (ref 95–100)
SCHISTOCYTES BLD QL AUTO: SIGNIFICANT CHANGE UP
SODIUM SERPL-SCNC: 135 MMOL/L — SIGNIFICANT CHANGE UP (ref 135–145)
SODIUM SERPL-SCNC: 136 MMOL/L — SIGNIFICANT CHANGE UP (ref 135–145)
SODIUM SERPL-SCNC: 137 MMOL/L — SIGNIFICANT CHANGE UP (ref 135–145)
SODIUM SERPL-SCNC: 138 MMOL/L — SIGNIFICANT CHANGE UP (ref 135–145)
SODIUM SERPL-SCNC: 138 MMOL/L — SIGNIFICANT CHANGE UP (ref 135–145)
SODIUM SERPL-SCNC: 139 MMOL/L — SIGNIFICANT CHANGE UP (ref 135–145)
WBC # BLD: 13.6 K/UL — HIGH (ref 3.8–10.5)
WBC # BLD: 14 K/UL — HIGH (ref 3.8–10.5)
WBC # BLD: 14.6 K/UL — HIGH (ref 3.8–10.5)
WBC # BLD: 15.7 K/UL — HIGH (ref 3.8–10.5)
WBC # BLD: 16.4 K/UL — HIGH (ref 3.8–10.5)
WBC # BLD: 16.7 K/UL — HIGH (ref 3.8–10.5)
WBC # FLD AUTO: 13.6 K/UL — HIGH (ref 3.8–10.5)
WBC # FLD AUTO: 14 K/UL — HIGH (ref 3.8–10.5)
WBC # FLD AUTO: 14.6 K/UL — HIGH (ref 3.8–10.5)
WBC # FLD AUTO: 15.7 K/UL — HIGH (ref 3.8–10.5)
WBC # FLD AUTO: 16.4 K/UL — HIGH (ref 3.8–10.5)
WBC # FLD AUTO: 16.7 K/UL — HIGH (ref 3.8–10.5)

## 2018-07-05 PROCEDURE — 99292 CRITICAL CARE ADDL 30 MIN: CPT | Mod: 25

## 2018-07-05 PROCEDURE — 97606 NEG PRS WND THER DME>50 SQCM: CPT

## 2018-07-05 PROCEDURE — 31622 DX BRONCHOSCOPE/WASH: CPT

## 2018-07-05 PROCEDURE — 90945 DIALYSIS ONE EVALUATION: CPT

## 2018-07-05 PROCEDURE — 71045 X-RAY EXAM CHEST 1 VIEW: CPT | Mod: 26

## 2018-07-05 PROCEDURE — 93306 TTE W/DOPPLER COMPLETE: CPT | Mod: 26

## 2018-07-05 PROCEDURE — 36620 INSERTION CATHETER ARTERY: CPT

## 2018-07-05 PROCEDURE — 99291 CRITICAL CARE FIRST HOUR: CPT | Mod: 25

## 2018-07-05 PROCEDURE — 74018 RADEX ABDOMEN 1 VIEW: CPT | Mod: 26

## 2018-07-05 RX ORDER — SODIUM BICARBONATE 1 MEQ/ML
0.25 SYRINGE (ML) INTRAVENOUS
Qty: 150 | Refills: 0 | Status: DISCONTINUED | OUTPATIENT
Start: 2018-07-05 | End: 2018-07-10

## 2018-07-05 RX ORDER — SODIUM BICARBONATE 1 MEQ/ML
50 SYRINGE (ML) INTRAVENOUS
Qty: 0 | Refills: 0 | Status: COMPLETED | OUTPATIENT
Start: 2018-07-05 | End: 2018-07-05

## 2018-07-05 RX ORDER — MAGNESIUM SULFATE 500 MG/ML
2 VIAL (ML) INJECTION ONCE
Qty: 0 | Refills: 0 | Status: COMPLETED | OUTPATIENT
Start: 2018-07-05 | End: 2018-07-05

## 2018-07-05 RX ORDER — CALCIUM CHLORIDE
1000 POWDER (GRAM) MISCELLANEOUS ONCE
Qty: 0 | Refills: 0 | Status: COMPLETED | OUTPATIENT
Start: 2018-07-05 | End: 2018-07-05

## 2018-07-05 RX ORDER — INSULIN LISPRO 100/ML
VIAL (ML) SUBCUTANEOUS EVERY 6 HOURS
Qty: 0 | Refills: 0 | Status: DISCONTINUED | OUTPATIENT
Start: 2018-07-05 | End: 2018-07-20

## 2018-07-05 RX ORDER — CISATRACURIUM BESYLATE 2 MG/ML
10 INJECTION INTRAVENOUS ONCE
Qty: 0 | Refills: 0 | Status: COMPLETED | OUTPATIENT
Start: 2018-07-05 | End: 2018-07-05

## 2018-07-05 RX ORDER — ALBUMIN HUMAN 25 %
250 VIAL (ML) INTRAVENOUS ONCE
Qty: 0 | Refills: 0 | Status: COMPLETED | OUTPATIENT
Start: 2018-07-05 | End: 2018-07-05

## 2018-07-05 RX ORDER — GENTAMICIN SULFATE 40 MG/ML
120 VIAL (ML) INJECTION ONCE
Qty: 0 | Refills: 0 | Status: COMPLETED | OUTPATIENT
Start: 2018-07-05 | End: 2018-07-05

## 2018-07-05 RX ORDER — POTASSIUM CHLORIDE 20 MEQ
20 PACKET (EA) ORAL ONCE
Qty: 0 | Refills: 0 | Status: COMPLETED | OUTPATIENT
Start: 2018-07-05 | End: 2018-07-05

## 2018-07-05 RX ORDER — DESMOPRESSIN ACETATE 0.1 MG/1
20 TABLET ORAL ONCE
Qty: 0 | Refills: 0 | Status: COMPLETED | OUTPATIENT
Start: 2018-07-05 | End: 2018-07-05

## 2018-07-05 RX ORDER — SODIUM CHLORIDE 9 MG/ML
1000 INJECTION, SOLUTION INTRAVENOUS
Qty: 0 | Refills: 0 | Status: DISCONTINUED | OUTPATIENT
Start: 2018-07-05 | End: 2018-07-20

## 2018-07-05 RX ORDER — AMIODARONE HYDROCHLORIDE 400 MG/1
150 TABLET ORAL ONCE
Qty: 0 | Refills: 0 | Status: COMPLETED | OUTPATIENT
Start: 2018-07-05 | End: 2018-07-05

## 2018-07-05 RX ORDER — DEXTROSE 50 % IN WATER 50 %
15 SYRINGE (ML) INTRAVENOUS ONCE
Qty: 0 | Refills: 0 | Status: DISCONTINUED | OUTPATIENT
Start: 2018-07-05 | End: 2018-07-20

## 2018-07-05 RX ORDER — GLUCAGON INJECTION, SOLUTION 0.5 MG/.1ML
1 INJECTION, SOLUTION SUBCUTANEOUS ONCE
Qty: 0 | Refills: 0 | Status: DISCONTINUED | OUTPATIENT
Start: 2018-07-05 | End: 2018-07-20

## 2018-07-05 RX ORDER — SODIUM BICARBONATE 1 MEQ/ML
50 SYRINGE (ML) INTRAVENOUS ONCE
Qty: 0 | Refills: 0 | Status: COMPLETED | OUTPATIENT
Start: 2018-07-05 | End: 2018-07-05

## 2018-07-05 RX ORDER — VASOPRESSIN 20 [USP'U]/ML
0.02 INJECTION INTRAVENOUS
Qty: 100 | Refills: 0 | Status: DISCONTINUED | OUTPATIENT
Start: 2018-07-05 | End: 2018-07-20

## 2018-07-05 RX ORDER — LACTULOSE 10 G/15ML
20 SOLUTION ORAL ONCE
Qty: 0 | Refills: 0 | Status: COMPLETED | OUTPATIENT
Start: 2018-07-05 | End: 2018-07-05

## 2018-07-05 RX ORDER — MIDAZOLAM HYDROCHLORIDE 1 MG/ML
5 INJECTION, SOLUTION INTRAMUSCULAR; INTRAVENOUS ONCE
Qty: 0 | Refills: 0 | Status: DISCONTINUED | OUTPATIENT
Start: 2018-07-05 | End: 2018-07-05

## 2018-07-05 RX ADMIN — Medication 50 MILLIEQUIVALENT(S): at 22:39

## 2018-07-05 RX ADMIN — FLUCONAZOLE 100 MILLIGRAM(S): 150 TABLET ORAL at 11:14

## 2018-07-05 RX ADMIN — MILRINONE LACTATE 4.39 MICROGRAM(S)/KG/MIN: 1 INJECTION, SOLUTION INTRAVENOUS at 16:11

## 2018-07-05 RX ADMIN — Medication 50 MILLIEQUIVALENT(S): at 06:50

## 2018-07-05 RX ADMIN — CHLORHEXIDINE GLUCONATE 15 MILLILITER(S): 213 SOLUTION TOPICAL at 05:30

## 2018-07-05 RX ADMIN — Medication 50 MILLIGRAM(S): at 02:17

## 2018-07-05 RX ADMIN — Medication 50 MILLIGRAM(S): at 10:02

## 2018-07-05 RX ADMIN — Medication 1 DROP(S): at 05:28

## 2018-07-05 RX ADMIN — Medication 100 MILLIEQUIVALENT(S): at 11:05

## 2018-07-05 RX ADMIN — Medication 50 MILLIGRAM(S): at 05:28

## 2018-07-05 RX ADMIN — PROPOFOL 1.76 MICROGRAM(S)/KG/MIN: 10 INJECTION, EMULSION INTRAVENOUS at 02:46

## 2018-07-05 RX ADMIN — Medication 50 MILLIGRAM(S): at 14:46

## 2018-07-05 RX ADMIN — Medication 100 MILLIGRAM(S): at 05:30

## 2018-07-05 RX ADMIN — Medication 100 MILLIEQUIVALENT(S): at 03:45

## 2018-07-05 RX ADMIN — LACTULOSE 20 GRAM(S): 10 SOLUTION ORAL at 17:00

## 2018-07-05 RX ADMIN — Medication 100 MILLIGRAM(S): at 21:48

## 2018-07-05 RX ADMIN — CISATRACURIUM BESYLATE 10.55 MICROGRAM(S)/KG/MIN: 2 INJECTION INTRAVENOUS at 06:01

## 2018-07-05 RX ADMIN — Medication 100 MILLIGRAM(S): at 15:01

## 2018-07-05 RX ADMIN — Medication 125 MILLILITER(S): at 14:46

## 2018-07-05 RX ADMIN — CISATRACURIUM BESYLATE 10 MILLIGRAM(S): 2 INJECTION INTRAVENOUS at 09:30

## 2018-07-05 RX ADMIN — Medication 250 MILLIGRAM(S): at 12:15

## 2018-07-05 RX ADMIN — INSULIN HUMAN 5 UNIT(S)/HR: 100 INJECTION, SOLUTION SUBCUTANEOUS at 02:45

## 2018-07-05 RX ADMIN — Medication 50 GRAM(S): at 19:21

## 2018-07-05 RX ADMIN — AMIODARONE HYDROCHLORIDE 600 MILLIGRAM(S): 400 TABLET ORAL at 16:11

## 2018-07-05 RX ADMIN — Medication 50 MILLIEQUIVALENT(S): at 06:55

## 2018-07-05 RX ADMIN — Medication 1000 MILLIGRAM(S): at 21:48

## 2018-07-05 RX ADMIN — Medication 125 MILLILITER(S): at 10:03

## 2018-07-05 RX ADMIN — FENTANYL CITRATE 2.93 MICROGRAM(S)/KG/HR: 50 INJECTION INTRAVENOUS at 07:48

## 2018-07-05 RX ADMIN — HEPARIN SODIUM 3 UNIT(S)/HR: 5000 INJECTION INTRAVENOUS; SUBCUTANEOUS at 16:12

## 2018-07-05 RX ADMIN — PANTOPRAZOLE SODIUM 40 MILLIGRAM(S): 20 TABLET, DELAYED RELEASE ORAL at 11:15

## 2018-07-05 RX ADMIN — Medication 100 MILLIEQUIVALENT(S): at 02:30

## 2018-07-05 RX ADMIN — CHLORHEXIDINE GLUCONATE 15 MILLILITER(S): 213 SOLUTION TOPICAL at 17:00

## 2018-07-05 RX ADMIN — CISATRACURIUM BESYLATE 10 MILLIGRAM(S): 2 INJECTION INTRAVENOUS at 04:30

## 2018-07-05 RX ADMIN — Medication 50 MILLIGRAM(S): at 17:01

## 2018-07-05 RX ADMIN — Medication 100 MILLIEQUIVALENT(S): at 16:11

## 2018-07-05 RX ADMIN — Medication 1 DROP(S): at 17:00

## 2018-07-05 RX ADMIN — Medication 2: at 18:01

## 2018-07-05 RX ADMIN — Medication 200 MILLIGRAM(S): at 21:53

## 2018-07-05 RX ADMIN — MIDAZOLAM HYDROCHLORIDE 5 MILLIGRAM(S): 1 INJECTION, SOLUTION INTRAMUSCULAR; INTRAVENOUS at 09:30

## 2018-07-05 RX ADMIN — Medication 50 MILLIGRAM(S): at 21:48

## 2018-07-05 RX ADMIN — Medication 125 MILLILITER(S): at 05:45

## 2018-07-05 NOTE — PROGRESS NOTE ADULT - ASSESSMENT
The patient is 67 year old male with PMH stated above, now s/p  Repair of AA, AVR, MV repair, TV repair, CABG x2, now in shock requiring pressor support - mutiple pressors. The patient with deterioration of the renal function in the setting of the shock - oliguric, no response from the diuretics. The CT ICU team started the patient on aquaphoresis and the CVVHD ordered, never been started from the primary team the BP unstable. The cvvhd started yesterday - 7/2 good tolerance so far - last 24 hours with good tolerance -5.1 liters removed with CVVHD, negative -1.5 liters. In the morning the UF stopped from the primary team and the blood flow decreased to 100 ml/min because the BP dropped

## 2018-07-05 NOTE — PROGRESS NOTE ADULT - SUBJECTIVE AND OBJECTIVE BOX
CTICU  CRITICAL  CARE  attending     Hand off received 					   Pertinent clinical, laboratory, radiographic, hemodynamic, echocardiographic, respiratory data, microbiologic data and chart were reviewed and analyzed frequently throughout the course of the day and night  Patient seen and examined with CTS/ SH attending at bedside  Pt is a 67y , Male, HEALTH ISSUES - PROBLEM Dx:  Shock: Shock  Anemia: Anemia  Hyponatremia: Hyponatremia  Acute kidney failure: Acute kidney failure  CAD (coronary artery disease): CAD (coronary artery disease)  Valvular disease: Valvular disease      , FAMILY HISTORY:  PAST MEDICAL & SURGICAL HISTORY:  No pertinent past medical history  No significant past surgical history    Patient is a 67y old  Male who presents with a chief complaint of AS (19 Jun 2018 00:06)      14 system review was unremarkable  acute changes include acute respiratory failure  Vital signs, hemodynamic and respiratory parameters were reviewed from the bedside nursing flowsheet.  ICU Vital Signs Last 24 Hrs  T(C): 36.1 (05 Jul 2018 21:01), Max: 36.1 (05 Jul 2018 21:01)  T(F): 96.9 (05 Jul 2018 21:01), Max: 96.9 (05 Jul 2018 21:01)  HR: 80 (05 Jul 2018 23:00) (79 - 82)  BP: --  BP(mean): --  ABP: 100/66 (05 Jul 2018 23:00) (86/64 - 146/88)  ABP(mean): 74 (05 Jul 2018 23:00) (69 - 104)  RR: 27 (05 Jul 2018 23:00) (22 - 28)  SpO2: 72% (05 Jul 2018 23:00) (72% - 97%)    Adult Advanced Hemodynamics Last 24 Hrs  CVP(mm Hg): 26 (05 Jul 2018 23:00) (21 - 30)  CVP(cm H2O): --  CO: --  CI: --  PA: --  PA(mean): --  PCWP: --  SVR: --  SVRI: --  PVR: --  PVRI: --, ABG - ( 05 Jul 2018 22:20 )  pH, Arterial: 7.27  pH, Blood: x     /  pCO2: 43    /  pO2: 83    / HCO3: 20    / Base Excess: -6.9  /  SaO2: 96                Mode: AC/ CMV (Assist Control/ Continuous Mandatory Ventilation)  RR (machine): 24  TV (machine): 400  FiO2: 60  PEEP: 8  ITime: 1  MAP: 20  PIP: 37    Intake and output was reviewed and the fluid balance was calculated  Daily     Daily   I&O's Summary    04 Jul 2018 07:01  -  05 Jul 2018 07:00  --------------------------------------------------------  IN: 4163.7 mL / OUT: 5751 mL / NET: -1587.3 mL    05 Jul 2018 07:01  -  05 Jul 2018 23:56  --------------------------------------------------------  IN: 3434.7 mL / OUT: 1795 mL / NET: 1639.7 mL        All lines and drain sites were assessed  [facility]      POCT Blood Glucose.: 160 mg/dL (05 Jul 2018 17:41)    No acute change in mental status  Auscultation of the chest reveals equal bs  Abdomen is soft  Extremities are warm and well perfused  Wounds appear clean and unremarkable  Antibiotics are periop    labs  CBC Full  -  ( 05 Jul 2018 22:19 )  WBC Count : 15.7 K/uL  Hemoglobin : 8.8 g/dL  Hematocrit : 25.5 %  Platelet Count - Automated : 93 K/uL  Mean Cell Volume : 84.4 fL  Mean Cell Hemoglobin : 29.1 pg  Mean Cell Hemoglobin Concentration : 34.5 g/dL  Auto Neutrophil # : x  Auto Lymphocyte # : x  Auto Monocyte # : x  Auto Eosinophil # : x  Auto Basophil # : x  Auto Neutrophil % : x  Auto Lymphocyte % : x  Auto Monocyte % : x  Auto Eosinophil % : x  Auto Basophil % : x    07-05    138  |  99  |  29<H>  ----------------------------<  143<H>  3.9   |  19<L>  |  0.71    Ca    10.2      05 Jul 2018 22:19  Phos  2.2     07-05  Mg     2.7     07-05    TPro  5.1<L>  /  Alb  3.8  /  TBili  31.4<H>  /  DBili  x   /  AST  80<H>  /  ALT  37  /  AlkPhos  110  07-05    PT/INR - ( 05 Jul 2018 22:19 )   PT: 13.7 sec;   INR: 1.23          PTT - ( 05 Jul 2018 22:19 )  PTT:42.0 sec  The current medications were reviewed   MEDICATIONS  (STANDING):  artificial  tears Solution 1 Drop(s) Both EYES two times a day  aztreonam  IVPB 1000 milliGRAM(s) IV Intermittent every 8 hours  chlorhexidine 0.12% Liquid 15 milliLiter(s) Swish and Spit every 12 hours  cisatracurium Infusion 3 MICROgram(s)/kG/Min (10.548 mL/Hr) IV Continuous <Continuous>  desmopressin IVPB 20 MICROGram(s) IV Intermittent once  dextrose 5%. 1000 milliLiter(s) (50 mL/Hr) IV Continuous <Continuous>  dextrose 50% Injectable 50 milliLiter(s) IV Push every 15 minutes  dextrose 50% Injectable 25 milliLiter(s) IV Push every 15 minutes  epinephrine 8 milliGRAM(s),dextrose 5% in water 250 milliLiter(s) 8 milliGRAM(s) (10.42 mL/Hr) IV Continuous <Continuous>  fentaNYL   Infusion. 0.5 MICROgram(s)/kG/Hr (2.93 mL/Hr) IV Continuous <Continuous>  fluconAZOLE IVPB 400 milliGRAM(s) IV Intermittent every 24 hours  heparin  Infusion 200 Unit(s)/Hr (3 mL/Hr) IV Continuous <Continuous>  Heparin 66749 Unit(s),Dextrose 5% 1000 milliLiter(s) 11563 Unit(s) (25 mL/Hr) IV Continuous <Continuous>  hydrocortisone sodium succinate Injectable 50 milliGRAM(s) IV Push every 8 hours  insulin lispro (HumaLOG) corrective regimen sliding scale   SubCutaneous every 6 hours  metroNIDAZOLE  IVPB 500 milliGRAM(s) IV Intermittent every 8 hours  milrinone Infusion 0.25 MICROgram(s)/kG/Min (4.395 mL/Hr) IV Continuous <Continuous>  pantoprazole  Injectable 40 milliGRAM(s) IV Push daily  phenylephrine    Infusion 2 MICROgram(s)/kG/Min (21.975 mL/Hr) IV Continuous <Continuous>  propofol Infusion 5 MICROgram(s)/kG/Min (1.758 mL/Hr) IV Continuous <Continuous>  PureFlow Dialysate RFP-400 (K 2 / Ca 3) 5000 milliLiter(s) (1500 mL/Hr) CRRT <Continuous>  sodium bicarbonate  Infusion 0.254 mEq/kG/Hr (100 mL/Hr) IV Continuous <Continuous>  sodium chloride 0.9%. 1000 milliLiter(s) (10 mL/Hr) IV Continuous <Continuous>  vancomycin  IVPB 750 milliGRAM(s) IV Intermittent every 24 hours  vasopressin Infusion 0.017 Unit(s)/Min (1 mL/Hr) IV Continuous <Continuous>    MEDICATIONS  (PRN):  dextrose 40% Gel 15 Gram(s) Oral once PRN Blood Glucose LESS THAN 70 milliGRAM(s)/deciliter  glucagon  Injectable 1 milliGRAM(s) IntraMuscular once PRN Glucose LESS THAN 70 milligrams/deciliter       PROBLEM LIST/ ASSESSMENT:  HEALTH ISSUES - PROBLEM Dx:  Shock: Shock  Anemia: Anemia  Hyponatremia: Hyponatremia  Acute kidney failure: Acute kidney failure  CAD (coronary artery disease): CAD (coronary artery disease)  Valvular disease: Valvular disease      ,   Patient is a 67y old  Male who presents with a chief complaint of AS (19 Jun 2018 00:06)     s/p   acute changes include acute respiratory failure    My plan includes :  close hemodynamic, ventilatory and drain monitoring and management per post op routine    Monitor for arrhythmias and monitor parameters for organ perfusion  monitor neurologic status  Head of the bed should remain elevated to 45 deg .   chest PT and IS will be encouraged  monitor adequacy of oxygenation and ventilation and attempt to wean oxygen  Nutritional goals will be met using po eventually , ensure adequate caloric intake and montior the same  Stress ulcer and VTE prophylaxis will be achieved    Glycemic control is satisfactory  Electrolytes have been repleted as necessary and wound care has been carried out. Pain control has been achieved.   agressive physical therapy and early mobility and ambulation goals will be met   The family was updated about the course and plan  CRITICAL CARE TIME SPENT in evaluation and management, reassessments, review and interpretation of labs and x-rays, ventilator and hemodynamic management, formulating a plan and coordinating care: __30___ MIN.  Time does not include procedural time.  CTICU ATTENDING     					    Rai Singer MD CTICU  CRITICAL  CARE  attending     Hand off received @ 7p					   Pertinent clinical, laboratory, radiographic, hemodynamic, echocardiographic, respiratory data, microbiologic data and chart were reviewed and analyzed frequently throughout the course of the day and night  Patient seen and examined with CTS/ SH attending at bedside    Pt is a 67y , Male, pod # 14 s/p AVR; MV repair; TV repair;    post op:    cardiogenic shock  LV assist with Impella  acute renal failure on CRRT  Vent dependant    Currently    acute post hemorrhagic anemia  hyper biliruninemia  coagulopathy  hemodynamic instability  lactic acidosis    Shock: Shock  Anemia: Anemia  Hyponatremia: Hyponatremia  Acute kidney failure: Acute kidney failure  CAD (coronary artery disease): CAD (coronary artery disease)  Valvular disease: Valvular disease      , FAMILY HISTORY:  PAST MEDICAL & SURGICAL HISTORY:  No pertinent past medical history  No significant past surgical history    Patient is a 67y old  Male who presents with a chief complaint of AS (19 Jun 2018 00:06)      14 system review was unremarkable  acute changes include acute respiratory failure  Vital signs, hemodynamic and respiratory parameters were reviewed from the bedside nursing flowsheet.  ICU Vital Signs Last 24 Hrs  T(C): 36.1 (05 Jul 2018 21:01), Max: 36.1 (05 Jul 2018 21:01)  T(F): 96.9 (05 Jul 2018 21:01), Max: 96.9 (05 Jul 2018 21:01)  HR: 80 (05 Jul 2018 23:00) (79 - 82)  BP: --  BP(mean): --  ABP: 100/66 (05 Jul 2018 23:00) (86/64 - 146/88)  ABP(mean): 74 (05 Jul 2018 23:00) (69 - 104)  RR: 27 (05 Jul 2018 23:00) (22 - 28)  SpO2: 72% (05 Jul 2018 23:00) (72% - 97%)    Adult Advanced Hemodynamics Last 24 Hrs  CVP(mm Hg): 26 (05 Jul 2018 23:00) (21 - 30)  CVP(cm H2O): --  CO: --  CI: --  PA: --  PA(mean): --  PCWP: --  SVR: --  SVRI: --  PVR: --  PVRI: --, ABG - ( 05 Jul 2018 22:20 )  pH, Arterial: 7.27  pH, Blood: x     /  pCO2: 43    /  pO2: 83    / HCO3: 20    / Base Excess: -6.9  /  SaO2: 96                Mode: AC/ CMV (Assist Control/ Continuous Mandatory Ventilation)  RR (machine): 24  TV (machine): 400  FiO2: 60  PEEP: 8  ITime: 1  MAP: 20  PIP: 37    Intake and output was reviewed and the fluid balance was calculated  Daily     Daily   I&O's Summary    04 Jul 2018 07:01  -  05 Jul 2018 07:00  --------------------------------------------------------  IN: 4163.7 mL / OUT: 5751 mL / NET: -1587.3 mL    05 Jul 2018 07:01  -  05 Jul 2018 23:56  --------------------------------------------------------  IN: 3434.7 mL / OUT: 1795 mL / NET: 1639.7 mL        All lines and drain sites were assessed  [facility]      POCT Blood Glucose.: 160 mg/dL (05 Jul 2018 17:41)    No acute change in mental status  Auscultation of the chest reveals equal bs  Abdomen is soft  Extremities are warm and well perfused  Wounds appear clean and unremarkable  Antibiotics are periop    labs  CBC Full  -  ( 05 Jul 2018 22:19 )  WBC Count : 15.7 K/uL  Hemoglobin : 8.8 g/dL  Hematocrit : 25.5 %  Platelet Count - Automated : 93 K/uL  Mean Cell Volume : 84.4 fL  Mean Cell Hemoglobin : 29.1 pg  Mean Cell Hemoglobin Concentration : 34.5 g/dL  Auto Neutrophil # : x  Auto Lymphocyte # : x  Auto Monocyte # : x  Auto Eosinophil # : x  Auto Basophil # : x  Auto Neutrophil % : x  Auto Lymphocyte % : x  Auto Monocyte % : x  Auto Eosinophil % : x  Auto Basophil % : x    07-05    138  |  99  |  29<H>  ----------------------------<  143<H>  3.9   |  19<L>  |  0.71    Ca    10.2      05 Jul 2018 22:19  Phos  2.2     07-05  Mg     2.7     07-05    TPro  5.1<L>  /  Alb  3.8  /  TBili  31.4<H>  /  DBili  x   /  AST  80<H>  /  ALT  37  /  AlkPhos  110  07-05    PT/INR - ( 05 Jul 2018 22:19 )   PT: 13.7 sec;   INR: 1.23          PTT - ( 05 Jul 2018 22:19 )  PTT:42.0 sec  The current medications were reviewed   MEDICATIONS  (STANDING):  artificial  tears Solution 1 Drop(s) Both EYES two times a day  aztreonam  IVPB 1000 milliGRAM(s) IV Intermittent every 8 hours  chlorhexidine 0.12% Liquid 15 milliLiter(s) Swish and Spit every 12 hours  cisatracurium Infusion 3 MICROgram(s)/kG/Min (10.548 mL/Hr) IV Continuous <Continuous>  desmopressin IVPB 20 MICROGram(s) IV Intermittent once  dextrose 5%. 1000 milliLiter(s) (50 mL/Hr) IV Continuous <Continuous>  dextrose 50% Injectable 50 milliLiter(s) IV Push every 15 minutes  dextrose 50% Injectable 25 milliLiter(s) IV Push every 15 minutes  epinephrine 8 milliGRAM(s),dextrose 5% in water 250 milliLiter(s) 8 milliGRAM(s) (10.42 mL/Hr) IV Continuous <Continuous>  fentaNYL   Infusion. 0.5 MICROgram(s)/kG/Hr (2.93 mL/Hr) IV Continuous <Continuous>  fluconAZOLE IVPB 400 milliGRAM(s) IV Intermittent every 24 hours  heparin  Infusion 200 Unit(s)/Hr (3 mL/Hr) IV Continuous <Continuous>  Heparin 07179 Unit(s),Dextrose 5% 1000 milliLiter(s) 72166 Unit(s) (25 mL/Hr) IV Continuous <Continuous>  hydrocortisone sodium succinate Injectable 50 milliGRAM(s) IV Push every 8 hours  insulin lispro (HumaLOG) corrective regimen sliding scale   SubCutaneous every 6 hours  metroNIDAZOLE  IVPB 500 milliGRAM(s) IV Intermittent every 8 hours  milrinone Infusion 0.25 MICROgram(s)/kG/Min (4.395 mL/Hr) IV Continuous <Continuous>  pantoprazole  Injectable 40 milliGRAM(s) IV Push daily  phenylephrine    Infusion 2 MICROgram(s)/kG/Min (21.975 mL/Hr) IV Continuous <Continuous>  propofol Infusion 5 MICROgram(s)/kG/Min (1.758 mL/Hr) IV Continuous <Continuous>  PureFlow Dialysate RFP-400 (K 2 / Ca 3) 5000 milliLiter(s) (1500 mL/Hr) CRRT <Continuous>  sodium bicarbonate  Infusion 0.254 mEq/kG/Hr (100 mL/Hr) IV Continuous <Continuous>  sodium chloride 0.9%. 1000 milliLiter(s) (10 mL/Hr) IV Continuous <Continuous>  vancomycin  IVPB 750 milliGRAM(s) IV Intermittent every 24 hours  vasopressin Infusion 0.017 Unit(s)/Min (1 mL/Hr) IV Continuous <Continuous>    MEDICATIONS  (PRN):  dextrose 40% Gel 15 Gram(s) Oral once PRN Blood Glucose LESS THAN 70 milliGRAM(s)/deciliter  glucagon  Injectable 1 milliGRAM(s) IntraMuscular once PRN Glucose LESS THAN 70 milligrams/deciliter       PROBLEM LIST/ ASSESSMENT:  HEALTH ISSUES - PROBLEM Dx:  cardiogenic shock  lactic acidosis  acute post hemorrhagic anemia  coagulopathy  Shock: Shock  Anemia: Anemia  Hyponatremia: Hyponatremia  Acute kidney failure: Acute kidney failure  CAD (coronary artery disease): CAD (coronary artery disease)  Valvular disease: Valvular disease      ,   Patient is a 67y old  Male who presents with a chief complaint of AS (19 Jun 2018 00:06)     s/p AVR; MV/TV repair  acute changes include acute respiratory failure    My plan includes :  close hemodynamic, ventilatory and drain monitoring and management per post op routine  continue CRRT  Ddavp for coagulopathy in renal failure  transfuse 1 unit PRBC for anemia+ hemodynamic instability  Monitor for arrhythmias and monitor parameters for organ perfusion  monitor neurologic status  Head of the bed should remain elevated to 45 deg .   chest PT and IS will be encouraged  monitor adequacy of oxygenation and ventilation and attempt to wean oxygen  Nutritional goals will be met using po eventually , ensure adequate caloric intake and montior the same  Stress ulcer and VTE prophylaxis will be achieved    Glycemic control is satisfactory  Electrolytes have been repleted as necessary and wound care has been carried out. Pain control has been achieved.   agressive physical therapy and early mobility and ambulation goals will be met   The family was updated about the course and plan  CRITICAL CARE TIME SPENT in evaluation and management, reassessments, review and interpretation of labs and x-rays, ventilator and hemodynamic management, formulating a plan and coordinating care: __30___ MIN.  Time does not include procedural time.  CTICU ATTENDING     					    Rai Singer MD

## 2018-07-05 NOTE — PROGRESS NOTE ADULT - PROBLEM SELECTOR PLAN 1
- the oliguric EMILY due to ATN - most likely due to cardiogenic shock, can not exclude also a septic shock, requiring pressors - epinephrine phenylephrine, on milrinone   - oliguric in the morning - anuric   - now on CVVHd - see above more details, now the UF on hold - bp borderline, the blood flow 100 ml/min. When the hemodynamics is more stable the UF can be resumed to 50 ml/h and slow titrate up to 150 ml/h and the blood flow can be increased to 200 ml/min also when the hemodynamics is stable   - the patient with overt fluid overload - anasarca  - renal diet when he is not NPO  - hypophosphatemia - not recent phosphate level - please obtain current level   - potassium 3.0 - please replace with 40 mEq iv and follow up   - lactate trending - we will continue with CVVHD

## 2018-07-05 NOTE — PROGRESS NOTE ADULT - PROBLEM SELECTOR PLAN 4
- cardiogenic and possible component sepsis   - aztreonam, metronidazole and vancomycin and fluconazole   - please follow up the vanco trought - last one 20 - please continue to follow up - cardiogenic and possible component sepsis   - aztreonam, metronidazole and vancomycin and fluconazole   - please follow up the vanco trough - last one 20 - please continue to follow up

## 2018-07-05 NOTE — PROGRESS NOTE ADULT - SUBJECTIVE AND OBJECTIVE BOX
Seen in the morning, still intubated, still requiring pressor support. S/p surgery closing the chest wall, done on 7/2 - started on CVVHD after  that. In the past 24 hours - 5.1 liters removed with CVVHD in the past 24 hours. Was on 200 ml/h of UF. Now when I saw the patient the UF was stopped from the primary team - the BP borderline, still on pressors.     Patient seen and examined at bedside.     artificial  tears Solution 1 Drop(s) two times a day  aztreonam  IVPB 1000 milliGRAM(s) every 8 hours  chlorhexidine 0.12% Liquid 15 milliLiter(s) every 12 hours  cisatracurium Infusion 3 MICROgram(s)/kG/Min <Continuous>  dextrose 50% Injectable 50 milliLiter(s) every 15 minutes  dextrose 50% Injectable 25 milliLiter(s) every 15 minutes  epinephrine 8 milliGRAM(s),dextrose 5% in water 250 milliLiter(s) 8 milliGRAM(s) <Continuous>  fentaNYL   Infusion. 0.5 MICROgram(s)/kG/Hr <Continuous>  fluconAZOLE IVPB 400 milliGRAM(s) every 24 hours  heparin  Infusion 200 Unit(s)/Hr <Continuous>  Heparin 93763 Unit(s),Dextrose 5% 1000 milliLiter(s) 91796 Unit(s) <Continuous>  hydrocortisone sodium succinate Injectable 50 milliGRAM(s) every 8 hours  insulin Infusion 5 Unit(s)/Hr <Continuous>  metroNIDAZOLE  IVPB 500 milliGRAM(s) every 8 hours  milrinone Infusion 0.25 MICROgram(s)/kG/Min <Continuous>  pantoprazole  Injectable 40 milliGRAM(s) daily  phenylephrine    Infusion 2 MICROgram(s)/kG/Min <Continuous>  potassium chloride  20 mEq/100 mL IVPB 20 milliEquivalent(s) once  propofol Infusion 5 MICROgram(s)/kG/Min <Continuous>  PureFlow Dialysate RFP-400 (K 2 / Ca 3) 5000 milliLiter(s) <Continuous>  sodium chloride 0.9%. 1000 milliLiter(s) <Continuous>  vancomycin  IVPB 750 milliGRAM(s) every 24 hours      Allergies    penicillin (Rash)    Intolerances        T(C): , Max: 35.6 (07-04-18 @ 14:00)  T(F): , Max: 96 (07-04-18 @ 14:00)  HR: 80 (07-05-18 @ 11:00)  BP: --  BP(mean): --  RR: 24 (07-05-18 @ 11:00)  SpO2: 95% (07-05-18 @ 11:00)  Wt(kg): --    07-04 @ 07:01  -  07-05 @ 07:00  --------------------------------------------------------  IN:    0.9% NaCl: 50 mL    Albumin 5%  - 250 mL: 250 mL    cisatracurium Infusion: 450.3 mL    fentaNYL Infusion.: 110.4 mL    freetext medication -  Infusion: 132 mL    Jevity: 700 mL    milrinone  Infusion: 105.6 mL    phenylephrine   Infusion: 185 mL    propofol Infusion: 290.4 mL    sodium chloride 0.9%.: 240 mL    Solution: 1200 mL    Solution: 450 mL  Total IN: 4163.7 mL    OUT:    Bulb: 230 mL    Chest Tube: 250 mL    Chest Tube: 90 mL    Indwelling Catheter - Urethral: 10 mL    Other: 5121 mL    VAC (Vacuum Assisted Closure) System: 50 mL  Total OUT: 5751 mL    Total NET: -1587.3 mL      07-05 @ 07:01  -  07-05 @ 11:27  --------------------------------------------------------  IN:    Albumin 5%  - 250 mL: 250 mL    cisatracurium Infusion: 56.6 mL    fentaNYL Infusion.: 18.4 mL    freetext medication -  Infusion: 17.6 mL    Jevity: 90 mL    milrinone  Infusion: 17.6 mL    phenylephrine   Infusion: 64.2 mL    propofol Infusion: 42.4 mL    sodium chloride 0.9%.: 40 mL    Solution: 600 mL  Total IN: 1196.8 mL    OUT:    Bulb: 40 mL    Chest Tube: 10 mL    Chest Tube: 80 mL    Other: 203 mL  Total OUT: 333 mL    Total NET: 863.8 mL      Physical exam:   Intubated and sedated   ANASARCA   difficult to evaluate for JVD   Chest: s/p thoractomy, closed chest ultiple drainages from the chest   RRR, normal s1/s2, systolic murmur  Abdmen - swollen, not tender, not distended   EXtremities: 3+ peripheral edema  Hd cathter - on the left IJ  Desai     Height (cm): 165 (07-04 @ 21:41)  Weight (kg): 59 (07-04 @ 21:41)  BMI (kg/m2): 21.7 (07-04 @ 21:41)  BSA (m2): 1.65 (07-04 @ 21:41)      LABS:                        10.2   14.0  )-----------( 36       ( 05 Jul 2018 10:16 )             29.0     07-05    137  |  97  |  29<H>  ----------------------------<  168<H>  3.3<L>   |  21<L>  |  0.72    Ca    9.7      05 Jul 2018 10:16  Phos  2.0     07-04  Mg     1.9     07-05    TPro  4.8<L>  /  Alb  3.5  /  TBili  27.7<H>  /  DBili  >10.0<H>  /  AST  83<H>  /  ALT  33  /  AlkPhos  91  07-05      PT/INR - ( 05 Jul 2018 10:16 )   PT: 14.0 sec;   INR: 1.26          PTT - ( 05 Jul 2018 10:16 )  PTT:37.2 sec          RADIOLOGY & ADDITIONAL STUDIES:

## 2018-07-05 NOTE — PROGRESS NOTE ADULT - SUBJECTIVE AND OBJECTIVE BOX
PMH :  CHF  Handoff  MEWS Score  No pertinent past medical history  No pertinent past medical history  Open wound of chest wall, unspecified laterality, sequela  Complete heart block  Cardiac tamponade  Tricuspid valve insufficiency, unspecified etiology  Coronary artery disease with other form of angina pectoris  Mitral valve insufficiency, unspecified etiology  Aortic valve stenosis, etiology of cardiac valve disease unspecified  Open chest wound  Complete heart block  Open wound of chest wall, unspecified laterality, sequela  Cardiac tamponade  Tricuspid valve insufficiency, unspecified etiology  Coronary artery disease with other form of angina pectoris  Mitral valve insufficiency, unspecified etiology  Aortic valve stenosis, etiology of cardiac valve disease unspecified  Shock  Anemia  Hyponatremia  Acute kidney failure  CAD (coronary artery disease)  Valvular disease  Closure of chest wound in adult  Washout of thoracic cavity  Chest surgery  AVR (aortic valve replacement)  Mitral valve repair  Tricuspid valve repair  CABG  Ascending aorta graft  No significant past surgical history  No significant past surgical history    ROS  All other systems reviewed and negative.    ICU Vital Signs Last 24 Hrs  T(C): 35.8 (07-05-18 @ 18:00), Max: 35.8 (07-05-18 @ 18:00)  T(F): 96.5 (07-05-18 @ 18:00), Max: 96.5 (07-05-18 @ 18:00)  HR: 80 (07-05-18 @ 19:00) (79 - 82)  BP: --  BP(mean): --  ABP: 94/72 (07-05-18 @ 19:00) (89/60 - 146/88)  ABP(mean): 78 (07-05-18 @ 19:00) (69 - 104)  RR: 27 (07-05-18 @ 19:00) (22 - 28)  SpO2: 95% (07-05-18 @ 19:00) (93% - 97%)    I&O's Summary    04 Jul 2018 07:01  -  05 Jul 2018 07:00  --------------------------------------------------------  IN: 4163.7 mL / OUT: 5751 mL / NET: -1587.3 mL    05 Jul 2018 07:01  -  05 Jul 2018 19:04  --------------------------------------------------------  IN: 2780.3 mL / OUT: 1145 mL / NET: 1635.3 mL      Mode: AC/ CMV (Assist Control/ Continuous Mandatory Ventilation)  RR (machine): 24  TV (machine): 400  FiO2: 60  PEEP: 8  ITime: 1  MAP: 20  PIP: 37    ABG - ( 05 Jul 2018 17:46 )  pH: 7.30  /  pCO2: 44    /  pO2: 80    / HCO3: 21    / Base Excess: -4.9  /  SaO2: 94                                      9.2    14.6  )-----------( 135      ( 05 Jul 2018 17:46 )             25.8     05 Jul 2018 17:46    138    |  98     |  29     ----------------------------<  161    3.9     |  19     |  0.73     Ca    9.3        05 Jul 2018 17:46  Phos  2.0       04 Jul 2018 18:44  Mg     1.8       05 Jul 2018 17:46    TPro  4.8    /  Alb  3.5    /  TBili  27.7   /  DBili  >10.0  /  AST  83     /  ALT  33     /  AlkPhos  91     05 Jul 2018 10:16    PT/INR - ( 05 Jul 2018 17:46 )   PT: 14.4 sec;   INR: 1.29          PTT - ( 05 Jul 2018 17:46 )  PTT:34.4 sec  MEDICATIONS  (STANDING):  artificial  tears Solution 1 Drop(s) Both EYES two times a day  aztreonam  IVPB 1000 milliGRAM(s) IV Intermittent every 8 hours  chlorhexidine 0.12% Liquid 15 milliLiter(s) Swish and Spit every 12 hours  cisatracurium Infusion 3 MICROgram(s)/kG/Min IV Continuous <Continuous>  dextrose 5%. 1000 milliLiter(s) IV Continuous <Continuous>  dextrose 50% Injectable 50 milliLiter(s) IV Push every 15 minutes  dextrose 50% Injectable 25 milliLiter(s) IV Push every 15 minutes  epinephrine 8 milliGRAM(s),dextrose 5% in water 250 milliLiter(s) 8 milliGRAM(s) IV Continuous <Continuous>  fentaNYL   Infusion. 0.5 MICROgram(s)/kG/Hr IV Continuous <Continuous>  fluconAZOLE IVPB 400 milliGRAM(s) IV Intermittent every 24 hours  gentamicin   IVPB 120 milliGRAM(s) IV Intermittent once  heparin  Infusion 200 Unit(s)/Hr IV Continuous <Continuous>  Heparin 34989 Unit(s),Dextrose 5% 1000 milliLiter(s) 84528 Unit(s) IV Continuous <Continuous>  hydrocortisone sodium succinate Injectable 50 milliGRAM(s) IV Push every 8 hours  insulin lispro (HumaLOG) corrective regimen sliding scale   SubCutaneous every 6 hours  metroNIDAZOLE  IVPB 500 milliGRAM(s) IV Intermittent every 8 hours  milrinone Infusion 0.25 MICROgram(s)/kG/Min IV Continuous <Continuous>  pantoprazole  Injectable 40 milliGRAM(s) IV Push daily  phenylephrine    Infusion 2 MICROgram(s)/kG/Min IV Continuous <Continuous>  propofol Infusion 5 MICROgram(s)/kG/Min IV Continuous <Continuous>  PureFlow Dialysate RFP-400 (K 2 / Ca 3) 5000 milliLiter(s) CRRT <Continuous>  sodium chloride 0.9%. 1000 milliLiter(s) IV Continuous <Continuous>  vancomycin  IVPB 750 milliGRAM(s) IV Intermittent every 24 hours    Home Medications:  Lasix 40 mg oral tablet: 1 tab(s) orally once a day (19 Jun 2018 00:25)    PHYSICAL EXAM:  Gen : no acute distress  Neck: No LAD, No JVD  Respiratory: decreased in the bases  Cardiovascular: S1 and S2, RRR, no M/G/R  Gastrointestinal: BS+, soft, NT/ND  Extremities: No peripheral edema  Vascular: 2+ peripheral pulses  Neurological: A/O x 3, no focal deficits  Incision: clean dry/ no sign of infection  Lines: no sign of infection 68yo with history of congenital heart disease s/p AV repair vs replacement at the age of 12 recent cardiac evaluation for pre-op clearance.  On workup pt diagnosed with VHD (severe prosthetic AS, severe MR, Severe TR) as well 2 vessel CAD in the setting of low EF 20-25%.      Pre-op CT showing left subclavian artery stenosis, possible coarctation of aorta (focal narrowing 3.5 cm segment of proximal descending aorta distal to the subclavian artery)    6/21 AVR, ascending aorta, TV/MV repair, CABG X2.  recieved multiple blood products/volume arrived with open chest.  IABP and Impella for LV support.    6/22 Chest closure but re-explored for tamponade on 6/23 6/26 Chest closure but again re-explored by bedside for acute hemodynamic failure  7/2 s/p Chest closure    24 hours: Rising Lactic acid unclear etiology  s/p Bronchoscopy emergent for LLL consolidation, dark bloody secretions removed  Sedation held      Impella @ 3.0 L flow (P6)  CVVH with UF of 100ml removal     Drips :  Epi 0.06mch/kg/min  Milrinone @ 0.25mcg/min  Dio @ 2mcg/kg/min        PMH :  CHF  Open wound of chest wall, unspecified laterality, sequela  Complete heart block  Cardiac tamponade  Tricuspid valve insufficiency, unspecified etiology  Coronary artery disease with other form of angina pectoris  Mitral valve insufficiency, unspecified etiology  Aortic valve stenosis, etiology of cardiac valve disease unspecified  Open chest wound  Complete heart block  Open wound of chest wall, unspecified laterality, sequela  Cardiac tamponade  Tricuspid valve insufficiency, unspecified etiology  Coronary artery disease with other form of angina pectoris  Mitral valve insufficiency, unspecified etiology  Aortic valve stenosis, etiology of cardiac valve disease unspecified  Shock  Anemia  Hyponatremia  Acute kidney failure  CAD (coronary artery disease)  Valvular disease  Closure of chest wound in adult  Washout of thoracic cavity  Chest surgery  AVR (aortic valve replacement)  Mitral valve repair  Tricuspid valve repair  CABG    ICU Vital Signs Last 24 Hrs  T(C): 35.8 (07-05-18 @ 18:00), Max: 35.8 (07-05-18 @ 18:00)  T(F): 96.5 (07-05-18 @ 18:00), Max: 96.5 (07-05-18 @ 18:00)  HR: 80 (07-05-18 @ 19:00) (79 - 82)  ABP: 94/72 (07-05-18 @ 19:00) (89/60 - 146/88)  ABP(mean): 78 (07-05-18 @ 19:00) (69 - 104)  RR: 27 (07-05-18 @ 19:00) (22 - 28)  SpO2: 95% (07-05-18 @ 19:00) (93% - 97%)    I&O's Summary    04 Jul 2018 07:01  -  05 Jul 2018 07:00  --------------------------------------------------------  IN: 4163.7 mL / OUT: 5751 mL / NET: -1587.3 mL    05 Jul 2018 07:01  -  05 Jul 2018 19:04  --------------------------------------------------------  IN: 2780.3 mL / OUT: 1145 mL / NET: 1635.3 mL      Mode: AC/ CMV (Assist Control/ Continuous Mandatory Ventilation)  RR (machine): 24  TV (machine): 400  FiO2: 60  PEEP: 8  ITime: 1  MAP: 20  PIP: 37    ABG - ( 05 Jul 2018 17:46 )  pH: 7.30  /  pCO2: 44    /  pO2: 80    / HCO3: 21    / Base Excess: -4.9  /  SaO2: 94                              9.2    14.6  )-----------( 135      ( 05 Jul 2018 17:46 )             25.8     05 Jul 2018 17:46    138    |  98     |  29     ----------------------------<  161    3.9     |  19     |  0.73     Ca    9.3        05 Jul 2018 17:46  Phos  2.0       04 Jul 2018 18:44  Mg     1.8       05 Jul 2018 17:46    TPro  4.8    /  Alb  3.5    /  TBili  27.7   /  DBili  >10.0  /  AST  83     /  ALT  33     /  AlkPhos  91     05 Jul 2018 10:16    PT/INR - ( 05 Jul 2018 17:46 )   PT: 14.4 sec;   INR: 1.29     PTT - ( 05 Jul 2018 17:46 )  PTT:34.4 sec    MEDICATIONS  (STANDING):  artificial  tears Solution 1 Drop(s) Both EYES two times a day  aztreonam  IVPB 1000 milliGRAM(s) IV Intermittent every 8 hours  chlorhexidine 0.12% Liquid 15 milliLiter(s) Swish and Spit every 12 hours  epinephrine 8 milliGRAM(s),dextrose 5% in water 250 milliLiter(s) 8 milliGRAM(s) IV Continuous <Continuous>  fentaNYL   Infusion. 0.5 MICROgram(s)/kG/Hr IV Continuous <Continuous>  fluconAZOLE IVPB 400 milliGRAM(s) IV Intermittent every 24 hours  gentamicin   IVPB 120 milliGRAM(s) IV Intermittent once  heparin  Infusion 200 Unit(s)/Hr IV Continuous <Continuous>  Heparin 50230 Unit(s),Dextrose 5% 1000 milliLiter(s) 41617 Unit(s) IV Continuous <Continuous>  hydrocortisone sodium succinate Injectable 50 milliGRAM(s) IV Push every 8 hours  insulin lispro (HumaLOG) corrective regimen sliding scale   SubCutaneous every 6 hours  metroNIDAZOLE  IVPB 500 milliGRAM(s) IV Intermittent every 8 hours  milrinone Infusion 0.25 MICROgram(s)/kG/Min IV Continuous <Continuous>  pantoprazole  Injectable 40 milliGRAM(s) IV Push daily  phenylephrine    Infusion 2 MICROgram(s)/kG/Min IV Continuous <Continuous>  propofol Infusion 5 MICROgram(s)/kG/Min IV Continuous <Continuous>  PureFlow Dialysate RFP-400 (K 2 / Ca 3) 5000 milliLiter(s) CRRT <Continuous>  sodium chloride 0.9%. 1000 milliLiter(s) IV Continuous <Continuous>  vancomycin  IVPB 750 milliGRAM(s) IV Intermittent every 24 hours    Home Medications:  Lasix 40 mg oral tablet: 1 tab(s) orally once a day (19 Jun 2018 00:25)    PHYSICAL EXAM:  Gen : intubated sedated + jaundice  Respiratory: decreased in the bases  Cardiovascular: S1 and S2, RRR, no M/G/R  Gastrointestinal: Hypoactive  Extremities: No peripheral edema  Vascular: 2+ peripheral pulses  Incision: VAC to sternum   Lines: no sign of infection

## 2018-07-05 NOTE — PROGRESS NOTE ADULT - ASSESSMENT
68yo with history of congenital heart disease s/p AVR @ age of 12, Ef 20% on preop cardiac clearance diagnosed with 2 vessel CAD, VHD (severe prosthetic AS, Severe MR, severe TR, severe pulm hypertension.).    Pt s/p complex surgery on 6/21 with CABG x2, AVR, MV/TV repair, Impella insertion, IABP  -- open chest  6/22 Chest closed  6/23 Chest re-explored for tamponade  6/26 chest closure   6/27 chest re-explored by bedside for pseudo-tamponade.  6/30 chest washout  7/2 chest closure      Problems  1. Cardiogenic shock, supported with pressors/iontropes, Impella  2. lactic acidosis  3. Multiorgan failure    Plan   Neuro -- sedation held to assess neuro status    CVS - concern for hypoperfusion in the setting of risking lactic acid  Continue Impella support @ P6/3L flow  increasing pressor support since AM  Pulm - vent support  s/p toilet bronch  GI -TF ongoing.  No BM  Concern lactate is from abdominal ischemia  Jaundice/conjugated hyperbilirubinemia likely from hemolysis.  Pt s/p massive transfusion intra op and multiple transfusions post    - anuric -CVVH   Endo - glycemic control, taper stress dose steriods.   Heme - continuing heparin flush with impella  Likely can challenge with anticoagulation next week   ID - On empiric antibiotics -- Aztreonam, fluc, vanc, flagyl and gent  - concern for sepsis + /- cardiogenic shock.      Critical post op.    Critical care time spent 70 min   Family updated.  wife by bedside

## 2018-07-06 LAB
ALBUMIN SERPL ELPH-MCNC: 3.4 G/DL — SIGNIFICANT CHANGE UP (ref 3.3–5)
ALBUMIN SERPL ELPH-MCNC: 3.6 G/DL — SIGNIFICANT CHANGE UP (ref 3.3–5)
ALBUMIN SERPL ELPH-MCNC: 3.9 G/DL — SIGNIFICANT CHANGE UP (ref 3.3–5)
ALP SERPL-CCNC: 113 U/L — SIGNIFICANT CHANGE UP (ref 40–120)
ALP SERPL-CCNC: 120 U/L — SIGNIFICANT CHANGE UP (ref 40–120)
ALP SERPL-CCNC: 122 U/L — HIGH (ref 40–120)
ALT FLD-CCNC: 41 U/L — SIGNIFICANT CHANGE UP (ref 10–45)
ALT FLD-CCNC: 44 U/L — SIGNIFICANT CHANGE UP (ref 10–45)
ALT FLD-CCNC: 45 U/L — SIGNIFICANT CHANGE UP (ref 10–45)
ANION GAP SERPL CALC-SCNC: 20 MMOL/L — HIGH (ref 5–17)
ANION GAP SERPL CALC-SCNC: 20 MMOL/L — HIGH (ref 5–17)
ANION GAP SERPL CALC-SCNC: 21 MMOL/L — HIGH (ref 5–17)
ANION GAP SERPL CALC-SCNC: 21 MMOL/L — HIGH (ref 5–17)
APTT BLD: 39.9 SEC — HIGH (ref 27.5–37.4)
APTT BLD: 40.5 SEC — HIGH (ref 27.5–37.4)
APTT BLD: 44 SEC — HIGH (ref 27.5–37.4)
APTT BLD: 45 SEC — HIGH (ref 27.5–37.4)
APTT BLD: 47.5 SEC — HIGH (ref 27.5–37.4)
AST SERPL-CCNC: 104 U/L — HIGH (ref 10–40)
AST SERPL-CCNC: 110 U/L — HIGH (ref 10–40)
AST SERPL-CCNC: 94 U/L — HIGH (ref 10–40)
BASE EXCESS BLDA CALC-SCNC: -1.4 MMOL/L — SIGNIFICANT CHANGE UP (ref -2–3)
BILIRUB SERPL-MCNC: 32.4 MG/DL — HIGH (ref 0.2–1.2)
BILIRUB SERPL-MCNC: 35.5 MG/DL — HIGH (ref 0.2–1.2)
BILIRUB SERPL-MCNC: 37.1 MG/DL — HIGH (ref 0.2–1.2)
BUN SERPL-MCNC: 28 MG/DL — HIGH (ref 7–23)
BUN SERPL-MCNC: 29 MG/DL — HIGH (ref 7–23)
CALCIUM SERPL-MCNC: 9.6 MG/DL — SIGNIFICANT CHANGE UP (ref 8.4–10.5)
CALCIUM SERPL-MCNC: 9.6 MG/DL — SIGNIFICANT CHANGE UP (ref 8.4–10.5)
CALCIUM SERPL-MCNC: 9.7 MG/DL — SIGNIFICANT CHANGE UP (ref 8.4–10.5)
CALCIUM SERPL-MCNC: 9.8 MG/DL — SIGNIFICANT CHANGE UP (ref 8.4–10.5)
CHLORIDE SERPL-SCNC: 94 MMOL/L — LOW (ref 96–108)
CHLORIDE SERPL-SCNC: 96 MMOL/L — SIGNIFICANT CHANGE UP (ref 96–108)
CHLORIDE SERPL-SCNC: 97 MMOL/L — SIGNIFICANT CHANGE UP (ref 96–108)
CHLORIDE SERPL-SCNC: 97 MMOL/L — SIGNIFICANT CHANGE UP (ref 96–108)
CO2 SERPL-SCNC: 20 MMOL/L — LOW (ref 22–31)
CO2 SERPL-SCNC: 21 MMOL/L — LOW (ref 22–31)
CO2 SERPL-SCNC: 21 MMOL/L — LOW (ref 22–31)
CO2 SERPL-SCNC: 22 MMOL/L — SIGNIFICANT CHANGE UP (ref 22–31)
CREAT SERPL-MCNC: 0.6 MG/DL — SIGNIFICANT CHANGE UP (ref 0.5–1.3)
CREAT SERPL-MCNC: 0.61 MG/DL — SIGNIFICANT CHANGE UP (ref 0.5–1.3)
CREAT SERPL-MCNC: 0.61 MG/DL — SIGNIFICANT CHANGE UP (ref 0.5–1.3)
CREAT SERPL-MCNC: 0.63 MG/DL — SIGNIFICANT CHANGE UP (ref 0.5–1.3)
GAS PNL BLDA: SIGNIFICANT CHANGE UP
GLUCOSE BLDC GLUCOMTR-MCNC: 114 MG/DL — HIGH (ref 70–99)
GLUCOSE BLDC GLUCOMTR-MCNC: 146 MG/DL — HIGH (ref 70–99)
GLUCOSE BLDC GLUCOMTR-MCNC: 155 MG/DL — HIGH (ref 70–99)
GLUCOSE BLDC GLUCOMTR-MCNC: 178 MG/DL — HIGH (ref 70–99)
GLUCOSE SERPL-MCNC: 151 MG/DL — HIGH (ref 70–99)
GLUCOSE SERPL-MCNC: 168 MG/DL — HIGH (ref 70–99)
GLUCOSE SERPL-MCNC: 192 MG/DL — HIGH (ref 70–99)
GLUCOSE SERPL-MCNC: 197 MG/DL — HIGH (ref 70–99)
GRAM STN FLD: SIGNIFICANT CHANGE UP
HCO3 BLDA-SCNC: 22 MMOL/L — SIGNIFICANT CHANGE UP (ref 21–28)
HCT VFR BLD CALC: 26.3 % — LOW (ref 39–50)
HCT VFR BLD CALC: 27.3 % — LOW (ref 39–50)
HCT VFR BLD CALC: 27.8 % — LOW (ref 39–50)
HCT VFR BLD CALC: 28.6 % — LOW (ref 39–50)
HCT VFR BLD CALC: 28.8 % — LOW (ref 39–50)
HGB BLD-MCNC: 10.1 G/DL — LOW (ref 13–17)
HGB BLD-MCNC: 10.2 G/DL — LOW (ref 13–17)
HGB BLD-MCNC: 9.6 G/DL — LOW (ref 13–17)
HGB BLD-MCNC: 9.7 G/DL — LOW (ref 13–17)
HGB BLD-MCNC: 9.9 G/DL — LOW (ref 13–17)
INR BLD: 1.29 — HIGH (ref 0.88–1.16)
INR BLD: 1.33 — HIGH (ref 0.88–1.16)
INR BLD: 1.35 — HIGH (ref 0.88–1.16)
INR BLD: 1.4 — HIGH (ref 0.88–1.16)
INR BLD: 1.42 — HIGH (ref 0.88–1.16)
LACTATE SERPL-SCNC: 4.2 MMOL/L — CRITICAL HIGH (ref 0.5–2)
LACTATE SERPL-SCNC: 5.5 MMOL/L — CRITICAL HIGH (ref 0.5–2)
LACTATE SERPL-SCNC: 5.8 MMOL/L — CRITICAL HIGH (ref 0.5–2)
LACTATE SERPL-SCNC: 6 MMOL/L — CRITICAL HIGH (ref 0.5–2)
LACTATE SERPL-SCNC: 6.3 MMOL/L — CRITICAL HIGH (ref 0.5–2)
LACTATE SERPL-SCNC: 6.5 MMOL/L — CRITICAL HIGH (ref 0.5–2)
LACTATE SERPL-SCNC: 6.5 MMOL/L — CRITICAL HIGH (ref 0.5–2)
LACTATE SERPL-SCNC: 7 MMOL/L — CRITICAL HIGH (ref 0.5–2)
MAGNESIUM SERPL-MCNC: 1.9 MG/DL — SIGNIFICANT CHANGE UP (ref 1.6–2.6)
MAGNESIUM SERPL-MCNC: 2 MG/DL — SIGNIFICANT CHANGE UP (ref 1.6–2.6)
MAGNESIUM SERPL-MCNC: 2.2 MG/DL — SIGNIFICANT CHANGE UP (ref 1.6–2.6)
MCHC RBC-ENTMCNC: 29.6 PG — SIGNIFICANT CHANGE UP (ref 27–34)
MCHC RBC-ENTMCNC: 29.7 PG — SIGNIFICANT CHANGE UP (ref 27–34)
MCHC RBC-ENTMCNC: 35.3 G/DL — SIGNIFICANT CHANGE UP (ref 32–36)
MCHC RBC-ENTMCNC: 35.4 G/DL — SIGNIFICANT CHANGE UP (ref 32–36)
MCHC RBC-ENTMCNC: 35.5 G/DL — SIGNIFICANT CHANGE UP (ref 32–36)
MCHC RBC-ENTMCNC: 35.6 G/DL — SIGNIFICANT CHANGE UP (ref 32–36)
MCHC RBC-ENTMCNC: 36.5 G/DL — HIGH (ref 32–36)
MCV RBC AUTO: 81.2 FL — SIGNIFICANT CHANGE UP (ref 80–100)
MCV RBC AUTO: 83.5 FL — SIGNIFICANT CHANGE UP (ref 80–100)
MCV RBC AUTO: 83.5 FL — SIGNIFICANT CHANGE UP (ref 80–100)
MCV RBC AUTO: 84 FL — SIGNIFICANT CHANGE UP (ref 80–100)
MCV RBC AUTO: 84.1 FL — SIGNIFICANT CHANGE UP (ref 80–100)
PCO2 BLDA: 32 MMHG — LOW (ref 35–48)
PH BLDA: 7.45 — SIGNIFICANT CHANGE UP (ref 7.35–7.45)
PHOSPHATE SERPL-MCNC: 1.2 MG/DL — LOW (ref 2.5–4.5)
PHOSPHATE SERPL-MCNC: 1.3 MG/DL — LOW (ref 2.5–4.5)
PLATELET # BLD AUTO: 65 K/UL — LOW (ref 150–400)
PLATELET # BLD AUTO: 73 K/UL — LOW (ref 150–400)
PLATELET # BLD AUTO: 76 K/UL — LOW (ref 150–400)
PLATELET # BLD AUTO: 77 K/UL — LOW (ref 150–400)
PLATELET # BLD AUTO: 80 K/UL — LOW (ref 150–400)
PO2 BLDA: 205 MMHG — HIGH (ref 83–108)
POTASSIUM SERPL-MCNC: 3.5 MMOL/L — SIGNIFICANT CHANGE UP (ref 3.5–5.3)
POTASSIUM SERPL-MCNC: 3.6 MMOL/L — SIGNIFICANT CHANGE UP (ref 3.5–5.3)
POTASSIUM SERPL-MCNC: 3.7 MMOL/L — SIGNIFICANT CHANGE UP (ref 3.5–5.3)
POTASSIUM SERPL-MCNC: 3.8 MMOL/L — SIGNIFICANT CHANGE UP (ref 3.5–5.3)
POTASSIUM SERPL-SCNC: 3.5 MMOL/L — SIGNIFICANT CHANGE UP (ref 3.5–5.3)
POTASSIUM SERPL-SCNC: 3.6 MMOL/L — SIGNIFICANT CHANGE UP (ref 3.5–5.3)
POTASSIUM SERPL-SCNC: 3.7 MMOL/L — SIGNIFICANT CHANGE UP (ref 3.5–5.3)
POTASSIUM SERPL-SCNC: 3.8 MMOL/L — SIGNIFICANT CHANGE UP (ref 3.5–5.3)
PROT SERPL-MCNC: 4.7 G/DL — LOW (ref 6–8.3)
PROT SERPL-MCNC: 4.8 G/DL — LOW (ref 6–8.3)
PROT SERPL-MCNC: 4.9 G/DL — LOW (ref 6–8.3)
PROTHROM AB SERPL-ACNC: 14.4 SEC — HIGH (ref 9.8–12.7)
PROTHROM AB SERPL-ACNC: 14.9 SEC — HIGH (ref 9.8–12.7)
PROTHROM AB SERPL-ACNC: 15.1 SEC — HIGH (ref 9.8–12.7)
PROTHROM AB SERPL-ACNC: 15.7 SEC — HIGH (ref 9.8–12.7)
PROTHROM AB SERPL-ACNC: 15.9 SEC — HIGH (ref 9.8–12.7)
RBC # BLD: 3.24 M/UL — LOW (ref 4.2–5.8)
RBC # BLD: 3.27 M/UL — LOW (ref 4.2–5.8)
RBC # BLD: 3.33 M/UL — LOW (ref 4.2–5.8)
RBC # BLD: 3.4 M/UL — LOW (ref 4.2–5.8)
RBC # BLD: 3.43 M/UL — LOW (ref 4.2–5.8)
RBC # FLD: 17.4 % — HIGH (ref 10.3–16.9)
RBC # FLD: 17.8 % — HIGH (ref 10.3–16.9)
RBC # FLD: 17.8 % — HIGH (ref 10.3–16.9)
RBC # FLD: 17.9 % — HIGH (ref 10.3–16.9)
RBC # FLD: 17.9 % — HIGH (ref 10.3–16.9)
SAO2 % BLDA: 99 % — SIGNIFICANT CHANGE UP (ref 95–100)
SODIUM SERPL-SCNC: 135 MMOL/L — SIGNIFICANT CHANGE UP (ref 135–145)
SODIUM SERPL-SCNC: 138 MMOL/L — SIGNIFICANT CHANGE UP (ref 135–145)
SODIUM SERPL-SCNC: 138 MMOL/L — SIGNIFICANT CHANGE UP (ref 135–145)
SODIUM SERPL-SCNC: 139 MMOL/L — SIGNIFICANT CHANGE UP (ref 135–145)
SPECIMEN SOURCE: SIGNIFICANT CHANGE UP
WBC # BLD: 17.9 K/UL — HIGH (ref 3.8–10.5)
WBC # BLD: 20.1 K/UL — HIGH (ref 3.8–10.5)
WBC # BLD: 20.8 K/UL — HIGH (ref 3.8–10.5)
WBC # BLD: 26.4 K/UL — HIGH (ref 3.8–10.5)
WBC # FLD AUTO: 17.9 K/UL — HIGH (ref 3.8–10.5)
WBC # FLD AUTO: 20.1 K/UL — HIGH (ref 3.8–10.5)
WBC # FLD AUTO: 20.8 K/UL — HIGH (ref 3.8–10.5)
WBC # FLD AUTO: 26.4 K/UL — HIGH (ref 3.8–10.5)

## 2018-07-06 PROCEDURE — 71045 X-RAY EXAM CHEST 1 VIEW: CPT | Mod: 26

## 2018-07-06 PROCEDURE — 99292 CRITICAL CARE ADDL 30 MIN: CPT

## 2018-07-06 PROCEDURE — 90945 DIALYSIS ONE EVALUATION: CPT

## 2018-07-06 PROCEDURE — 99291 CRITICAL CARE FIRST HOUR: CPT

## 2018-07-06 RX ORDER — MAGNESIUM SULFATE 500 MG/ML
2 VIAL (ML) INJECTION ONCE
Qty: 0 | Refills: 0 | Status: COMPLETED | OUTPATIENT
Start: 2018-07-06 | End: 2018-07-06

## 2018-07-06 RX ORDER — POTASSIUM PHOSPHATE, MONOBASIC POTASSIUM PHOSPHATE, DIBASIC 236; 224 MG/ML; MG/ML
15 INJECTION, SOLUTION INTRAVENOUS ONCE
Qty: 0 | Refills: 0 | Status: COMPLETED | OUTPATIENT
Start: 2018-07-06 | End: 2018-07-06

## 2018-07-06 RX ORDER — CISATRACURIUM BESYLATE 2 MG/ML
10 INJECTION INTRAVENOUS ONCE
Qty: 0 | Refills: 0 | Status: COMPLETED | OUTPATIENT
Start: 2018-07-06 | End: 2018-07-06

## 2018-07-06 RX ORDER — MIDAZOLAM HYDROCHLORIDE 1 MG/ML
5 INJECTION, SOLUTION INTRAMUSCULAR; INTRAVENOUS ONCE
Qty: 0 | Refills: 0 | Status: DISCONTINUED | OUTPATIENT
Start: 2018-07-06 | End: 2018-07-06

## 2018-07-06 RX ORDER — MICAFUNGIN SODIUM 100 MG/1
100 INJECTION, POWDER, LYOPHILIZED, FOR SOLUTION INTRAVENOUS EVERY 24 HOURS
Qty: 0 | Refills: 0 | Status: DISCONTINUED | OUTPATIENT
Start: 2018-07-06 | End: 2018-07-10

## 2018-07-06 RX ORDER — NITROGLYCERIN 6.5 MG
0.5 CAPSULE, EXTENDED RELEASE ORAL DAILY
Qty: 0 | Refills: 0 | Status: COMPLETED | OUTPATIENT
Start: 2018-07-06 | End: 2018-07-07

## 2018-07-06 RX ORDER — POTASSIUM CHLORIDE 20 MEQ
20 PACKET (EA) ORAL ONCE
Qty: 0 | Refills: 0 | Status: COMPLETED | OUTPATIENT
Start: 2018-07-06 | End: 2018-07-06

## 2018-07-06 RX ORDER — AMIODARONE HYDROCHLORIDE 400 MG/1
150 TABLET ORAL ONCE
Qty: 0 | Refills: 0 | Status: COMPLETED | OUTPATIENT
Start: 2018-07-06 | End: 2018-07-06

## 2018-07-06 RX ORDER — MIDAZOLAM HYDROCHLORIDE 1 MG/ML
2 INJECTION, SOLUTION INTRAMUSCULAR; INTRAVENOUS
Qty: 0 | Refills: 0 | Status: DISCONTINUED | OUTPATIENT
Start: 2018-07-06 | End: 2018-07-12

## 2018-07-06 RX ORDER — POTASSIUM CHLORIDE 20 MEQ
20 PACKET (EA) ORAL
Qty: 0 | Refills: 0 | Status: COMPLETED | OUTPATIENT
Start: 2018-07-06 | End: 2018-07-06

## 2018-07-06 RX ORDER — MEROPENEM 1 G/30ML
1000 INJECTION INTRAVENOUS EVERY 8 HOURS
Qty: 0 | Refills: 0 | Status: DISCONTINUED | OUTPATIENT
Start: 2018-07-06 | End: 2018-07-14

## 2018-07-06 RX ADMIN — Medication 0.5 INCH(S): at 11:03

## 2018-07-06 RX ADMIN — Medication 50 MILLIGRAM(S): at 21:24

## 2018-07-06 RX ADMIN — Medication 50 MILLIGRAM(S): at 05:28

## 2018-07-06 RX ADMIN — AMIODARONE HYDROCHLORIDE 150 MILLIGRAM(S): 400 TABLET ORAL at 09:31

## 2018-07-06 RX ADMIN — Medication 100 MILLIEQUIVALENT(S): at 23:30

## 2018-07-06 RX ADMIN — Medication 50 GRAM(S): at 21:46

## 2018-07-06 RX ADMIN — MIDAZOLAM HYDROCHLORIDE 2 MILLIGRAM(S): 1 INJECTION, SOLUTION INTRAMUSCULAR; INTRAVENOUS at 14:43

## 2018-07-06 RX ADMIN — Medication 0.5 INCH(S): at 23:01

## 2018-07-06 RX ADMIN — Medication 2: at 11:24

## 2018-07-06 RX ADMIN — DESMOPRESSIN ACETATE 220 MICROGRAM(S): 0.1 TABLET ORAL at 01:10

## 2018-07-06 RX ADMIN — Medication 100 MEQ/KG/HR: at 14:25

## 2018-07-06 RX ADMIN — MIDAZOLAM HYDROCHLORIDE 2 MILLIGRAM(S): 1 INJECTION, SOLUTION INTRAMUSCULAR; INTRAVENOUS at 22:34

## 2018-07-06 RX ADMIN — Medication 50 MILLIGRAM(S): at 01:14

## 2018-07-06 RX ADMIN — PANTOPRAZOLE SODIUM 40 MILLIGRAM(S): 20 TABLET, DELAYED RELEASE ORAL at 11:03

## 2018-07-06 RX ADMIN — Medication 250 MILLIGRAM(S): at 11:03

## 2018-07-06 RX ADMIN — Medication 50 MILLIGRAM(S): at 14:25

## 2018-07-06 RX ADMIN — CHLORHEXIDINE GLUCONATE 15 MILLILITER(S): 213 SOLUTION TOPICAL at 17:30

## 2018-07-06 RX ADMIN — MICAFUNGIN SODIUM 105 MILLIGRAM(S): 100 INJECTION, POWDER, LYOPHILIZED, FOR SOLUTION INTRAVENOUS at 11:24

## 2018-07-06 RX ADMIN — Medication 1 DROP(S): at 17:30

## 2018-07-06 RX ADMIN — MILRINONE LACTATE 4.39 MICROGRAM(S)/KG/MIN: 1 INJECTION, SOLUTION INTRAVENOUS at 15:35

## 2018-07-06 RX ADMIN — MIDAZOLAM HYDROCHLORIDE 2 MILLIGRAM(S): 1 INJECTION, SOLUTION INTRAMUSCULAR; INTRAVENOUS at 10:41

## 2018-07-06 RX ADMIN — CHLORHEXIDINE GLUCONATE 15 MILLILITER(S): 213 SOLUTION TOPICAL at 05:27

## 2018-07-06 RX ADMIN — MEROPENEM 100 MILLIGRAM(S): 1 INJECTION INTRAVENOUS at 21:24

## 2018-07-06 RX ADMIN — Medication 1 DROP(S): at 08:09

## 2018-07-06 RX ADMIN — Medication 100 MILLIEQUIVALENT(S): at 21:47

## 2018-07-06 RX ADMIN — PROPOFOL 1.76 MICROGRAM(S)/KG/MIN: 10 INJECTION, EMULSION INTRAVENOUS at 09:52

## 2018-07-06 RX ADMIN — Medication 2: at 05:27

## 2018-07-06 RX ADMIN — MEROPENEM 100 MILLIGRAM(S): 1 INJECTION INTRAVENOUS at 14:25

## 2018-07-06 RX ADMIN — CISATRACURIUM BESYLATE 10 MILLIGRAM(S): 2 INJECTION INTRAVENOUS at 08:55

## 2018-07-06 RX ADMIN — Medication 100 MILLIGRAM(S): at 05:27

## 2018-07-06 RX ADMIN — MIDAZOLAM HYDROCHLORIDE 5 MILLIGRAM(S): 1 INJECTION, SOLUTION INTRAMUSCULAR; INTRAVENOUS at 08:55

## 2018-07-06 RX ADMIN — Medication 100 MILLIEQUIVALENT(S): at 09:52

## 2018-07-06 NOTE — PROGRESS NOTE ADULT - SUBJECTIVE AND OBJECTIVE BOX
66yo with history of congenital heart disease s/p AV repair vs replacement at the age of 12 recent cardiac evaluation for pre-op clearance.  On workup pt diagnosed with VHD (severe prosthetic AS, severe MR, Severe TR) as well 2 vessel CAD in the setting of low EF 20-25%.      Pre-op CT showing left subclavian artery stenosis, possible coarctation of aorta (focal narrowing 3.5 cm segment of proximal descending aorta distal to the subclavian artery)    6/21 AVR, ascending aorta, TV/MV repair, CABG X2.  recieved multiple blood products/volume arrived with open chest.  IABP and Impella for LV support.    6/22 Chest closure but re-explored for tamponade on 6/23 6/26 Chest closure but again re-explored by bedside for acute hemodynamic failure  7/2 s/p Chest closure    24 hours: Lactate remains elevated  fluctating pressor requirements  Able to tolerated CVVH with UF rate of 300 cc  TF held for concern of bowel ischemia  Sedation held overnight, pt spontaneously opening eyes but not following command.  Due to worsening metabolic derangement and vent dysynchrony, sedation restarted    ICU Vital Signs Last 24 Hrs  T(C): 36.7 (07-06-18 @ 09:00), Max: 36.7 (07-06-18 @ 09:00)  T(F): 98 (07-06-18 @ 09:00), Max: 98 (07-06-18 @ 09:00)  HR: 90 (07-06-18 @ 13:00) (79 - 91)  ABP: 107/73 (07-06-18 @ 13:00) (86/64 - 126/71)  ABP(mean): 82 (07-06-18 @ 13:00) (68 - 90)  RR: 23 (07-06-18 @ 13:00) (20 - 31)  SpO2: 96% (07-06-18 @ 13:00) (72% - 100%)    I&O's Summary    05 Jul 2018 07:01  -  06 Jul 2018 07:00  --------------------------------------------------------  IN: 5208.1 mL / OUT: 3784 mL / NET: 1424.1 mL    06 Jul 2018 07:01  -  06 Jul 2018 13:52  --------------------------------------------------------  IN: 1125.3 mL / OUT: 1737 mL / NET: -611.7 mL      Mode: AC/ CMV (Assist Control/ Continuous Mandatory Ventilation)  RR (machine): 20  TV (machine): 480  FiO2: 70  PEEP: 8  ITime: 0.9  MAP: 19  PIP: 40    ABG - ( 06 Jul 2018 12:35 )  pH: 7.42  /  pCO2: 34    /  pO2: 266   / HCO3: 21    / Base Excess: -2.4  /  SaO2: 100                           9.9    20.8  )-----------( 65       ( 06 Jul 2018 12:36 )             27.8     06 Jul 2018 12:36    138    |  96     |  29     ----------------------------<  192    3.7     |  21     |  0.60     Ca    9.6        06 Jul 2018 12:36  Phos  1.3       06 Jul 2018 12:36  Mg     2.0       06 Jul 2018 12:36    TPro  4.8    /  Alb  3.6    /  TBili  37.1   /  DBili  x      /  AST  104    /  ALT  44     /  AlkPhos  122    06 Jul 2018 12:36    PT/INR - ( 06 Jul 2018 12:36 )   PT: 15.1 sec;   INR: 1.35     PTT - ( 06 Jul 2018 12:36 )  PTT:44.0 sec    MEDICATIONS  (STANDING):  epinephrine 8 milliGRAM(s),dextrose 5% in water 250 milliLiter(s) 8 milliGRAM(s) IV Continuous <Continuous>  fentaNYL   Infusion. 0.5 MICROgram(s)/kG/Hr IV Continuous <Continuous>  heparin  Infusion 200 Unit(s)/Hr IV Continuous <Continuous>  Heparin 56015 Unit(s),Dextrose 5% 1000 milliLiter(s) 78226 Unit(s) IV Continuous <Continuous>  hydrocortisone sodium succinate Injectable 50 milliGRAM(s) IV Push every 8 hours  insulin lispro (HumaLOG) corrective regimen sliding scale   SubCutaneous every 6 hours  meropenem  IVPB 1000 milliGRAM(s) IV Intermittent every 8 hours  micafungin IVPB 100 milliGRAM(s) IV Intermittent every 24 hours  milrinone Infusion 0.25 MICROgram(s)/kG/Min IV Continuous <Continuous>  pantoprazole  Injectable 40 milliGRAM(s) IV Push daily  phenylephrine    Infusion 2 MICROgram(s)/kG/Min IV Continuous <Continuous>  propofol Infusion 5 MICROgram(s)/kG/Min IV Continuous <Continuous>  PureFlow Dialysate RFP-400 (K 2 / Ca 3) 5000 milliLiter(s) CRRT <Continuous>  sodium bicarbonate  Infusion 0.254 mEq/kG/Hr IV Continuous <Continuous>  vancomycin  IVPB 750 milliGRAM(s) IV Intermittent every 24 hours  vasopressin Infusion 0.017 Unit(s)/Min IV Continuous <Continuous>    Home Medications:  Lasix 40 mg oral tablet: 1 tab(s) orally once a day (19 Jun 2018 00:25)    PHYSICAL EXAM:  Gen : + jaundice, intubated sedated   Respiratory: decreased in the bases  Cardiovascular: S1 and S2, RRR, no M/G/R  Gastrointestinal: BS+, soft, NT/ND  Extremities: ++ edema  + right index finger cynotic concern for embolic lesion   Vascular: 2+ peripheral pulses  Neurological: A/O x 3, no focal deficits  Incision: Vac to chest   Lines: no sign of infection

## 2018-07-06 NOTE — PROGRESS NOTE ADULT - ASSESSMENT
68yo with history of congenital heart disease s/p AVR @ age of 12, Ef 20% on preop cardiac clearance diagnosed with 2 vessel CAD, VHD (severe prosthetic AS, Severe MR, severe TR, severe pulm hypertension.).    Pt s/p complex surgery on 6/21 with CABG x2, AVR, MV/TV repair, Impella insertion, IABP  -- open chest  6/22 Chest closed  6/23 Chest re-explored for tamponade  6/26 chest closure   6/27 chest re-explored by bedside for pseudo-tamponade.  6/30 chest washout  7/2 chest closure      Problems  1. Cardiogenic shock, supported with pressors/iontropes, Impella  2. lactic acidosis  3. Multiorgan failure    Plan   Neuro -- sedation resume in the setting of worsening acidosis  CVS - concern for hypoperfusion in the setting of risking lactic acid  Continue Impella support @ P6/3L flow  continuing pressor support.  Overnight pressors came down   Pulm - vent support  s/p toilet bronch  GI -TF held, No BM hypoactive BS  Concern lactate is from abdominal ischemia  Jaundice/conjugated hyperbilirubinemia likely from hemolysis.  Pt s/p massive transfusion intra op and multiple transfusions post   RUQ sono ordered, most recent labs show rising AST/ALK phos   - anuric -CVVH   Endo - glycemic control, taper stress dose steriods.   Heme - continuing heparin flush with impella  Likely can challenge with anticoagulation next week   ID - On empiric antibiotics -- Broadened for worsening clinical status.  Concern is bowel ischemia and intra-abdominal source of infection vs mediastinitis    Critical post op.    Critical care time spent 70 min

## 2018-07-06 NOTE — PROGRESS NOTE ADULT - PROBLEM SELECTOR PLAN 4
- cardiogenic and possible component sepsis   - aztreonam, metronidazole and vancomycin and fluconazole   - please follow up the vanco trough - last one 20 - please continue to follow up

## 2018-07-06 NOTE — PROGRESS NOTE ADULT - SUBJECTIVE AND OBJECTIVE BOX
CTICU  CRITICAL  CARE  attending     Hand off received 					   Pertinent clinical, laboratory, radiographic, hemodynamic, echocardiographic, respiratory data, microbiologic data and chart were reviewed and analyzed frequently throughout the course of the day and night  Patient seen and examined with CTS/ SH attending at bedside  Pt is a 67y , Male, HEALTH ISSUES - PROBLEM Dx:  Shock: Shock  Anemia: Anemia  Hyponatremia: Hyponatremia  Acute kidney failure: Acute kidney failure  CAD (coronary artery disease): CAD (coronary artery disease)  Valvular disease: Valvular disease      , FAMILY HISTORY:  PAST MEDICAL & SURGICAL HISTORY:  No pertinent past medical history  No significant past surgical history    Patient is a 67y old  Male who presents with a chief complaint of AS (19 Jun 2018 00:06)      14 system review was unremarkable  acute changes include acute respiratory failure  Vital signs, hemodynamic and respiratory parameters were reviewed from the bedside nursing flowsheet.  ICU Vital Signs Last 24 Hrs  T(C): 36.1 (06 Jul 2018 22:23), Max: 36.7 (06 Jul 2018 09:00)  T(F): 96.9 (06 Jul 2018 22:23), Max: 98 (06 Jul 2018 09:00)  HR: 90 (06 Jul 2018 22:00) (80 - 91)  BP: --  BP(mean): --  ABP: 108/70 (06 Jul 2018 22:00) (86/64 - 126/71)  ABP(mean): 82 (06 Jul 2018 22:00) (68 - 90)  RR: 27 (06 Jul 2018 22:00) (20 - 31)  SpO2: 98% (06 Jul 2018 22:00) (72% - 100%)    Adult Advanced Hemodynamics Last 24 Hrs  CVP(mm Hg): 16 (06 Jul 2018 22:00) (14 - 39)  CVP(cm H2O): --  CO: --  CI: --  PA: --  PA(mean): --  PCWP: --  SVR: --  SVRI: --  PVR: --  PVRI: --, ABG - ( 06 Jul 2018 22:59 )  pH, Arterial: 7.52  pH, Blood: x     /  pCO2: 31    /  pO2: 166   / HCO3: 25    / Base Excess: 2.1   /  SaO2: 99                Mode: AC/ CMV (Assist Control/ Continuous Mandatory Ventilation)  RR (machine): 20  TV (machine): 480  FiO2: 60  PEEP: 8  ITime: 0.9  MAP: 19  PIP: 38    Intake and output was reviewed and the fluid balance was calculated  Daily     Daily   I&O's Summary    05 Jul 2018 07:01  -  06 Jul 2018 07:00  --------------------------------------------------------  IN: 5208.1 mL / OUT: 3784 mL / NET: 1424.1 mL    06 Jul 2018 07:01  -  06 Jul 2018 23:07  --------------------------------------------------------  IN: 2428.3 mL / OUT: 4738 mL / NET: -2309.7 mL        All lines and drain sites were assessed  [facility]      POCT Blood Glucose.: 146 mg/dL (06 Jul 2018 17:29)    No acute change in mental status  Auscultation of the chest reveals equal bs  Abdomen is soft  Extremities are warm and well perfused  Wounds appear clean and unremarkable  Antibiotics are periop    labs  CBC Full  -  ( 06 Jul 2018 17:27 )  WBC Count : 26.4 K/uL  Hemoglobin : 9.7 g/dL  Hematocrit : 27.3 %  Platelet Count - Automated : 80 K/uL  Mean Cell Volume : 83.5 fL  Mean Cell Hemoglobin : 29.7 pg  Mean Cell Hemoglobin Concentration : 35.5 g/dL  Auto Neutrophil # : x  Auto Lymphocyte # : x  Auto Monocyte # : x  Auto Eosinophil # : x  Auto Basophil # : x  Auto Neutrophil % : x  Auto Lymphocyte % : x  Auto Monocyte % : x  Auto Eosinophil % : x  Auto Basophil % : x    07-06    139  |  97  |  28<H>  ----------------------------<  151<H>  3.5   |  22  |  0.61    Ca    9.6      06 Jul 2018 17:27  Phos  1.2     07-06  Mg     1.9     07-06    TPro  4.9<L>  /  Alb  3.4  /  TBili  35.5<H>  /  DBili  x   /  AST  110<H>  /  ALT  45  /  AlkPhos  120  07-06    PT/INR - ( 06 Jul 2018 17:27 )   PT: 15.9 sec;   INR: 1.42          PTT - ( 06 Jul 2018 17:27 )  PTT:45.0 sec  The current medications were reviewed   MEDICATIONS  (STANDING):  artificial  tears Solution 1 Drop(s) Both EYES two times a day  chlorhexidine 0.12% Liquid 15 milliLiter(s) Swish and Spit every 12 hours  cisatracurium Infusion 3 MICROgram(s)/kG/Min (10.548 mL/Hr) IV Continuous <Continuous>  dextrose 5%. 1000 milliLiter(s) (50 mL/Hr) IV Continuous <Continuous>  dextrose 50% Injectable 50 milliLiter(s) IV Push every 15 minutes  dextrose 50% Injectable 25 milliLiter(s) IV Push every 15 minutes  epinephrine 8 milliGRAM(s),dextrose 5% in water 250 milliLiter(s) 8 milliGRAM(s) (10.42 mL/Hr) IV Continuous <Continuous>  fentaNYL   Infusion. 0.5 MICROgram(s)/kG/Hr (2.93 mL/Hr) IV Continuous <Continuous>  heparin  Infusion 200 Unit(s)/Hr (3 mL/Hr) IV Continuous <Continuous>  Heparin 77822 Unit(s),Dextrose 5% 1000 milliLiter(s) 25493 Unit(s) (25 mL/Hr) IV Continuous <Continuous>  hydrocortisone sodium succinate Injectable 50 milliGRAM(s) IV Push every 8 hours  insulin lispro (HumaLOG) corrective regimen sliding scale   SubCutaneous every 6 hours  meropenem  IVPB 1000 milliGRAM(s) IV Intermittent every 8 hours  micafungin IVPB 100 milliGRAM(s) IV Intermittent every 24 hours  milrinone Infusion 0.25 MICROgram(s)/kG/Min (4.395 mL/Hr) IV Continuous <Continuous>  nitroglycerin    2% Ointment 0.5 Inch(s) Transdermal daily  pantoprazole  Injectable 40 milliGRAM(s) IV Push daily  phenylephrine    Infusion 2 MICROgram(s)/kG/Min (21.975 mL/Hr) IV Continuous <Continuous>  potassium chloride  20 mEq/100 mL IVPB 20 milliEquivalent(s) IV Intermittent every 1 hour  potassium phosphate IVPB 15 milliMole(s) IV Intermittent once  propofol Infusion 5 MICROgram(s)/kG/Min (1.758 mL/Hr) IV Continuous <Continuous>  PureFlow Dialysate RFP-400 (K 2 / Ca 3) 5000 milliLiter(s) (1500 mL/Hr) CRRT <Continuous>  sodium bicarbonate  Infusion 0.254 mEq/kG/Hr (100 mL/Hr) IV Continuous <Continuous>  sodium chloride 0.9%. 1000 milliLiter(s) (10 mL/Hr) IV Continuous <Continuous>  vancomycin  IVPB 750 milliGRAM(s) IV Intermittent every 24 hours  vasopressin Infusion 0.017 Unit(s)/Min (1 mL/Hr) IV Continuous <Continuous>    MEDICATIONS  (PRN):  dextrose 40% Gel 15 Gram(s) Oral once PRN Blood Glucose LESS THAN 70 milliGRAM(s)/deciliter  glucagon  Injectable 1 milliGRAM(s) IntraMuscular once PRN Glucose LESS THAN 70 milligrams/deciliter  midazolam Injectable 2 milliGRAM(s) IV Push every 2 hours PRN agitation       PROBLEM LIST/ ASSESSMENT:  HEALTH ISSUES - PROBLEM Dx:  Shock: Shock  Anemia: Anemia  Hyponatremia: Hyponatremia  Acute kidney failure: Acute kidney failure  CAD (coronary artery disease): CAD (coronary artery disease)  Valvular disease: Valvular disease      ,   Patient is a 67y old  Male who presents with a chief complaint of AS (19 Jun 2018 00:06)     s/p   acute changes include acute respiratory failure    My plan includes :  close hemodynamic, ventilatory and drain monitoring and management per post op routine    Monitor for arrhythmias and monitor parameters for organ perfusion  monitor neurologic status  Head of the bed should remain elevated to 45 deg .   chest PT and IS will be encouraged  monitor adequacy of oxygenation and ventilation and attempt to wean oxygen  Nutritional goals will be met using po eventually , ensure adequate caloric intake and montior the same  Stress ulcer and VTE prophylaxis will be achieved    Glycemic control is satisfactory  Electrolytes have been repleted as necessary and wound care has been carried out. Pain control has been achieved.   agressive physical therapy and early mobility and ambulation goals will be met   The family was updated about the course and plan  CRITICAL CARE TIME SPENT in evaluation and management, reassessments, review and interpretation of labs and x-rays, ventilator and hemodynamic management, formulating a plan and coordinating care: ___90____ MIN.  Time does not include procedural time.  CTICU ATTENDING     					    Clement Ahn MD CTICU  CRITICAL  CARE  attending     Hand off received @ 7p					   Pertinent clinical, laboratory, radiographic, hemodynamic, echocardiographic, respiratory data, microbiologic data and chart were reviewed and analyzed frequently throughout the course of the day and night  Patient seen and examined with CTS/ SH attending at bedside    Pt is a 67y , Male,  pod # 15 s/p AVR; MV repair; TV repair;    post op:    cardiogenic shock  LV assist with Impella  acute renal failure on CRRT  Vent dependant    Currently    acute post hemorrhagic anemia  hyper biliruninemia  coagulopathy  hemodynamic instability  lactic acidosis    Shock: Shock  Anemia: Anemia  Hyponatremia: Hyponatremia  Acute kidney failure: Acute kidney failure  CAD (coronary artery disease): CAD (coronary artery disease)  Valvular disease: Valvular disease      , FAMILY HISTORY:  PAST MEDICAL & SURGICAL HISTORY:  No pertinent past medical history  No significant past surgical history    Patient is a 67y old  Male who presents with a chief complaint of AS (19 Jun 2018 00:06)      14 system review was unremarkable  acute changes include acute respiratory failure  Vital signs, hemodynamic and respiratory parameters were reviewed from the bedside nursing flowsheet.  ICU Vital Signs Last 24 Hrs  T(C): 36.1 (06 Jul 2018 22:23), Max: 36.7 (06 Jul 2018 09:00)  T(F): 96.9 (06 Jul 2018 22:23), Max: 98 (06 Jul 2018 09:00)  HR: 90 (06 Jul 2018 22:00) (80 - 91)  BP: --  BP(mean): --  ABP: 108/70 (06 Jul 2018 22:00) (86/64 - 126/71)  ABP(mean): 82 (06 Jul 2018 22:00) (68 - 90)  RR: 27 (06 Jul 2018 22:00) (20 - 31)  SpO2: 98% (06 Jul 2018 22:00) (72% - 100%)    Adult Advanced Hemodynamics Last 24 Hrs  CVP(mm Hg): 16 (06 Jul 2018 22:00) (14 - 39)  CVP(cm H2O): --  CO: --  CI: --  PA: --  PA(mean): --  PCWP: --  SVR: --  SVRI: --  PVR: --  PVRI: --, ABG - ( 06 Jul 2018 22:59 )  pH, Arterial: 7.52  pH, Blood: x     /  pCO2: 31    /  pO2: 166   / HCO3: 25    / Base Excess: 2.1   /  SaO2: 99                Mode: AC/ CMV (Assist Control/ Continuous Mandatory Ventilation)  RR (machine): 20  TV (machine): 480  FiO2: 60  PEEP: 8  ITime: 0.9  MAP: 19  PIP: 38    Intake and output was reviewed and the fluid balance was calculated  Daily     Daily   I&O's Summary    05 Jul 2018 07:01  -  06 Jul 2018 07:00  --------------------------------------------------------  IN: 5208.1 mL / OUT: 3784 mL / NET: 1424.1 mL    06 Jul 2018 07:01  -  06 Jul 2018 23:07  --------------------------------------------------------  IN: 2428.3 mL / OUT: 4738 mL / NET: -2309.7 mL        All lines and drain sites were assessed  [facility]      POCT Blood Glucose.: 146 mg/dL (06 Jul 2018 17:29)    No acute change in mental status  Auscultation of the chest reveals equal bs  Abdomen is soft  Extremities are warm and well perfused  Wounds appear clean and unremarkable  Antibiotics are periop    labs  CBC Full  -  ( 06 Jul 2018 17:27 )  WBC Count : 26.4 K/uL  Hemoglobin : 9.7 g/dL  Hematocrit : 27.3 %  Platelet Count - Automated : 80 K/uL  Mean Cell Volume : 83.5 fL  Mean Cell Hemoglobin : 29.7 pg  Mean Cell Hemoglobin Concentration : 35.5 g/dL  Auto Neutrophil # : x  Auto Lymphocyte # : x  Auto Monocyte # : x  Auto Eosinophil # : x  Auto Basophil # : x  Auto Neutrophil % : x  Auto Lymphocyte % : x  Auto Monocyte % : x  Auto Eosinophil % : x  Auto Basophil % : x    07-06    139  |  97  |  28<H>  ----------------------------<  151<H>  3.5   |  22  |  0.61    Ca    9.6      06 Jul 2018 17:27  Phos  1.2     07-06  Mg     1.9     07-06    TPro  4.9<L>  /  Alb  3.4  /  TBili  35.5<H>  /  DBili  x   /  AST  110<H>  /  ALT  45  /  AlkPhos  120  07-06    PT/INR - ( 06 Jul 2018 17:27 )   PT: 15.9 sec;   INR: 1.42          PTT - ( 06 Jul 2018 17:27 )  PTT:45.0 sec  The current medications were reviewed   MEDICATIONS  (STANDING):  artificial  tears Solution 1 Drop(s) Both EYES two times a day  chlorhexidine 0.12% Liquid 15 milliLiter(s) Swish and Spit every 12 hours  cisatracurium Infusion 3 MICROgram(s)/kG/Min (10.548 mL/Hr) IV Continuous <Continuous>  dextrose 5%. 1000 milliLiter(s) (50 mL/Hr) IV Continuous <Continuous>  dextrose 50% Injectable 50 milliLiter(s) IV Push every 15 minutes  dextrose 50% Injectable 25 milliLiter(s) IV Push every 15 minutes  epinephrine 8 milliGRAM(s),dextrose 5% in water 250 milliLiter(s) 8 milliGRAM(s) (10.42 mL/Hr) IV Continuous <Continuous>  fentaNYL   Infusion. 0.5 MICROgram(s)/kG/Hr (2.93 mL/Hr) IV Continuous <Continuous>  heparin  Infusion 200 Unit(s)/Hr (3 mL/Hr) IV Continuous <Continuous>  Heparin 48105 Unit(s),Dextrose 5% 1000 milliLiter(s) 53797 Unit(s) (25 mL/Hr) IV Continuous <Continuous>  hydrocortisone sodium succinate Injectable 50 milliGRAM(s) IV Push every 8 hours  insulin lispro (HumaLOG) corrective regimen sliding scale   SubCutaneous every 6 hours  meropenem  IVPB 1000 milliGRAM(s) IV Intermittent every 8 hours  micafungin IVPB 100 milliGRAM(s) IV Intermittent every 24 hours  milrinone Infusion 0.25 MICROgram(s)/kG/Min (4.395 mL/Hr) IV Continuous <Continuous>  nitroglycerin    2% Ointment 0.5 Inch(s) Transdermal daily  pantoprazole  Injectable 40 milliGRAM(s) IV Push daily  phenylephrine    Infusion 2 MICROgram(s)/kG/Min (21.975 mL/Hr) IV Continuous <Continuous>  potassium chloride  20 mEq/100 mL IVPB 20 milliEquivalent(s) IV Intermittent every 1 hour  potassium phosphate IVPB 15 milliMole(s) IV Intermittent once  propofol Infusion 5 MICROgram(s)/kG/Min (1.758 mL/Hr) IV Continuous <Continuous>  PureFlow Dialysate RFP-400 (K 2 / Ca 3) 5000 milliLiter(s) (1500 mL/Hr) CRRT <Continuous>  sodium bicarbonate  Infusion 0.254 mEq/kG/Hr (100 mL/Hr) IV Continuous <Continuous>  sodium chloride 0.9%. 1000 milliLiter(s) (10 mL/Hr) IV Continuous <Continuous>  vancomycin  IVPB 750 milliGRAM(s) IV Intermittent every 24 hours  vasopressin Infusion 0.017 Unit(s)/Min (1 mL/Hr) IV Continuous <Continuous>    MEDICATIONS  (PRN):  dextrose 40% Gel 15 Gram(s) Oral once PRN Blood Glucose LESS THAN 70 milliGRAM(s)/deciliter  glucagon  Injectable 1 milliGRAM(s) IntraMuscular once PRN Glucose LESS THAN 70 milligrams/deciliter  midazolam Injectable 2 milliGRAM(s) IV Push every 2 hours PRN agitation       PROBLEM LIST/ ASSESSMENT:  HEALTH ISSUES - PROBLEM Dx:  cardiogenic shock  lactic acidosis  acute post hemorrhagic anemia  coagulopathy  Shock: Shock  Anemia: Anemia  Hyponatremia: Hyponatremia  Acute kidney failure: Acute kidney failure  CAD (coronary artery disease): CAD (coronary artery disease)  Valvular disease: Valvular disease      ,   Patient is a 67y old  Male who presents with a chief complaint of AS (19 Jun 2018 00:06)     s/p AVR; MV/TV repair  acute changes include acute respiratory failure    My plan includes :  close hemodynamic, ventilatory and drain monitoring and management per post op routine    Monitor for arrhythmias and monitor parameters for organ perfusion  monitor neurologic status  Head of the bed should remain elevated to 45 deg .   chest PT and IS will be encouraged  monitor adequacy of oxygenation and ventilation and attempt to wean oxygen  Nutritional goals will be met using po eventually , ensure adequate caloric intake and montior the same  Stress ulcer and VTE prophylaxis will be achieved    Glycemic control is satisfactory  Electrolytes have been repleted as necessary and wound care has been carried out. Pain control has been achieved.   agressive physical therapy and early mobility and ambulation goals will be met   The family was updated about the course and plan  CRITICAL CARE TIME SPENT in evaluation and management, reassessments, review and interpretation of labs and x-rays, ventilator and hemodynamic management, formulating a plan and coordinating care: ___30____ MIN.  Time does not include procedural time.  CTICU ATTENDING     					    Rai Singer MD

## 2018-07-06 NOTE — PROGRESS NOTE ADULT - SUBJECTIVE AND OBJECTIVE BOX
Seen in the morning, still intubated, still requiring pressor support. S/p surgery closing the chest wall, done on 7/2 - started on CVVHD after  that. In the past 24 hours 3 liters off from the CVVHD, in the morning on 300 ml/min, blood flow on 150 ml/min and dialysate flow on 2 liters/h.  Patient seen and examined at bedside.       artificial  tears Solution 1 Drop(s) two times a day  chlorhexidine 0.12% Liquid 15 milliLiter(s) every 12 hours  cisatracurium Infusion 3 MICROgram(s)/kG/Min <Continuous>  dextrose 40% Gel 15 Gram(s) once PRN  dextrose 5%. 1000 milliLiter(s) <Continuous>  dextrose 50% Injectable 50 milliLiter(s) every 15 minutes  dextrose 50% Injectable 25 milliLiter(s) every 15 minutes  epinephrine 8 milliGRAM(s),dextrose 5% in water 250 milliLiter(s) 8 milliGRAM(s) <Continuous>  fentaNYL   Infusion. 0.5 MICROgram(s)/kG/Hr <Continuous>  glucagon  Injectable 1 milliGRAM(s) once PRN  heparin  Infusion 200 Unit(s)/Hr <Continuous>  Heparin 58674 Unit(s),Dextrose 5% 1000 milliLiter(s) 99221 Unit(s) <Continuous>  hydrocortisone sodium succinate Injectable 50 milliGRAM(s) every 8 hours  insulin lispro (HumaLOG) corrective regimen sliding scale   every 6 hours  meropenem  IVPB 1000 milliGRAM(s) every 8 hours  micafungin IVPB 100 milliGRAM(s) every 24 hours  midazolam Injectable 2 milliGRAM(s) every 2 hours PRN  milrinone Infusion 0.25 MICROgram(s)/kG/Min <Continuous>  nitroglycerin    2% Ointment 0.5 Inch(s) daily  pantoprazole  Injectable 40 milliGRAM(s) daily  phenylephrine    Infusion 2 MICROgram(s)/kG/Min <Continuous>  propofol Infusion 5 MICROgram(s)/kG/Min <Continuous>  PureFlow Dialysate RFP-400 (K 2 / Ca 3) 5000 milliLiter(s) <Continuous>  sodium bicarbonate  Infusion 0.254 mEq/kG/Hr <Continuous>  sodium chloride 0.9%. 1000 milliLiter(s) <Continuous>  vancomycin  IVPB 750 milliGRAM(s) every 24 hours  vasopressin Infusion 0.017 Unit(s)/Min <Continuous>      Allergies    penicillin (Rash)    Intolerances        T(C): , Max: 36.7 (07-06-18 @ 09:00)  T(F): , Max: 98 (07-06-18 @ 09:00)  HR: 90 (07-06-18 @ 11:00)  BP: --  BP(mean): --  RR: 20 (07-06-18 @ 11:00)  SpO2: 96% (07-06-18 @ 11:00)  Wt(kg): --    07-05 @ 07:01  -  07-06 @ 07:00  --------------------------------------------------------  IN:    Albumin 5%  - 250 mL: 500 mL    cisatracurium Infusion: 56.6 mL    fentaNYL Infusion.: 32.2 mL    freetext medication -  Infusion: 121.7 mL    heparin Infusion: 45 mL    Jevity: 690 mL    milrinone  Infusion: 105.6 mL    Packed Red Blood Cells: 320 mL    phenylephrine   Infusion: 396.2 mL    Platelets - Single Donor: 300 mL    propofol Infusion: 84.8 mL    sodium bicarbonate  Infusion: 800 mL    sodium chloride 0.9%.: 240 mL    Solution: 1500 mL    vasopressin Infusion: 16 mL  Total IN: 5208.1 mL    OUT:    Bulb: 90 mL    Chest Tube: 270 mL    Chest Tube: 20 mL    Indwelling Catheter - Urethral: 10 mL    Other: 3384 mL    VAC (Vacuum Assisted Closure) System: 10 mL  Total OUT: 3784 mL    Total NET: 1424.1 mL      07-06 @ 07:01  -  07-06 @ 11:06  --------------------------------------------------------  IN:    freetext medication -  Infusion: 17.5 mL    heparin Infusion: 15 mL    Jevity: 60 mL    milrinone  Infusion: 17.6 mL    phenylephrine   Infusion: 53.2 mL    propofol Infusion: 10.5 mL    sodium bicarbonate  Infusion: 250 mL    sodium chloride 0.9%.: 40 mL    Solution: 450 mL    vasopressin Infusion: 8 mL  Total IN: 921.8 mL    OUT:    Chest Tube: 30 mL    Indwelling Catheter - Urethral: 5 mL    Other: 1141 mL  Total OUT: 1176 mL    Total NET: -254.2 mL      Physical exam:   Intubated and sedated   ANASARCA   difficult to evaluate for JVD   Chest: s/p thoractomy, closed chest  multiple drainages from the chest   RRR, normal s1/s2, systolic murmur  Abdomen - swollen, not tender, not distended   Extremities 3+ peripheral edema  Hd cathter - on the left IJ  Desai         LABS:                        10.2   20.1  )-----------( 73       ( 06 Jul 2018 08:53 )             28.8     07-06    135  |  94<L>  |  28<H>  ----------------------------<  197<H>  3.6   |  21<L>  |  0.61    Ca    9.8      06 Jul 2018 08:53  Phos  2.2     07-05  Mg     2.2     07-06    TPro  4.7<L>  /  Alb  3.9  /  TBili  32.4<H>  /  DBili  x   /  AST  94<H>  /  ALT  41  /  AlkPhos  113  07-06      PT/INR - ( 06 Jul 2018 08:53 )   PT: 14.9 sec;   INR: 1.33          PTT - ( 06 Jul 2018 08:53 )  PTT:39.9 sec          RADIOLOGY & ADDITIONAL STUDIES:

## 2018-07-06 NOTE — PROGRESS NOTE ADULT - SUBJECTIVE AND OBJECTIVE BOX
I had a lengthy discussion with Hernando, the son and health care proxy.  Explained that his father was secerely critical condition with persistent lacidosis and concerned for overall survival.  However, recommended continued aggressive medical therapy at this point as there is some chance , however small, for survival.  Explained the impella pump is assisting his circulation and will continue. I had spoken to Hernando with the patient before surgery regarding the approximately 15-20% overall mortality. The son was grateful for the care.

## 2018-07-06 NOTE — PROGRESS NOTE ADULT - PROBLEM SELECTOR PLAN 1
- the oliguric EMILY due to ATN - most likely due to cardiogenic shock, can not exclude also a septic shock, requiring pressors - epinephrine phenylephrine, on milrinone   - oliguric in the morning - anuric   - now on CVVHd - see above more details, it is better to be on 1.5 liters of dialysate per h, the patient is 60 kg and the recommendations are to be on 25 ml/kg/h of dialysate   - the patient with overt fluid overload - anasarca  - renal diet when he is not NPO  - hypophosphatemia - not recent phosphate level - please obtain current level   - potassium 3.6 - please continue to follow up   - lactate trending - we will continue with CVVHD, the ultimate treatment for lactic acidosis is treatment of the cause for it. In this case treatment of the shock   - the patient with mild metabolic alkalosis - please reconsider the need for bicarbonate drip  - in and out

## 2018-07-06 NOTE — PROGRESS NOTE ADULT - ASSESSMENT
The patient is 67 year old male with PMH stated above, now s/p  Repair of AA, AVR, MV repair, TV repair, CABG x2, now in shock requiring pressor support - mutiple pressors. The patient with deterioration of the renal function in the setting of the shock - oliguric, no response from the diuretics. The CT ICU team started the patient on aquaphoresis and the CVVHD ordered, never been started from the primary team the BP unstable. The cvvhd started yesterday - 7/2 good tolerance so far. In the past 24 hours the UF was increased to 300 ml/min, the dialysate flow now on 2 liters per hour and the blood flow on 150 ml/min. no clotting issues overnight. Still on pressors

## 2018-07-07 LAB
ALBUMIN SERPL ELPH-MCNC: 3.1 G/DL — LOW (ref 3.3–5)
ALBUMIN SERPL ELPH-MCNC: 3.2 G/DL — LOW (ref 3.3–5)
ALBUMIN SERPL ELPH-MCNC: 3.4 G/DL — SIGNIFICANT CHANGE UP (ref 3.3–5)
ALP SERPL-CCNC: 101 U/L — SIGNIFICANT CHANGE UP (ref 40–120)
ALP SERPL-CCNC: 108 U/L — SIGNIFICANT CHANGE UP (ref 40–120)
ALP SERPL-CCNC: 120 U/L — SIGNIFICANT CHANGE UP (ref 40–120)
ALT FLD-CCNC: 47 U/L — HIGH (ref 10–45)
ANION GAP SERPL CALC-SCNC: 16 MMOL/L — SIGNIFICANT CHANGE UP (ref 5–17)
ANION GAP SERPL CALC-SCNC: 17 MMOL/L — SIGNIFICANT CHANGE UP (ref 5–17)
ANION GAP SERPL CALC-SCNC: 18 MMOL/L — HIGH (ref 5–17)
ANION GAP SERPL CALC-SCNC: 19 MMOL/L — HIGH (ref 5–17)
APTT BLD: 48.9 SEC — HIGH (ref 27.5–37.4)
APTT BLD: 54.1 SEC — HIGH (ref 27.5–37.4)
APTT BLD: 54.2 SEC — HIGH (ref 27.5–37.4)
AST SERPL-CCNC: 108 U/L — HIGH (ref 10–40)
AST SERPL-CCNC: 112 U/L — HIGH (ref 10–40)
AST SERPL-CCNC: 116 U/L — HIGH (ref 10–40)
BASE EXCESS BLDA CALC-SCNC: 2 MMOL/L — SIGNIFICANT CHANGE UP (ref -2–3)
BASE EXCESS BLDV CALC-SCNC: 4 MMOL/L — SIGNIFICANT CHANGE UP
BILIRUB DIRECT SERPL-MCNC: >10 MG/DL — HIGH (ref 0–0.2)
BILIRUB INDIRECT FLD-MCNC: <24.3 MG/DL — HIGH (ref 0.2–1)
BILIRUB SERPL-MCNC: 33.7 MG/DL — HIGH (ref 0.2–1.2)
BILIRUB SERPL-MCNC: 34.3 MG/DL — HIGH (ref 0.2–1.2)
BILIRUB SERPL-MCNC: 35.8 MG/DL — HIGH (ref 0.2–1.2)
BUN SERPL-MCNC: 24 MG/DL — HIGH (ref 7–23)
BUN SERPL-MCNC: 24 MG/DL — HIGH (ref 7–23)
BUN SERPL-MCNC: 25 MG/DL — HIGH (ref 7–23)
BUN SERPL-MCNC: 27 MG/DL — HIGH (ref 7–23)
CALCIUM SERPL-MCNC: 9.5 MG/DL — SIGNIFICANT CHANGE UP (ref 8.4–10.5)
CALCIUM SERPL-MCNC: 9.6 MG/DL — SIGNIFICANT CHANGE UP (ref 8.4–10.5)
CALCIUM SERPL-MCNC: 9.7 MG/DL — SIGNIFICANT CHANGE UP (ref 8.4–10.5)
CALCIUM SERPL-MCNC: 9.9 MG/DL — SIGNIFICANT CHANGE UP (ref 8.4–10.5)
CHLORIDE SERPL-SCNC: 95 MMOL/L — LOW (ref 96–108)
CHLORIDE SERPL-SCNC: 97 MMOL/L — SIGNIFICANT CHANGE UP (ref 96–108)
CHLORIDE SERPL-SCNC: 98 MMOL/L — SIGNIFICANT CHANGE UP (ref 96–108)
CHLORIDE SERPL-SCNC: 98 MMOL/L — SIGNIFICANT CHANGE UP (ref 96–108)
CO2 SERPL-SCNC: 23 MMOL/L — SIGNIFICANT CHANGE UP (ref 22–31)
CO2 SERPL-SCNC: 24 MMOL/L — SIGNIFICANT CHANGE UP (ref 22–31)
CREAT SERPL-MCNC: 0.52 MG/DL — SIGNIFICANT CHANGE UP (ref 0.5–1.3)
CREAT SERPL-MCNC: 0.53 MG/DL — SIGNIFICANT CHANGE UP (ref 0.5–1.3)
CREAT SERPL-MCNC: 0.57 MG/DL — SIGNIFICANT CHANGE UP (ref 0.5–1.3)
CREAT SERPL-MCNC: 0.6 MG/DL — SIGNIFICANT CHANGE UP (ref 0.5–1.3)
CULTURE RESULTS: SIGNIFICANT CHANGE UP
GAS PNL BLDA: SIGNIFICANT CHANGE UP
GAS PNL BLDV: SIGNIFICANT CHANGE UP
GLUCOSE BLDC GLUCOMTR-MCNC: 155 MG/DL — HIGH (ref 70–99)
GLUCOSE BLDC GLUCOMTR-MCNC: 161 MG/DL — HIGH (ref 70–99)
GLUCOSE SERPL-MCNC: 143 MG/DL — HIGH (ref 70–99)
GLUCOSE SERPL-MCNC: 148 MG/DL — HIGH (ref 70–99)
GLUCOSE SERPL-MCNC: 152 MG/DL — HIGH (ref 70–99)
GLUCOSE SERPL-MCNC: 168 MG/DL — HIGH (ref 70–99)
HAPTOGLOB SERPL-MCNC: <10 MG/DL — LOW (ref 34–200)
HCO3 BLDA-SCNC: 25 MMOL/L — SIGNIFICANT CHANGE UP (ref 21–28)
HCO3 BLDV-SCNC: 29 MMOL/L — HIGH (ref 20–27)
HCT VFR BLD CALC: 24.2 % — LOW (ref 39–50)
HCT VFR BLD CALC: 25.8 % — LOW (ref 39–50)
HCT VFR BLD CALC: 27.3 % — LOW (ref 39–50)
HGB BLD-MCNC: 8.9 G/DL — LOW (ref 13–17)
HGB BLD-MCNC: 9 G/DL — LOW (ref 13–17)
HGB BLD-MCNC: 9.7 G/DL — LOW (ref 13–17)
INR BLD: 1.44 — HIGH (ref 0.88–1.16)
INR BLD: 1.44 — HIGH (ref 0.88–1.16)
INR BLD: 1.45 — HIGH (ref 0.88–1.16)
LACTATE SERPL-SCNC: 2.3 MMOL/L — HIGH (ref 0.5–2)
LACTATE SERPL-SCNC: 2.7 MMOL/L — HIGH (ref 0.5–2)
LACTATE SERPL-SCNC: 3.4 MMOL/L — HIGH (ref 0.5–2)
LACTATE SERPL-SCNC: 3.6 MMOL/L — HIGH (ref 0.5–2)
LDH SERPL L TO P-CCNC: 1274 U/L — HIGH (ref 50–242)
MAGNESIUM SERPL-MCNC: 2 MG/DL — SIGNIFICANT CHANGE UP (ref 1.6–2.6)
MAGNESIUM SERPL-MCNC: 2.2 MG/DL — SIGNIFICANT CHANGE UP (ref 1.6–2.6)
MAGNESIUM SERPL-MCNC: 2.6 MG/DL — SIGNIFICANT CHANGE UP (ref 1.6–2.6)
MCHC RBC-ENTMCNC: 29.3 PG — SIGNIFICANT CHANGE UP (ref 27–34)
MCHC RBC-ENTMCNC: 29.7 PG — SIGNIFICANT CHANGE UP (ref 27–34)
MCHC RBC-ENTMCNC: 30.4 PG — SIGNIFICANT CHANGE UP (ref 27–34)
MCHC RBC-ENTMCNC: 34.9 G/DL — SIGNIFICANT CHANGE UP (ref 32–36)
MCHC RBC-ENTMCNC: 35.5 G/DL — SIGNIFICANT CHANGE UP (ref 32–36)
MCHC RBC-ENTMCNC: 36.8 G/DL — HIGH (ref 32–36)
MCV RBC AUTO: 82.6 FL — SIGNIFICANT CHANGE UP (ref 80–100)
MCV RBC AUTO: 83.5 FL — SIGNIFICANT CHANGE UP (ref 80–100)
MCV RBC AUTO: 84 FL — SIGNIFICANT CHANGE UP (ref 80–100)
NRBC # BLD: 47 /100 WBCS — HIGH (ref 0–0)
PCO2 BLDA: 35 MMHG — SIGNIFICANT CHANGE UP (ref 35–48)
PCO2 BLDV: 43 MMHG — SIGNIFICANT CHANGE UP (ref 41–51)
PH BLDA: 7.48 — HIGH (ref 7.35–7.45)
PH BLDV: 7.44 — HIGH (ref 7.32–7.43)
PHOSPHATE SERPL-MCNC: 1.1 MG/DL — LOW (ref 2.5–4.5)
PHOSPHATE SERPL-MCNC: 1.4 MG/DL — LOW (ref 2.5–4.5)
PHOSPHATE SERPL-MCNC: 1.9 MG/DL — LOW (ref 2.5–4.5)
PLATELET # BLD AUTO: 36 K/UL — LOW (ref 150–400)
PLATELET # BLD AUTO: 42 K/UL — LOW (ref 150–400)
PLATELET # BLD AUTO: 51 K/UL — LOW (ref 150–400)
PO2 BLDA: 99 MMHG — SIGNIFICANT CHANGE UP (ref 83–108)
PO2 BLDV: 39 MMHG — SIGNIFICANT CHANGE UP
POTASSIUM SERPL-MCNC: 3.2 MMOL/L — LOW (ref 3.5–5.3)
POTASSIUM SERPL-MCNC: 3.6 MMOL/L — SIGNIFICANT CHANGE UP (ref 3.5–5.3)
POTASSIUM SERPL-MCNC: 3.7 MMOL/L — SIGNIFICANT CHANGE UP (ref 3.5–5.3)
POTASSIUM SERPL-MCNC: 4 MMOL/L — SIGNIFICANT CHANGE UP (ref 3.5–5.3)
POTASSIUM SERPL-SCNC: 3.2 MMOL/L — LOW (ref 3.5–5.3)
POTASSIUM SERPL-SCNC: 3.6 MMOL/L — SIGNIFICANT CHANGE UP (ref 3.5–5.3)
POTASSIUM SERPL-SCNC: 3.7 MMOL/L — SIGNIFICANT CHANGE UP (ref 3.5–5.3)
POTASSIUM SERPL-SCNC: 4 MMOL/L — SIGNIFICANT CHANGE UP (ref 3.5–5.3)
PROT SERPL-MCNC: 4.7 G/DL — LOW (ref 6–8.3)
PROT SERPL-MCNC: 4.7 G/DL — LOW (ref 6–8.3)
PROT SERPL-MCNC: 4.8 G/DL — LOW (ref 6–8.3)
PROTHROM AB SERPL-ACNC: 16.1 SEC — HIGH (ref 9.8–12.7)
PROTHROM AB SERPL-ACNC: 16.1 SEC — HIGH (ref 9.8–12.7)
PROTHROM AB SERPL-ACNC: 16.2 SEC — HIGH (ref 9.8–12.7)
RBC # BLD: 2.93 M/UL — LOW (ref 4.2–5.8)
RBC # BLD: 3 M/UL — LOW (ref 4.2–5.8)
RBC # BLD: 3.07 M/UL — LOW (ref 4.2–5.8)
RBC # BLD: 3.27 M/UL — LOW (ref 4.2–5.8)
RBC # FLD: 18.1 % — HIGH (ref 10.3–16.9)
RBC # FLD: 18.5 % — HIGH (ref 10.3–16.9)
RBC # FLD: 18.5 % — HIGH (ref 10.3–16.9)
RETICS/RBC NFR: 5.7 % — HIGH (ref 0.5–2.5)
SAO2 % BLDA: 98 % — SIGNIFICANT CHANGE UP (ref 95–100)
SAO2 % BLDV: 79 % — SIGNIFICANT CHANGE UP
SODIUM SERPL-SCNC: 137 MMOL/L — SIGNIFICANT CHANGE UP (ref 135–145)
SODIUM SERPL-SCNC: 137 MMOL/L — SIGNIFICANT CHANGE UP (ref 135–145)
SODIUM SERPL-SCNC: 138 MMOL/L — SIGNIFICANT CHANGE UP (ref 135–145)
SODIUM SERPL-SCNC: 139 MMOL/L — SIGNIFICANT CHANGE UP (ref 135–145)
SPECIMEN SOURCE: SIGNIFICANT CHANGE UP
VANCOMYCIN TROUGH SERPL-MCNC: 11.8 UG/ML — SIGNIFICANT CHANGE UP (ref 10–20)
WBC # BLD: 19 K/UL — HIGH (ref 3.8–10.5)
WBC # BLD: 20.9 K/UL — HIGH (ref 3.8–10.5)
WBC # BLD: 22.4 K/UL — HIGH (ref 3.8–10.5)
WBC # BLD: 23.8 K/UL — HIGH (ref 3.8–10.5)
WBC # FLD AUTO: 19 K/UL — HIGH (ref 3.8–10.5)
WBC # FLD AUTO: 20.9 K/UL — HIGH (ref 3.8–10.5)
WBC # FLD AUTO: 22.4 K/UL — HIGH (ref 3.8–10.5)
WBC # FLD AUTO: 23.8 K/UL — HIGH (ref 3.8–10.5)

## 2018-07-07 PROCEDURE — 99291 CRITICAL CARE FIRST HOUR: CPT

## 2018-07-07 PROCEDURE — 99233 SBSQ HOSP IP/OBS HIGH 50: CPT

## 2018-07-07 PROCEDURE — 99292 CRITICAL CARE ADDL 30 MIN: CPT

## 2018-07-07 PROCEDURE — 71045 X-RAY EXAM CHEST 1 VIEW: CPT | Mod: 26,77

## 2018-07-07 PROCEDURE — 71045 X-RAY EXAM CHEST 1 VIEW: CPT | Mod: 26

## 2018-07-07 RX ORDER — ALBUMIN HUMAN 25 %
50 VIAL (ML) INTRAVENOUS
Qty: 0 | Refills: 0 | Status: COMPLETED | OUTPATIENT
Start: 2018-07-07 | End: 2018-07-07

## 2018-07-07 RX ORDER — POTASSIUM CHLORIDE 20 MEQ
20 PACKET (EA) ORAL ONCE
Qty: 0 | Refills: 0 | Status: COMPLETED | OUTPATIENT
Start: 2018-07-07 | End: 2018-07-07

## 2018-07-07 RX ORDER — CALCIUM GLUCONATE 100 MG/ML
2 VIAL (ML) INTRAVENOUS ONCE
Qty: 0 | Refills: 0 | Status: COMPLETED | OUTPATIENT
Start: 2018-07-07 | End: 2018-07-07

## 2018-07-07 RX ORDER — DEXMEDETOMIDINE HYDROCHLORIDE IN 0.9% SODIUM CHLORIDE 4 UG/ML
0.5 INJECTION INTRAVENOUS
Qty: 200 | Refills: 0 | Status: DISCONTINUED | OUTPATIENT
Start: 2018-07-07 | End: 2018-07-09

## 2018-07-07 RX ORDER — FUROSEMIDE 40 MG
100 TABLET ORAL ONCE
Qty: 0 | Refills: 0 | Status: COMPLETED | OUTPATIENT
Start: 2018-07-07 | End: 2018-07-07

## 2018-07-07 RX ORDER — METOCLOPRAMIDE HCL 10 MG
10 TABLET ORAL EVERY 8 HOURS
Qty: 0 | Refills: 0 | Status: COMPLETED | OUTPATIENT
Start: 2018-07-07 | End: 2018-07-09

## 2018-07-07 RX ADMIN — Medication 1 DROP(S): at 05:48

## 2018-07-07 RX ADMIN — Medication 0.5 INCH(S): at 12:43

## 2018-07-07 RX ADMIN — Medication 200 GRAM(S): at 06:41

## 2018-07-07 RX ADMIN — MIDAZOLAM HYDROCHLORIDE 2 MILLIGRAM(S): 1 INJECTION, SOLUTION INTRAMUSCULAR; INTRAVENOUS at 06:39

## 2018-07-07 RX ADMIN — PROPOFOL 1.76 MICROGRAM(S)/KG/MIN: 10 INJECTION, EMULSION INTRAVENOUS at 16:07

## 2018-07-07 RX ADMIN — Medication 50 MILLILITER(S): at 06:38

## 2018-07-07 RX ADMIN — MILRINONE LACTATE 4.39 MICROGRAM(S)/KG/MIN: 1 INJECTION, SOLUTION INTRAVENOUS at 16:09

## 2018-07-07 RX ADMIN — CHLORHEXIDINE GLUCONATE 15 MILLILITER(S): 213 SOLUTION TOPICAL at 17:26

## 2018-07-07 RX ADMIN — MEROPENEM 100 MILLIGRAM(S): 1 INJECTION INTRAVENOUS at 14:03

## 2018-07-07 RX ADMIN — MICAFUNGIN SODIUM 105 MILLIGRAM(S): 100 INJECTION, POWDER, LYOPHILIZED, FOR SOLUTION INTRAVENOUS at 11:15

## 2018-07-07 RX ADMIN — MEROPENEM 100 MILLIGRAM(S): 1 INJECTION INTRAVENOUS at 05:47

## 2018-07-07 RX ADMIN — Medication 120 MILLIGRAM(S): at 01:03

## 2018-07-07 RX ADMIN — Medication 50 MILLIGRAM(S): at 05:47

## 2018-07-07 RX ADMIN — Medication 50 MILLIGRAM(S): at 13:33

## 2018-07-07 RX ADMIN — MIDAZOLAM HYDROCHLORIDE 2 MILLIGRAM(S): 1 INJECTION, SOLUTION INTRAMUSCULAR; INTRAVENOUS at 09:33

## 2018-07-07 RX ADMIN — Medication 2: at 17:26

## 2018-07-07 RX ADMIN — Medication 50 MILLIGRAM(S): at 21:06

## 2018-07-07 RX ADMIN — PHENYLEPHRINE HYDROCHLORIDE 21.98 MICROGRAM(S)/KG/MIN: 10 INJECTION INTRAVENOUS at 16:09

## 2018-07-07 RX ADMIN — VASOPRESSIN 1 UNIT(S)/MIN: 20 INJECTION INTRAVENOUS at 16:06

## 2018-07-07 RX ADMIN — Medication 10 MILLIGRAM(S): at 21:06

## 2018-07-07 RX ADMIN — Medication 100 MILLIEQUIVALENT(S): at 21:05

## 2018-07-07 RX ADMIN — POTASSIUM PHOSPHATE, MONOBASIC POTASSIUM PHOSPHATE, DIBASIC 62.5 MILLIMOLE(S): 236; 224 INJECTION, SOLUTION INTRAVENOUS at 00:31

## 2018-07-07 RX ADMIN — Medication 1 DROP(S): at 17:27

## 2018-07-07 RX ADMIN — Medication 250 MILLIGRAM(S): at 12:44

## 2018-07-07 RX ADMIN — Medication 50 MILLILITER(S): at 06:39

## 2018-07-07 RX ADMIN — MEROPENEM 100 MILLIGRAM(S): 1 INJECTION INTRAVENOUS at 21:05

## 2018-07-07 RX ADMIN — CHLORHEXIDINE GLUCONATE 15 MILLILITER(S): 213 SOLUTION TOPICAL at 05:47

## 2018-07-07 RX ADMIN — PANTOPRAZOLE SODIUM 40 MILLIGRAM(S): 20 TABLET, DELAYED RELEASE ORAL at 12:43

## 2018-07-07 RX ADMIN — Medication 2: at 05:46

## 2018-07-07 RX ADMIN — MIDAZOLAM HYDROCHLORIDE 2 MILLIGRAM(S): 1 INJECTION, SOLUTION INTRAMUSCULAR; INTRAVENOUS at 03:15

## 2018-07-07 NOTE — PROGRESS NOTE ADULT - SUBJECTIVE AND OBJECTIVE BOX
CTICU  CRITICAL  CARE  attending     Hand off received 					   Pertinent clinical, laboratory, radiographic, hemodynamic, echocardiographic, respiratory data, microbiologic data and chart were reviewed and analyzed frequently throughout the course of the day and night  Patient seen and examined with CTS/ SH attending at bedside  Pt is a 67y , Male, HEALTH ISSUES - PROBLEM Dx:  Shock: Shock  Anemia: Anemia  Hyponatremia: Hyponatremia  Acute kidney failure: Acute kidney failure  CAD (coronary artery disease): CAD (coronary artery disease)  Valvular disease: Valvular disease      , FAMILY HISTORY:  PAST MEDICAL & SURGICAL HISTORY:  No pertinent past medical history  No significant past surgical history    Patient is a 67y old  Male who presents with a chief complaint of AS (19 Jun 2018 00:06)      14 system review was unremarkable  acute changes include acute respiratory failure  Vital signs, hemodynamic and respiratory parameters were reviewed from the bedside nursing flowsheet.  ICU Vital Signs Last 24 Hrs  T(C): 35.6 (07 Jul 2018 13:25), Max: 36.5 (06 Jul 2018 14:00)  T(F): 96 (07 Jul 2018 13:25), Max: 97.7 (06 Jul 2018 14:00)  HR: 91 (07 Jul 2018 13:09) (90 - 91)  BP: --  BP(mean): --  ABP: 110/72 (07 Jul 2018 13:00) (90/60 - 130/78)  ABP(mean): 82 (07 Jul 2018 13:00) (70 - 98)  RR: 19 (07 Jul 2018 08:35) (16 - 27)  SpO2: 96% (07 Jul 2018 13:09) (94% - 99%)    Adult Advanced Hemodynamics Last 24 Hrs  CVP(mm Hg): 26 (07 Jul 2018 13:00) (13 - 26)  CVP(cm H2O): --  CO: --  CI: --  PA: --  PA(mean): --  PCWP: --  SVR: --  SVRI: --  PVR: --  PVRI: --, ABG - ( 07 Jul 2018 09:53 )  pH, Arterial: 7.47  pH, Blood: x     /  pCO2: 35    /  pO2: 155   / HCO3: 25    / Base Excess: 1.4   /  SaO2: 100               Mode: AC/ CMV (Assist Control/ Continuous Mandatory Ventilation)  RR (machine): 14  TV (machine): 450  FiO2: 40  PEEP: 8  ITime: 0.9  MAP: 16  PIP: 33    Intake and output was reviewed and the fluid balance was calculated  Daily     Daily   I&O's Summary    06 Jul 2018 07:01  -  07 Jul 2018 07:00  --------------------------------------------------------  IN: 3772.1 mL / OUT: 6527 mL / NET: -2754.9 mL    07 Jul 2018 07:01  -  07 Jul 2018 13:33  --------------------------------------------------------  IN: 831.5 mL / OUT: 1019 mL / NET: -187.5 mL        All lines and drain sites were assessed  [facility]      POCT Blood Glucose.: 155 mg/dL (07 Jul 2018 05:43)    No acute change in mental status  Auscultation of the chest reveals equal bs  Abdomen is soft  Extremities are warm and well perfused  Wounds appear clean and unremarkable  Antibiotics are periop    labs  CBC Full  -  ( 07 Jul 2018 09:55 )  WBC Count : 22.4 K/uL  Hemoglobin : 9.0 g/dL  Hematocrit : 25.8 %  Platelet Count - Automated : 36 K/uL  Mean Cell Volume : 84.0 fL  Mean Cell Hemoglobin : 29.3 pg  Mean Cell Hemoglobin Concentration : 34.9 g/dL  Auto Neutrophil # : x  Auto Lymphocyte # : x  Auto Monocyte # : x  Auto Eosinophil # : x  Auto Basophil # : x  Auto Neutrophil % : x  Auto Lymphocyte % : x  Auto Monocyte % : x  Auto Eosinophil % : x  Auto Basophil % : x    07-07    138  |  97  |  24<H>  ----------------------------<  143<H>  3.6   |  23  |  0.53    Ca    9.9      07 Jul 2018 09:55  Phos  1.9     07-07  Mg     2.2     07-07    TPro  4.7<L>  /  Alb  3.4  /  TBili  34.3<H>  /  DBili  >10.0<H>  /  AST  112<H>  /  ALT  47<H>  /  AlkPhos  101  07-07    PT/INR - ( 07 Jul 2018 09:55 )   PT: 16.1 sec;   INR: 1.44          PTT - ( 07 Jul 2018 09:55 )  PTT:54.2 sec  The current medications were reviewed   MEDICATIONS  (STANDING):  artificial  tears Solution 1 Drop(s) Both EYES two times a day  chlorhexidine 0.12% Liquid 15 milliLiter(s) Swish and Spit every 12 hours  cisatracurium Infusion 3 MICROgram(s)/kG/Min (10.548 mL/Hr) IV Continuous <Continuous>  dextrose 5%. 1000 milliLiter(s) (25 mL/Hr) IV Continuous <Continuous>  dextrose 50% Injectable 50 milliLiter(s) IV Push every 15 minutes  dextrose 50% Injectable 25 milliLiter(s) IV Push every 15 minutes  epinephrine 8 milliGRAM(s),dextrose 5% in water 250 milliLiter(s) 8 milliGRAM(s) (10.42 mL/Hr) IV Continuous <Continuous>  heparin  Infusion 200 Unit(s)/Hr (3 mL/Hr) IV Continuous <Continuous>  Heparin 94771 Unit(s),Dextrose 5% 1000 milliLiter(s) 01111 Unit(s) (25 mL/Hr) IV Continuous <Continuous>  hydrocortisone sodium succinate Injectable 50 milliGRAM(s) IV Push every 8 hours  insulin lispro (HumaLOG) corrective regimen sliding scale   SubCutaneous every 6 hours  meropenem  IVPB 1000 milliGRAM(s) IV Intermittent every 8 hours  micafungin IVPB 100 milliGRAM(s) IV Intermittent every 24 hours  milrinone Infusion 0.25 MICROgram(s)/kG/Min (4.395 mL/Hr) IV Continuous <Continuous>  pantoprazole  Injectable 40 milliGRAM(s) IV Push daily  phenylephrine    Infusion 2 MICROgram(s)/kG/Min (21.975 mL/Hr) IV Continuous <Continuous>  propofol Infusion 5 MICROgram(s)/kG/Min (1.758 mL/Hr) IV Continuous <Continuous>  PureFlow Dialysate RFP-400 (K 2 / Ca 3) 5000 milliLiter(s) (1500 mL/Hr) CRRT <Continuous>  sodium bicarbonate  Infusion 0.254 mEq/kG/Hr (100 mL/Hr) IV Continuous <Continuous>  sodium chloride 0.9%. 1000 milliLiter(s) (10 mL/Hr) IV Continuous <Continuous>  vancomycin  IVPB 750 milliGRAM(s) IV Intermittent every 24 hours  vasopressin Infusion 0.017 Unit(s)/Min (1 mL/Hr) IV Continuous <Continuous>    MEDICATIONS  (PRN):  dextrose 40% Gel 15 Gram(s) Oral once PRN Blood Glucose LESS THAN 70 milliGRAM(s)/deciliter  glucagon  Injectable 1 milliGRAM(s) IntraMuscular once PRN Glucose LESS THAN 70 milligrams/deciliter  midazolam Injectable 2 milliGRAM(s) IV Push every 2 hours PRN agitation       PROBLEM LIST/ ASSESSMENT:  HEALTH ISSUES - PROBLEM Dx:  Shock: Shock  Anemia: Anemia  Hyponatremia: Hyponatremia  Acute kidney failure: Acute kidney failure  CAD (coronary artery disease): CAD (coronary artery disease)  Valvular disease: Valvular disease      ,   Patient is a 67y old  Male who presents with a chief complaint of AS (19 Jun 2018 00:06)     s/p cardiac surgery      My plan includes :  close hemodynamic, ventilatory and drain monitoring and management per post op routine    Monitor for arrhythmias and monitor parameters for organ perfusion  monitor neurologic status  Head of the bed should remain elevated to 45 deg .   chest PT and IS will be encouraged  monitor adequacy of oxygenation and ventilation and attempt to wean oxygen  Nutritional goals will be met using po eventually , ensure adequate caloric intake and montior the same  Stress ulcer and VTE prophylaxis will be achieved    Glycemic control is satisfactory  Electrolytes have been repleted as necessary and wound care has been carried out. Pain control has been achieved.   agressive physical therapy and early mobility and ambulation goals will be met   The family was updated about the course and plan  CRITICAL CARE TIME SPENT in evaluation and management, reassessments, review and interpretation of labs and x-rays, ventilator and hemodynamic management, formulating a plan and coordinating care: ___90____ MIN.  Time does not include procedural time.  CTICU ATTENDING     					    Clement Ahn MD

## 2018-07-07 NOTE — PROGRESS NOTE ADULT - ASSESSMENT
The patient is 67 year old male with PMH stated above, now s/p  Repair of AA, AVR, MV repair, TV repair, CABG x2, now in shock requiring pressor support - mutiple pressors. The patient with deterioration of the renal function in the setting of the shock - oliguric, no response from the diuretics. The CT ICU team started the patient on aquaphoresis and the CVVHD ordered, never been started from the primary team the BP unstable. The cvvhd started yesterday - 7/2 good tolerance so far. In the past 24 hours the  ml/min, the dialysate flow now on 2 liters per hour and the blood flow on 200 ml/min. no clotting issues overnight. Still on pressors. Good UF in the past 24 hours - 6 liters, almost 3 liters negative

## 2018-07-07 NOTE — PROGRESS NOTE ADULT - SUBJECTIVE AND OBJECTIVE BOX
Seen in the morning, still intubated, still requiring pressor support. S/p surgery closing the chest wall, done on 7/2 - started on CVVHD after  that. In the past 24 hours 6 liters off from the CVVHD, in the morning on 200 ml/min, blood flow on 200 ml/min and dialysate flow on 2 liters/h.  Patient seen and examined at bedside.       Patient seen and examined at bedside.     artificial  tears Solution 1 Drop(s) two times a day  chlorhexidine 0.12% Liquid 15 milliLiter(s) every 12 hours  cisatracurium Infusion 3 MICROgram(s)/kG/Min <Continuous>  dextrose 40% Gel 15 Gram(s) once PRN  dextrose 5%. 1000 milliLiter(s) <Continuous>  dextrose 50% Injectable 50 milliLiter(s) every 15 minutes  dextrose 50% Injectable 25 milliLiter(s) every 15 minutes  epinephrine 8 milliGRAM(s),dextrose 5% in water 250 milliLiter(s) 8 milliGRAM(s) <Continuous>  glucagon  Injectable 1 milliGRAM(s) once PRN  heparin  Infusion 200 Unit(s)/Hr <Continuous>  Heparin 54593 Unit(s),Dextrose 5% 1000 milliLiter(s) 55521 Unit(s) <Continuous>  hydrocortisone sodium succinate Injectable 50 milliGRAM(s) every 8 hours  insulin lispro (HumaLOG) corrective regimen sliding scale   every 6 hours  meropenem  IVPB 1000 milliGRAM(s) every 8 hours  micafungin IVPB 100 milliGRAM(s) every 24 hours  midazolam Injectable 2 milliGRAM(s) every 2 hours PRN  milrinone Infusion 0.25 MICROgram(s)/kG/Min <Continuous>  nitroglycerin    2% Ointment 0.5 Inch(s) daily  pantoprazole  Injectable 40 milliGRAM(s) daily  phenylephrine    Infusion 2 MICROgram(s)/kG/Min <Continuous>  propofol Infusion 5 MICROgram(s)/kG/Min <Continuous>  PureFlow Dialysate RFP-400 (K 2 / Ca 3) 5000 milliLiter(s) <Continuous>  sodium bicarbonate  Infusion 0.254 mEq/kG/Hr <Continuous>  sodium chloride 0.9%. 1000 milliLiter(s) <Continuous>  vancomycin  IVPB 750 milliGRAM(s) every 24 hours  vasopressin Infusion 0.017 Unit(s)/Min <Continuous>      Allergies    penicillin (Rash)    Intolerances        T(C): , Max: 36.5 (07-06-18 @ 14:00)  T(F): , Max: 97.7 (07-06-18 @ 14:00)  HR: 90 (07-07-18 @ 09:00)  BP: --  BP(mean): --  RR: 19 (07-07-18 @ 08:35)  SpO2: 96% (07-07-18 @ 09:00)  Wt(kg): --    07-06 @ 07:01  -  07-07 @ 07:00  --------------------------------------------------------  IN:    freetext medication -  Infusion: 61.5 mL    heparin Infusion: 95 mL    Jevity: 60 mL    milrinone  Infusion: 105.6 mL    phenylephrine   Infusion: 521.6 mL    propofol Infusion: 215.4 mL    sodium bicarbonate  Infusion: 1076 mL    sodium chloride 0.9%.: 240 mL    Solution: 350 mL    Solution: 999 mL    vasopressin Infusion: 48 mL  Total IN: 3772.1 mL    OUT:    Bulb: 125 mL    Chest Tube: 110 mL    Indwelling Catheter - Urethral: 5 mL    Other: 6257 mL    VAC (Vacuum Assisted Closure) System: 30 mL  Total OUT: 6527 mL    Total NET: -2754.9 mL      07-07 @ 07:01  -  07-07 @ 10:55  --------------------------------------------------------  IN:    freetext medication -  Infusion: 4.4 mL    heparin Infusion: 8 mL    milrinone  Infusion: 8.8 mL    phenylephrine   Infusion: 44.2 mL    propofol Infusion: 21.2 mL    sodium bicarbonate  Infusion: 50 mL    sodium chloride 0.9%.: 20 mL    vasopressin Infusion: 3 mL  Total IN: 159.6 mL    OUT:    Chest Tube: 20 mL    Indwelling Catheter - Urethral: 5 mL    Other: 388 mL  Total OUT: 413 mL    Total NET: -253.4 mL      Physical exam:   Intubated and sedated   ANASARCA   difficult to evaluate for JVD   Chest: s/p thoractomy, closed chest  multiple drainages from the chest   RRR, normal s1/s2, systolic murmur  Abdomen - swollen, not tender, not distended   Extremities 3+ peripheral edema  Hd cathter - on the left IJ  Mitchell       LABS:                        9.0    22.4  )-----------( 36       ( 07 Jul 2018 09:55 )             25.8     07-07    138  |  97  |  24<H>  ----------------------------<  143<H>  3.6   |  23  |  0.53    Ca    9.9      07 Jul 2018 09:55  Phos  1.9     07-07  Mg     2.2     07-07    TPro  4.7<L>  /  Alb  3.4  /  TBili  34.3<H>  /  DBili  >10.0<H>  /  AST  112<H>  /  ALT  47<H>  /  AlkPhos  101  07-07      PT/INR - ( 07 Jul 2018 09:55 )   PT: 16.1 sec;   INR: 1.44          PTT - ( 07 Jul 2018 09:55 )  PTT:54.2 sec          RADIOLOGY & ADDITIONAL STUDIES:

## 2018-07-07 NOTE — PROGRESS NOTE ADULT - PROBLEM SELECTOR PLAN 1
- the oliguric EMILY due to ATN - most likely due to cardiogenic shock, can not exclude also a septic shock, requiring pressors - epinephrine phenylephrine, on milrinone   - oliguric in the morning - anuric   - now on CVVHd - see above more details, it is better to be on 1.5 liters of dialysate per h, the patient is 60 kg and the recommendations are to be on 25 ml/kg/h of dialysate   - the patient with overt fluid overload - anasarca  - renal diet when he is not NPO  - hypophosphatemia - noted - please consider replacing with KPO4 - 30 mEq and follow up   - potassium 3.6 - please continue to follow up   - lactate still elevated - we will continue with CVVHD, the ultimate treatment for lactic acidosis is treatment of the cause for it. In this case treatment of the shock   - the patient with mild metabolic alkalosis - please reconsider the need for bicarbonate drip  - in and out

## 2018-07-08 DIAGNOSIS — N48.89 OTHER SPECIFIED DISORDERS OF PENIS: ICD-10-CM

## 2018-07-08 LAB
ALBUMIN SERPL ELPH-MCNC: 3.1 G/DL — LOW (ref 3.3–5)
ALBUMIN SERPL ELPH-MCNC: 3.2 G/DL — LOW (ref 3.3–5)
ALBUMIN SERPL ELPH-MCNC: 3.6 G/DL — SIGNIFICANT CHANGE UP (ref 3.3–5)
ALP SERPL-CCNC: 121 U/L — HIGH (ref 40–120)
ALP SERPL-CCNC: 130 U/L — HIGH (ref 40–120)
ALP SERPL-CCNC: 130 U/L — HIGH (ref 40–120)
ALT FLD-CCNC: 48 U/L — HIGH (ref 10–45)
ALT FLD-CCNC: 50 U/L — HIGH (ref 10–45)
ALT FLD-CCNC: 52 U/L — HIGH (ref 10–45)
ANION GAP SERPL CALC-SCNC: 15 MMOL/L — SIGNIFICANT CHANGE UP (ref 5–17)
ANION GAP SERPL CALC-SCNC: 15 MMOL/L — SIGNIFICANT CHANGE UP (ref 5–17)
ANION GAP SERPL CALC-SCNC: 16 MMOL/L — SIGNIFICANT CHANGE UP (ref 5–17)
ANION GAP SERPL CALC-SCNC: 17 MMOL/L — SIGNIFICANT CHANGE UP (ref 5–17)
ANION GAP SERPL CALC-SCNC: 17 MMOL/L — SIGNIFICANT CHANGE UP (ref 5–17)
APTT BLD: 34.2 SEC — SIGNIFICANT CHANGE UP (ref 27.5–37.4)
APTT BLD: 34.4 SEC — SIGNIFICANT CHANGE UP (ref 27.5–37.4)
APTT BLD: 56.4 SEC — HIGH (ref 27.5–37.4)
APTT BLD: 58.5 SEC — HIGH (ref 27.5–37.4)
APTT BLD: 64.9 SEC — HIGH (ref 27.5–37.4)
AST SERPL-CCNC: 107 U/L — HIGH (ref 10–40)
AST SERPL-CCNC: 109 U/L — HIGH (ref 10–40)
AST SERPL-CCNC: 120 U/L — HIGH (ref 10–40)
BASE EXCESS BLDA CALC-SCNC: -0.5 MMOL/L — SIGNIFICANT CHANGE UP (ref -2–3)
BILIRUB DIRECT SERPL-MCNC: >10 MG/DL — HIGH (ref 0–0.2)
BILIRUB INDIRECT FLD-MCNC: <26.4 MG/DL — HIGH (ref 0.2–1)
BILIRUB INDIRECT FLD-MCNC: <27 MG/DL — HIGH (ref 0.2–1)
BILIRUB INDIRECT FLD-MCNC: SIGNIFICANT CHANGE UP MG/DL (ref 0.2–1)
BILIRUB SERPL-MCNC: 36.4 MG/DL — HIGH (ref 0.2–1.2)
BILIRUB SERPL-MCNC: 37 MG/DL — HIGH (ref 0.2–1.2)
BILIRUB SERPL-MCNC: 38.8 MG/DL — HIGH (ref 0.2–1.2)
BUN SERPL-MCNC: 17 MG/DL — SIGNIFICANT CHANGE UP (ref 7–23)
BUN SERPL-MCNC: 18 MG/DL — SIGNIFICANT CHANGE UP (ref 7–23)
BUN SERPL-MCNC: 19 MG/DL — SIGNIFICANT CHANGE UP (ref 7–23)
BUN SERPL-MCNC: 21 MG/DL — SIGNIFICANT CHANGE UP (ref 7–23)
BUN SERPL-MCNC: 22 MG/DL — SIGNIFICANT CHANGE UP (ref 7–23)
CALCIUM SERPL-MCNC: 9.2 MG/DL — SIGNIFICANT CHANGE UP (ref 8.4–10.5)
CALCIUM SERPL-MCNC: 9.3 MG/DL — SIGNIFICANT CHANGE UP (ref 8.4–10.5)
CALCIUM SERPL-MCNC: 9.5 MG/DL — SIGNIFICANT CHANGE UP (ref 8.4–10.5)
CALCIUM SERPL-MCNC: 9.5 MG/DL — SIGNIFICANT CHANGE UP (ref 8.4–10.5)
CALCIUM SERPL-MCNC: 9.8 MG/DL — SIGNIFICANT CHANGE UP (ref 8.4–10.5)
CHLORIDE SERPL-SCNC: 101 MMOL/L — SIGNIFICANT CHANGE UP (ref 96–108)
CHLORIDE SERPL-SCNC: 96 MMOL/L — SIGNIFICANT CHANGE UP (ref 96–108)
CHLORIDE SERPL-SCNC: 97 MMOL/L — SIGNIFICANT CHANGE UP (ref 96–108)
CHLORIDE SERPL-SCNC: 97 MMOL/L — SIGNIFICANT CHANGE UP (ref 96–108)
CHLORIDE SERPL-SCNC: 98 MMOL/L — SIGNIFICANT CHANGE UP (ref 96–108)
CO2 SERPL-SCNC: 22 MMOL/L — SIGNIFICANT CHANGE UP (ref 22–31)
CO2 SERPL-SCNC: 23 MMOL/L — SIGNIFICANT CHANGE UP (ref 22–31)
CO2 SERPL-SCNC: 23 MMOL/L — SIGNIFICANT CHANGE UP (ref 22–31)
CO2 SERPL-SCNC: 24 MMOL/L — SIGNIFICANT CHANGE UP (ref 22–31)
CO2 SERPL-SCNC: 25 MMOL/L — SIGNIFICANT CHANGE UP (ref 22–31)
CREAT SERPL-MCNC: 0.38 MG/DL — LOW (ref 0.5–1.3)
CREAT SERPL-MCNC: 0.39 MG/DL — LOW (ref 0.5–1.3)
CREAT SERPL-MCNC: 0.45 MG/DL — LOW (ref 0.5–1.3)
CREAT SERPL-MCNC: 0.47 MG/DL — LOW (ref 0.5–1.3)
CREAT SERPL-MCNC: SIGNIFICANT CHANGE UP MG/DL (ref 0.5–1.3)
GAS PNL BLDA: SIGNIFICANT CHANGE UP
GLUCOSE BLDC GLUCOMTR-MCNC: 140 MG/DL — HIGH (ref 70–99)
GLUCOSE BLDC GLUCOMTR-MCNC: 165 MG/DL — HIGH (ref 70–99)
GLUCOSE SERPL-MCNC: 134 MG/DL — HIGH (ref 70–99)
GLUCOSE SERPL-MCNC: 151 MG/DL — HIGH (ref 70–99)
GLUCOSE SERPL-MCNC: 162 MG/DL — HIGH (ref 70–99)
GLUCOSE SERPL-MCNC: 167 MG/DL — HIGH (ref 70–99)
GLUCOSE SERPL-MCNC: 167 MG/DL — HIGH (ref 70–99)
HCO3 BLDA-SCNC: 23 MMOL/L — SIGNIFICANT CHANGE UP (ref 21–28)
HCT VFR BLD CALC: 20.7 % — CRITICAL LOW (ref 39–50)
HCT VFR BLD CALC: 22.3 % — LOW (ref 39–50)
HCT VFR BLD CALC: 24.4 % — LOW (ref 39–50)
HCT VFR BLD CALC: 25.4 % — LOW (ref 39–50)
HCT VFR BLD CALC: 26 % — LOW (ref 39–50)
HCT VFR BLD CALC: 27.6 % — LOW (ref 39–50)
HGB BLD-MCNC: 7.5 G/DL — LOW (ref 13–17)
HGB BLD-MCNC: 7.9 G/DL — LOW (ref 13–17)
HGB BLD-MCNC: 8.8 G/DL — LOW (ref 13–17)
HGB BLD-MCNC: 9 G/DL — LOW (ref 13–17)
HGB BLD-MCNC: 9.1 G/DL — LOW (ref 13–17)
HGB BLD-MCNC: 9.9 G/DL — LOW (ref 13–17)
INR BLD: 1.26 — HIGH (ref 0.88–1.16)
INR BLD: 1.28 — HIGH (ref 0.88–1.16)
INR BLD: 1.36 — HIGH (ref 0.88–1.16)
INR BLD: 1.36 — HIGH (ref 0.88–1.16)
INR BLD: 1.38 — HIGH (ref 0.88–1.16)
LACTATE SERPL-SCNC: 2.9 MMOL/L — HIGH (ref 0.5–2)
LACTATE SERPL-SCNC: 2.9 MMOL/L — HIGH (ref 0.5–2)
LACTATE SERPL-SCNC: 3.2 MMOL/L — HIGH (ref 0.5–2)
LACTATE SERPL-SCNC: 3.3 MMOL/L — HIGH (ref 0.5–2)
LACTATE SERPL-SCNC: 3.4 MMOL/L — HIGH (ref 0.5–2)
MAGNESIUM SERPL-MCNC: 1.8 MG/DL — SIGNIFICANT CHANGE UP (ref 1.6–2.6)
MAGNESIUM SERPL-MCNC: 1.8 MG/DL — SIGNIFICANT CHANGE UP (ref 1.6–2.6)
MAGNESIUM SERPL-MCNC: 1.9 MG/DL — SIGNIFICANT CHANGE UP (ref 1.6–2.6)
MAGNESIUM SERPL-MCNC: 2 MG/DL — SIGNIFICANT CHANGE UP (ref 1.6–2.6)
MCHC RBC-ENTMCNC: 29 PG — SIGNIFICANT CHANGE UP (ref 27–34)
MCHC RBC-ENTMCNC: 29.7 PG — SIGNIFICANT CHANGE UP (ref 27–34)
MCHC RBC-ENTMCNC: 30 PG — SIGNIFICANT CHANGE UP (ref 27–34)
MCHC RBC-ENTMCNC: 30.1 PG — SIGNIFICANT CHANGE UP (ref 27–34)
MCHC RBC-ENTMCNC: 30.2 PG — SIGNIFICANT CHANGE UP (ref 27–34)
MCHC RBC-ENTMCNC: 30.5 PG — SIGNIFICANT CHANGE UP (ref 27–34)
MCHC RBC-ENTMCNC: 34.6 G/DL — SIGNIFICANT CHANGE UP (ref 32–36)
MCHC RBC-ENTMCNC: 35.4 G/DL — SIGNIFICANT CHANGE UP (ref 32–36)
MCHC RBC-ENTMCNC: 35.8 G/DL — SIGNIFICANT CHANGE UP (ref 32–36)
MCHC RBC-ENTMCNC: 35.9 G/DL — SIGNIFICANT CHANGE UP (ref 32–36)
MCHC RBC-ENTMCNC: 36.1 G/DL — HIGH (ref 32–36)
MCHC RBC-ENTMCNC: 36.2 G/DL — HIGH (ref 32–36)
MCV RBC AUTO: 83.3 FL — SIGNIFICANT CHANGE UP (ref 80–100)
MCV RBC AUTO: 83.5 FL — SIGNIFICANT CHANGE UP (ref 80–100)
MCV RBC AUTO: 83.8 FL — SIGNIFICANT CHANGE UP (ref 80–100)
MCV RBC AUTO: 83.9 FL — SIGNIFICANT CHANGE UP (ref 80–100)
MCV RBC AUTO: 84.1 FL — SIGNIFICANT CHANGE UP (ref 80–100)
MCV RBC AUTO: 84.9 FL — SIGNIFICANT CHANGE UP (ref 80–100)
PCO2 BLDA: 35 MMHG — SIGNIFICANT CHANGE UP (ref 35–48)
PH BLDA: 7.44 — SIGNIFICANT CHANGE UP (ref 7.35–7.45)
PHOSPHATE SERPL-MCNC: 1.4 MG/DL — LOW (ref 2.5–4.5)
PHOSPHATE SERPL-MCNC: 1.7 MG/DL — LOW (ref 2.5–4.5)
PHOSPHATE SERPL-MCNC: 1.9 MG/DL — LOW (ref 2.5–4.5)
PHOSPHATE SERPL-MCNC: 2.2 MG/DL — LOW (ref 2.5–4.5)
PLATELET # BLD AUTO: 25 K/UL — LOW (ref 150–400)
PLATELET # BLD AUTO: 38 K/UL — LOW (ref 150–400)
PLATELET # BLD AUTO: 48 K/UL — LOW (ref 150–400)
PLATELET # BLD AUTO: 54 K/UL — LOW (ref 150–400)
PLATELET # BLD AUTO: 69 K/UL — LOW (ref 150–400)
PLATELET # BLD AUTO: 76 K/UL — LOW (ref 150–400)
PO2 BLDA: 217 MMHG — HIGH (ref 83–108)
POTASSIUM SERPL-MCNC: 3 MMOL/L — LOW (ref 3.5–5.3)
POTASSIUM SERPL-MCNC: 3 MMOL/L — LOW (ref 3.5–5.3)
POTASSIUM SERPL-MCNC: 3.2 MMOL/L — LOW (ref 3.5–5.3)
POTASSIUM SERPL-MCNC: 3.6 MMOL/L — SIGNIFICANT CHANGE UP (ref 3.5–5.3)
POTASSIUM SERPL-MCNC: 4 MMOL/L — SIGNIFICANT CHANGE UP (ref 3.5–5.3)
POTASSIUM SERPL-SCNC: 3 MMOL/L — LOW (ref 3.5–5.3)
POTASSIUM SERPL-SCNC: 3 MMOL/L — LOW (ref 3.5–5.3)
POTASSIUM SERPL-SCNC: 3.2 MMOL/L — LOW (ref 3.5–5.3)
POTASSIUM SERPL-SCNC: 3.6 MMOL/L — SIGNIFICANT CHANGE UP (ref 3.5–5.3)
POTASSIUM SERPL-SCNC: 4 MMOL/L — SIGNIFICANT CHANGE UP (ref 3.5–5.3)
PROT SERPL-MCNC: 4.2 G/DL — LOW (ref 6–8.3)
PROT SERPL-MCNC: 4.8 G/DL — LOW (ref 6–8.3)
PROT SERPL-MCNC: SIGNIFICANT CHANGE UP G/DL (ref 6–8.3)
PROTHROM AB SERPL-ACNC: 14.1 SEC — HIGH (ref 9.8–12.7)
PROTHROM AB SERPL-ACNC: 14.3 SEC — HIGH (ref 9.8–12.7)
PROTHROM AB SERPL-ACNC: 15.2 SEC — HIGH (ref 9.8–12.7)
PROTHROM AB SERPL-ACNC: 15.2 SEC — HIGH (ref 9.8–12.7)
PROTHROM AB SERPL-ACNC: 15.4 SEC — HIGH (ref 9.8–12.7)
RBC # BLD: 2.48 M/UL — LOW (ref 4.2–5.8)
RBC # BLD: 2.66 M/UL — LOW (ref 4.2–5.8)
RBC # BLD: 2.93 M/UL — LOW (ref 4.2–5.8)
RBC # BLD: 3.02 M/UL — LOW (ref 4.2–5.8)
RBC # BLD: 3.1 M/UL — LOW (ref 4.2–5.8)
RBC # BLD: 3.25 M/UL — LOW (ref 4.2–5.8)
RBC # FLD: 18 % — HIGH (ref 10.3–16.9)
RBC # FLD: 18.4 % — HIGH (ref 10.3–16.9)
RBC # FLD: 18.7 % — HIGH (ref 10.3–16.9)
RBC # FLD: 18.9 % — HIGH (ref 10.3–16.9)
RBC # FLD: 19 % — HIGH (ref 10.3–16.9)
RBC # FLD: 19.3 % — HIGH (ref 10.3–16.9)
SAO2 % BLDA: 100 % — SIGNIFICANT CHANGE UP (ref 95–100)
SODIUM SERPL-SCNC: 136 MMOL/L — SIGNIFICANT CHANGE UP (ref 135–145)
SODIUM SERPL-SCNC: 136 MMOL/L — SIGNIFICANT CHANGE UP (ref 135–145)
SODIUM SERPL-SCNC: 137 MMOL/L — SIGNIFICANT CHANGE UP (ref 135–145)
SODIUM SERPL-SCNC: 138 MMOL/L — SIGNIFICANT CHANGE UP (ref 135–145)
SODIUM SERPL-SCNC: 139 MMOL/L — SIGNIFICANT CHANGE UP (ref 135–145)
VANCOMYCIN TROUGH SERPL-MCNC: 10.2 UG/ML — SIGNIFICANT CHANGE UP (ref 10–20)
WBC # BLD: 13.5 K/UL — HIGH (ref 3.8–10.5)
WBC # BLD: 14.1 K/UL — HIGH (ref 3.8–10.5)
WBC # BLD: 15.8 K/UL — HIGH (ref 3.8–10.5)
WBC # BLD: 18.3 K/UL — HIGH (ref 3.8–10.5)
WBC # BLD: 22.8 K/UL — HIGH (ref 3.8–10.5)
WBC # BLD: 22.9 K/UL — HIGH (ref 3.8–10.5)
WBC # FLD AUTO: 13.5 K/UL — HIGH (ref 3.8–10.5)
WBC # FLD AUTO: 14.1 K/UL — HIGH (ref 3.8–10.5)
WBC # FLD AUTO: 15.8 K/UL — HIGH (ref 3.8–10.5)
WBC # FLD AUTO: 18.3 K/UL — HIGH (ref 3.8–10.5)
WBC # FLD AUTO: 22.8 K/UL — HIGH (ref 3.8–10.5)
WBC # FLD AUTO: 22.9 K/UL — HIGH (ref 3.8–10.5)

## 2018-07-08 PROCEDURE — 99291 CRITICAL CARE FIRST HOUR: CPT

## 2018-07-08 PROCEDURE — 99292 CRITICAL CARE ADDL 30 MIN: CPT

## 2018-07-08 PROCEDURE — 76705 ECHO EXAM OF ABDOMEN: CPT | Mod: 26

## 2018-07-08 PROCEDURE — 99233 SBSQ HOSP IP/OBS HIGH 50: CPT

## 2018-07-08 PROCEDURE — 71045 X-RAY EXAM CHEST 1 VIEW: CPT | Mod: 26,77

## 2018-07-08 PROCEDURE — 71045 X-RAY EXAM CHEST 1 VIEW: CPT | Mod: 26

## 2018-07-08 RX ORDER — MAGNESIUM SULFATE 500 MG/ML
2 VIAL (ML) INJECTION ONCE
Qty: 0 | Refills: 0 | Status: COMPLETED | OUTPATIENT
Start: 2018-07-08 | End: 2018-07-08

## 2018-07-08 RX ORDER — POTASSIUM CHLORIDE 20 MEQ
20 PACKET (EA) ORAL ONCE
Qty: 0 | Refills: 0 | Status: COMPLETED | OUTPATIENT
Start: 2018-07-08 | End: 2018-07-08

## 2018-07-08 RX ORDER — POTASSIUM CHLORIDE 20 MEQ
20 PACKET (EA) ORAL
Qty: 0 | Refills: 0 | Status: COMPLETED | OUTPATIENT
Start: 2018-07-08 | End: 2018-07-08

## 2018-07-08 RX ORDER — ALBUMIN HUMAN 25 %
50 VIAL (ML) INTRAVENOUS ONCE
Qty: 0 | Refills: 0 | Status: COMPLETED | OUTPATIENT
Start: 2018-07-08 | End: 2018-07-08

## 2018-07-08 RX ADMIN — MEROPENEM 100 MILLIGRAM(S): 1 INJECTION INTRAVENOUS at 06:59

## 2018-07-08 RX ADMIN — Medication 2: at 00:51

## 2018-07-08 RX ADMIN — Medication 1 DROP(S): at 06:59

## 2018-07-08 RX ADMIN — PROPOFOL 1.76 MICROGRAM(S)/KG/MIN: 10 INJECTION, EMULSION INTRAVENOUS at 10:55

## 2018-07-08 RX ADMIN — PANTOPRAZOLE SODIUM 40 MILLIGRAM(S): 20 TABLET, DELAYED RELEASE ORAL at 12:59

## 2018-07-08 RX ADMIN — CHLORHEXIDINE GLUCONATE 15 MILLILITER(S): 213 SOLUTION TOPICAL at 18:19

## 2018-07-08 RX ADMIN — Medication 2: at 18:19

## 2018-07-08 RX ADMIN — Medication 50 MILLIGRAM(S): at 22:59

## 2018-07-08 RX ADMIN — MICAFUNGIN SODIUM 105 MILLIGRAM(S): 100 INJECTION, POWDER, LYOPHILIZED, FOR SOLUTION INTRAVENOUS at 11:40

## 2018-07-08 RX ADMIN — Medication 100 MILLIEQUIVALENT(S): at 11:26

## 2018-07-08 RX ADMIN — MEROPENEM 100 MILLIGRAM(S): 1 INJECTION INTRAVENOUS at 14:55

## 2018-07-08 RX ADMIN — MIDAZOLAM HYDROCHLORIDE 2 MILLIGRAM(S): 1 INJECTION, SOLUTION INTRAMUSCULAR; INTRAVENOUS at 13:44

## 2018-07-08 RX ADMIN — Medication 100 MILLIEQUIVALENT(S): at 06:59

## 2018-07-08 RX ADMIN — Medication 1 DROP(S): at 18:19

## 2018-07-08 RX ADMIN — Medication 10 MILLIGRAM(S): at 06:59

## 2018-07-08 RX ADMIN — PHENYLEPHRINE HYDROCHLORIDE 21.98 MICROGRAM(S)/KG/MIN: 10 INJECTION INTRAVENOUS at 17:05

## 2018-07-08 RX ADMIN — Medication 0.5 INCH(S): at 00:54

## 2018-07-08 RX ADMIN — Medication 100 MILLIEQUIVALENT(S): at 16:14

## 2018-07-08 RX ADMIN — MIDAZOLAM HYDROCHLORIDE 2 MILLIGRAM(S): 1 INJECTION, SOLUTION INTRAMUSCULAR; INTRAVENOUS at 17:30

## 2018-07-08 RX ADMIN — Medication 100 MILLIEQUIVALENT(S): at 11:39

## 2018-07-08 RX ADMIN — Medication 250 MILLIGRAM(S): at 12:59

## 2018-07-08 RX ADMIN — Medication 50 GRAM(S): at 12:34

## 2018-07-08 RX ADMIN — PROPOFOL 1.76 MICROGRAM(S)/KG/MIN: 10 INJECTION, EMULSION INTRAVENOUS at 20:30

## 2018-07-08 RX ADMIN — Medication 10 MILLIGRAM(S): at 14:55

## 2018-07-08 RX ADMIN — MEROPENEM 100 MILLIGRAM(S): 1 INJECTION INTRAVENOUS at 22:58

## 2018-07-08 RX ADMIN — Medication 50 MILLIGRAM(S): at 14:55

## 2018-07-08 RX ADMIN — Medication 50 MILLIGRAM(S): at 06:59

## 2018-07-08 RX ADMIN — Medication 100 MILLIEQUIVALENT(S): at 11:12

## 2018-07-08 RX ADMIN — Medication 50 MILLILITER(S): at 11:12

## 2018-07-08 RX ADMIN — Medication 10 MILLIGRAM(S): at 22:58

## 2018-07-08 RX ADMIN — CHLORHEXIDINE GLUCONATE 15 MILLILITER(S): 213 SOLUTION TOPICAL at 07:00

## 2018-07-08 RX ADMIN — Medication 50 MILLILITER(S): at 11:25

## 2018-07-08 RX ADMIN — Medication 85 MILLIMOLE(S): at 00:53

## 2018-07-08 NOTE — PROGRESS NOTE ADULT - ASSESSMENT
66yo with history of congenital heart disease s/p AVR @ age of 12, Ef 20% on preop cardiac clearance diagnosed with 2 vessel CAD, VHD (severe prosthetic AS, Severe MR, severe TR, severe pulm hypertension.).    Pt s/p complex surgery on 6/21 with CABG x2, AVR, MV/TV repair, Impella insertion, IABP  -- open chest  6/22 Chest closed  6/23 Chest re-explored for tamponade  6/26 chest closure   6/27 chest re-explored by bedside for pseudo-tamponade.  6/30 chest washout  7/2 chest closure      Problems  1. Cardiogenic shock, supported with pressors/iontropes, Impella  2. lactic acidosis  3. Multiorgan failure    Plan   Neuro -- sedation resume in the setting of worsening acidosis  CVS -Continue Impella support @ P6/3L flow  continuing pressor support.    Pulm - stable vent requirement   weaning Ana Luisa  GI -+ BM, TF increased   Concern lactate is from abdominal ischemia  Jaundice/conjugated hyperbilirubinemia likely from hemolysis.  Pt s/p massive transfusion intra op and multiple transfusions post   RUQ sono ordered, most recent labs show rising AST/ALK phos   - anuric -CVVH   Endo - glycemic control, taper stress dose steriods.   Heme - continuing heparin flush with impella, heparin gtt   ID - On empiric antibiotics -- Broadened for worsening clinical status.  Concern is bowel ischemia and intra-abdominal source of infection vs mediastinitis - all cultures remain negative.  Will start de-escalating.     Critical post op.    Critical care time spent 80 min 68yo with history of congenital heart disease s/p AVR @ age of 12, Ef 20% on preop cardiac clearance diagnosed with 2 vessel CAD, VHD (severe prosthetic AS, Severe MR, severe TR, severe pulm hypertension.).    Pt s/p complex surgery on 6/21 with CABG x2, AVR, MV/TV repair, Impella insertion, IABP  -- open chest  6/22 Chest closed  6/23 Chest re-explored for tamponade  6/26 chest closure   6/27 chest re-explored by bedside for pseudo-tamponade.  6/30 chest washout  7/2 chest closure      Problems  1. Cardiogenic shock, supported with pressors/iontropes, Impella  2. lactic acidosis  3. Multiorgan failure    Plan   Neuro -- sedation resume in the setting of worsening acidosis  CVS -Continue Impella support @ P6/3L flow  continuing pressor support.    Pulm - stable vent requirement   weaning Ana Luisa  GI -+ BM, TF increased   Concern lactate is from abdominal ischemia  Jaundice/conjugated hyperbilirubinemia likely from hemolysis.    RUQ sono completed, pending read   - anuric -CVVH   Endo - glycemic control, taper stress dose steriods.   Heme - continuing heparin flush with impella, heparin gtt   ID - On empiric antibiotics -- Broadened for worsening clinical status.  Concern is bowel ischemia and intra-abdominal source of infection vs mediastinitis - all cultures remain negative.  Will start de-escalating.     Critical post op.    Critical care time spent 80 min

## 2018-07-08 NOTE — CONSULT NOTE ADULT - PROBLEM SELECTOR RECOMMENDATION 9
-able to retract foreskin  -continue CVVHD and ICU care -soft swelling, no crepitus noted  -able to retract foreskin easily  -continue CVVHD and ICU care  -will place villanueva if needed.

## 2018-07-08 NOTE — PROGRESS NOTE ADULT - SUBJECTIVE AND OBJECTIVE BOX
66yo with history of congenital heart disease s/p AV repair vs replacement at the age of 12 recent cardiac evaluation for pre-op clearance.  On workup pt diagnosed with VHD (severe prosthetic AS, severe MR, Severe TR) as well 2 vessel CAD in the setting of low EF 20-25%.      Pre-op CT showing left subclavian artery stenosis, possible coarctation of aorta (focal narrowing 3.5 cm segment of proximal descending aorta distal to the subclavian artery)    6/21 AVR, ascending aorta, TV/MV repair, CABG X2.  recieved multiple blood products/volume arrived with open chest.  IABP and Impella for LV support.    6/22 Chest closure but re-explored for tamponade on 6/23 6/26 Chest closure but again re-explored by bedside for acute hemodynamic failure  7/2 s/p Chest closure    24 hours:  Improving hemodynamics  Improved pressor requirement  Lactic acidosis improving  Vac changed, mediastinal blakes removed  + BM, formed brown stool  Post CVVH change noted to have drop in Hbg, currently no active bleeding, 2 units of PRBC given for Hbg 7.5  after holding sedation pt opens eyes spontaneously, grimaces to pain.     PMH :  CHF  Complete heart block  Cardiac tamponade  Mitral valve insufficiency, unspecified etiology  Aortic valve stenosis, etiology of cardiac valve disease unspecified  Tricuspid valve insufficiency, unspecified etiology  Penile swelling  Shock  Anemia  Hyponatremia  Acute kidney failure  CAD (coronary artery disease)  Valvular disease  Closure of chest wound in adult  Washout of thoracic cavity  Chest surgery  AVR (aortic valve replacement)  Mitral valve repair  Tricuspid valve repair      ICU Vital Signs Last 24 Hrs  T(C): 35.5 (07-08-18 @ 10:00), Max: 35.5 (07-08-18 @ 05:00)  T(F): 95.9 (07-08-18 @ 10:00), Max: 95.9 (07-08-18 @ 05:00)  HR: 80 (07-08-18 @ 17:00) (80 - 83)  ABP: 116/82 (07-08-18 @ 17:00) (11/84 - 134/89)  ABP(mean): 100 (07-08-18 @ 17:00) (72 - 106)  RR: 16 (07-08-18 @ 17:00) (15 - 22)  SpO2: 97% (07-08-18 @ 17:00) (93% - 99%)    I&O's Summary    07 Jul 2018 07:01  -  08 Jul 2018 07:00  --------------------------------------------------------  IN: 3092.2 mL / OUT: 4546 mL / NET: -1453.8 mL    08 Jul 2018 07:01  -  08 Jul 2018 18:13  --------------------------------------------------------  IN: 2289.4 mL / OUT: 479 mL / NET: 1810.4 mL      Mode: AC/ CMV (Assist Control/ Continuous Mandatory Ventilation)  RR (machine): 14  TV (machine): 450  FiO2: 100  PEEP: 8  ITime: 1.2  MAP: 16  PIP: 34    ABG - ( 08 Jul 2018 15:19 )  pH: 7.40  /  pCO2: 41    /  pO2: 341   / HCO3: 25    / Base Excess: 0.2   /  SaO2: 100                           7.5    14.1  )-----------( 76       ( 08 Jul 2018 17:01 )             20.7     08 Jul 2018 15:25    137    |  98     |  17     ----------------------------<  167    3.6     |  24     |  see note    Ca    9.8        08 Jul 2018 15:25  Phos  1.7       08 Jul 2018 10:15  Mg     1.8       08 Jul 2018 10:15    TPro  see note  /  Alb  3.6    /  TBili  38.8   /  DBili  >10.0  /  AST  109    /  ALT  50     /  AlkPhos  130    08 Jul 2018 15:25    PT/INR - ( 08 Jul 2018 15:25 )   PT: 14.3 sec;   INR: 1.28     PTT - ( 08 Jul 2018 15:25 )  PTT:34.4 sec    MEDICATIONS  (STANDING):  artificial  tears Solution 1 Drop(s) Both EYES two times a day  chlorhexidine 0.12% Liquid 15 milliLiter(s) Swish and Spit every 12 hours  dexmedetomidine Infusion 0.5 MICROgram(s)/kG/Hr IV Continuous <Continuous>  epinephrine 8 milliGRAM(s),dextrose 5% in water 250 milliLiter(s) 8 milliGRAM(s) IV Continuous <Continuous>  heparin  Infusion 200 Unit(s)/Hr IV Continuous <Continuous>  Heparin 48603 Unit(s),Dextrose 5% 1000 milliLiter(s) 19742 Unit(s) IV Continuous <Continuous>  hydrocortisone sodium succinate Injectable 50 milliGRAM(s) IV Push every 8 hours  insulin lispro (HumaLOG) corrective regimen sliding scale   SubCutaneous every 6 hours  meropenem  IVPB 1000 milliGRAM(s) IV Intermittent every 8 hours  metoclopramide Injectable 10 milliGRAM(s) IV Push every 8 hours  micafungin IVPB 100 milliGRAM(s) IV Intermittent every 24 hours  milrinone Infusion 0.25 MICROgram(s)/kG/Min IV Continuous <Continuous>  pantoprazole  Injectable 40 milliGRAM(s) IV Push daily  phenylephrine    Infusion 2 MICROgram(s)/kG/Min IV Continuous <Continuous>  propofol Infusion 5 MICROgram(s)/kG/Min IV Continuous <Continuous>  PureFlow Dialysate RFP-400 (K 2 / Ca 3) 5000 milliLiter(s) CRRT <Continuous>  sodium bicarbonate  Infusion 0.254 mEq/kG/Hr IV Continuous <Continuous>  sodium chloride 0.9%. 1000 milliLiter(s) IV Continuous <Continuous>  vancomycin  IVPB 750 milliGRAM(s) IV Intermittent every 24 hours  vasopressin Infusion 0.017 Unit(s)/Min IV Continuous <Continuous>    Home Medications:  Lasix 40 mg oral tablet: 1 tab(s) orally once a day (19 Jun 2018 00:25)    PHYSICAL EXAM:  Gen : intubated, sedated   + jaundice   Respiratory: decreased in the bases, + coarse BS  Cardiovascular: S1 and S2, RRR, no M/G/R  Gastrointestinal: BS+, soft, NT/ND  Extremities: ++ Edema, serous drainage from extremities   Incision: + vac to sternal wound   Lines: no sign of infection

## 2018-07-08 NOTE — CONSULT NOTE ADULT - SUBJECTIVE AND OBJECTIVE BOX
HPI: 68 yo male s/p ascending aorta graft, cabg, TVR, MVR, and AVR on 6/21/18. S/P closure chest wound 7/2/18. Patient on ventilator. +shock +EMILY on CVVHD. Villanueva removed yesterday. Called to evaluate penile swelling. Patient on ventilator. On hep drip. Platelets 25K.  While previous villanueva was in patient was not putting out much urine.     PMH / PSH:  See HPI.  Appendectomy > 50 year ago.     Home Rx.:  Lasix 40mg PO QD    FH:  N/A    SH:  Lives with family. Not working. Non-smoker. Occasional ETOH use. No IVRDU.    Allergies:  PCN. (19 Jun 2018 00:06)      PAST MEDICAL & SURGICAL HISTORY:  No pertinent past medical history  No significant past surgical history      MEDICATIONS  (STANDING):  artificial  tears Solution 1 Drop(s) Both EYES two times a day  chlorhexidine 0.12% Liquid 15 milliLiter(s) Swish and Spit every 12 hours  cisatracurium Infusion 3 MICROgram(s)/kG/Min (10.548 mL/Hr) IV Continuous <Continuous>  dexmedetomidine Infusion 0.5 MICROgram(s)/kG/Hr (7.375 mL/Hr) IV Continuous <Continuous>  dextrose 5%. 1000 milliLiter(s) (25 mL/Hr) IV Continuous <Continuous>  dextrose 50% Injectable 50 milliLiter(s) IV Push every 15 minutes  dextrose 50% Injectable 25 milliLiter(s) IV Push every 15 minutes  epinephrine 8 milliGRAM(s),dextrose 5% in water 250 milliLiter(s) 8 milliGRAM(s) (10.42 mL/Hr) IV Continuous <Continuous>  heparin  Infusion 200 Unit(s)/Hr (3 mL/Hr) IV Continuous <Continuous>  Heparin 00129 Unit(s),Dextrose 5% 1000 milliLiter(s) 24451 Unit(s) (25 mL/Hr) IV Continuous <Continuous>  hydrocortisone sodium succinate Injectable 50 milliGRAM(s) IV Push every 8 hours  insulin lispro (HumaLOG) corrective regimen sliding scale   SubCutaneous every 6 hours  meropenem  IVPB 1000 milliGRAM(s) IV Intermittent every 8 hours  metoclopramide Injectable 10 milliGRAM(s) IV Push every 8 hours  micafungin IVPB 100 milliGRAM(s) IV Intermittent every 24 hours  milrinone Infusion 0.25 MICROgram(s)/kG/Min (4.395 mL/Hr) IV Continuous <Continuous>  pantoprazole  Injectable 40 milliGRAM(s) IV Push daily  phenylephrine    Infusion 2 MICROgram(s)/kG/Min (21.975 mL/Hr) IV Continuous <Continuous>  propofol Infusion 5 MICROgram(s)/kG/Min (1.758 mL/Hr) IV Continuous <Continuous>  PureFlow Dialysate RFP-400 (K 2 / Ca 3) 5000 milliLiter(s) (1500 mL/Hr) CRRT <Continuous>  sodium bicarbonate  Infusion 0.254 mEq/kG/Hr (100 mL/Hr) IV Continuous <Continuous>  sodium chloride 0.9%. 1000 milliLiter(s) (10 mL/Hr) IV Continuous <Continuous>  vancomycin  IVPB 750 milliGRAM(s) IV Intermittent every 24 hours  vasopressin Infusion 0.017 Unit(s)/Min (1 mL/Hr) IV Continuous <Continuous>    MEDICATIONS  (PRN):  dextrose 40% Gel 15 Gram(s) Oral once PRN Blood Glucose LESS THAN 70 milliGRAM(s)/deciliter  glucagon  Injectable 1 milliGRAM(s) IntraMuscular once PRN Glucose LESS THAN 70 milligrams/deciliter  midazolam Injectable 2 milliGRAM(s) IV Push every 2 hours PRN agitation      Allergies    penicillin (Rash)    Intolerances        SOCIAL HISTORY:    FAMILY HISTORY:      Vital Signs Last 24 Hrs  T(C): 35.5 (08 Jul 2018 10:00), Max: 35.6 (07 Jul 2018 17:35)  T(F): 95.9 (08 Jul 2018 10:00), Max: 96 (07 Jul 2018 17:35)  HR: 80 (08 Jul 2018 15:00) (80 - 90)  BP: --  BP(mean): --  RR: 16 (08 Jul 2018 12:00) (15 - 22)  SpO2: 99% (08 Jul 2018 15:00) (93% - 99%)    On PE:  General: on ventilator, sedated  SKin: +jaundice  Abdomen: +anasarca  : +penile swelling, no crepitus, slight bluish tinge of skin. Difficult to palpate testes bilateral.  EXT: +swelling upper and lower extremities    LABS:                        7.9    x     )-----------( 69       ( 08 Jul 2018 15:25 )             22.3     07-08    139  |  101  |  19  ----------------------------<  134<H>  3.0<L>   |  23  |  0.39<L>    Ca    9.5      08 Jul 2018 10:15  Phos  1.7     07-08  Mg     1.8     07-08    TPro  4.2<L>  /  Alb  3.2<L>  /  TBili  36.4<H>  /  DBili  >10.0<H>  /  AST  120<H>  /  ALT  52<H>  /  AlkPhos  130<H>  07-08    PT/INR - ( 08 Jul 2018 15:25 )   PT: 14.3 sec;   INR: 1.28          PTT - ( 08 Jul 2018 15:25 )  PTT:34.4 sec      RADIOLOGY & ADDITIONAL STUDIES:

## 2018-07-08 NOTE — PROGRESS NOTE ADULT - SUBJECTIVE AND OBJECTIVE BOX
Seen in the morning, still intubated, still requiring pressor support. S/p surgery closing the chest wall, done on 7/2 - started on CVVHD after  that. In the past 24 hours 4.5 liters off from the CVVHD, in the morning on 100 ml/min UF, blood flow on 150 ml/min and dialysate flow on 2 liters/h.  Patient seen and examined at bedside.       artificial  tears Solution 1 Drop(s) two times a day  chlorhexidine 0.12% Liquid 15 milliLiter(s) every 12 hours  cisatracurium Infusion 3 MICROgram(s)/kG/Min <Continuous>  dexmedetomidine Infusion 0.5 MICROgram(s)/kG/Hr <Continuous>  dextrose 40% Gel 15 Gram(s) once PRN  dextrose 5%. 1000 milliLiter(s) <Continuous>  dextrose 50% Injectable 50 milliLiter(s) every 15 minutes  dextrose 50% Injectable 25 milliLiter(s) every 15 minutes  epinephrine 8 milliGRAM(s),dextrose 5% in water 250 milliLiter(s) 8 milliGRAM(s) <Continuous>  glucagon  Injectable 1 milliGRAM(s) once PRN  heparin  Infusion 200 Unit(s)/Hr <Continuous>  Heparin 55032 Unit(s),Dextrose 5% 1000 milliLiter(s) 43397 Unit(s) <Continuous>  hydrocortisone sodium succinate Injectable 50 milliGRAM(s) every 8 hours  insulin lispro (HumaLOG) corrective regimen sliding scale   every 6 hours  meropenem  IVPB 1000 milliGRAM(s) every 8 hours  metoclopramide Injectable 10 milliGRAM(s) every 8 hours  micafungin IVPB 100 milliGRAM(s) every 24 hours  midazolam Injectable 2 milliGRAM(s) every 2 hours PRN  milrinone Infusion 0.25 MICROgram(s)/kG/Min <Continuous>  pantoprazole  Injectable 40 milliGRAM(s) daily  phenylephrine    Infusion 2 MICROgram(s)/kG/Min <Continuous>  potassium chloride  20 mEq/100 mL IVPB 20 milliEquivalent(s) every 1 hour PRN  propofol Infusion 5 MICROgram(s)/kG/Min <Continuous>  PureFlow Dialysate RFP-400 (K 2 / Ca 3) 5000 milliLiter(s) <Continuous>  sodium bicarbonate  Infusion 0.254 mEq/kG/Hr <Continuous>  sodium chloride 0.9%. 1000 milliLiter(s) <Continuous>  vancomycin  IVPB 750 milliGRAM(s) every 24 hours  vasopressin Infusion 0.017 Unit(s)/Min <Continuous>      Allergies    penicillin (Rash)    Intolerances        T(C): , Max: 35.8 (07-07-18 @ 10:18)  T(F): , Max: 96.4 (07-07-18 @ 10:18)  HR: 80 (07-08-18 @ 08:51)  BP: --  BP(mean): --  RR: 18 (07-08-18 @ 08:00)  SpO2: 97% (07-08-18 @ 08:51)  Wt(kg): --    07-07 @ 07:01  -  07-08 @ 07:00  --------------------------------------------------------  IN:    dexmedetomidine Infusion: 80 mL    freetext medication -  Infusion: 30.8 mL    heparin Infusion: 96 mL    Jevity: 20 mL    milrinone  Infusion: 105.6 mL    phenylephrine   Infusion: 501.8 mL    propofol Infusion: 161 mL    sodium bicarbonate  Infusion: 600 mL    sodium chloride 0.9%.: 240 mL    Solution: 1060 mL    vasopressin Infusion: 47 mL    Vital HN: 150 mL  Total IN: 3092.2 mL    OUT:    Bulb: 10 mL    Chest Tube: 85 mL    Indwelling Catheter - Urethral: 5 mL    Other: 4411 mL    VAC (Vacuum Assisted Closure) System: 35 mL  Total OUT: 4546 mL    Total NET: -1453.8 mL      07-08 @ 07:01  -  07-08 @ 09:36  --------------------------------------------------------  IN:    dexmedetomidine Infusion: 5.9 mL    heparin Infusion: 4 mL    milrinone  Infusion: 4.4 mL    phenylephrine   Infusion: 19.9 mL    sodium bicarbonate  Infusion: 25 mL    sodium chloride 0.9%.: 10 mL    vasopressin Infusion: 2 mL    Vital HN: 10 mL  Total IN: 81.2 mL    OUT:    Other: 97 mL  Total OUT: 97 mL    Total NET: -15.8 mL      Physical exam:   Intubated and sedated   ANASARCA   difficult to evaluate for JVD   Chest: s/p thoractomy, closed chest  multiple drainages from the chest   RRR, normal s1/s2, systolic murmur  Abdomen - swollen, not tender, not distended   Extremities 2+ peripheral edema  Hd cathter - on the left IJ  Desai           LABS:                        8.8    22.8  )-----------( 38       ( 08 Jul 2018 03:51 )             24.4     07-08    136  |  96  |  22  ----------------------------<  151<H>  3.0<L>   |  23  |  0.45<L>    Ca    9.2      08 Jul 2018 03:51  Phos  1.9     07-08  Mg     1.8     07-08    TPro  4.8<L>  /  Alb  3.1<L>  /  TBili  37.0<H>  /  DBili  >10.0<H>  /  AST  107<H>  /  ALT  48<H>  /  AlkPhos  121<H>  07-07      PT/INR - ( 08 Jul 2018 03:51 )   PT: 15.2 sec;   INR: 1.36          PTT - ( 08 Jul 2018 03:51 )  PTT:56.4 sec          RADIOLOGY & ADDITIONAL STUDIES:

## 2018-07-08 NOTE — PROGRESS NOTE ADULT - PROBLEM SELECTOR PLAN 1
- the oliguric EMILY due to ATN - most likely due to cardiogenic shock, can not exclude also a septic shock, requiring pressors - on vasopressin, phenylephrine,  - oliguric in the morning - anuric   - now on CVVHd - see above more details, it is better to be on 1.5 liters of dialysate per h, the patient is 60 kg and the recommendations are to be on 25 ml/kg/h of dialysate   - the patient with overt fluid overload - anasarca  - renal diet when he is not NPO  - hypophosphatemia - noted - please consider replacing with KPO4 - 30 mEq and follow up - iv twice and follow up   - potassium 3.0 - please give 40 meq of potassium iv and follow up   - lactate still elevated - we will continue with CVVHD, the ultimate treatment for lactic acidosis is treatment of the cause for it. In this case treatment of the shock   - please reconsider the need for bicarbonate drip  - in and out

## 2018-07-08 NOTE — PROGRESS NOTE ADULT - PROBLEM SELECTOR PLAN 4
- cardiogenic and possible component sepsis   - meropenem and vancomycin and fluconazole   - please follow up the vanco trough - last one 11.8 - please continue to follow up

## 2018-07-08 NOTE — PROGRESS NOTE ADULT - ASSESSMENT
The patient is 67 year old male with PMH stated above, now s/p  Repair of AA, AVR, MV repair, TV repair, CABG x2, now in shock requiring pressor support - mutiple pressors. The patient with deterioration of the renal function in the setting of the shock - oliguric, no response from the diuretics. The CT ICU team started the patient on aquaphoresis and the CVVHD ordered, never been started from the primary team the BP unstable. The cvvhd started yesterday - 7/2 good tolerance so far. In the past 24 hours the  ml/min, the dialysate flow now on 2 liters per hour and the blood flow on 150 ml/min. no clotting issues overnight. Still on pressors. Good UF in the past 24 hours - 4 liters, almost 1.5 liters negative

## 2018-07-09 LAB
ALBUMIN SERPL ELPH-MCNC: 3.7 G/DL — SIGNIFICANT CHANGE UP (ref 3.3–5)
ALBUMIN SERPL ELPH-MCNC: 3.8 G/DL — SIGNIFICANT CHANGE UP (ref 3.3–5)
ALBUMIN SERPL ELPH-MCNC: 3.8 G/DL — SIGNIFICANT CHANGE UP (ref 3.3–5)
ALP SERPL-CCNC: 145 U/L — HIGH (ref 40–120)
ALP SERPL-CCNC: 145 U/L — HIGH (ref 40–120)
ALP SERPL-CCNC: 206 U/L — HIGH (ref 40–120)
ALT FLD-CCNC: 60 U/L — HIGH (ref 10–45)
ALT FLD-CCNC: 60 U/L — HIGH (ref 10–45)
ALT FLD-CCNC: 70 U/L — HIGH (ref 10–45)
AMMONIA BLD-MCNC: 10 UMOL/L — LOW (ref 11–55)
ANION GAP SERPL CALC-SCNC: 18 MMOL/L — HIGH (ref 5–17)
ANION GAP SERPL CALC-SCNC: 19 MMOL/L — HIGH (ref 5–17)
ANION GAP SERPL CALC-SCNC: 21 MMOL/L — HIGH (ref 5–17)
APTT BLD: 34.5 SEC — SIGNIFICANT CHANGE UP (ref 27.5–37.4)
APTT BLD: 54.6 SEC — HIGH (ref 27.5–37.4)
APTT BLD: 59.5 SEC — HIGH (ref 27.5–37.4)
AST SERPL-CCNC: 134 U/L — HIGH (ref 10–40)
AST SERPL-CCNC: 134 U/L — HIGH (ref 10–40)
AST SERPL-CCNC: 168 U/L — HIGH (ref 10–40)
BASE EXCESS BLDA CALC-SCNC: -0.6 MMOL/L — SIGNIFICANT CHANGE UP (ref -2–3)
BASE EXCESS BLDA CALC-SCNC: -2.6 MMOL/L — LOW (ref -2–3)
BILIRUB DIRECT SERPL-MCNC: >10 MG/DL — HIGH (ref 0–0.2)
BILIRUB INDIRECT FLD-MCNC: SIGNIFICANT CHANGE UP MG/DL (ref 0.2–1)
BILIRUB SERPL-MCNC: 40.7 MG/DL — HIGH (ref 0.2–1.2)
BILIRUB SERPL-MCNC: 40.7 MG/DL — HIGH (ref 0.2–1.2)
BILIRUB SERPL-MCNC: 41.7 MG/DL — HIGH (ref 0.2–1.2)
BUN SERPL-MCNC: 17 MG/DL — SIGNIFICANT CHANGE UP (ref 7–23)
BUN SERPL-MCNC: 17 MG/DL — SIGNIFICANT CHANGE UP (ref 7–23)
BUN SERPL-MCNC: 18 MG/DL — SIGNIFICANT CHANGE UP (ref 7–23)
CALCIUM SERPL-MCNC: 9.5 MG/DL — SIGNIFICANT CHANGE UP (ref 8.4–10.5)
CALCIUM SERPL-MCNC: 9.5 MG/DL — SIGNIFICANT CHANGE UP (ref 8.4–10.5)
CALCIUM SERPL-MCNC: 9.7 MG/DL — SIGNIFICANT CHANGE UP (ref 8.4–10.5)
CHLORIDE SERPL-SCNC: 97 MMOL/L — SIGNIFICANT CHANGE UP (ref 96–108)
CHLORIDE SERPL-SCNC: 98 MMOL/L — SIGNIFICANT CHANGE UP (ref 96–108)
CHLORIDE SERPL-SCNC: 98 MMOL/L — SIGNIFICANT CHANGE UP (ref 96–108)
CO2 SERPL-SCNC: 20 MMOL/L — LOW (ref 22–31)
CO2 SERPL-SCNC: 22 MMOL/L — SIGNIFICANT CHANGE UP (ref 22–31)
CO2 SERPL-SCNC: 23 MMOL/L — SIGNIFICANT CHANGE UP (ref 22–31)
COHGB MFR BLDA: 2.1 % — HIGH
CREAT SERPL-MCNC: 0.35 MG/DL — LOW (ref 0.5–1.3)
CREAT SERPL-MCNC: 0.35 MG/DL — LOW (ref 0.5–1.3)
CREAT SERPL-MCNC: 0.36 MG/DL — LOW (ref 0.5–1.3)
CULTURE RESULTS: SIGNIFICANT CHANGE UP
GAS PNL BLDA: SIGNIFICANT CHANGE UP
GLUCOSE BLDC GLUCOMTR-MCNC: 155 MG/DL — HIGH (ref 70–99)
GLUCOSE BLDC GLUCOMTR-MCNC: 170 MG/DL — HIGH (ref 70–99)
GLUCOSE BLDC GLUCOMTR-MCNC: 185 MG/DL — HIGH (ref 70–99)
GLUCOSE BLDC GLUCOMTR-MCNC: 204 MG/DL — HIGH (ref 70–99)
GLUCOSE BLDC GLUCOMTR-MCNC: 253 MG/DL — HIGH (ref 70–99)
GLUCOSE SERPL-MCNC: 177 MG/DL — HIGH (ref 70–99)
GLUCOSE SERPL-MCNC: 180 MG/DL — HIGH (ref 70–99)
GLUCOSE SERPL-MCNC: 224 MG/DL — HIGH (ref 70–99)
HCO3 BLDA-SCNC: 24 MMOL/L — SIGNIFICANT CHANGE UP (ref 21–28)
HCO3 BLDA-SCNC: 24 MMOL/L — SIGNIFICANT CHANGE UP (ref 21–28)
HCT VFR BLD CALC: 26.5 % — LOW (ref 39–50)
HCT VFR BLD CALC: 27.9 % — LOW (ref 39–50)
HCT VFR BLD CALC: 30.4 % — LOW (ref 39–50)
HGB BLD-MCNC: 10.1 G/DL — LOW (ref 13–17)
HGB BLD-MCNC: 10.7 G/DL — LOW (ref 13–17)
HGB BLD-MCNC: 9.7 G/DL — LOW (ref 13–17)
HGB BLDA-MCNC: 10.7 G/DL — LOW (ref 13–17)
INR BLD: 1.18 — HIGH (ref 0.88–1.16)
INR BLD: 1.18 — HIGH (ref 0.88–1.16)
INR BLD: 1.23 — HIGH (ref 0.88–1.16)
LACTATE SERPL-SCNC: 4.1 MMOL/L — CRITICAL HIGH (ref 0.5–2)
LACTATE SERPL-SCNC: 4.2 MMOL/L — CRITICAL HIGH (ref 0.5–2)
LACTATE SERPL-SCNC: 4.3 MMOL/L — CRITICAL HIGH (ref 0.5–2)
LACTATE SERPL-SCNC: 4.3 MMOL/L — CRITICAL HIGH (ref 0.5–2)
LACTATE SERPL-SCNC: 5.6 MMOL/L — CRITICAL HIGH (ref 0.5–2)
MAGNESIUM SERPL-MCNC: 1.8 MG/DL — SIGNIFICANT CHANGE UP (ref 1.6–2.6)
MAGNESIUM SERPL-MCNC: 1.9 MG/DL — SIGNIFICANT CHANGE UP (ref 1.6–2.6)
MAGNESIUM SERPL-MCNC: 2 MG/DL — SIGNIFICANT CHANGE UP (ref 1.6–2.6)
MCHC RBC-ENTMCNC: 30.4 PG — SIGNIFICANT CHANGE UP (ref 27–34)
MCHC RBC-ENTMCNC: 30.5 PG — SIGNIFICANT CHANGE UP (ref 27–34)
MCHC RBC-ENTMCNC: 30.6 PG — SIGNIFICANT CHANGE UP (ref 27–34)
MCHC RBC-ENTMCNC: 35.2 G/DL — SIGNIFICANT CHANGE UP (ref 32–36)
MCHC RBC-ENTMCNC: 36.2 G/DL — HIGH (ref 32–36)
MCHC RBC-ENTMCNC: 36.6 G/DL — HIGH (ref 32–36)
MCV RBC AUTO: 83.6 FL — SIGNIFICANT CHANGE UP (ref 80–100)
MCV RBC AUTO: 84.3 FL — SIGNIFICANT CHANGE UP (ref 80–100)
MCV RBC AUTO: 86.4 FL — SIGNIFICANT CHANGE UP (ref 80–100)
METHGB MFR BLDA: 0.6 % — SIGNIFICANT CHANGE UP
O2 CT VFR BLDA CALC: SIGNIFICANT CHANGE UP (ref 15–23)
OXYHGB MFR BLDA: 90 % — LOW (ref 94–100)
PCO2 BLDA: 42 MMHG — SIGNIFICANT CHANGE UP (ref 35–48)
PCO2 BLDA: 50 MMHG — HIGH (ref 35–48)
PH BLDA: 7.3 — LOW (ref 7.35–7.45)
PH BLDA: 7.39 — SIGNIFICANT CHANGE UP (ref 7.35–7.45)
PHOSPHATE SERPL-MCNC: 1.2 MG/DL — LOW (ref 2.5–4.5)
PHOSPHATE SERPL-MCNC: 1.2 MG/DL — LOW (ref 2.5–4.5)
PLATELET # BLD AUTO: 28 K/UL — LOW (ref 150–400)
PLATELET # BLD AUTO: 29 K/UL — LOW (ref 150–400)
PLATELET # BLD AUTO: 37 K/UL — LOW (ref 150–400)
PO2 BLDA: 293 MMHG — HIGH (ref 83–108)
PO2 BLDA: 69 MMHG — LOW (ref 83–108)
POTASSIUM SERPL-MCNC: 3.1 MMOL/L — LOW (ref 3.5–5.3)
POTASSIUM SERPL-MCNC: 3.3 MMOL/L — LOW (ref 3.5–5.3)
POTASSIUM SERPL-MCNC: 3.6 MMOL/L — SIGNIFICANT CHANGE UP (ref 3.5–5.3)
POTASSIUM SERPL-SCNC: 3.1 MMOL/L — LOW (ref 3.5–5.3)
POTASSIUM SERPL-SCNC: 3.3 MMOL/L — LOW (ref 3.5–5.3)
POTASSIUM SERPL-SCNC: 3.6 MMOL/L — SIGNIFICANT CHANGE UP (ref 3.5–5.3)
PROT SERPL-MCNC: 4.5 G/DL — LOW (ref 6–8.3)
PROT SERPL-MCNC: 4.8 G/DL — LOW (ref 6–8.3)
PROT SERPL-MCNC: 4.8 G/DL — LOW (ref 6–8.3)
PROTHROM AB SERPL-ACNC: 13.1 SEC — HIGH (ref 9.8–12.7)
PROTHROM AB SERPL-ACNC: 13.1 SEC — HIGH (ref 9.8–12.7)
PROTHROM AB SERPL-ACNC: 13.7 SEC — HIGH (ref 9.8–12.7)
RBC # BLD: 3.17 M/UL — LOW (ref 4.2–5.8)
RBC # BLD: 3.31 M/UL — LOW (ref 4.2–5.8)
RBC # BLD: 3.52 M/UL — LOW (ref 4.2–5.8)
RBC # FLD: 17.3 % — HIGH (ref 10.3–16.9)
RBC # FLD: 17.3 % — HIGH (ref 10.3–16.9)
RBC # FLD: 17.4 % — HIGH (ref 10.3–16.9)
SAO2 % BLDA: 100 % — SIGNIFICANT CHANGE UP (ref 95–100)
SAO2 % BLDA: 92 % — LOW (ref 95–100)
SODIUM SERPL-SCNC: 138 MMOL/L — SIGNIFICANT CHANGE UP (ref 135–145)
SODIUM SERPL-SCNC: 139 MMOL/L — SIGNIFICANT CHANGE UP (ref 135–145)
SODIUM SERPL-SCNC: 139 MMOL/L — SIGNIFICANT CHANGE UP (ref 135–145)
SPECIMEN SOURCE: SIGNIFICANT CHANGE UP
VANCOMYCIN TROUGH SERPL-MCNC: 10.9 UG/ML — SIGNIFICANT CHANGE UP (ref 10–20)
WBC # BLD: 15.7 K/UL — HIGH (ref 3.8–10.5)
WBC # BLD: 26.1 K/UL — HIGH (ref 3.8–10.5)
WBC # BLD: 30.4 K/UL — HIGH (ref 3.8–10.5)
WBC # FLD AUTO: 15.7 K/UL — HIGH (ref 3.8–10.5)
WBC # FLD AUTO: 26.1 K/UL — HIGH (ref 3.8–10.5)
WBC # FLD AUTO: 30.4 K/UL — HIGH (ref 3.8–10.5)

## 2018-07-09 PROCEDURE — 99291 CRITICAL CARE FIRST HOUR: CPT

## 2018-07-09 PROCEDURE — 90945 DIALYSIS ONE EVALUATION: CPT | Mod: GC

## 2018-07-09 PROCEDURE — 99292 CRITICAL CARE ADDL 30 MIN: CPT

## 2018-07-09 PROCEDURE — 71045 X-RAY EXAM CHEST 1 VIEW: CPT | Mod: 26

## 2018-07-09 PROCEDURE — 93306 TTE W/DOPPLER COMPLETE: CPT | Mod: 26

## 2018-07-09 PROCEDURE — 74018 RADEX ABDOMEN 1 VIEW: CPT | Mod: 26

## 2018-07-09 RX ORDER — POTASSIUM PHOSPHATE, MONOBASIC POTASSIUM PHOSPHATE, DIBASIC 236; 224 MG/ML; MG/ML
30 INJECTION, SOLUTION INTRAVENOUS ONCE
Qty: 0 | Refills: 0 | Status: COMPLETED | OUTPATIENT
Start: 2018-07-09 | End: 2018-07-09

## 2018-07-09 RX ORDER — ALBUMIN HUMAN 25 %
50 VIAL (ML) INTRAVENOUS EVERY 6 HOURS
Qty: 0 | Refills: 0 | Status: COMPLETED | OUTPATIENT
Start: 2018-07-09 | End: 2018-07-11

## 2018-07-09 RX ORDER — DEXMEDETOMIDINE HYDROCHLORIDE IN 0.9% SODIUM CHLORIDE 4 UG/ML
0.5 INJECTION INTRAVENOUS
Qty: 200 | Refills: 0 | Status: DISCONTINUED | OUTPATIENT
Start: 2018-07-09 | End: 2018-07-20

## 2018-07-09 RX ADMIN — Medication 50 MILLILITER(S): at 12:04

## 2018-07-09 RX ADMIN — Medication 50 MILLILITER(S): at 23:52

## 2018-07-09 RX ADMIN — POTASSIUM PHOSPHATE, MONOBASIC POTASSIUM PHOSPHATE, DIBASIC 85 MILLIMOLE(S): 236; 224 INJECTION, SOLUTION INTRAVENOUS at 21:21

## 2018-07-09 RX ADMIN — MEROPENEM 100 MILLIGRAM(S): 1 INJECTION INTRAVENOUS at 07:51

## 2018-07-09 RX ADMIN — Medication 50 MILLIGRAM(S): at 22:10

## 2018-07-09 RX ADMIN — Medication 50 MILLIGRAM(S): at 07:51

## 2018-07-09 RX ADMIN — PANTOPRAZOLE SODIUM 40 MILLIGRAM(S): 20 TABLET, DELAYED RELEASE ORAL at 12:02

## 2018-07-09 RX ADMIN — Medication 250 MILLIGRAM(S): at 14:00

## 2018-07-09 RX ADMIN — Medication 1 DROP(S): at 19:00

## 2018-07-09 RX ADMIN — CHLORHEXIDINE GLUCONATE 15 MILLILITER(S): 213 SOLUTION TOPICAL at 07:50

## 2018-07-09 RX ADMIN — DEXMEDETOMIDINE HYDROCHLORIDE IN 0.9% SODIUM CHLORIDE 7.38 MICROGRAM(S)/KG/HR: 4 INJECTION INTRAVENOUS at 23:50

## 2018-07-09 RX ADMIN — MILRINONE LACTATE 4.39 MICROGRAM(S)/KG/MIN: 1 INJECTION, SOLUTION INTRAVENOUS at 22:12

## 2018-07-09 RX ADMIN — Medication 6: at 19:33

## 2018-07-09 RX ADMIN — VASOPRESSIN 1 UNIT(S)/MIN: 20 INJECTION INTRAVENOUS at 22:10

## 2018-07-09 RX ADMIN — VASOPRESSIN 1 UNIT(S)/MIN: 20 INJECTION INTRAVENOUS at 21:21

## 2018-07-09 RX ADMIN — PHENYLEPHRINE HYDROCHLORIDE 21.98 MICROGRAM(S)/KG/MIN: 10 INJECTION INTRAVENOUS at 22:11

## 2018-07-09 RX ADMIN — Medication 50 MILLIGRAM(S): at 14:13

## 2018-07-09 RX ADMIN — Medication 2: at 06:40

## 2018-07-09 RX ADMIN — Medication 4: at 23:50

## 2018-07-09 RX ADMIN — Medication 10 MILLIGRAM(S): at 15:14

## 2018-07-09 RX ADMIN — MICAFUNGIN SODIUM 105 MILLIGRAM(S): 100 INJECTION, POWDER, LYOPHILIZED, FOR SOLUTION INTRAVENOUS at 10:27

## 2018-07-09 RX ADMIN — Medication 10 MILLIGRAM(S): at 07:51

## 2018-07-09 RX ADMIN — Medication 1 DROP(S): at 07:50

## 2018-07-09 RX ADMIN — MEROPENEM 100 MILLIGRAM(S): 1 INJECTION INTRAVENOUS at 22:10

## 2018-07-09 RX ADMIN — Medication 2: at 12:02

## 2018-07-09 RX ADMIN — HEPARIN SODIUM 3 UNIT(S)/HR: 5000 INJECTION INTRAVENOUS; SUBCUTANEOUS at 22:11

## 2018-07-09 RX ADMIN — MEROPENEM 100 MILLIGRAM(S): 1 INJECTION INTRAVENOUS at 14:14

## 2018-07-09 RX ADMIN — Medication 50 MILLILITER(S): at 19:37

## 2018-07-09 RX ADMIN — Medication 2: at 00:15

## 2018-07-09 NOTE — PROGRESS NOTE ADULT - SUBJECTIVE AND OBJECTIVE BOX
CTICU  CRITICAL  CARE  attending     Hand off received 					   Pertinent clinical, laboratory, radiographic, hemodynamic, echocardiographic, respiratory data, microbiologic data and chart were reviewed and analyzed frequently throughout the course of the day and night  Patient seen and examined with CTS/ SH attending at bedside  Pt is a 67y , Male, HEALTH ISSUES - PROBLEM Dx:  Penile swelling: Penile swelling  Shock: Shock  Anemia: Anemia  Hyponatremia: Hyponatremia  Acute kidney failure: Acute kidney failure  CAD (coronary artery disease): CAD (coronary artery disease)  Valvular disease: Valvular disease      , FAMILY HISTORY:  PAST MEDICAL & SURGICAL HISTORY:  No pertinent past medical history  No significant past surgical history    Patient is a 67y old  Male who presents with a chief complaint of AS (19 Jun 2018 00:06)      14 system review was unremarkable  acute changes include acute respiratory failure  Vital signs, hemodynamic and respiratory parameters were reviewed from the bedside nursing flowsheet.  ICU Vital Signs Last 24 Hrs  T(C): 35.4 (09 Jul 2018 09:36), Max: 35.4 (08 Jul 2018 18:00)  T(F): 95.7 (09 Jul 2018 09:36), Max: 95.8 (08 Jul 2018 21:56)  HR: 82 (09 Jul 2018 13:00) (71 - 101)  BP: --  BP(mean): --  ABP: 110/74 (09 Jul 2018 13:00) (91/68 - 134/89)  ABP(mean): 84 (09 Jul 2018 13:00) (76 - 106)  RR: 20 (09 Jul 2018 13:00) (15 - 21)  SpO2: 96% (09 Jul 2018 13:00) (94% - 100%)    Adult Advanced Hemodynamics Last 24 Hrs  CVP(mm Hg): 21 (09 Jul 2018 13:00) (16 - 43)  CVP(cm H2O): --  CO: --  CI: --  PA: --  PA(mean): --  PCWP: --  SVR: --  SVRI: --  PVR: --  PVRI: --, ABG - ( 09 Jul 2018 11:18 )  pH, Arterial: 7.42  pH, Blood: x     /  pCO2: 38    /  pO2: 87    / HCO3: 24    / Base Excess: -0.4  /  SaO2: 97                Mode: AC/ CMV (Assist Control/ Continuous Mandatory Ventilation)  RR (machine): 20  TV (machine): 450  FiO2: 85  PEEP: 8  ITime: 1.2  MAP: 18  PIP: 32    Intake and output was reviewed and the fluid balance was calculated  Daily     Daily   I&O's Summary    08 Jul 2018 07:01  -  09 Jul 2018 07:00  --------------------------------------------------------  IN: 3871.4 mL / OUT: 2468 mL / NET: 1403.4 mL    09 Jul 2018 07:01  -  09 Jul 2018 13:35  --------------------------------------------------------  IN: 674 mL / OUT: 700 mL / NET: -26 mL        All lines and drain sites were assessed  Glycemic trend was reviewed[facility]      POCT Blood Glucose.: 185 mg/dL (09 Jul 2018 11:16)    No acute change in mental status  Auscultation of the chest reveals equal bs  Abdomen is soft  Extremities are warm and well perfused  Wounds appear clean and unremarkable  Antibiotics are periop    labs  CBC Full  -  ( 09 Jul 2018 09:41 )  WBC Count : 18.1 K/uL  Hemoglobin : 10.1 g/dL  Hematocrit : 27.9 %  Platelet Count - Automated : 28 K/uL  Mean Cell Volume : 84.3 fL  Mean Cell Hemoglobin : 30.5 pg  Mean Cell Hemoglobin Concentration : 36.2 g/dL  Auto Neutrophil # : x  Auto Lymphocyte # : x  Auto Monocyte # : x  Auto Eosinophil # : x  Auto Basophil # : x  Auto Neutrophil % : x  Auto Lymphocyte % : x  Auto Monocyte % : x  Auto Eosinophil % : x  Auto Basophil % : x    07-09    139  |  98  |  17  ----------------------------<  177<H>  3.3<L>   |  22  |  0.36<L>    Ca    9.5      09 Jul 2018 09:41  Phos  1.2     07-09  Mg     1.9     07-09    TPro  4.8<L>  /  Alb  3.8  /  TBili  40.7<H>  /  DBili  >10.0<H>  /  AST  134<H>  /  ALT  60<H>  /  AlkPhos  145<H>  07-09    PT/INR - ( 09 Jul 2018 09:41 )   PT: 13.1 sec;   INR: 1.18          PTT - ( 09 Jul 2018 09:41 )  PTT:59.5 sec  The current medications were reviewed   MEDICATIONS  (STANDING):  albumin human 25% IVPB 50 milliLiter(s) IV Intermittent every 6 hours  artificial  tears Solution 1 Drop(s) Both EYES two times a day  chlorhexidine 0.12% Liquid 15 milliLiter(s) Swish and Spit every 12 hours  cisatracurium Infusion 3 MICROgram(s)/kG/Min (10.548 mL/Hr) IV Continuous <Continuous>  dexmedetomidine Infusion 0.5 MICROgram(s)/kG/Hr (7.375 mL/Hr) IV Continuous <Continuous>  dextrose 5%. 1000 milliLiter(s) (25 mL/Hr) IV Continuous <Continuous>  dextrose 50% Injectable 50 milliLiter(s) IV Push every 15 minutes  dextrose 50% Injectable 25 milliLiter(s) IV Push every 15 minutes  epinephrine 8 milliGRAM(s),dextrose 5% in water 250 milliLiter(s) 8 milliGRAM(s) (10.42 mL/Hr) IV Continuous <Continuous>  heparin  Infusion 200 Unit(s)/Hr (3 mL/Hr) IV Continuous <Continuous>  Heparin 37034 Unit(s),Dextrose 5% 1000 milliLiter(s) 25844 Unit(s) (25 mL/Hr) IV Continuous <Continuous>  hydrocortisone sodium succinate Injectable 50 milliGRAM(s) IV Push every 8 hours  insulin lispro (HumaLOG) corrective regimen sliding scale   SubCutaneous every 6 hours  meropenem  IVPB 1000 milliGRAM(s) IV Intermittent every 8 hours  metoclopramide Injectable 10 milliGRAM(s) IV Push every 8 hours  micafungin IVPB 100 milliGRAM(s) IV Intermittent every 24 hours  milrinone Infusion 0.25 MICROgram(s)/kG/Min (4.395 mL/Hr) IV Continuous <Continuous>  pantoprazole  Injectable 40 milliGRAM(s) IV Push daily  phenylephrine    Infusion 2 MICROgram(s)/kG/Min (21.975 mL/Hr) IV Continuous <Continuous>  propofol Infusion 5 MICROgram(s)/kG/Min (1.758 mL/Hr) IV Continuous <Continuous>  PureFlow Dialysate RFP-400 (K 2 / Ca 3) 5000 milliLiter(s) (1500 mL/Hr) CRRT <Continuous>  sodium bicarbonate  Infusion 0.254 mEq/kG/Hr (100 mL/Hr) IV Continuous <Continuous>  sodium chloride 0.9%. 1000 milliLiter(s) (10 mL/Hr) IV Continuous <Continuous>  vancomycin  IVPB 750 milliGRAM(s) IV Intermittent every 24 hours  vasopressin Infusion 0.017 Unit(s)/Min (1 mL/Hr) IV Continuous <Continuous>    MEDICATIONS  (PRN):  dextrose 40% Gel 15 Gram(s) Oral once PRN Blood Glucose LESS THAN 70 milliGRAM(s)/deciliter  glucagon  Injectable 1 milliGRAM(s) IntraMuscular once PRN Glucose LESS THAN 70 milligrams/deciliter  midazolam Injectable 2 milliGRAM(s) IV Push every 2 hours PRN agitation       PROBLEM LIST/ ASSESSMENT:  HEALTH ISSUES - PROBLEM Dx:  Penile swelling: Penile swelling  Shock: Shock  Anemia: Anemia  Hyponatremia: Hyponatremia  Acute kidney failure: Acute kidney failure  CAD (coronary artery disease): CAD (coronary artery disease)  Valvular disease: Valvular disease      ,   Patient is a 67y old  Male who presents with a chief complaint of AS (19 Jun 2018 00:06)     s/p cardiac surgery      My plan includes :  close hemodynamic, ventilatory and drain monitoring and management per post op routine    Monitor for arrhythmias and monitor parameters for organ perfusion  monitor neurologic status  Head of the bed should remain elevated to 45 deg .   chest PT and IS will be encouraged  monitor adequacy of oxygenation and ventilation and attempt to wean oxygen  Nutritional goals will be met using po eventually , ensure adequate caloric intake and montior the same  Stress ulcer and VTE prophylaxis will be achieved    Glycemic control is satisfactory  Electrolytes have been repleted as necessary and wound care has been carried out. Pain control has been achieved.   agressive physical therapy and early mobility and ambulation goals will be met   The family was updated about the course and plan  CRITICAL CARE TIME SPENT in evaluation and management, reassessments, review and interpretation of labs and x-rays, ventilator and hemodynamic management, formulating a plan and coordinating care: ___90____ MIN.  Time does not include procedural time.  CTICU ATTENDING     					    Clement Ahn MD

## 2018-07-09 NOTE — PROGRESS NOTE ADULT - PROBLEM SELECTOR PLAN 3
- hemoglobin stable   - blood transfusion as per primary team   - no need for EPO at present  - blood transfusion as per primary team.

## 2018-07-09 NOTE — PROGRESS NOTE ADULT - SUBJECTIVE AND OBJECTIVE BOX
Seen in the morning, still intubated, still requiring pressor support, worsening jaundice. S/p surgery closing the chest wall, done on 7/2 - started on CVVHD after  that. In the past 24 hours 6 liters off from the CVVHD, in the morning on 100 ml/min, blood flow on 200 ml/min and dialysate flow on 2 liters/h. No issues with CVVHD, 2.2 liters, postive 1.4 liters in the past 24 hours   Patient seen and examined at bedside.       Patient seen and examined at bedside.     albumin human 25% IVPB 50 milliLiter(s) every 6 hours  artificial  tears Solution 1 Drop(s) two times a day  chlorhexidine 0.12% Liquid 15 milliLiter(s) every 12 hours  cisatracurium Infusion 3 MICROgram(s)/kG/Min <Continuous>  dexmedetomidine Infusion 0.5 MICROgram(s)/kG/Hr <Continuous>  dextrose 40% Gel 15 Gram(s) once PRN  dextrose 5%. 1000 milliLiter(s) <Continuous>  dextrose 50% Injectable 50 milliLiter(s) every 15 minutes  dextrose 50% Injectable 25 milliLiter(s) every 15 minutes  epinephrine 8 milliGRAM(s),dextrose 5% in water 250 milliLiter(s) 8 milliGRAM(s) <Continuous>  glucagon  Injectable 1 milliGRAM(s) once PRN  heparin  Infusion 200 Unit(s)/Hr <Continuous>  Heparin 78637 Unit(s),Dextrose 5% 1000 milliLiter(s) 24478 Unit(s) <Continuous>  hydrocortisone sodium succinate Injectable 50 milliGRAM(s) every 8 hours  insulin lispro (HumaLOG) corrective regimen sliding scale   every 6 hours  meropenem  IVPB 1000 milliGRAM(s) every 8 hours  metoclopramide Injectable 10 milliGRAM(s) every 8 hours  micafungin IVPB 100 milliGRAM(s) every 24 hours  midazolam Injectable 2 milliGRAM(s) every 2 hours PRN  milrinone Infusion 0.25 MICROgram(s)/kG/Min <Continuous>  pantoprazole  Injectable 40 milliGRAM(s) daily  phenylephrine    Infusion 2 MICROgram(s)/kG/Min <Continuous>  propofol Infusion 5 MICROgram(s)/kG/Min <Continuous>  PureFlow Dialysate RFP-400 (K 2 / Ca 3) 5000 milliLiter(s) <Continuous>  sodium bicarbonate  Infusion 0.254 mEq/kG/Hr <Continuous>  sodium chloride 0.9%. 1000 milliLiter(s) <Continuous>  vancomycin  IVPB 750 milliGRAM(s) every 24 hours  vasopressin Infusion 0.017 Unit(s)/Min <Continuous>      Allergies    penicillin (Rash)    Intolerances        T(C): , Max: 35.6 (07-08-18 @ 12:00)  T(F): , Max: 96 (07-08-18 @ 12:00)  HR: 80 (07-09-18 @ 10:00)  BP: --  BP(mean): --  RR: 20 (07-09-18 @ 10:00)  SpO2: 95% (07-09-18 @ 10:00)  Wt(kg): --    07-08 @ 07:01  -  07-09 @ 07:00  --------------------------------------------------------  IN:    dexmedetomidine Infusion: 118.4 mL    Enteral Tube Flush: 60 mL    heparin Infusion: 40 mL    milrinone  Infusion: 145.2 mL    Packed Red Blood Cells: 600 mL    phenylephrine   Infusion: 477.6 mL    Platelets - Single Donor: 228 mL    propofol Infusion: 151.2 mL    sodium bicarbonate  Infusion: 400 mL    sodium chloride 0.9%.: 240 mL    Solution: 500 mL    Solution: 200 mL    vasopressin Infusion: 81 mL    Vital HN: 630 mL  Total IN: 3871.4 mL    OUT:    Chest Tube: 215 mL    Other: 2248 mL    VAC (Vacuum Assisted Closure) System: 5 mL  Total OUT: 2468 mL    Total NET: 1403.4 mL      07-09 @ 07:01  -  07-09 @ 10:46  --------------------------------------------------------  IN:    dexmedetomidine Infusion: 9 mL    heparin Infusion: 12 mL    milrinone  Infusion: 13.2 mL    phenylephrine   Infusion: 39.9 mL    propofol Infusion: 17.7 mL    sodium bicarbonate  Infusion: 75 mL    sodium chloride 0.9%.: 30 mL    vasopressin Infusion: 12 mL    Vital HN: 40 mL  Total IN: 248.8 mL    OUT:    Chest Tube: 20 mL    Other: 285 mL  Total OUT: 305 mL    Total NET: -56.2 mL        Physical exam:   Intubated and sedated   ANASARCA   Jaundice   difficult to evaluate for JVD   Chest: s/p thoracotomy closed chest  multiple drainages from the chest   RRR, normal s1/s2, systolic murmur  Abdomen - swollen, not tender, not distended   Extremities 3+ peripheral edema  Hd cathter - on the left IJ  Desai           LABS:                        10.1   26.1  )-----------( 28       ( 09 Jul 2018 09:41 )             27.9     07-09    139  |  98  |  17  ----------------------------<  177<H>  3.3<L>   |  22  |  0.36<L>    Ca    9.5      09 Jul 2018 09:41  Phos  1.2     07-09  Mg     1.9     07-09    TPro  4.8<L>  /  Alb  3.8  /  TBili  40.7<H>  /  DBili  >10.0<H>  /  AST  134<H>  /  ALT  60<H>  /  AlkPhos  145<H>  07-09      PT/INR - ( 09 Jul 2018 09:41 )   PT: 13.1 sec;   INR: 1.18          PTT - ( 09 Jul 2018 09:41 )  PTT:59.5 sec          RADIOLOGY & ADDITIONAL STUDIES:

## 2018-07-09 NOTE — PROGRESS NOTE ADULT - ASSESSMENT
The patient is 67 year old male with PMH stated above, now s/p  Repair of AA, AVR, MV repair, TV repair, CABG x2, now in shock requiring pressor support - mutiple pressors. The patient with deterioration of the renal function in the setting of the shock - oliguric, no response from the diuretics. The CT ICU team started the patient on aquaphoresis and the CVVHD ordered, never been started from the primary team the BP unstable. The cvvhd started yesterday - 7/2 good tolerance so far. In the past 24 hours the  ml/min, the dialysate flow now on 2 liters per hour and the blood flow on 200 ml/min. no clotting issues overnight. Still on pressors. The UF in the past 24 hours 2.2 liters, postive 1.4 liters, no issues with the CVVHD

## 2018-07-09 NOTE — PROGRESS NOTE ADULT - PROBLEM SELECTOR PLAN 4
- cardiogenic and possible component sepsis   - meropenem, and vancomycin and micafungin   - please follow up the vanco trough - last one 10.2 - please continue to follow up

## 2018-07-09 NOTE — PROGRESS NOTE ADULT - PROBLEM SELECTOR PLAN 1
- the oliguric EMILY due to ATN - most likely due to cardiogenic shock, can not exclude also a septic shock, requiring pressors - on vasopressin and phenylephrine, on milrinone   - oliguric in the morning - anuric   - now on CVVHd - see above more details,   The dialysate flow per h shuold be based on  the patient's weight he  is 60 kg and the recommendations are to be on 25 ml/kg/h of dialysate - 1.5 liters/h - dialysate flow  - the patient with overt fluid overload - anasarca  - renal diet when he is not NPO  - hypophosphatemia - noted - please consider replacing with KPO4 - 30 mEq x2 and follow up   - potassium 3.6 - please continue to follow up   - lactate still elevated - we will continue with CVVHD, the ultimate treatment for lactic acidosis is treatment of the cause for it. In this case treatment of the shock   - the patient with mild metabolic alkalosis - please reconsider the need for bicarbonate drip  - in and out  - with hypokalemia - substituting the phosphate with potassium phosphate will bring the potassium up - so please follow up

## 2018-07-10 LAB
ALBUMIN SERPL ELPH-MCNC: 3.8 G/DL — SIGNIFICANT CHANGE UP (ref 3.3–5)
ALBUMIN SERPL ELPH-MCNC: 4 G/DL — SIGNIFICANT CHANGE UP (ref 3.3–5)
ALBUMIN SERPL ELPH-MCNC: 4 G/DL — SIGNIFICANT CHANGE UP (ref 3.3–5)
ALBUMIN SERPL ELPH-MCNC: 4.1 G/DL — SIGNIFICANT CHANGE UP (ref 3.3–5)
ALBUMIN SERPL ELPH-MCNC: 4.1 G/DL — SIGNIFICANT CHANGE UP (ref 3.3–5)
ALP SERPL-CCNC: 208 U/L — HIGH (ref 40–120)
ALP SERPL-CCNC: 214 U/L — HIGH (ref 40–120)
ALP SERPL-CCNC: 215 U/L — HIGH (ref 40–120)
ALP SERPL-CCNC: 216 U/L — HIGH (ref 40–120)
ALP SERPL-CCNC: 225 U/L — HIGH (ref 40–120)
ALT FLD-CCNC: 75 U/L — HIGH (ref 10–45)
ALT FLD-CCNC: 76 U/L — HIGH (ref 10–45)
ALT FLD-CCNC: 79 U/L — HIGH (ref 10–45)
ALT FLD-CCNC: 80 U/L — HIGH (ref 10–45)
ALT FLD-CCNC: 82 U/L — HIGH (ref 10–45)
AMMONIA BLD-MCNC: 56 UMOL/L — HIGH (ref 11–55)
AMYLASE P1 CFR SERPL: 55 U/L — SIGNIFICANT CHANGE UP (ref 25–125)
ANION GAP SERPL CALC-SCNC: 20 MMOL/L — HIGH (ref 5–17)
ANION GAP SERPL CALC-SCNC: 21 MMOL/L — HIGH (ref 5–17)
ANION GAP SERPL CALC-SCNC: 21 MMOL/L — HIGH (ref 5–17)
ANION GAP SERPL CALC-SCNC: 22 MMOL/L — HIGH (ref 5–17)
ANION GAP SERPL CALC-SCNC: 22 MMOL/L — HIGH (ref 5–17)
APTT BLD: 47.9 SEC — HIGH (ref 27.5–37.4)
APTT BLD: 51 SEC — HIGH (ref 27.5–37.4)
APTT BLD: 51 SEC — HIGH (ref 27.5–37.4)
APTT BLD: 55.7 SEC — HIGH (ref 27.5–37.4)
APTT BLD: 56.5 SEC — HIGH (ref 27.5–37.4)
AST SERPL-CCNC: 183 U/L — HIGH (ref 10–40)
AST SERPL-CCNC: 185 U/L — HIGH (ref 10–40)
AST SERPL-CCNC: 198 U/L — HIGH (ref 10–40)
AST SERPL-CCNC: 199 U/L — HIGH (ref 10–40)
AST SERPL-CCNC: 200 U/L — HIGH (ref 10–40)
BASE EXCESS BLDA CALC-SCNC: -2.7 MMOL/L — LOW (ref -2–3)
BILIRUB SERPL-MCNC: 44.3 MG/DL — HIGH (ref 0.2–1.2)
BILIRUB SERPL-MCNC: 46.9 MG/DL — HIGH (ref 0.2–1.2)
BILIRUB SERPL-MCNC: 47.4 MG/DL — HIGH (ref 0.2–1.2)
BILIRUB SERPL-MCNC: 48.1 MG/DL — HIGH (ref 0.2–1.2)
BILIRUB SERPL-MCNC: 48.4 MG/DL — HIGH (ref 0.2–1.2)
BUN SERPL-MCNC: 18 MG/DL — SIGNIFICANT CHANGE UP (ref 7–23)
BUN SERPL-MCNC: 19 MG/DL — SIGNIFICANT CHANGE UP (ref 7–23)
CALCIUM SERPL-MCNC: 10 MG/DL — SIGNIFICANT CHANGE UP (ref 8.4–10.5)
CALCIUM SERPL-MCNC: 9.2 MG/DL — SIGNIFICANT CHANGE UP (ref 8.4–10.5)
CALCIUM SERPL-MCNC: 9.5 MG/DL — SIGNIFICANT CHANGE UP (ref 8.4–10.5)
CALCIUM SERPL-MCNC: 9.9 MG/DL — SIGNIFICANT CHANGE UP (ref 8.4–10.5)
CALCIUM SERPL-MCNC: 9.9 MG/DL — SIGNIFICANT CHANGE UP (ref 8.4–10.5)
CHLORIDE SERPL-SCNC: 97 MMOL/L — SIGNIFICANT CHANGE UP (ref 96–108)
CHLORIDE SERPL-SCNC: 98 MMOL/L — SIGNIFICANT CHANGE UP (ref 96–108)
CHLORIDE SERPL-SCNC: 99 MMOL/L — SIGNIFICANT CHANGE UP (ref 96–108)
CO2 SERPL-SCNC: 19 MMOL/L — LOW (ref 22–31)
CO2 SERPL-SCNC: 20 MMOL/L — LOW (ref 22–31)
CO2 SERPL-SCNC: 21 MMOL/L — LOW (ref 22–31)
CO2 SERPL-SCNC: 21 MMOL/L — LOW (ref 22–31)
CO2 SERPL-SCNC: 22 MMOL/L — SIGNIFICANT CHANGE UP (ref 22–31)
CREAT SERPL-MCNC: 0.34 MG/DL — LOW (ref 0.5–1.3)
CREAT SERPL-MCNC: 0.34 MG/DL — LOW (ref 0.5–1.3)
CREAT SERPL-MCNC: 0.35 MG/DL — LOW (ref 0.5–1.3)
CREAT SERPL-MCNC: 0.4 MG/DL — LOW (ref 0.5–1.3)
CREAT SERPL-MCNC: 0.43 MG/DL — LOW (ref 0.5–1.3)
GAS PNL BLDA: SIGNIFICANT CHANGE UP
GLUCOSE BLDC GLUCOMTR-MCNC: 161 MG/DL — HIGH (ref 70–99)
GLUCOSE BLDC GLUCOMTR-MCNC: 189 MG/DL — HIGH (ref 70–99)
GLUCOSE BLDC GLUCOMTR-MCNC: 202 MG/DL — HIGH (ref 70–99)
GLUCOSE SERPL-MCNC: 148 MG/DL — HIGH (ref 70–99)
GLUCOSE SERPL-MCNC: 163 MG/DL — HIGH (ref 70–99)
GLUCOSE SERPL-MCNC: 180 MG/DL — HIGH (ref 70–99)
GLUCOSE SERPL-MCNC: 187 MG/DL — HIGH (ref 70–99)
GLUCOSE SERPL-MCNC: 201 MG/DL — HIGH (ref 70–99)
HCO3 BLDA-SCNC: 22 MMOL/L — SIGNIFICANT CHANGE UP (ref 21–28)
HCT VFR BLD CALC: 23.6 % — LOW (ref 39–50)
HCT VFR BLD CALC: 24.4 % — LOW (ref 39–50)
HCT VFR BLD CALC: 25.4 % — LOW (ref 39–50)
HCT VFR BLD CALC: 25.5 % — LOW (ref 39–50)
HCT VFR BLD CALC: 26.4 % — LOW (ref 39–50)
HGB BLD-MCNC: 8.3 G/DL — LOW (ref 13–17)
HGB BLD-MCNC: 8.5 G/DL — LOW (ref 13–17)
HGB BLD-MCNC: 8.8 G/DL — LOW (ref 13–17)
HGB BLD-MCNC: 9 G/DL — LOW (ref 13–17)
HGB BLD-MCNC: 9.3 G/DL — LOW (ref 13–17)
INR BLD: 1.19 — HIGH (ref 0.88–1.16)
INR BLD: 1.2 — HIGH (ref 0.88–1.16)
INR BLD: 1.21 — HIGH (ref 0.88–1.16)
INR BLD: 1.21 — HIGH (ref 0.88–1.16)
INR BLD: 1.24 — HIGH (ref 0.88–1.16)
LACTATE SERPL-SCNC: 5 MMOL/L — CRITICAL HIGH (ref 0.5–2)
LACTATE SERPL-SCNC: 5.2 MMOL/L — CRITICAL HIGH (ref 0.5–2)
LACTATE SERPL-SCNC: 5.3 MMOL/L — CRITICAL HIGH (ref 0.5–2)
LACTATE SERPL-SCNC: 6.5 MMOL/L — CRITICAL HIGH (ref 0.5–2)
LACTATE SERPL-SCNC: 7 MMOL/L — CRITICAL HIGH (ref 0.5–2)
LIDOCAIN IGE QN: 116 U/L — HIGH (ref 7–60)
MAGNESIUM SERPL-MCNC: 1.8 MG/DL — SIGNIFICANT CHANGE UP (ref 1.6–2.6)
MAGNESIUM SERPL-MCNC: 2 MG/DL — SIGNIFICANT CHANGE UP (ref 1.6–2.6)
MAGNESIUM SERPL-MCNC: 2.1 MG/DL — SIGNIFICANT CHANGE UP (ref 1.6–2.6)
MAGNESIUM SERPL-MCNC: 2.2 MG/DL — SIGNIFICANT CHANGE UP (ref 1.6–2.6)
MAGNESIUM SERPL-MCNC: 2.5 MG/DL — SIGNIFICANT CHANGE UP (ref 1.6–2.6)
MCHC RBC-ENTMCNC: 30 PG — SIGNIFICANT CHANGE UP (ref 27–34)
MCHC RBC-ENTMCNC: 30.1 PG — SIGNIFICANT CHANGE UP (ref 27–34)
MCHC RBC-ENTMCNC: 30.1 PG — SIGNIFICANT CHANGE UP (ref 27–34)
MCHC RBC-ENTMCNC: 30.3 PG — SIGNIFICANT CHANGE UP (ref 27–34)
MCHC RBC-ENTMCNC: 30.5 PG — SIGNIFICANT CHANGE UP (ref 27–34)
MCHC RBC-ENTMCNC: 34.5 G/DL — SIGNIFICANT CHANGE UP (ref 32–36)
MCHC RBC-ENTMCNC: 34.8 G/DL — SIGNIFICANT CHANGE UP (ref 32–36)
MCHC RBC-ENTMCNC: 35.2 G/DL — SIGNIFICANT CHANGE UP (ref 32–36)
MCHC RBC-ENTMCNC: 35.2 G/DL — SIGNIFICANT CHANGE UP (ref 32–36)
MCHC RBC-ENTMCNC: 35.4 G/DL — SIGNIFICANT CHANGE UP (ref 32–36)
MCV RBC AUTO: 85.4 FL — SIGNIFICANT CHANGE UP (ref 80–100)
MCV RBC AUTO: 86.1 FL — SIGNIFICANT CHANGE UP (ref 80–100)
MCV RBC AUTO: 86.1 FL — SIGNIFICANT CHANGE UP (ref 80–100)
MCV RBC AUTO: 86.5 FL — SIGNIFICANT CHANGE UP (ref 80–100)
MCV RBC AUTO: 87 FL — SIGNIFICANT CHANGE UP (ref 80–100)
PCO2 BLDA: 36 MMHG — SIGNIFICANT CHANGE UP (ref 35–48)
PH BLDA: 7.4 — SIGNIFICANT CHANGE UP (ref 7.35–7.45)
PHOSPHATE SERPL-MCNC: 1.8 MG/DL — LOW (ref 2.5–4.5)
PHOSPHATE SERPL-MCNC: 1.8 MG/DL — LOW (ref 2.5–4.5)
PHOSPHATE SERPL-MCNC: 2.6 MG/DL — SIGNIFICANT CHANGE UP (ref 2.5–4.5)
PLATELET # BLD AUTO: 21 K/UL — LOW (ref 150–400)
PLATELET # BLD AUTO: 21 K/UL — LOW (ref 150–400)
PLATELET # BLD AUTO: 51 K/UL — LOW (ref 150–400)
PLATELET # BLD AUTO: 76 K/UL — LOW (ref 150–400)
PLATELET # BLD AUTO: 97 K/UL — LOW (ref 150–400)
PO2 BLDA: 145 MMHG — HIGH (ref 83–108)
POTASSIUM SERPL-MCNC: 3.4 MMOL/L — LOW (ref 3.5–5.3)
POTASSIUM SERPL-MCNC: 3.5 MMOL/L — SIGNIFICANT CHANGE UP (ref 3.5–5.3)
POTASSIUM SERPL-MCNC: 3.5 MMOL/L — SIGNIFICANT CHANGE UP (ref 3.5–5.3)
POTASSIUM SERPL-MCNC: 3.6 MMOL/L — SIGNIFICANT CHANGE UP (ref 3.5–5.3)
POTASSIUM SERPL-MCNC: 3.7 MMOL/L — SIGNIFICANT CHANGE UP (ref 3.5–5.3)
POTASSIUM SERPL-SCNC: 3.4 MMOL/L — LOW (ref 3.5–5.3)
POTASSIUM SERPL-SCNC: 3.5 MMOL/L — SIGNIFICANT CHANGE UP (ref 3.5–5.3)
POTASSIUM SERPL-SCNC: 3.5 MMOL/L — SIGNIFICANT CHANGE UP (ref 3.5–5.3)
POTASSIUM SERPL-SCNC: 3.6 MMOL/L — SIGNIFICANT CHANGE UP (ref 3.5–5.3)
POTASSIUM SERPL-SCNC: 3.7 MMOL/L — SIGNIFICANT CHANGE UP (ref 3.5–5.3)
PROT SERPL-MCNC: 4.6 G/DL — LOW (ref 6–8.3)
PROT SERPL-MCNC: 4.6 G/DL — LOW (ref 6–8.3)
PROT SERPL-MCNC: 4.8 G/DL — LOW (ref 6–8.3)
PROT SERPL-MCNC: 4.9 G/DL — LOW (ref 6–8.3)
PROT SERPL-MCNC: 4.9 G/DL — LOW (ref 6–8.3)
PROTHROM AB SERPL-ACNC: 13.3 SEC — HIGH (ref 9.8–12.7)
PROTHROM AB SERPL-ACNC: 13.4 SEC — HIGH (ref 9.8–12.7)
PROTHROM AB SERPL-ACNC: 13.5 SEC — HIGH (ref 9.8–12.7)
PROTHROM AB SERPL-ACNC: 13.5 SEC — HIGH (ref 9.8–12.7)
PROTHROM AB SERPL-ACNC: 13.8 SEC — HIGH (ref 9.8–12.7)
RBC # BLD: 2.74 M/UL — LOW (ref 4.2–5.8)
RBC # BLD: 2.82 M/UL — LOW (ref 4.2–5.8)
RBC # BLD: 2.93 M/UL — LOW (ref 4.2–5.8)
RBC # BLD: 2.95 M/UL — LOW (ref 4.2–5.8)
RBC # BLD: 3.09 M/UL — LOW (ref 4.2–5.8)
RBC # FLD: 17.5 % — HIGH (ref 10.3–16.9)
RBC # FLD: 17.7 % — HIGH (ref 10.3–16.9)
RBC # FLD: 17.7 % — HIGH (ref 10.3–16.9)
RBC # FLD: 18 % — HIGH (ref 10.3–16.9)
RBC # FLD: 18.3 % — HIGH (ref 10.3–16.9)
SAO2 % BLDA: 99 % — SIGNIFICANT CHANGE UP (ref 95–100)
SODIUM SERPL-SCNC: 139 MMOL/L — SIGNIFICANT CHANGE UP (ref 135–145)
SODIUM SERPL-SCNC: 140 MMOL/L — SIGNIFICANT CHANGE UP (ref 135–145)
WBC # BLD: 20.9 K/UL — HIGH (ref 3.8–10.5)
WBC # BLD: 21.1 K/UL — HIGH (ref 3.8–10.5)
WBC # BLD: 23 K/UL — HIGH (ref 3.8–10.5)
WBC # BLD: 23.5 K/UL — HIGH (ref 3.8–10.5)
WBC # BLD: 41 K/UL — CRITICAL HIGH (ref 3.8–10.5)
WBC # FLD AUTO: 20.9 K/UL — HIGH (ref 3.8–10.5)
WBC # FLD AUTO: 21.1 K/UL — HIGH (ref 3.8–10.5)
WBC # FLD AUTO: 23 K/UL — HIGH (ref 3.8–10.5)
WBC # FLD AUTO: 23.5 K/UL — HIGH (ref 3.8–10.5)
WBC # FLD AUTO: 41 K/UL — CRITICAL HIGH (ref 3.8–10.5)

## 2018-07-10 PROCEDURE — 31622 DX BRONCHOSCOPE/WASH: CPT

## 2018-07-10 PROCEDURE — 90945 DIALYSIS ONE EVALUATION: CPT | Mod: GC

## 2018-07-10 PROCEDURE — 99291 CRITICAL CARE FIRST HOUR: CPT | Mod: 25

## 2018-07-10 PROCEDURE — 99292 CRITICAL CARE ADDL 30 MIN: CPT | Mod: 25

## 2018-07-10 PROCEDURE — 71045 X-RAY EXAM CHEST 1 VIEW: CPT | Mod: 26,76

## 2018-07-10 RX ORDER — HEPARIN SODIUM 5000 [USP'U]/ML
400 INJECTION INTRAVENOUS; SUBCUTANEOUS
Qty: 25000 | Refills: 0 | Status: DISCONTINUED | OUTPATIENT
Start: 2018-07-10 | End: 2018-07-14

## 2018-07-10 RX ORDER — SODIUM BICARBONATE 1 MEQ/ML
50 SYRINGE (ML) INTRAVENOUS ONCE
Qty: 0 | Refills: 0 | Status: COMPLETED | OUTPATIENT
Start: 2018-07-10 | End: 2018-07-10

## 2018-07-10 RX ORDER — POTASSIUM PHOSPHATE, MONOBASIC POTASSIUM PHOSPHATE, DIBASIC 236; 224 MG/ML; MG/ML
15 INJECTION, SOLUTION INTRAVENOUS ONCE
Qty: 0 | Refills: 0 | Status: COMPLETED | OUTPATIENT
Start: 2018-07-10 | End: 2018-07-10

## 2018-07-10 RX ORDER — POTASSIUM CHLORIDE 20 MEQ
20 PACKET (EA) ORAL ONCE
Qty: 0 | Refills: 0 | Status: COMPLETED | OUTPATIENT
Start: 2018-07-10 | End: 2018-07-10

## 2018-07-10 RX ORDER — HYDROMORPHONE HYDROCHLORIDE 2 MG/ML
0.5 INJECTION INTRAMUSCULAR; INTRAVENOUS; SUBCUTANEOUS ONCE
Qty: 0 | Refills: 0 | Status: DISCONTINUED | OUTPATIENT
Start: 2018-07-10 | End: 2018-07-10

## 2018-07-10 RX ORDER — POTASSIUM CHLORIDE 20 MEQ
20 PACKET (EA) ORAL
Qty: 0 | Refills: 0 | Status: COMPLETED | OUTPATIENT
Start: 2018-07-10 | End: 2018-07-10

## 2018-07-10 RX ORDER — SODIUM BICARBONATE 1 MEQ/ML
0.13 SYRINGE (ML) INTRAVENOUS
Qty: 150 | Refills: 0 | Status: DISCONTINUED | OUTPATIENT
Start: 2018-07-10 | End: 2018-07-12

## 2018-07-10 RX ORDER — MAGNESIUM SULFATE 500 MG/ML
2 VIAL (ML) INJECTION ONCE
Qty: 0 | Refills: 0 | Status: COMPLETED | OUTPATIENT
Start: 2018-07-10 | End: 2018-07-10

## 2018-07-10 RX ORDER — DESMOPRESSIN ACETATE 0.1 MG/1
20 TABLET ORAL ONCE
Qty: 0 | Refills: 0 | Status: COMPLETED | OUTPATIENT
Start: 2018-07-10 | End: 2018-07-10

## 2018-07-10 RX ADMIN — MIDAZOLAM HYDROCHLORIDE 2 MILLIGRAM(S): 1 INJECTION, SOLUTION INTRAMUSCULAR; INTRAVENOUS at 13:58

## 2018-07-10 RX ADMIN — PANTOPRAZOLE SODIUM 40 MILLIGRAM(S): 20 TABLET, DELAYED RELEASE ORAL at 11:13

## 2018-07-10 RX ADMIN — Medication 2: at 18:18

## 2018-07-10 RX ADMIN — Medication 50 GRAM(S): at 09:13

## 2018-07-10 RX ADMIN — Medication 50 MILLIEQUIVALENT(S): at 16:54

## 2018-07-10 RX ADMIN — Medication 250 MILLIGRAM(S): at 12:00

## 2018-07-10 RX ADMIN — Medication 50 MILLIEQUIVALENT(S): at 16:45

## 2018-07-10 RX ADMIN — MEROPENEM 100 MILLIGRAM(S): 1 INJECTION INTRAVENOUS at 22:07

## 2018-07-10 RX ADMIN — Medication 50 MEQ/KG/HR: at 18:19

## 2018-07-10 RX ADMIN — HYDROMORPHONE HYDROCHLORIDE 0.5 MILLIGRAM(S): 2 INJECTION INTRAMUSCULAR; INTRAVENOUS; SUBCUTANEOUS at 03:55

## 2018-07-10 RX ADMIN — Medication 50 MILLILITER(S): at 17:16

## 2018-07-10 RX ADMIN — CHLORHEXIDINE GLUCONATE 15 MILLILITER(S): 213 SOLUTION TOPICAL at 06:07

## 2018-07-10 RX ADMIN — DESMOPRESSIN ACETATE 220 MICROGRAM(S): 0.1 TABLET ORAL at 14:17

## 2018-07-10 RX ADMIN — Medication 50 MILLIGRAM(S): at 22:07

## 2018-07-10 RX ADMIN — HYDROMORPHONE HYDROCHLORIDE 0.5 MILLIGRAM(S): 2 INJECTION INTRAMUSCULAR; INTRAVENOUS; SUBCUTANEOUS at 11:29

## 2018-07-10 RX ADMIN — PHENYLEPHRINE HYDROCHLORIDE 21.98 MICROGRAM(S)/KG/MIN: 10 INJECTION INTRAVENOUS at 22:07

## 2018-07-10 RX ADMIN — HYDROMORPHONE HYDROCHLORIDE 0.5 MILLIGRAM(S): 2 INJECTION INTRAMUSCULAR; INTRAVENOUS; SUBCUTANEOUS at 14:06

## 2018-07-10 RX ADMIN — Medication 4: at 06:15

## 2018-07-10 RX ADMIN — MILRINONE LACTATE 4.39 MICROGRAM(S)/KG/MIN: 1 INJECTION, SOLUTION INTRAVENOUS at 14:25

## 2018-07-10 RX ADMIN — CHLORHEXIDINE GLUCONATE 15 MILLILITER(S): 213 SOLUTION TOPICAL at 17:17

## 2018-07-10 RX ADMIN — Medication 1 DROP(S): at 17:17

## 2018-07-10 RX ADMIN — Medication 50 MILLIGRAM(S): at 06:07

## 2018-07-10 RX ADMIN — Medication 100 MILLIEQUIVALENT(S): at 06:15

## 2018-07-10 RX ADMIN — MEROPENEM 100 MILLIGRAM(S): 1 INJECTION INTRAVENOUS at 13:58

## 2018-07-10 RX ADMIN — VASOPRESSIN 1 UNIT(S)/MIN: 20 INJECTION INTRAVENOUS at 22:07

## 2018-07-10 RX ADMIN — HYDROMORPHONE HYDROCHLORIDE 0.5 MILLIGRAM(S): 2 INJECTION INTRAMUSCULAR; INTRAVENOUS; SUBCUTANEOUS at 14:20

## 2018-07-10 RX ADMIN — HYDROMORPHONE HYDROCHLORIDE 0.5 MILLIGRAM(S): 2 INJECTION INTRAMUSCULAR; INTRAVENOUS; SUBCUTANEOUS at 04:10

## 2018-07-10 RX ADMIN — Medication 100 MILLIEQUIVALENT(S): at 03:56

## 2018-07-10 RX ADMIN — Medication 50 MILLILITER(S): at 06:08

## 2018-07-10 RX ADMIN — Medication 50 MILLILITER(S): at 11:13

## 2018-07-10 RX ADMIN — MEROPENEM 100 MILLIGRAM(S): 1 INJECTION INTRAVENOUS at 06:07

## 2018-07-10 RX ADMIN — DEXMEDETOMIDINE HYDROCHLORIDE IN 0.9% SODIUM CHLORIDE 7.38 MICROGRAM(S)/KG/HR: 4 INJECTION INTRAVENOUS at 07:03

## 2018-07-10 RX ADMIN — Medication 50 MILLIGRAM(S): at 13:58

## 2018-07-10 RX ADMIN — HYDROMORPHONE HYDROCHLORIDE 0.5 MILLIGRAM(S): 2 INJECTION INTRAMUSCULAR; INTRAVENOUS; SUBCUTANEOUS at 11:45

## 2018-07-10 RX ADMIN — Medication 2: at 11:28

## 2018-07-10 RX ADMIN — Medication 1 DROP(S): at 06:15

## 2018-07-10 NOTE — PROGRESS NOTE ADULT - SUBJECTIVE AND OBJECTIVE BOX
Seen in the morning, still intubated, still requiring pressor support, worsening jaundice. S/p surgery closing the chest wall, done on 7/2 - started on CVVHD after  that. In the past 24 hours 3.2 liters off from the CVVHD, negative 300 ml/24 hours. in the morning on 150 ml/min UF, blood flow on 200 ml/min and dialysate flow on 2 liters/h. No issues with CVVHD,  Patient seen and examined at bedside.       Patient seen and examined at bedside.       albumin human 25% IVPB 50 milliLiter(s) every 6 hours  artificial  tears Solution 1 Drop(s) two times a day  chlorhexidine 0.12% Liquid 15 milliLiter(s) every 12 hours  cisatracurium Infusion 3 MICROgram(s)/kG/Min <Continuous>  dexmedetomidine Infusion 0.5 MICROgram(s)/kG/Hr <Continuous>  dextrose 40% Gel 15 Gram(s) once PRN  dextrose 5%. 1000 milliLiter(s) <Continuous>  dextrose 50% Injectable 50 milliLiter(s) every 15 minutes  dextrose 50% Injectable 25 milliLiter(s) every 15 minutes  epinephrine 8 milliGRAM(s),dextrose 5% in water 250 milliLiter(s) 8 milliGRAM(s) <Continuous>  glucagon  Injectable 1 milliGRAM(s) once PRN  heparin  Infusion 200 Unit(s)/Hr <Continuous>  Heparin 68487 Unit(s),Dextrose 5% 1000 milliLiter(s) 42305 Unit(s) <Continuous>  hydrocortisone sodium succinate Injectable 50 milliGRAM(s) every 8 hours  insulin lispro (HumaLOG) corrective regimen sliding scale   every 6 hours  meropenem  IVPB 1000 milliGRAM(s) every 8 hours  midazolam Injectable 2 milliGRAM(s) every 2 hours PRN  milrinone Infusion 0.25 MICROgram(s)/kG/Min <Continuous>  pantoprazole  Injectable 40 milliGRAM(s) daily  phenylephrine    Infusion 2 MICROgram(s)/kG/Min <Continuous>  potassium chloride  20 mEq/100 mL IVPB 20 milliEquivalent(s) every 1 hour PRN  propofol Infusion 5 MICROgram(s)/kG/Min <Continuous>  PureFlow Dialysate RFP-400 (K 2 / Ca 3) 5000 milliLiter(s) <Continuous>  sodium bicarbonate  Infusion 0.254 mEq/kG/Hr <Continuous>  sodium chloride 0.9%. 1000 milliLiter(s) <Continuous>  vancomycin  IVPB 750 milliGRAM(s) every 24 hours  vasopressin Infusion 0.017 Unit(s)/Min <Continuous>      Allergies    penicillin (Rash)    Intolerances        T(C): , Max: 35.6 (07-10-18 @ 05:00)  T(F): , Max: 96 (07-10-18 @ 05:00)  HR: 80 (07-10-18 @ 10:00)  BP: --  BP(mean): --  RR: 27 (07-10-18 @ 10:00)  SpO2: 97% (07-10-18 @ 10:00)  Wt(kg): --    07-09 @ 07:01  -  07-10 @ 07:00  --------------------------------------------------------  IN:    Albumin 25%: 150 mL    dexmedetomidine Infusion: 121 mL    dexmedetomidine Infusion: 9 mL    heparin Infusion: 96 mL    milrinone  Infusion: 105.6 mL    phenylephrine   Infusion: 258.4 mL    propofol Infusion: 17.7 mL    sodium bicarbonate  Infusion: 75 mL    sodium chloride 0.9%.: 240 mL    Solution: 1116.5 mL    vasopressin Infusion: 96 mL    Vital HN: 790 mL  Total IN: 3075.2 mL    OUT:    Chest Tube: 90 mL    Other: 3265 mL  Total OUT: 3355 mL    Total NET: -279.8 mL      07-10 @ 07:01  -  07-10 @ 10:24  --------------------------------------------------------  IN:    dexmedetomidine Infusion: 7.4 mL    heparin Infusion: 12 mL    milrinone  Infusion: 13.2 mL    phenylephrine   Infusion: 46.5 mL    sodium chloride 0.9%.: 30 mL    Solution: 50 mL    vasopressin Infusion: 12 mL    Vital HN: 90 mL  Total IN: 261.1 mL    OUT:    Other: 437 mL  Total OUT: 437 mL    Total NET: -175.9 mL        Physical exam:   Intubated and sedated   ANASARCA   Jaundice   difficult to evaluate for JVD   Chest: s/p thoracotomy closed chest  multiple drainages from the chest   RRR, normal s1/s2, systolic murmur  Abdomen - swollen, not tender, not distended   Extremities 3+ peripheral edema  Hd cathter - on the left IJ  [place]           LABS:                        9.3    23.5  )-----------( 21       ( 10 Jul 2018 02:28 )             26.4     07-10    140  |  98  |  18  ----------------------------<  187<H>  3.5   |  20<L>  |  0.34<L>    Ca    10.0      10 Jul 2018 02:28  Phos  2.6     07-10  Mg     1.8     07-10    TPro  4.8<L>  /  Alb  4.1  /  TBili  44.3<H>  /  DBili  x   /  AST  183<H>  /  ALT  76<H>  /  AlkPhos  214<H>  07-10      PT/INR - ( 10 Jul 2018 02:28 )   PT: 13.4 sec;   INR: 1.20          PTT - ( 10 Jul 2018 02:28 )  PTT:47.9 sec          RADIOLOGY & ADDITIONAL STUDIES:

## 2018-07-10 NOTE — PROGRESS NOTE ADULT - PROBLEM SELECTOR PLAN 1
- the oliguric EMILY due to ATN - most likely due to cardiogenic shock, can not exclude also a septic shock, requiring pressors - on vasopressin and phenylephrine, on milrinone   - oliguric in the morning - anuric   - now on CVVHd - see above more details,   The dialysate flow - 1.5 liters/h - talked to intensevist  - the patient with overt fluid overload - anasarca  - renal diet when he is not NPO  - hypophosphatemia - noted - resolved - continue to follow up  - potassium 3.6 - please continue to follow up   - lactate still elevated - we will continue with CVVHD, the ultimate treatment for lactic acidosis is treatment of the cause for it. In this case treatment of the shock   - the patient with mild metabolic alkalosis - please reconsider the need for bicarbonate drip  - in and out  - daily weight

## 2018-07-10 NOTE — PROGRESS NOTE ADULT - PROBLEM SELECTOR PLAN 4
- cardiogenic and possible component sepsis   - meropenem, and vancomycin  - please follow up the vanco trough - last one 10.2 - please continue to follow up

## 2018-07-10 NOTE — PROGRESS NOTE ADULT - ASSESSMENT
The patient is 67 year old male with PMH stated above, now s/p  Repair of AA, AVR, MV repair, TV repair, CABG x2, now in shock requiring pressor support - mutiple pressors. The patient with deterioration of the renal function in the setting of the shock - oliguric, no response from the diuretics. The CT ICU team started the patient on aquaphoresis and the CVVHD ordered, never been started from the primary team the BP unstable. The cvvhd started yesterday - 7/2 good tolerance so far. In the past 24 hours the  ml/min, the dialysate flow now on 2 liters per hour and the blood flow on 200 ml/min. no clotting issues overnight. Still on pressors. The UF in the past 24 hours 3.2 liters off, negative 300 ml/24, no issues with CVVHD in 24 hours

## 2018-07-11 LAB
ALBUMIN SERPL ELPH-MCNC: 3.7 G/DL — SIGNIFICANT CHANGE UP (ref 3.3–5)
ALBUMIN SERPL ELPH-MCNC: 3.8 G/DL — SIGNIFICANT CHANGE UP (ref 3.3–5)
ALBUMIN SERPL ELPH-MCNC: 3.9 G/DL — SIGNIFICANT CHANGE UP (ref 3.3–5)
ALBUMIN SERPL ELPH-MCNC: 4.2 G/DL — SIGNIFICANT CHANGE UP (ref 3.3–5)
ALP SERPL-CCNC: 203 U/L — HIGH (ref 40–120)
ALP SERPL-CCNC: 215 U/L — HIGH (ref 40–120)
ALP SERPL-CCNC: 231 U/L — HIGH (ref 40–120)
ALP SERPL-CCNC: 237 U/L — HIGH (ref 40–120)
ALT FLD-CCNC: 84 U/L — HIGH (ref 10–45)
ALT FLD-CCNC: 85 U/L — HIGH (ref 10–45)
ALT FLD-CCNC: 87 U/L — HIGH (ref 10–45)
ALT FLD-CCNC: 88 U/L — HIGH (ref 10–45)
ANION GAP SERPL CALC-SCNC: 19 MMOL/L — HIGH (ref 5–17)
ANION GAP SERPL CALC-SCNC: 20 MMOL/L — HIGH (ref 5–17)
ANION GAP SERPL CALC-SCNC: 20 MMOL/L — HIGH (ref 5–17)
ANION GAP SERPL CALC-SCNC: 22 MMOL/L — HIGH (ref 5–17)
APTT BLD: 30.6 SEC — SIGNIFICANT CHANGE UP (ref 27.5–37.4)
APTT BLD: 30.7 SEC — SIGNIFICANT CHANGE UP (ref 27.5–37.4)
APTT BLD: 32.1 SEC — SIGNIFICANT CHANGE UP (ref 27.5–37.4)
APTT BLD: 61.1 SEC — HIGH (ref 27.5–37.4)
AST SERPL-CCNC: 196 U/L — HIGH (ref 10–40)
AST SERPL-CCNC: 201 U/L — HIGH (ref 10–40)
AST SERPL-CCNC: 204 U/L — HIGH (ref 10–40)
AST SERPL-CCNC: 213 U/L — HIGH (ref 10–40)
BILIRUB DIRECT SERPL-MCNC: >10 MG/DL — HIGH (ref 0–0.2)
BILIRUB INDIRECT FLD-MCNC: <40.3 MG/DL — HIGH (ref 0.2–1)
BILIRUB SERPL-MCNC: 47.8 MG/DL — HIGH (ref 0.2–1.2)
BILIRUB SERPL-MCNC: 50.3 MG/DL — HIGH (ref 0.2–1.2)
BILIRUB SERPL-MCNC: 53.3 MG/DL — HIGH (ref 0.2–1.2)
BILIRUB SERPL-MCNC: 55.8 MG/DL — HIGH (ref 0.2–1.2)
BUN SERPL-MCNC: 19 MG/DL — SIGNIFICANT CHANGE UP (ref 7–23)
BUN SERPL-MCNC: 20 MG/DL — SIGNIFICANT CHANGE UP (ref 7–23)
BUN SERPL-MCNC: 22 MG/DL — SIGNIFICANT CHANGE UP (ref 7–23)
BUN SERPL-MCNC: 22 MG/DL — SIGNIFICANT CHANGE UP (ref 7–23)
CALCIUM SERPL-MCNC: 10.2 MG/DL — SIGNIFICANT CHANGE UP (ref 8.4–10.5)
CALCIUM SERPL-MCNC: 9.6 MG/DL — SIGNIFICANT CHANGE UP (ref 8.4–10.5)
CALCIUM SERPL-MCNC: 9.6 MG/DL — SIGNIFICANT CHANGE UP (ref 8.4–10.5)
CALCIUM SERPL-MCNC: 9.7 MG/DL — SIGNIFICANT CHANGE UP (ref 8.4–10.5)
CHLORIDE SERPL-SCNC: 93 MMOL/L — LOW (ref 96–108)
CHLORIDE SERPL-SCNC: 96 MMOL/L — SIGNIFICANT CHANGE UP (ref 96–108)
CHLORIDE SERPL-SCNC: 96 MMOL/L — SIGNIFICANT CHANGE UP (ref 96–108)
CHLORIDE SERPL-SCNC: 99 MMOL/L — SIGNIFICANT CHANGE UP (ref 96–108)
CO2 SERPL-SCNC: 21 MMOL/L — LOW (ref 22–31)
CO2 SERPL-SCNC: 23 MMOL/L — SIGNIFICANT CHANGE UP (ref 22–31)
CREAT SERPL-MCNC: 0.37 MG/DL — LOW (ref 0.5–1.3)
CREAT SERPL-MCNC: 0.38 MG/DL — LOW (ref 0.5–1.3)
CREAT SERPL-MCNC: 0.4 MG/DL — LOW (ref 0.5–1.3)
CREAT SERPL-MCNC: 0.41 MG/DL — LOW (ref 0.5–1.3)
CULTURE RESULTS: SIGNIFICANT CHANGE UP
CULTURE RESULTS: SIGNIFICANT CHANGE UP
FIBRINOGEN PPP-MCNC: 446 MG/DL — HIGH (ref 258–438)
GAS PNL BLDA: SIGNIFICANT CHANGE UP
GLUCOSE BLDC GLUCOMTR-MCNC: 196 MG/DL — HIGH (ref 70–99)
GLUCOSE BLDC GLUCOMTR-MCNC: 227 MG/DL — HIGH (ref 70–99)
GLUCOSE BLDC GLUCOMTR-MCNC: 241 MG/DL — HIGH (ref 70–99)
GLUCOSE SERPL-MCNC: 164 MG/DL — HIGH (ref 70–99)
GLUCOSE SERPL-MCNC: 206 MG/DL — HIGH (ref 70–99)
GLUCOSE SERPL-MCNC: 216 MG/DL — HIGH (ref 70–99)
GLUCOSE SERPL-MCNC: 232 MG/DL — HIGH (ref 70–99)
HCT VFR BLD CALC: 26.1 % — LOW (ref 39–50)
HCT VFR BLD CALC: 29 % — LOW (ref 39–50)
HCT VFR BLD CALC: 29.8 % — LOW (ref 39–50)
HCT VFR BLD CALC: 32.1 % — LOW (ref 39–50)
HGB BLD-MCNC: 10.2 G/DL — LOW (ref 13–17)
HGB BLD-MCNC: 10.2 G/DL — LOW (ref 13–17)
HGB BLD-MCNC: 11.2 G/DL — LOW (ref 13–17)
HGB BLD-MCNC: 8.9 G/DL — LOW (ref 13–17)
INR BLD: 1.11 — SIGNIFICANT CHANGE UP (ref 0.88–1.16)
INR BLD: 1.13 — SIGNIFICANT CHANGE UP (ref 0.88–1.16)
INR BLD: 1.13 — SIGNIFICANT CHANGE UP (ref 0.88–1.16)
INR BLD: 1.15 — SIGNIFICANT CHANGE UP (ref 0.88–1.16)
LACTATE SERPL-SCNC: 4.3 MMOL/L — CRITICAL HIGH (ref 0.5–2)
LACTATE SERPL-SCNC: 4.4 MMOL/L — CRITICAL HIGH (ref 0.5–2)
LACTATE SERPL-SCNC: 4.5 MMOL/L — CRITICAL HIGH (ref 0.5–2)
LACTATE SERPL-SCNC: 5 MMOL/L — CRITICAL HIGH (ref 0.5–2)
MAGNESIUM SERPL-MCNC: 1.9 MG/DL — SIGNIFICANT CHANGE UP (ref 1.6–2.6)
MAGNESIUM SERPL-MCNC: 2 MG/DL — SIGNIFICANT CHANGE UP (ref 1.6–2.6)
MAGNESIUM SERPL-MCNC: 2.2 MG/DL — SIGNIFICANT CHANGE UP (ref 1.6–2.6)
MAGNESIUM SERPL-MCNC: 2.2 MG/DL — SIGNIFICANT CHANGE UP (ref 1.6–2.6)
MCHC RBC-ENTMCNC: 29.1 PG — SIGNIFICANT CHANGE UP (ref 27–34)
MCHC RBC-ENTMCNC: 29.3 PG — SIGNIFICANT CHANGE UP (ref 27–34)
MCHC RBC-ENTMCNC: 29.9 PG — SIGNIFICANT CHANGE UP (ref 27–34)
MCHC RBC-ENTMCNC: 30.2 PG — SIGNIFICANT CHANGE UP (ref 27–34)
MCHC RBC-ENTMCNC: 34.1 G/DL — SIGNIFICANT CHANGE UP (ref 32–36)
MCHC RBC-ENTMCNC: 34.2 G/DL — SIGNIFICANT CHANGE UP (ref 32–36)
MCHC RBC-ENTMCNC: 34.9 G/DL — SIGNIFICANT CHANGE UP (ref 32–36)
MCHC RBC-ENTMCNC: 35.2 G/DL — SIGNIFICANT CHANGE UP (ref 32–36)
MCV RBC AUTO: 85.3 FL — SIGNIFICANT CHANGE UP (ref 80–100)
MCV RBC AUTO: 85.6 FL — SIGNIFICANT CHANGE UP (ref 80–100)
MCV RBC AUTO: 85.8 FL — SIGNIFICANT CHANGE UP (ref 80–100)
MCV RBC AUTO: 85.8 FL — SIGNIFICANT CHANGE UP (ref 80–100)
PHOSPHATE SERPL-MCNC: 2 MG/DL — LOW (ref 2.5–4.5)
PLATELET # BLD AUTO: 105 K/UL — LOW (ref 150–400)
PLATELET # BLD AUTO: 42 K/UL — LOW (ref 150–400)
PLATELET # BLD AUTO: 78 K/UL — LOW (ref 150–400)
PLATELET # BLD AUTO: 79 K/UL — LOW (ref 150–400)
POTASSIUM SERPL-MCNC: 3.6 MMOL/L — SIGNIFICANT CHANGE UP (ref 3.5–5.3)
POTASSIUM SERPL-MCNC: 3.7 MMOL/L — SIGNIFICANT CHANGE UP (ref 3.5–5.3)
POTASSIUM SERPL-MCNC: 3.9 MMOL/L — SIGNIFICANT CHANGE UP (ref 3.5–5.3)
POTASSIUM SERPL-MCNC: 3.9 MMOL/L — SIGNIFICANT CHANGE UP (ref 3.5–5.3)
POTASSIUM SERPL-SCNC: 3.6 MMOL/L — SIGNIFICANT CHANGE UP (ref 3.5–5.3)
POTASSIUM SERPL-SCNC: 3.7 MMOL/L — SIGNIFICANT CHANGE UP (ref 3.5–5.3)
POTASSIUM SERPL-SCNC: 3.9 MMOL/L — SIGNIFICANT CHANGE UP (ref 3.5–5.3)
POTASSIUM SERPL-SCNC: 3.9 MMOL/L — SIGNIFICANT CHANGE UP (ref 3.5–5.3)
PROT SERPL-MCNC: 4.4 G/DL — LOW (ref 6–8.3)
PROT SERPL-MCNC: 4.8 G/DL — LOW (ref 6–8.3)
PROT SERPL-MCNC: 5 G/DL — LOW (ref 6–8.3)
PROT SERPL-MCNC: 5.1 G/DL — LOW (ref 6–8.3)
PROTHROM AB SERPL-ACNC: 12.3 SEC — SIGNIFICANT CHANGE UP (ref 9.8–12.7)
PROTHROM AB SERPL-ACNC: 12.6 SEC — SIGNIFICANT CHANGE UP (ref 9.8–12.7)
PROTHROM AB SERPL-ACNC: 12.6 SEC — SIGNIFICANT CHANGE UP (ref 9.8–12.7)
PROTHROM AB SERPL-ACNC: 12.8 SEC — HIGH (ref 9.8–12.7)
RBC # BLD: 3.06 M/UL — LOW (ref 4.2–5.8)
RBC # BLD: 3.38 M/UL — LOW (ref 4.2–5.8)
RBC # BLD: 3.48 M/UL — LOW (ref 4.2–5.8)
RBC # BLD: 3.74 M/UL — LOW (ref 4.2–5.8)
RBC # FLD: 16.8 % — SIGNIFICANT CHANGE UP (ref 10.3–16.9)
RBC # FLD: 16.8 % — SIGNIFICANT CHANGE UP (ref 10.3–16.9)
RBC # FLD: 17.2 % — HIGH (ref 10.3–16.9)
RBC # FLD: 18 % — HIGH (ref 10.3–16.9)
SODIUM SERPL-SCNC: 136 MMOL/L — SIGNIFICANT CHANGE UP (ref 135–145)
SODIUM SERPL-SCNC: 137 MMOL/L — SIGNIFICANT CHANGE UP (ref 135–145)
SODIUM SERPL-SCNC: 139 MMOL/L — SIGNIFICANT CHANGE UP (ref 135–145)
SODIUM SERPL-SCNC: 139 MMOL/L — SIGNIFICANT CHANGE UP (ref 135–145)
SPECIMEN SOURCE: SIGNIFICANT CHANGE UP
SPECIMEN SOURCE: SIGNIFICANT CHANGE UP
WBC # BLD: 16 K/UL — HIGH (ref 3.8–10.5)
WBC # BLD: 16.3 K/UL — HIGH (ref 3.8–10.5)
WBC # BLD: 17.7 K/UL — HIGH (ref 3.8–10.5)
WBC # BLD: 20 K/UL — HIGH (ref 3.8–10.5)
WBC # FLD AUTO: 16 K/UL — HIGH (ref 3.8–10.5)
WBC # FLD AUTO: 16.3 K/UL — HIGH (ref 3.8–10.5)
WBC # FLD AUTO: 17.7 K/UL — HIGH (ref 3.8–10.5)
WBC # FLD AUTO: 20 K/UL — HIGH (ref 3.8–10.5)

## 2018-07-11 PROCEDURE — 99292 CRITICAL CARE ADDL 30 MIN: CPT

## 2018-07-11 PROCEDURE — 99291 CRITICAL CARE FIRST HOUR: CPT

## 2018-07-11 PROCEDURE — 90945 DIALYSIS ONE EVALUATION: CPT

## 2018-07-11 PROCEDURE — 33977 REMOVE VENTRICULAR DEVICE: CPT

## 2018-07-11 PROCEDURE — 71045 X-RAY EXAM CHEST 1 VIEW: CPT | Mod: 26

## 2018-07-11 RX ORDER — FENTANYL CITRATE 50 UG/ML
25 INJECTION INTRAVENOUS EVERY 4 HOURS
Qty: 0 | Refills: 0 | Status: DISCONTINUED | OUTPATIENT
Start: 2018-07-11 | End: 2018-07-11

## 2018-07-11 RX ORDER — POTASSIUM PHOSPHATE, MONOBASIC POTASSIUM PHOSPHATE, DIBASIC 236; 224 MG/ML; MG/ML
15 INJECTION, SOLUTION INTRAVENOUS ONCE
Qty: 0 | Refills: 0 | Status: COMPLETED | OUTPATIENT
Start: 2018-07-11 | End: 2018-07-11

## 2018-07-11 RX ORDER — CALCIUM GLUCONATE 100 MG/ML
2 VIAL (ML) INTRAVENOUS ONCE
Qty: 0 | Refills: 0 | Status: COMPLETED | OUTPATIENT
Start: 2018-07-11 | End: 2018-07-11

## 2018-07-11 RX ORDER — MIDAZOLAM HYDROCHLORIDE 1 MG/ML
2 INJECTION, SOLUTION INTRAMUSCULAR; INTRAVENOUS ONCE
Qty: 0 | Refills: 0 | Status: DISCONTINUED | OUTPATIENT
Start: 2018-07-11 | End: 2018-07-11

## 2018-07-11 RX ORDER — DOBUTAMINE HCL 250MG/20ML
5 VIAL (ML) INTRAVENOUS
Qty: 500 | Refills: 0 | Status: DISCONTINUED | OUTPATIENT
Start: 2018-07-11 | End: 2018-07-14

## 2018-07-11 RX ORDER — POTASSIUM CHLORIDE 20 MEQ
20 PACKET (EA) ORAL
Qty: 0 | Refills: 0 | Status: COMPLETED | OUTPATIENT
Start: 2018-07-11 | End: 2018-07-11

## 2018-07-11 RX ORDER — CISATRACURIUM BESYLATE 2 MG/ML
10 INJECTION INTRAVENOUS ONCE
Qty: 0 | Refills: 0 | Status: COMPLETED | OUTPATIENT
Start: 2018-07-11 | End: 2018-07-11

## 2018-07-11 RX ORDER — PANTOPRAZOLE SODIUM 20 MG/1
40 TABLET, DELAYED RELEASE ORAL EVERY 12 HOURS
Qty: 0 | Refills: 0 | Status: DISCONTINUED | OUTPATIENT
Start: 2018-07-11 | End: 2018-07-12

## 2018-07-11 RX ORDER — DESMOPRESSIN ACETATE 0.1 MG/1
20 TABLET ORAL ONCE
Qty: 0 | Refills: 0 | Status: COMPLETED | OUTPATIENT
Start: 2018-07-11 | End: 2018-07-11

## 2018-07-11 RX ORDER — FENTANYL CITRATE 50 UG/ML
25 INJECTION INTRAVENOUS EVERY 4 HOURS
Qty: 0 | Refills: 0 | Status: DISCONTINUED | OUTPATIENT
Start: 2018-07-11 | End: 2018-07-12

## 2018-07-11 RX ORDER — FENTANYL CITRATE 50 UG/ML
50 INJECTION INTRAVENOUS ONCE
Qty: 0 | Refills: 0 | Status: DISCONTINUED | OUTPATIENT
Start: 2018-07-11 | End: 2018-07-11

## 2018-07-11 RX ORDER — MAGNESIUM SULFATE 500 MG/ML
2 VIAL (ML) INJECTION ONCE
Qty: 0 | Refills: 0 | Status: COMPLETED | OUTPATIENT
Start: 2018-07-11 | End: 2018-07-11

## 2018-07-11 RX ADMIN — Medication 50 MILLIGRAM(S): at 22:01

## 2018-07-11 RX ADMIN — DESMOPRESSIN ACETATE 220 MICROGRAM(S): 0.1 TABLET ORAL at 13:40

## 2018-07-11 RX ADMIN — Medication 50 GRAM(S): at 14:56

## 2018-07-11 RX ADMIN — Medication 50 MILLILITER(S): at 06:00

## 2018-07-11 RX ADMIN — Medication 100 MILLIEQUIVALENT(S): at 00:29

## 2018-07-11 RX ADMIN — FENTANYL CITRATE 50 MICROGRAM(S): 50 INJECTION INTRAVENOUS at 13:14

## 2018-07-11 RX ADMIN — Medication 250 MILLIGRAM(S): at 13:13

## 2018-07-11 RX ADMIN — MEROPENEM 100 MILLIGRAM(S): 1 INJECTION INTRAVENOUS at 05:26

## 2018-07-11 RX ADMIN — MEROPENEM 100 MILLIGRAM(S): 1 INJECTION INTRAVENOUS at 22:01

## 2018-07-11 RX ADMIN — MIDAZOLAM HYDROCHLORIDE 2 MILLIGRAM(S): 1 INJECTION, SOLUTION INTRAMUSCULAR; INTRAVENOUS at 15:26

## 2018-07-11 RX ADMIN — Medication 100 MILLIEQUIVALENT(S): at 14:47

## 2018-07-11 RX ADMIN — MIDAZOLAM HYDROCHLORIDE 2 MILLIGRAM(S): 1 INJECTION, SOLUTION INTRAMUSCULAR; INTRAVENOUS at 12:09

## 2018-07-11 RX ADMIN — PANTOPRAZOLE SODIUM 40 MILLIGRAM(S): 20 TABLET, DELAYED RELEASE ORAL at 22:10

## 2018-07-11 RX ADMIN — PANTOPRAZOLE SODIUM 40 MILLIGRAM(S): 20 TABLET, DELAYED RELEASE ORAL at 13:39

## 2018-07-11 RX ADMIN — CHLORHEXIDINE GLUCONATE 15 MILLILITER(S): 213 SOLUTION TOPICAL at 17:17

## 2018-07-11 RX ADMIN — Medication 100 MILLIEQUIVALENT(S): at 14:45

## 2018-07-11 RX ADMIN — Medication 2: at 00:28

## 2018-07-11 RX ADMIN — MIDAZOLAM HYDROCHLORIDE 2 MILLIGRAM(S): 1 INJECTION, SOLUTION INTRAMUSCULAR; INTRAVENOUS at 18:35

## 2018-07-11 RX ADMIN — Medication 50 MILLILITER(S): at 00:28

## 2018-07-11 RX ADMIN — Medication 1 DROP(S): at 05:27

## 2018-07-11 RX ADMIN — Medication 100 MILLIEQUIVALENT(S): at 14:48

## 2018-07-11 RX ADMIN — Medication 50 MILLIGRAM(S): at 13:40

## 2018-07-11 RX ADMIN — Medication 1 DROP(S): at 17:50

## 2018-07-11 RX ADMIN — MIDAZOLAM HYDROCHLORIDE 2 MILLIGRAM(S): 1 INJECTION, SOLUTION INTRAMUSCULAR; INTRAVENOUS at 21:08

## 2018-07-11 RX ADMIN — POTASSIUM PHOSPHATE, MONOBASIC POTASSIUM PHOSPHATE, DIBASIC 62.5 MILLIMOLE(S): 236; 224 INJECTION, SOLUTION INTRAVENOUS at 03:12

## 2018-07-11 RX ADMIN — Medication 4: at 17:51

## 2018-07-11 RX ADMIN — Medication 2: at 14:07

## 2018-07-11 RX ADMIN — FENTANYL CITRATE 25 MICROGRAM(S): 50 INJECTION INTRAVENOUS at 23:07

## 2018-07-11 RX ADMIN — CISATRACURIUM BESYLATE 10 MILLIGRAM(S): 2 INJECTION INTRAVENOUS at 13:12

## 2018-07-11 RX ADMIN — CHLORHEXIDINE GLUCONATE 15 MILLILITER(S): 213 SOLUTION TOPICAL at 05:27

## 2018-07-11 RX ADMIN — Medication 200 GRAM(S): at 15:20

## 2018-07-11 RX ADMIN — MEROPENEM 100 MILLIGRAM(S): 1 INJECTION INTRAVENOUS at 13:41

## 2018-07-11 RX ADMIN — Medication 50 MILLIGRAM(S): at 05:26

## 2018-07-11 RX ADMIN — FENTANYL CITRATE 25 MICROGRAM(S): 50 INJECTION INTRAVENOUS at 23:15

## 2018-07-11 RX ADMIN — POTASSIUM PHOSPHATE, MONOBASIC POTASSIUM PHOSPHATE, DIBASIC 62.5 MILLIMOLE(S): 236; 224 INJECTION, SOLUTION INTRAVENOUS at 09:28

## 2018-07-11 RX ADMIN — MIDAZOLAM HYDROCHLORIDE 2 MILLIGRAM(S): 1 INJECTION, SOLUTION INTRAMUSCULAR; INTRAVENOUS at 07:59

## 2018-07-11 RX ADMIN — FENTANYL CITRATE 50 MICROGRAM(S): 50 INJECTION INTRAVENOUS at 13:15

## 2018-07-11 RX ADMIN — Medication 4: at 05:26

## 2018-07-11 NOTE — PROGRESS NOTE ADULT - PROBLEM SELECTOR PLAN 3
- hemoglobin noted   - blood transfusion as per primary team   - no need for EPO at present  - blood transfusion as per primary team.

## 2018-07-11 NOTE — PROGRESS NOTE ADULT - SUBJECTIVE AND OBJECTIVE BOX
Seen in the morning, still intubated, still requiring pressor support, worsening jaundice. S/p surgery closing the chest wall, done on 7/2 - started on CVVHD after  that. In the past 24 hours 4.4 liters off from the CVVHD, negative 200 ml/24 hours. in the morning on 200 ml/min UF, blood flow on 200 ml/min and dialysate flow on 1.5 liters/h. No issues with CVVHD,  Patient seen and examined at bedside.       Patient seen and examined at bedside.       artificial  tears Solution 1 Drop(s) two times a day  chlorhexidine 0.12% Liquid 15 milliLiter(s) every 12 hours  cisatracurium Infusion 3 MICROgram(s)/kG/Min <Continuous>  desmopressin IVPB 20 MICROGram(s) once  dexmedetomidine Infusion 0.5 MICROgram(s)/kG/Hr <Continuous>  dextrose 40% Gel 15 Gram(s) once PRN  dextrose 5%. 1000 milliLiter(s) <Continuous>  dextrose 50% Injectable 50 milliLiter(s) every 15 minutes  dextrose 50% Injectable 25 milliLiter(s) every 15 minutes  glucagon  Injectable 1 milliGRAM(s) once PRN  heparin  Infusion 400 Unit(s)/Hr <Continuous>  Heparin 80681 Unit(s),Dextrose 5% 1000 milliLiter(s) 99253 Unit(s) <Continuous>  hydrocortisone sodium succinate Injectable 50 milliGRAM(s) every 8 hours  insulin lispro (HumaLOG) corrective regimen sliding scale   every 6 hours  meropenem  IVPB 1000 milliGRAM(s) every 8 hours  midazolam Injectable 2 milliGRAM(s) every 2 hours PRN  milrinone Infusion 0.25 MICROgram(s)/kG/Min <Continuous>  pantoprazole  Injectable 40 milliGRAM(s) daily  phenylephrine    Infusion 2 MICROgram(s)/kG/Min <Continuous>  PureFlow Dialysate RFP-400 (K 2 / Ca 3) 5000 milliLiter(s) <Continuous>  sodium bicarbonate  Infusion 0.127 mEq/kG/Hr <Continuous>  sodium chloride 0.9%. 1000 milliLiter(s) <Continuous>  vancomycin  IVPB 750 milliGRAM(s) every 24 hours  vasopressin Infusion 0.017 Unit(s)/Min <Continuous>      Allergies    penicillin (Rash)    Intolerances        T(C): , Max: 36.1 (07-11-18 @ 05:00)  T(F): , Max: 96.9 (07-11-18 @ 05:00)  HR: 90 (07-11-18 @ 10:00)  BP: --  BP(mean): --  RR: 22 (07-11-18 @ 09:00)  SpO2: 100% (07-11-18 @ 10:00)  Wt(kg): --    07-10 @ 07:01  -  07-11 @ 07:00  --------------------------------------------------------  IN:    Albumin 25%: 100 mL    dexmedetomidine Infusion: 73.8 mL    heparin Infusion: 32 mL    heparin Infusion: 64 mL    milrinone  Infusion: 105.6 mL    Packed Red Blood Cells: 1000 mL    phenylephrine   Infusion: 372 mL    Platelets - Single Donor: 300 mL    sodium bicarbonate  Infusion: 700 mL    sodium chloride 0.9%.: 240 mL    Solution: 400 mL    vasopressin Infusion: 96 mL    Vital HN: 888 mL  Total IN: 4371.4 mL    OUT:    Chest Tube: 100 mL    Other: 4463 mL  Total OUT: 4563 mL    Total NET: -191.6 mL      07-11 @ 07:01  -  07-11 @ 10:13  --------------------------------------------------------  IN:    dexmedetomidine Infusion: 30.8 mL    heparin Infusion: 12 mL    milrinone  Infusion: 13.2 mL    phenylephrine   Infusion: 46.5 mL    Platelets - Single Donor: 247 mL    sodium bicarbonate  Infusion: 150 mL    sodium chloride 0.9%.: 30 mL    Solution: 250 mL    vasopressin Infusion: 12 mL    Vital HN: 126 mL  Total IN: 917.5 mL    OUT:    Other: 580 mL  Total OUT: 580 mL    Total NET: 337.5 mL      Physical exam:   Intubated and sedated   ANASARCA   Jaundice   difficult to evaluate for JVD   Chest: s/p thoracotomy closed chest  multiple drainages from the chest   RRR, normal s1/s2, systolic murmur  Abdomen - swollen, not tender, not distended   Extremities 3+ peripheral edema  Hd cathter - on the left IJ  [place]           LABS:                        11.2   20.0  )-----------( 42       ( 11 Jul 2018 03:35 )             32.1     07-11    136  |  93<L>  |  19  ----------------------------<  216<H>  3.6   |  21<L>  |  0.37<L>    Ca    10.2      11 Jul 2018 03:35  Phos  2.0     07-11  Mg     2.0     07-11    TPro  5.1<L>  /  Alb  4.2  /  TBili  55.8<H>  /  DBili  x   /  AST  213<H>  /  ALT  88<H>  /  AlkPhos  237<H>  07-11      PT/INR - ( 11 Jul 2018 03:35 )   PT: 12.8 sec;   INR: 1.15          PTT - ( 11 Jul 2018 03:35 )  PTT:61.1 sec          RADIOLOGY & ADDITIONAL STUDIES:

## 2018-07-11 NOTE — PROGRESS NOTE ADULT - PROBLEM SELECTOR PLAN 1
- the oliguric EMILY due to ATN - most likely due to cardiogenic shock, can not exclude also a septic shock, requiring pressors - on vasopressin and phenylephrine, on milrinone   - oliguric in the morning - anuric   - now on CVVHd - see above more details,   The dialysate flow - 1.5 liters/h. We will continue with CVVHD today   - the patient with overt fluid overload - anasarca, worsening jaundice   - renal diet when he is not NPO  - hypophosphatemia - please consider 30 meq of Kpho4 iv and follow up - will help for the potassium level also   - potassium 3.6 - please continue to follow up   - lactate still elevated - we will continue with CVVHD, the ultimate treatment for lactic acidosis is treatment of the cause for it. In this case treatment of the shock   - the patient with mild metabolic alkalosis - please reconsider the need for bicarbonate drip  - in and out  - daily weight

## 2018-07-11 NOTE — PROGRESS NOTE ADULT - SUBJECTIVE AND OBJECTIVE BOX
CTICU  CRITICAL  CARE  attending     Hand off received @ 7a					   Pertinent clinical, laboratory, radiographic, hemodynamic, echocardiographic, respiratory data, microbiologic data and chart were reviewed and analyzed frequently throughout the course of the day and night  Patient seen and examined with CTS/ SH attending at bedside    Pt is a 67y , Male, post op day # 21 s/p AVR; MV/TV repair;      post op:    protracted Cardiogenic shock  on LV assist with Impella  hemolytic anemia/acute ongoing hemolysis 2/2 Impella/Extra corporeal circuit  Hyperbilirubinemia  Resp failure/Vent dependant  Coagulopathy  Acute renal failure on CRRT      today:    Impella d/cd at the bedside  1 unit of platelets/FFP; and Ddavp 20 mcgs prior to skin incision for coagulopathy  Dobutamine added at 5 mcgs for additional inotrope support  CRRT continued  acute GI bleed with acute post hemorrhagic anemia        Penile swelling: Penile swelling  Shock: Shock  Anemia: Anemia  Hyponatremia: Hyponatremia  Acute kidney failure: Acute kidney failure  CAD (coronary artery disease): CAD (coronary artery disease)  Valvular disease: Valvular disease      , FAMILY HISTORY:  PAST MEDICAL & SURGICAL HISTORY:  No pertinent past medical history  No significant past surgical history    Patient is a 67y old  Male who presents with a chief complaint of AS (19 Jun 2018 00:06)      14 system review was unremarkable  acute changes include acute respiratory failure  Vital signs, hemodynamic and respiratory parameters were reviewed from the bedside nursing flowsheet.  ICU Vital Signs Last 24 Hrs  T(C): 36.2 (11 Jul 2018 15:00), Max: 36.2 (11 Jul 2018 15:00)  T(F): 97.2 (11 Jul 2018 15:00), Max: 97.2 (11 Jul 2018 15:00)  HR: 90 (11 Jul 2018 15:00) (90 - 92)  BP: --  BP(mean): --  ABP: 110/58 (11 Jul 2018 15:00) (93/60 - 148/90)  ABP(mean): 76 (11 Jul 2018 15:00) (68 - 108)  RR: 20 (11 Jul 2018 15:00) (20 - 28)  SpO2: 99% (11 Jul 2018 15:00) (95% - 100%)    Adult Advanced Hemodynamics Last 24 Hrs  CVP(mm Hg): 35 (11 Jul 2018 15:00) (22 - 52)  CVP(cm H2O): --  CO: --  CI: --  PA: --  PA(mean): --  PCWP: --  SVR: --  SVRI: --  PVR: --  PVRI: --, ABG - ( 11 Jul 2018 13:35 )  pH, Arterial: 7.39  pH, Blood: x     /  pCO2: 41    /  pO2: 116   / HCO3: 24    / Base Excess: -0.4  /  SaO2: 98                Mode: AC/ CMV (Assist Control/ Continuous Mandatory Ventilation)  RR (machine): 20  TV (machine): 450  FiO2: 70  PEEP: 8  ITime: 1.2  MAP: 18  PIP: 30    Intake and output was reviewed and the fluid balance was calculated  Daily     Daily   I&O's Summary    10 Jul 2018 07:01  -  11 Jul 2018 07:00  --------------------------------------------------------  IN: 4371.4 mL / OUT: 4563 mL / NET: -191.6 mL    11 Jul 2018 07:01  -  11 Jul 2018 15:57  --------------------------------------------------------  IN: 2878.8 mL / OUT: 1442 mL / NET: 1436.8 mL        All lines and drain sites were assessed  [facility]      POCT Blood Glucose.: 241 mg/dL (11 Jul 2018 05:23)    No acute change in mental status  Auscultation of the chest reveals equal bs  Abdomen is soft  Extremities are warm and well perfused  Wounds appear clean and unremarkable  Antibiotics are periop    labs  CBC Full  -  ( 11 Jul 2018 13:35 )  WBC Count : 17.7 K/uL  Hemoglobin : 8.9 g/dL  Hematocrit : 26.1 %  Platelet Count - Automated : 105 K/uL  Mean Cell Volume : 85.3 fL  Mean Cell Hemoglobin : 29.1 pg  Mean Cell Hemoglobin Concentration : 34.1 g/dL  Auto Neutrophil # : x  Auto Lymphocyte # : x  Auto Monocyte # : x  Auto Eosinophil # : x  Auto Basophil # : x  Auto Neutrophil % : x  Auto Lymphocyte % : x  Auto Monocyte % : x  Auto Eosinophil % : x  Auto Basophil % : x    07-11    139  |  96  |  20  ----------------------------<  164<H>  3.7   |  23  |  0.41<L>    Ca    9.7      11 Jul 2018 13:35  Phos  2.0     07-11  Mg     1.9     07-11    TPro  5.0<L>  /  Alb  3.9  /  TBili  50.3<H>  /  DBili  >10.0<H>  /  AST  196<H>  /  ALT  84<H>  /  AlkPhos  215<H>  07-11    PT/INR - ( 11 Jul 2018 13:35 )   PT: 12.6 sec;   INR: 1.13          PTT - ( 11 Jul 2018 13:35 )  PTT:30.7 sec  The current medications were reviewed   MEDICATIONS  (STANDING):  artificial  tears Solution 1 Drop(s) Both EYES two times a day  chlorhexidine 0.12% Liquid 15 milliLiter(s) Swish and Spit every 12 hours  cisatracurium Infusion 3 MICROgram(s)/kG/Min (10.548 mL/Hr) IV Continuous <Continuous>  dexmedetomidine Infusion 0.5 MICROgram(s)/kG/Hr (7.375 mL/Hr) IV Continuous <Continuous>  dextrose 5%. 1000 milliLiter(s) (25 mL/Hr) IV Continuous <Continuous>  dextrose 50% Injectable 50 milliLiter(s) IV Push every 15 minutes  dextrose 50% Injectable 25 milliLiter(s) IV Push every 15 minutes  DOBUTamine Infusion 5 MICROgram(s)/kG/Min (8.85 mL/Hr) IV Continuous <Continuous>  heparin  Infusion 400 Unit(s)/Hr (4 mL/Hr) IV Continuous <Continuous>  Heparin 23041 Unit(s),Dextrose 5% 1000 milliLiter(s) 75104 Unit(s) (25 mL/Hr) IV Continuous <Continuous>  hydrocortisone sodium succinate Injectable 50 milliGRAM(s) IV Push every 8 hours  insulin lispro (HumaLOG) corrective regimen sliding scale   SubCutaneous every 6 hours  meropenem  IVPB 1000 milliGRAM(s) IV Intermittent every 8 hours  milrinone Infusion 0.25 MICROgram(s)/kG/Min (4.395 mL/Hr) IV Continuous <Continuous>  pantoprazole  Injectable 40 milliGRAM(s) IV Push daily  phenylephrine    Infusion 2 MICROgram(s)/kG/Min (21.975 mL/Hr) IV Continuous <Continuous>  PureFlow Dialysate RFP-400 (K 2 / Ca 3) 5000 milliLiter(s) (1500 mL/Hr) CRRT <Continuous>  sodium bicarbonate  Infusion 0.127 mEq/kG/Hr (50 mL/Hr) IV Continuous <Continuous>  sodium chloride 0.9%. 1000 milliLiter(s) (10 mL/Hr) IV Continuous <Continuous>  vancomycin  IVPB 750 milliGRAM(s) IV Intermittent every 24 hours  vasopressin Infusion 0.017 Unit(s)/Min (1 mL/Hr) IV Continuous <Continuous>    MEDICATIONS  (PRN):  dextrose 40% Gel 15 Gram(s) Oral once PRN Blood Glucose LESS THAN 70 milliGRAM(s)/deciliter  glucagon  Injectable 1 milliGRAM(s) IntraMuscular once PRN Glucose LESS THAN 70 milligrams/deciliter  midazolam Injectable 2 milliGRAM(s) IV Push every 2 hours PRN agitation       PROBLEM LIST/ ASSESSMENT:  HEALTH ISSUES - PROBLEM Dx:    cardiogenic shock  lactic acidosis  acute lower GI bleed  Hyperbilirubinemia  acute post hemorrhagic anemia  coagulopathy        Penile swelling: Penile swelling  Shock: Shock  Anemia: Anemia  Hyponatremia: Hyponatremia  Acute kidney failure: Acute kidney failure  CAD (coronary artery disease): CAD (coronary artery disease)  Valvular disease: Valvular disease      ,   Patient is a 67y old  Male who presents with a chief complaint of AS (19 Jun 2018 00:06)     s/p AVR; MV/TV repair  acute changes include acute respiratory failure    My plan includes :  close hemodynamic, ventilatory and drain monitoring and management per post op routine    Monitor for arrhythmias and monitor parameters for organ perfusion  monitor neurologic status  Head of the bed should remain elevated to 45 deg .   chest PT and IS will be encouraged  monitor adequacy of oxygenation and ventilation and attempt to wean oxygen  Nutritional goals will be met using po eventually , ensure adequate caloric intake and montior the same  Stress ulcer and VTE prophylaxis will be achieved    Glycemic control is satisfactory  Electrolytes have been repleted as necessary and wound care has been carried out. Pain control has been achieved.   agressive physical therapy and early mobility and ambulation goals will be met   The family was updated about the course and plan  CRITICAL CARE TIME SPENT in evaluation and management, reassessments, review and interpretation of labs and x-rays, ventilator and hemodynamic management, formulating a plan and coordinating care: ___90____ MIN.  Time does not include procedural time.  CTICU ATTENDING     					    Rai Singer MD

## 2018-07-11 NOTE — PROGRESS NOTE ADULT - ASSESSMENT
The patient is 67 year old male with PMH stated above, now s/p  Repair of AA, AVR, MV repair, TV repair, CABG x2, now in shock requiring pressor support - mutiple pressors. The patient with deterioration of the renal function in the setting of the shock - oliguric, no response from the diuretics. The CT ICU team started the patient on aquaphoresis and the CVVHD ordered, never been started from the primary team the BP unstable. The cvvhd started yesterday - 7/2 good tolerance so far. In the past 24 hours the  ml/min, the dialysate flow now on 1.5 liters per hour and the blood flow on 200 ml/min. no clotting issues overnight. Still on pressors. The UF in the past 24 hours 4.4 liters off, negative 200 ml/24, no issues with CVVHD in 24 hours. we will contunue with CVVHD for today. No heparin drip

## 2018-07-12 LAB
ALBUMIN SERPL ELPH-MCNC: 3.6 G/DL — SIGNIFICANT CHANGE UP (ref 3.3–5)
ALBUMIN SERPL ELPH-MCNC: 3.6 G/DL — SIGNIFICANT CHANGE UP (ref 3.3–5)
ALBUMIN SERPL ELPH-MCNC: 3.7 G/DL — SIGNIFICANT CHANGE UP (ref 3.3–5)
ALBUMIN SERPL ELPH-MCNC: 3.7 G/DL — SIGNIFICANT CHANGE UP (ref 3.3–5)
ALP SERPL-CCNC: 241 U/L — HIGH (ref 40–120)
ALP SERPL-CCNC: 242 U/L — HIGH (ref 40–120)
ALP SERPL-CCNC: 285 U/L — HIGH (ref 40–120)
ALP SERPL-CCNC: 287 U/L — HIGH (ref 40–120)
ALT FLD-CCNC: 104 U/L — HIGH (ref 10–45)
ALT FLD-CCNC: 111 U/L — HIGH (ref 10–45)
ALT FLD-CCNC: 92 U/L — HIGH (ref 10–45)
ALT FLD-CCNC: 96 U/L — HIGH (ref 10–45)
ANION GAP SERPL CALC-SCNC: 18 MMOL/L — HIGH (ref 5–17)
ANION GAP SERPL CALC-SCNC: 19 MMOL/L — HIGH (ref 5–17)
APTT BLD: 35 SEC — SIGNIFICANT CHANGE UP (ref 27.5–37.4)
APTT BLD: 37.9 SEC — HIGH (ref 27.5–37.4)
APTT BLD: 38.2 SEC — HIGH (ref 27.5–37.4)
APTT BLD: 48 SEC — HIGH (ref 27.5–37.4)
AST SERPL-CCNC: 204 U/L — HIGH (ref 10–40)
AST SERPL-CCNC: 212 U/L — HIGH (ref 10–40)
AST SERPL-CCNC: 234 U/L — HIGH (ref 10–40)
AST SERPL-CCNC: 252 U/L — HIGH (ref 10–40)
BILIRUB SERPL-MCNC: 52.7 MG/DL — HIGH (ref 0.2–1.2)
BILIRUB SERPL-MCNC: 54.4 MG/DL — HIGH (ref 0.2–1.2)
BILIRUB SERPL-MCNC: 57.5 MG/DL — HIGH (ref 0.2–1.2)
BILIRUB SERPL-MCNC: 58.5 MG/DL — HIGH (ref 0.2–1.2)
BUN SERPL-MCNC: 22 MG/DL — SIGNIFICANT CHANGE UP (ref 7–23)
BUN SERPL-MCNC: 23 MG/DL — SIGNIFICANT CHANGE UP (ref 7–23)
BUN SERPL-MCNC: 23 MG/DL — SIGNIFICANT CHANGE UP (ref 7–23)
BUN SERPL-MCNC: 24 MG/DL — HIGH (ref 7–23)
CALCIUM SERPL-MCNC: 9.5 MG/DL — SIGNIFICANT CHANGE UP (ref 8.4–10.5)
CALCIUM SERPL-MCNC: 9.7 MG/DL — SIGNIFICANT CHANGE UP (ref 8.4–10.5)
CALCIUM SERPL-MCNC: 9.8 MG/DL — SIGNIFICANT CHANGE UP (ref 8.4–10.5)
CALCIUM SERPL-MCNC: 9.9 MG/DL — SIGNIFICANT CHANGE UP (ref 8.4–10.5)
CHLORIDE SERPL-SCNC: 95 MMOL/L — LOW (ref 96–108)
CHLORIDE SERPL-SCNC: 96 MMOL/L — SIGNIFICANT CHANGE UP (ref 96–108)
CHLORIDE SERPL-SCNC: 98 MMOL/L — SIGNIFICANT CHANGE UP (ref 96–108)
CHLORIDE SERPL-SCNC: 98 MMOL/L — SIGNIFICANT CHANGE UP (ref 96–108)
CO2 SERPL-SCNC: 21 MMOL/L — LOW (ref 22–31)
CO2 SERPL-SCNC: 22 MMOL/L — SIGNIFICANT CHANGE UP (ref 22–31)
CO2 SERPL-SCNC: 23 MMOL/L — SIGNIFICANT CHANGE UP (ref 22–31)
CO2 SERPL-SCNC: 23 MMOL/L — SIGNIFICANT CHANGE UP (ref 22–31)
CREAT SERPL-MCNC: 0.36 MG/DL — LOW (ref 0.5–1.3)
CREAT SERPL-MCNC: 0.36 MG/DL — LOW (ref 0.5–1.3)
CREAT SERPL-MCNC: 0.37 MG/DL — LOW (ref 0.5–1.3)
CREAT SERPL-MCNC: 0.38 MG/DL — LOW (ref 0.5–1.3)
GAS PNL BLDA: SIGNIFICANT CHANGE UP
GAS PNL BLDV: SIGNIFICANT CHANGE UP
GLUCOSE BLDC GLUCOMTR-MCNC: 175 MG/DL — HIGH (ref 70–99)
GLUCOSE BLDC GLUCOMTR-MCNC: 176 MG/DL — HIGH (ref 70–99)
GLUCOSE BLDC GLUCOMTR-MCNC: 197 MG/DL — HIGH (ref 70–99)
GLUCOSE BLDC GLUCOMTR-MCNC: 214 MG/DL — HIGH (ref 70–99)
GLUCOSE BLDC GLUCOMTR-MCNC: 79 MG/DL — SIGNIFICANT CHANGE UP (ref 70–99)
GLUCOSE SERPL-MCNC: 131 MG/DL — HIGH (ref 70–99)
GLUCOSE SERPL-MCNC: 196 MG/DL — HIGH (ref 70–99)
GLUCOSE SERPL-MCNC: 202 MG/DL — HIGH (ref 70–99)
GLUCOSE SERPL-MCNC: 220 MG/DL — HIGH (ref 70–99)
HCT VFR BLD CALC: 28.8 % — LOW (ref 39–50)
HCT VFR BLD CALC: 29.1 % — LOW (ref 39–50)
HCT VFR BLD CALC: 31 % — LOW (ref 39–50)
HCT VFR BLD CALC: 32.3 % — LOW (ref 39–50)
HGB BLD-MCNC: 10 G/DL — LOW (ref 13–17)
HGB BLD-MCNC: 10 G/DL — LOW (ref 13–17)
HGB BLD-MCNC: 11.2 G/DL — LOW (ref 13–17)
HGB BLD-MCNC: 11.2 G/DL — LOW (ref 13–17)
INR BLD: 1.04 — SIGNIFICANT CHANGE UP (ref 0.88–1.16)
INR BLD: 1.07 — SIGNIFICANT CHANGE UP (ref 0.88–1.16)
INR BLD: 1.11 — SIGNIFICANT CHANGE UP (ref 0.88–1.16)
INR BLD: 1.12 — SIGNIFICANT CHANGE UP (ref 0.88–1.16)
LACTATE SERPL-SCNC: 4 MMOL/L — CRITICAL HIGH (ref 0.5–2)
LACTATE SERPL-SCNC: 4.1 MMOL/L — CRITICAL HIGH (ref 0.5–2)
LACTATE SERPL-SCNC: 4.4 MMOL/L — CRITICAL HIGH (ref 0.5–2)
LACTATE SERPL-SCNC: 4.4 MMOL/L — CRITICAL HIGH (ref 0.5–2)
MAGNESIUM SERPL-MCNC: 2 MG/DL — SIGNIFICANT CHANGE UP (ref 1.6–2.6)
MAGNESIUM SERPL-MCNC: 2.1 MG/DL — SIGNIFICANT CHANGE UP (ref 1.6–2.6)
MAGNESIUM SERPL-MCNC: 2.2 MG/DL — SIGNIFICANT CHANGE UP (ref 1.6–2.6)
MAGNESIUM SERPL-MCNC: 2.6 MG/DL — SIGNIFICANT CHANGE UP (ref 1.6–2.6)
MCHC RBC-ENTMCNC: 29.4 PG — SIGNIFICANT CHANGE UP (ref 27–34)
MCHC RBC-ENTMCNC: 29.5 PG — SIGNIFICANT CHANGE UP (ref 27–34)
MCHC RBC-ENTMCNC: 29.7 PG — SIGNIFICANT CHANGE UP (ref 27–34)
MCHC RBC-ENTMCNC: 30.4 PG — SIGNIFICANT CHANGE UP (ref 27–34)
MCHC RBC-ENTMCNC: 34.4 G/DL — SIGNIFICANT CHANGE UP (ref 32–36)
MCHC RBC-ENTMCNC: 34.7 G/DL — SIGNIFICANT CHANGE UP (ref 32–36)
MCHC RBC-ENTMCNC: 34.7 G/DL — SIGNIFICANT CHANGE UP (ref 32–36)
MCHC RBC-ENTMCNC: 36.1 G/DL — HIGH (ref 32–36)
MCV RBC AUTO: 84 FL — SIGNIFICANT CHANGE UP (ref 80–100)
MCV RBC AUTO: 85 FL — SIGNIFICANT CHANGE UP (ref 80–100)
MCV RBC AUTO: 85.6 FL — SIGNIFICANT CHANGE UP (ref 80–100)
MCV RBC AUTO: 85.7 FL — SIGNIFICANT CHANGE UP (ref 80–100)
PHOSPHATE SERPL-MCNC: 2 MG/DL — LOW (ref 2.5–4.5)
PHOSPHATE SERPL-MCNC: 2.2 MG/DL — LOW (ref 2.5–4.5)
PHOSPHATE SERPL-MCNC: 2.2 MG/DL — LOW (ref 2.5–4.5)
PHOSPHATE SERPL-MCNC: 2.9 MG/DL — SIGNIFICANT CHANGE UP (ref 2.5–4.5)
PLATELET # BLD AUTO: 52 K/UL — LOW (ref 150–400)
PLATELET # BLD AUTO: 59 K/UL — LOW (ref 150–400)
PLATELET # BLD AUTO: 75 K/UL — LOW (ref 150–400)
PLATELET # BLD AUTO: 84 K/UL — LOW (ref 150–400)
POTASSIUM SERPL-MCNC: 3.6 MMOL/L — SIGNIFICANT CHANGE UP (ref 3.5–5.3)
POTASSIUM SERPL-MCNC: 3.7 MMOL/L — SIGNIFICANT CHANGE UP (ref 3.5–5.3)
POTASSIUM SERPL-MCNC: 4 MMOL/L — SIGNIFICANT CHANGE UP (ref 3.5–5.3)
POTASSIUM SERPL-MCNC: 4.4 MMOL/L — SIGNIFICANT CHANGE UP (ref 3.5–5.3)
POTASSIUM SERPL-SCNC: 3.6 MMOL/L — SIGNIFICANT CHANGE UP (ref 3.5–5.3)
POTASSIUM SERPL-SCNC: 3.7 MMOL/L — SIGNIFICANT CHANGE UP (ref 3.5–5.3)
POTASSIUM SERPL-SCNC: 4 MMOL/L — SIGNIFICANT CHANGE UP (ref 3.5–5.3)
POTASSIUM SERPL-SCNC: 4.4 MMOL/L — SIGNIFICANT CHANGE UP (ref 3.5–5.3)
PROT SERPL-MCNC: 4.3 G/DL — LOW (ref 6–8.3)
PROT SERPL-MCNC: 4.7 G/DL — LOW (ref 6–8.3)
PROT SERPL-MCNC: 4.9 G/DL — LOW (ref 6–8.3)
PROT SERPL-MCNC: 5 G/DL — LOW (ref 6–8.3)
PROTHROM AB SERPL-ACNC: 11.6 SEC — SIGNIFICANT CHANGE UP (ref 9.8–12.7)
PROTHROM AB SERPL-ACNC: 11.9 SEC — SIGNIFICANT CHANGE UP (ref 9.8–12.7)
PROTHROM AB SERPL-ACNC: 12.4 SEC — SIGNIFICANT CHANGE UP (ref 9.8–12.7)
PROTHROM AB SERPL-ACNC: 12.5 SEC — SIGNIFICANT CHANGE UP (ref 9.8–12.7)
RBC # BLD: 3.39 M/UL — LOW (ref 4.2–5.8)
RBC # BLD: 3.4 M/UL — LOW (ref 4.2–5.8)
RBC # BLD: 3.69 M/UL — LOW (ref 4.2–5.8)
RBC # BLD: 3.77 M/UL — LOW (ref 4.2–5.8)
RBC # FLD: 17.1 % — HIGH (ref 10.3–16.9)
RBC # FLD: 17.3 % — HIGH (ref 10.3–16.9)
RBC # FLD: 17.7 % — HIGH (ref 10.3–16.9)
RBC # FLD: 18 % — HIGH (ref 10.3–16.9)
SODIUM SERPL-SCNC: 136 MMOL/L — SIGNIFICANT CHANGE UP (ref 135–145)
SODIUM SERPL-SCNC: 136 MMOL/L — SIGNIFICANT CHANGE UP (ref 135–145)
SODIUM SERPL-SCNC: 139 MMOL/L — SIGNIFICANT CHANGE UP (ref 135–145)
SODIUM SERPL-SCNC: 140 MMOL/L — SIGNIFICANT CHANGE UP (ref 135–145)
VANCOMYCIN TROUGH SERPL-MCNC: 13.4 UG/ML — SIGNIFICANT CHANGE UP (ref 10–20)
WBC # BLD: 14.1 K/UL — HIGH (ref 3.8–10.5)
WBC # BLD: 14.8 K/UL — HIGH (ref 3.8–10.5)
WBC # BLD: 15.2 K/UL — HIGH (ref 3.8–10.5)
WBC # BLD: 26.1 K/UL — HIGH (ref 3.8–10.5)
WBC # FLD AUTO: 14.1 K/UL — HIGH (ref 3.8–10.5)
WBC # FLD AUTO: 14.8 K/UL — HIGH (ref 3.8–10.5)
WBC # FLD AUTO: 15.2 K/UL — HIGH (ref 3.8–10.5)
WBC # FLD AUTO: 26.1 K/UL — HIGH (ref 3.8–10.5)

## 2018-07-12 PROCEDURE — 99292 CRITICAL CARE ADDL 30 MIN: CPT

## 2018-07-12 PROCEDURE — 71045 X-RAY EXAM CHEST 1 VIEW: CPT | Mod: 26

## 2018-07-12 PROCEDURE — 45330 DIAGNOSTIC SIGMOIDOSCOPY: CPT

## 2018-07-12 PROCEDURE — 43235 EGD DIAGNOSTIC BRUSH WASH: CPT

## 2018-07-12 PROCEDURE — 99291 CRITICAL CARE FIRST HOUR: CPT

## 2018-07-12 PROCEDURE — 90945 DIALYSIS ONE EVALUATION: CPT | Mod: GC

## 2018-07-12 PROCEDURE — 99223 1ST HOSP IP/OBS HIGH 75: CPT | Mod: 25

## 2018-07-12 RX ORDER — POTASSIUM PHOSPHATE, MONOBASIC POTASSIUM PHOSPHATE, DIBASIC 236; 224 MG/ML; MG/ML
15 INJECTION, SOLUTION INTRAVENOUS ONCE
Qty: 0 | Refills: 0 | Status: COMPLETED | OUTPATIENT
Start: 2018-07-12 | End: 2018-07-12

## 2018-07-12 RX ORDER — PANTOPRAZOLE SODIUM 20 MG/1
8 TABLET, DELAYED RELEASE ORAL
Qty: 80 | Refills: 0 | Status: DISCONTINUED | OUTPATIENT
Start: 2018-07-12 | End: 2018-07-14

## 2018-07-12 RX ORDER — FENTANYL CITRATE 50 UG/ML
25 INJECTION INTRAVENOUS ONCE
Qty: 0 | Refills: 0 | Status: DISCONTINUED | OUTPATIENT
Start: 2018-07-12 | End: 2018-07-13

## 2018-07-12 RX ORDER — POTASSIUM CHLORIDE 20 MEQ
20 PACKET (EA) ORAL
Qty: 0 | Refills: 0 | Status: COMPLETED | OUTPATIENT
Start: 2018-07-12 | End: 2018-07-12

## 2018-07-12 RX ORDER — POTASSIUM CHLORIDE 20 MEQ
20 PACKET (EA) ORAL ONCE
Qty: 0 | Refills: 0 | Status: COMPLETED | OUTPATIENT
Start: 2018-07-12 | End: 2018-07-12

## 2018-07-12 RX ORDER — MAGNESIUM SULFATE 500 MG/ML
2 VIAL (ML) INJECTION ONCE
Qty: 0 | Refills: 0 | Status: COMPLETED | OUTPATIENT
Start: 2018-07-12 | End: 2018-07-12

## 2018-07-12 RX ADMIN — MEROPENEM 100 MILLIGRAM(S): 1 INJECTION INTRAVENOUS at 21:21

## 2018-07-12 RX ADMIN — MIDAZOLAM HYDROCHLORIDE 2 MILLIGRAM(S): 1 INJECTION, SOLUTION INTRAMUSCULAR; INTRAVENOUS at 01:25

## 2018-07-12 RX ADMIN — MEROPENEM 100 MILLIGRAM(S): 1 INJECTION INTRAVENOUS at 05:19

## 2018-07-12 RX ADMIN — FENTANYL CITRATE 25 MICROGRAM(S): 50 INJECTION INTRAVENOUS at 05:15

## 2018-07-12 RX ADMIN — Medication 100 MILLIEQUIVALENT(S): at 12:19

## 2018-07-12 RX ADMIN — Medication 1 DROP(S): at 18:58

## 2018-07-12 RX ADMIN — CHLORHEXIDINE GLUCONATE 15 MILLILITER(S): 213 SOLUTION TOPICAL at 05:19

## 2018-07-12 RX ADMIN — DEXMEDETOMIDINE HYDROCHLORIDE IN 0.9% SODIUM CHLORIDE 7.38 MICROGRAM(S)/KG/HR: 4 INJECTION INTRAVENOUS at 00:33

## 2018-07-12 RX ADMIN — POTASSIUM PHOSPHATE, MONOBASIC POTASSIUM PHOSPHATE, DIBASIC 62.5 MILLIMOLE(S): 236; 224 INJECTION, SOLUTION INTRAVENOUS at 04:47

## 2018-07-12 RX ADMIN — Medication 250 MILLIGRAM(S): at 15:07

## 2018-07-12 RX ADMIN — FENTANYL CITRATE 25 MICROGRAM(S): 50 INJECTION INTRAVENOUS at 19:09

## 2018-07-12 RX ADMIN — Medication 50 MILLIGRAM(S): at 13:52

## 2018-07-12 RX ADMIN — MIDAZOLAM HYDROCHLORIDE 2 MILLIGRAM(S): 1 INJECTION, SOLUTION INTRAMUSCULAR; INTRAVENOUS at 18:57

## 2018-07-12 RX ADMIN — Medication 4: at 06:02

## 2018-07-12 RX ADMIN — Medication 1 DROP(S): at 05:50

## 2018-07-12 RX ADMIN — PANTOPRAZOLE SODIUM 40 MILLIGRAM(S): 20 TABLET, DELAYED RELEASE ORAL at 05:19

## 2018-07-12 RX ADMIN — Medication 2: at 13:52

## 2018-07-12 RX ADMIN — MEROPENEM 100 MILLIGRAM(S): 1 INJECTION INTRAVENOUS at 13:52

## 2018-07-12 RX ADMIN — VASOPRESSIN 1 UNIT(S)/MIN: 20 INJECTION INTRAVENOUS at 00:33

## 2018-07-12 RX ADMIN — Medication 50 GRAM(S): at 12:34

## 2018-07-12 RX ADMIN — MIDAZOLAM HYDROCHLORIDE 2 MILLIGRAM(S): 1 INJECTION, SOLUTION INTRAMUSCULAR; INTRAVENOUS at 03:52

## 2018-07-12 RX ADMIN — FENTANYL CITRATE 25 MICROGRAM(S): 50 INJECTION INTRAVENOUS at 05:21

## 2018-07-12 RX ADMIN — Medication 50 MILLIGRAM(S): at 21:21

## 2018-07-12 RX ADMIN — FENTANYL CITRATE 25 MICROGRAM(S): 50 INJECTION INTRAVENOUS at 18:57

## 2018-07-12 RX ADMIN — Medication 2: at 18:59

## 2018-07-12 RX ADMIN — Medication 2: at 01:38

## 2018-07-12 RX ADMIN — Medication 50 MILLIGRAM(S): at 05:21

## 2018-07-12 NOTE — PROGRESS NOTE ADULT - PROBLEM SELECTOR PLAN 1
- the oliguric EMILY due to ATN - most likely due to cardiogenic shock, can not exclude also a septic shock, requiring pressors - on vasopressin and phenylephrine, on milrinone   - oliguric   - now on CVVHd  The dialysate flow - 1.5 liters/h. We will continue with CVVHD today   - the patient with overt fluid overload - anasarca, worsening jaundice   - renal diet when he is not NPO  - hypophosphatemia - improved   - potassium 3.6 - please continue to follow up   - lactate still elevated - we will continue with CVVHD, the ultimate treatment for lactic acidosis is treatment of the cause for it. In this case treatment of the shock   - the patient with mild metabolic alkalosis - please reconsider the need for bicarbonate drip  - in and out  - daily weight - the oliguric EMILY due to ATN - most likely due to cardiogenic shock, can not exclude also a septic shock, requiring pressors - on vasopressin and phenylephrine, on milrinone   - oliguric   - now on CVVHd  The dialysate flow - 1.5 liters/h. We will continue with CVVHD today with increased blood flow at 250 in order to achieve greater UF  - the patient with overt fluid overload - anasarca, worsening jaundice   - renal diet when he is not NPO  - hypophosphatemia - improved   - potassium 3.6 - please continue to follow up   - lactate still elevated - we will continue with CVVHD, the ultimate treatment for lactic acidosis is treatment of the cause for it. In this case treatment of the shock   - in and out  - daily weight

## 2018-07-12 NOTE — PROGRESS NOTE ADULT - ASSESSMENT
67 year old male with PMH stated above, now s/p  Repair of AA, AVR, MV repair, TV repair, CABG x2,  in shock requiring pressor support. Patient deveoped deterioration of renal function in the setting of shock - now oliguric, unresponsive to diuretics. The CT ICU team started the patient on aquaphoresis and CVVHD was ordered. CVVDH was started on 7/2 with good tolerance so far.      In the past 24 hours, he is net neutral balance, with CVVHD. He is on 200 ml/min UF, blood flow on 200 ml/min and dialysate flow on 1.5 liters/h. No issues with CVVHD,    CVP noted to be 29. No issues with CVVHD in 24 hours. We will continue with CVVHD for today.

## 2018-07-12 NOTE — PROGRESS NOTE ADULT - SUBJECTIVE AND OBJECTIVE BOX
CTICU  CRITICAL  CARE  attending     Hand off received @7a					   Pertinent clinical, laboratory, radiographic, hemodynamic, echocardiographic, respiratory data, microbiologic data and chart were reviewed and analyzed frequently throughout the course of the day and night  Patient seen and examined with CTS/ SH attending at bedside  Pt is a 67y , Male, post op day # 22 s/p AVR; MV/TV repair;      post op:    protracted Cardiogenic shock   LV assist with Impella  hemolytic anemia/acute ongoing hemolysis 2/2 Impella/Extra corporeal circuit  Hyperbilirubinemia  Resp failure/Vent dependant  Coagulopathy  Acute renal failure on CRRT      today:    cardiogenic shock  Unchanged pressor/inotrope requirements  s/p 1 unit PC; platelets  Ongoing CRRT   acute GI bleed with acute post hemorrhagic anemia  scheduled for Sigmoidoscopy/EGD by GI service      Penile swelling: Penile swelling  Shock: Shock  Anemia: Anemia  Hyponatremia: Hyponatremia  Acute kidney failure: Acute kidney failure  CAD (coronary artery disease): CAD (coronary artery disease)  Valvular disease: Valvular disease      , FAMILY HISTORY:  PAST MEDICAL & SURGICAL HISTORY:  No pertinent past medical history  No significant past surgical history    Patient is a 67y old  Male who presents with a chief complaint of AS (19 Jun 2018 00:06)      14 system review was unremarkable  acute changes include acute respiratory failure  Vital signs, hemodynamic and respiratory parameters were reviewed from the bedside nursing flowsheet.  ICU Vital Signs Last 24 Hrs  T(C): 35.7 (12 Jul 2018 10:29), Max: 36.1 (11 Jul 2018 17:12)  T(F): 96.2 (12 Jul 2018 10:29), Max: 96.9 (11 Jul 2018 17:12)  HR: 90 (12 Jul 2018 17:00) (90 - 93)  BP: --  BP(mean): --  ABP: 96/46 (12 Jul 2018 17:00) (90/48 - 108/62)  ABP(mean): 62 (12 Jul 2018 17:00) (58 - 80)  RR: 20 (12 Jul 2018 17:00) (18 - 24)  SpO2: 100% (12 Jul 2018 17:00) (100% - 100%)    Adult Advanced Hemodynamics Last 24 Hrs  CVP(mm Hg): 17 (12 Jul 2018 17:00) (17 - 30)  CVP(cm H2O): --  CO: --  CI: --  PA: --  PA(mean): --  PCWP: --  SVR: --  SVRI: --  PVR: --  PVRI: --, ABG - ( 12 Jul 2018 16:21 )  pH, Arterial: 7.42  pH, Blood: x     /  pCO2: 36    /  pO2: 139   / HCO3: 23    / Base Excess: -1.3  /  SaO2: 98                Mode: AC/ CMV (Assist Control/ Continuous Mandatory Ventilation)  RR (machine): 20  TV (machine): 450  FiO2: 80  PEEP: 8  ITime: 1.2  MAP: 20  PIP: 36    Intake and output was reviewed and the fluid balance was calculated  Daily     Daily   I&O's Summary    11 Jul 2018 07:01  -  12 Jul 2018 07:00  --------------------------------------------------------  IN: 4712.3 mL / OUT: 4474 mL / NET: 238.3 mL    12 Jul 2018 07:01  -  12 Jul 2018 17:05  --------------------------------------------------------  IN: 2001 mL / OUT: 1907 mL / NET: 94 mL        All lines and drain sites were assessed  Glycemic trend was reviewedCAPILLARY BLOOD GLUCOSE      POCT Blood Glucose.: 176 mg/dL (12 Jul 2018 13:34)    No acute change in mental status  Auscultation of the chest reveals equal bs  Abdomen is soft  Extremities are warm and well perfused  Wounds appear clean and unremarkable  Antibiotics are periop    labs  CBC Full  -  ( 12 Jul 2018 16:21 )  WBC Count : 26.1 K/uL  Hemoglobin : 11.2 g/dL  Hematocrit : 31.0 %  Platelet Count - Automated : 84 K/uL  Mean Cell Volume : 84.0 fL  Mean Cell Hemoglobin : 30.4 pg  Mean Cell Hemoglobin Concentration : 36.1 g/dL  Auto Neutrophil # : x  Auto Lymphocyte # : x  Auto Monocyte # : x  Auto Eosinophil # : x  Auto Basophil # : x  Auto Neutrophil % : x  Auto Lymphocyte % : x  Auto Monocyte % : x  Auto Eosinophil % : x  Auto Basophil % : x    07-12    136  |  96  |  24<H>  ----------------------------<  202<H>  4.4   |  21<L>  |  0.37<L>    Ca    9.8      12 Jul 2018 16:21  Phos  2.2     07-12  Mg     2.6     07-12    TPro  4.9<L>  /  Alb  3.6  /  TBili  57.5<H>  /  DBili  x   /  AST  234<H>  /  ALT  104<H>  /  AlkPhos  287<H>  07-12    PT/INR - ( 12 Jul 2018 16:21 )   PT: 11.6 sec;   INR: 1.04          PTT - ( 12 Jul 2018 16:21 )  PTT:35.0 sec  The current medications were reviewed   MEDICATIONS  (STANDING):  artificial  tears Solution 1 Drop(s) Both EYES two times a day  chlorhexidine 0.12% Liquid 15 milliLiter(s) Swish and Spit every 12 hours  cisatracurium Infusion 3 MICROgram(s)/kG/Min (10.548 mL/Hr) IV Continuous <Continuous>  dexmedetomidine Infusion 0.5 MICROgram(s)/kG/Hr (7.375 mL/Hr) IV Continuous <Continuous>  dextrose 5%. 1000 milliLiter(s) (25 mL/Hr) IV Continuous <Continuous>  dextrose 50% Injectable 50 milliLiter(s) IV Push every 15 minutes  dextrose 50% Injectable 25 milliLiter(s) IV Push every 15 minutes  DOBUTamine Infusion 5 MICROgram(s)/kG/Min (8.85 mL/Hr) IV Continuous <Continuous>  heparin  Infusion 400 Unit(s)/Hr (4 mL/Hr) IV Continuous <Continuous>  hydrocortisone sodium succinate Injectable 50 milliGRAM(s) IV Push every 8 hours  insulin lispro (HumaLOG) corrective regimen sliding scale   SubCutaneous every 6 hours  meropenem  IVPB 1000 milliGRAM(s) IV Intermittent every 8 hours  milrinone Infusion 0.25 MICROgram(s)/kG/Min (4.395 mL/Hr) IV Continuous <Continuous>  pantoprazole Infusion 8 mG/Hr (10 mL/Hr) IV Continuous <Continuous>  phenylephrine    Infusion 2 MICROgram(s)/kG/Min (21.975 mL/Hr) IV Continuous <Continuous>  PureFlow Dialysate RFP-400 (K 2 / Ca 3) 5000 milliLiter(s) (1500 mL/Hr) CRRT <Continuous>  sodium chloride 0.9%. 1000 milliLiter(s) (10 mL/Hr) IV Continuous <Continuous>  vancomycin  IVPB 750 milliGRAM(s) IV Intermittent every 24 hours  vasopressin Infusion 0.017 Unit(s)/Min (1 mL/Hr) IV Continuous <Continuous>    MEDICATIONS  (PRN):  dextrose 40% Gel 15 Gram(s) Oral once PRN Blood Glucose LESS THAN 70 milliGRAM(s)/deciliter  fentaNYL    Injectable 25 MICROGram(s) IV Push every 4 hours PRN Severe Pain (7 - 10)  glucagon  Injectable 1 milliGRAM(s) IntraMuscular once PRN Glucose LESS THAN 70 milligrams/deciliter  midazolam Injectable 2 milliGRAM(s) IV Push every 2 hours PRN agitation       PROBLEM LIST/ ASSESSMENT:  HEALTH ISSUES - PROBLEM Dx:  cardiogenic shock  lactic acidosis  acute lower GI bleed  Hyperbilirubinemia  acute post hemorrhagic anemia  coagulopathy      Penile swelling: Penile swelling  Shock: Shock  Anemia: Anemia  Hyponatremia: Hyponatremia  Acute kidney failure: Acute kidney failure  CAD (coronary artery disease): CAD (coronary artery disease)  Valvular disease: Valvular disease      ,   Patient is a 67y old  Male who presents with a chief complaint of AS (19 Jun 2018 00:06)     s/p  AVR; MV/TV repair;  acute changes include acute respiratory failure    My plan includes :  close hemodynamic, ventilatory and drain monitoring and management per post op routine    Monitor for arrhythmias and monitor parameters for organ perfusion  monitor neurologic status  Head of the bed should remain elevated to 45 deg .   chest PT and IS will be encouraged  monitor adequacy of oxygenation and ventilation and attempt to wean oxygen  Nutritional goals will be met using po eventually , ensure adequate caloric intake and montior the same  Stress ulcer and VTE prophylaxis will be achieved    Glycemic control is satisfactory  Electrolytes have been repleted as necessary and wound care has been carried out. Pain control has been achieved.   agressive physical therapy and early mobility and ambulation goals will be met   The family was updated about the course and plan  CRITICAL CARE TIME SPENT in evaluation and management, reassessments, review and interpretation of labs and x-rays, ventilator and hemodynamic management, formulating a plan and coordinating care: ___90____ MIN.  Time does not include procedural time.  CTICU ATTENDING     					    Rai Singer MD

## 2018-07-12 NOTE — PROGRESS NOTE ADULT - SUBJECTIVE AND OBJECTIVE BOX
Patient was seen at bedside. He is still intubated,  on pressor support. Patient  is s/p surgery for closing the chest wall, done on 7/2, after which he was started on CVVHD .     In the past 24 hours, he is net neutral balance, with CVVHD. He is on 200 ml/min UF, blood flow on 200 ml/min and dialysate flow on 1.5 liters/h. No issues with CVVHD,    CVP noted to be 29.    Meds:    MEDICATIONS  (STANDING):  artificial  tears Solution 1 Drop(s) Both EYES two times a day  chlorhexidine 0.12% Liquid 15 milliLiter(s) Swish and Spit every 12 hours  cisatracurium Infusion 3 MICROgram(s)/kG/Min (10.548 mL/Hr) IV Continuous <Continuous>  dexmedetomidine Infusion 0.5 MICROgram(s)/kG/Hr (7.375 mL/Hr) IV Continuous <Continuous>  dextrose 5%. 1000 milliLiter(s) (25 mL/Hr) IV Continuous <Continuous>  dextrose 50% Injectable 50 milliLiter(s) IV Push every 15 minutes  dextrose 50% Injectable 25 milliLiter(s) IV Push every 15 minutes  DOBUTamine Infusion 5 MICROgram(s)/kG/Min (8.85 mL/Hr) IV Continuous <Continuous>  heparin  Infusion 400 Unit(s)/Hr (4 mL/Hr) IV Continuous <Continuous>  hydrocortisone sodium succinate Injectable 50 milliGRAM(s) IV Push every 8 hours  insulin lispro (HumaLOG) corrective regimen sliding scale   SubCutaneous every 6 hours  meropenem  IVPB 1000 milliGRAM(s) IV Intermittent every 8 hours  milrinone Infusion 0.25 MICROgram(s)/kG/Min (4.395 mL/Hr) IV Continuous <Continuous>  pantoprazole Infusion 8 mG/Hr (10 mL/Hr) IV Continuous <Continuous>  phenylephrine    Infusion 2 MICROgram(s)/kG/Min (21.975 mL/Hr) IV Continuous <Continuous>  PureFlow Dialysate RFP-400 (K 2 / Ca 3) 5000 milliLiter(s) (1500 mL/Hr) CRRT <Continuous>  sodium chloride 0.9%. 1000 milliLiter(s) (10 mL/Hr) IV Continuous <Continuous>  vancomycin  IVPB 750 milliGRAM(s) IV Intermittent every 24 hours  vasopressin Infusion 0.017 Unit(s)/Min (1 mL/Hr) IV Continuous <Continuous>    MEDICATIONS  (PRN):  dextrose 40% Gel 15 Gram(s) Oral once PRN Blood Glucose LESS THAN 70 milliGRAM(s)/deciliter  fentaNYL    Injectable 25 MICROGram(s) IV Push every 4 hours PRN Severe Pain (7 - 10)  glucagon  Injectable 1 milliGRAM(s) IntraMuscular once PRN Glucose LESS THAN 70 milligrams/deciliter  midazolam Injectable 2 milliGRAM(s) IV Push every 2 hours PRN agitation        T(C): 35.7 (07-12-18 @ 10:29), Max: 36.1 (07-11-18 @ 17:12)  HR: 90 (07-12-18 @ 15:00) (90 - 93)  BP: --  RR: 20 (07-12-18 @ 15:00) (18 - 24)  SpO2: 100% (07-12-18 @ 15:00) (100% - 100%)    07-11-18 @ 07:01  -  07-12-18 @ 07:00  --------------------------------------------------------  IN: 4712.3 mL / OUT: 4474 mL / NET: 238.3 mL    07-12-18 @ 07:01  -  07-12-18 @ 15:31  --------------------------------------------------------  IN: 1911 mL / OUT: 1315 mL / NET: 596 mL    Allergies    penicillin (Rash)            --------------------------------------------------------  IN:    Albumin 25%: 100 mL    dexmedetomidine Infusion: 73.8 mL    heparin Infusion: 32 mL    heparin Infusion: 64 mL    milrinone  Infusion: 105.6 mL    Packed Red Blood Cells: 1000 mL    phenylephrine   Infusion: 372 mL    Platelets - Single Donor: 300 mL    sodium bicarbonate  Infusion: 700 mL    sodium chloride 0.9%.: 240 mL    Solution: 400 mL    vasopressin Infusion: 96 mL    Vital HN: 888 mL  Total IN: 4371.4 mL    OUT:    Chest Tube: 100 mL    Other: 4463 mL  Total OUT: 4563 mL    Total NET: -191.6 mL      07-11 @ 07:01  -  07-11 @ 10:13  --------------------------------------------------------  IN:    dexmedetomidine Infusion: 30.8 mL    heparin Infusion: 12 mL    milrinone  Infusion: 13.2 mL    phenylephrine   Infusion: 46.5 mL    Platelets - Single Donor: 247 mL    sodium bicarbonate  Infusion: 150 mL    sodium chloride 0.9%.: 30 mL    Solution: 250 mL    vasopressin Infusion: 12 mL    Vital HN: 126 mL  Total IN: 917.5 mL    OUT:    Other: 580 mL  Total OUT: 580 mL    Total NET: 337.5 mL      Physical exam:     Patient seen Intubated and sedated, with anasarca, and jaundice   Chest: s/p thoracotomy, closed chest,  multiple drains attached to chest   RRR, normal s1/s2, systolic murmur  Abdomen  not distended   Extremities 3+ peripheral edema    Access:  Hd cathter - on the left IJ  Desai in situ        LABS:               07-12    140  |  98  |  23  ----------------------------<  220<H>  3.7   |  23  |  0.36<L>    Ca    9.7      12 Jul 2018 09:27  Phos  2.9     07-12  Mg     2.0     07-12    TPro  4.3<L>  /  Alb  3.7  /  TBili  52.7<H>  /  DBili  x   /  AST  212<H>  /  ALT  96<H>  /  AlkPhos  242<H>  07-12            RADIOLOGY & ADDITIONAL STUDIES:    < from: Xray Chest 1 View- PORTABLE-Routine (07.12.18 @ 02:59) >   XR CHEST PORTABLE ROUTINE 1V                          PROCEDURE DATE:  07/12/2018          INTERPRETATION:  Chest x-ray    Indication: Postoperative patient    A portable frontal view of the chest is compared to the prior study dated   17/11/2018. ET tube, gastric tube, right IJ line, right chest tube and   other support devices are without significant change. Worsening pulmonary   infiltrates could be due to increased congestion. Cannot exclude pleural   effusions. No pneumothorax.      IMPRESSION: Worsening pulmonary infiltrates.      < end of copied text >

## 2018-07-12 NOTE — CONSULT NOTE ADULT - ASSESSMENT
67 yr old male with a PMHx of congenital AS s/p repair at age 12 and CAD s/p stenting 8 yrs ago who was found to have 2V CAD, severe AS, MR and TR during pre-operative evaluation now s/p MV/TV repair and AVR w/ IABP and impella placement. Hospital course was complicated by cardiogenic shock requiring continued inotropic support, cardiac tamponade x 2, consumptive coaguloapthy, pre-renal azotemia with anasarca requiring CVVH, and now new onset melena and hematochezia x 3 days for which GI was consulted.     # melena/hematochezia  -patient with noted dark stools on bed pad as well as bloody stools for the past 3 days; dark stool noted on rectal exam  -on once daily ppi throughout hospital course, not on ppi gtt  -etiologies of patient's hematochezia/melena could be multifactorial: melena 2/2 poor gut perfusion in the setting of cardiogenic shock vs stress ulcers, hematochezia could be 2/2 poor bowel perfusion in the setting of cardiogenic shock (a dx that is further supported by the presence of pt's lactic avidemia) vs AVMs 2/2 long standing hx of AS vs sequelae of patient's consumptive coagulopathy 67 yr old male with a PMHx of congenital AS s/p repair at age 12 and CAD s/p stenting 8 yrs ago who was found to have 2V CAD, severe AS, MR and TR during pre-operative evaluation now s/p MV/TV repair and AVR w/ IABP and impella placement. Hospital course was complicated by cardiogenic shock requiring continued inotropic support, cardiac tamponade x 2, consumptive coaguloapthy, pre-renal azotemia with anasarca requiring CVVH, and now new onset melena and hematochezia x 3 days for which GI was consulted.     # melena/hematochezia  -patient with noted dark stools on bed pad as well as bloody stools for the past 3 days; dark stool noted on rectal exam  -on once daily ppi throughout hospital course, now on ppi gtt  -etiologies of patient's hematochezia/melena could be multifactorial:  -melena 2/2 poor gut perfusion in the setting of cardiogenic shock vs stress ulcers  - hematochezia could be 2/2 poor bowel perfusion in the setting of cardiogenic shock vs AVMs 2/2 long standing hx of AS vs sequelae of patient's consumptive coagulopathy 67 yr old male with a PMHx of congenital AS s/p repair at age 12 and CAD s/p stenting 8 yrs ago who was found to have 2V CAD, severe AS, MR and TR during pre-operative evaluation now s/p MV/TV repair and AVR w/ IABP and impella placement. Hospital course was complicated by cardiogenic shock requiring continued inotropic support, cardiac tamponade x 2, consumptive coaguloapthy, pre-renal azotemia with anasarca requiring CVVH, and now new onset melena and hematochezia x 3 days for which GI was consulted.      # melena/hematochezia   -patient with noted dark stools on bed pad as well as bloody stools for the past 3 days; dark stool noted on rectal exam  -on once daily ppi throughout hospital course, now on ppi gtt   -etiologies of patient's hematochezia/melena could be multifactorial  -melena 2/2 poor gut perfusion in the setting of cardiogenic shock vs stress ulcers  - hematochezia could be 2/2 poor bowel perfusion in the setting of cardiogenic shock vs AVMs 2/2 long standing hx of AS vs sequelae of patient's consumptive coagulopathy     # transaminitis  -pt with chronically uptrending total bilirubun since admission; most likely 2/2 persistent hemolysis from impella  -would recommend hemolysis workup w/ LDH and haptoglobin 67 yr old male with a PMHx of congenital AS s/p repair at age 12 and CAD s/p stenting 8 yrs ago who was found to have 2V CAD, severe AS, MR and TR during pre-operative evaluation now s/p MV/TV repair and AVR w/ IABP and impella placement. Hospital course was complicated by cardiogenic shock requiring continued inotropic support, cardiac tamponade x 2, consumptive coaguloapthy, pre-renal azotemia with anasarca requiring CVVH, and now new onset melena and hematochezia x 3 days for which GI was consulted.      # melena/hematochezia   -patient with noted dark stools on bed pad as well as bloody stools for the past 3 days; dark stool noted on rectal exam  -on once daily ppi throughout hospital course, now on ppi gtt   -etiologies of patient's hematochezia/melena could be multifactorial  -melena 2/2 poor gut perfusion in the setting of cardiogenic shock vs stress ulcers  - hematochezia could be 2/2 poor bowel perfusion in the setting of cardiogenic shock vs AVMs 2/2 long standing hx of AS vs sequelae of patient's consumptive coagulopathy   -plan for flex sig and egd today to assess for source of bleed    # transaminitis  -pt with chronically uptrending total bilirubun since admission; multifactorial in nature: RBC and platelet consumption 2/2 shearing on impella;   -would recommend hemolysis workup w/ LDH and haptoglobin 67 yr old male with a PMHx of congenital AS s/p repair at age 12 and CAD s/p stenting 8 yrs ago who was found to have 2V CAD, severe AS, MR and TR during pre-operative evaluation now s/p MV/TV repair and AVR w/ IABP and impella placement. Hospital course was complicated by cardiogenic shock requiring continued inotropic support, cardiac tamponade x 2, consumptive coaguloapthy, pre-renal azotemia with anasarca requiring CVVH, and now new onset melena and hematochezia x 3 days for which GI was consulted.      # melena/hematochezia   -patient with noted dark stools on bed pad as well as bloody stools for the past 3 days; dark stool noted on rectal exam  -on once daily ppi throughout hospital course, now on ppi gtt   -etiologies of patient's hematochezia/melena could be multifactorial  -melena 2/2 poor gut perfusion in the setting of cardiogenic shock vs stress ulcers  - hematochezia could be 2/2 poor bowel perfusion in the setting of cardiogenic shock vs AVMs 2/2 long standing hx of AS vs sequelae of patient's consumptive coagulopathy   -plan for flex sig and egd today to assess for source of bleed    # transaminitis  -pt with chronically uptrending total bilirubun since admission; multifactorial in nature: RBC and platelet consumption 2/2 shearing on impella, drug induced 2/2 ?meropenem  -need to rule out acute viral etiology: please send EBV and CMV IgM and IgG  -please avoid hepatotoxic agents    all recs d/w primary team; will continue to follow 67 yr old male with a PMHx of congenital AS s/p repair at age 12 and CAD s/p stenting 8 yrs ago who was found to have 2V CAD, severe AS, MR and TR during pre-operative evaluation now s/p MV/TV repair and AVR w/ IABP and impella placement. Hospital course was complicated by cardiogenic shock requiring continued inotropic support, cardiac tamponade x 2, consumptive coaguloapthy, pre-renal azotemia with anasarca requiring CVVH, and now new onset melena and hematochezia x 3 days for which GI was consulted.      # melena/hematochezia   -patient with noted dark stools on bed pad as well as bloody stools for the past 3 days; dark stool noted on rectal exam  -on once daily ppi throughout hospital course, now on ppi gtt   -etiologies of patient's hematochezia/melena could be multifactorial  -melena 2/2 poor gut perfusion in the setting of cardiogenic shock vs stress ulcers  - hematochezia could be 2/2 poor bowel perfusion in the setting of cardiogenic shock vs AVMs 2/2 long standing hx of AS vs sequelae of patient's consumptive coagulopathy   -plan for flex sig and egd today to assess for source of bleed    # transaminitis with hyperbilirubinemia  -pt with chronically up trending total bilirubin since admission; multifactorial in nature: could be 2/2 hemolysis ,RBC and platelet consumption 2/2 shearing on impella, drug induced 2/2 ?meropenem, autoimmune hemolysis in the setting of multiple transfusions  -need to rule out acute viral etiology: please send EBV and CMV IgM and IgG, hepatitis serologies  -check Ig M for PBC  -please check repeat peripheral smear  -consider hematology consult for persistent hemolysis  -please avoid hepatotoxic agents    all recs d/w primary team; will continue to follow    Agree with above, note has been added/ addended as needed    Tavia Dillon  GI fellow 67 yr old male with a PMHx of congenital AS s/p repair at age 12 and CAD s/p stenting 8 yrs ago who was found to have 2V CAD, severe AS, MR and TR during pre-operative evaluation now s/p MV/TV repair and AVR w/ IABP and impella placement. Hospital course was complicated by cardiogenic shock requiring continued inotropic support, cardiac tamponade x 2, consumptive coaguloapthy, pre-renal azotemia with anasarca requiring CVVH, and now new onset melena and hematochezia x 3 days for which GI was consulted.      # melena/hematochezia   -patient with noted dark stools on bed pad as well as bloody stools for the past 3 days; dark stool noted on rectal exam  -on once daily ppi throughout hospital course, now on ppi gtt   -etiologies of patient's hematochezia/melena could be multifactorial  -melena 2/2 poor gut perfusion in the setting of cardiogenic shock vs stress ulcers  - hematochezia could be 2/2 poor bowel perfusion in the setting of cardiogenic shock vs AVMs 2/2 long standing hx of AS vs sequelae of patient's consumptive coagulopathy   -plan for flex sig and egd today to assess for source of bleed    # transaminitis with hyperbilirubinemia  -pt with chronically up trending total bilirubin since admission; multifactorial in nature: could be 2/2 hemolysis ,RBC and platelet consumption 2/2 shearing on impella, drug induced 2/2 ?meropenem, autoimmune hemolysis in the setting of multiple transfusions  -need to rule out acute viral etiology: please send EBV and CMV IgM and IgG, hepatitis serologies  -check Ig M for PBC  -please check repeat peripheral smear  -consider hematology consult for persistent hemolysis  -please avoid hepatotoxic agents    all recs d/w primary team; will continue to follow    Agree with above, note has been added/ addended as needed    Tavia Dillon  GI fellow      Addendum: patient underwent flex sig and evidence of proctitis seen, can consider using mesalamine suppository

## 2018-07-12 NOTE — CONSULT NOTE ADULT - SUBJECTIVE AND OBJECTIVE BOX
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 He is / was  being evaluated for ? abdominal hernia repair surgery. Given medical and surgical history cardiology clearance needed. On evaluation with  TTE / Cardiac Catheterization (Completed 06/2018.) EF = 25%, reduced LV function, severe AS, severe MR, severe TR, severe pHTN, and 2V CAD. [place] [facility] [place] Potter for further work up and MGMT. After speaking with patient he does state a decreased in ET and increase in SOB that has been progressing over the last 6-8 month. Details hard to obtain. Patient poor historian.     GI consulted for new melena.    PMH / PSH:  See HPI.  Appendectomy > 50 year ago.     Home Rx.:  Lasix 40mg PO QD    FH:  N/A    SH:  Lives with family. Not working. Non-smoker. Occasional ETOH use. No IVRDU.    Allergies:  PCN. (19 Jun 2018 00:06)      REVIEW OF SYSTEMS:  Constitutional: No fever, weight loss or fatigue  ENMT:  No difficulty hearing, tinnitus, vertigo; No sinus or throat pain  Respiratory: No cough, wheezing, chills or hemoptysis  Cardiovascular: No chest pain, palpitations, dizziness or leg swelling  Gastrointestinal: No abdominal or epigastric pain. No nausea, vomiting or hematemesis; No diarrhea or constipation. No melena or hematochezia.  Skin: No itching, burning, rashes or lesions   Musculoskeletal: No joint pain or swelling; No muscle, back or extremity pain    PAST MEDICAL & SURGICAL HISTORY:  No pertinent past medical history  No significant past surgical history      FAMILY HISTORY:      SOCIAL HISTORY:  Smoking Status: [ ] Current, [ ] Former, [ ] Never  Pack Years:    MEDICATIONS:  MEDICATIONS  (STANDING):  artificial  tears Solution 1 Drop(s) Both EYES two times a day  chlorhexidine 0.12% Liquid 15 milliLiter(s) Swish and Spit every 12 hours  cisatracurium Infusion 3 MICROgram(s)/kG/Min (10.548 mL/Hr) IV Continuous <Continuous>  dexmedetomidine Infusion 0.5 MICROgram(s)/kG/Hr (7.375 mL/Hr) IV Continuous <Continuous>  dextrose 5%. 1000 milliLiter(s) (25 mL/Hr) IV Continuous <Continuous>  dextrose 50% Injectable 50 milliLiter(s) IV Push every 15 minutes  dextrose 50% Injectable 25 milliLiter(s) IV Push every 15 minutes  DOBUTamine Infusion 5 MICROgram(s)/kG/Min (8.85 mL/Hr) IV Continuous <Continuous>  heparin  Infusion 400 Unit(s)/Hr (4 mL/Hr) IV Continuous <Continuous>  hydrocortisone sodium succinate Injectable 50 milliGRAM(s) IV Push every 8 hours  insulin lispro (HumaLOG) corrective regimen sliding scale   SubCutaneous every 6 hours  meropenem  IVPB 1000 milliGRAM(s) IV Intermittent every 8 hours  milrinone Infusion 0.25 MICROgram(s)/kG/Min (4.395 mL/Hr) IV Continuous <Continuous>  pantoprazole Infusion 8 mG/Hr (10 mL/Hr) IV Continuous <Continuous>  phenylephrine    Infusion 2 MICROgram(s)/kG/Min (21.975 mL/Hr) IV Continuous <Continuous>  PureFlow Dialysate RFP-400 (K 2 / Ca 3) 5000 milliLiter(s) (1500 mL/Hr) CRRT <Continuous>  sodium chloride 0.9%. 1000 milliLiter(s) (10 mL/Hr) IV Continuous <Continuous>  vancomycin  IVPB 750 milliGRAM(s) IV Intermittent every 24 hours  vasopressin Infusion 0.017 Unit(s)/Min (1 mL/Hr) IV Continuous <Continuous>    MEDICATIONS  (PRN):  dextrose 40% Gel 15 Gram(s) Oral once PRN Blood Glucose LESS THAN 70 milliGRAM(s)/deciliter  fentaNYL    Injectable 25 MICROGram(s) IV Push every 4 hours PRN Severe Pain (7 - 10)  glucagon  Injectable 1 milliGRAM(s) IntraMuscular once PRN Glucose LESS THAN 70 milligrams/deciliter  midazolam Injectable 2 milliGRAM(s) IV Push every 2 hours PRN agitation      Allergies    penicillin (Rash)    Intolerances        Vital Signs Last 24 Hrs  T(C): 35.3 (12 Jul 2018 05:00), Max: 36.2 (11 Jul 2018 15:00)  T(F): 95.5 (12 Jul 2018 05:00), Max: 97.2 (11 Jul 2018 15:00)  HR: 90 (12 Jul 2018 08:55) (90 - 90)  BP: --  BP(mean): --  RR: 23 (12 Jul 2018 08:55) (18 - 24)  SpO2: 100% (12 Jul 2018 08:55) (98% - 100%)    07-11 @ 07:01  -  07-12 @ 07:00  --------------------------------------------------------  IN: 4712.3 mL / OUT: 4474 mL / NET: 238.3 mL    07-12 @ 07:01 - 07-12 @ 09:27  --------------------------------------------------------  IN: 121.7 mL / OUT: 198 mL / NET: -76.3 mL      Mode: AC/ CMV (Assist Control/ Continuous Mandatory Ventilation)  RR (machine): 20  TV (machine): 450  FiO2: 80  PEEP: 8  ITime: 1.2  MAP: 13  PIP: 22      PHYSICAL EXAM:    General: Well developed; well nourished; in no acute distress  HEENT: MMM, conjunctiva and sclera clear  Gastrointestinal: Soft, non-tender non-distended; Normal bowel sounds; No rebound or guarding  Extremities: Normal range of motion, No clubbing, cyanosis or edema  Neurological: Alert and oriented x3  Skin: Warm and dry. No obvious rash      LABS:                        10.0   14.8  )-----------( 59       ( 12 Jul 2018 02:39 )             29.1     07-12    136  |  95<L>  |  23  ----------------------------<  196<H>  3.6   |  23  |  0.38<L>    Ca    9.5      12 Jul 2018 02:39  Phos  2.2     07-12  Mg     2.1     07-12    TPro  4.7<L>  /  Alb  3.6  /  TBili  54.4<H>  /  DBili  x   /  AST  204<H>  /  ALT  92<H>  /  AlkPhos  241<H>  07-12          RADIOLOGY & ADDITIONAL STUDIES: Hospital Course:  67 yr old male with a PMHx of congenital AS s/p repair at age 12 who was found to have 2V CAD, severe AS, MR and TR s/p MV and TV repair and AVR w/ IABP and impella placement. Hospital course complicated by cardiogenic shock requiring continued inotropic support, cardiac tamponade x 2, consumptive coaguloapthy, pre-renal azotemia with anasarca requiring CVVH, and now new onset melena and hematochezia x 3 days for which GI was consulted.     GI Hx:  patient with persistent diarrhea after being initiated on tube feeds but were brown in color. Hgb had been stable  started having jennifer blood in his stoo    PMH / PSH:  See HPI.  Appendectomy > 50 year ago.     Home Rx.:  Lasix 40mg PO QD    FH:  N/A    SH:  Lives with family. Not working. Non-smoker. Occasional ETOH use. No IVRDU.    Allergies:  PCN. (19 Jun 2018 00:06)      REVIEW OF SYSTEMS:  Constitutional: No fever, weight loss or fatigue  ENMT:  No difficulty hearing, tinnitus, vertigo; No sinus or throat pain  Respiratory: No cough, wheezing, chills or hemoptysis  Cardiovascular: No chest pain, palpitations, dizziness or leg swelling  Gastrointestinal: No abdominal or epigastric pain. No nausea, vomiting or hematemesis; No diarrhea or constipation. No melena or hematochezia.  Skin: No itching, burning, rashes or lesions   Musculoskeletal: No joint pain or swelling; No muscle, back or extremity pain    PAST MEDICAL & SURGICAL HISTORY:  No pertinent past medical history  No significant past surgical history      FAMILY HISTORY:      SOCIAL HISTORY:  Smoking Status: [ ] Current, [ ] Former, [ ] Never  Pack Years:    MEDICATIONS:  MEDICATIONS  (STANDING):  artificial  tears Solution 1 Drop(s) Both EYES two times a day  chlorhexidine 0.12% Liquid 15 milliLiter(s) Swish and Spit every 12 hours  cisatracurium Infusion 3 MICROgram(s)/kG/Min (10.548 mL/Hr) IV Continuous <Continuous>  dexmedetomidine Infusion 0.5 MICROgram(s)/kG/Hr (7.375 mL/Hr) IV Continuous <Continuous>  dextrose 5%. 1000 milliLiter(s) (25 mL/Hr) IV Continuous <Continuous>  dextrose 50% Injectable 50 milliLiter(s) IV Push every 15 minutes  dextrose 50% Injectable 25 milliLiter(s) IV Push every 15 minutes  DOBUTamine Infusion 5 MICROgram(s)/kG/Min (8.85 mL/Hr) IV Continuous <Continuous>  heparin  Infusion 400 Unit(s)/Hr (4 mL/Hr) IV Continuous <Continuous>  hydrocortisone sodium succinate Injectable 50 milliGRAM(s) IV Push every 8 hours  insulin lispro (HumaLOG) corrective regimen sliding scale   SubCutaneous every 6 hours  meropenem  IVPB 1000 milliGRAM(s) IV Intermittent every 8 hours  milrinone Infusion 0.25 MICROgram(s)/kG/Min (4.395 mL/Hr) IV Continuous <Continuous>  pantoprazole Infusion 8 mG/Hr (10 mL/Hr) IV Continuous <Continuous>  phenylephrine    Infusion 2 MICROgram(s)/kG/Min (21.975 mL/Hr) IV Continuous <Continuous>  PureFlow Dialysate RFP-400 (K 2 / Ca 3) 5000 milliLiter(s) (1500 mL/Hr) CRRT <Continuous>  sodium chloride 0.9%. 1000 milliLiter(s) (10 mL/Hr) IV Continuous <Continuous>  vancomycin  IVPB 750 milliGRAM(s) IV Intermittent every 24 hours  vasopressin Infusion 0.017 Unit(s)/Min (1 mL/Hr) IV Continuous <Continuous>    MEDICATIONS  (PRN):  dextrose 40% Gel 15 Gram(s) Oral once PRN Blood Glucose LESS THAN 70 milliGRAM(s)/deciliter  fentaNYL    Injectable 25 MICROGram(s) IV Push every 4 hours PRN Severe Pain (7 - 10)  glucagon  Injectable 1 milliGRAM(s) IntraMuscular once PRN Glucose LESS THAN 70 milligrams/deciliter  midazolam Injectable 2 milliGRAM(s) IV Push every 2 hours PRN agitation      Allergies    penicillin (Rash)    Intolerances        Vital Signs Last 24 Hrs  T(C): 35.3 (12 Jul 2018 05:00), Max: 36.2 (11 Jul 2018 15:00)  T(F): 95.5 (12 Jul 2018 05:00), Max: 97.2 (11 Jul 2018 15:00)  HR: 90 (12 Jul 2018 08:55) (90 - 90)  BP: --  BP(mean): --  RR: 23 (12 Jul 2018 08:55) (18 - 24)  SpO2: 100% (12 Jul 2018 08:55) (98% - 100%)    07-11 @ 07:01 - 07-12 @ 07:00  --------------------------------------------------------  IN: 4712.3 mL / OUT: 4474 mL / NET: 238.3 mL    07-12 @ 07:01 - 07-12 @ 09:27  --------------------------------------------------------  IN: 121.7 mL / OUT: 198 mL / NET: -76.3 mL      Mode: AC/ CMV (Assist Control/ Continuous Mandatory Ventilation)  RR (machine): 20  TV (machine): 450  FiO2: 80  PEEP: 8  ITime: 1.2  MAP: 13  PIP: 22      PHYSICAL EXAM:    General: Well developed; well nourished; in no acute distress  HEENT: MMM, conjunctiva and sclera clear  Gastrointestinal: Soft, non-tender non-distended; Normal bowel sounds; No rebound or guarding  Extremities: Normal range of motion, No clubbing, cyanosis or edema  Neurological: Alert and oriented x3  Skin: Warm and dry. No obvious rash      LABS:                        10.0   14.8  )-----------( 59       ( 12 Jul 2018 02:39 )             29.1     07-12    136  |  95<L>  |  23  ----------------------------<  196<H>  3.6   |  23  |  0.38<L>    Ca    9.5      12 Jul 2018 02:39  Phos  2.2     07-12  Mg     2.1     07-12    TPro  4.7<L>  /  Alb  3.6  /  TBili  54.4<H>  /  DBili  x   /  AST  204<H>  /  ALT  92<H>  /  AlkPhos  241<H>  07-12          RADIOLOGY & ADDITIONAL STUDIES: Hospital Course:  67 yr old male with a PMHx of congenital AS s/p repair at age 12 and CAD s/p stenting 8 yrs ago who was found to have 2V CAD, severe AS, MR and TR during pre-operative evaluation now s/p MV/TV repair and AVR w/ IABP and impella placement. Hospital course was complicated by cardiogenic shock requiring continued inotropic support, cardiac tamponade x 2, consumptive coaguloapthy, pre-renal azotemia with anasarca requiring CVVH, and now new onset melena and hematochezia x 3 days for which GI was consulted.     GI Hx:  Patient with persistent diarrhea after being initiated on tube feeds during this hospital course but were brown in color. Pt started having jennifer blood mixed with his stools on July 9 evening and has had continued jennifer blood mixed with stool since that time. Per primary team, had an episode of melena overnight (July 11th). No blood/coffee ground emesis noted in his NG tube. He has required multiple PRBC and platelet transfusions 2/2 his consumptive coagulopathy 2/2 shearing from his Impella (which was removed on July 9th). Hgb has been difficult to assess for acute changes 2/2 consistent transfusions throughout his hospital course. Patient had been on once daily protonix dosing for his whole hospital course which was increased to 40 mg BID when hematochezia started and then was changed to gtt this morning (July 12th). Patient's lactate was checked 2/2 persistent hematochezia with concern for mesenteric ischemia, which peaked at 7 on July 10 and has slowly downtrended since (4.5 on July 12). All hx obtained from primary team and chart as patient is intubated.     PMH / PSH:  See HPI.  Appendectomy > 50 year ago.     Home Rx.:  Lasix 40mg PO QD    FH:  N/A    SH:  Lives with family. Not working. Non-smoker. Occasional ETOH use. No IVRDU.    Allergies:  PCN (19 Jun 2018 00:06)      REVIEW OF SYSTEMS:  Constitutional: No fever, weight loss or fatigue  ENMT:  No difficulty hearing, tinnitus, vertigo; No sinus or throat pain  Respiratory: No cough, wheezing, chills or hemoptysis  Cardiovascular: No chest pain, palpitations, dizziness or leg swelling  Gastrointestinal: No abdominal or epigastric pain. No nausea, vomiting or hematemesis; No diarrhea or constipation. No melena or hematochezia.  Skin: No itching, burning, rashes or lesions   Musculoskeletal: No joint pain or swelling; No muscle, back or extremity pain    PAST MEDICAL & SURGICAL HISTORY:  No pertinent past medical history  No significant past surgical history      FAMILY HISTORY:      SOCIAL HISTORY:  Smoking Status: [ ] Current, [ ] Former, [ ] Never  Pack Years:    MEDICATIONS:  MEDICATIONS  (STANDING):  artificial  tears Solution 1 Drop(s) Both EYES two times a day  chlorhexidine 0.12% Liquid 15 milliLiter(s) Swish and Spit every 12 hours  cisatracurium Infusion 3 MICROgram(s)/kG/Min (10.548 mL/Hr) IV Continuous <Continuous>  dexmedetomidine Infusion 0.5 MICROgram(s)/kG/Hr (7.375 mL/Hr) IV Continuous <Continuous>  dextrose 5%. 1000 milliLiter(s) (25 mL/Hr) IV Continuous <Continuous>  dextrose 50% Injectable 50 milliLiter(s) IV Push every 15 minutes  dextrose 50% Injectable 25 milliLiter(s) IV Push every 15 minutes  DOBUTamine Infusion 5 MICROgram(s)/kG/Min (8.85 mL/Hr) IV Continuous <Continuous>  heparin  Infusion 400 Unit(s)/Hr (4 mL/Hr) IV Continuous <Continuous>  hydrocortisone sodium succinate Injectable 50 milliGRAM(s) IV Push every 8 hours  insulin lispro (HumaLOG) corrective regimen sliding scale   SubCutaneous every 6 hours  meropenem  IVPB 1000 milliGRAM(s) IV Intermittent every 8 hours  milrinone Infusion 0.25 MICROgram(s)/kG/Min (4.395 mL/Hr) IV Continuous <Continuous>  pantoprazole Infusion 8 mG/Hr (10 mL/Hr) IV Continuous <Continuous>  phenylephrine    Infusion 2 MICROgram(s)/kG/Min (21.975 mL/Hr) IV Continuous <Continuous>  PureFlow Dialysate RFP-400 (K 2 / Ca 3) 5000 milliLiter(s) (1500 mL/Hr) CRRT <Continuous>  sodium chloride 0.9%. 1000 milliLiter(s) (10 mL/Hr) IV Continuous <Continuous>  vancomycin  IVPB 750 milliGRAM(s) IV Intermittent every 24 hours  vasopressin Infusion 0.017 Unit(s)/Min (1 mL/Hr) IV Continuous <Continuous>    MEDICATIONS  (PRN):  dextrose 40% Gel 15 Gram(s) Oral once PRN Blood Glucose LESS THAN 70 milliGRAM(s)/deciliter  fentaNYL    Injectable 25 MICROGram(s) IV Push every 4 hours PRN Severe Pain (7 - 10)  glucagon  Injectable 1 milliGRAM(s) IntraMuscular once PRN Glucose LESS THAN 70 milligrams/deciliter  midazolam Injectable 2 milliGRAM(s) IV Push every 2 hours PRN agitation      Allergies    penicillin (Rash)    Intolerances        Vital Signs Last 24 Hrs  T(C): 35.3 (12 Jul 2018 05:00), Max: 36.2 (11 Jul 2018 15:00)  T(F): 95.5 (12 Jul 2018 05:00), Max: 97.2 (11 Jul 2018 15:00)  HR: 90 (12 Jul 2018 08:55) (90 - 90)  BP: --  BP(mean): --  RR: 23 (12 Jul 2018 08:55) (18 - 24)  SpO2: 100% (12 Jul 2018 08:55) (98% - 100%)    07-11 @ 07:01 - 07-12 @ 07:00  --------------------------------------------------------  IN: 4712.3 mL / OUT: 4474 mL / NET: 238.3 mL    07-12 @ 07:01 - 07-12 @ 09:27  --------------------------------------------------------  IN: 121.7 mL / OUT: 198 mL / NET: -76.3 mL      Mode: AC/ CMV (Assist Control/ Continuous Mandatory Ventilation)  RR (machine): 20  TV (machine): 450  FiO2: 80  PEEP: 8  ITime: 1.2  MAP: 13  PIP: 22      PHYSICAL EXAM:    General: extremely ill appearing male; intubated and sedated  HEENT: pupils sluggish  Gastrointestinal: Soft, non-tender non-distended; Normal bowel sounds; no appreciable organomegaly; two epigastric drains with blood in the catheters; dark stools noted on bed pad; dark stool on rectal exam.  Extremities: no peripheral edema   Neurological: intubated and sedated  Skin: jaundiced      LABS:                        10.0   14.8  )-----------( 59       ( 12 Jul 2018 02:39 )             29.1     07-12    136  |  95<L>  |  23  ----------------------------<  196<H>  3.6   |  23  |  0.38<L>    Ca    9.5      12 Jul 2018 02:39  Phos  2.2     07-12  Mg     2.1     07-12    TPro  4.7<L>  /  Alb  3.6  /  TBili  54.4<H>  /  DBili  x   /  AST  204<H>  /  ALT  92<H>  /  AlkPhos  241<H>  07-12          RADIOLOGY & ADDITIONAL STUDIES:

## 2018-07-12 NOTE — CONSULT NOTE ADULT - ATTENDING COMMENTS
Pt seen and examined at bedside. Pt with ongoing hematochezia will plan for urgent EGD/flex sig to evaluate etiology.

## 2018-07-13 LAB
ALBUMIN SERPL ELPH-MCNC: 3.4 G/DL — SIGNIFICANT CHANGE UP (ref 3.3–5)
ALBUMIN SERPL ELPH-MCNC: 3.5 G/DL — SIGNIFICANT CHANGE UP (ref 3.3–5)
ALBUMIN SERPL ELPH-MCNC: 3.7 G/DL — SIGNIFICANT CHANGE UP (ref 3.3–5)
ALBUMIN SERPL ELPH-MCNC: 3.8 G/DL — SIGNIFICANT CHANGE UP (ref 3.3–5)
ALP SERPL-CCNC: 226 U/L — HIGH (ref 40–120)
ALP SERPL-CCNC: 251 U/L — HIGH (ref 40–120)
ALP SERPL-CCNC: 257 U/L — HIGH (ref 40–120)
ALP SERPL-CCNC: 287 U/L — HIGH (ref 40–120)
ALT FLD-CCNC: 101 U/L — HIGH (ref 10–45)
ALT FLD-CCNC: 104 U/L — HIGH (ref 10–45)
ALT FLD-CCNC: 104 U/L — HIGH (ref 10–45)
ALT FLD-CCNC: 121 U/L — HIGH (ref 10–45)
ANION GAP SERPL CALC-SCNC: 17 MMOL/L — SIGNIFICANT CHANGE UP (ref 5–17)
ANION GAP SERPL CALC-SCNC: 17 MMOL/L — SIGNIFICANT CHANGE UP (ref 5–17)
ANION GAP SERPL CALC-SCNC: 20 MMOL/L — HIGH (ref 5–17)
ANION GAP SERPL CALC-SCNC: 20 MMOL/L — HIGH (ref 5–17)
APTT BLD: 39.6 SEC — HIGH (ref 27.5–37.4)
APTT BLD: 41.6 SEC — HIGH (ref 27.5–37.4)
APTT BLD: 41.7 SEC — HIGH (ref 27.5–37.4)
APTT BLD: 44.3 SEC — HIGH (ref 27.5–37.4)
AST SERPL-CCNC: 202 U/L — HIGH (ref 10–40)
AST SERPL-CCNC: 210 U/L — HIGH (ref 10–40)
AST SERPL-CCNC: 218 U/L — HIGH (ref 10–40)
AST SERPL-CCNC: 267 U/L — HIGH (ref 10–40)
BILIRUB DIRECT SERPL-MCNC: >10 MG/DL — HIGH (ref 0–0.2)
BILIRUB SERPL-MCNC: 48.6 MG/DL — HIGH (ref 0.2–1.2)
BILIRUB SERPL-MCNC: 49.8 MG/DL — HIGH (ref 0.2–1.2)
BILIRUB SERPL-MCNC: 52.3 MG/DL — HIGH (ref 0.2–1.2)
BILIRUB SERPL-MCNC: 58.7 MG/DL — HIGH (ref 0.2–1.2)
BLD GP AB SCN SERPL QL: NEGATIVE — SIGNIFICANT CHANGE UP
BUN SERPL-MCNC: 20 MG/DL — SIGNIFICANT CHANGE UP (ref 7–23)
BUN SERPL-MCNC: 21 MG/DL — SIGNIFICANT CHANGE UP (ref 7–23)
CALCIUM SERPL-MCNC: 10.3 MG/DL — SIGNIFICANT CHANGE UP (ref 8.4–10.5)
CALCIUM SERPL-MCNC: 9.7 MG/DL — SIGNIFICANT CHANGE UP (ref 8.4–10.5)
CALCIUM SERPL-MCNC: 9.9 MG/DL — SIGNIFICANT CHANGE UP (ref 8.4–10.5)
CALCIUM SERPL-MCNC: 9.9 MG/DL — SIGNIFICANT CHANGE UP (ref 8.4–10.5)
CHLORIDE SERPL-SCNC: 100 MMOL/L — SIGNIFICANT CHANGE UP (ref 96–108)
CHLORIDE SERPL-SCNC: 95 MMOL/L — LOW (ref 96–108)
CHLORIDE SERPL-SCNC: 98 MMOL/L — SIGNIFICANT CHANGE UP (ref 96–108)
CHLORIDE SERPL-SCNC: 99 MMOL/L — SIGNIFICANT CHANGE UP (ref 96–108)
CO2 SERPL-SCNC: 21 MMOL/L — LOW (ref 22–31)
CO2 SERPL-SCNC: 22 MMOL/L — SIGNIFICANT CHANGE UP (ref 22–31)
CO2 SERPL-SCNC: 22 MMOL/L — SIGNIFICANT CHANGE UP (ref 22–31)
CO2 SERPL-SCNC: 23 MMOL/L — SIGNIFICANT CHANGE UP (ref 22–31)
CREAT SERPL-MCNC: 0.31 MG/DL — LOW (ref 0.5–1.3)
CREAT SERPL-MCNC: 0.32 MG/DL — LOW (ref 0.5–1.3)
CREAT SERPL-MCNC: 0.35 MG/DL — LOW (ref 0.5–1.3)
CREAT SERPL-MCNC: 0.35 MG/DL — LOW (ref 0.5–1.3)
D DIMER BLD IA.RAPID-MCNC: 5715 NG/ML DDU — HIGH
FIBRINOGEN PPP-MCNC: 460 MG/DL — HIGH (ref 258–438)
GAS PNL BLDA: SIGNIFICANT CHANGE UP
GLUCOSE BLDC GLUCOMTR-MCNC: 173 MG/DL — HIGH (ref 70–99)
GLUCOSE BLDC GLUCOMTR-MCNC: 185 MG/DL — HIGH (ref 70–99)
GLUCOSE BLDC GLUCOMTR-MCNC: 198 MG/DL — HIGH (ref 70–99)
GLUCOSE SERPL-MCNC: 139 MG/DL — HIGH (ref 70–99)
GLUCOSE SERPL-MCNC: 183 MG/DL — HIGH (ref 70–99)
GLUCOSE SERPL-MCNC: 204 MG/DL — HIGH (ref 70–99)
GLUCOSE SERPL-MCNC: 227 MG/DL — HIGH (ref 70–99)
HAPTOGLOB SERPL-MCNC: <10 MG/DL — LOW (ref 34–200)
HAV IGM SER-ACNC: SIGNIFICANT CHANGE UP
HBV CORE IGM SER-ACNC: SIGNIFICANT CHANGE UP
HBV SURFACE AG SER-ACNC: SIGNIFICANT CHANGE UP
HCT VFR BLD CALC: 23.7 % — LOW (ref 39–50)
HCT VFR BLD CALC: 25.6 % — LOW (ref 39–50)
HCT VFR BLD CALC: 29.7 % — LOW (ref 39–50)
HCT VFR BLD CALC: 32.3 % — LOW (ref 39–50)
HCV AB S/CO SERPL IA: 0.07 S/CO — SIGNIFICANT CHANGE UP
HCV AB SERPL-IMP: SIGNIFICANT CHANGE UP
HGB BLD-MCNC: 10.4 G/DL — LOW (ref 13–17)
HGB BLD-MCNC: 11 G/DL — LOW (ref 13–17)
HGB BLD-MCNC: 8.3 G/DL — LOW (ref 13–17)
HGB BLD-MCNC: 9 G/DL — LOW (ref 13–17)
INR BLD: 1.06 — SIGNIFICANT CHANGE UP (ref 0.88–1.16)
INR BLD: 1.14 — SIGNIFICANT CHANGE UP (ref 0.88–1.16)
INR BLD: 1.19 — HIGH (ref 0.88–1.16)
INR BLD: 1.19 — HIGH (ref 0.88–1.16)
LACTATE SERPL-SCNC: 3.1 MMOL/L — HIGH (ref 0.5–2)
LACTATE SERPL-SCNC: 4.1 MMOL/L — CRITICAL HIGH (ref 0.5–2)
LACTATE SERPL-SCNC: 4.5 MMOL/L — CRITICAL HIGH (ref 0.5–2)
LACTATE SERPL-SCNC: 4.6 MMOL/L — CRITICAL HIGH (ref 0.5–2)
LDH SERPL L TO P-CCNC: 1338 U/L — HIGH (ref 50–242)
MAGNESIUM SERPL-MCNC: 1.9 MG/DL — SIGNIFICANT CHANGE UP (ref 1.6–2.6)
MAGNESIUM SERPL-MCNC: 2 MG/DL — SIGNIFICANT CHANGE UP (ref 1.6–2.6)
MAGNESIUM SERPL-MCNC: 2 MG/DL — SIGNIFICANT CHANGE UP (ref 1.6–2.6)
MAGNESIUM SERPL-MCNC: 2.3 MG/DL — SIGNIFICANT CHANGE UP (ref 1.6–2.6)
MCHC RBC-ENTMCNC: 29.1 PG — SIGNIFICANT CHANGE UP (ref 27–34)
MCHC RBC-ENTMCNC: 29.4 PG — SIGNIFICANT CHANGE UP (ref 27–34)
MCHC RBC-ENTMCNC: 29.9 PG — SIGNIFICANT CHANGE UP (ref 27–34)
MCHC RBC-ENTMCNC: 30.2 PG — SIGNIFICANT CHANGE UP (ref 27–34)
MCHC RBC-ENTMCNC: 34.1 G/DL — SIGNIFICANT CHANGE UP (ref 32–36)
MCHC RBC-ENTMCNC: 35 G/DL — SIGNIFICANT CHANGE UP (ref 32–36)
MCHC RBC-ENTMCNC: 35 G/DL — SIGNIFICANT CHANGE UP (ref 32–36)
MCHC RBC-ENTMCNC: 35.2 G/DL — SIGNIFICANT CHANGE UP (ref 32–36)
MCV RBC AUTO: 83.9 FL — SIGNIFICANT CHANGE UP (ref 80–100)
MCV RBC AUTO: 85 FL — SIGNIFICANT CHANGE UP (ref 80–100)
MCV RBC AUTO: 85.4 FL — SIGNIFICANT CHANGE UP (ref 80–100)
MCV RBC AUTO: 86.2 FL — SIGNIFICANT CHANGE UP (ref 80–100)
PHOSPHATE SERPL-MCNC: 1.7 MG/DL — LOW (ref 2.5–4.5)
PHOSPHATE SERPL-MCNC: 1.8 MG/DL — LOW (ref 2.5–4.5)
PHOSPHATE SERPL-MCNC: 1.8 MG/DL — LOW (ref 2.5–4.5)
PHOSPHATE SERPL-MCNC: 1.9 MG/DL — LOW (ref 2.5–4.5)
PLATELET # BLD AUTO: 47 K/UL — LOW (ref 150–400)
PLATELET # BLD AUTO: 58 K/UL — LOW (ref 150–400)
PLATELET # BLD AUTO: 82 K/UL — LOW (ref 150–400)
PLATELET # BLD AUTO: 95 K/UL — LOW (ref 150–400)
POTASSIUM SERPL-MCNC: 3.5 MMOL/L — SIGNIFICANT CHANGE UP (ref 3.5–5.3)
POTASSIUM SERPL-MCNC: 3.7 MMOL/L — SIGNIFICANT CHANGE UP (ref 3.5–5.3)
POTASSIUM SERPL-MCNC: 3.8 MMOL/L — SIGNIFICANT CHANGE UP (ref 3.5–5.3)
POTASSIUM SERPL-MCNC: 3.8 MMOL/L — SIGNIFICANT CHANGE UP (ref 3.5–5.3)
POTASSIUM SERPL-SCNC: 3.5 MMOL/L — SIGNIFICANT CHANGE UP (ref 3.5–5.3)
POTASSIUM SERPL-SCNC: 3.7 MMOL/L — SIGNIFICANT CHANGE UP (ref 3.5–5.3)
POTASSIUM SERPL-SCNC: 3.8 MMOL/L — SIGNIFICANT CHANGE UP (ref 3.5–5.3)
POTASSIUM SERPL-SCNC: 3.8 MMOL/L — SIGNIFICANT CHANGE UP (ref 3.5–5.3)
PROT SERPL-MCNC: 4.2 G/DL — LOW (ref 6–8.3)
PROT SERPL-MCNC: 4.4 G/DL — LOW (ref 6–8.3)
PROT SERPL-MCNC: 4.4 G/DL — LOW (ref 6–8.3)
PROT SERPL-MCNC: 4.9 G/DL — LOW (ref 6–8.3)
PROTHROM AB SERPL-ACNC: 11.8 SEC — SIGNIFICANT CHANGE UP (ref 9.8–12.7)
PROTHROM AB SERPL-ACNC: 12.7 SEC — SIGNIFICANT CHANGE UP (ref 9.8–12.7)
PROTHROM AB SERPL-ACNC: 13.2 SEC — HIGH (ref 9.8–12.7)
PROTHROM AB SERPL-ACNC: 13.3 SEC — HIGH (ref 9.8–12.7)
RBC # BLD: 2.75 M/UL — LOW (ref 4.2–5.8)
RBC # BLD: 3.01 M/UL — LOW (ref 4.2–5.8)
RBC # BLD: 3.54 M/UL — LOW (ref 4.2–5.8)
RBC # BLD: 3.78 M/UL — LOW (ref 4.2–5.8)
RBC # FLD: 17.6 % — HIGH (ref 10.3–16.9)
RBC # FLD: 17.8 % — HIGH (ref 10.3–16.9)
RBC # FLD: 18.3 % — HIGH (ref 10.3–16.9)
RBC # FLD: 18.7 % — HIGH (ref 10.3–16.9)
RH IG SCN BLD-IMP: POSITIVE — SIGNIFICANT CHANGE UP
SODIUM SERPL-SCNC: 137 MMOL/L — SIGNIFICANT CHANGE UP (ref 135–145)
SODIUM SERPL-SCNC: 137 MMOL/L — SIGNIFICANT CHANGE UP (ref 135–145)
SODIUM SERPL-SCNC: 139 MMOL/L — SIGNIFICANT CHANGE UP (ref 135–145)
SODIUM SERPL-SCNC: 141 MMOL/L — SIGNIFICANT CHANGE UP (ref 135–145)
T4 AB SER-ACNC: 4.23 UG/DL — SIGNIFICANT CHANGE UP (ref 3.17–11.72)
TSH SERPL-MCNC: 0.11 UIU/ML — LOW (ref 0.35–4.94)
VANCOMYCIN TROUGH SERPL-MCNC: 13.6 UG/ML — SIGNIFICANT CHANGE UP (ref 10–20)
WBC # BLD: 12.7 K/UL — HIGH (ref 3.8–10.5)
WBC # BLD: 15.7 K/UL — HIGH (ref 3.8–10.5)
WBC # BLD: 17.7 K/UL — HIGH (ref 3.8–10.5)
WBC # BLD: 19.3 K/UL — HIGH (ref 3.8–10.5)
WBC # FLD AUTO: 12.7 K/UL — HIGH (ref 3.8–10.5)
WBC # FLD AUTO: 15.7 K/UL — HIGH (ref 3.8–10.5)
WBC # FLD AUTO: 17.7 K/UL — HIGH (ref 3.8–10.5)
WBC # FLD AUTO: 19.3 K/UL — HIGH (ref 3.8–10.5)

## 2018-07-13 PROCEDURE — 95951: CPT | Mod: 26

## 2018-07-13 PROCEDURE — 97605 NEG PRS WND THER DME<=50SQCM: CPT

## 2018-07-13 PROCEDURE — 99233 SBSQ HOSP IP/OBS HIGH 50: CPT

## 2018-07-13 PROCEDURE — 99292 CRITICAL CARE ADDL 30 MIN: CPT | Mod: 25

## 2018-07-13 PROCEDURE — 36556 INSERT NON-TUNNEL CV CATH: CPT

## 2018-07-13 PROCEDURE — 71045 X-RAY EXAM CHEST 1 VIEW: CPT | Mod: 26

## 2018-07-13 PROCEDURE — 76937 US GUIDE VASCULAR ACCESS: CPT | Mod: 26

## 2018-07-13 PROCEDURE — 99291 CRITICAL CARE FIRST HOUR: CPT | Mod: 25

## 2018-07-13 PROCEDURE — 90945 DIALYSIS ONE EVALUATION: CPT | Mod: GC

## 2018-07-13 RX ORDER — HYDROCORTISONE 20 MG
50 TABLET ORAL EVERY 12 HOURS
Qty: 0 | Refills: 0 | Status: DISCONTINUED | OUTPATIENT
Start: 2018-07-13 | End: 2018-07-14

## 2018-07-13 RX ORDER — POTASSIUM CHLORIDE 20 MEQ
20 PACKET (EA) ORAL ONCE
Qty: 0 | Refills: 0 | Status: COMPLETED | OUTPATIENT
Start: 2018-07-13 | End: 2018-07-13

## 2018-07-13 RX ORDER — POTASSIUM CHLORIDE 20 MEQ
20 PACKET (EA) ORAL
Qty: 0 | Refills: 0 | Status: COMPLETED | OUTPATIENT
Start: 2018-07-13 | End: 2018-07-13

## 2018-07-13 RX ORDER — DESMOPRESSIN ACETATE 0.1 MG/1
20 TABLET ORAL ONCE
Qty: 0 | Refills: 0 | Status: COMPLETED | OUTPATIENT
Start: 2018-07-13 | End: 2018-07-13

## 2018-07-13 RX ORDER — LACTULOSE 10 G/15ML
10 SOLUTION ORAL ONCE
Qty: 0 | Refills: 0 | Status: COMPLETED | OUTPATIENT
Start: 2018-07-13 | End: 2018-07-13

## 2018-07-13 RX ORDER — DESMOPRESSIN ACETATE 0.1 MG/1
20 TABLET ORAL ONCE
Qty: 0 | Refills: 0 | Status: DISCONTINUED | OUTPATIENT
Start: 2018-07-13 | End: 2018-07-13

## 2018-07-13 RX ORDER — POTASSIUM PHOSPHATE, MONOBASIC POTASSIUM PHOSPHATE, DIBASIC 236; 224 MG/ML; MG/ML
15 INJECTION, SOLUTION INTRAVENOUS ONCE
Qty: 0 | Refills: 0 | Status: COMPLETED | OUTPATIENT
Start: 2018-07-13 | End: 2018-07-13

## 2018-07-13 RX ORDER — ALBUMIN HUMAN 25 %
50 VIAL (ML) INTRAVENOUS EVERY 8 HOURS
Qty: 0 | Refills: 0 | Status: COMPLETED | OUTPATIENT
Start: 2018-07-13 | End: 2018-07-16

## 2018-07-13 RX ORDER — ALBUMIN HUMAN 25 %
50 VIAL (ML) INTRAVENOUS ONCE
Qty: 0 | Refills: 0 | Status: COMPLETED | OUTPATIENT
Start: 2018-07-13 | End: 2018-07-13

## 2018-07-13 RX ORDER — URSODIOL 250 MG/1
300 TABLET, FILM COATED ORAL EVERY 12 HOURS
Qty: 0 | Refills: 0 | Status: DISCONTINUED | OUTPATIENT
Start: 2018-07-13 | End: 2018-07-17

## 2018-07-13 RX ORDER — MODAFINIL 200 MG/1
100 TABLET ORAL DAILY
Qty: 0 | Refills: 0 | Status: DISCONTINUED | OUTPATIENT
Start: 2018-07-13 | End: 2018-07-15

## 2018-07-13 RX ORDER — SODIUM BICARBONATE 1 MEQ/ML
50 SYRINGE (ML) INTRAVENOUS ONCE
Qty: 0 | Refills: 0 | Status: COMPLETED | OUTPATIENT
Start: 2018-07-13 | End: 2018-07-13

## 2018-07-13 RX ORDER — CHLORHEXIDINE GLUCONATE 213 G/1000ML
1 SOLUTION TOPICAL DAILY
Qty: 0 | Refills: 0 | Status: DISCONTINUED | OUTPATIENT
Start: 2018-07-13 | End: 2018-07-20

## 2018-07-13 RX ORDER — PROPOFOL 10 MG/ML
30 INJECTION, EMULSION INTRAVENOUS ONCE
Qty: 0 | Refills: 0 | Status: COMPLETED | OUTPATIENT
Start: 2018-07-13 | End: 2018-07-13

## 2018-07-13 RX ORDER — CISATRACURIUM BESYLATE 2 MG/ML
10 INJECTION INTRAVENOUS ONCE
Qty: 0 | Refills: 0 | Status: COMPLETED | OUTPATIENT
Start: 2018-07-13 | End: 2018-07-13

## 2018-07-13 RX ORDER — MESALAMINE 400 MG
1000 TABLET, DELAYED RELEASE (ENTERIC COATED) ORAL AT BEDTIME
Qty: 0 | Refills: 0 | Status: DISCONTINUED | OUTPATIENT
Start: 2018-07-13 | End: 2018-07-20

## 2018-07-13 RX ORDER — MESALAMINE 400 MG
4 TABLET, DELAYED RELEASE (ENTERIC COATED) ORAL ONCE
Qty: 0 | Refills: 0 | Status: COMPLETED | OUTPATIENT
Start: 2018-07-13 | End: 2018-07-13

## 2018-07-13 RX ADMIN — Medication 100 MILLIEQUIVALENT(S): at 17:42

## 2018-07-13 RX ADMIN — Medication 1000 MILLIGRAM(S): at 23:03

## 2018-07-13 RX ADMIN — PHENYLEPHRINE HYDROCHLORIDE 21.98 MICROGRAM(S)/KG/MIN: 10 INJECTION INTRAVENOUS at 23:03

## 2018-07-13 RX ADMIN — CHLORHEXIDINE GLUCONATE 1 APPLICATION(S): 213 SOLUTION TOPICAL at 13:23

## 2018-07-13 RX ADMIN — MEROPENEM 100 MILLIGRAM(S): 1 INJECTION INTRAVENOUS at 15:00

## 2018-07-13 RX ADMIN — Medication 100 MILLIEQUIVALENT(S): at 13:26

## 2018-07-13 RX ADMIN — MEROPENEM 100 MILLIGRAM(S): 1 INJECTION INTRAVENOUS at 23:03

## 2018-07-13 RX ADMIN — DESMOPRESSIN ACETATE 220 MICROGRAM(S): 0.1 TABLET ORAL at 14:30

## 2018-07-13 RX ADMIN — FENTANYL CITRATE 25 MICROGRAM(S): 50 INJECTION INTRAVENOUS at 00:31

## 2018-07-13 RX ADMIN — MEROPENEM 100 MILLIGRAM(S): 1 INJECTION INTRAVENOUS at 05:49

## 2018-07-13 RX ADMIN — URSODIOL 300 MILLIGRAM(S): 250 TABLET, FILM COATED ORAL at 18:32

## 2018-07-13 RX ADMIN — CHLORHEXIDINE GLUCONATE 15 MILLILITER(S): 213 SOLUTION TOPICAL at 05:48

## 2018-07-13 RX ADMIN — Medication 50 MILLIEQUIVALENT(S): at 06:32

## 2018-07-13 RX ADMIN — Medication 1 DROP(S): at 18:10

## 2018-07-13 RX ADMIN — Medication 50 MILLILITER(S): at 06:33

## 2018-07-13 RX ADMIN — Medication 2: at 19:03

## 2018-07-13 RX ADMIN — Medication 100 MILLIEQUIVALENT(S): at 14:00

## 2018-07-13 RX ADMIN — LACTULOSE 10 GRAM(S): 10 SOLUTION ORAL at 13:24

## 2018-07-13 RX ADMIN — Medication 250 MILLIGRAM(S): at 14:45

## 2018-07-13 RX ADMIN — Medication 50 MILLILITER(S): at 15:46

## 2018-07-13 RX ADMIN — Medication 4 GRAM(S): at 05:27

## 2018-07-13 RX ADMIN — Medication 50 MILLILITER(S): at 23:03

## 2018-07-13 RX ADMIN — PROPOFOL 30 MILLIGRAM(S): 10 INJECTION, EMULSION INTRAVENOUS at 04:00

## 2018-07-13 RX ADMIN — Medication 1 DROP(S): at 05:52

## 2018-07-13 RX ADMIN — CISATRACURIUM BESYLATE 10 MILLIGRAM(S): 2 INJECTION INTRAVENOUS at 06:32

## 2018-07-13 RX ADMIN — MODAFINIL 100 MILLIGRAM(S): 200 TABLET ORAL at 13:24

## 2018-07-13 RX ADMIN — Medication 50 MILLIGRAM(S): at 05:48

## 2018-07-13 RX ADMIN — POTASSIUM PHOSPHATE, MONOBASIC POTASSIUM PHOSPHATE, DIBASIC 62.5 MILLIMOLE(S): 236; 224 INJECTION, SOLUTION INTRAVENOUS at 23:51

## 2018-07-13 RX ADMIN — FENTANYL CITRATE 25 MICROGRAM(S): 50 INJECTION INTRAVENOUS at 00:51

## 2018-07-13 RX ADMIN — Medication 2: at 13:24

## 2018-07-13 RX ADMIN — CHLORHEXIDINE GLUCONATE 15 MILLILITER(S): 213 SOLUTION TOPICAL at 18:10

## 2018-07-13 RX ADMIN — Medication 2: at 06:33

## 2018-07-13 RX ADMIN — Medication 50 MILLIGRAM(S): at 18:10

## 2018-07-13 NOTE — PROGRESS NOTE ADULT - SUBJECTIVE AND OBJECTIVE BOX
INTERVAL HPI/OVERNIGHT EVENTS:    MEDICATIONS  (STANDING):  artificial  tears Solution 1 Drop(s) Both EYES two times a day  chlorhexidine 0.12% Liquid 15 milliLiter(s) Swish and Spit every 12 hours  chlorhexidine 2% Cloths 1 Application(s) Topical daily  cisatracurium Infusion 3 MICROgram(s)/kG/Min (10.548 mL/Hr) IV Continuous <Continuous>  dexmedetomidine Infusion 0.5 MICROgram(s)/kG/Hr (7.375 mL/Hr) IV Continuous <Continuous>  dextrose 5%. 1000 milliLiter(s) (25 mL/Hr) IV Continuous <Continuous>  dextrose 50% Injectable 50 milliLiter(s) IV Push every 15 minutes  dextrose 50% Injectable 25 milliLiter(s) IV Push every 15 minutes  DOBUTamine Infusion 5 MICROgram(s)/kG/Min (8.85 mL/Hr) IV Continuous <Continuous>  heparin  Infusion 400 Unit(s)/Hr (4 mL/Hr) IV Continuous <Continuous>  hydrocortisone sodium succinate Injectable 50 milliGRAM(s) IV Push every 8 hours  insulin lispro (HumaLOG) corrective regimen sliding scale   SubCutaneous every 6 hours  meropenem  IVPB 1000 milliGRAM(s) IV Intermittent every 8 hours  milrinone Infusion 0.25 MICROgram(s)/kG/Min (4.395 mL/Hr) IV Continuous <Continuous>  pantoprazole Infusion 8 mG/Hr (10 mL/Hr) IV Continuous <Continuous>  phenylephrine    Infusion 2 MICROgram(s)/kG/Min (21.975 mL/Hr) IV Continuous <Continuous>  PureFlow Dialysate RFP-400 (K 2 / Ca 3) 5000 milliLiter(s) (1500 mL/Hr) CRRT <Continuous>  sodium chloride 0.9%. 1000 milliLiter(s) (10 mL/Hr) IV Continuous <Continuous>  vancomycin  IVPB 750 milliGRAM(s) IV Intermittent every 24 hours  vasopressin Infusion 0.017 Unit(s)/Min (1 mL/Hr) IV Continuous <Continuous>    MEDICATIONS  (PRN):  dextrose 40% Gel 15 Gram(s) Oral once PRN Blood Glucose LESS THAN 70 milliGRAM(s)/deciliter  glucagon  Injectable 1 milliGRAM(s) IntraMuscular once PRN Glucose LESS THAN 70 milligrams/deciliter  midazolam Injectable 2 milliGRAM(s) IV Push every 2 hours PRN agitation      Allergies    penicillin (Rash)    Intolerances        REVIEW OF SYSTEMS      General:	    Skin/Breast:  	  Ophthalmologic:  	  ENMT:	    Respiratory and Thorax:  	  Cardiovascular:	    Gastrointestinal:	    Genitourinary:	    Musculoskeletal:	    Neurological:	    Psychiatric:	    Hematology/Lymphatics:	    Endocrine:	    Allergic/Immunologic:	    Vital Signs Last 24 Hrs  T(C): 35.3 (13 Jul 2018 05:00), Max: 35.7 (12 Jul 2018 10:29)  T(F): 95.5 (13 Jul 2018 05:00), Max: 96.2 (12 Jul 2018 10:29)  HR: 90 (13 Jul 2018 10:00) (89 - 93)  BP: --  BP(mean): --  RR: 23 (13 Jul 2018 10:00) (20 - 26)  SpO2: 97% (13 Jul 2018 10:00) (97% - 100%)    .  VITAL SIGNS:  T(F): 95.5 (07-13-18 @ 05:00), Max: 96.2 (07-12-18 @ 10:29)  HR: 90 (07-13-18 @ 10:00) (89 - 93)  BP: --  BP(mean): --  RR: 23 (07-13-18 @ 10:00) (20 - 26)  SpO2: 97% (07-13-18 @ 10:00) (97% - 100%)    PHYSICAL EXAM:  General: extremely ill appearing male; intubated and sedated  HEENT: pupils sluggish  Gastrointestinal: Soft, non-tender non-distended; Normal bowel sounds; no appreciable organomegaly; two epigastric drains with blood in the catheters; dark stools noted on bed pad; dark stool on rectal exam.  Extremities: no peripheral edema   Neurological: intubated and sedated  Skin: jaundiced    LABS:                        11.0   15.7  )-----------( 58       ( 13 Jul 2018 02:53 )             32.3     07-13    137  |  95<L>  |  20  ----------------------------<  139<H>  3.8   |  22  |  0.35<L>    Ca    10.3      13 Jul 2018 02:53  Phos  1.9     07-13  Mg     2.3     07-13    TPro  4.9<L>  /  Alb  3.7  /  TBili  58.7<H>  /  DBili  x   /  AST  267<H>  /  ALT  121<H>  /  AlkPhos  287<H>  07-13    PT/INR - ( 13 Jul 2018 02:53 )   PT: 11.8 sec;   INR: 1.06          PTT - ( 13 Jul 2018 02:53 )  PTT:41.6 sec      RADIOLOGY & ADDITIONAL TESTS: INTERVAL HPI/OVERNIGHT EVENTS:  S/P central line exchange overnight. Had flex sig and EGD last night which showed ?deulafoy lesion on EGD and flex sig showed severe proctitis. S/P canassa enema o/n.     Subjective: pt still intubated; off sedation for > 24 hours; still with BRBPR    MEDICATIONS  (STANDING):  artificial  tears Solution 1 Drop(s) Both EYES two times a day  chlorhexidine 0.12% Liquid 15 milliLiter(s) Swish and Spit every 12 hours  chlorhexidine 2% Cloths 1 Application(s) Topical daily  cisatracurium Infusion 3 MICROgram(s)/kG/Min (10.548 mL/Hr) IV Continuous <Continuous>  dexmedetomidine Infusion 0.5 MICROgram(s)/kG/Hr (7.375 mL/Hr) IV Continuous <Continuous>  dextrose 5%. 1000 milliLiter(s) (25 mL/Hr) IV Continuous <Continuous>  dextrose 50% Injectable 50 milliLiter(s) IV Push every 15 minutes  dextrose 50% Injectable 25 milliLiter(s) IV Push every 15 minutes  DOBUTamine Infusion 5 MICROgram(s)/kG/Min (8.85 mL/Hr) IV Continuous <Continuous>  heparin  Infusion 400 Unit(s)/Hr (4 mL/Hr) IV Continuous <Continuous>  hydrocortisone sodium succinate Injectable 50 milliGRAM(s) IV Push every 8 hours  insulin lispro (HumaLOG) corrective regimen sliding scale   SubCutaneous every 6 hours  meropenem  IVPB 1000 milliGRAM(s) IV Intermittent every 8 hours  milrinone Infusion 0.25 MICROgram(s)/kG/Min (4.395 mL/Hr) IV Continuous <Continuous>  pantoprazole Infusion 8 mG/Hr (10 mL/Hr) IV Continuous <Continuous>  phenylephrine    Infusion 2 MICROgram(s)/kG/Min (21.975 mL/Hr) IV Continuous <Continuous>  PureFlow Dialysate RFP-400 (K 2 / Ca 3) 5000 milliLiter(s) (1500 mL/Hr) CRRT <Continuous>  sodium chloride 0.9%. 1000 milliLiter(s) (10 mL/Hr) IV Continuous <Continuous>  vancomycin  IVPB 750 milliGRAM(s) IV Intermittent every 24 hours  vasopressin Infusion 0.017 Unit(s)/Min (1 mL/Hr) IV Continuous <Continuous>    MEDICATIONS  (PRN):  dextrose 40% Gel 15 Gram(s) Oral once PRN Blood Glucose LESS THAN 70 milliGRAM(s)/deciliter  glucagon  Injectable 1 milliGRAM(s) IntraMuscular once PRN Glucose LESS THAN 70 milligrams/deciliter  midazolam Injectable 2 milliGRAM(s) IV Push every 2 hours PRN agitation      Allergies    penicillin (Rash)    Intolerances    Allergic/Immunologic:	    Vital Signs Last 24 Hrs  T(C): 35.3 (13 Jul 2018 05:00), Max: 35.7 (12 Jul 2018 10:29)  T(F): 95.5 (13 Jul 2018 05:00), Max: 96.2 (12 Jul 2018 10:29)  HR: 90 (13 Jul 2018 10:00) (89 - 93)  BP: --  BP(mean): --  RR: 23 (13 Jul 2018 10:00) (20 - 26)  SpO2: 97% (13 Jul 2018 10:00) (97% - 100%)    .  VITAL SIGNS:  T(F): 95.5 (07-13-18 @ 05:00), Max: 96.2 (07-12-18 @ 10:29)  HR: 90 (07-13-18 @ 10:00) (89 - 93)  BP: --  BP(mean): --  RR: 23 (07-13-18 @ 10:00) (20 - 26)  SpO2: 97% (07-13-18 @ 10:00) (97% - 100%)    PHYSICAL EXAM:  General: extremely ill appearing male; intubated and sedated  HEENT: pupils sluggish  Gastrointestinal: Soft, non-tender non-distended; Normal bowel sounds; no appreciable organomegaly; two epigastric drains with blood in the catheters; dark stools noted on bed pad; dark stool on rectal exam.  Extremities: no peripheral edema   Neurological: intubated and sedated  Skin: jaundiced    LABS:                        11.0   15.7  )-----------( 58       ( 13 Jul 2018 02:53 )             32.3     07-13    137  |  95<L>  |  20  ----------------------------<  139<H>  3.8   |  22  |  0.35<L>    Ca    10.3      13 Jul 2018 02:53  Phos  1.9     07-13  Mg     2.3     07-13    TPro  4.9<L>  /  Alb  3.7  /  TBili  58.7<H>  /  DBili  x   /  AST  267<H>  /  ALT  121<H>  /  AlkPhos  287<H>  07-13    PT/INR - ( 13 Jul 2018 02:53 )   PT: 11.8 sec;   INR: 1.06          PTT - ( 13 Jul 2018 02:53 )  PTT:41.6 sec      RADIOLOGY & ADDITIONAL TESTS: INTERVAL HPI/OVERNIGHT EVENTS:  S/P central line exchange overnight. Had flex sig and EGD last night which showed ?deulafoy lesion on EGD and flex sig showed severe proctitis. S/P canassa enema o/n.     Subjective: pt still intubated; off sedation for > 24 hours; still with BRBPR    ROS: unable to assess    MEDICATIONS  (STANDING):  artificial  tears Solution 1 Drop(s) Both EYES two times a day  chlorhexidine 0.12% Liquid 15 milliLiter(s) Swish and Spit every 12 hours  chlorhexidine 2% Cloths 1 Application(s) Topical daily  cisatracurium Infusion 3 MICROgram(s)/kG/Min (10.548 mL/Hr) IV Continuous <Continuous>  dexmedetomidine Infusion 0.5 MICROgram(s)/kG/Hr (7.375 mL/Hr) IV Continuous <Continuous>  dextrose 5%. 1000 milliLiter(s) (25 mL/Hr) IV Continuous <Continuous>  dextrose 50% Injectable 50 milliLiter(s) IV Push every 15 minutes  dextrose 50% Injectable 25 milliLiter(s) IV Push every 15 minutes  DOBUTamine Infusion 5 MICROgram(s)/kG/Min (8.85 mL/Hr) IV Continuous <Continuous>  heparin  Infusion 400 Unit(s)/Hr (4 mL/Hr) IV Continuous <Continuous>  hydrocortisone sodium succinate Injectable 50 milliGRAM(s) IV Push every 8 hours  insulin lispro (HumaLOG) corrective regimen sliding scale   SubCutaneous every 6 hours  meropenem  IVPB 1000 milliGRAM(s) IV Intermittent every 8 hours  milrinone Infusion 0.25 MICROgram(s)/kG/Min (4.395 mL/Hr) IV Continuous <Continuous>  pantoprazole Infusion 8 mG/Hr (10 mL/Hr) IV Continuous <Continuous>  phenylephrine    Infusion 2 MICROgram(s)/kG/Min (21.975 mL/Hr) IV Continuous <Continuous>  PureFlow Dialysate RFP-400 (K 2 / Ca 3) 5000 milliLiter(s) (1500 mL/Hr) CRRT <Continuous>  sodium chloride 0.9%. 1000 milliLiter(s) (10 mL/Hr) IV Continuous <Continuous>  vancomycin  IVPB 750 milliGRAM(s) IV Intermittent every 24 hours  vasopressin Infusion 0.017 Unit(s)/Min (1 mL/Hr) IV Continuous <Continuous>    MEDICATIONS  (PRN):  dextrose 40% Gel 15 Gram(s) Oral once PRN Blood Glucose LESS THAN 70 milliGRAM(s)/deciliter  glucagon  Injectable 1 milliGRAM(s) IntraMuscular once PRN Glucose LESS THAN 70 milligrams/deciliter  midazolam Injectable 2 milliGRAM(s) IV Push every 2 hours PRN agitation      Allergies    penicillin (Rash)    Intolerances    Allergic/Immunologic:	    Vital Signs Last 24 Hrs  T(C): 35.3 (13 Jul 2018 05:00), Max: 35.7 (12 Jul 2018 10:29)  T(F): 95.5 (13 Jul 2018 05:00), Max: 96.2 (12 Jul 2018 10:29)  HR: 90 (13 Jul 2018 10:00) (89 - 93)  BP: --  BP(mean): --  RR: 23 (13 Jul 2018 10:00) (20 - 26)  SpO2: 97% (13 Jul 2018 10:00) (97% - 100%)    .  VITAL SIGNS:  T(F): 95.5 (07-13-18 @ 05:00), Max: 96.2 (07-12-18 @ 10:29)  HR: 90 (07-13-18 @ 10:00) (89 - 93)  BP: --  BP(mean): --  RR: 23 (07-13-18 @ 10:00) (20 - 26)  SpO2: 97% (07-13-18 @ 10:00) (97% - 100%)    PHYSICAL EXAM:  General: extremely ill appearing male; intubated and sedated  HEENT: pupils sluggish  Gastrointestinal: Soft, non-tender non-distended; Normal bowel sounds; no appreciable organomegaly; two epigastric drains with blood in the catheters; dark stools noted on bed pad; dark stool on rectal exam.,  Extremities: + peripheral edema   Neurological: intubated and sedated  Skin: jaundiced    LABS:                        11.0   15.7  )-----------( 58       ( 13 Jul 2018 02:53 )             32.3     07-13    137  |  95<L>  |  20  ----------------------------<  139<H>  3.8   |  22  |  0.35<L>    Ca    10.3      13 Jul 2018 02:53  Phos  1.9     07-13  Mg     2.3     07-13    TPro  4.9<L>  /  Alb  3.7  /  TBili  58.7<H>  /  DBili  x   /  AST  267<H>  /  ALT  121<H>  /  AlkPhos  287<H>  07-13    PT/INR - ( 13 Jul 2018 02:53 )   PT: 11.8 sec;   INR: 1.06          PTT - ( 13 Jul 2018 02:53 )  PTT:41.6 sec      RADIOLOGY & ADDITIONAL TESTS: INTERVAL HPI/OVERNIGHT EVENTS:  S/P central line exchange overnight. Had flex sig and EGD last night which showed severe proctitis on flex sig. S/P canassa enema o/n.     Subjective: pt still intubated; off sedation for > 24 hours; still with BRBPR    ROS: unable to assess    MEDICATIONS  (STANDING):  artificial  tears Solution 1 Drop(s) Both EYES two times a day  chlorhexidine 0.12% Liquid 15 milliLiter(s) Swish and Spit every 12 hours  chlorhexidine 2% Cloths 1 Application(s) Topical daily  cisatracurium Infusion 3 MICROgram(s)/kG/Min (10.548 mL/Hr) IV Continuous <Continuous>  dexmedetomidine Infusion 0.5 MICROgram(s)/kG/Hr (7.375 mL/Hr) IV Continuous <Continuous>  dextrose 5%. 1000 milliLiter(s) (25 mL/Hr) IV Continuous <Continuous>  dextrose 50% Injectable 50 milliLiter(s) IV Push every 15 minutes  dextrose 50% Injectable 25 milliLiter(s) IV Push every 15 minutes  DOBUTamine Infusion 5 MICROgram(s)/kG/Min (8.85 mL/Hr) IV Continuous <Continuous>  heparin  Infusion 400 Unit(s)/Hr (4 mL/Hr) IV Continuous <Continuous>  hydrocortisone sodium succinate Injectable 50 milliGRAM(s) IV Push every 8 hours  insulin lispro (HumaLOG) corrective regimen sliding scale   SubCutaneous every 6 hours  meropenem  IVPB 1000 milliGRAM(s) IV Intermittent every 8 hours  milrinone Infusion 0.25 MICROgram(s)/kG/Min (4.395 mL/Hr) IV Continuous <Continuous>  pantoprazole Infusion 8 mG/Hr (10 mL/Hr) IV Continuous <Continuous>  phenylephrine    Infusion 2 MICROgram(s)/kG/Min (21.975 mL/Hr) IV Continuous <Continuous>  PureFlow Dialysate RFP-400 (K 2 / Ca 3) 5000 milliLiter(s) (1500 mL/Hr) CRRT <Continuous>  sodium chloride 0.9%. 1000 milliLiter(s) (10 mL/Hr) IV Continuous <Continuous>  vancomycin  IVPB 750 milliGRAM(s) IV Intermittent every 24 hours  vasopressin Infusion 0.017 Unit(s)/Min (1 mL/Hr) IV Continuous <Continuous>    MEDICATIONS  (PRN):  dextrose 40% Gel 15 Gram(s) Oral once PRN Blood Glucose LESS THAN 70 milliGRAM(s)/deciliter  glucagon  Injectable 1 milliGRAM(s) IntraMuscular once PRN Glucose LESS THAN 70 milligrams/deciliter  midazolam Injectable 2 milliGRAM(s) IV Push every 2 hours PRN agitation      Allergies    penicillin (Rash)    Intolerances    Allergic/Immunologic:	    Vital Signs Last 24 Hrs  T(C): 35.3 (13 Jul 2018 05:00), Max: 35.7 (12 Jul 2018 10:29)  T(F): 95.5 (13 Jul 2018 05:00), Max: 96.2 (12 Jul 2018 10:29)  HR: 90 (13 Jul 2018 10:00) (89 - 93)  BP: --  BP(mean): --  RR: 23 (13 Jul 2018 10:00) (20 - 26)  SpO2: 97% (13 Jul 2018 10:00) (97% - 100%)    .  VITAL SIGNS:  T(F): 95.5 (07-13-18 @ 05:00), Max: 96.2 (07-12-18 @ 10:29)  HR: 90 (07-13-18 @ 10:00) (89 - 93)  BP: --  BP(mean): --  RR: 23 (07-13-18 @ 10:00) (20 - 26)  SpO2: 97% (07-13-18 @ 10:00) (97% - 100%)    PHYSICAL EXAM:  General: extremely ill appearing male; intubated and sedated  HEENT: pupils sluggish  Gastrointestinal: Soft, non-tender non-distended; Normal bowel sounds; no appreciable organomegaly; two epigastric drains with blood in the catheters; dark stools noted on bed pad; dark stool on rectal exam.,  Extremities: + peripheral edema   Neurological: intubated and sedated  Skin: jaundiced    LABS:                        11.0   15.7  )-----------( 58       ( 13 Jul 2018 02:53 )             32.3     07-13    137  |  95<L>  |  20  ----------------------------<  139<H>  3.8   |  22  |  0.35<L>    Ca    10.3      13 Jul 2018 02:53  Phos  1.9     07-13  Mg     2.3     07-13    TPro  4.9<L>  /  Alb  3.7  /  TBili  58.7<H>  /  DBili  x   /  AST  267<H>  /  ALT  121<H>  /  AlkPhos  287<H>  07-13    PT/INR - ( 13 Jul 2018 02:53 )   PT: 11.8 sec;   INR: 1.06          PTT - ( 13 Jul 2018 02:53 )  PTT:41.6 sec      RADIOLOGY & ADDITIONAL TESTS:

## 2018-07-13 NOTE — PROGRESS NOTE ADULT - ASSESSMENT
The patient is 67 year old male with PMH stated above, now s/p  Repair of AA, AVR, MV repair, TV repair, CABG x2, now in shock requiring pressor support - mutiple pressors. The patient with deterioration of the renal function in the setting of the shock - oliguric, no response from the diuretics. The CT ICU team started the patient on aquaphoresis and the CVVHD ordered, never been started from the primary team the BP unstable. The cvvhd started yesterday - 7/2 good tolerance so far. In the past 24 hours the  ml/h in the morning decreased from primary team from 250 ml/h, the dialysate flow now on 2 liters per hour and the blood flow on 200 ml/min. no clotting issues overnight. Still on pressors. The UF in the past 24 hours 4.3 liters off, negative 1300 ml/24, no issues with CVVHD in 24 hours

## 2018-07-13 NOTE — PROGRESS NOTE ADULT - SUBJECTIVE AND OBJECTIVE BOX
CTICU  CRITICAL  CARE  attending     Hand off received @ 7a					   Pertinent clinical, laboratory, radiographic, hemodynamic, echocardiographic, respiratory data, microbiologic data and chart were reviewed and analyzed frequently throughout the course of the day and night  Patient seen and examined with CTS/ SH attending at bedside    Pt is a 67y , Male,  post op day # 23 s/p AVR; MV/TV repair;      post op:    protracted Cardiogenic shock   LV assist with Impella  hemolytic anemia/acute ongoing hemolysis 2/2 Impella/Extra corporeal circuit  Hyperbilirubinemia  Resp failure/Vent dependant  Coagulopathy  Acute renal failure on CRRT  s/p EGD/Flex Sig yesterday which showed severe proctitis.      today:    cardiogenic shock  Unchanged pressor/inotrope requirements  s/p 1 unit PC; platelets  Ongoing CRRT   started on Ursodiol for possible Cholestasis causing Hyperbilirubinemia  HIT assay sent; as per Heme consult  Started on Provigil for encephalopathy  EEG to r/o subclinical Sz      Penile swelling: Penile swelling  Shock: Shock  Anemia: Anemia  Hyponatremia: Hyponatremia  Acute kidney failure: Acute kidney failure  CAD (coronary artery disease): CAD (coronary artery disease)  Valvular disease: Valvular disease      , FAMILY HISTORY:  PAST MEDICAL & SURGICAL HISTORY:  No pertinent past medical history  No significant past surgical history    Patient is a 67y old  Male who presents with a chief complaint of AS (19 Jun 2018 00:06)      14 system review was unremarkable  acute changes include acute respiratory failure  Vital signs, hemodynamic and respiratory parameters were reviewed from the bedside nursing flowsheet.  ICU Vital Signs Last 24 Hrs  T(C): 35.4 (13 Jul 2018 15:00), Max: 35.9 (13 Jul 2018 10:00)  T(F): 95.7 (13 Jul 2018 15:00), Max: 96.6 (13 Jul 2018 10:00)  HR: 90 (13 Jul 2018 16:00) (89 - 90)  BP: 80/46 (13 Jul 2018 13:00) (80/46 - 80/46)  BP(mean): 55 (13 Jul 2018 13:00) (55 - 55)  ABP: 104/50 (13 Jul 2018 16:00) (82/40 - 110/58)  ABP(mean): 68 (13 Jul 2018 16:00) (52 - 76)  RR: 21 (13 Jul 2018 16:00) (20 - 26)  SpO2: 97% (13 Jul 2018 16:00) (95% - 100%)    Adult Advanced Hemodynamics Last 24 Hrs  CVP(mm Hg): 24 (13 Jul 2018 16:00) (13 - 28)  CVP(cm H2O): --  CO: --  CI: --  PA: --  PA(mean): --  PCWP: --  SVR: --  SVRI: --  PVR: --  PVRI: --, ABG - ( 13 Jul 2018 16:16 )  pH, Arterial: 7.41  pH, Blood: x     /  pCO2: 36    /  pO2: 123   / HCO3: 22    / Base Excess: -2.2  /  SaO2: 98                Mode: AC/ CMV (Assist Control/ Continuous Mandatory Ventilation)  RR (machine): 20  TV (machine): 450  FiO2: 60  PEEP: 6  ITime: 1.2  MAP: 21  PIP: 33    Intake and output was reviewed and the fluid balance was calculated  Daily     Daily   I&O's Summary    12 Jul 2018 07:01  -  13 Jul 2018 07:00  --------------------------------------------------------  IN: 3061.6 mL / OUT: 4341 mL / NET: -1279.4 mL    13 Jul 2018 07:01  -  13 Jul 2018 16:43  --------------------------------------------------------  IN: 1898.9 mL / OUT: 1044 mL / NET: 854.9 mL        All lines and drain sites were assessed  Glycemic trend was reviewedCAPILLARY BLOOD GLUCOSE      POCT Blood Glucose.: 185 mg/dL (13 Jul 2018 13:20)    No acute change in mental status  Auscultation of the chest reveals equal bs  Abdomen is soft  Extremities are warm and well perfused  Wounds appear clean and unremarkable  Antibiotics are periop    labs  CBC Full  -  ( 13 Jul 2018 10:10 )  WBC Count : 12.7 K/uL  Hemoglobin : 9.0 g/dL  Hematocrit : 25.6 %  Platelet Count - Automated : 47 K/uL  Mean Cell Volume : 85.0 fL  Mean Cell Hemoglobin : 29.9 pg  Mean Cell Hemoglobin Concentration : 35.2 g/dL  Auto Neutrophil # : x  Auto Lymphocyte # : x  Auto Monocyte # : x  Auto Eosinophil # : x  Auto Basophil # : x  Auto Neutrophil % : x  Auto Lymphocyte % : x  Auto Monocyte % : x  Auto Eosinophil % : x  Auto Basophil % : x    07-13    139  |  99  |  21  ----------------------------<  183<H>  3.5   |  23  |  0.31<L>    Ca    9.7      13 Jul 2018 10:10  Phos  1.8     07-13  Mg     2.0     07-13    TPro  4.2<L>  /  Alb  3.5  /  TBili  48.6<H>  /  DBili  >10.0<H>  /  AST  218<H>  /  ALT  104<H>  /  AlkPhos  226<H>  07-13    PT/INR - ( 13 Jul 2018 10:10 )   PT: 13.3 sec;   INR: 1.19          PTT - ( 13 Jul 2018 10:10 )  PTT:44.3 sec  The current medications were reviewed   MEDICATIONS  (STANDING):  albumin human 25% IVPB 50 milliLiter(s) IV Intermittent every 8 hours  artificial  tears Solution 1 Drop(s) Both EYES two times a day  chlorhexidine 0.12% Liquid 15 milliLiter(s) Swish and Spit every 12 hours  chlorhexidine 2% Cloths 1 Application(s) Topical daily  cisatracurium Infusion 3 MICROgram(s)/kG/Min (10.548 mL/Hr) IV Continuous <Continuous>  dexmedetomidine Infusion 0.5 MICROgram(s)/kG/Hr (7.375 mL/Hr) IV Continuous <Continuous>  dextrose 5%. 1000 milliLiter(s) (25 mL/Hr) IV Continuous <Continuous>  dextrose 50% Injectable 50 milliLiter(s) IV Push every 15 minutes  dextrose 50% Injectable 25 milliLiter(s) IV Push every 15 minutes  DOBUTamine Infusion 5 MICROgram(s)/kG/Min (8.85 mL/Hr) IV Continuous <Continuous>  heparin  Infusion 400 Unit(s)/Hr (4 mL/Hr) IV Continuous <Continuous>  hydrocortisone sodium succinate Injectable 50 milliGRAM(s) IV Push every 12 hours  insulin lispro (HumaLOG) corrective regimen sliding scale   SubCutaneous every 6 hours  meropenem  IVPB 1000 milliGRAM(s) IV Intermittent every 8 hours  milrinone Infusion 0.25 MICROgram(s)/kG/Min (4.395 mL/Hr) IV Continuous <Continuous>  modafinil 100 milliGRAM(s) Oral daily  pantoprazole Infusion 8 mG/Hr (10 mL/Hr) IV Continuous <Continuous>  phenylephrine    Infusion 2 MICROgram(s)/kG/Min (21.975 mL/Hr) IV Continuous <Continuous>  potassium chloride  20 mEq/100 mL IVPB 20 milliEquivalent(s) IV Intermittent every 1 hour  PureFlow Dialysate RFP-400 (K 2 / Ca 3) 5000 milliLiter(s) (1500 mL/Hr) CRRT <Continuous>  sodium chloride 0.9%. 1000 milliLiter(s) (10 mL/Hr) IV Continuous <Continuous>  ursodiol Suspension 300 milliGRAM(s) Oral every 12 hours  vancomycin  IVPB 750 milliGRAM(s) IV Intermittent every 24 hours  vasopressin Infusion 0.017 Unit(s)/Min (1 mL/Hr) IV Continuous <Continuous>    MEDICATIONS  (PRN):  dextrose 40% Gel 15 Gram(s) Oral once PRN Blood Glucose LESS THAN 70 milliGRAM(s)/deciliter  glucagon  Injectable 1 milliGRAM(s) IntraMuscular once PRN Glucose LESS THAN 70 milligrams/deciliter  midazolam Injectable 2 milliGRAM(s) IV Push every 2 hours PRN agitation       PROBLEM LIST/ ASSESSMENT:  HEALTH ISSUES - PROBLEM Dx:  cardiogenic shock  lactic acidosis  Hyperbilirubinemia  acute post hemorrhagic anemia  coagulopathy  encephalopathy  malnutrition  Penile swelling: Penile swelling  Shock: Shock  Anemia: Anemia  Hyponatremia: Hyponatremia  Acute kidney failure: Acute kidney failure  CAD (coronary artery disease): CAD (coronary artery disease)  Valvular disease: Valvular disease      ,   Patient is a 67y old  Male who presents with a chief complaint of AS (19 Jun 2018 00:06)     s/p AVR; MV/TV repair;  acute changes include acute respiratory failure    My plan includes :  close hemodynamic, ventilatory and drain monitoring and management per post op routine    Monitor for arrhythmias and monitor parameters for organ perfusion  monitor neurologic status  Head of the bed should remain elevated to 45 deg .   chest PT and IS will be encouraged  monitor adequacy of oxygenation and ventilation and attempt to wean oxygen  Nutritional goals will be met using po eventually , ensure adequate caloric intake and montior the same  Stress ulcer and VTE prophylaxis will be achieved    Glycemic control is satisfactory  Electrolytes have been repleted as necessary and wound care has been carried out. Pain control has been achieved.   agressive physical therapy and early mobility and ambulation goals will be met   The family was updated about the course and plan  CRITICAL CARE TIME SPENT in evaluation and management, reassessments, review and interpretation of labs and x-rays, ventilator and hemodynamic management, formulating a plan and coordinating care: ___30____ MIN.  Time does not include procedural time.  CTICU ATTENDING     					    Rai Singer MD

## 2018-07-13 NOTE — PROGRESS NOTE ADULT - PROBLEM SELECTOR PLAN 1
- the oliguric EMILY due to ATN - most likely due to cardiogenic shock, can not exclude also a septic shock, requiring pressors - on vasopressin and phenylephrine, on milrinone, on inotropic - dobutamine   - oliguric in the morning - anuric   - now on CVVHd - see above more details,   The dialysate flow - 2.0 liters/h - should be 1.5 liters/h based on his weight - 25 ml/kg/h  - the patient with overt fluid overload - anasarca  - renal diet when he is not NPO  - hypophosphatemia - 1.8 - please repalce with kpho4 - 30 meq and follow up   - potassium 3.5 - please continue to follow up  - replacing the phosphate will bring it up   - lactate still elevated - we will continue with CVVHD, the ultimate treatment for lactic acidosis is treatment of the cause for it. In this case treatment of the shock   - in and out  - daily weight

## 2018-07-13 NOTE — PROGRESS NOTE ADULT - PROBLEM SELECTOR PLAN 4
- cardiogenic and possible component sepsis   - meropenem, and vancomycin  - please follow up the vanco trough - last one 13.6- please continue to follow up

## 2018-07-13 NOTE — CONSULT NOTE ADULT - SUBJECTIVE AND OBJECTIVE BOX
Hematology Oncology Consult Note (Dr. Garrett)  Discussed with Dr. Garrett and recommendations reviewed with the primary team.    The patient was seen and examined    KATY BENSON is a 67y Male with extensive cardiovascular dz s/p AVR, MVR, TVR, CABG, and ascending aorta repair on 6/21 requiring LVAD/impella device now also on CVVHD with open chest with wound vac and chest tube draining blood.  He is intubated, on pressors, not on sedation but minimally responsive (non-verbal, but seems to react to sounds).  Information obtained from chart, RN, and primary CT surgery team.  Unable to obtain a ROS from pt directly.    Per primary team, they are concerned that removal of impella device may have resulted in showering of clots to extremities.    Cannot determine a PMHX, PSHX, soc hx, fam hx.  Meds and all per chart    Allergies:  penicillin (Rash)      Medications:  MEDICATIONS  (STANDING):  albumin human 25% IVPB 50 milliLiter(s) IV Intermittent every 8 hours  artificial  tears Solution 1 Drop(s) Both EYES two times a day  chlorhexidine 0.12% Liquid 15 milliLiter(s) Swish and Spit every 12 hours  chlorhexidine 2% Cloths 1 Application(s) Topical daily  cisatracurium Infusion 3 MICROgram(s)/kG/Min (10.548 mL/Hr) IV Continuous <Continuous>  dexmedetomidine Infusion 0.5 MICROgram(s)/kG/Hr (7.375 mL/Hr) IV Continuous <Continuous>  dextrose 5%. 1000 milliLiter(s) (25 mL/Hr) IV Continuous <Continuous>  dextrose 50% Injectable 50 milliLiter(s) IV Push every 15 minutes  dextrose 50% Injectable 25 milliLiter(s) IV Push every 15 minutes  DOBUTamine Infusion 5 MICROgram(s)/kG/Min (8.85 mL/Hr) IV Continuous <Continuous>  heparin  Infusion 400 Unit(s)/Hr (4 mL/Hr) IV Continuous <Continuous>  hydrocortisone sodium succinate Injectable 50 milliGRAM(s) IV Push every 12 hours  insulin lispro (HumaLOG) corrective regimen sliding scale   SubCutaneous every 6 hours  meropenem  IVPB 1000 milliGRAM(s) IV Intermittent every 8 hours  mesalamine Suppository 1000 milliGRAM(s) Rectal at bedtime  milrinone Infusion 0.25 MICROgram(s)/kG/Min (4.395 mL/Hr) IV Continuous <Continuous>  modafinil 100 milliGRAM(s) Oral daily  pantoprazole Infusion 8 mG/Hr (10 mL/Hr) IV Continuous <Continuous>  phenylephrine    Infusion 2 MICROgram(s)/kG/Min (21.975 mL/Hr) IV Continuous <Continuous>  PureFlow Dialysate RFP-400 (K 2 / Ca 3) 5000 milliLiter(s) (1500 mL/Hr) CRRT <Continuous>  sodium chloride 0.9%. 1000 milliLiter(s) (10 mL/Hr) IV Continuous <Continuous>  ursodiol Suspension 300 milliGRAM(s) Oral every 12 hours  vancomycin  IVPB 750 milliGRAM(s) IV Intermittent every 24 hours  vasopressin Infusion 0.017 Unit(s)/Min (1 mL/Hr) IV Continuous <Continuous>    MEDICATIONS  (PRN):  dextrose 40% Gel 15 Gram(s) Oral once PRN Blood Glucose LESS THAN 70 milliGRAM(s)/deciliter  glucagon  Injectable 1 milliGRAM(s) IntraMuscular once PRN Glucose LESS THAN 70 milligrams/deciliter  midazolam Injectable 2 milliGRAM(s) IV Push every 2 hours PRN agitation    PHYSICAL EXAM:    T(F): 96.1 (07-13-18 @ 17:30), Max: 96.6 (07-13-18 @ 10:00)  HR: 90 (07-13-18 @ 18:00) (89 - 90)  BP: 80/46 (07-13-18 @ 13:00) (80/46 - 80/46)  RR: 24 (07-13-18 @ 18:00) (20 - 26)  SpO2: 96% (07-13-18 @ 18:00) (95% - 100%)  Wt(kg): --    Daily     Daily     Gen: intubated, nonverbal  HEENT: scleral icterus, moves eyes to sound; PERRL  Neck: central line with oozing and CVVHD catheter mild oozing  Chest: open chest with wound vac and chest tube x2 draining blood  Cardiovascular: difficult to assess d/t wound vac  Respiratory: difficult to assess  Gastrointestinal: BS+, soft, NT/ND, no masses, no splenomegaly, no hepatomegaly, no evidence for ascites  Extremities: no clubbing/cyanosis, no edema, no calf tenderness  Vascular:  DP/PT 2+ b/l  Neurological: CN 2-12 grossly intact, no focal deficits  Skin: jaundice whole body  Musculoskeletal:  mildly blue fingers    Labs:                          8.3    17.7  )-----------( 95       ( 13 Jul 2018 16:11 )             23.7     CBC Full  -  ( 13 Jul 2018 16:11 )  WBC Count : 17.7 K/uL  Hemoglobin : 8.3 g/dL  Hematocrit : 23.7 %  Platelet Count - Automated : 95 K/uL  Mean Cell Volume : 86.2 fL  Mean Cell Hemoglobin : 30.2 pg  Mean Cell Hemoglobin Concentration : 35.0 g/dL  Auto Neutrophil # : x  Auto Lymphocyte # : x  Auto Monocyte # : x  Auto Eosinophil # : x  Auto Basophil # : x  Auto Neutrophil % : x  Auto Lymphocyte % : x  Auto Monocyte % : x  Auto Eosinophil % : x  Auto Basophil % : x    PT/INR - ( 13 Jul 2018 16:11 )   PT: 13.2 sec;   INR: 1.19          PTT - ( 13 Jul 2018 16:11 )  PTT:41.7 sec    07-13    137  |  98  |  21  ----------------------------<  227<H>  3.7   |  22  |  0.35<L>    Ca    9.9      13 Jul 2018 16:11  Phos  1.7     07-13  Mg     2.0     07-13    TPro  4.4<L>  /  Alb  3.4  /  TBili  49.8<H>  /  DBili  x   /  AST  202<H>  /  ALT  101<H>  /  AlkPhos  251<H>  07-13      [place] neg, elevated LDH, low haptoglobin, elevated fibrinogen

## 2018-07-13 NOTE — PROGRESS NOTE ADULT - ASSESSMENT
67 yr old male with a PMHx of congenital AS s/p repair at age 12 and CAD s/p stenting 8 yrs ago who was found to have 2V CAD, severe AS, MR and TR during pre-operative evaluation now s/p MV/TV repair and AVR w/ IABP and impella placement. Hospital course was complicated by cardiogenic shock requiring continued inotropic support, cardiac tamponade x 2, consumptive coaguloapthy, pre-renal azotemia with anasarca requiring CVVH, and now new onset melena and hematochezia x 4 days for which GI was consulted.    # melena/hematochezia   -patient with noted dark stools on bed pad as well as bloody stools for the past 3 days; bright red blood on patient's luciana  -on once daily ppi throughout hospital course, now on ppi gtt   -etiologies of patient's hematochezia/melena could be multifactorial  -melena 2/2 poor gut perfusion in the setting of cardiogenic shock vs ulcers (noted to have ?deulafoy lesion on EGD)  -hematochezia most likely 2/2 severe proctitis noted on flex sig;  -please switch canssa enemas to canasa suppositories    # transaminitis with hyperbilirubinemia  -pt with chronically up trending total bilirubin since admission; multifactorial in nature: could be 2/2 hemolysis ,RBC and platelet consumption 2/2 shearing on impella, drug induced 2/2 ?meropenem, autoimmune hemolysis in the setting of multiple transfusions  -please repeat abdominal ultrasound for further evaluation of hyperbilirubinemia  -need to rule out acute viral etiology: please send EBV and CMV IgM and IgG, hepatitis serologies  -check Ig M for PBC  -please check repeat peripheral smear  -consider hematology consult for persistent hemolysis  -please avoid hepatotoxic agents    all recs d/w primary team; will continue to follow 67 yr old male with a PMHx of congenital AS s/p repair at age 12 and CAD s/p stenting 8 yrs ago who was found to have 2V CAD, severe AS, MR and TR during pre-operative evaluation now s/p MV/TV repair and AVR w/ IABP and impella placement. Hospital course was complicated by cardiogenic shock requiring continued inotropic support, cardiac tamponade x 2, consumptive coaguloapthy, pre-renal azotemia with anasarca requiring CVVH, and now new onset melena and hematochezia x 4 days for which GI was consulted.    # melena/hematochezia   -etiologies of patient's hematochezia/melena could be multifactorial  -melena 2/2 poor gut perfusion in the setting of cardiogenic shock vs proctitis as seen on flex sig  -please switch canssa enemas to canasa suppositories, as suppositories are less traumatic to mucosa  - on ppi gtt - given that no ulcers were seen, IV PPI once daily is sufficient while in ICU    # transaminitis with hyperbilirubinemia  -pt with chronically up trending total bilirubin since admission; multifactorial in nature: could be 2/2 hemolysis ,RBC and platelet consumption 2/2 shearing on impella, drug induced 2/2 ?meropenem, autoimmune hemolysis in the setting of multiple transfusions  -please repeat liver ultrasound for further evaluation of hyperbilirubinemia  -need to rule out acute viral etiology: please send EBV and CMV IgM and IgG, hepatitis serologies- hep A IgM, HBsAg, IgM anti-HBC, anti-HCV  -check AMA for PBC  -please check repeat peripheral smear  -consider hematology consult for persistent hemolysis  -please avoid hepatotoxic agents    all recs d/w primary team; will continue to follow 67 yr old male with a PMHx of congenital AS s/p repair at age 12 and CAD s/p stenting 8 yrs ago who was found to have 2V CAD, severe AS, MR and TR during pre-operative evaluation now s/p MV/TV repair and AVR w/ IABP and impella placement. Hospital course was complicated by cardiogenic shock requiring continued inotropic support, cardiac tamponade x 2, consumptive coaguloapthy, pre-renal azotemia with anasarca requiring CVVH, and now new onset melena and hematochezia x 4 days for which GI was consulted.    # melena/hematochezia   -etiologies of patient's hematochezia/melena could be multifactorial  -melena 2/2 poor gut perfusion in the setting of cardiogenic shock vs proctitis as seen on flex sig  -please switch canssa enemas to canasa suppositories, as suppositories are less traumatic to mucosa  - on ppi gtt - given that no ulcers were seen, IV PPI BID    # transaminitis with hyperbilirubinemia  -pt with chronically up trending total bilirubin since admission; multifactorial in nature: could be 2/2 hemolysis ,RBC and platelet consumption 2/2 shearing on impella, drug induced 2/2 ?meropenem, autoimmune hemolysis in the setting of multiple transfusions  -please repeat liver ultrasound for further evaluation of hyperbilirubinemia  -need to rule out acute viral etiology: please send EBV and CMV IgM and IgG, hepatitis serologies- hep A IgM, HBsAg, IgM anti-HBC, anti-HCV  -check AMA for PBC  -please check repeat peripheral smear  -consider hematology consult for persistent hemolysis  -please avoid hepatotoxic agents    all recs d/w primary team; will continue to follow 67 yr old male with a PMHx of congenital AS s/p repair at age 12 and CAD s/p stenting 8 yrs ago who was found to have 2V CAD, severe AS, MR and TR during pre-operative evaluation now s/p MV/TV repair and AVR w/ IABP and impella placement. Hospital course was complicated by cardiogenic shock requiring continued inotropic support, cardiac tamponade x 2, consumptive coaguloapthy, pre-renal azotemia with anasarca requiring CVVH, and now new onset melena and hematochezia x 4 days for which GI was consulted.    # melena/hematochezia   -etiologies of patient's hematochezia/melena could be multifactorial  -melena 2/2 poor gut perfusion in the setting of cardiogenic shock vs proctitis as seen on flex sig  -please switch canssa enemas to canasa suppositories, as suppositories are less traumatic to mucosa  - on ppi gtt - given that no ulcers were seen, IV PPI BID    # transaminitis with hyperbilirubinemia  -pt with chronically up trending total bilirubin since admission; multifactorial in nature: could be 2/2 hemolysis ,RBC and platelet consumption 2/2 shearing on impella, drug induced 2/2 ?meropenem, autoimmune hemolysis in the setting of multiple transfusions  -please repeat liver ultrasound for further evaluation of hyperbilirubinemia  -need to rule out acute viral etiology: please send EBV and CMV IgM and IgG, hepatitis serologies- hep A IgM, HBsAg, IgM anti-HBC, anti-HCV  -check AMA for PBC  -please check repeat peripheral smear  -consider hematology consult for persistent hemolysis  -please avoid hepatotoxic agents     will continue to follow

## 2018-07-13 NOTE — PROGRESS NOTE ADULT - SUBJECTIVE AND OBJECTIVE BOX
Seen in the morning, still intubated, still requiring pressor support, worsening jaundice. S/p surgery closing the chest wall, done on 7/2 - started on CVVHD after  that. In the past 24 hours 4.3 liters off from the CVVHD, negative 1300 ml/24 hours. in the morning on 110 ml/min UF - decreased for hypotension from primary team, blood flow on 200 ml/min and dialysate flow on 2 liters/h. No issues with CVVHD,  Patient seen and examined at bedside.       Patient seen and examined at bedside.       artificial  tears Solution 1 Drop(s) two times a day  chlorhexidine 0.12% Liquid 15 milliLiter(s) every 12 hours  chlorhexidine 2% Cloths 1 Application(s) daily  cisatracurium Infusion 3 MICROgram(s)/kG/Min <Continuous>  desmopressin IVPB 20 MICROGram(s) once  dexmedetomidine Infusion 0.5 MICROgram(s)/kG/Hr <Continuous>  dextrose 40% Gel 15 Gram(s) once PRN  dextrose 5%. 1000 milliLiter(s) <Continuous>  dextrose 50% Injectable 50 milliLiter(s) every 15 minutes  dextrose 50% Injectable 25 milliLiter(s) every 15 minutes  DOBUTamine Infusion 5 MICROgram(s)/kG/Min <Continuous>  glucagon  Injectable 1 milliGRAM(s) once PRN  heparin  Infusion 400 Unit(s)/Hr <Continuous>  hydrocortisone sodium succinate Injectable 50 milliGRAM(s) every 8 hours  insulin lispro (HumaLOG) corrective regimen sliding scale   every 6 hours  lactulose Syrup 10 Gram(s) once  meropenem  IVPB 1000 milliGRAM(s) every 8 hours  midazolam Injectable 2 milliGRAM(s) every 2 hours PRN  milrinone Infusion 0.25 MICROgram(s)/kG/Min <Continuous>  pantoprazole Infusion 8 mG/Hr <Continuous>  phenylephrine    Infusion 2 MICROgram(s)/kG/Min <Continuous>  PureFlow Dialysate RFP-400 (K 2 / Ca 3) 5000 milliLiter(s) <Continuous>  sodium chloride 0.9%. 1000 milliLiter(s) <Continuous>  vancomycin  IVPB 750 milliGRAM(s) every 24 hours  vasopressin Infusion 0.017 Unit(s)/Min <Continuous>      Allergies    penicillin (Rash)    Intolerances        T(C): , Max: 35.9 (07-13-18 @ 10:00)  T(F): , Max: 96.6 (07-13-18 @ 10:00)  HR: 90 (07-13-18 @ 12:00)  BP: --  BP(mean): --  RR: 24 (07-13-18 @ 12:00)  SpO2: 97% (07-13-18 @ 12:00)  Wt(kg): --    07-12 @ 07:01  -  07-13 @ 07:00  --------------------------------------------------------  IN:    Albumin 25%: 100 mL    dexmedetomidine Infusion: 10.3 mL    DOBUTamine Infusion: 213.6 mL    milrinone  Infusion: 105.6 mL    Packed Red Blood Cells: 350 mL    phenylephrine   Infusion: 424.1 mL    Platelets - Single Donor: 223 mL    sodium bicarbonate  Infusion: 25 mL    sodium chloride 0.9%.: 240 mL    Solution: 200 mL    Solution: 600 mL    Solution: 90 mL    vasopressin Infusion: 102 mL    Vital HN: 378 mL  Total IN: 3061.6 mL    OUT:    Chest Tube: 20 mL    Other: 4321 mL  Total OUT: 4341 mL    Total NET: -1279.4 mL      07-13 @ 07:01  -  07-13 @ 12:28  --------------------------------------------------------  IN:    DOBUTamine Infusion: 53.4 mL    milrinone  Infusion: 26.4 mL    phenylephrine   Infusion: 160.6 mL    Platelets - Single Donor: 287 mL    sodium chloride 0.9%.: 60 mL    vasopressin Infusion: 36 mL    Vital HN: 252 mL  Total IN: 875.4 mL    OUT:    Chest Tube: 10 mL    Other: 731 mL  Total OUT: 741 mL    Total NET: 134.4 mL    Physical exam:   Intubated and sedated   ANASARCA   Jaundice   difficult to evaluate for JVD   Chest: s/p thoracotomy closed chest  multiple drainages from the chest   RRR, normal s1/s2, systolic murmur  Abdomen - swollen, not tender, not distended   Extremities 3+ peripheral edema  Hd cathter - on the left IJ  [place]             LABS:                        9.0    12.7  )-----------( 47       ( 13 Jul 2018 10:10 )             25.6     07-13    139  |  99  |  21  ----------------------------<  183<H>  3.5   |  23  |  0.31<L>    Ca    9.7      13 Jul 2018 10:10  Phos  1.8     07-13  Mg     2.0     07-13    TPro  4.2<L>  /  Alb  3.5  /  TBili  48.6<H>  /  DBili  >10.0<H>  /  AST  218<H>  /  ALT  104<H>  /  AlkPhos  226<H>  07-13      PT/INR - ( 13 Jul 2018 10:10 )   PT: 13.3 sec;   INR: 1.19          PTT - ( 13 Jul 2018 10:10 )  PTT:44.3 sec          RADIOLOGY & ADDITIONAL STUDIES:

## 2018-07-13 NOTE — PROGRESS NOTE ADULT - SUBJECTIVE AND OBJECTIVE BOX
CTICU  CRITICAL  CARE  attending     Hand off received 					   Pertinent clinical, laboratory, radiographic, hemodynamic, echocardiographic, respiratory data, microbiologic data and chart were reviewed and analyzed frequently throughout the course of the day and night  Patient seen and examined with CTS/ SH attending at bedside  Pt is a 67y , Male, HEALTH ISSUES - PROBLEM Dx:  Penile swelling: Penile swelling  Shock: Shock  Anemia: Anemia  Hyponatremia: Hyponatremia  Acute kidney failure: Acute kidney failure  CAD (coronary artery disease): CAD (coronary artery disease)  Valvular disease: Valvular disease      , FAMILY HISTORY:  PAST MEDICAL & SURGICAL HISTORY:  No pertinent past medical history  No significant past surgical history    Patient is a 67y old  Male who presents with a chief complaint of AS (19 Jun 2018 00:06)      14 system review was unremarkable  acute changes include acute respiratory failure  Vital signs, hemodynamic and respiratory parameters were reviewed from the bedside nursing flowsheet.  ICU Vital Signs Last 24 Hrs  T(C): 35.3 (13 Jul 2018 05:00), Max: 35.7 (12 Jul 2018 10:29)  T(F): 95.5 (13 Jul 2018 05:00), Max: 96.2 (12 Jul 2018 10:29)  HR: 90 (13 Jul 2018 09:00) (89 - 93)  BP: --  BP(mean): --  ABP: 102/52 (13 Jul 2018 08:00) (86/40 - 110/50)  ABP(mean): 70 (13 Jul 2018 08:00) (54 - 74)  RR: 21 (13 Jul 2018 08:00) (20 - 26)  SpO2: 100% (13 Jul 2018 09:00) (100% - 100%)    Adult Advanced Hemodynamics Last 24 Hrs  CVP(mm Hg): 22 (13 Jul 2018 08:00) (13 - 29)  CVP(cm H2O): --  CO: --  CI: --  PA: --  PA(mean): --  PCWP: --  SVR: --  SVRI: --  PVR: --  PVRI: --, ABG - ( 13 Jul 2018 02:50 )  pH, Arterial: 7.42  pH, Blood: x     /  pCO2: 39    /  pO2: 170   / HCO3: 25    / Base Excess: 0.3   /  SaO2: 99                Mode: AC/ CMV (Assist Control/ Continuous Mandatory Ventilation)  RR (machine): 20  TV (machine): 450  FiO2: 60  PEEP: 6  ITime: 1.2  MAP: 18  PIP: 34    Intake and output was reviewed and the fluid balance was calculated  Daily     Daily   I&O's Summary    12 Jul 2018 07:01  -  13 Jul 2018 07:00  --------------------------------------------------------  IN: 3061.6 mL / OUT: 4341 mL / NET: -1279.4 mL    13 Jul 2018 07:01  -  13 Jul 2018 09:32  --------------------------------------------------------  IN: 93.4 mL / OUT: 149 mL / NET: -55.6 mL        All lines and drain sites were assessed  [facility]      POCT Blood Glucose.: 173 mg/dL (13 Jul 2018 06:24)    No acute change in mental status  Auscultation of the chest reveals equal bs  Abdomen is soft  Extremities are warm and well perfused  Wounds appear clean and unremarkable  Antibiotics are periop    labs  CBC Full  -  ( 13 Jul 2018 02:53 )  WBC Count : 15.7 K/uL  Hemoglobin : 11.0 g/dL  Hematocrit : 32.3 %  Platelet Count - Automated : 58 K/uL  Mean Cell Volume : 85.4 fL  Mean Cell Hemoglobin : 29.1 pg  Mean Cell Hemoglobin Concentration : 34.1 g/dL  Auto Neutrophil # : x  Auto Lymphocyte # : x  Auto Monocyte # : x  Auto Eosinophil # : x  Auto Basophil # : x  Auto Neutrophil % : x  Auto Lymphocyte % : x  Auto Monocyte % : x  Auto Eosinophil % : x  Auto Basophil % : x    07-13    137  |  95<L>  |  20  ----------------------------<  139<H>  3.8   |  22  |  0.35<L>    Ca    10.3      13 Jul 2018 02:53  Phos  1.9     07-13  Mg     2.3     07-13    TPro  4.9<L>  /  Alb  3.7  /  TBili  58.7<H>  /  DBili  x   /  AST  267<H>  /  ALT  121<H>  /  AlkPhos  287<H>  07-13    PT/INR - ( 13 Jul 2018 02:53 )   PT: 11.8 sec;   INR: 1.06          PTT - ( 13 Jul 2018 02:53 )  PTT:41.6 sec  The current medications were reviewed   MEDICATIONS  (STANDING):  artificial  tears Solution 1 Drop(s) Both EYES two times a day  chlorhexidine 0.12% Liquid 15 milliLiter(s) Swish and Spit every 12 hours  chlorhexidine 2% Cloths 1 Application(s) Topical daily  cisatracurium Infusion 3 MICROgram(s)/kG/Min (10.548 mL/Hr) IV Continuous <Continuous>  dexmedetomidine Infusion 0.5 MICROgram(s)/kG/Hr (7.375 mL/Hr) IV Continuous <Continuous>  dextrose 5%. 1000 milliLiter(s) (25 mL/Hr) IV Continuous <Continuous>  dextrose 50% Injectable 50 milliLiter(s) IV Push every 15 minutes  dextrose 50% Injectable 25 milliLiter(s) IV Push every 15 minutes  DOBUTamine Infusion 5 MICROgram(s)/kG/Min (8.85 mL/Hr) IV Continuous <Continuous>  heparin  Infusion 400 Unit(s)/Hr (4 mL/Hr) IV Continuous <Continuous>  hydrocortisone sodium succinate Injectable 50 milliGRAM(s) IV Push every 8 hours  insulin lispro (HumaLOG) corrective regimen sliding scale   SubCutaneous every 6 hours  meropenem  IVPB 1000 milliGRAM(s) IV Intermittent every 8 hours  milrinone Infusion 0.25 MICROgram(s)/kG/Min (4.395 mL/Hr) IV Continuous <Continuous>  pantoprazole Infusion 8 mG/Hr (10 mL/Hr) IV Continuous <Continuous>  phenylephrine    Infusion 2 MICROgram(s)/kG/Min (21.975 mL/Hr) IV Continuous <Continuous>  PureFlow Dialysate RFP-400 (K 2 / Ca 3) 5000 milliLiter(s) (1500 mL/Hr) CRRT <Continuous>  sodium chloride 0.9%. 1000 milliLiter(s) (10 mL/Hr) IV Continuous <Continuous>  vancomycin  IVPB 750 milliGRAM(s) IV Intermittent every 24 hours  vasopressin Infusion 0.017 Unit(s)/Min (1 mL/Hr) IV Continuous <Continuous>    MEDICATIONS  (PRN):  dextrose 40% Gel 15 Gram(s) Oral once PRN Blood Glucose LESS THAN 70 milliGRAM(s)/deciliter  glucagon  Injectable 1 milliGRAM(s) IntraMuscular once PRN Glucose LESS THAN 70 milligrams/deciliter  midazolam Injectable 2 milliGRAM(s) IV Push every 2 hours PRN agitation       PROBLEM LIST/ ASSESSMENT:  HEALTH ISSUES - PROBLEM Dx:  Penile swelling: Penile swelling  Shock: Shock  Anemia: Anemia  Hyponatremia: Hyponatremia  Acute kidney failure: Acute kidney failure  CAD (coronary artery disease): CAD (coronary artery disease)  Valvular disease: Valvular disease      ,   Patient is a 67y old  Male who presents with a chief complaint of AS (19 Jun 2018 00:06)     s/p cardiac surgery      My plan includes :  close hemodynamic, ventilatory and drain monitoring and management per post op routine    Monitor for arrhythmias and monitor parameters for organ perfusion  monitor neurologic status  Head of the bed should remain elevated to 45 deg .   chest PT and IS will be encouraged  monitor adequacy of oxygenation and ventilation and attempt to wean oxygen  Nutritional goals will be met using po eventually , ensure adequate caloric intake and montior the same  Stress ulcer and VTE prophylaxis will be achieved    Glycemic control is satisfactory  Electrolytes have been repleted as necessary and wound care has been carried out. Pain control has been achieved.   agressive physical therapy and early mobility and ambulation goals will be met   The family was updated about the course and plan  CRITICAL CARE TIME SPENT in evaluation and management, reassessments, review and interpretation of labs and x-rays, ventilator and hemodynamic management, formulating a plan and coordinating care: ___90____ MIN.  Time does not include procedural time.  CTICU ATTENDING     					    Clement Ahn MD

## 2018-07-13 NOTE — CONSULT NOTE ADULT - ASSESSMENT
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 leak vs other coagulopathy discussed below  -suggest starting pt on folic acid 5 mg daily    #Thrombocytopenia - with coagulopathy  highest likely etiology is DIC (elevated fibrinogen possibly in setting of inflammation as acute phase reactant); less likely TTP given coagulopathy; less likely HIT or ENRIQUE given timing of reduction in thrombocytopenia immediately after surgery more suggestive of either valvular sheering etiology vs devices (impella and CVVHD membrane)  -would check retic count  -check LE duplex r/o DVT  -check mixing study  -may need to reevaluate valvular function or assess for leak    Prognosis seems guarded at this time; GI is consulted to evaluate for additional etiology of hyperbilirubinemia    Will follow along.    Discussed with Dr. Garrett

## 2018-07-14 DIAGNOSIS — D69.6 THROMBOCYTOPENIA, UNSPECIFIED: ICD-10-CM

## 2018-07-14 LAB
ALBUMIN SERPL ELPH-MCNC: 3.5 G/DL — SIGNIFICANT CHANGE UP (ref 3.3–5)
ALBUMIN SERPL ELPH-MCNC: 3.7 G/DL — SIGNIFICANT CHANGE UP (ref 3.3–5)
ALBUMIN SERPL ELPH-MCNC: 3.8 G/DL — SIGNIFICANT CHANGE UP (ref 3.3–5)
ALBUMIN SERPL ELPH-MCNC: 4 G/DL — SIGNIFICANT CHANGE UP (ref 3.3–5)
ALP SERPL-CCNC: 275 U/L — HIGH (ref 40–120)
ALP SERPL-CCNC: 284 U/L — HIGH (ref 40–120)
ALP SERPL-CCNC: 303 U/L — HIGH (ref 40–120)
ALP SERPL-CCNC: 319 U/L — HIGH (ref 40–120)
ALT FLD-CCNC: 102 U/L — HIGH (ref 10–45)
ALT FLD-CCNC: 103 U/L — HIGH (ref 10–45)
ALT FLD-CCNC: 106 U/L — HIGH (ref 10–45)
ALT FLD-CCNC: 98 U/L — HIGH (ref 10–45)
ANION GAP SERPL CALC-SCNC: 21 MMOL/L — HIGH (ref 5–17)
ANION GAP SERPL CALC-SCNC: 21 MMOL/L — HIGH (ref 5–17)
ANION GAP SERPL CALC-SCNC: 22 MMOL/L — HIGH (ref 5–17)
APTT BLD: 35.9 SEC — SIGNIFICANT CHANGE UP (ref 27.5–37.4)
APTT BLD: 39.8 SEC — HIGH (ref 27.5–37.4)
APTT BLD: 40.9 SEC — HIGH (ref 27.5–37.4)
AST SERPL-CCNC: 189 U/L — HIGH (ref 10–40)
AST SERPL-CCNC: 195 U/L — HIGH (ref 10–40)
AST SERPL-CCNC: 207 U/L — HIGH (ref 10–40)
AST SERPL-CCNC: 211 U/L — HIGH (ref 10–40)
BASE EXCESS BLDA CALC-SCNC: -1.7 MMOL/L — SIGNIFICANT CHANGE UP (ref -2–3)
BASE EXCESS BLDA CALC-SCNC: -2.9 MMOL/L — LOW (ref -2–3)
BASE EXCESS BLDA CALC-SCNC: -3.5 MMOL/L — LOW (ref -2–3)
BILIRUB DIRECT SERPL-MCNC: >10 MG/DL — HIGH (ref 0–0.2)
BILIRUB INDIRECT FLD-MCNC: <42.1 MG/DL — HIGH (ref 0.2–1)
BILIRUB SERPL-MCNC: 52.1 MG/DL — HIGH (ref 0.2–1.2)
BILIRUB SERPL-MCNC: 53.1 MG/DL — HIGH (ref 0.2–1.2)
BILIRUB SERPL-MCNC: 54.1 MG/DL — HIGH (ref 0.2–1.2)
BILIRUB SERPL-MCNC: 59.2 MG/DL — HIGH (ref 0.2–1.2)
BUN SERPL-MCNC: 22 MG/DL — SIGNIFICANT CHANGE UP (ref 7–23)
BUN SERPL-MCNC: 24 MG/DL — HIGH (ref 7–23)
BUN SERPL-MCNC: 32 MG/DL — HIGH (ref 7–23)
CALCIUM SERPL-MCNC: 10 MG/DL — SIGNIFICANT CHANGE UP (ref 8.4–10.5)
CALCIUM SERPL-MCNC: 9.4 MG/DL — SIGNIFICANT CHANGE UP (ref 8.4–10.5)
CALCIUM SERPL-MCNC: 9.4 MG/DL — SIGNIFICANT CHANGE UP (ref 8.4–10.5)
CHLORIDE SERPL-SCNC: 100 MMOL/L — SIGNIFICANT CHANGE UP (ref 96–108)
CHLORIDE SERPL-SCNC: 96 MMOL/L — SIGNIFICANT CHANGE UP (ref 96–108)
CHLORIDE SERPL-SCNC: 99 MMOL/L — SIGNIFICANT CHANGE UP (ref 96–108)
CO2 SERPL-SCNC: 20 MMOL/L — LOW (ref 22–31)
CO2 SERPL-SCNC: 20 MMOL/L — LOW (ref 22–31)
CO2 SERPL-SCNC: 21 MMOL/L — LOW (ref 22–31)
CREAT SERPL-MCNC: 0.32 MG/DL — LOW (ref 0.5–1.3)
CREAT SERPL-MCNC: 0.41 MG/DL — LOW (ref 0.5–1.3)
CREAT SERPL-MCNC: 0.53 MG/DL — SIGNIFICANT CHANGE UP (ref 0.5–1.3)
D DIMER BLD IA.RAPID-MCNC: 5058 NG/ML DDU — HIGH
ERYTHROCYTE [SEDIMENTATION RATE] IN BLOOD: 22 MM/HR — HIGH
GAS PNL BLDA: SIGNIFICANT CHANGE UP
GLUCOSE BLDC GLUCOMTR-MCNC: 147 MG/DL — HIGH (ref 70–99)
GLUCOSE BLDC GLUCOMTR-MCNC: 158 MG/DL — HIGH (ref 70–99)
GLUCOSE BLDC GLUCOMTR-MCNC: 162 MG/DL — HIGH (ref 70–99)
GLUCOSE BLDC GLUCOMTR-MCNC: 182 MG/DL — HIGH (ref 70–99)
GLUCOSE BLDC GLUCOMTR-MCNC: 188 MG/DL — HIGH (ref 70–99)
GLUCOSE BLDC GLUCOMTR-MCNC: 199 MG/DL — HIGH (ref 70–99)
GLUCOSE SERPL-MCNC: 171 MG/DL — HIGH (ref 70–99)
GLUCOSE SERPL-MCNC: 187 MG/DL — HIGH (ref 70–99)
GLUCOSE SERPL-MCNC: 207 MG/DL — HIGH (ref 70–99)
HAPTOGLOB SERPL-MCNC: <10 MG/DL — LOW (ref 34–200)
HCO3 BLDA-SCNC: 22 MMOL/L — SIGNIFICANT CHANGE UP (ref 21–28)
HCO3 BLDA-SCNC: 22 MMOL/L — SIGNIFICANT CHANGE UP (ref 21–28)
HCO3 BLDA-SCNC: 23 MMOL/L — SIGNIFICANT CHANGE UP (ref 21–28)
HCT VFR BLD CALC: 25.3 % — LOW (ref 39–50)
HCT VFR BLD CALC: 27.4 % — LOW (ref 39–50)
HCT VFR BLD CALC: 29.1 % — LOW (ref 39–50)
HCT VFR BLD CALC: 29.9 % — LOW (ref 39–50)
HGB BLD-MCNC: 10.1 G/DL — LOW (ref 13–17)
HGB BLD-MCNC: 10.4 G/DL — LOW (ref 13–17)
HGB BLD-MCNC: 9.2 G/DL — LOW (ref 13–17)
HGB BLD-MCNC: 9.5 G/DL — LOW (ref 13–17)
INR BLD: 1.11 — SIGNIFICANT CHANGE UP (ref 0.88–1.16)
INR BLD: 1.14 — SIGNIFICANT CHANGE UP (ref 0.88–1.16)
INR BLD: 1.18 — HIGH (ref 0.88–1.16)
LACTATE SERPL-SCNC: 3.7 MMOL/L — HIGH (ref 0.5–2)
LACTATE SERPL-SCNC: 3.8 MMOL/L — HIGH (ref 0.5–2)
LACTATE SERPL-SCNC: 3.8 MMOL/L — HIGH (ref 0.5–2)
LACTATE SERPL-SCNC: 4 MMOL/L — CRITICAL HIGH (ref 0.5–2)
LACTATE SERPL-SCNC: 4.4 MMOL/L — CRITICAL HIGH (ref 0.5–2)
LACTATE SERPL-SCNC: 4.5 MMOL/L — CRITICAL HIGH (ref 0.5–2)
LACTATE SERPL-SCNC: 4.8 MMOL/L — CRITICAL HIGH (ref 0.5–2)
MAGNESIUM SERPL-MCNC: 1.8 MG/DL — SIGNIFICANT CHANGE UP (ref 1.6–2.6)
MAGNESIUM SERPL-MCNC: 1.9 MG/DL — SIGNIFICANT CHANGE UP (ref 1.6–2.6)
MAGNESIUM SERPL-MCNC: 1.9 MG/DL — SIGNIFICANT CHANGE UP (ref 1.6–2.6)
MCHC RBC-ENTMCNC: 29.7 PG — SIGNIFICANT CHANGE UP (ref 27–34)
MCHC RBC-ENTMCNC: 29.8 PG — SIGNIFICANT CHANGE UP (ref 27–34)
MCHC RBC-ENTMCNC: 29.9 PG — SIGNIFICANT CHANGE UP (ref 27–34)
MCHC RBC-ENTMCNC: 30.8 PG — SIGNIFICANT CHANGE UP (ref 27–34)
MCHC RBC-ENTMCNC: 34.7 G/DL — SIGNIFICANT CHANGE UP (ref 32–36)
MCHC RBC-ENTMCNC: 34.7 G/DL — SIGNIFICANT CHANGE UP (ref 32–36)
MCHC RBC-ENTMCNC: 34.8 G/DL — SIGNIFICANT CHANGE UP (ref 32–36)
MCHC RBC-ENTMCNC: 36.4 G/DL — HIGH (ref 32–36)
MCV RBC AUTO: 84.6 FL — SIGNIFICANT CHANGE UP (ref 80–100)
MCV RBC AUTO: 85.6 FL — SIGNIFICANT CHANGE UP (ref 80–100)
MCV RBC AUTO: 85.7 FL — SIGNIFICANT CHANGE UP (ref 80–100)
MCV RBC AUTO: 86.2 FL — SIGNIFICANT CHANGE UP (ref 80–100)
PCO2 BLDA: 36 MMHG — SIGNIFICANT CHANGE UP (ref 35–48)
PCO2 BLDA: 41 MMHG — SIGNIFICANT CHANGE UP (ref 35–48)
PCO2 BLDA: 42 MMHG — SIGNIFICANT CHANGE UP (ref 35–48)
PF4 HEPARIN CMPLX AB SER-ACNC: NEGATIVE — SIGNIFICANT CHANGE UP
PF4 HEPARIN CMPLX AB SERPL QL IA: 0.14 ABS — SIGNIFICANT CHANGE UP
PH BLDA: 7.34 — LOW (ref 7.35–7.45)
PH BLDA: 7.37 — SIGNIFICANT CHANGE UP (ref 7.35–7.45)
PH BLDA: 7.39 — SIGNIFICANT CHANGE UP (ref 7.35–7.45)
PHOSPHATE SERPL-MCNC: 2.3 MG/DL — LOW (ref 2.5–4.5)
PHOSPHATE SERPL-MCNC: 2.4 MG/DL — LOW (ref 2.5–4.5)
PHOSPHATE SERPL-MCNC: 3.7 MG/DL — SIGNIFICANT CHANGE UP (ref 2.5–4.5)
PLATELET # BLD AUTO: 103 K/UL — LOW (ref 150–400)
PLATELET # BLD AUTO: 30 K/UL — LOW (ref 150–400)
PLATELET # BLD AUTO: 47 K/UL — LOW (ref 150–400)
PLATELET # BLD AUTO: 73 K/UL — LOW (ref 150–400)
PO2 BLDA: 121 MMHG — HIGH (ref 83–108)
PO2 BLDA: 230 MMHG — HIGH (ref 83–108)
PO2 BLDA: 79 MMHG — LOW (ref 83–108)
POTASSIUM SERPL-MCNC: 3.8 MMOL/L — SIGNIFICANT CHANGE UP (ref 3.5–5.3)
POTASSIUM SERPL-MCNC: 3.8 MMOL/L — SIGNIFICANT CHANGE UP (ref 3.5–5.3)
POTASSIUM SERPL-MCNC: 4.3 MMOL/L — SIGNIFICANT CHANGE UP (ref 3.5–5.3)
POTASSIUM SERPL-SCNC: 3.8 MMOL/L — SIGNIFICANT CHANGE UP (ref 3.5–5.3)
POTASSIUM SERPL-SCNC: 3.8 MMOL/L — SIGNIFICANT CHANGE UP (ref 3.5–5.3)
POTASSIUM SERPL-SCNC: 4.3 MMOL/L — SIGNIFICANT CHANGE UP (ref 3.5–5.3)
PROT SERPL-MCNC: 4.3 G/DL — LOW (ref 6–8.3)
PROT SERPL-MCNC: 4.4 G/DL — LOW (ref 6–8.3)
PROT SERPL-MCNC: 4.4 G/DL — LOW (ref 6–8.3)
PROT SERPL-MCNC: 4.9 G/DL — LOW (ref 6–8.3)
PROTHROM AB SERPL-ACNC: 12.3 SEC — SIGNIFICANT CHANGE UP (ref 9.8–12.7)
PROTHROM AB SERPL-ACNC: 12.7 SEC — SIGNIFICANT CHANGE UP (ref 9.8–12.7)
PROTHROM AB SERPL-ACNC: 13.1 SEC — HIGH (ref 9.8–12.7)
RBC # BLD: 2.99 M/UL — LOW (ref 4.2–5.8)
RBC # BLD: 3.18 M/UL — LOW (ref 4.2–5.8)
RBC # BLD: 3.18 M/UL — LOW (ref 4.2–5.8)
RBC # BLD: 3.4 M/UL — LOW (ref 4.2–5.8)
RBC # BLD: 3.49 M/UL — LOW (ref 4.2–5.8)
RBC # FLD: 17.4 % — HIGH (ref 10.3–16.9)
RBC # FLD: 18.6 % — HIGH (ref 10.3–16.9)
RBC # FLD: 18.6 % — HIGH (ref 10.3–16.9)
RBC # FLD: 19 % — HIGH (ref 10.3–16.9)
RETICS/RBC NFR: 6.8 % — HIGH (ref 0.5–2.5)
SAO2 % BLDA: 94 % — LOW (ref 95–100)
SAO2 % BLDA: 98 % — SIGNIFICANT CHANGE UP (ref 95–100)
SAO2 % BLDA: 99 % — SIGNIFICANT CHANGE UP (ref 95–100)
SODIUM SERPL-SCNC: 138 MMOL/L — SIGNIFICANT CHANGE UP (ref 135–145)
SODIUM SERPL-SCNC: 140 MMOL/L — SIGNIFICANT CHANGE UP (ref 135–145)
SODIUM SERPL-SCNC: 142 MMOL/L — SIGNIFICANT CHANGE UP (ref 135–145)
T3 SERPL-MCNC: 56 NG/DL — LOW (ref 80–200)
WBC # BLD: 11.8 K/UL — HIGH (ref 3.8–10.5)
WBC # BLD: 12.3 K/UL — HIGH (ref 3.8–10.5)
WBC # BLD: 16.8 K/UL — HIGH (ref 3.8–10.5)
WBC # BLD: 17 K/UL — HIGH (ref 3.8–10.5)
WBC # FLD AUTO: 11.8 K/UL — HIGH (ref 3.8–10.5)
WBC # FLD AUTO: 12.3 K/UL — HIGH (ref 3.8–10.5)
WBC # FLD AUTO: 16.8 K/UL — HIGH (ref 3.8–10.5)
WBC # FLD AUTO: 17 K/UL — HIGH (ref 3.8–10.5)

## 2018-07-14 PROCEDURE — 93306 TTE W/DOPPLER COMPLETE: CPT | Mod: 26

## 2018-07-14 PROCEDURE — 95951: CPT | Mod: 26

## 2018-07-14 PROCEDURE — 99232 SBSQ HOSP IP/OBS MODERATE 35: CPT

## 2018-07-14 PROCEDURE — 99292 CRITICAL CARE ADDL 30 MIN: CPT

## 2018-07-14 PROCEDURE — 99291 CRITICAL CARE FIRST HOUR: CPT

## 2018-07-14 PROCEDURE — 71045 X-RAY EXAM CHEST 1 VIEW: CPT | Mod: 26

## 2018-07-14 RX ORDER — AZTREONAM 2 G
1000 VIAL (EA) INJECTION EVERY 12 HOURS
Qty: 0 | Refills: 0 | Status: DISCONTINUED | OUTPATIENT
Start: 2018-07-14 | End: 2018-07-14

## 2018-07-14 RX ORDER — FENTANYL CITRATE 50 UG/ML
25 INJECTION INTRAVENOUS ONCE
Qty: 0 | Refills: 0 | Status: DISCONTINUED | OUTPATIENT
Start: 2018-07-14 | End: 2018-07-14

## 2018-07-14 RX ORDER — AZTREONAM 2 G
2000 VIAL (EA) INJECTION EVERY 12 HOURS
Qty: 0 | Refills: 0 | Status: DISCONTINUED | OUTPATIENT
Start: 2018-07-14 | End: 2018-07-20

## 2018-07-14 RX ORDER — MIDODRINE HYDROCHLORIDE 2.5 MG/1
10 TABLET ORAL EVERY 8 HOURS
Qty: 0 | Refills: 0 | Status: DISCONTINUED | OUTPATIENT
Start: 2018-07-14 | End: 2018-07-20

## 2018-07-14 RX ORDER — MIDAZOLAM HYDROCHLORIDE 1 MG/ML
5 INJECTION, SOLUTION INTRAMUSCULAR; INTRAVENOUS ONCE
Qty: 0 | Refills: 0 | Status: DISCONTINUED | OUTPATIENT
Start: 2018-07-14 | End: 2018-07-14

## 2018-07-14 RX ORDER — AZTREONAM 2 G
VIAL (EA) INJECTION
Qty: 0 | Refills: 0 | Status: DISCONTINUED | OUTPATIENT
Start: 2018-07-14 | End: 2018-07-14

## 2018-07-14 RX ORDER — PROPOFOL 10 MG/ML
10 INJECTION, EMULSION INTRAVENOUS
Qty: 1000 | Refills: 0 | Status: DISCONTINUED | OUTPATIENT
Start: 2018-07-14 | End: 2018-07-20

## 2018-07-14 RX ORDER — FENTANYL CITRATE 50 UG/ML
25 INJECTION INTRAVENOUS EVERY 4 HOURS
Qty: 0 | Refills: 0 | Status: DISCONTINUED | OUTPATIENT
Start: 2018-07-14 | End: 2018-07-20

## 2018-07-14 RX ORDER — PANTOPRAZOLE SODIUM 20 MG/1
40 TABLET, DELAYED RELEASE ORAL EVERY 12 HOURS
Qty: 0 | Refills: 0 | Status: DISCONTINUED | OUTPATIENT
Start: 2018-07-14 | End: 2018-07-20

## 2018-07-14 RX ORDER — FOLIC ACID 0.8 MG
1 TABLET ORAL DAILY
Qty: 0 | Refills: 0 | Status: DISCONTINUED | OUTPATIENT
Start: 2018-07-14 | End: 2018-07-20

## 2018-07-14 RX ORDER — HYDROCORTISONE 20 MG
50 TABLET ORAL DAILY
Qty: 0 | Refills: 0 | Status: COMPLETED | OUTPATIENT
Start: 2018-07-15 | End: 2018-07-17

## 2018-07-14 RX ORDER — AZTREONAM 2 G
1000 VIAL (EA) INJECTION ONCE
Qty: 0 | Refills: 0 | Status: DISCONTINUED | OUTPATIENT
Start: 2018-07-14 | End: 2018-07-14

## 2018-07-14 RX ADMIN — DEXMEDETOMIDINE HYDROCHLORIDE IN 0.9% SODIUM CHLORIDE 7.38 MICROGRAM(S)/KG/HR: 4 INJECTION INTRAVENOUS at 23:48

## 2018-07-14 RX ADMIN — PROPOFOL 3.54 MICROGRAM(S)/KG/MIN: 10 INJECTION, EMULSION INTRAVENOUS at 11:00

## 2018-07-14 RX ADMIN — FENTANYL CITRATE 25 MICROGRAM(S): 50 INJECTION INTRAVENOUS at 10:40

## 2018-07-14 RX ADMIN — Medication 100 MILLIGRAM(S): at 12:40

## 2018-07-14 RX ADMIN — URSODIOL 300 MILLIGRAM(S): 250 TABLET, FILM COATED ORAL at 08:00

## 2018-07-14 RX ADMIN — Medication 50 MILLILITER(S): at 21:45

## 2018-07-14 RX ADMIN — CHLORHEXIDINE GLUCONATE 15 MILLILITER(S): 213 SOLUTION TOPICAL at 17:16

## 2018-07-14 RX ADMIN — Medication 50 MILLIGRAM(S): at 05:39

## 2018-07-14 RX ADMIN — SODIUM CHLORIDE 10 MILLILITER(S): 9 INJECTION INTRAMUSCULAR; INTRAVENOUS; SUBCUTANEOUS at 21:46

## 2018-07-14 RX ADMIN — DEXMEDETOMIDINE HYDROCHLORIDE IN 0.9% SODIUM CHLORIDE 7.38 MICROGRAM(S)/KG/HR: 4 INJECTION INTRAVENOUS at 13:48

## 2018-07-14 RX ADMIN — DEXMEDETOMIDINE HYDROCHLORIDE IN 0.9% SODIUM CHLORIDE 7.38 MICROGRAM(S)/KG/HR: 4 INJECTION INTRAVENOUS at 11:00

## 2018-07-14 RX ADMIN — Medication 50 MILLILITER(S): at 05:40

## 2018-07-14 RX ADMIN — CHLORHEXIDINE GLUCONATE 15 MILLILITER(S): 213 SOLUTION TOPICAL at 05:39

## 2018-07-14 RX ADMIN — Medication 2: at 23:48

## 2018-07-14 RX ADMIN — FENTANYL CITRATE 25 MICROGRAM(S): 50 INJECTION INTRAVENOUS at 17:11

## 2018-07-14 RX ADMIN — MEROPENEM 100 MILLIGRAM(S): 1 INJECTION INTRAVENOUS at 05:39

## 2018-07-14 RX ADMIN — Medication 2: at 08:00

## 2018-07-14 RX ADMIN — Medication 8.85 MICROGRAM(S)/KG/MIN: at 12:41

## 2018-07-14 RX ADMIN — PANTOPRAZOLE SODIUM 40 MILLIGRAM(S): 20 TABLET, DELAYED RELEASE ORAL at 17:16

## 2018-07-14 RX ADMIN — Medication 1000 MILLIGRAM(S): at 21:46

## 2018-07-14 RX ADMIN — FENTANYL CITRATE 25 MICROGRAM(S): 50 INJECTION INTRAVENOUS at 11:00

## 2018-07-14 RX ADMIN — MIDAZOLAM HYDROCHLORIDE 5 MILLIGRAM(S): 1 INJECTION, SOLUTION INTRAMUSCULAR; INTRAVENOUS at 16:00

## 2018-07-14 RX ADMIN — Medication 100 MILLIGRAM(S): at 23:48

## 2018-07-14 RX ADMIN — MODAFINIL 100 MILLIGRAM(S): 200 TABLET ORAL at 12:39

## 2018-07-14 RX ADMIN — MIDODRINE HYDROCHLORIDE 10 MILLIGRAM(S): 2.5 TABLET ORAL at 21:46

## 2018-07-14 RX ADMIN — Medication 1 DROP(S): at 17:16

## 2018-07-14 RX ADMIN — PHENYLEPHRINE HYDROCHLORIDE 21.98 MICROGRAM(S)/KG/MIN: 10 INJECTION INTRAVENOUS at 21:47

## 2018-07-14 RX ADMIN — Medication 2: at 00:12

## 2018-07-14 RX ADMIN — URSODIOL 300 MILLIGRAM(S): 250 TABLET, FILM COATED ORAL at 17:16

## 2018-07-14 RX ADMIN — FENTANYL CITRATE 25 MICROGRAM(S): 50 INJECTION INTRAVENOUS at 16:50

## 2018-07-14 RX ADMIN — Medication 1 MILLIGRAM(S): at 12:39

## 2018-07-14 RX ADMIN — Medication 250 MILLIGRAM(S): at 12:40

## 2018-07-14 RX ADMIN — Medication 2: at 13:09

## 2018-07-14 RX ADMIN — Medication 50 MILLILITER(S): at 15:00

## 2018-07-14 RX ADMIN — Medication 1 DROP(S): at 05:39

## 2018-07-14 RX ADMIN — CHLORHEXIDINE GLUCONATE 1 APPLICATION(S): 213 SOLUTION TOPICAL at 12:37

## 2018-07-14 NOTE — PROGRESS NOTE ADULT - SUBJECTIVE AND OBJECTIVE BOX
Heme/Onc Progress Note (Dr. Garrett )    Interval History: Patient with persistent oozing from multiple sites including IV lines, oral mucosa, and chest wound vac/chest tube. Patient currently on sedation however awake, blinking eyes, has eeg leads attached. No documented fever overnight.         ROS is otherwise negative.    penicillin (Rash)    Allergies    penicillin (Rash)    Intolerances        Medications:  MEDICATIONS  (STANDING):  albumin human 25% IVPB 50 milliLiter(s) IV Intermittent every 8 hours  artificial  tears Solution 1 Drop(s) Both EYES two times a day  aztreonam  IVPB 2000 milliGRAM(s) IV Intermittent every 12 hours  chlorhexidine 0.12% Liquid 15 milliLiter(s) Swish and Spit every 12 hours  chlorhexidine 2% Cloths 1 Application(s) Topical daily  dexmedetomidine Infusion 0.5 MICROgram(s)/kG/Hr (7.375 mL/Hr) IV Continuous <Continuous>  dextrose 5%. 1000 milliLiter(s) (25 mL/Hr) IV Continuous <Continuous>  dextrose 50% Injectable 50 milliLiter(s) IV Push every 15 minutes  dextrose 50% Injectable 25 milliLiter(s) IV Push every 15 minutes  DOBUTamine Infusion 5 MICROgram(s)/kG/Min (8.85 mL/Hr) IV Continuous <Continuous>  folic acid 1 milliGRAM(s) Oral daily  heparin  Infusion 400 Unit(s)/Hr (4 mL/Hr) IV Continuous <Continuous>  insulin lispro (HumaLOG) corrective regimen sliding scale   SubCutaneous every 6 hours  mesalamine Suppository 1000 milliGRAM(s) Rectal at bedtime  modafinil 100 milliGRAM(s) Oral daily  pantoprazole  Injectable 40 milliGRAM(s) IV Push every 12 hours  phenylephrine    Infusion 2 MICROgram(s)/kG/Min (21.975 mL/Hr) IV Continuous <Continuous>  propofol Infusion 10 MICROgram(s)/kG/Min (3.54 mL/Hr) IV Continuous <Continuous>  PureFlow Dialysate RFP-400 (K 2 / Ca 3) 5000 milliLiter(s) (1500 mL/Hr) CRRT <Continuous>  sodium chloride 0.9%. 1000 milliLiter(s) (10 mL/Hr) IV Continuous <Continuous>  ursodiol Suspension 300 milliGRAM(s) Oral every 12 hours  vancomycin  IVPB 750 milliGRAM(s) IV Intermittent every 24 hours  vasopressin Infusion 0.017 Unit(s)/Min (1 mL/Hr) IV Continuous <Continuous>    MEDICATIONS  (PRN):  dextrose 40% Gel 15 Gram(s) Oral once PRN Blood Glucose LESS THAN 70 milliGRAM(s)/deciliter  glucagon  Injectable 1 milliGRAM(s) IntraMuscular once PRN Glucose LESS THAN 70 milligrams/deciliter    heparin  Infusion 400 Unit(s)/Hr IV Continuous <Continuous>          PHYSICAL EXAM:    T(F): 96 (07-14-18 @ 09:16), Max: 96.7 (07-14-18 @ 01:00)  HR: 90 (07-14-18 @ 12:06) (90 - 92)  BP: 109/58 (07-14-18 @ 10:00) (80/46 - 109/58)  RR: 24 (07-14-18 @ 12:00) (20 - 31)  SpO2: 93% (07-14-18 @ 12:06) (92% - 97%)  Wt(kg): --    Daily     Daily     Gen: intubated, nonverbal  HEENT: scleral icterus, moves eyes to sound; PERRL  Neck: central line with oozing and CVVHD catheter mild oozing  Chest: open chest with wound vac and chest tube x2 draining blood  Cardiovascular: difficult to assess d/t wound vac  Respiratory: difficult to assess  Gastrointestinal: BS+, soft, NT/ND, no masses, no splenomegaly, no hepatomegaly, no evidence for ascites  Extremities: no clubbing/cyanosis, no edema, no calf tenderness  Vascular:  DP/PT 2+ b/l  Neurological: CN 2-12 grossly intact, no focal deficits  Skin: jaundice whole body  Musculoskeletal:  mildly blue fingers, swollen      Labs:                          9.5    11.8  )-----------( 30       ( 14 Jul 2018 12:27 )             27.4     CBC Full  -  ( 14 Jul 2018 12:27 )  WBC Count : 11.8 K/uL  Hemoglobin : 9.5 g/dL  Hematocrit : 27.4 %  Platelet Count - Automated : 30 K/uL  Mean Cell Volume : 86.2 fL  Mean Cell Hemoglobin : 29.9 pg  Mean Cell Hemoglobin Concentration : 34.7 g/dL  Auto Neutrophil # : x  Auto Lymphocyte # : x  Auto Monocyte # : x  Auto Eosinophil # : x  Auto Basophil # : x  Auto Neutrophil % : x  Auto Lymphocyte % : x  Auto Monocyte % : x  Auto Eosinophil % : x  Auto Basophil % : x    PT/INR - ( 14 Jul 2018 03:22 )   PT: 13.1 sec;   INR: 1.18          PTT - ( 14 Jul 2018 03:22 )  PTT:40.9 sec    07-14    142  |  100  |  22  ----------------------------<  187<H>  3.8   |  21<L>  |  0.32<L>    Ca    10.0      14 Jul 2018 03:22  Phos  2.3     07-14  Mg     1.9     07-14    TPro  4.4<L>  /  Alb  4.0  /  TBili  53.1<H>  /  DBili  x   /  AST  207<H>  /  ALT  103<H>  /  AlkPhos  275<H>  07-14        CXR - Portable exam at chest demonstrates no interval change this remaining   support devices in comparison to prior examination of the chest   7/13/2018. Status post valvular replacement. Congestion and/or   infiltrates.    Impression: Congestive change and/or infiltrates

## 2018-07-14 NOTE — PROGRESS NOTE ADULT - SUBJECTIVE AND OBJECTIVE BOX
66yo with history of congenital heart disease s/p AV repair vs replacement at the age of 12 recent cardiac evaluation for pre-op clearance.  On workup pt diagnosed with VHD (severe prosthetic AS, severe MR, Severe TR) as well 2 vessel CAD in the setting of low EF 20-25%.      Pre-op CT showing left subclavian artery stenosis, possible coarctation of aorta (focal narrowing 3.5 cm segment of proximal descending aorta distal to the subclavian artery)    6/21 AVR, ascending aorta, TV/MV repair, CABG X2.  received multiple blood products/volume arrived with open chest.  IABP and Impella for LV support.    6/22 Chest closure but re-explored for tamponade on 6/23 6/26 Chest closure but again re-explored by bedside for acute hemodynamic failure  7/2 s/p Chest closure  7/11 Impella removed    24 hours: pt remains critically ill, on pressors, iontropic support active issues include    1.  persistent lactic acidosis unclear cardiogenic, organ specific vs respiratory effort  2.  respiratory failure  3.  encephalopathy/ AMS likely metabolic   4.  critical illness myopathy/neuropathy  5.  LGIB found to have severe proctitis with rectal ulcer in flex sig  6.  Hepatitis/Jandice  7. Concern for mediastinitis due to multiple chest exploration   8. Cardiogenic shock s/p impella support -- requiring iontropic support      PMH :  CHF  Complete heart block  Cardiac tamponade  Tricuspid valve insufficiency, unspecified etiology  Coronary artery disease with other form of angina pectoris  Mitral valve insufficiency, unspecified etiology  Aortic valve stenosis, etiology of cardiac valve disease unspecified  Complete heart block  Tricuspid valve insufficiency, unspecified etiology  Coronary artery disease with other form of angina pectoris  Mitral valve insufficiency, unspecified etiology  Aortic valve stenosis, etiology of cardiac valve disease unspecified  Thrombocytopenia  Shock  Anemia    ICU Vital Signs Last 24 Hrs  T(C): 35.8 (07-14-18 @ 16:00), Max: 35.9 (07-13-18 @ 21:05)  T(F): 96.5 (07-14-18 @ 16:00), Max: 96.7 (07-14-18 @ 01:00)  HR: 93 (07-14-18 @ 16:36) (90 - 96)  BP: 97/53 (07-14-18 @ 16:00) (97/53 - 109/58)  BP(mean): 67 (07-14-18 @ 16:00) (67 - 72)  ABP: 88/50 (07-14-18 @ 16:00) (82/44 - 110/56)  ABP(mean): 62 (07-14-18 @ 16:00) (54 - 76)  RR: 26 (07-14-18 @ 16:00) (17 - 31)  SpO2: 98% (07-14-18 @ 16:36) (92% - 98%)    I&O's Summary    13 Jul 2018 07:01  -  14 Jul 2018 07:00  --------------------------------------------------------  IN: 4632.4 mL / OUT: 2561 mL / NET: 2071.4 mL    14 Jul 2018 07:01  -  14 Jul 2018 16:50  --------------------------------------------------------  IN: 2028.9 mL / OUT: 1147 mL / NET: 881.9 mL      Mode: AC/ CMV (Assist Control/ Continuous Mandatory Ventilation)  RR (machine): 15  TV (machine): 500  FiO2: 100  PEEP: 8  ITime: 1.2  MAP: 18  PIP: 32    ABG - ( 14 Jul 2018 16:44 )  pH: 7.34  /  pCO2: 42    /  pO2: 230   / HCO3: 22    / Base Excess: -3.5  /  SaO2: 99                              9.5    8.1   )-----------( 30       ( 14 Jul 2018 12:27 )             27.4     14 Jul 2018 13:25    140    |  99     |  24     ----------------------------<  207    3.8     |  20     |  0.41     Ca    9.4        14 Jul 2018 13:25  Phos  2.4       14 Jul 2018 13:25  Mg     1.8       14 Jul 2018 13:25    TPro  4.4    /  Alb  3.5    /  TBili  54.1   /  DBili  x      /  AST  189    /  ALT  98     /  AlkPhos  303    14 Jul 2018 13:25    PT/INR - ( 14 Jul 2018 12:27 )   PT: 12.7 sec;   INR: 1.14          PTT - ( 14 Jul 2018 12:27 )  PTT:39.8 sec  MEDICATIONS  (STANDING):  albumin human 25% IVPB 50 milliLiter(s) IV Intermittent every 8 hours  artificial  tears Solution 1 Drop(s) Both EYES two times a day  aztreonam  IVPB 2000 milliGRAM(s) IV Intermittent every 12 hours  chlorhexidine 0.12% Liquid 15 milliLiter(s) Swish and Spit every 12 hours  chlorhexidine 2% Cloths 1 Application(s) Topical daily  dexmedetomidine Infusion 0.5 MICROgram(s)/kG/Hr IV Continuous <Continuous>  dextrose 5%. 1000 milliLiter(s) IV Continuous <Continuous>  dextrose 50% Injectable 50 milliLiter(s) IV Push every 15 minutes  dextrose 50% Injectable 25 milliLiter(s) IV Push every 15 minutes  DOBUTamine Infusion 5 MICROgram(s)/kG/Min IV Continuous <Continuous>  folic acid 1 milliGRAM(s) Oral daily  heparin  Infusion 400 Unit(s)/Hr IV Continuous <Continuous>  insulin lispro (HumaLOG) corrective regimen sliding scale   SubCutaneous every 6 hours  mesalamine Suppository 1000 milliGRAM(s) Rectal at bedtime  midodrine 10 milliGRAM(s) Oral every 8 hours  modafinil 100 milliGRAM(s) Oral daily  pantoprazole  Injectable 40 milliGRAM(s) IV Push every 12 hours  phenylephrine    Infusion 2 MICROgram(s)/kG/Min IV Continuous <Continuous>  propofol Infusion 10 MICROgram(s)/kG/Min IV Continuous <Continuous>  PureFlow Dialysate RFP-400 (K 2 / Ca 3) 5000 milliLiter(s) CRRT <Continuous>  sodium chloride 0.9%. 1000 milliLiter(s) IV Continuous <Continuous>  ursodiol Suspension 300 milliGRAM(s) Oral every 12 hours  vancomycin  IVPB 750 milliGRAM(s) IV Intermittent every 24 hours  vasopressin Infusion 0.017 Unit(s)/Min IV Continuous <Continuous>    Home Medications:  Lasix 40 mg oral tablet: 1 tab(s) orally once a day (19 Jun 2018 00:25)    PHYSICAL EXAM:  Gen : no acute distress  Neck: No LAD, No JVD  Respiratory: decreased in the bases  Cardiovascular: S1 and S2, RRR, no M/G/R  Gastrointestinal: BS+, soft, NT/ND  Extremities: No peripheral edema  Vascular: 2+ peripheral pulses  Neurological: A/O x 3, no focal deficits  Incision: clean dry/ no sign of infection  Lines: no sign of infection 68yo with history of congenital heart disease s/p AV repair vs replacement at the age of 12 recent cardiac evaluation for pre-op clearance.  On workup pt diagnosed with VHD (severe prosthetic AS, severe MR, Severe TR) as well 2 vessel CAD in the setting of low EF 20-25%.      Pre-op CT showing left subclavian artery stenosis, possible coarctation of aorta (focal narrowing 3.5 cm segment of proximal descending aorta distal to the subclavian artery)    6/21 AVR, ascending aorta, TV/MV repair, CABG X2.  received multiple blood products/volume arrived with open chest.  IABP and Impella for LV support.    6/22 Chest closure but re-explored for tamponade on 6/23 6/26 Chest closure but again re-explored by bedside for acute hemodynamic failure  7/2 s/p Chest closure  7/11 Impella removed    24 hours: pt remains critically ill, on pressors, iontropic support active issues include    1.  persistent lactic acidosis unclear cardiogenic, organ specific vs respiratory effort  2.  respiratory failure  3.  encephalopathy/ AMS likely metabolic   4.  critical illness myopathy/neuropathy  5.  LGIB found to have severe proctitis with rectal ulcer in flex sig  6.  Hepatitis/Jandice  7. Concern for mediastinitis due to multiple chest exploration   8. Cardiogenic shock s/p impella support -- requiring iontropic support      PMH :  CHF  Complete heart block  Cardiac tamponade  Tricuspid valve insufficiency, unspecified etiology  Coronary artery disease with other form of angina pectoris  Mitral valve insufficiency, unspecified etiology  Aortic valve stenosis, etiology of cardiac valve disease unspecified  Complete heart block  Tricuspid valve insufficiency, unspecified etiology  Coronary artery disease with other form of angina pectoris  Mitral valve insufficiency, unspecified etiology  Aortic valve stenosis, etiology of cardiac valve disease unspecified  Thrombocytopenia  Shock  Anemia    ICU Vital Signs Last 24 Hrs  T(C): 35.8 (07-14-18 @ 16:00), Max: 35.9 (07-13-18 @ 21:05)  T(F): 96.5 (07-14-18 @ 16:00), Max: 96.7 (07-14-18 @ 01:00)  HR: 93 (07-14-18 @ 16:36) (90 - 96)  BP: 97/53 (07-14-18 @ 16:00) (97/53 - 109/58)  BP(mean): 67 (07-14-18 @ 16:00) (67 - 72)  ABP: 88/50 (07-14-18 @ 16:00) (82/44 - 110/56)  ABP(mean): 62 (07-14-18 @ 16:00) (54 - 76)  RR: 26 (07-14-18 @ 16:00) (17 - 31)  SpO2: 98% (07-14-18 @ 16:36) (92% - 98%)    I&O's Summary    13 Jul 2018 07:01  -  14 Jul 2018 07:00  --------------------------------------------------------  IN: 4632.4 mL / OUT: 2561 mL / NET: 2071.4 mL    14 Jul 2018 07:01  -  14 Jul 2018 16:50  --------------------------------------------------------  IN: 2028.9 mL / OUT: 1147 mL / NET: 881.9 mL      Mode: AC/ CMV (Assist Control/ Continuous Mandatory Ventilation)  RR (machine): 15  TV (machine): 500  FiO2: 100  PEEP: 8  ITime: 1.2  MAP: 18  PIP: 32    ABG - ( 14 Jul 2018 16:44 )  pH: 7.34  /  pCO2: 42    /  pO2: 230   / HCO3: 22    / Base Excess: -3.5  /  SaO2: 99                              9.5    8.1   )-----------( 30       ( 14 Jul 2018 12:27 )             27.4     14 Jul 2018 13:25    140    |  99     |  24     ----------------------------<  207    3.8     |  20     |  0.41     Ca    9.4        14 Jul 2018 13:25  Phos  2.4       14 Jul 2018 13:25  Mg     1.8       14 Jul 2018 13:25    TPro  4.4    /  Alb  3.5    /  TBili  54.1   /  DBili  x      /  AST  189    /  ALT  98     /  AlkPhos  303    14 Jul 2018 13:25    PT/INR - ( 14 Jul 2018 12:27 )   PT: 12.7 sec;   INR: 1.14     PTT - ( 14 Jul 2018 12:27 )  PTT:39.8 sec    MEDICATIONS  (STANDING):  albumin human 25% IVPB 50 milliLiter(s) IV Intermittent every 8 hours  aztreonam  IVPB 2000 milliGRAM(s) IV Intermittent every 12 hours  dexmedetomidine Infusion 0.5 MICROgram(s)/kG/Hr IV Continuous <Continuous>  DOBUTamine Infusion 5 MICROgram(s)/kG/Min IV Continuous <Continuous>  folic acid 1 milliGRAM(s) Oral daily  heparin  Infusion 400 Unit(s)/Hr IV Continuous <Continuous>  insulin lispro (HumaLOG) corrective regimen sliding scale   SubCutaneous every 6 hours  mesalamine Suppository 1000 milliGRAM(s) Rectal at bedtime  midodrine 10 milliGRAM(s) Oral every 8 hours  pantoprazole  Injectable 40 milliGRAM(s) IV Push every 12 hours  phenylephrine    Infusion 2 MICROgram(s)/kG/Min IV Continuous <Continuous>  propofol Infusion 10 MICROgram(s)/kG/Min IV Continuous <Continuous>  PureFlow Dialysate RFP-400 (K 2 / Ca 3) 5000 milliLiter(s) CRRT <Continuous>  sodium chloride 0.9%. 1000 milliLiter(s) IV Continuous <Continuous>  ursodiol Suspension 300 milliGRAM(s) Oral every 12 hours  vancomycin  IVPB 750 milliGRAM(s) IV Intermittent every 24 hours  vasopressin Infusion 0.017 Unit(s)/Min IV Continuous <Continuous>    Home Medications:  Lasix 40 mg oral tablet: 1 tab(s) orally once a day (19 Jun 2018 00:25)    PHYSICAL EXAM:  Gen :intubatd, + jaundice   Respiratory: decreased in the bases  Cardiovascular: S1 and S2, RRR, no M/G/R  Gastrointestinal: BS+, soft, NT/ND  Extremities: No peripheral edema  Vascular: 2+ peripheral pulses  Neurological: opens eyes spontaneously, not following commands   Incision: + vac to sternum   Lines: no sign of infection

## 2018-07-14 NOTE — PROGRESS NOTE ADULT - ASSESSMENT
68yo with history of congenital heart disease s/p AVR @ age of 12, Ef 20% on preop cardiac clearance diagnosed with 2 vessel CAD, VHD (severe prosthetic AS, Severe MR, severe TR, severe pulm hypertension.).    Pt s/p complex surgery on 6/21 with CABG x2, AVR, MV/TV repair, Impella insertion, IABP  -- open chest  6/22 Chest closed  6/23 Chest re-explored for tamponade  6/26 chest closure   6/27 chest re-explored by bedside for pseudo-tamponade.  6/30 chest washout  7/2 chest closure      Problems  1. Cardiogenic shock, supported with pressors/iontropes, Impella  2. lactic acidosis  3. Multiorgan failure    Plan   Neuro -- sedation resume in the setting of worsening acidosis  CVS -Continue Impella support @ P6/3L flow  continuing pressor support.    Pulm - stable vent requirement   weaning Ana Luisa  GI -+ BM, TF increased   Concern lactate is from abdominal ischemia  Jaundice/conjugated hyperbilirubinemia likely from hemolysis.    RUQ sono completed, pending read   - anuric -CVVH   Endo - glycemic control, taper stress dose steriods.   Heme - continuing heparin flush with impella, heparin gtt   ID - On empiric antibiotics -- Broadened for worsening clinical status.  Concern is bowel ischemia and intra-abdominal source of infection vs mediastinitis - all cultures remain negative.  Will start de-escalating.     Critical post op.    Critical care time spent 80 min 68yo with history of congenital heart disease s/p AVR @ age of 12, Ef 20% on preop cardiac clearance diagnosed with 2 vessel CAD, VHD (severe prosthetic AS, Severe MR, severe TR, severe pulm hypertension.). Complicated post op course in cardiogenic shock with multiorgan failure       Problems  1.  persistent lactic acidosis unclear cardiogenic, organ specific vs respiratory effort  2.  respiratory failure  3.  encephalopathy/ AMS likely metabolic   4.  critical illness myopathy/neuropathy  5.  LGIB found to have severe proctitis with rectal ulcer in flex sig  6.  Hepatitis/Jandice  7. Concern for mediastinitis due to multiple chest exploration   8. Cardiogenic shock s/p impella support -- requiring iontropic support - multiorgan failure       Plan   Neuro -- sedation resume in the setting of worsening acidosis.  Lactate improved with  sedation   CVS - Continues to require pressor support.  Fluctuating pressor requirement over the course of the day.  Currently on Dio @ 2mcg/kg/min, Epi 0.06mcg/kg/min, Vaso @ 0.017units/min   Repeat Echo today -- EF low but no pericardial effusion   Pulm - Likely needs bronch but due to severe coagulatopathy will defer today   Continuing vent support  Likely Trach on Monday   sedation for increased work of breathing   GI -+ BM, TF at goal.  LGIB due to proctitis -- medical management   Hyperbilirubinemia -- continuing supportive care.  If stable for transport will get CT scan in AM to rule out obstructive process.   Jaundice/unconjugated hyperbilirubinemia likely from hemolysis.     -CVVH held, aquapharesis for fluid removal    Endo - glycemic control, taper stress dose steriods.   Heme - heme following For w/u anemia/thrombocytopeniua   ID - On empiric antibiotics -- Vanc and Aztreonam for concern of mediastitis     Critical post op.    Critical care time spent 80 min

## 2018-07-14 NOTE — PROGRESS NOTE ADULT - SUBJECTIVE AND OBJECTIVE BOX
CC: CHF      INTERVAL HISTORY:  eyes open  ?response to verbal stimuli  CVVHD continuing  intubated      ROS: No chest pain, no sob, no abd pain. No n/v/d    PAST MEDICAL & SURGICAL HISTORY:  No pertinent past medical history  No significant past surgical history      PHYSICAL EXAM:  T(C): 35.8 (07-14-18 @ 05:01), Max: 35.9 (07-13-18 @ 10:00)  HR: 90 (07-14-18 @ 06:24)  BP: 80/46 (07-13-18 @ 13:00) (80/46 - 80/46)  RR: 31 (07-14-18 @ 06:24)  SpO2: 95% (07-14-18 @ 06:24)  Wt(kg): --  I&O's Summary    13 Jul 2018 07:01  -  14 Jul 2018 07:00  --------------------------------------------------------  IN: 4270.9 mL / OUT: 2328 mL / NET: 1942.9 mL      Weight   General  NAD. intubated/jaundiced  HEENT: moist mucous membranes, no pallor/cyanosis.  Neck: no JVD visible.  Cardiac: S1, S2. RRR. No murmurs   Respratory:rhonchi  Abdomen: soft. nontender. nondistended  Skin: no rashes.  Extremities: + LE/ UE  edema b/l  Access:       DATA:                        10.1<L>  12.3<H> )-----------( 47<L>    ( 14 Jul 2018 03:22 )             29.1<L>        142    |  100    |  22     ----------------------------<  187<H>  Ca:10.0  (14 Jul 2018 03:22)  3.8     |  21<L>  |  0.32<L>      eGFR if Non : 133  eGFR if : 154    TPro  4.4<L>  /  Alb  4.0    /  TBili  53.1<H>  /  DBili  x      /  AST  207<H>  /  ALT  103<H>  /  AlkPhos  275<H>  14 Jul 2018 03:22  Lactate Dehydrogenase, Serum: 1338 U/L <H> (07-13 @ 10:10)                    MEDICATIONS  (STANDING):  albumin human 25% IVPB 50 milliLiter(s) IV Intermittent every 8 hours  artificial  tears Solution 1 Drop(s) Both EYES two times a day  chlorhexidine 0.12% Liquid 15 milliLiter(s) Swish and Spit every 12 hours  chlorhexidine 2% Cloths 1 Application(s) Topical daily  cisatracurium Infusion 3 MICROgram(s)/kG/Min (10.548 mL/Hr) IV Continuous <Continuous>  dexmedetomidine Infusion 0.5 MICROgram(s)/kG/Hr (7.375 mL/Hr) IV Continuous <Continuous>  dextrose 5%. 1000 milliLiter(s) (25 mL/Hr) IV Continuous <Continuous>  dextrose 50% Injectable 50 milliLiter(s) IV Push every 15 minutes  dextrose 50% Injectable 25 milliLiter(s) IV Push every 15 minutes  DOBUTamine Infusion 5 MICROgram(s)/kG/Min (8.85 mL/Hr) IV Continuous <Continuous>  heparin  Infusion 400 Unit(s)/Hr (4 mL/Hr) IV Continuous <Continuous>  hydrocortisone sodium succinate Injectable 50 milliGRAM(s) IV Push every 12 hours  insulin lispro (HumaLOG) corrective regimen sliding scale   SubCutaneous every 6 hours  meropenem  IVPB 1000 milliGRAM(s) IV Intermittent every 8 hours  mesalamine Suppository 1000 milliGRAM(s) Rectal at bedtime  milrinone Infusion 0.25 MICROgram(s)/kG/Min (4.395 mL/Hr) IV Continuous <Continuous>  modafinil 100 milliGRAM(s) Oral daily  pantoprazole Infusion 8 mG/Hr (10 mL/Hr) IV Continuous <Continuous>  phenylephrine    Infusion 2 MICROgram(s)/kG/Min (21.975 mL/Hr) IV Continuous <Continuous>  PureFlow Dialysate RFP-400 (K 2 / Ca 3) 5000 milliLiter(s) (1500 mL/Hr) CRRT <Continuous>  sodium chloride 0.9%. 1000 milliLiter(s) (10 mL/Hr) IV Continuous <Continuous>  ursodiol Suspension 300 milliGRAM(s) Oral every 12 hours  vancomycin  IVPB 750 milliGRAM(s) IV Intermittent every 24 hours  vasopressin Infusion 0.017 Unit(s)/Min (1 mL/Hr) IV Continuous <Continuous>    MEDICATIONS  (PRN):  dextrose 40% Gel 15 Gram(s) Oral once PRN Blood Glucose LESS THAN 70 milliGRAM(s)/deciliter  glucagon  Injectable 1 milliGRAM(s) IntraMuscular once PRN Glucose LESS THAN 70 milligrams/deciliter

## 2018-07-14 NOTE — PROGRESS NOTE ADULT - SUBJECTIVE AND OBJECTIVE BOX
Pt seen and examined at bedside  Remains intubated, sedated and on multiple pressors  Staff report minimal spotting since scopes yesterday  Hgb relatively stable      MEDICATIONS:  MEDICATIONS  (STANDING):  albumin human 25% IVPB 50 milliLiter(s) IV Intermittent every 8 hours  artificial  tears Solution 1 Drop(s) Both EYES two times a day  aztreonam  IVPB 2000 milliGRAM(s) IV Intermittent every 12 hours  chlorhexidine 0.12% Liquid 15 milliLiter(s) Swish and Spit every 12 hours  chlorhexidine 2% Cloths 1 Application(s) Topical daily  dexmedetomidine Infusion 0.5 MICROgram(s)/kG/Hr (7.375 mL/Hr) IV Continuous <Continuous>  dextrose 5%. 1000 milliLiter(s) (25 mL/Hr) IV Continuous <Continuous>  dextrose 50% Injectable 50 milliLiter(s) IV Push every 15 minutes  dextrose 50% Injectable 25 milliLiter(s) IV Push every 15 minutes  folic acid 1 milliGRAM(s) Oral daily  insulin lispro (HumaLOG) corrective regimen sliding scale   SubCutaneous every 6 hours  mesalamine Suppository 1000 milliGRAM(s) Rectal at bedtime  midodrine 10 milliGRAM(s) Oral every 8 hours  modafinil 100 milliGRAM(s) Oral daily  pantoprazole  Injectable 40 milliGRAM(s) IV Push every 12 hours  phenylephrine    Infusion 2 MICROgram(s)/kG/Min (21.975 mL/Hr) IV Continuous <Continuous>  propofol Infusion 10 MICROgram(s)/kG/Min (3.54 mL/Hr) IV Continuous <Continuous>  PureFlow Dialysate RFP-400 (K 2 / Ca 3) 5000 milliLiter(s) (1500 mL/Hr) CRRT <Continuous>  sodium chloride 0.9%. 1000 milliLiter(s) (10 mL/Hr) IV Continuous <Continuous>  ursodiol Suspension 300 milliGRAM(s) Oral every 12 hours  vancomycin  IVPB 750 milliGRAM(s) IV Intermittent every 24 hours  vasopressin Infusion 0.017 Unit(s)/Min (1 mL/Hr) IV Continuous <Continuous>    MEDICATIONS  (PRN):  dextrose 40% Gel 15 Gram(s) Oral once PRN Blood Glucose LESS THAN 70 milliGRAM(s)/deciliter  fentaNYL    Injectable 25 MICROGram(s) IV Push every 4 hours PRN Severe Pain (7 - 10)  glucagon  Injectable 1 milliGRAM(s) IntraMuscular once PRN Glucose LESS THAN 70 milligrams/deciliter      Allergies  penicillin (Rash)    Intolerances      Vital Signs Last 24 Hrs  T(C): 36.2 (14 Jul 2018 21:01), Max: 36.2 (14 Jul 2018 21:01)  T(F): 97.2 (14 Jul 2018 21:01), Max: 97.2 (14 Jul 2018 21:01)  HR: 90 (14 Jul 2018 22:00) (90 - 96)  BP: 97/53 (14 Jul 2018 16:00) (97/53 - 109/58)  BP(mean): 67 (14 Jul 2018 16:00) (67 - 72)  RR: 25 (14 Jul 2018 22:00) (17 - 31)  SpO2: 93% (14 Jul 2018 22:00) (92% - 98%)    07-13 @ 07:01  -  07-14 @ 07:00  --------------------------------------------------------  IN: 4632.4 mL / OUT: 2561 mL / NET: 2071.4 mL    07-14 @ 07:01  -  07-14 @ 22:09  --------------------------------------------------------  IN: 2775.1 mL / OUT: 1784 mL / NET: 991.1 mL        PHYSICAL EXAM:  General: extremely ill appearing male; intubated and sedated  HEENT: pupils sluggish  Gastrointestinal: Soft, non-tender non-distended; Normal bowel sounds; no appreciable organomegaly; two epigastric drains with blood in the catheters; dark stools noted on bed pad; dark stool on rectal exam.,  Extremities: + peripheral edema   Neurological: intubated and sedated  Skin: jaundiced      LABS:  ABG - ( 14 Jul 2018 21:47 )  pH, Arterial: 7.27  pH, Blood: x     /  pCO2: 47    /  pO2: 85    / HCO3: 21    / Base Excess: -5.6  /  SaO2: 95        CBC Full  -  ( 14 Jul 2018 21:46 )  WBC Count : 17.0 K/uL  Hemoglobin : 10.4 g/dL  Hematocrit : 29.9 %  Platelet Count - Automated : 73 K/uL  Mean Cell Volume : 85.7 fL  Mean Cell Hemoglobin : 29.8 pg  Mean Cell Hemoglobin Concentration : 34.8 g/dL    140  |  99  |  24<H>  ----------------------------<  207<H>  3.8   |  20<L>  |  0.41<L>    Ca    9.4      14 Jul 2018 13:25  Phos  2.4     07-14  Mg     1.8     07-14    TPro  4.4<L>  /  Alb  3.5  /  TBili  54.1<H>  /  DBili  x   /  AST  189<H>  /  ALT  98<H>  /  AlkPhos  303<H>  07-14    PT/INR - ( 14 Jul 2018 21:46 )   PT: 12.3 sec;   INR: 1.11       PTT - ( 14 Jul 2018 21:46 )  PTT:35.9 sec          RADIOLOGY & ADDITIONAL STUDIES (The following images were personally reviewed):

## 2018-07-14 NOTE — PROGRESS NOTE ADULT - ASSESSMENT
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 leak vs other coagulopathy discussed below  -switch to IV folic acid or crushed PO thru NGT, increase to 5 mg     #Thrombocytopenia - with coagulopathy  highest likely etiology is DIC (elevated fibrinogen possibly in setting of inflammation as acute phase reactant); less likely TTP given coagulopathy; less likely HIT or ENRIQUE given timing of reduction in thrombocytopenia immediately after surgery more suggestive of either valvular sheering etiology vs devices (impella and CVVHD membrane)  -elevated retic count >6, likely consistent with increase cell turn over (hemolysis, bleed)   -check LE duplex r/o DVT  -check mixing study  -may need to reevaluate valvular function or assess for leak  -if active bleeding and plt count <30 may consider plts   -awaiting pf4, hilda r/o HIT    Prognosis seems guarded at this time; GI is consulted to evaluate for additional etiology of hyperbilirubinemia    Will follow along.    Discussed with Dr. 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 leak vs other coagulopathy discussed below  -switch to IV folic acid or crushed PO thru NGT, increase to 5 mg     #Thrombocytopenia - with coagulopathy  highest likely etiology is DIC (elevated fibrinogen possibly in setting of inflammation as acute phase reactant); less likely TTP given coagulopathy; less likely HIT or ENRIQUE given timing of reduction in thrombocytopenia immediately after surgery more suggestive of either valvular sheering etiology vs devices (impella and CVVHD membrane)  -elevated retic count >6, likely consistent with increase cell turn over (hemolysis, bleed), repeat retic in AM   -check LE duplex r/o DVT  -check mixing study  -may need to reevaluate valvular function or assess for leak  -if active bleeding and plt count <30 may consider plts   -awaiting pf4, hilda r/o HIT    Prognosis seems guarded at this time; GI is consulted to evaluate for additional etiology of hyperbilirubinemia    Will follow along.    Discussed with Dr. 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 leak vs other coagulopathy discussed below  -switch to IV folic acid or crushed PO thru NGT, increase to 5 mg     #Thrombocytopenia - with coagulopathy  highest likely etiology is DIC (elevated fibrinogen possibly in setting of inflammation as acute phase reactant); less likely HIT or ENRIQUE given timing of reduction in thrombocytopenia immediately after surgery more suggestive of either valvular sheering etiology vs devices (impella and CVVHD membrane)  -elevated retic count >6, likely consistent with increase cell turn over (hemolysis, bleed), repeat retic in AM   -check LE duplex r/o DVT  -check mixing study  -may need to reevaluate valvular function or assess for leak  -if active bleeding and plt count <30 may consider plts   -awaiting pf4, hilda r/o HIT    Prognosis seems guarded at this time; GI is consulted to evaluate for additional etiology of hyperbilirubinemia    Will follow along.    Discussed with Dr. Garrett

## 2018-07-14 NOTE — PROGRESS NOTE ADULT - ASSESSMENT
The patient is 67 year old male with PMH stated above, now s/p  Repair of AA, AVR, MV repair, TV repair, CABG x2, now in shock requiring pressor support - mutiple pressors. The patient with deterioration of the renal function in the setting of the shock - oliguric, no response from the diuretics. The CT ICU team started the patient on aquaphoresis and the CVVHD ordered, never been started from the primary team the BP unstable. The cvvhd started yesterday - 7/2 good tolerance so far. In the past 24 hours the  ml/h in the morning decreased from primary team from 250 ml/h, the dialysate flow now on 2 liters per hour and the blood flow on 200 ml/min. no clotting issues overnight. Still on pressors and CVVHD

## 2018-07-14 NOTE — PROGRESS NOTE ADULT - PROBLEM SELECTOR PLAN 1
continue CVVHD  UF rate 100cc/hr - cannot increase rate now without increasing pressor support  goal of negative fluid balance difficult given fernando intake    Hypophosphatemia - continue supplementation with KPHOS

## 2018-07-14 NOTE — PROGRESS NOTE ADULT - ASSESSMENT
67 yr old male with a PMHx of congenital AS s/p repair at age 12 and CAD s/p stenting 8 yrs ago who was found to have 2V CAD, severe AS, MR and TR during pre-operative evaluation now s/p MV/TV repair and AVR w/ IABP and impella placement. Hospital course was complicated by cardiogenic shock requiring continued inotropic support, cardiac tamponade x 2, consumptive coaguloapthy, pre-renal azotemia with anasarca requiring CVVH, and now new onset melena and hematochezia x 4 days for which GI was consulted.    # melena/hematochezia   -etiologies of patient's hematochezia/melena could be multifactorial  - sp EGD 7/13/18 - esophagitis, and gastropathy  - sp flex sig 7/13/18 - which showed significant proctitis  -melena 2/2 poor gut perfusion in the setting of cardiogenic shock vs proctitis as seen on flex sig  -please switch Canasa enemas to Canasa suppositories, as suppositories are less traumatic to mucosa  - on ppi gtt - given that no ulcers were seen, IV PPI BID    # transaminitis with hyperbilirubinemia  -pt with chronically up trending total bilirubin since admission; multifactorial in nature: could be 2/2 hemolysis ,RBC and platelet consumption 2/2 shearing on impella, drug induced 2/2 ?meropenem, autoimmune hemolysis in the setting of multiple transfusions  - pending repeat liver USS  - need to rule out acute viral etiology: please send EBV and CMV IgM and IgG, hepatitis serologies- hep A IgM, HBsAg, IgM anti-HBC, anti-HCV  - check AMA for PBC  - please check repeat peripheral smear  - consider hematology consult for persistent hemolysis  - please avoid hepatotoxic agents      Discussed with attending Dr Vee COBIAN following 67 yr old male with a PMHx of congenital AS s/p repair at age 12 and CAD s/p stenting 8 yrs ago who was found to have 2V CAD, severe AS, MR and TR during pre-operative evaluation now s/p MV/TV repair and AVR w/ IABP and impella placement. Hospital course was complicated by cardiogenic shock requiring continued inotropic support, cardiac tamponade x 2, consumptive coaguloapthy, pre-renal azotemia with anasarca requiring CVVH, and now new onset melena and hematochezia x 4 days for which GI was consulted.    # melena/hematochezia   -etiologies of patient's hematochezia/melena could be multifactorial  - sp EGD 7/13/18 - esophagitis, and gastropathy  - sp flex sig 7/13/18 - which showed significant proctitis  -melena 2/2 poor gut perfusion in the setting of cardiogenic shock vs proctitis as seen on flex sig  -please switch Canasa enemas to Canasa suppositories, as suppositories are less traumatic to mucosa  - on ppi gtt - given that no ulcers were seen, IV PPI BID    # transaminitis with hyperbilirubinemia  -pt with chronically up trending total bilirubin since admission; multifactorial in nature: could be 2/2 hemolysis ,RBC and platelet consumption 2/2 shearing on impella, drug induced 2/2 ?meropenem, autoimmune hemolysis in the setting of multiple transfusions  -liver US on 7/15 show biliary sludge and increased wall thickening 0.5cm, concerning for acute cholecystitis  - need to rule out acute viral etiology: please send EBV and CMV IgM and IgG, hepatitis serologies- hep A IgM, HBsAg, IgM anti-HBC, anti-HCV  - check AMA for PBC  - please check repeat peripheral smear  - consider hematology consult for persistent hemolysis  - please avoid hepatotoxic agents      Discussed with attending Dr Vee COBIAN following

## 2018-07-15 DIAGNOSIS — R17 UNSPECIFIED JAUNDICE: ICD-10-CM

## 2018-07-15 LAB
ALBUMIN SERPL ELPH-MCNC: 3.6 G/DL — SIGNIFICANT CHANGE UP (ref 3.3–5)
ALBUMIN SERPL ELPH-MCNC: 3.8 G/DL — SIGNIFICANT CHANGE UP (ref 3.3–5)
ALBUMIN SERPL ELPH-MCNC: 3.9 G/DL — SIGNIFICANT CHANGE UP (ref 3.3–5)
ALBUMIN SERPL ELPH-MCNC: 3.9 G/DL — SIGNIFICANT CHANGE UP (ref 3.3–5)
ALP SERPL-CCNC: 268 U/L — HIGH (ref 40–120)
ALP SERPL-CCNC: 302 U/L — HIGH (ref 40–120)
ALP SERPL-CCNC: 305 U/L — HIGH (ref 40–120)
ALP SERPL-CCNC: 317 U/L — HIGH (ref 40–120)
ALT FLD-CCNC: 101 U/L — HIGH (ref 10–45)
ALT FLD-CCNC: 93 U/L — HIGH (ref 10–45)
ANION GAP SERPL CALC-SCNC: 22 MMOL/L — HIGH (ref 5–17)
ANION GAP SERPL CALC-SCNC: 22 MMOL/L — HIGH (ref 5–17)
ANION GAP SERPL CALC-SCNC: 23 MMOL/L — HIGH (ref 5–17)
ANION GAP SERPL CALC-SCNC: 23 MMOL/L — HIGH (ref 5–17)
ANION GAP SERPL CALC-SCNC: 25 MMOL/L — HIGH (ref 5–17)
ANISOCYTOSIS BLD QL: SLIGHT — SIGNIFICANT CHANGE UP
APTT BLD: 31.9 SEC — SIGNIFICANT CHANGE UP (ref 27.5–37.4)
APTT BLD: 32.6 SEC — SIGNIFICANT CHANGE UP (ref 27.5–37.4)
APTT BLD: 36 SEC — SIGNIFICANT CHANGE UP (ref 27.5–37.4)
APTT BLD: 36.7 SEC — SIGNIFICANT CHANGE UP (ref 27.5–37.4)
APTT BLD: 36.8 SEC — SIGNIFICANT CHANGE UP (ref 27.5–37.4)
AST SERPL-CCNC: 201 U/L — HIGH (ref 10–40)
AST SERPL-CCNC: 205 U/L — HIGH (ref 10–40)
AST SERPL-CCNC: 206 U/L — HIGH (ref 10–40)
AST SERPL-CCNC: 207 U/L — HIGH (ref 10–40)
AUTO DIFF PNL BLD: NEGATIVE — SIGNIFICANT CHANGE UP
BILIRUB DIRECT SERPL-MCNC: >10 MG/DL — HIGH (ref 0–0.2)
BILIRUB INDIRECT FLD-MCNC: SIGNIFICANT CHANGE UP MG/DL (ref 0.2–1)
BILIRUB SERPL-MCNC: 54.8 MG/DL — HIGH (ref 0.2–1.2)
BILIRUB SERPL-MCNC: 57.7 MG/DL — HIGH (ref 0.2–1.2)
BILIRUB SERPL-MCNC: 60.4 MG/DL — HIGH (ref 0.2–1.2)
BILIRUB SERPL-MCNC: 61.4 MG/DL — HIGH (ref 0.2–1.2)
BUN SERPL-MCNC: 36 MG/DL — HIGH (ref 7–23)
BUN SERPL-MCNC: 44 MG/DL — HIGH (ref 7–23)
BUN SERPL-MCNC: 49 MG/DL — HIGH (ref 7–23)
BUN SERPL-MCNC: 50 MG/DL — HIGH (ref 7–23)
BUN SERPL-MCNC: 53 MG/DL — HIGH (ref 7–23)
BURR CELLS BLD QL SMEAR: SIGNIFICANT CHANGE UP
BURR CELLS BLD QL SMEAR: SIGNIFICANT CHANGE UP
C-ANCA SER-ACNC: NEGATIVE — SIGNIFICANT CHANGE UP
CALCIUM SERPL-MCNC: 10 MG/DL — SIGNIFICANT CHANGE UP (ref 8.4–10.5)
CALCIUM SERPL-MCNC: 10.1 MG/DL — SIGNIFICANT CHANGE UP (ref 8.4–10.5)
CALCIUM SERPL-MCNC: 9.4 MG/DL — SIGNIFICANT CHANGE UP (ref 8.4–10.5)
CALCIUM SERPL-MCNC: 9.7 MG/DL — SIGNIFICANT CHANGE UP (ref 8.4–10.5)
CALCIUM SERPL-MCNC: 9.8 MG/DL — SIGNIFICANT CHANGE UP (ref 8.4–10.5)
CHLORIDE SERPL-SCNC: 93 MMOL/L — LOW (ref 96–108)
CHLORIDE SERPL-SCNC: 96 MMOL/L — SIGNIFICANT CHANGE UP (ref 96–108)
CHLORIDE SERPL-SCNC: 99 MMOL/L — SIGNIFICANT CHANGE UP (ref 96–108)
CO2 SERPL-SCNC: 16 MMOL/L — LOW (ref 22–31)
CO2 SERPL-SCNC: 17 MMOL/L — LOW (ref 22–31)
CO2 SERPL-SCNC: 18 MMOL/L — LOW (ref 22–31)
CO2 SERPL-SCNC: 19 MMOL/L — LOW (ref 22–31)
CO2 SERPL-SCNC: 19 MMOL/L — LOW (ref 22–31)
CREAT SERPL-MCNC: 0.6 MG/DL — SIGNIFICANT CHANGE UP (ref 0.5–1.3)
CREAT SERPL-MCNC: 0.73 MG/DL — SIGNIFICANT CHANGE UP (ref 0.5–1.3)
CREAT SERPL-MCNC: 0.83 MG/DL — SIGNIFICANT CHANGE UP (ref 0.5–1.3)
CREAT SERPL-MCNC: 0.84 MG/DL — SIGNIFICANT CHANGE UP (ref 0.5–1.3)
CREAT SERPL-MCNC: 0.9 MG/DL — SIGNIFICANT CHANGE UP (ref 0.5–1.3)
EOSINOPHIL NFR BLD AUTO: 1 % — SIGNIFICANT CHANGE UP (ref 0–6)
FIBRINOGEN PPP-MCNC: 493 MG/DL — HIGH (ref 258–438)
GAS PNL BLDA: SIGNIFICANT CHANGE UP
GGT SERPL-CCNC: 142 U/L — HIGH (ref 9–50)
GIANT PLATELETS BLD QL SMEAR: PRESENT — SIGNIFICANT CHANGE UP
GLUCOSE BLDC GLUCOMTR-MCNC: 125 MG/DL — HIGH (ref 70–99)
GLUCOSE BLDC GLUCOMTR-MCNC: 132 MG/DL — HIGH (ref 70–99)
GLUCOSE BLDC GLUCOMTR-MCNC: 132 MG/DL — HIGH (ref 70–99)
GLUCOSE SERPL-MCNC: 132 MG/DL — HIGH (ref 70–99)
GLUCOSE SERPL-MCNC: 140 MG/DL — HIGH (ref 70–99)
GLUCOSE SERPL-MCNC: 142 MG/DL — HIGH (ref 70–99)
GLUCOSE SERPL-MCNC: 158 MG/DL — HIGH (ref 70–99)
GLUCOSE SERPL-MCNC: 209 MG/DL — HIGH (ref 70–99)
HCT VFR BLD CALC: 25.6 % — LOW (ref 39–50)
HCT VFR BLD CALC: 27.5 % — LOW (ref 39–50)
HCT VFR BLD CALC: 29 % — LOW (ref 39–50)
HCT VFR BLD CALC: 29.1 % — LOW (ref 39–50)
HCT VFR BLD CALC: 31.1 % — LOW (ref 39–50)
HGB BLD-MCNC: 10.4 G/DL — LOW (ref 13–17)
HGB BLD-MCNC: 10.9 G/DL — LOW (ref 13–17)
HGB BLD-MCNC: 8.7 G/DL — LOW (ref 13–17)
HGB BLD-MCNC: 9.7 G/DL — LOW (ref 13–17)
HGB BLD-MCNC: 9.9 G/DL — LOW (ref 13–17)
INR BLD: 1.1 — SIGNIFICANT CHANGE UP (ref 0.88–1.16)
INR BLD: 1.11 — SIGNIFICANT CHANGE UP (ref 0.88–1.16)
INR BLD: 1.12 — SIGNIFICANT CHANGE UP (ref 0.88–1.16)
INR BLD: 1.18 — HIGH (ref 0.88–1.16)
INR BLD: 1.18 — HIGH (ref 0.88–1.16)
LACTATE SERPL-SCNC: 3 MMOL/L — HIGH (ref 0.5–2)
LACTATE SERPL-SCNC: 3.2 MMOL/L — HIGH (ref 0.5–2)
LACTATE SERPL-SCNC: 3.4 MMOL/L — HIGH (ref 0.5–2)
LACTATE SERPL-SCNC: 3.5 MMOL/L — HIGH (ref 0.5–2)
LACTATE SERPL-SCNC: 3.6 MMOL/L — HIGH (ref 0.5–2)
LACTATE SERPL-SCNC: 3.7 MMOL/L — HIGH (ref 0.5–2)
LG PLATELETS BLD QL AUTO: PRESENT — SIGNIFICANT CHANGE UP
LYMPHOCYTES # BLD AUTO: 7 % — LOW (ref 13–44)
MACROCYTES BLD QL: SLIGHT — SIGNIFICANT CHANGE UP
MAGNESIUM SERPL-MCNC: 1.9 MG/DL — SIGNIFICANT CHANGE UP (ref 1.6–2.6)
MAGNESIUM SERPL-MCNC: 2 MG/DL — SIGNIFICANT CHANGE UP (ref 1.6–2.6)
MAGNESIUM SERPL-MCNC: 2.1 MG/DL — SIGNIFICANT CHANGE UP (ref 1.6–2.6)
MANUAL DIF COMMENT BLD-IMP: SIGNIFICANT CHANGE UP
MANUAL SMEAR VERIFICATION: SIGNIFICANT CHANGE UP
MCHC RBC-ENTMCNC: 29.5 PG — SIGNIFICANT CHANGE UP (ref 27–34)
MCHC RBC-ENTMCNC: 29.6 PG — SIGNIFICANT CHANGE UP (ref 27–34)
MCHC RBC-ENTMCNC: 29.6 PG — SIGNIFICANT CHANGE UP (ref 27–34)
MCHC RBC-ENTMCNC: 30 PG — SIGNIFICANT CHANGE UP (ref 27–34)
MCHC RBC-ENTMCNC: 30.1 PG — SIGNIFICANT CHANGE UP (ref 27–34)
MCHC RBC-ENTMCNC: 34 G/DL — SIGNIFICANT CHANGE UP (ref 32–36)
MCHC RBC-ENTMCNC: 34.1 G/DL — SIGNIFICANT CHANGE UP (ref 32–36)
MCHC RBC-ENTMCNC: 35 G/DL — SIGNIFICANT CHANGE UP (ref 32–36)
MCHC RBC-ENTMCNC: 35.3 G/DL — SIGNIFICANT CHANGE UP (ref 32–36)
MCHC RBC-ENTMCNC: 35.7 G/DL — SIGNIFICANT CHANGE UP (ref 32–36)
MCV RBC AUTO: 84.1 FL — SIGNIFICANT CHANGE UP (ref 80–100)
MCV RBC AUTO: 84.3 FL — SIGNIFICANT CHANGE UP (ref 80–100)
MCV RBC AUTO: 85.1 FL — SIGNIFICANT CHANGE UP (ref 80–100)
MCV RBC AUTO: 86.6 FL — SIGNIFICANT CHANGE UP (ref 80–100)
MCV RBC AUTO: 87.1 FL — SIGNIFICANT CHANGE UP (ref 80–100)
METAMYELOCYTES # FLD: 1 % — HIGH
MICROCYTES BLD QL: SLIGHT — SIGNIFICANT CHANGE UP
MONOCYTES NFR BLD AUTO: 2 % — SIGNIFICANT CHANGE UP (ref 2–14)
NEUTROPHILS NFR BLD AUTO: 62 % — SIGNIFICANT CHANGE UP (ref 43–77)
NEUTS BAND # BLD: 27 % — HIGH
NRBC # BLD: 55 /100 WBCS — HIGH
OVALOCYTES BLD QL SMEAR: SLIGHT — SIGNIFICANT CHANGE UP
P-ANCA SER-ACNC: NEGATIVE — SIGNIFICANT CHANGE UP
PF4 HEPARIN CMPLX AB SER-ACNC: NEGATIVE — SIGNIFICANT CHANGE UP
PF4 HEPARIN CMPLX AB SERPL QL IA: 0.08 ABS — SIGNIFICANT CHANGE UP
PHOSPHATE SERPL-MCNC: 3.3 MG/DL — SIGNIFICANT CHANGE UP (ref 2.5–4.5)
PHOSPHATE SERPL-MCNC: 3.5 MG/DL — SIGNIFICANT CHANGE UP (ref 2.5–4.5)
PHOSPHATE SERPL-MCNC: 3.9 MG/DL — SIGNIFICANT CHANGE UP (ref 2.5–4.5)
PHOSPHATE SERPL-MCNC: 4.1 MG/DL — SIGNIFICANT CHANGE UP (ref 2.5–4.5)
PLAT MORPH BLD: ABNORMAL
PLATELET # BLD AUTO: 150 K/UL — SIGNIFICANT CHANGE UP (ref 150–400)
PLATELET # BLD AUTO: 40 K/UL — LOW (ref 150–400)
PLATELET # BLD AUTO: 48 K/UL — LOW (ref 150–400)
PLATELET # BLD AUTO: 59 K/UL — LOW (ref 150–400)
PLATELET # BLD AUTO: 81 K/UL — LOW (ref 150–400)
POIKILOCYTOSIS BLD QL AUTO: SLIGHT — SIGNIFICANT CHANGE UP
POLYCHROMASIA BLD QL SMEAR: SLIGHT — SIGNIFICANT CHANGE UP
POTASSIUM SERPL-MCNC: 4.3 MMOL/L — SIGNIFICANT CHANGE UP (ref 3.5–5.3)
POTASSIUM SERPL-MCNC: 4.3 MMOL/L — SIGNIFICANT CHANGE UP (ref 3.5–5.3)
POTASSIUM SERPL-MCNC: 4.6 MMOL/L — SIGNIFICANT CHANGE UP (ref 3.5–5.3)
POTASSIUM SERPL-MCNC: 4.8 MMOL/L — SIGNIFICANT CHANGE UP (ref 3.5–5.3)
POTASSIUM SERPL-MCNC: 4.8 MMOL/L — SIGNIFICANT CHANGE UP (ref 3.5–5.3)
POTASSIUM SERPL-SCNC: 4.3 MMOL/L — SIGNIFICANT CHANGE UP (ref 3.5–5.3)
POTASSIUM SERPL-SCNC: 4.3 MMOL/L — SIGNIFICANT CHANGE UP (ref 3.5–5.3)
POTASSIUM SERPL-SCNC: 4.6 MMOL/L — SIGNIFICANT CHANGE UP (ref 3.5–5.3)
POTASSIUM SERPL-SCNC: 4.8 MMOL/L — SIGNIFICANT CHANGE UP (ref 3.5–5.3)
POTASSIUM SERPL-SCNC: 4.8 MMOL/L — SIGNIFICANT CHANGE UP (ref 3.5–5.3)
PROT SERPL-MCNC: 4.9 G/DL — LOW (ref 6–8.3)
PROT SERPL-MCNC: 4.9 G/DL — LOW (ref 6–8.3)
PROT SERPL-MCNC: 5 G/DL — LOW (ref 6–8.3)
PROT SERPL-MCNC: 5 G/DL — LOW (ref 6–8.3)
PROTHROM AB SERPL-ACNC: 12.2 SEC — SIGNIFICANT CHANGE UP (ref 9.8–12.7)
PROTHROM AB SERPL-ACNC: 12.3 SEC — SIGNIFICANT CHANGE UP (ref 9.8–12.7)
PROTHROM AB SERPL-ACNC: 12.5 SEC — SIGNIFICANT CHANGE UP (ref 9.8–12.7)
PROTHROM AB SERPL-ACNC: 13.1 SEC — HIGH (ref 9.8–12.7)
PROTHROM AB SERPL-ACNC: 13.1 SEC — HIGH (ref 9.8–12.7)
RBC # BLD: 2.94 M/UL — LOW (ref 4.2–5.8)
RBC # BLD: 3.23 M/UL — LOW (ref 4.2–5.8)
RBC # BLD: 3.35 M/UL — LOW (ref 4.2–5.8)
RBC # BLD: 3.46 M/UL — LOW (ref 4.2–5.8)
RBC # BLD: 3.69 M/UL — LOW (ref 4.2–5.8)
RBC # FLD: 18.2 % — HIGH (ref 10.3–16.9)
RBC # FLD: 18.4 % — HIGH (ref 10.3–16.9)
RBC # FLD: 19.3 % — HIGH (ref 10.3–16.9)
RBC # FLD: 20.2 % — HIGH (ref 10.3–16.9)
RBC # FLD: 20.3 % — HIGH (ref 10.3–16.9)
RBC BLD AUTO: ABNORMAL
SCHISTOCYTES BLD QL AUTO: SIGNIFICANT CHANGE UP
SODIUM SERPL-SCNC: 133 MMOL/L — LOW (ref 135–145)
SODIUM SERPL-SCNC: 137 MMOL/L — SIGNIFICANT CHANGE UP (ref 135–145)
SODIUM SERPL-SCNC: 137 MMOL/L — SIGNIFICANT CHANGE UP (ref 135–145)
SODIUM SERPL-SCNC: 138 MMOL/L — SIGNIFICANT CHANGE UP (ref 135–145)
SODIUM SERPL-SCNC: 139 MMOL/L — SIGNIFICANT CHANGE UP (ref 135–145)
TOXIC GRANULES BLD QL SMEAR: SLIGHT — SIGNIFICANT CHANGE UP
VANCOMYCIN TROUGH SERPL-MCNC: 20.5 UG/ML — HIGH (ref 10–20)
WBC # BLD: 10.4 K/UL — SIGNIFICANT CHANGE UP (ref 3.8–10.5)
WBC # BLD: 10.6 K/UL — HIGH (ref 3.8–10.5)
WBC # BLD: 11.1 K/UL — HIGH (ref 3.8–10.5)
WBC # BLD: 13.4 K/UL — HIGH (ref 3.8–10.5)
WBC # BLD: 15.5 K/UL — HIGH (ref 3.8–10.5)
WBC # FLD AUTO: 10.4 K/UL — SIGNIFICANT CHANGE UP (ref 3.8–10.5)
WBC # FLD AUTO: 10.6 K/UL — HIGH (ref 3.8–10.5)
WBC # FLD AUTO: 11.1 K/UL — HIGH (ref 3.8–10.5)
WBC # FLD AUTO: 13.4 K/UL — HIGH (ref 3.8–10.5)
WBC # FLD AUTO: 15.5 K/UL — HIGH (ref 3.8–10.5)

## 2018-07-15 PROCEDURE — 71045 X-RAY EXAM CHEST 1 VIEW: CPT | Mod: 26,77

## 2018-07-15 PROCEDURE — 71045 X-RAY EXAM CHEST 1 VIEW: CPT | Mod: 26

## 2018-07-15 PROCEDURE — 99291 CRITICAL CARE FIRST HOUR: CPT

## 2018-07-15 PROCEDURE — 76705 ECHO EXAM OF ABDOMEN: CPT | Mod: 26

## 2018-07-15 PROCEDURE — 99292 CRITICAL CARE ADDL 30 MIN: CPT

## 2018-07-15 PROCEDURE — 95813 EEG EXTND MNTR 61-119 MIN: CPT | Mod: 26

## 2018-07-15 RX ORDER — EPINEPHRINE 0.3 MG/.3ML
0.06 INJECTION INTRAMUSCULAR; SUBCUTANEOUS
Qty: 4 | Refills: 0 | Status: DISCONTINUED | OUTPATIENT
Start: 2018-07-15 | End: 2018-07-20

## 2018-07-15 RX ORDER — CISATRACURIUM BESYLATE 2 MG/ML
10 INJECTION INTRAVENOUS
Qty: 0 | Refills: 0 | Status: COMPLETED | OUTPATIENT
Start: 2018-07-15 | End: 2018-07-15

## 2018-07-15 RX ORDER — LACTULOSE 10 G/15ML
20 SOLUTION ORAL EVERY 6 HOURS
Qty: 0 | Refills: 0 | Status: DISCONTINUED | OUTPATIENT
Start: 2018-07-15 | End: 2018-07-16

## 2018-07-15 RX ADMIN — CISATRACURIUM BESYLATE 10 MILLIGRAM(S): 2 INJECTION INTRAVENOUS at 17:18

## 2018-07-15 RX ADMIN — CHLORHEXIDINE GLUCONATE 15 MILLILITER(S): 213 SOLUTION TOPICAL at 05:50

## 2018-07-15 RX ADMIN — Medication 50 MILLILITER(S): at 21:38

## 2018-07-15 RX ADMIN — FENTANYL CITRATE 25 MICROGRAM(S): 50 INJECTION INTRAVENOUS at 21:30

## 2018-07-15 RX ADMIN — LACTULOSE 20 GRAM(S): 10 SOLUTION ORAL at 11:24

## 2018-07-15 RX ADMIN — CISATRACURIUM BESYLATE 10 MILLIGRAM(S): 2 INJECTION INTRAVENOUS at 17:19

## 2018-07-15 RX ADMIN — PROPOFOL 3.54 MICROGRAM(S)/KG/MIN: 10 INJECTION, EMULSION INTRAVENOUS at 05:18

## 2018-07-15 RX ADMIN — MIDODRINE HYDROCHLORIDE 10 MILLIGRAM(S): 2.5 TABLET ORAL at 05:50

## 2018-07-15 RX ADMIN — Medication 1000 MILLIGRAM(S): at 21:38

## 2018-07-15 RX ADMIN — MIDODRINE HYDROCHLORIDE 10 MILLIGRAM(S): 2.5 TABLET ORAL at 21:38

## 2018-07-15 RX ADMIN — Medication 250 MILLIGRAM(S): at 11:24

## 2018-07-15 RX ADMIN — Medication 100 MILLIGRAM(S): at 11:24

## 2018-07-15 RX ADMIN — CHLORHEXIDINE GLUCONATE 15 MILLILITER(S): 213 SOLUTION TOPICAL at 18:55

## 2018-07-15 RX ADMIN — Medication 1 MILLIGRAM(S): at 11:24

## 2018-07-15 RX ADMIN — PANTOPRAZOLE SODIUM 40 MILLIGRAM(S): 20 TABLET, DELAYED RELEASE ORAL at 19:00

## 2018-07-15 RX ADMIN — FENTANYL CITRATE 25 MICROGRAM(S): 50 INJECTION INTRAVENOUS at 21:00

## 2018-07-15 RX ADMIN — URSODIOL 300 MILLIGRAM(S): 250 TABLET, FILM COATED ORAL at 19:17

## 2018-07-15 RX ADMIN — FENTANYL CITRATE 25 MICROGRAM(S): 50 INJECTION INTRAVENOUS at 01:00

## 2018-07-15 RX ADMIN — PANTOPRAZOLE SODIUM 40 MILLIGRAM(S): 20 TABLET, DELAYED RELEASE ORAL at 07:07

## 2018-07-15 RX ADMIN — Medication 50 MILLILITER(S): at 14:21

## 2018-07-15 RX ADMIN — Medication 1 DROP(S): at 07:00

## 2018-07-15 RX ADMIN — Medication 50 MILLIGRAM(S): at 07:07

## 2018-07-15 RX ADMIN — URSODIOL 300 MILLIGRAM(S): 250 TABLET, FILM COATED ORAL at 05:51

## 2018-07-15 RX ADMIN — Medication 1 DROP(S): at 19:17

## 2018-07-15 RX ADMIN — FENTANYL CITRATE 25 MICROGRAM(S): 50 INJECTION INTRAVENOUS at 01:30

## 2018-07-15 RX ADMIN — DEXMEDETOMIDINE HYDROCHLORIDE IN 0.9% SODIUM CHLORIDE 7.38 MICROGRAM(S)/KG/HR: 4 INJECTION INTRAVENOUS at 05:18

## 2018-07-15 RX ADMIN — EPINEPHRINE 13.28 MICROGRAM(S)/KG/MIN: 0.3 INJECTION INTRAMUSCULAR; SUBCUTANEOUS at 05:49

## 2018-07-15 RX ADMIN — Medication 50 MILLILITER(S): at 05:50

## 2018-07-15 RX ADMIN — CHLORHEXIDINE GLUCONATE 1 APPLICATION(S): 213 SOLUTION TOPICAL at 11:51

## 2018-07-15 NOTE — PROGRESS NOTE ADULT - SUBJECTIVE AND OBJECTIVE BOX
CC: CHF      INTERVAL HISTORY:  sedated/intubated      ROS: No chest pain, no sob, no abd pain. No n/v/d    PAST MEDICAL & SURGICAL HISTORY:  No pertinent past medical history  No significant past surgical history      PHYSICAL EXAM:  T(C): 36.1 (07-15-18 @ 05:00), Max: 36.2 (07-14-18 @ 21:01)  HR: 90 (07-15-18 @ 06:00)  BP: 97/53 (07-14-18 @ 16:00) (97/53 - 109/58)  RR: 30 (07-15-18 @ 06:00)  SpO2: 97% (07-15-18 @ 06:00)  Wt(kg): --  I&O's Summary    14 Jul 2018 07:01  -  15 Jul 2018 07:00  --------------------------------------------------------  IN: 4093.5 mL / OUT: 2857 mL / NET: 1236.5 mL      Weight   General:intubated; sedated  HEENT: moist mucous membranes, no pallor/cyanosis.  Neck: no JVD visible.  Cardiac: S1, S2. RRR. No murmurs   Respratory:rhonchi  Abdomen: soft. nontender. nondistended  Skin: no rashes; jaundiced  Extremities:+ generalized  edema  Access: Permacath      DATA:                        9.9<L>  10.6<H> )-----------( 59<L>    ( 15 Jul 2018 02:51 )             29.0<L>        137    |  96     |  36<H>  ----------------------------<  142<H>  Ca:9.7   (15 Jul 2018 02:51)  4.3     |  19<L>  |  0.60       eGFR if Non : 103  eGFR if : 119    TPro  5.0<L>  /  Alb  3.9    /  TBili  60.4<H>  /  DBili  x      /  AST  205<H>  /  ALT  101<H>  /  AlkPhos  305<H>  15 Jul 2018 02:51  Lactate Dehydrogenase, Serum: 1338 U/L <H> (07-13 @ 10:10)                    MEDICATIONS  (STANDING):  albumin human 25% IVPB 50 milliLiter(s) IV Intermittent every 8 hours  artificial  tears Solution 1 Drop(s) Both EYES two times a day  aztreonam  IVPB 2000 milliGRAM(s) IV Intermittent every 12 hours  chlorhexidine 0.12% Liquid 15 milliLiter(s) Swish and Spit every 12 hours  chlorhexidine 2% Cloths 1 Application(s) Topical daily  dexmedetomidine Infusion 0.5 MICROgram(s)/kG/Hr (7.375 mL/Hr) IV Continuous <Continuous>  dextrose 5%. 1000 milliLiter(s) (25 mL/Hr) IV Continuous <Continuous>  dextrose 50% Injectable 50 milliLiter(s) IV Push every 15 minutes  dextrose 50% Injectable 25 milliLiter(s) IV Push every 15 minutes  EPINEPHrine     Infusion 0.06 MICROgram(s)/kG/Min (13.275 mL/Hr) IV Continuous <Continuous>  folic acid 1 milliGRAM(s) Oral daily  hydrocortisone sodium succinate Injectable 50 milliGRAM(s) IV Push daily  insulin lispro (HumaLOG) corrective regimen sliding scale   SubCutaneous every 6 hours  mesalamine Suppository 1000 milliGRAM(s) Rectal at bedtime  midodrine 10 milliGRAM(s) Oral every 8 hours  pantoprazole  Injectable 40 milliGRAM(s) IV Push every 12 hours  phenylephrine    Infusion 2 MICROgram(s)/kG/Min (21.975 mL/Hr) IV Continuous <Continuous>  propofol Infusion 10 MICROgram(s)/kG/Min (3.54 mL/Hr) IV Continuous <Continuous>  PureFlow Dialysate RFP-400 (K 2 / Ca 3) 5000 milliLiter(s) (1500 mL/Hr) CRRT <Continuous>  sodium chloride 0.9%. 1000 milliLiter(s) (10 mL/Hr) IV Continuous <Continuous>  ursodiol Suspension 300 milliGRAM(s) Oral every 12 hours  vancomycin  IVPB 750 milliGRAM(s) IV Intermittent every 24 hours  vasopressin Infusion 0.017 Unit(s)/Min (1 mL/Hr) IV Continuous <Continuous>    MEDICATIONS  (PRN):  dextrose 40% Gel 15 Gram(s) Oral once PRN Blood Glucose LESS THAN 70 milliGRAM(s)/deciliter  fentaNYL    Injectable 25 MICROGram(s) IV Push every 4 hours PRN Severe Pain (7 - 10)  glucagon  Injectable 1 milliGRAM(s) IntraMuscular once PRN Glucose LESS THAN 70 milligrams/deciliter

## 2018-07-15 NOTE — CONSULT NOTE ADULT - SUBJECTIVE AND OBJECTIVE BOX
HPI:  Patient is a 67 year old male with history of AV (Congenital.) disease s/p AV repair / replacement at the age of 12 in [facility]. He is / was  being evaluated for ? abdominal hernia repair surgery. Given medical and surgical history cardiology clearance needed. On evaluation with  TTE / Cardiac Catheterization (Completed 06/2018.) EF = 25%, reduced LV function, severe AS, severe MR, severe TR, severe pHTN, and 2V CAD. [place] [facility] [place] Dr. Potter for further work up and MGMT. After speaking with patient he does state a decreased in ET and increase in SOB that has been progressing over the last 6-8 month. Details hard to obtain. Patient poor historian.     PMH / PSH:  See HPI.  Appendectomy > 50 year ago.     Home Rx.:  Lasix 40mg PO QD    FH:  N/A    SH:  Lives with family. Not working. Non-smoker. Occasional ETOH use. No IVRDU.    Allergies:  PCN. (19 Jun 2018 00:06)      SURGICAL ADDENDUM:  Colorectal Surgery consulted with concern for massive LGIB. GI bedside at time of consult performing colonoscopy and EGD. Gastritis noted on EGD without bleeding. Severe proctitis noted on C-scope. Both findings also noted on prior scope--7/13.    Patient is s/p 2-v CABG, AVR, tricuspid/mitral valve repair 6/21. Post-operative course complicated by cardiogenic shock due to tamponade requiring RTOR x2, chest eventually closed 7/2. Impella device has since been removed. Patient is in MSOF; remains on 3-pressor support, CVVHD/aquapheresis for AKF, thrombocytopenic requiring frequent transfusion.    During scope today, pt noted to have significant bleeding from rectum, just proximal to anal verge, though clipping unsuccessful due to friability of tissue. Scope completed and roughly 2L blood evacuated from colon, suspicious for small bowel source as well.    PAST MEDICAL & SURGICAL HISTORY:  No pertinent past medical history  No significant past surgical history      MEDICATIONS  (STANDING):  albumin human 25% IVPB 50 milliLiter(s) IV Intermittent every 8 hours  aztreonam  IVPB 2000 milliGRAM(s) IV Intermittent every 12 hours  cisatracurium Injectable 10 milliGRAM(s) IV Push every 20 minutes  dexmedetomidine Infusion 0.5 MICROgram(s)/kG/Hr (7.375 mL/Hr) IV Continuous <Continuous>  dextrose 5%. 1000 milliLiter(s) (25 mL/Hr) IV Continuous <Continuous>  EPINEPHrine     Infusion 0.06 MICROgram(s)/kG/Min (13.275 mL/Hr) IV Continuous <Continuous>  folic acid 1 milliGRAM(s) Oral daily  hydrocortisone sodium succinate Injectable 50 milliGRAM(s) IV Push daily  insulin lispro (HumaLOG) corrective regimen sliding scale   SubCutaneous every 6 hours  lactulose Syrup 20 Gram(s) Oral every 6 hours  mesalamine Suppository 1000 milliGRAM(s) Rectal at bedtime  midodrine 10 milliGRAM(s) Oral every 8 hours  pantoprazole  Injectable 40 milliGRAM(s) IV Push every 12 hours  phenylephrine    Infusion 2 MICROgram(s)/kG/Min (21.975 mL/Hr) IV Continuous <Continuous>  propofol Infusion 10 MICROgram(s)/kG/Min (3.54 mL/Hr) IV Continuous <Continuous>  PureFlow Dialysate RFP-400 (K 2 / Ca 3) 5000 milliLiter(s) (1500 mL/Hr) CRRT <Continuous>  sodium chloride 0.9%. 1000 milliLiter(s) (10 mL/Hr) IV Continuous <Continuous>  ursodiol Suspension 300 milliGRAM(s) Oral every 12 hours  vancomycin  IVPB 750 milliGRAM(s) IV Intermittent every 24 hours  vasopressin Infusion 0.017 Unit(s)/Min (1 mL/Hr) IV Continuous <Continuous>    MEDICATIONS  (PRN):  dextrose 40% Gel 15 Gram(s) Oral once PRN Blood Glucose LESS THAN 70 milliGRAM(s)/deciliter  fentaNYL    Injectable 25 MICROGram(s) IV Push every 4 hours PRN Severe Pain (7 - 10)  glucagon  Injectable 1 milliGRAM(s) IntraMuscular once PRN Glucose LESS THAN 70 milligrams/deciliter      Allergies: penicillin (Rash)      Vital Signs Last 24 Hrs  T(C): 36.1 (15 Jul 2018 14:06), Max: 36.7 (15 Jul 2018 09:00)  T(F): 97 (15 Jul 2018 14:06), Max: 98 (15 Jul 2018 09:00)  HR: 90 (15 Jul 2018 15:00) (79 - 92)  BP: 89/47 (15 Jul 2018 14:00) (89/47 - 89/47)  BP(mean): 56 (15 Jul 2018 14:00) (56 - 56)  RR: 30 (15 Jul 2018 15:00) (23 - 30)  SpO2: 99% (15 Jul 2018 15:00) (92% - 99%)    PHYSICAL EXAM  General: NAD. Disheveled.   NEURO: Sedated.  CV: RRR, no MRG  CHEST: Sternal wound vac in place. Bilateral subclavicular incisions with staples, CDI with surrounding ecchymosis. Additional transverse incision closed with staples noted caudad to wound vac in midline. Moderate diffuse ecchymosis noted.  PULM: Coarse breath sounds bilaterally, nonlabored breathing, intubated.  ABD: soft, NT/ND. No rebound or guarding. Well-healed left-sided vertical incision--unknown etiology.  RECTAL: intact sphincter tone, no evidence of external hemorrhoids. Significant amount of blood and large clots evacuated during colonoscopy.      LABS:                        8.7    10.4  )-----------( 40       ( 15 Jul 2018 13:33 )             25.6     07-15    139  |  99  |  50<H>  ----------------------------<  209<H>  4.6   |  18<L>  |  0.84    Ca    9.4      15 Jul 2018 13:33  Phos  3.9     07-15  Mg     1.9     07-15    TPro  4.9<L>  /  Alb  3.9  /  TBili  61.4<H>  /  DBili  x   /  AST  206<H>  /  ALT  101<H>  /  AlkPhos  317<H>  07-15    PT/INR - ( 15 Jul 2018 13:33 )   PT: 13.1 sec;   INR: 1.18          PTT - ( 15 Jul 2018 13:33 )  PTT:36.8 sec      RADIOLOGY & ADDITIONAL STUDIES:

## 2018-07-15 NOTE — PROGRESS NOTE ADULT - ASSESSMENT
66yo with history of congenital heart disease s/p AVR @ age of 12, Ef 20% on preop cardiac clearance diagnosed with 2 vessel CAD, VHD (severe prosthetic AS, Severe MR, severe TR, severe pulm hypertension.). Complicated post op course in cardiogenic shock with multiorgan failure       Problems  1.  Lactic acidosis  2.  respiratory failure  3.  encephalopathy/ AMS likely metabolic   4.  critical illness myopathy/neuropathy  5.  GIB s/p upper and lower endoscopy - small bowel source of bleed + active rectal bleeding  6.  Hepatitis/Jandice  7. Concern for mediastinitis due to multiple chest exploration   8. Cardiogenic shock s/p impella support -- requiring iontropic support - multiorgan failure       Plan   Neuro -- sedation resume in the setting of worsening acidosis.    CVS - Continues to require pressor support.  Fluctuating pressor requirement over the course of the day.  Currently on Dio @ 1.5mcg/kg/min, Epi 0.06mcg/kg/min, Vaso @ 0.033units/min   Pulm - Plan for bronch +/- trach tmr   Continuing vent support  sedation for increased work of breathing   GI - Active GI bleeding, from small bowel and rectum.  Plan is continued medical support for now, rectal bleeding is clotting, unable to identify small bowel bleeding source.  Discussed at [facility] with wife at bedside and son Hernando pt is to high risk for transportation at this time.  Will correct coagulopathy and stabilize hemodynamics if deemed stable will re-eval possible IR angio tmr with cardiac anesthesia backup for workup of GIB.  Discussed with surgery if any intervention is available to manage rectal bleeding (local cautery but at this time bleeding is self tamponading with large clot will leave and re-evaluate.  If bleeding re-occurs will consider packing the rectum.)   Hyperbilirubinemia -- continuing supportive care.  May be related to massive GIB and retained blood in bowel.  Pt with preserved liver synthetic function, nl INR, normal range fibrinogen.    -CVVH held, aquapharesis for fluid removal  - tolerating fluid removal of 150/hr  Endo - glycemic control, taper stress dose steriods.   Heme - heme following For w/u anemia/thrombocytopeniua   ID - On empiric antibiotics -- Vanc and Aztreonam for concern of mediastinitis     Critical post op.    Critical care time spent 100 min, , managing hemodynamics during bedside Endoscopy/colonoscopy/   Family son (Hernando) and wife updated at [facility].  Explained at [facility] this is poor prognosis, at this time I am concerned pt will not tolerate transport to IR or CT scan, very labile BP on three pressors and tenuous resp status on 80% Vent support, becomes hypotensive upon turning.  Plan is to continue supportive care tonight and re-evaluate if he can tolerate transport to IR with anesthesia backup in AM.

## 2018-07-15 NOTE — PROGRESS NOTE ADULT - PROBLEM SELECTOR PLAN 1
still requiring aquapheresis/ CVVHD for anuric EMILY  on three pressors - tolerating UF rate of ~100cc/hr  UF in  past 24 hours 2.8L

## 2018-07-15 NOTE — PROGRESS NOTE ADULT - ASSESSMENT
67 yr old male with a PMHx of congenital AS s/p repair at age 12 and CAD s/p stenting 8 yrs ago who was found to have 2V CAD, severe AS, MR and TR during pre-operative evaluation now s/p MV/TV repair and AVR w/ IABP and impella placement. Hospital course was complicated by cardiogenic shock requiring continued inotropic support, cardiac tamponade x 2, consumptive coaguloapthy, pre-renal azotemia with anasarca requiring CVVH, and now new onset melena and hematochezia x 4 days for which GI was consulted.    # melena/hematochezia   - ddx - small bowel bleed vs severe proctitis vs rectal oozing of unclear etiology during scope today  - sp EGD 7/13/18 - esophagitis, and gastropathy  - sp repeat EGD 7/15/18 - mild esophagitis, linear gastric ulcer from NGT, and retained gastric content and enteral feeds suggesting gastric dysmotility  - sp flex sig 7/13/18 - which showed significant proctitis  - sp colonoscopy 7/15/18 - with large rectal clot requiring manual disimpaction, anorectal oozing of unclear etiology, severe proctitis, old BRB throughout entire examined colon, difficulty intubating TI - but there was suggestion of small clot when TI visualized, no evidence of ischemic bowel seen  -please switch Canasa enemas to Canasa suppositories, as suppositories are less traumatic to mucosa  - on ppi gtt - given that no ulcers were seen, IV PPI BID    # transaminitis with hyperbilirubinemia  - pt with chronically up trending total bilirubin since admission; multifactorial in nature: could be 2/2 hemolysis, RBC and platelet consumption 2/2 shearing on impella, drug induced 2/2 ?meropenem, autoimmune hemolysis in the setting of multiple transfusions  - pending repeat liver USS  - need to rule out acute viral etiology: please send EBV and CMV IgM and IgG, hepatitis serologies- hep A IgM, HBsAg, IgM anti-HBC, anti-HCV  - check AMA for PBC  - please check repeat peripheral smear  - consider hematology consult for persistent hemolysis  - please avoid hepatotoxic agents  - will consider discussion with [facility] liver fellow for any further input      Discussed with attending Dr Vee COBIAN following

## 2018-07-15 NOTE — CONSULT NOTE ADULT - ASSESSMENT
67yoM with PMH congenital AS s/p repair at 13yo, sCHF (pre-op EF 20%), now s/p 2-v CABG, AV replacement, and repair of tricuspid and mitral valves (6/21), course complicated by cardiogenic shock due to cardiac tamponade s/p RTOR, multisystem organ failure requiring 3-pressor support, CVVHD, and blood product transfusion. Colorectal Surgery consulted for LGIB uncontrolled with endoscopic intervention.    -Patient is HD unstable and cannot safely undergo operative intervention. Would recommend IR angiogram and embolization should patient be stable enough for such intervention.  -Would consider packing rectum with Kerlix to attempt to tamponade distal bleeding, however in setting of currently stable clot in the area, will hold off on manipulation for now. Should bleeding recommence, please call Surgery urgently to pack area.  -Would advise against placing a suture in friable tissue at this time  -Will follow on Team 1C. Please call with any questions or concerns  -Discussed with Dr. Leija, CRS on call

## 2018-07-15 NOTE — PROGRESS NOTE ADULT - ASSESSMENT
67 year old male with PMH stated above, now s/p  Repair of AA, AVR, MV repair, TV repair, CABG x2, now in shock requiring pressor support - mutiple pressors. The patient with deterioration of the renal function in the setting of the shock - oliguric, no response from the diuretics. The CT ICU team started the patient on aquaphoresis and the CVVHD ordered, never been started from the primary team the BP unstable. The cvvhd started yesterday - 7/2 good tolerance so far. In the past 24 hours the  ml/h in the morning decreased from primary team from 250 ml/h, the dialysate flow now on 2 liters per hour and the blood flow on 200 ml/min. Still onthree  pressors to maintain hemodynamic stability  currently on Aquapheresis at UF rate of 110cc/hr - switched from CVVHD yesterday at 2 PM

## 2018-07-15 NOTE — PROGRESS NOTE ADULT - SUBJECTIVE AND OBJECTIVE BOX
Pt seen and examined at bedside  Developed sudden profuse BRBPR this afternoon with drop in Hgb requiring 2stat units of prbcs  Planned for repeat endoscopy at bedside  bilirubin still rising      MEDICATIONS:  MEDICATIONS  (STANDING):  albumin human 25% IVPB 50 milliLiter(s) IV Intermittent every 8 hours  artificial  tears Solution 1 Drop(s) Both EYES two times a day  aztreonam  IVPB 2000 milliGRAM(s) IV Intermittent every 12 hours  chlorhexidine 0.12% Liquid 15 milliLiter(s) Swish and Spit every 12 hours  chlorhexidine 2% Cloths 1 Application(s) Topical daily  dexmedetomidine Infusion 0.5 MICROgram(s)/kG/Hr (7.375 mL/Hr) IV Continuous <Continuous>  dextrose 5%. 1000 milliLiter(s) (25 mL/Hr) IV Continuous <Continuous>  dextrose 50% Injectable 50 milliLiter(s) IV Push every 15 minutes  dextrose 50% Injectable 25 milliLiter(s) IV Push every 15 minutes  EPINEPHrine     Infusion 0.06 MICROgram(s)/kG/Min (13.275 mL/Hr) IV Continuous <Continuous>  folic acid 1 milliGRAM(s) Oral daily  hydrocortisone sodium succinate Injectable 50 milliGRAM(s) IV Push daily  insulin lispro (HumaLOG) corrective regimen sliding scale   SubCutaneous every 6 hours  lactulose Syrup 20 Gram(s) Oral every 6 hours  mesalamine Suppository 1000 milliGRAM(s) Rectal at bedtime  midodrine 10 milliGRAM(s) Oral every 8 hours  pantoprazole  Injectable 40 milliGRAM(s) IV Push every 12 hours  phenylephrine    Infusion 2 MICROgram(s)/kG/Min (21.975 mL/Hr) IV Continuous <Continuous>  propofol Infusion 10 MICROgram(s)/kG/Min (3.54 mL/Hr) IV Continuous <Continuous>  PureFlow Dialysate RFP-400 (K 2 / Ca 3) 5000 milliLiter(s) (1500 mL/Hr) CRRT <Continuous>  sodium chloride 0.9%. 1000 milliLiter(s) (10 mL/Hr) IV Continuous <Continuous>  ursodiol Suspension 300 milliGRAM(s) Oral every 12 hours  vancomycin  IVPB 750 milliGRAM(s) IV Intermittent every 24 hours  vasopressin Infusion 0.017 Unit(s)/Min (1 mL/Hr) IV Continuous <Continuous>    MEDICATIONS  (PRN):  dextrose 40% Gel 15 Gram(s) Oral once PRN Blood Glucose LESS THAN 70 milliGRAM(s)/deciliter  fentaNYL    Injectable 25 MICROGram(s) IV Push every 4 hours PRN Severe Pain (7 - 10)  glucagon  Injectable 1 milliGRAM(s) IntraMuscular once PRN Glucose LESS THAN 70 milligrams/deciliter      Allergies  penicillin (Rash)    Intolerances      Vital Signs Last 24 Hrs  T(C): 36.2 (15 Jul 2018 21:07), Max: 36.7 (15 Jul 2018 09:00)  T(F): 97.1 (15 Jul 2018 21:07), Max: 98 (15 Jul 2018 09:00)  HR: 90 (15 Jul 2018 21:00) (79 - 90)  BP: 89/47 (15 Jul 2018 14:00) (89/47 - 89/47)  BP(mean): 56 (15 Jul 2018 14:00) (56 - 56)  RR: 30 (15 Jul 2018 21:00) (20 - 30)  SpO2: 99% (15 Jul 2018 21:00) (93% - 100%)    07-14 @ 07:01  -  07-15 @ 07:00  --------------------------------------------------------  IN: 4135.5 mL / OUT: 2857 mL / NET: 1278.5 mL    07-15 @ 07:01  -  07-15 @ 21:34  --------------------------------------------------------  IN: 1943.9 mL / OUT: 1602 mL / NET: 341.9 mL      PHYSICAL EXAM:  General: extremely ill appearing male; intubated and sedated  HEENT: pupils sluggish  Gastrointestinal: Soft, non-tender non-distended; Normal bowel sounds; no appreciable organomegaly; two epigastric drains with blood in the catheters; dark stools noted on bed pad; dark stool on rectal exam.,  Extremities: + peripheral edema   Neurological: intubated and sedated  Skin: jaundiced      LABS:  ABG - ( 15 Jul 2018 17:05 )  pH, Arterial: 7.39  pH, Blood: x     /  pCO2: 31    /  pO2: 181   / HCO3: 18    / Base Excess: -5.6  /  SaO2: 99                  CBC Full  -  ( 15 Jul 2018 17:06 )  WBC Count : 11.1 K/uL  Hemoglobin : 10.9 g/dL  Hematocrit : 31.1 %  Platelet Count - Automated : 81 K/uL  Mean Cell Volume : 84.3 fL  Mean Cell Hemoglobin : 29.5 pg  Mean Cell Hemoglobin Concentration : 35.0 g/dL    133<L>  |  93<L>  |  49<H>  ----------------------------<  140<H>  4.8   |  17<L>  |  0.83    Ca    10.0      15 Jul 2018 17:06  Phos  3.9     07-15  Mg     2.1     07-15    TPro  4.9<L>  /  Alb  3.6  /  TBili  57.7<H>  /  DBili  >10.0<H>  /  AST  207<H>  /  ALT  101<H>  /  AlkPhos  302<H>  07-15    PT/INR - ( 15 Jul 2018 17:06 )   PT: 12.3 sec;   INR: 1.11        PTT - ( 15 Jul 2018 17:06 )  PTT:36.7 sec            RADIOLOGY & ADDITIONAL STUDIES (The following images were personally reviewed):

## 2018-07-15 NOTE — PROGRESS NOTE ADULT - PROBLEM SELECTOR PLAN 3
patient extremely jaundiced with T. Br of 60  need to r/o cholecystitis but bedside sonogram does not yield results secondary to anasarca  needs HIDA scan or CT scan once stable for transport

## 2018-07-15 NOTE — PROGRESS NOTE ADULT - SUBJECTIVE AND OBJECTIVE BOX
68yo with history of congenital heart disease s/p AV repair vs replacement at the age of 12 recent cardiac evaluation for pre-op clearance.  On workup pt diagnosed with VHD (severe prosthetic AS, severe MR, Severe TR) as well 2 vessel CAD in the setting of low EF 20-25%.      Pre-op CT showing left subclavian artery stenosis, possible coarctation of aorta (focal narrowing 3.5 cm segment of proximal descending aorta distal to the subclavian artery)    6/21 AVR, ascending aorta, TV/MV repair, CABG X2.  Received multiple blood products/volume arrived with open chest.  IABP and Impella for LV support.    6/22 Chest closure but re-explored for tamponade on 6/23 6/26 Chest closure but again re-explored by bedside for acute hemodynamic failure  7/2 s/p Chest closure  7/11 Impella removed    24 hours: pt remains critically ill, on pressors, iontropic support.  some improvement in lactic acidosis but worsening hyperbilirubinemia  On exam this morning noted to have large blood clot per rectum.  GI called, pt s/p upper and lower scope.   Able to scope the whole colon to ileocecal valve, pt had active bleeding from rectum (anal verge - rapid bleeding, forming large baseball sized clots)  proximal colon to cecum also full of retain blood, total of approximately 2 liters of blood evacuated from colon.  Coming if Ileocecal valve.  EGD performed to rule out upper GIB, pt noted to have retained tube feed in stomach, no active bleeding but has diffuse gastritis.  NG tube inserted and passed post pyloric with EGD guidance. Gen surgery consulted for management of lower GIB, will likely need IR evaluation for small bowel source of bleeding but pt remains unable on 3 pressors (epi/vaso/Dio).     1.  persistent lactic acidosis unclear cardiogenic, organ specific vs respiratory effort  2.  respiratory failure  3.  encephalopathy/ AMS likely metabolic   4.  critical illness myopathy/neuropathy  5.  GIB  (active rectal bleed and likely bleeding from small bowel)  6.  Hepatitis/Jandice  7. Concern for mediastinitis due to multiple chest exploration   8. Cardiogenic shock s/p impella support -- requiring iontropic support      PMH :  CHF  Open wound of chest wall, unspecified laterality, sequela  Complete heart block  Cardiac tamponade  Aortic valve stenosis, etiology of cardiac valve disease unspecified  Open wound of chest wall, unspecified laterality, sequela  Cardiac tamponade  Tricuspid valve insufficiency, unspecified etiology  Coronary artery disease with other form of angina pectoris  Mitral valve insufficiency, unspecified etiology  Aortic valve stenosis, etiology of cardiac valve disease unspecified  Jaundice  Thrombocytopenia  Penile swelling  Shock  Anemia    ICU Vital Signs Last 24 Hrs  T(C): 36.1 (07-15-18 @ 14:06), Max: 36.7 (07-15-18 @ 09:00)  T(F): 97 (07-15-18 @ 14:06), Max: 98 (07-15-18 @ 09:00)  HR: 90 (07-15-18 @ 17:00) (79 - 90)  BP: 89/47 (07-15-18 @ 14:00) (89/47 - 89/47)  BP(mean): 56 (07-15-18 @ 14:00) (56 - 56)  ABP: 92/52 (07-15-18 @ 17:00) (82/40 - 112/64)  ABP(mean): 66 (07-15-18 @ 17:00) (54 - 82)  RR: 20 (07-15-18 @ 17:00) (20 - 30)  SpO2: 99% (07-15-18 @ 17:00) (92% - 100%)    I&O's Summary    14 Jul 2018 07:01  -  15 Jul 2018 07:00  --------------------------------------------------------  IN: 4135.5 mL / OUT: 2857 mL / NET: 1278.5 mL    15 Jul 2018 07:01  -  15 Jul 2018 17:29  --------------------------------------------------------  IN: 1774.7 mL / OUT: 1562 mL / NET: 212.7 mL      Mode: AC/ CMV (Assist Control/ Continuous Mandatory Ventilation)  RR (machine): 30  TV (machine): 450  FiO2: 80  PEEP: 8  ITime: 1  MAP: 22  PIP: 38    ABG - ( 15 Jul 2018 17:05 )  pH: 7.39  /  pCO2: 31    /  pO2: 181   / HCO3: 18    / Base Excess: -5.6  /  SaO2: 99                            8.7    10.4  )-----------( 40       ( 15 Jul 2018 13:33 )             25.6     15 Jul 2018 13:33    139    |  99     |  50     ----------------------------<  209    4.6     |  18     |  0.84     Ca    9.4        15 Jul 2018 13:33  Phos  3.9       15 Jul 2018 13:33  Mg     1.9       15 Jul 2018 13:33    TPro  4.9    /  Alb  3.9    /  TBili  61.4   /  DBili  x      /  AST  206    /  ALT  101    /  AlkPhos  317    15 Jul 2018 09:07    PT/INR - ( 15 Jul 2018 17:06 )   PT: 12.3 sec;   INR: 1.11     PTT - ( 15 Jul 2018 17:06 )  PTT:36.7 sec    MEDICATIONS  (STANDING):  albumin human 25% IVPB 50 milliLiter(s) IV Intermittent every 8 hours  aztreonam  IVPB 2000 milliGRAM(s) IV Intermittent every 12 hours  dexmedetomidine Infusion 0.5 MICROgram(s)/kG/Hr IV Continuous <Continuous>  EPINEPHrine     Infusion 0.06 MICROgram(s)/kG/Min IV Continuous <Continuous>  folic acid 1 milliGRAM(s) Oral daily  hydrocortisone sodium succinate Injectable 50 milliGRAM(s) IV Push daily  insulin lispro (HumaLOG) corrective regimen sliding scale   SubCutaneous every 6 hours  lactulose Syrup 20 Gram(s) Oral every 6 hours  mesalamine Suppository 1000 milliGRAM(s) Rectal at bedtime  midodrine 10 milliGRAM(s) Oral every 8 hours  pantoprazole  Injectable 40 milliGRAM(s) IV Push every 12 hours  phenylephrine    Infusion 2 MICROgram(s)/kG/Min IV Continuous <Continuous>  propofol Infusion 10 MICROgram(s)/kG/Min IV Continuous <Continuous>  PureFlow Dialysate RFP-400 (K 2 / Ca 3) 5000 milliLiter(s) CRRT <Continuous>  sodium chloride 0.9%. 1000 milliLiter(s) IV Continuous <Continuous>  ursodiol Suspension 300 milliGRAM(s) Oral every 12 hours  vancomycin  IVPB 750 milliGRAM(s) IV Intermittent every 24 hours  vasopressin Infusion 0.017 Unit(s)/Min IV Continuous <Continuous>    Home Medications:  Lasix 40 mg oral tablet: 1 tab(s) orally once a day (19 Jun 2018 00:25)    PHYSICAL EXAM:  Gen : intubated sedated, + jaundice  Respiratory: decreased in the bases  Cardiovascular: S1 and S2, RRR, no M/G/R  Gastrointestinal: BS+, soft, NT/ND  Extremities: + edema   Vascular: 2+ peripheral pulses  Neurological: sedated  Incision: + vac   Lines: no sign of infection

## 2018-07-16 DIAGNOSIS — E87.5 HYPERKALEMIA: ICD-10-CM

## 2018-07-16 DIAGNOSIS — E87.2 ACIDOSIS: ICD-10-CM

## 2018-07-16 LAB
ALBUMIN SERPL ELPH-MCNC: 3.7 G/DL — SIGNIFICANT CHANGE UP (ref 3.3–5)
ALBUMIN SERPL ELPH-MCNC: 4 G/DL — SIGNIFICANT CHANGE UP (ref 3.3–5)
ALP SERPL-CCNC: 273 U/L — HIGH (ref 40–120)
ALP SERPL-CCNC: 337 U/L — HIGH (ref 40–120)
ALT FLD-CCNC: 111 U/L — HIGH (ref 10–45)
ALT FLD-CCNC: 99 U/L — HIGH (ref 10–45)
ANA TITR SER: NEGATIVE — SIGNIFICANT CHANGE UP
ANION GAP SERPL CALC-SCNC: 25 MMOL/L — HIGH (ref 5–17)
ANION GAP SERPL CALC-SCNC: 25 MMOL/L — HIGH (ref 5–17)
APTT BLD: 32.3 SEC — SIGNIFICANT CHANGE UP (ref 27.5–37.4)
APTT BLD: 35.6 SEC — SIGNIFICANT CHANGE UP (ref 27.5–37.4)
APTT BLD: 36.9 SEC — SIGNIFICANT CHANGE UP (ref 27.5–37.4)
AST SERPL-CCNC: 193 U/L — HIGH (ref 10–40)
AST SERPL-CCNC: 223 U/L — HIGH (ref 10–40)
BASE EXCESS BLDA CALC-SCNC: -1.6 MMOL/L — SIGNIFICANT CHANGE UP (ref -2–3)
BILIRUB SERPL-MCNC: 56.5 MG/DL — HIGH (ref 0.2–1.2)
BILIRUB SERPL-MCNC: 62.4 MG/DL — HIGH (ref 0.2–1.2)
BLD GP AB SCN SERPL QL: NEGATIVE — SIGNIFICANT CHANGE UP
BUN SERPL-MCNC: 57 MG/DL — HIGH (ref 7–23)
BUN SERPL-MCNC: 73 MG/DL — HIGH (ref 7–23)
CALCIUM SERPL-MCNC: 9.7 MG/DL — SIGNIFICANT CHANGE UP (ref 8.4–10.5)
CALCIUM SERPL-MCNC: 9.8 MG/DL — SIGNIFICANT CHANGE UP (ref 8.4–10.5)
CARDIOLIPIN AB SER-ACNC: NEGATIVE — SIGNIFICANT CHANGE UP
CHLORIDE SERPL-SCNC: 95 MMOL/L — LOW (ref 96–108)
CHLORIDE SERPL-SCNC: 97 MMOL/L — SIGNIFICANT CHANGE UP (ref 96–108)
CO2 SERPL-SCNC: 15 MMOL/L — LOW (ref 22–31)
CO2 SERPL-SCNC: 16 MMOL/L — LOW (ref 22–31)
CREAT SERPL-MCNC: 0.95 MG/DL — SIGNIFICANT CHANGE UP (ref 0.5–1.3)
CREAT SERPL-MCNC: 1.09 MG/DL — SIGNIFICANT CHANGE UP (ref 0.5–1.3)
ENTEROCOC DNA BLD POS QL NAA+NON-PROBE: SIGNIFICANT CHANGE UP
ENTEROCOC DNA BLD POS QL NAA+NON-PROBE: SIGNIFICANT CHANGE UP
GAS PNL BLDA: SIGNIFICANT CHANGE UP
GLUCOSE BLDC GLUCOMTR-MCNC: 150 MG/DL — HIGH (ref 70–99)
GLUCOSE BLDC GLUCOMTR-MCNC: 169 MG/DL — HIGH (ref 70–99)
GLUCOSE BLDC GLUCOMTR-MCNC: 81 MG/DL — SIGNIFICANT CHANGE UP (ref 70–99)
GLUCOSE BLDC GLUCOMTR-MCNC: 82 MG/DL — SIGNIFICANT CHANGE UP (ref 70–99)
GLUCOSE BLDC GLUCOMTR-MCNC: 84 MG/DL — SIGNIFICANT CHANGE UP (ref 70–99)
GLUCOSE SERPL-MCNC: 171 MG/DL — HIGH (ref 70–99)
GLUCOSE SERPL-MCNC: 82 MG/DL — SIGNIFICANT CHANGE UP (ref 70–99)
GRAM STN FLD: SIGNIFICANT CHANGE UP
GRAM STN FLD: SIGNIFICANT CHANGE UP
HBV SURFACE AB SER-ACNC: REACTIVE — SIGNIFICANT CHANGE UP
HBV SURFACE AG SER-ACNC: SIGNIFICANT CHANGE UP
HCO3 BLDA-SCNC: 22 MMOL/L — SIGNIFICANT CHANGE UP (ref 21–28)
HCT VFR BLD CALC: 28.9 % — LOW (ref 39–50)
HCT VFR BLD CALC: 29 % — LOW (ref 39–50)
HCT VFR BLD CALC: 30 % — LOW (ref 39–50)
HCV AB S/CO SERPL IA: 0.09 S/CO — SIGNIFICANT CHANGE UP
HCV AB SERPL-IMP: SIGNIFICANT CHANGE UP
HGB BLD-MCNC: 10 G/DL — LOW (ref 13–17)
HGB BLD-MCNC: 10.2 G/DL — LOW (ref 13–17)
HGB BLD-MCNC: 10.6 G/DL — LOW (ref 13–17)
INR BLD: 1.15 — SIGNIFICANT CHANGE UP (ref 0.88–1.16)
INR BLD: 1.16 — SIGNIFICANT CHANGE UP (ref 0.88–1.16)
INR BLD: 1.25 — HIGH (ref 0.88–1.16)
LACTATE SERPL-SCNC: 3.3 MMOL/L — HIGH (ref 0.5–2)
LACTATE SERPL-SCNC: 3.3 MMOL/L — HIGH (ref 0.5–2)
MAGNESIUM SERPL-MCNC: 2 MG/DL — SIGNIFICANT CHANGE UP (ref 1.6–2.6)
MAGNESIUM SERPL-MCNC: 2.1 MG/DL — SIGNIFICANT CHANGE UP (ref 1.6–2.6)
MCHC RBC-ENTMCNC: 29.8 PG — SIGNIFICANT CHANGE UP (ref 27–34)
MCHC RBC-ENTMCNC: 29.9 PG — SIGNIFICANT CHANGE UP (ref 27–34)
MCHC RBC-ENTMCNC: 30.1 PG — SIGNIFICANT CHANGE UP (ref 27–34)
MCHC RBC-ENTMCNC: 34.5 G/DL — SIGNIFICANT CHANGE UP (ref 32–36)
MCHC RBC-ENTMCNC: 35.3 G/DL — SIGNIFICANT CHANGE UP (ref 32–36)
MCHC RBC-ENTMCNC: 35.3 G/DL — SIGNIFICANT CHANGE UP (ref 32–36)
MCV RBC AUTO: 84.5 FL — SIGNIFICANT CHANGE UP (ref 80–100)
MCV RBC AUTO: 85.2 FL — SIGNIFICANT CHANGE UP (ref 80–100)
MCV RBC AUTO: 86.8 FL — SIGNIFICANT CHANGE UP (ref 80–100)
METHOD TYPE: SIGNIFICANT CHANGE UP
PCO2 BLDA: 33 MMHG — LOW (ref 35–48)
PH BLDA: 7.44 — SIGNIFICANT CHANGE UP (ref 7.35–7.45)
PHOSPHATE SERPL-MCNC: 4.4 MG/DL — SIGNIFICANT CHANGE UP (ref 2.5–4.5)
PHOSPHATE SERPL-MCNC: 5.9 MG/DL — HIGH (ref 2.5–4.5)
PLATELET # BLD AUTO: 115 K/UL — LOW (ref 150–400)
PLATELET # BLD AUTO: 54 K/UL — LOW (ref 150–400)
PLATELET # BLD AUTO: 93 K/UL — LOW (ref 150–400)
PO2 BLDA: 93 MMHG — SIGNIFICANT CHANGE UP (ref 83–108)
POTASSIUM SERPL-MCNC: 4.7 MMOL/L — SIGNIFICANT CHANGE UP (ref 3.5–5.3)
POTASSIUM SERPL-MCNC: 5.8 MMOL/L — HIGH (ref 3.5–5.3)
POTASSIUM SERPL-SCNC: 4.7 MMOL/L — SIGNIFICANT CHANGE UP (ref 3.5–5.3)
POTASSIUM SERPL-SCNC: 5.8 MMOL/L — HIGH (ref 3.5–5.3)
PROT SERPL-MCNC: 5 G/DL — LOW (ref 6–8.3)
PROT SERPL-MCNC: 5.7 G/DL — LOW (ref 6–8.3)
PROTHROM AB SERPL-ACNC: 12.8 SEC — HIGH (ref 9.8–12.7)
PROTHROM AB SERPL-ACNC: 12.9 SEC — HIGH (ref 9.8–12.7)
PROTHROM AB SERPL-ACNC: 13.9 SEC — HIGH (ref 9.8–12.7)
RBC # BLD: 3.34 M/UL — LOW (ref 4.2–5.8)
RBC # BLD: 3.42 M/UL — LOW (ref 4.2–5.8)
RBC # BLD: 3.52 M/UL — LOW (ref 4.2–5.8)
RBC # FLD: 18.4 % — HIGH (ref 10.3–16.9)
RBC # FLD: 18.7 % — HIGH (ref 10.3–16.9)
RBC # FLD: 19.8 % — HIGH (ref 10.3–16.9)
RH IG SCN BLD-IMP: POSITIVE — SIGNIFICANT CHANGE UP
SAO2 % BLDA: 98 % — SIGNIFICANT CHANGE UP (ref 95–100)
SODIUM SERPL-SCNC: 135 MMOL/L — SIGNIFICANT CHANGE UP (ref 135–145)
SODIUM SERPL-SCNC: 138 MMOL/L — SIGNIFICANT CHANGE UP (ref 135–145)
SRA INTERP SER-IMP: SIGNIFICANT CHANGE UP
WBC # BLD: 10.2 K/UL — SIGNIFICANT CHANGE UP (ref 3.8–10.5)
WBC # BLD: 10.5 K/UL — SIGNIFICANT CHANGE UP (ref 3.8–10.5)
WBC # BLD: 11.9 K/UL — HIGH (ref 3.8–10.5)
WBC # FLD AUTO: 10.2 K/UL — SIGNIFICANT CHANGE UP (ref 3.8–10.5)
WBC # FLD AUTO: 10.5 K/UL — SIGNIFICANT CHANGE UP (ref 3.8–10.5)
WBC # FLD AUTO: 11.9 K/UL — HIGH (ref 3.8–10.5)

## 2018-07-16 PROCEDURE — 74174 CTA ABD&PLVS W/CONTRAST: CPT | Mod: 26

## 2018-07-16 PROCEDURE — 36556 INSERT NON-TUNNEL CV CATH: CPT

## 2018-07-16 PROCEDURE — 71045 X-RAY EXAM CHEST 1 VIEW: CPT | Mod: 26

## 2018-07-16 PROCEDURE — 90947 DIALYSIS REPEATED EVAL: CPT

## 2018-07-16 PROCEDURE — 71275 CT ANGIOGRAPHY CHEST: CPT | Mod: 26

## 2018-07-16 PROCEDURE — 76937 US GUIDE VASCULAR ACCESS: CPT | Mod: 26

## 2018-07-16 PROCEDURE — 99292 CRITICAL CARE ADDL 30 MIN: CPT | Mod: 25

## 2018-07-16 PROCEDURE — 70450 CT HEAD/BRAIN W/O DYE: CPT | Mod: 26

## 2018-07-16 PROCEDURE — 99291 CRITICAL CARE FIRST HOUR: CPT | Mod: 25

## 2018-07-16 RX ORDER — ALBUMIN HUMAN 25 %
50 VIAL (ML) INTRAVENOUS
Qty: 0 | Refills: 0 | Status: COMPLETED | OUTPATIENT
Start: 2018-07-16 | End: 2018-07-16

## 2018-07-16 RX ORDER — SODIUM BICARBONATE 1 MEQ/ML
50 SYRINGE (ML) INTRAVENOUS
Qty: 0 | Refills: 0 | Status: COMPLETED | OUTPATIENT
Start: 2018-07-16 | End: 2018-07-16

## 2018-07-16 RX ORDER — CISATRACURIUM BESYLATE 2 MG/ML
10 INJECTION INTRAVENOUS ONCE
Qty: 0 | Refills: 0 | Status: COMPLETED | OUTPATIENT
Start: 2018-07-16 | End: 2018-07-16

## 2018-07-16 RX ORDER — MICAFUNGIN SODIUM 100 MG/1
100 INJECTION, POWDER, LYOPHILIZED, FOR SOLUTION INTRAVENOUS EVERY 24 HOURS
Qty: 0 | Refills: 0 | Status: DISCONTINUED | OUTPATIENT
Start: 2018-07-17 | End: 2018-07-20

## 2018-07-16 RX ORDER — CALCIUM GLUCONATE 100 MG/ML
2 VIAL (ML) INTRAVENOUS ONCE
Qty: 0 | Refills: 0 | Status: COMPLETED | OUTPATIENT
Start: 2018-07-16 | End: 2018-07-16

## 2018-07-16 RX ORDER — DESMOPRESSIN ACETATE 0.1 MG/1
30 TABLET ORAL ONCE
Qty: 0 | Refills: 0 | Status: COMPLETED | OUTPATIENT
Start: 2018-07-16 | End: 2018-07-16

## 2018-07-16 RX ORDER — MICAFUNGIN SODIUM 100 MG/1
INJECTION, POWDER, LYOPHILIZED, FOR SOLUTION INTRAVENOUS
Qty: 0 | Refills: 0 | Status: DISCONTINUED | OUTPATIENT
Start: 2018-07-16 | End: 2018-07-20

## 2018-07-16 RX ORDER — MICAFUNGIN SODIUM 100 MG/1
100 INJECTION, POWDER, LYOPHILIZED, FOR SOLUTION INTRAVENOUS ONCE
Qty: 0 | Refills: 0 | Status: COMPLETED | OUTPATIENT
Start: 2018-07-16 | End: 2018-07-16

## 2018-07-16 RX ORDER — SODIUM BICARBONATE 1 MEQ/ML
50 SYRINGE (ML) INTRAVENOUS ONCE
Qty: 0 | Refills: 0 | Status: COMPLETED | OUTPATIENT
Start: 2018-07-16 | End: 2018-07-16

## 2018-07-16 RX ORDER — DAPTOMYCIN 500 MG/10ML
350 INJECTION, POWDER, LYOPHILIZED, FOR SOLUTION INTRAVENOUS
Qty: 0 | Refills: 0 | Status: DISCONTINUED | OUTPATIENT
Start: 2018-07-16 | End: 2018-07-20

## 2018-07-16 RX ORDER — GENTAMICIN SULFATE 40 MG/ML
250 VIAL (ML) INJECTION ONCE
Qty: 0 | Refills: 0 | Status: COMPLETED | OUTPATIENT
Start: 2018-07-16 | End: 2018-07-16

## 2018-07-16 RX ADMIN — Medication 50 MILLIGRAM(S): at 06:42

## 2018-07-16 RX ADMIN — Medication 50 MILLILITER(S): at 17:30

## 2018-07-16 RX ADMIN — Medication 1 MILLIGRAM(S): at 13:49

## 2018-07-16 RX ADMIN — Medication 50 MILLIEQUIVALENT(S): at 17:46

## 2018-07-16 RX ADMIN — CISATRACURIUM BESYLATE 10 MILLIGRAM(S): 2 INJECTION INTRAVENOUS at 19:00

## 2018-07-16 RX ADMIN — MIDODRINE HYDROCHLORIDE 10 MILLIGRAM(S): 2.5 TABLET ORAL at 22:00

## 2018-07-16 RX ADMIN — Medication 50 MILLIEQUIVALENT(S): at 17:56

## 2018-07-16 RX ADMIN — MICAFUNGIN SODIUM 105 MILLIGRAM(S): 100 INJECTION, POWDER, LYOPHILIZED, FOR SOLUTION INTRAVENOUS at 13:48

## 2018-07-16 RX ADMIN — Medication 100 MILLIGRAM(S): at 23:50

## 2018-07-16 RX ADMIN — Medication 50 MILLIEQUIVALENT(S): at 17:36

## 2018-07-16 RX ADMIN — FENTANYL CITRATE 25 MICROGRAM(S): 50 INJECTION INTRAVENOUS at 19:40

## 2018-07-16 RX ADMIN — Medication 100 MILLIGRAM(S): at 00:04

## 2018-07-16 RX ADMIN — Medication 50 MILLILITER(S): at 06:43

## 2018-07-16 RX ADMIN — CISATRACURIUM BESYLATE 10 MILLIGRAM(S): 2 INJECTION INTRAVENOUS at 12:00

## 2018-07-16 RX ADMIN — PANTOPRAZOLE SODIUM 40 MILLIGRAM(S): 20 TABLET, DELAYED RELEASE ORAL at 17:17

## 2018-07-16 RX ADMIN — FENTANYL CITRATE 25 MICROGRAM(S): 50 INJECTION INTRAVENOUS at 19:30

## 2018-07-16 RX ADMIN — CHLORHEXIDINE GLUCONATE 15 MILLILITER(S): 213 SOLUTION TOPICAL at 17:16

## 2018-07-16 RX ADMIN — MIDODRINE HYDROCHLORIDE 10 MILLIGRAM(S): 2.5 TABLET ORAL at 06:43

## 2018-07-16 RX ADMIN — MIDODRINE HYDROCHLORIDE 10 MILLIGRAM(S): 2.5 TABLET ORAL at 13:49

## 2018-07-16 RX ADMIN — DEXMEDETOMIDINE HYDROCHLORIDE IN 0.9% SODIUM CHLORIDE 7.38 MICROGRAM(S)/KG/HR: 4 INJECTION INTRAVENOUS at 13:50

## 2018-07-16 RX ADMIN — DEXMEDETOMIDINE HYDROCHLORIDE IN 0.9% SODIUM CHLORIDE 7.38 MICROGRAM(S)/KG/HR: 4 INJECTION INTRAVENOUS at 02:07

## 2018-07-16 RX ADMIN — Medication 200 GRAM(S): at 15:42

## 2018-07-16 RX ADMIN — Medication 100 MILLIGRAM(S): at 13:49

## 2018-07-16 RX ADMIN — Medication 1 DROP(S): at 06:44

## 2018-07-16 RX ADMIN — Medication 2: at 00:11

## 2018-07-16 RX ADMIN — Medication 50 MILLIEQUIVALENT(S): at 18:06

## 2018-07-16 RX ADMIN — CHLORHEXIDINE GLUCONATE 1 APPLICATION(S): 213 SOLUTION TOPICAL at 13:49

## 2018-07-16 RX ADMIN — DAPTOMYCIN 114 MILLIGRAM(S): 500 INJECTION, POWDER, LYOPHILIZED, FOR SOLUTION INTRAVENOUS at 22:00

## 2018-07-16 RX ADMIN — PANTOPRAZOLE SODIUM 40 MILLIGRAM(S): 20 TABLET, DELAYED RELEASE ORAL at 06:43

## 2018-07-16 RX ADMIN — Medication 1 DROP(S): at 17:17

## 2018-07-16 RX ADMIN — Medication 1000 MILLIGRAM(S): at 22:00

## 2018-07-16 RX ADMIN — PROPOFOL 3.54 MICROGRAM(S)/KG/MIN: 10 INJECTION, EMULSION INTRAVENOUS at 13:50

## 2018-07-16 RX ADMIN — Medication 50 MILLIEQUIVALENT(S): at 15:00

## 2018-07-16 RX ADMIN — URSODIOL 300 MILLIGRAM(S): 250 TABLET, FILM COATED ORAL at 06:43

## 2018-07-16 RX ADMIN — Medication 50 MILLILITER(S): at 17:00

## 2018-07-16 RX ADMIN — CHLORHEXIDINE GLUCONATE 15 MILLILITER(S): 213 SOLUTION TOPICAL at 06:44

## 2018-07-16 RX ADMIN — DESMOPRESSIN ACETATE 115 MICROGRAM(S): 0.1 TABLET ORAL at 10:48

## 2018-07-16 RX ADMIN — Medication 200 GRAM(S): at 18:30

## 2018-07-16 RX ADMIN — URSODIOL 300 MILLIGRAM(S): 250 TABLET, FILM COATED ORAL at 17:17

## 2018-07-16 RX ADMIN — Medication 200 MILLIGRAM(S): at 22:00

## 2018-07-16 NOTE — PROGRESS NOTE ADULT - ASSESSMENT
The patient is 67 year old male with PMH stated above, now s/p  Repair of AA, AVR, MV repair, TV repair, CABG x2, now in shock requiring pressor support - mutiple pressors. The patient with deterioration of the renal function in the setting of the shock - oliguric, no response from the diuretics. was started on CVVHD from 7/2 to 7/14 and stopped and been on aquaphoresis since than - in the past 24 hours - 3.7 liters UF, negative around 300 ml/h. Now still oliguric, requring pressors, with worsening hyperkalemia and acidosis - may be should restart the CVVHD with better clearance - pending discussion with attending and intensivist

## 2018-07-16 NOTE — PROGRESS NOTE ADULT - SUBJECTIVE AND OBJECTIVE BOX
Seen in the afternoon, in the morning was getting CT scan, still intubated, still requiring pressor support, been  on CVVHD since 7/2 and was discontinued on 7/14 in the afternoon. After that switch to aquaphoresis with good tolerance -  ml/h - in 24 hours - 3.6 liters off, negative around 300    The renal service for EMILY due to ATN from the cardiogenic shock     Patient seen and examined at bedside.           artificial  tears Solution 1 Drop(s) two times a day  aztreonam  IVPB 2000 milliGRAM(s) every 12 hours  calcium gluconate IVPB 2 Gram(s) once  chlorhexidine 0.12% Liquid 15 milliLiter(s) every 12 hours  chlorhexidine 2% Cloths 1 Application(s) daily  DAPTOmycin IVPB 350 milliGRAM(s) every 48 hours  dexmedetomidine Infusion 0.5 MICROgram(s)/kG/Hr <Continuous>  dextrose 40% Gel 15 Gram(s) once PRN  dextrose 5%. 1000 milliLiter(s) <Continuous>  dextrose 50% Injectable 50 milliLiter(s) every 15 minutes  dextrose 50% Injectable 25 milliLiter(s) every 15 minutes  EPINEPHrine     Infusion 0.06 MICROgram(s)/kG/Min <Continuous>  fentaNYL    Injectable 25 MICROGram(s) every 4 hours PRN  folic acid 1 milliGRAM(s) daily  gentamicin   IVPB 250 milliGRAM(s) once  glucagon  Injectable 1 milliGRAM(s) once PRN  hydrocortisone sodium succinate Injectable 50 milliGRAM(s) daily  insulin lispro (HumaLOG) corrective regimen sliding scale   every 6 hours  lactulose Syrup 20 Gram(s) every 6 hours  mesalamine Suppository 1000 milliGRAM(s) at bedtime  micafungin IVPB     midodrine 10 milliGRAM(s) every 8 hours  pantoprazole  Injectable 40 milliGRAM(s) every 12 hours  phenylephrine    Infusion 2 MICROgram(s)/kG/Min <Continuous>  propofol Infusion 10 MICROgram(s)/kG/Min <Continuous>  PureFlow Dialysate RFP-400 (K 2 / Ca 3) 5000 milliLiter(s) <Continuous>  sodium bicarbonate  Injectable 50 milliEquivalent(s) once  sodium chloride 0.9%. 1000 milliLiter(s) <Continuous>  ursodiol Suspension 300 milliGRAM(s) every 12 hours  vasopressin Infusion 0.017 Unit(s)/Min <Continuous>      Allergies    penicillin (Rash)    Intolerances        T(C): , Max: 36.3 (07-16-18 @ 05:00)  T(F): , Max: 97.3 (07-16-18 @ 05:00)  HR: 90 (07-16-18 @ 14:00)  BP: 116/66 (07-16-18 @ 10:00)  BP(mean): 81 (07-16-18 @ 10:00)  RR: 24 (07-16-18 @ 13:00)  SpO2: 96% (07-16-18 @ 14:00)  Wt(kg): --    07-15 @ 07:01  -  07-16 @ 07:00  --------------------------------------------------------  IN:    Albumin 25%: 100 mL    dexmedetomidine Infusion: 177.6 mL    Enteral Tube Flush: 200 mL    EPINEPHrine Infusion: 319.1 mL    Packed Red Blood Cells: 700 mL    phenylephrine   Infusion: 365.7 mL    Platelets - Single Donor: 450 mL    propofol Infusion: 222.5 mL    sodium chloride 0.9%.: 240 mL    Solution: 200 mL    vasopressin Infusion: 48 mL    Vital HN: 756 mL  Total IN: 3778.9 mL    OUT:    Chest Tube: 110 mL    Nasoenteral Tube: 300 mL    Other: 3606 mL    VAC (Vacuum Assisted Closure) System: 10 mL  Total OUT: 4026 mL    Total NET: -247.1 mL      07-16 @ 07:01  -  07-16 @ 14:20  --------------------------------------------------------  IN:    dexmedetomidine Infusion: 32.5 mL    Enteral Tube Flush: 250 mL    EPINEPHrine Infusion: 73.2 mL    phenylephrine   Infusion: 89.7 mL    Platelets - Single Donor: 290 mL    propofol Infusion: 26.4 mL    sodium chloride 0.9%.: 60 mL    vasopressin Infusion: 12 mL  Total IN: 833.8 mL    OUT:    Chest Tube: 20 mL    Nasoenteral Tube: 300 mL    Other: 480 mL  Total OUT: 800 mL    Total NET: 33.8 mL      Physical exam:   Intubated and off sedation when i saw him - opening his eyes   ANASARCA   Jaundice   difficult to evaluate for JVD   Chest: s/p thoracotomy closed chest  multiple drainages from the chest   RRR, normal s1/s2, systolic murmur  Abdomen - swollen, not tender, not distended   Extremities 3+ peripheral edema  Hd cathter - on the left IJ  Desai           LABS:                        10.0   10.2  )-----------( 93       ( 16 Jul 2018 13:35 )             29.0     07-16    135  |  95<L>  |  73<H>  ----------------------------<  82  5.8<H>   |  15<L>  |  1.09    Ca    9.7      16 Jul 2018 13:35  Phos  5.9     07-16  Mg     2.1     07-16    TPro  5.7<L>  /  Alb  4.0  /  TBili  62.4<H>  /  DBili  x   /  AST  223<H>  /  ALT  111<H>  /  AlkPhos  337<H>  07-16      PT/INR - ( 16 Jul 2018 13:35 )   PT: 12.8 sec;   INR: 1.15          PTT - ( 16 Jul 2018 13:35 )  PTT:35.6 sec          RADIOLOGY & ADDITIONAL STUDIES:

## 2018-07-16 NOTE — PROGRESS NOTE ADULT - SUBJECTIVE AND OBJECTIVE BOX
GENERAL SURGERY CONSULT PROGRESS NOTE    SUBJECTIVE:   Pt seen & examined at bedside. Intubated, sedated, on pressors. Per nursing bleeding has stopped.    MEDICATIONS:  ---NEURO-  dexmedetomidine Infusion 0.5 MICROgram(s)/kG/Hr IV Continuous <Continuous>  fentaNYL    Injectable 25 MICROGram(s) IV Push every 4 hours PRN  propofol Infusion 10 MICROgram(s)/kG/Min IV Continuous <Continuous>  ---CV-  EPINEPHrine     Infusion 0.06 MICROgram(s)/kG/Min (13.275 mL/Hr) IV Continuous <Continuous>  midodrine 10 milliGRAM(s) Oral every 8 hours  phenylephrine    Infusion 2 MICROgram(s)/kG/Min (21.975 mL/Hr) IV Continuous <Continuous>  ---PULM-  ---GI/FEN-  lactulose Syrup 20 Gram(s) Oral every 6 hours  mesalamine Suppository 1000 milliGRAM(s) Rectal at bedtime  pantoprazole  Injectable 40 milliGRAM(s) IV Push every 12 hours  ursodiol Suspension 300 milliGRAM(s) Oral every 12 hours  dextrose 5%. 1000 milliLiter(s) IV Continuous <Continuous>  folic acid 1 milliGRAM(s) Oral daily  sodium chloride 0.9%. 1000 milliLiter(s) IV Continuous <Continuous>  ----  ---ID-   aztreonam  IVPB 2000 milliGRAM(s) IV Intermittent every 12 hours  DAPTOmycin IVPB 350 milliGRAM(s) IV Intermittent every 48 hours  gentamicin   IVPB 250 milliGRAM(s) IV Intermittent once  micafungin IVPB 100 milliGRAM(s) IV Intermittent once  micafungin IVPB      ---ENDO-  desmopressin IVPB 30 MICROGram(s) IV Intermittent once  dextrose 40% Gel 15 Gram(s) Oral once PRN  dextrose 50% Injectable 50 milliLiter(s) IV Push every 15 minutes  dextrose 50% Injectable 25 milliLiter(s) IV Push every 15 minutes  glucagon  Injectable 1 milliGRAM(s) IntraMuscular once PRN  hydrocortisone sodium succinate Injectable 50 milliGRAM(s) IV Push daily  insulin lispro (HumaLOG) corrective regimen sliding scale   SubCutaneous every 6 hours  vasopressin Infusion 0.017 Unit(s)/Min IV Continuous <Continuous>  ---HEME-  ---OTHER-  artificial  tears Solution 1 Drop(s) Both EYES two times a day  chlorhexidine 0.12% Liquid 15 milliLiter(s) Swish and Spit every 12 hours  chlorhexidine 2% Cloths 1 Application(s) Topical daily  PureFlow Dialysate RFP-400 (K 2 / Ca 3) 5000 milliLiter(s) CRRT <Continuous>        VOLUME STATUS:  I&O's Detail    15 Jul 2018 07:01  -  16 Jul 2018 07:00  --------------------------------------------------------  IN:    Albumin 25%: 100 mL    dexmedetomidine Infusion: 177.6 mL    Enteral Tube Flush: 200 mL    EPINEPHrine Infusion: 319.1 mL    Packed Red Blood Cells: 700 mL    phenylephrine   Infusion: 365.7 mL    Platelets - Single Donor: 450 mL    propofol Infusion: 222.5 mL    sodium chloride 0.9%.: 240 mL    Solution: 200 mL    vasopressin Infusion: 48 mL    Vital HN: 756 mL  Total IN: 3778.9 mL    OUT:    Chest Tube: 110 mL    Nasoenteral Tube: 300 mL    Other: 3606 mL    VAC (Vacuum Assisted Closure) System: 10 mL  Total OUT: 4026 mL    Total NET: -247.1 mL      16 Jul 2018 07:01  -  16 Jul 2018 13:05  --------------------------------------------------------  IN:    dexmedetomidine Infusion: 22.2 mL    Enteral Tube Flush: 250 mL    EPINEPHrine Infusion: 55.4 mL    phenylephrine   Infusion: 63.1 mL    Platelets - Single Donor: 290 mL    propofol Infusion: 26.4 mL    sodium chloride 0.9%.: 40 mL    vasopressin Infusion: 8 mL  Total IN: 755.1 mL    OUT:    Chest Tube: 5 mL    Nasoenteral Tube: 200 mL    Other: 480 mL  Total OUT: 685 mL    Total NET: 70.1 mL          VITALS:  Vital Signs Last 24 Hrs  T(C): 36.3 (16 Jul 2018 05:00), Max: 36.3 (16 Jul 2018 05:00)  T(F): 97.3 (16 Jul 2018 05:00), Max: 97.3 (16 Jul 2018 05:00)  HR: 90 (16 Jul 2018 11:00) (90 - 90)  BP: 116/66 (16 Jul 2018 10:00) (89/47 - 116/66)  BP(mean): 81 (16 Jul 2018 10:00) (56 - 81)  RR: 26 (16 Jul 2018 11:00) (20 - 30)  SpO2: 95% (16 Jul 2018 11:00) (94% - 100%)    PHYSICAL EXAM:  General: Intubated, sedated  Chest: chest tube x2 in place midline sternal vac in place  ABD: Dobhoff and OGT in place ND         LABS:                        10.2   11.9  )-----------( 115      ( 16 Jul 2018 02:56 )             28.9     07-16    138  |  97  |  57<H>  ----------------------------<  171<H>  4.7   |  16<L>  |  0.95    Ca    9.8      16 Jul 2018 02:56  Phos  4.4     07-16  Mg     2.0     07-16    TPro  5.0<L>  /  Alb  3.7  /  TBili  56.5<H>  /  DBili  x   /  AST  193<H>  /  ALT  99<H>  /  AlkPhos  273<H>  07-16    PT/INR - ( 16 Jul 2018 02:56 )   PT: 12.9 sec;   INR: 1.16          PTT - ( 16 Jul 2018 02:56 )  PTT:32.3 sec

## 2018-07-16 NOTE — PROGRESS NOTE ADULT - PROBLEM SELECTOR PLAN 5
- high anion metabolic acidosis  - lactatate 3.3   - mild also metabolic alkalosis   - getting pushes of bicarb   - the ultimate treatment of the lactic metabolic acidosis is the treatment of the cause in this particular case - the shock cardiogenic and septic

## 2018-07-16 NOTE — PROGRESS NOTE ADULT - SUBJECTIVE AND OBJECTIVE BOX
CTICU  CRITICAL  CARE  attending     Hand off received 					   Pertinent clinical, laboratory, radiographic, hemodynamic, echocardiographic, respiratory data, microbiologic data and chart were reviewed and analyzed frequently throughout the course of the day and night  Patient seen and examined with CTS/ SH attending at bedside  Pt is a 67y , Male, HEALTH ISSUES - PROBLEM Dx:  Metabolic acidosis: Metabolic acidosis  Hyperkalemia: Hyperkalemia  Jaundice: Jaundice  Thrombocytopenia: Thrombocytopenia  Penile swelling: Penile swelling  Shock: Shock  Anemia: Anemia  Hyponatremia: Hyponatremia  Acute kidney failure: Acute kidney failure  CAD (coronary artery disease): CAD (coronary artery disease)  Valvular disease: Valvular disease      , FAMILY HISTORY:  PAST MEDICAL & SURGICAL HISTORY:  No pertinent past medical history  No significant past surgical history    Patient is a 67y old  Male who presents with a chief complaint of AS (2018 00:06)      14 system review was unremarkable  acute changes include acute respiratory failure  Vital signs, hemodynamic and respiratory parameters were reviewed from the bedside nursing flowsheet.  ICU Vital Signs Last 24 Hrs  T(C): 36.4 (2018 17:00), Max: 36.4 (2018 17:00)  T(F): 97.6 (2018 17:00), Max: 97.6 (2018 17:00)  HR: 90 (2018 19:00) (90 - 93)  BP: 116/66 (2018 10:00) (116/66 - 116/66)  BP(mean): 81 (2018 10:00) (81 - 81)  ABP: 96/44 (2018 19:00) (84/40 - 136/74)  ABP(mean): 60 (2018 19:00) (54 - 98)  RR: 21 (2018 19:00) (18 - 30)  SpO2: 94% (2018 19:00) (92% - 100%)    Adult Advanced Hemodynamics Last 24 Hrs  CVP(mm Hg): 15 (2018 19:00) (15 - 38)  CVP(cm H2O): --  CO: --  CI: --  PA: --  PA(mean): --  PCWP: --  SVR: --  SVRI: --  PVR: --  PVRI: --, ABG - ( 2018 17:12 )  pH, Arterial: 7.44  pH, Blood: x     /  pCO2: 32    /  pO2: 114   / HCO3: 22    / Base Excess: -1.8  /  SaO2: 99                Mode: AC/ CMV (Assist Control/ Continuous Mandatory Ventilation)  RR (machine): 25  TV (machine): 520  FiO2: 70  PEEP: 5  ITime: 1.7  MAP: 19  PIP: 38    Intake and output was reviewed and the fluid balance was calculated  Daily     Daily Weight in k.8 (2018 16:10)  I&O's Summary    15 Jul 2018 07:  -  2018 07:00  --------------------------------------------------------  IN: 3778.9 mL / OUT: 4026 mL / NET: -247.1 mL    2018 07:01  -  2018 20:07  --------------------------------------------------------  IN: 1617.8 mL / OUT: 1109 mL / NET: 508.8 mL        All lines and drain sites were assessed  [facility]      POCT Blood Glucose.: 84 mg/dL (2018 17:02)    No acute change in mental status  Auscultation of the chest reveals equal bs  Abdomen is soft  Extremities are warm and well perfused  Wounds appear clean and unremarkable  Antibiotics are periop    labs  CBC Full  -  ( 2018 13:35 )  WBC Count : 10.2 K/uL  Hemoglobin : 10.0 g/dL  Hematocrit : 29.0 %  Platelet Count - Automated : 93 K/uL  Mean Cell Volume : 86.8 fL  Mean Cell Hemoglobin : 29.9 pg  Mean Cell Hemoglobin Concentration : 34.5 g/dL  Auto Neutrophil # : x  Auto Lymphocyte # : x  Auto Monocyte # : x  Auto Eosinophil # : x  Auto Basophil # : x  Auto Neutrophil % : x  Auto Lymphocyte % : x  Auto Monocyte % : x  Auto Eosinophil % : x  Auto Basophil % : x        135  |  95<L>  |  73<H>  ----------------------------<  82  5.8<H>   |  15<L>  |  1.09    Ca    9.7      2018 13:35  Phos  5.9     -  Mg     2.1         TPro  5.7<L>  /  Alb  4.0  /  TBili  62.4<H>  /  DBili  x   /  AST  223<H>  /  ALT  111<H>  /  AlkPhos  337<H>      PT/INR - ( 2018 13:35 )   PT: 12.8 sec;   INR: 1.15          PTT - ( 2018 13:35 )  PTT:35.6 sec  The current medications were reviewed   MEDICATIONS  (STANDING):  artificial  tears Solution 1 Drop(s) Both EYES two times a day  aztreonam  IVPB 2000 milliGRAM(s) IV Intermittent every 12 hours  chlorhexidine 0.12% Liquid 15 milliLiter(s) Swish and Spit every 12 hours  chlorhexidine 2% Cloths 1 Application(s) Topical daily  DAPTOmycin IVPB 350 milliGRAM(s) IV Intermittent every 48 hours  dexmedetomidine Infusion 0.5 MICROgram(s)/kG/Hr (7.375 mL/Hr) IV Continuous <Continuous>  dextrose 5%. 1000 milliLiter(s) (25 mL/Hr) IV Continuous <Continuous>  dextrose 50% Injectable 50 milliLiter(s) IV Push every 15 minutes  dextrose 50% Injectable 25 milliLiter(s) IV Push every 15 minutes  EPINEPHrine     Infusion 0.06 MICROgram(s)/kG/Min (13.275 mL/Hr) IV Continuous <Continuous>  folic acid 1 milliGRAM(s) Oral daily  gentamicin   IVPB 250 milliGRAM(s) IV Intermittent once  hydrocortisone sodium succinate Injectable 50 milliGRAM(s) IV Push daily  insulin lispro (HumaLOG) corrective regimen sliding scale   SubCutaneous every 6 hours  lactulose Syrup 20 Gram(s) Oral every 6 hours  mesalamine Suppository 1000 milliGRAM(s) Rectal at bedtime  micafungin IVPB      midodrine 10 milliGRAM(s) Oral every 8 hours  pantoprazole  Injectable 40 milliGRAM(s) IV Push every 12 hours  phenylephrine    Infusion 2 MICROgram(s)/kG/Min (21.975 mL/Hr) IV Continuous <Continuous>  propofol Infusion 10 MICROgram(s)/kG/Min (3.54 mL/Hr) IV Continuous <Continuous>  sodium chloride 0.9%. 1000 milliLiter(s) (10 mL/Hr) IV Continuous <Continuous>  ursodiol Suspension 300 milliGRAM(s) Oral every 12 hours  vasopressin Infusion 0.017 Unit(s)/Min (1 mL/Hr) IV Continuous <Continuous>    MEDICATIONS  (PRN):  dextrose 40% Gel 15 Gram(s) Oral once PRN Blood Glucose LESS THAN 70 milliGRAM(s)/deciliter  fentaNYL    Injectable 25 MICROGram(s) IV Push every 4 hours PRN Severe Pain (7 - 10)  glucagon  Injectable 1 milliGRAM(s) IntraMuscular once PRN Glucose LESS THAN 70 milligrams/deciliter       PROBLEM LIST/ ASSESSMENT:  HEALTH ISSUES - PROBLEM Dx:  Metabolic acidosis: Metabolic acidosis  Hyperkalemia: Hyperkalemia  Jaundice: Jaundice  Thrombocytopenia: Thrombocytopenia  Penile swelling: Penile swelling  Shock: Shock  Anemia: Anemia  Hyponatremia: Hyponatremia  Acute kidney failure: Acute kidney failure  CAD (coronary artery disease): CAD (coronary artery disease)  Valvular disease: Valvular disease      ,   Patient is a 67y old  Male who presents with a chief complaint of AS (2018 00:06)     s/p cardiac surgery      My plan includes :  close hemodynamic, ventilatory and drain monitoring and management per post op routine    Monitor for arrhythmias and monitor parameters for organ perfusion  monitor neurologic status  Head of the bed should remain elevated to 45 deg .   chest PT and IS will be encouraged  monitor adequacy of oxygenation and ventilation and attempt to wean oxygen  Nutritional goals will be met using po eventually , ensure adequate caloric intake and montior the same  Stress ulcer and VTE prophylaxis will be achieved    Glycemic control is satisfactory  Electrolytes have been repleted as necessary and wound care has been carried out. Pain control has been achieved.   agressive physical therapy and early mobility and ambulation goals will be met   The family was updated about the course and plan  CRITICAL CARE TIME SPENT in evaluation and management, reassessments, review and interpretation of labs and x-rays, ventilator and hemodynamic management, formulating a plan and coordinating care: ___90____ MIN.  Time does not include procedural time.  CTICU ATTENDING     					    Clement Ahn MD

## 2018-07-16 NOTE — PROGRESS NOTE ADULT - PROBLEM SELECTOR PLAN 1
- the oliguric EMILY due to ATN - most likely due to cardiogenic shock, can not exclude also a septic shock, requiring pressors - on vasopressin and phenylephrine, epinephrine, on midodrine and hydrocortisone   - oliguric in the morning - anuric   - on aquaphoresis stopped in the morning for the CT scan studies to be restarted from CT ICU team after Ct scan   - now with worsening of the acidosis and hyperkalemia - may be is a good idea to restart the CVVHD - with  ml/h, blood flow 200 ml/min and dialysate flow 1.5 liters/h   - the patient with overt fluid overload - anasarca  - renal diet when he is not NPO  - hyperphosphatemia - 5.9 - may restart the CVVHD  - potassium 5.8 trending up  may restart the CVVHD   - lactate still elevated - was given 1 amp of bicarbonate   - in and out  - daily weight  - started on gentamycin - please follow up the trough  - hyperbilirubinemia - 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

## 2018-07-16 NOTE — PROGRESS NOTE ADULT - ASSESSMENT
67yoM with PMH congenital AS s/p repair at 11yo, sCHF (pre-op EF 20%), now s/p 2-v CABG, AV replacement, and repair of tricuspid and mitral valves (6/21), course complicated by cardiogenic shock due to cardiac tamponade s/p RTOR, multisystem organ failure requiring 3-pressor support, CVVHD, and blood product transfusion. Colorectal Surgery consulted for LGIB uncontrolled with endoscopic intervention.    -Patient is HD unstable and cannot safely undergo operative intervention. Would recommend CT or IR angiogram and embolization should patient be stable enough for such intervention.  -Would consider packing rectum with Kerlix to attempt to tamponade distal bleeding, however in setting of currently stable clot in the area, will hold off on manipulation for now. Should bleeding recommence, please call Surgery urgently to pack area.  -Would advise against placing a suture in friable tissue at this time  -Will follow on Team 1C. Please call with any questions or concerns  -Discussed with Dr. Leija, CRS on call

## 2018-07-16 NOTE — PROGRESS NOTE ADULT - SUBJECTIVE AND OBJECTIVE BOX
Pt seen and examined at bedside. Pt intubated and sedated. Per nursing, no further blood BMS overnight. They are also avoiding oral suctioning and manipulating NG tubes- they do report some minor bloody oral secretions, soaking gauze.     PERTINENT REVIEW OF SYSTEMS:  unable to assess     Allergies    penicillin (Rash)    Intolerances      MEDICATIONS:  MEDICATIONS  (STANDING):  artificial  tears Solution 1 Drop(s) Both EYES two times a day  aztreonam  IVPB 2000 milliGRAM(s) IV Intermittent every 12 hours  chlorhexidine 0.12% Liquid 15 milliLiter(s) Swish and Spit every 12 hours  chlorhexidine 2% Cloths 1 Application(s) Topical daily  DAPTOmycin IVPB 350 milliGRAM(s) IV Intermittent every 48 hours  desmopressin IVPB 30 MICROGram(s) IV Intermittent once  dexmedetomidine Infusion 0.5 MICROgram(s)/kG/Hr (7.375 mL/Hr) IV Continuous <Continuous>  dextrose 5%. 1000 milliLiter(s) (25 mL/Hr) IV Continuous <Continuous>  dextrose 50% Injectable 50 milliLiter(s) IV Push every 15 minutes  dextrose 50% Injectable 25 milliLiter(s) IV Push every 15 minutes  EPINEPHrine     Infusion 0.06 MICROgram(s)/kG/Min (13.275 mL/Hr) IV Continuous <Continuous>  folic acid 1 milliGRAM(s) Oral daily  gentamicin   IVPB 250 milliGRAM(s) IV Intermittent once  hydrocortisone sodium succinate Injectable 50 milliGRAM(s) IV Push daily  insulin lispro (HumaLOG) corrective regimen sliding scale   SubCutaneous every 6 hours  lactulose Syrup 20 Gram(s) Oral every 6 hours  mesalamine Suppository 1000 milliGRAM(s) Rectal at bedtime  micafungin IVPB 100 milliGRAM(s) IV Intermittent once  micafungin IVPB      midodrine 10 milliGRAM(s) Oral every 8 hours  pantoprazole  Injectable 40 milliGRAM(s) IV Push every 12 hours  phenylephrine    Infusion 2 MICROgram(s)/kG/Min (21.975 mL/Hr) IV Continuous <Continuous>  propofol Infusion 10 MICROgram(s)/kG/Min (3.54 mL/Hr) IV Continuous <Continuous>  PureFlow Dialysate RFP-400 (K 2 / Ca 3) 5000 milliLiter(s) (1500 mL/Hr) CRRT <Continuous>  sodium chloride 0.9%. 1000 milliLiter(s) (10 mL/Hr) IV Continuous <Continuous>  ursodiol Suspension 300 milliGRAM(s) Oral every 12 hours  vasopressin Infusion 0.017 Unit(s)/Min (1 mL/Hr) IV Continuous <Continuous>    MEDICATIONS  (PRN):  dextrose 40% Gel 15 Gram(s) Oral once PRN Blood Glucose LESS THAN 70 milliGRAM(s)/deciliter  fentaNYL    Injectable 25 MICROGram(s) IV Push every 4 hours PRN Severe Pain (7 - 10)  glucagon  Injectable 1 milliGRAM(s) IntraMuscular once PRN Glucose LESS THAN 70 milligrams/deciliter    Vital Signs Last 24 Hrs  T(C): 36.3 (16 Jul 2018 05:00), Max: 36.3 (16 Jul 2018 05:00)  T(F): 97.3 (16 Jul 2018 05:00), Max: 97.3 (16 Jul 2018 05:00)  HR: 90 (16 Jul 2018 09:00) (79 - 90)  BP: 89/47 (15 Jul 2018 14:00) (89/47 - 89/47)  BP(mean): 56 (15 Jul 2018 14:00) (56 - 56)  RR: 24 (16 Jul 2018 09:00) (20 - 30)  SpO2: 95% (16 Jul 2018 09:00) (94% - 100%)    07-15 @ 07:01 - 07-16 @ 07:00  --------------------------------------------------------  IN: 3778.9 mL / OUT: 4026 mL / NET: -247.1 mL    07-16 @ 07:01  -  07-16 @ 09:55  --------------------------------------------------------  IN: 116.2 mL / OUT: 100 mL / NET: 16.2 mL      PHYSICAL EXAM:    General: extremely ill appearing male;  HEENT: pupils sluggish, guaze in mouth with bright red blood  Gastrointestinal: Soft, non-tender non-distended; Normal bowel sounds; no appreciable organomegaly; two epigastric drains with blood in the catheters; no dark stools noted on bed pad;  Extremities: + peripheral edema   Neurological: intubated and sedated  Skin: jaundiced    LABS:                        10.2   11.9  )-----------( 115      ( 16 Jul 2018 02:56 )             28.9     07-16    138  |  97  |  57<H>  ----------------------------<  171<H>  4.7   |  16<L>  |  0.95    Ca    9.8      16 Jul 2018 02:56  Phos  4.4     07-16  Mg     2.0     07-16    TPro  5.0<L>  /  Alb  3.7  /  TBili  56.5<H>  /  DBili  x   /  AST  193<H>  /  ALT  99<H>  /  AlkPhos  273<H>  07-16    PT/INR - ( 16 Jul 2018 02:56 )   PT: 12.9 sec;   INR: 1.16          PTT - ( 16 Jul 2018 02:56 )  PTT:32.3 sec                  Culture - Blood (collected 15 Jul 2018 10:28)  Source: .Blood Blood  Gram Stain (16 Jul 2018 08:53):    Aerobic and Anaerobic Bottle: Gram Positive Cocci in Pairs and Chains    Result called to and read back byCompa Pollard RN  07/16/2018 08:53:27    ***Blood Panel PCR results on this specimen are available    approximately 3 hours after the Gram stain result.***    Gram stain, PCR, and/or culture results may not always    correspond due to difference in methodologies.    ************************************************************    This PCR assay was performed using [employer].    The following targets are tested for: Enterococcus,    vancomycin resistant enterococci, Listeria monocytogenes,    coagulase negative staphylococci, S. aureus,    methicillin resistant S. aureus, Streptococcus agalactiae    (Group B), S. pneumoniae, S. pyogenes (Group A),    Acinetobacter baumannii, Enterobacter cloacae, E. coli,    Klebsiella oxytoca, K. pneumoniae, Proteus sp.,    Serratia marcescens, Haemophilus influenzae,    Neisseria meningitidis, Pseudomonas aeruginosa, Candida    albicans, C. glabrata, C krusei, C parapsilosis,    C. tropicalis and the KPC resistance gene.    "Due to technical problems, Proteus sp. will Not be reported as part of    the BCID panel until further notice"  Preliminary Report (16 Jul 2018 08:54):    Culture in progress  Organism: Blood Culture PCR (16 Jul 2018 09:37)  Organism: Blood Culture PCR (16 Jul 2018 09:37)    Culture - Blood (collected 15 Jul 2018 10:28)  Source: .Blood Blood  Gram Stain (16 Jul 2018 08:55):    Aerobic and Anaerobic Bottle: Gram Positive Cocci in Pairs and Chains    Result called to and read back byCompa Pollard RN  07/16/2018 08:53:27  Preliminary Report (16 Jul 2018 08:55):    Culture in progress      RADIOLOGY & ADDITIONAL STUDIES:

## 2018-07-16 NOTE — PROGRESS NOTE ADULT - PROBLEM SELECTOR PLAN 4
- no clearance since 7/14 - expecting trending up of the potassium   - daptomycin can cause hyperkalemia  - may be restarting CVVHD

## 2018-07-16 NOTE — PROGRESS NOTE ADULT - SUBJECTIVE AND OBJECTIVE BOX
Patient was seen and evaluated on dialysis.   Patient is tolerating the procedure well.   HR: 90 (07-16-18 @ 16:10)  BP: 120/85  Continue dialysis:   Dialyzer:  160        QB: 200       QD: 500  Goal UF no UF over 5 Hours     We are jeff ROCA, no UF, the patient on 3 pressors, the BP acceptable for now

## 2018-07-16 NOTE — PROGRESS NOTE ADULT - PROBLEM SELECTOR PLAN 3
- cardiogenic and possible component sepsis   - on aztreonam, on micafungin, on daptomycin and gentamycin   - now with VRE from blood culture from 7/15

## 2018-07-16 NOTE — PROGRESS NOTE ADULT - ASSESSMENT
67 yr old male with a PMHx of congenital AS s/p repair at age 12 and CAD s/p stenting 8 yrs ago who was found to have 2V CAD, severe AS, MR and TR during pre-operative evaluation now s/p MV/TV repair and AVR w/ IABP and impella placement. Hospital course was complicated by cardiogenic shock requiring continued inotropic support, cardiac tamponade x 2, consumptive coaguloapthy, pre-renal azotemia with anasarca requiring CVVH, and now new onset melena and hematochezia x 4 days for which GI was consulted.    # melena/hematochezia   etiology is multifactorial with the following findings seen during EGD and cscope   - sp EGD 7/13/18 - esophagitis, and gastropathy,  sp repeat EGD 7/15/18 - mild esophagitis, linear gastric ulcer from NGT, and retained gastric content and enteral feeds suggesting gastric dysmotility  - sp flex sig 7/13/18 - which showed significant proctitis, sp colonoscopy 7/15/18 - with large rectal clot requiring manual disimpaction, anorectal oozing of unclear etiology, severe proctitis, old BRB throughout entire examined colon, difficulty intubating TI - but there was suggestion of small clot when TI visualized, no evidence of ischemic bowel seen  -please switch Canasa enemas to Canasa suppositories, as suppositories are less traumatic to mucosa  -c/w IV PPI    # transaminitis with hyperbilirubinemia  - pt with chronically up trending total bilirubin since admission; multifactorial in nature: could be 2/2 hemolysis, RBC and platelet consumption 2/2 shearing on impella, drug induced 2/2 ?meropenem, autoimmune hemolysis in the setting of multiple transfusions  - pending repeat liver USS  - need to rule out acute viral etiology: please send EBV and CMV IgM and IgG, hepatitis serologies- hep A IgM, HBsAg, IgM anti-HBC, anti-HCV  - check AMA for PBC  - please check repeat peripheral smear  - consider hematology consult for persistent hemolysis  - please avoid hepatotoxic agents  - will consider discussion with [facility] liver fellow for any further input 67 yr old male with a PMHx of congenital AS s/p repair at age 12 and CAD s/p stenting 8 yrs ago who was found to have 2V CAD, severe AS, MR and TR during pre-operative evaluation now s/p MV/TV repair and AVR w/ IABP and impella placement. Hospital course was complicated by cardiogenic shock requiring continued inotropic support, cardiac tamponade x 2, consumptive coaguloapthy, pre-renal azotemia with anasarca requiring CVVH, and now new onset melena and hematochezia x 4 days for which GI was consulted.    # melena/hematochezia   etiology is multifactorial with the following findings seen during EGD and cscope   - sp EGD 7/13/18 - esophagitis, and gastropathy,  sp repeat EGD 7/15/18 - mild esophagitis, linear gastric ulcer from NGT, and retained gastric content and enteral feeds suggesting gastric dysmotility  - sp flex sig 7/13/18 - which showed significant proctitis, sp colonoscopy 7/15/18 - with large rectal clot requiring manual disimpaction, anorectal oozing of unclear etiology, severe proctitis, old BRB throughout entire examined colon, difficulty intubating TI - but there was suggestion of small clot when TI visualized, no evidence of ischemic bowel seen  -please switch Canasa enemas to Canasa suppositories, as suppositories are less traumatic to mucosa  -c/w IV PPI    # transaminitis with hyperbilirubinemia  - pt with chronically up trending total bilirubin since admission; multifactorial in nature: could be 2/2 hemolysis, RBC and platelet consumption 2/2 shearing on impella, drug induced 2/2 ?meropenem, autoimmune hemolysis in the setting of multiple transfusions  - pending repeat liver USS  - need to rule out acute viral etiology: please send EBV and CMV IgM and IgG, hepatitis serologies- hep A IgM, HBsAg, IgM anti-HBC, anti-HCV ( labs have not been sent off yet)  - check AMA for PBC  - please check repeat peripheral smear  - consider hematology consult for persistent hemolysis  - please avoid hepatotoxic agents  - will consider discussion with [facility] liver fellow for any further input 67 yr old male with a PMHx of congenital AS s/p repair at age 12 and CAD s/p stenting 8 yrs ago who was found to have 2V CAD, severe AS, MR and TR during pre-operative evaluation now s/p MV/TV repair and AVR w/ IABP and impella placement. Hospital course was complicated by cardiogenic shock requiring continued inotropic support, cardiac tamponade x 2, consumptive coaguloapthy, pre-renal azotemia with anasarca requiring CVVH, and now new onset melena and hematochezia x 4 days for which GI was consulted.    # melena/hematochezia   etiology is multifactorial with the following findings seen during EGD and cscope   - sp EGD 7/13/18 - esophagitis, and gastropathy,  sp repeat EGD 7/15/18 - mild esophagitis, linear gastric ulcer from NGT, and retained gastric content and enteral feeds suggesting gastric dysmotility  - sp flex sig 7/13/18 - which showed significant proctitis, sp colonoscopy 7/15/18 - with large rectal clot requiring manual disimpaction, anorectal oozing of unclear etiology, severe proctitis, old BRB throughout entire examined colon, difficulty intubating TI - but there was suggestion of small clot when TI visualized, no evidence of ischemic bowel seen  -please switch Canasa enemas to Canasa suppositories, as suppositories are less traumatic to mucosa  -c/w IV PPI    # transaminitis with hyperbilirubinemia  - pt with chronically up trending total bilirubin since admission; multifactorial in nature: could be 2/2 hemolysis, RBC and platelet consumption 2/2 shearing on impella, drug induced 2/2 ?meropenem, autoimmune hemolysis in the setting of multiple transfusions  - US shows bilary sludge and wall thickening 0.5 cm, no stones, suggesting acalculous cholecystitis. Surgery following. Pt is a poor candidate for surgery, but would consider consulting IR for percutaneous cholecystostomy tubve   - need to rule out acute viral etiology: please send EBV and CMV IgM and IgG, hepatitis serologies- hep A IgM, HBsAg, IgM anti-HBC, anti-HCV ( labs have not been sent off yet)  - check AMA for PBC  - please check repeat peripheral smear  - consider hematology consult for persistent hemolysis  - please avoid hepatotoxic agents  - will consider discussion with [facility] liver fellow for any further input

## 2018-07-17 LAB
ALBUMIN SERPL ELPH-MCNC: 3.7 G/DL — SIGNIFICANT CHANGE UP (ref 3.3–5)
ALBUMIN SERPL ELPH-MCNC: 4.1 G/DL — SIGNIFICANT CHANGE UP (ref 3.3–5)
ALBUMIN SERPL ELPH-MCNC: 4.4 G/DL — SIGNIFICANT CHANGE UP (ref 3.3–5)
ALP SERPL-CCNC: 342 U/L — HIGH (ref 40–120)
ALP SERPL-CCNC: 393 U/L — HIGH (ref 40–120)
ALP SERPL-CCNC: 445 U/L — HIGH (ref 40–120)
ALT FLD-CCNC: 155 U/L — HIGH (ref 10–45)
ALT FLD-CCNC: 156 U/L — HIGH (ref 10–45)
ALT FLD-CCNC: 178 U/L — HIGH (ref 10–45)
ANION GAP SERPL CALC-SCNC: 26 MMOL/L — HIGH (ref 5–17)
ANION GAP SERPL CALC-SCNC: 26 MMOL/L — HIGH (ref 5–17)
ANION GAP SERPL CALC-SCNC: 27 MMOL/L — HIGH (ref 5–17)
APTT BLD: 32 SEC — SIGNIFICANT CHANGE UP (ref 27.5–37.4)
APTT BLD: 33.6 SEC — SIGNIFICANT CHANGE UP (ref 27.5–37.4)
APTT BLD: 33.8 SEC — SIGNIFICANT CHANGE UP (ref 27.5–37.4)
AST SERPL-CCNC: 466 U/L — HIGH (ref 10–40)
AST SERPL-CCNC: 472 U/L — HIGH (ref 10–40)
AST SERPL-CCNC: 517 U/L — HIGH (ref 10–40)
BILIRUB SERPL-MCNC: 55.8 MG/DL — HIGH (ref 0.2–1.2)
BILIRUB SERPL-MCNC: 57.2 MG/DL — HIGH (ref 0.2–1.2)
BILIRUB SERPL-MCNC: 58.1 MG/DL — HIGH (ref 0.2–1.2)
BUN SERPL-MCNC: 38 MG/DL — HIGH (ref 7–23)
BUN SERPL-MCNC: 42 MG/DL — HIGH (ref 7–23)
BUN SERPL-MCNC: 52 MG/DL — HIGH (ref 7–23)
CALCIUM SERPL-MCNC: 10 MG/DL — SIGNIFICANT CHANGE UP (ref 8.4–10.5)
CALCIUM SERPL-MCNC: 10.3 MG/DL — SIGNIFICANT CHANGE UP (ref 8.4–10.5)
CALCIUM SERPL-MCNC: 9.8 MG/DL — SIGNIFICANT CHANGE UP (ref 8.4–10.5)
CHLORIDE SERPL-SCNC: 90 MMOL/L — LOW (ref 96–108)
CHLORIDE SERPL-SCNC: 90 MMOL/L — LOW (ref 96–108)
CHLORIDE SERPL-SCNC: 93 MMOL/L — LOW (ref 96–108)
CO2 SERPL-SCNC: 17 MMOL/L — LOW (ref 22–31)
CO2 SERPL-SCNC: 18 MMOL/L — LOW (ref 22–31)
CO2 SERPL-SCNC: 19 MMOL/L — LOW (ref 22–31)
CREAT SERPL-MCNC: 0.68 MG/DL — SIGNIFICANT CHANGE UP (ref 0.5–1.3)
CREAT SERPL-MCNC: 0.74 MG/DL — SIGNIFICANT CHANGE UP (ref 0.5–1.3)
CREAT SERPL-MCNC: 0.87 MG/DL — SIGNIFICANT CHANGE UP (ref 0.5–1.3)
GAS PNL BLDA: SIGNIFICANT CHANGE UP
GAS PNL BLDV: SIGNIFICANT CHANGE UP
GAS PNL BLDV: SIGNIFICANT CHANGE UP
GLUCOSE BLDC GLUCOMTR-MCNC: 120 MG/DL — HIGH (ref 70–99)
GLUCOSE BLDC GLUCOMTR-MCNC: 131 MG/DL — HIGH (ref 70–99)
GLUCOSE BLDC GLUCOMTR-MCNC: 141 MG/DL — HIGH (ref 70–99)
GLUCOSE BLDC GLUCOMTR-MCNC: 212 MG/DL — HIGH (ref 70–99)
GLUCOSE SERPL-MCNC: 106 MG/DL — HIGH (ref 70–99)
GLUCOSE SERPL-MCNC: 140 MG/DL — HIGH (ref 70–99)
GLUCOSE SERPL-MCNC: 176 MG/DL — HIGH (ref 70–99)
HBV CORE AB SER-ACNC: REACTIVE
HCT VFR BLD CALC: 28.3 % — LOW (ref 39–50)
HCT VFR BLD CALC: 29.3 % — LOW (ref 39–50)
HCT VFR BLD CALC: 30 % — LOW (ref 39–50)
HGB BLD-MCNC: 10.2 G/DL — LOW (ref 13–17)
HGB BLD-MCNC: 10.3 G/DL — LOW (ref 13–17)
HGB BLD-MCNC: 10.4 G/DL — LOW (ref 13–17)
INR BLD: 1.33 — HIGH (ref 0.88–1.16)
INR BLD: 1.36 — HIGH (ref 0.88–1.16)
INR BLD: 1.45 — HIGH (ref 0.88–1.16)
LACTATE SERPL-SCNC: 4.1 MMOL/L — CRITICAL HIGH (ref 0.5–2)
LACTATE SERPL-SCNC: 4.2 MMOL/L — CRITICAL HIGH (ref 0.5–2)
LACTATE SERPL-SCNC: 4.9 MMOL/L — CRITICAL HIGH (ref 0.5–2)
MAGNESIUM SERPL-MCNC: 1.9 MG/DL — SIGNIFICANT CHANGE UP (ref 1.6–2.6)
MAGNESIUM SERPL-MCNC: 2.1 MG/DL — SIGNIFICANT CHANGE UP (ref 1.6–2.6)
MAGNESIUM SERPL-MCNC: 2.1 MG/DL — SIGNIFICANT CHANGE UP (ref 1.6–2.6)
MCHC RBC-ENTMCNC: 29.7 PG — SIGNIFICANT CHANGE UP (ref 27–34)
MCHC RBC-ENTMCNC: 30.2 PG — SIGNIFICANT CHANGE UP (ref 27–34)
MCHC RBC-ENTMCNC: 30.2 PG — SIGNIFICANT CHANGE UP (ref 27–34)
MCHC RBC-ENTMCNC: 34.7 G/DL — SIGNIFICANT CHANGE UP (ref 32–36)
MCHC RBC-ENTMCNC: 35.2 G/DL — SIGNIFICANT CHANGE UP (ref 32–36)
MCHC RBC-ENTMCNC: 36 G/DL — SIGNIFICANT CHANGE UP (ref 32–36)
MCV RBC AUTO: 83.7 FL — SIGNIFICANT CHANGE UP (ref 80–100)
MCV RBC AUTO: 85.7 FL — SIGNIFICANT CHANGE UP (ref 80–100)
MCV RBC AUTO: 85.9 FL — SIGNIFICANT CHANGE UP (ref 80–100)
PHOSPHATE SERPL-MCNC: 4.1 MG/DL — SIGNIFICANT CHANGE UP (ref 2.5–4.5)
PHOSPHATE SERPL-MCNC: 4.2 MG/DL — SIGNIFICANT CHANGE UP (ref 2.5–4.5)
PHOSPHATE SERPL-MCNC: 5.3 MG/DL — HIGH (ref 2.5–4.5)
PLATELET # BLD AUTO: 114 K/UL — LOW (ref 150–400)
PLATELET # BLD AUTO: 85 K/UL — LOW (ref 150–400)
PLATELET # BLD AUTO: 92 K/UL — LOW (ref 150–400)
POTASSIUM SERPL-MCNC: 4.4 MMOL/L — SIGNIFICANT CHANGE UP (ref 3.5–5.3)
POTASSIUM SERPL-MCNC: 4.5 MMOL/L — SIGNIFICANT CHANGE UP (ref 3.5–5.3)
POTASSIUM SERPL-MCNC: 4.9 MMOL/L — SIGNIFICANT CHANGE UP (ref 3.5–5.3)
POTASSIUM SERPL-SCNC: 4.4 MMOL/L — SIGNIFICANT CHANGE UP (ref 3.5–5.3)
POTASSIUM SERPL-SCNC: 4.5 MMOL/L — SIGNIFICANT CHANGE UP (ref 3.5–5.3)
POTASSIUM SERPL-SCNC: 4.9 MMOL/L — SIGNIFICANT CHANGE UP (ref 3.5–5.3)
PROT SERPL-MCNC: 5 G/DL — LOW (ref 6–8.3)
PROT SERPL-MCNC: 5.2 G/DL — LOW (ref 6–8.3)
PROT SERPL-MCNC: 5.3 G/DL — LOW (ref 6–8.3)
PROTHROM AB SERPL-ACNC: 14.8 SEC — HIGH (ref 9.8–12.7)
PROTHROM AB SERPL-ACNC: 15.2 SEC — HIGH (ref 9.8–12.7)
PROTHROM AB SERPL-ACNC: 16.2 SEC — HIGH (ref 9.8–12.7)
RBC # BLD: 3.38 M/UL — LOW (ref 4.2–5.8)
RBC # BLD: 3.41 M/UL — LOW (ref 4.2–5.8)
RBC # BLD: 3.5 M/UL — LOW (ref 4.2–5.8)
RBC # FLD: 19.3 % — HIGH (ref 10.3–16.9)
RBC # FLD: 19.4 % — HIGH (ref 10.3–16.9)
RBC # FLD: 19.6 % — HIGH (ref 10.3–16.9)
SODIUM SERPL-SCNC: 134 MMOL/L — LOW (ref 135–145)
SODIUM SERPL-SCNC: 135 MMOL/L — SIGNIFICANT CHANGE UP (ref 135–145)
SODIUM SERPL-SCNC: 137 MMOL/L — SIGNIFICANT CHANGE UP (ref 135–145)
WBC # BLD: 11.2 K/UL — HIGH (ref 3.8–10.5)
WBC # BLD: 13.4 K/UL — HIGH (ref 3.8–10.5)
WBC # BLD: 9.6 K/UL — SIGNIFICANT CHANGE UP (ref 3.8–10.5)
WBC # FLD AUTO: 11.2 K/UL — HIGH (ref 3.8–10.5)
WBC # FLD AUTO: 13.4 K/UL — HIGH (ref 3.8–10.5)
WBC # FLD AUTO: 9.6 K/UL — SIGNIFICANT CHANGE UP (ref 3.8–10.5)

## 2018-07-17 PROCEDURE — 71045 X-RAY EXAM CHEST 1 VIEW: CPT | Mod: 26,77

## 2018-07-17 PROCEDURE — 99291 CRITICAL CARE FIRST HOUR: CPT

## 2018-07-17 PROCEDURE — 90945 DIALYSIS ONE EVALUATION: CPT

## 2018-07-17 PROCEDURE — 71045 X-RAY EXAM CHEST 1 VIEW: CPT | Mod: 26

## 2018-07-17 PROCEDURE — 99292 CRITICAL CARE ADDL 30 MIN: CPT

## 2018-07-17 PROCEDURE — 97606 NEG PRS WND THER DME>50 SQCM: CPT

## 2018-07-17 PROCEDURE — 99232 SBSQ HOSP IP/OBS MODERATE 35: CPT

## 2018-07-17 RX ORDER — FUROSEMIDE 40 MG
40 TABLET ORAL ONCE
Qty: 0 | Refills: 0 | Status: DISCONTINUED | OUTPATIENT
Start: 2018-07-17 | End: 2018-07-17

## 2018-07-17 RX ORDER — CALCIUM GLUCONATE 100 MG/ML
2 VIAL (ML) INTRAVENOUS ONCE
Qty: 0 | Refills: 0 | Status: COMPLETED | OUTPATIENT
Start: 2018-07-17 | End: 2018-07-17

## 2018-07-17 RX ORDER — DESMOPRESSIN ACETATE 0.1 MG/1
20 TABLET ORAL ONCE
Qty: 0 | Refills: 0 | Status: COMPLETED | OUTPATIENT
Start: 2018-07-17 | End: 2018-07-17

## 2018-07-17 RX ORDER — FENTANYL CITRATE 50 UG/ML
100 INJECTION INTRAVENOUS ONCE
Qty: 0 | Refills: 0 | Status: DISCONTINUED | OUTPATIENT
Start: 2018-07-17 | End: 2018-07-17

## 2018-07-17 RX ADMIN — CHLORHEXIDINE GLUCONATE 15 MILLILITER(S): 213 SOLUTION TOPICAL at 06:43

## 2018-07-17 RX ADMIN — Medication 1 MILLIGRAM(S): at 12:33

## 2018-07-17 RX ADMIN — PROPOFOL 3.54 MICROGRAM(S)/KG/MIN: 10 INJECTION, EMULSION INTRAVENOUS at 17:45

## 2018-07-17 RX ADMIN — FENTANYL CITRATE 25 MICROGRAM(S): 50 INJECTION INTRAVENOUS at 03:30

## 2018-07-17 RX ADMIN — VASOPRESSIN 1 UNIT(S)/MIN: 20 INJECTION INTRAVENOUS at 02:10

## 2018-07-17 RX ADMIN — Medication 1000 MILLIGRAM(S): at 21:18

## 2018-07-17 RX ADMIN — FENTANYL CITRATE 25 MICROGRAM(S): 50 INJECTION INTRAVENOUS at 03:00

## 2018-07-17 RX ADMIN — Medication 4: at 23:42

## 2018-07-17 RX ADMIN — PANTOPRAZOLE SODIUM 40 MILLIGRAM(S): 20 TABLET, DELAYED RELEASE ORAL at 06:43

## 2018-07-17 RX ADMIN — CHLORHEXIDINE GLUCONATE 15 MILLILITER(S): 213 SOLUTION TOPICAL at 17:45

## 2018-07-17 RX ADMIN — MIDODRINE HYDROCHLORIDE 10 MILLIGRAM(S): 2.5 TABLET ORAL at 21:18

## 2018-07-17 RX ADMIN — Medication 1 DROP(S): at 17:46

## 2018-07-17 RX ADMIN — FENTANYL CITRATE 25 MICROGRAM(S): 50 INJECTION INTRAVENOUS at 18:00

## 2018-07-17 RX ADMIN — Medication 1 DROP(S): at 06:44

## 2018-07-17 RX ADMIN — Medication 100 MILLIGRAM(S): at 17:45

## 2018-07-17 RX ADMIN — DESMOPRESSIN ACETATE 220 MICROGRAM(S): 0.1 TABLET ORAL at 22:52

## 2018-07-17 RX ADMIN — Medication 50 MILLIGRAM(S): at 06:43

## 2018-07-17 RX ADMIN — Medication 200 GRAM(S): at 12:34

## 2018-07-17 RX ADMIN — FENTANYL CITRATE 25 MICROGRAM(S): 50 INJECTION INTRAVENOUS at 18:30

## 2018-07-17 RX ADMIN — Medication 200 GRAM(S): at 21:18

## 2018-07-17 RX ADMIN — MIDODRINE HYDROCHLORIDE 10 MILLIGRAM(S): 2.5 TABLET ORAL at 14:20

## 2018-07-17 RX ADMIN — CHLORHEXIDINE GLUCONATE 1 APPLICATION(S): 213 SOLUTION TOPICAL at 11:46

## 2018-07-17 RX ADMIN — FENTANYL CITRATE 25 MICROGRAM(S): 50 INJECTION INTRAVENOUS at 23:26

## 2018-07-17 RX ADMIN — PANTOPRAZOLE SODIUM 40 MILLIGRAM(S): 20 TABLET, DELAYED RELEASE ORAL at 17:45

## 2018-07-17 RX ADMIN — MICAFUNGIN SODIUM 105 MILLIGRAM(S): 100 INJECTION, POWDER, LYOPHILIZED, FOR SOLUTION INTRAVENOUS at 09:29

## 2018-07-17 RX ADMIN — MIDODRINE HYDROCHLORIDE 10 MILLIGRAM(S): 2.5 TABLET ORAL at 06:43

## 2018-07-17 RX ADMIN — PHENYLEPHRINE HYDROCHLORIDE 21.98 MICROGRAM(S)/KG/MIN: 10 INJECTION INTRAVENOUS at 02:10

## 2018-07-17 RX ADMIN — EPINEPHRINE 13.28 MICROGRAM(S)/KG/MIN: 0.3 INJECTION INTRAMUSCULAR; SUBCUTANEOUS at 02:11

## 2018-07-17 NOTE — PROGRESS NOTE ADULT - SUBJECTIVE AND OBJECTIVE BOX
GENERAL SURGERY CONSULT PROGRESS NOTE    SUBJECTIVE:   Pt seen & examined at bedside. Intubated, sedated, increasing pressor requirements. Per nursing blood UT and OGT decreased ON. CT abd/ pelvis  showed bowel wall thickening, proctitis, no active bleed.     MEDICATIONS:  ---NEURO-  dexmedetomidine Infusion 0.5 MICROgram(s)/kG/Hr IV Continuous <Continuous>  fentaNYL    Injectable 25 MICROGram(s) IV Push every 4 hours PRN  propofol Infusion 10 MICROgram(s)/kG/Min IV Continuous <Continuous>  ---CV-  EPINEPHrine     Infusion 0.06 MICROgram(s)/kG/Min (13.275 mL/Hr) IV Continuous <Continuous>  midodrine 10 milliGRAM(s) Oral every 8 hours  phenylephrine    Infusion 2 MICROgram(s)/kG/Min (21.975 mL/Hr) IV Continuous <Continuous>  ---PULM-  ---GI/FEN-  mesalamine Suppository 1000 milliGRAM(s) Rectal at bedtime  pantoprazole  Injectable 40 milliGRAM(s) IV Push every 12 hours  dextrose 5%. 1000 milliLiter(s) IV Continuous <Continuous>  folic acid 1 milliGRAM(s) Oral daily  sodium chloride 0.9%. 1000 milliLiter(s) IV Continuous <Continuous>  ----  ---ID-   aztreonam  IVPB 2000 milliGRAM(s) IV Intermittent every 12 hours  DAPTOmycin IVPB 350 milliGRAM(s) IV Intermittent every 48 hours  micafungin IVPB 100 milliGRAM(s) IV Intermittent every 24 hours  micafungin IVPB      ---ENDO-  dextrose 40% Gel 15 Gram(s) Oral once PRN  dextrose 50% Injectable 50 milliLiter(s) IV Push every 15 minutes  dextrose 50% Injectable 25 milliLiter(s) IV Push every 15 minutes  glucagon  Injectable 1 milliGRAM(s) IntraMuscular once PRN  insulin lispro (HumaLOG) corrective regimen sliding scale   SubCutaneous every 6 hours  vasopressin Infusion 0.017 Unit(s)/Min IV Continuous <Continuous>  ---HEME-  ---OTHER-  artificial  tears Solution 1 Drop(s) Both EYES two times a day  chlorhexidine 0.12% Liquid 15 milliLiter(s) Swish and Spit every 12 hours  chlorhexidine 2% Cloths 1 Application(s) Topical daily        VOLUME STATUS:  I&O's Detail    16 Jul 2018 07:01  -  17 Jul 2018 07:00  --------------------------------------------------------  IN:    Albumin 25%: 100 mL    dexmedetomidine Infusion: 42.8 mL    Enteral Tube Flush: 450 mL    EPINEPHrine Infusion: 308.1 mL    Packed Red Blood Cells: 300 mL    phenylephrine   Infusion: 403.8 mL    Platelets - Single Donor: 610 mL    propofol Infusion: 114.8 mL    sodium chloride 0.9%.: 230 mL    Solution: 650 mL    vasopressin Infusion: 61 mL    Vital HN: 250 mL  Total IN: 3520.5 mL    OUT:    Chest Tube: 80 mL    Nasoenteral Tube: 400 mL    Other: 300 mL    Other: 2064 mL  Total OUT: 2844 mL    Total NET: 676.5 mL      17 Jul 2018 07:01  -  17 Jul 2018 12:36  --------------------------------------------------------  IN:    EPINEPHrine Infusion: 97.4 mL    phenylephrine   Infusion: 128.2 mL    propofol Infusion: 35.4 mL    sodium chloride 0.9%.: 60 mL    Solution: 200 mL    vasopressin Infusion: 22 mL    Vital HN: 240 mL  Total IN: 783 mL    OUT:    Chest Tube: 15 mL    Other: 306 mL  Total OUT: 321 mL    Total NET: 462 mL          VITALS:  Vital Signs Last 24 Hrs  T(C): 37.1 (17 Jul 2018 10:15), Max: 37.1 (17 Jul 2018 10:00)  T(F): 98.8 (17 Jul 2018 10:15), Max: 98.8 (17 Jul 2018 10:00)  HR: 90 (17 Jul 2018 12:00) (90 - 118)  BP: --  BP(mean): --  RR: 22 (17 Jul 2018 12:00) (18 - 26)  SpO2: 96% (17 Jul 2018 12:00) (92% - 99%)    PHYSICAL EXAM:  General: Intubated, sedated  Chest: Chest tube x2 in place, sternal vac in place  ABD: soft ND    LABS:                        10.4   11.2  )-----------( 92       ( 17 Jul 2018 10:12 )             30.0     07-17    134<L>  |  90<L>  |  52<H>  ----------------------------<  140<H>  4.9   |  17<L>  |  0.87    Ca    10.0      17 Jul 2018 10:12  Phos  5.3     07-17  Mg     2.1     07-17    TPro  5.2<L>  /  Alb  4.1  /  TBili  58.1<H>  /  DBili  x   /  AST  517<H>  /  ALT  178<H>  /  AlkPhos  393<H>  07-17    PT/INR - ( 17 Jul 2018 10:12 )   PT: 15.2 sec;   INR: 1.36          PTT - ( 17 Jul 2018 10:12 )  PTT:33.8 sec

## 2018-07-17 NOTE — PROGRESS NOTE ADULT - PROBLEM SELECTOR PLAN 1
- the oliguric EMILY due to ATN - most likely due to cardiogenic shock, can not exclude also a septic shock, requiring pressors - on vasopressin and phenylephrine, epinephrine, on midodrine and hydrocortisone   - oliguric in the morning - anuric   -  we will do IHD - with slow blood flow, no UF, for 5 hours, discussed with the attending   - electrolytes noted - hyperkalemia - resolved   - the patient with overt fluid overload - anasarca  - renal diet when he is not NPO  - hyperphosphatemia - 5.3m - hopefully some clearance with IHD  - lactate still elevated   - in and out  - daily weight  - hyperbilirubinemia - also can cause EMILY - pigment nephropathy can not be excluded

## 2018-07-17 NOTE — PROGRESS NOTE ADULT - PROBLEM SELECTOR PLAN 3
- cardiogenic and possible component sepsis   - on aztreonam, on micafungin, on daptomycin and gentamycin   - dose of aztreonam is - loading dose 1-2 gr and than 500 mg every 12 hours  - daptomycin  is 6-8 mg/kg for 48 hours;   - micafungin - no adjustement with HD   - now with VRE from blood culture from 7/15 - cardiogenic and possible component sepsis   - on aztreonam, on micafungin, on daptomycin and gentamycin   - dose of aztreonam is - loading dose 1-2 gr and than 500 mg every 12 hours  - daptomycin  is 6-8 mg/kg for 48 hours;   - micafungin - no adjustment with HD   - now with VRE from blood culture from 7/15

## 2018-07-17 NOTE — PROCEDURE NOTE - NSINFORMCONSENT_GEN_A_CORE
This was an emergent procedure.
This was an emergent procedure.
Benefits, risks, and possible complications of procedure explained to patient/caregiver who verbalized understanding and gave verbal consent.
This was an emergent procedure.

## 2018-07-17 NOTE — PROCEDURE NOTE - NSCOMPLICATION_GEN_A_CORE
no complications

## 2018-07-17 NOTE — PROGRESS NOTE ADULT - ASSESSMENT
The patient is 67 year old male with PMH stated above, now s/p  Repair of AA, AVR, MV repair, TV repair, CABG x2, now in shock requiring pressor support - mutiple pressors. The patient with deterioration of the renal function in the setting of the shock - oliguric, no response from the diuretics. was started on CVVHD from 7/2 to 7/14 and stopped. On 7/16 was switched to IHD - SLED - 200 ml/min blood flow, no UF, for 5 hours, the pressors requirements increased during the IHD. The aquaphoresis was continued afterwards - 2. liters off in 24 hours,postive 670 ml.   We will do another session of IHD - SLED today - discussed with intensivist

## 2018-07-17 NOTE — PROGRESS NOTE ADULT - SUBJECTIVE AND OBJECTIVE BOX
CTICU  CRITICAL  CARE  attending     Hand off received 					   Pertinent clinical, laboratory, radiographic, hemodynamic, echocardiographic, respiratory data, microbiologic data and chart were reviewed and analyzed frequently throughout the course of the day and night  Patient seen and examined with CTS/ SH attending at bedside  Pt is a 67y , Male, HEALTH ISSUES - PROBLEM Dx:  Metabolic acidosis: Metabolic acidosis  Hyperkalemia: Hyperkalemia  Jaundice: Jaundice  Thrombocytopenia: Thrombocytopenia  Penile swelling: Penile swelling  Shock: Shock  Anemia: Anemia  Hyponatremia: Hyponatremia  Acute kidney failure: Acute kidney failure  CAD (coronary artery disease): CAD (coronary artery disease)  Valvular disease: Valvular disease      , FAMILY HISTORY:  PAST MEDICAL & SURGICAL HISTORY:  No pertinent past medical history  No significant past surgical history    Patient is a 67y old  Male who presents with a chief complaint of AS (2018 00:06)      14 system review was unremarkable  acute changes include acute respiratory failure  Vital signs, hemodynamic and respiratory parameters were reviewed from the bedside nursing flowsheet.  ICU Vital Signs Last 24 Hrs  T(C): 37 (2018 15:15), Max: 37.1 (2018 10:00)  T(F): 98.6 (2018 15:15), Max: 98.8 (2018 10:00)  HR: 90 (2018 21:33) (90 - 95)  BP: --  BP(mean): --  ABP: 96/50 (2018 21:30) (90/50 - 124/68)  ABP(mean): 68 (2018 21:00) (62 - 88)  RR: 21 (2018 21:30) (20 - 25)  SpO2: 96% (2018 21:33) (95% - 98%)    Adult Advanced Hemodynamics Last 24 Hrs  CVP(mm Hg): 26 (2018 21:00) (18 - 33)  CVP(cm H2O): --  CO: --  CI: --  PA: --  PA(mean): --  PCWP: --  SVR: --  SVRI: --  PVR: --  PVRI: --, ABG - ( 2018 18:29 )  pH, Arterial: 7.43  pH, Blood: x     /  pCO2: 30    /  pO2: 103   / HCO3: 19    / Base Excess: -4.4  /  SaO2: 98                Mode: AC/ CMV (Assist Control/ Continuous Mandatory Ventilation)  RR (machine): 18  TV (machine): 520  FiO2: 60  PEEP: 5  ITime: 1.7  MAP: 23  PIP: 38    Intake and output was reviewed and the fluid balance was calculated  Daily     Daily Weight in k.8 (2018 15:15)  I&O's Summary    2018 07:  -  2018 07:00  --------------------------------------------------------  IN: 3520.5 mL / OUT: 2844 mL / NET: 676.5 mL    2018 07:  -  2018 21:40  --------------------------------------------------------  IN: 1860 mL / OUT: 2696 mL / NET: -836 mL        All lines and drain sites were assessed  [facility]      POCT Blood Glucose.: 141 mg/dL (2018 17:37)    No acute change in mental status  Auscultation of the chest reveals equal bs  Abdomen is soft  Extremities are warm and well perfused  Wounds appear clean and unremarkable  Antibiotics are periop    labs  CBC Full  -  ( 2018 18:32 )  WBC Count : 13.4 K/uL  Hemoglobin : 10.2 g/dL  Hematocrit : 28.3 %  Platelet Count - Automated : 85 K/uL  Mean Cell Volume : 83.7 fL  Mean Cell Hemoglobin : 30.2 pg  Mean Cell Hemoglobin Concentration : 36.0 g/dL  Auto Neutrophil # : x  Auto Lymphocyte # : x  Auto Monocyte # : x  Auto Eosinophil # : x  Auto Basophil # : x  Auto Neutrophil % : x  Auto Lymphocyte % : x  Auto Monocyte % : x  Auto Eosinophil % : x  Auto Basophil % : x        137  |  93<L>  |  38<H>  ----------------------------<  176<H>  4.5   |  18<L>  |  0.68    Ca    9.8      2018 18:32  Phos  4.1     07-17  Mg     2.1         TPro  5.0<L>  /  Alb  3.7  /  TBili  55.8<H>  /  DBili  x   /  AST  466<H>  /  ALT  155<H>  /  AlkPhos  445<H>      PT/INR - ( 2018 18:32 )   PT: 16.2 sec;   INR: 1.45          PTT - ( 2018 18:32 )  PTT:33.6 sec  The current medications were reviewed   MEDICATIONS  (STANDING):  artificial  tears Solution 1 Drop(s) Both EYES two times a day  aztreonam  IVPB 2000 milliGRAM(s) IV Intermittent every 12 hours  chlorhexidine 0.12% Liquid 15 milliLiter(s) Swish and Spit every 12 hours  chlorhexidine 2% Cloths 1 Application(s) Topical daily  DAPTOmycin IVPB 350 milliGRAM(s) IV Intermittent every 48 hours  desmopressin IVPB 20 MICROGram(s) IV Intermittent once  dexmedetomidine Infusion 0.5 MICROgram(s)/kG/Hr (7.375 mL/Hr) IV Continuous <Continuous>  dextrose 5%. 1000 milliLiter(s) (25 mL/Hr) IV Continuous <Continuous>  dextrose 50% Injectable 50 milliLiter(s) IV Push every 15 minutes  dextrose 50% Injectable 25 milliLiter(s) IV Push every 15 minutes  EPINEPHrine     Infusion 0.06 MICROgram(s)/kG/Min (13.275 mL/Hr) IV Continuous <Continuous>  folic acid 1 milliGRAM(s) Oral daily  insulin lispro (HumaLOG) corrective regimen sliding scale   SubCutaneous every 6 hours  mesalamine Suppository 1000 milliGRAM(s) Rectal at bedtime  micafungin IVPB 100 milliGRAM(s) IV Intermittent every 24 hours  micafungin IVPB      midodrine 10 milliGRAM(s) Oral every 8 hours  pantoprazole  Injectable 40 milliGRAM(s) IV Push every 12 hours  phenylephrine    Infusion 2 MICROgram(s)/kG/Min (21.975 mL/Hr) IV Continuous <Continuous>  propofol Infusion 10 MICROgram(s)/kG/Min (3.54 mL/Hr) IV Continuous <Continuous>  sodium chloride 0.9%. 1000 milliLiter(s) (10 mL/Hr) IV Continuous <Continuous>  vasopressin Infusion 0.017 Unit(s)/Min (1 mL/Hr) IV Continuous <Continuous>    MEDICATIONS  (PRN):  dextrose 40% Gel 15 Gram(s) Oral once PRN Blood Glucose LESS THAN 70 milliGRAM(s)/deciliter  fentaNYL    Injectable 25 MICROGram(s) IV Push every 4 hours PRN Severe Pain (7 - 10)  glucagon  Injectable 1 milliGRAM(s) IntraMuscular once PRN Glucose LESS THAN 70 milligrams/deciliter       PROBLEM LIST/ ASSESSMENT:  HEALTH ISSUES - PROBLEM Dx:  Metabolic acidosis: Metabolic acidosis  Hyperkalemia: Hyperkalemia  Jaundice: Jaundice  Thrombocytopenia: Thrombocytopenia  Penile swelling: Penile swelling  Shock: Shock  Anemia: Anemia  Hyponatremia: Hyponatremia  Acute kidney failure: Acute kidney failure  CAD (coronary artery disease): CAD (coronary artery disease)  Valvular disease: Valvular disease      ,   Patient is a 67y old  Male who presents with a chief complaint of AS (2018 00:06)     s/p cardiac surgery      My plan includes :  close hemodynamic, ventilatory and drain monitoring and management per post op routine    Monitor for arrhythmias and monitor parameters for organ perfusion  monitor neurologic status  Head of the bed should remain elevated to 45 deg .   chest PT and IS will be encouraged  monitor adequacy of oxygenation and ventilation and attempt to wean oxygen  Nutritional goals will be met using po eventually , ensure adequate caloric intake and montior the same  Stress ulcer and VTE prophylaxis will be achieved    Glycemic control is satisfactory  Electrolytes have been repleted as necessary and wound care has been carried out. Pain control has been achieved.   agressive physical therapy and early mobility and ambulation goals will be met   The family was updated about the course and plan  CRITICAL CARE TIME SPENT in evaluation and management, reassessments, review and interpretation of labs and x-rays, ventilator and hemodynamic management, formulating a plan and coordinating care: ___90____ MIN.  Time does not include procedural time.  CTICU ATTENDING     					    Clement Ahn MD

## 2018-07-17 NOTE — PROCEDURE NOTE - PROCEDURE DATE TIME, MLM
13-Jul-2018 06:24
13-Jul-2018 09:24
16-Jul-2018
17-Jul-2018 22:17
27-Jun-2018
27-Jun-2018
03-Jul-2018 12:24
03-Jul-2018 14:26

## 2018-07-17 NOTE — PROCEDURE NOTE - NSSITEPREP_SKIN_A_CORE
chlorhexidine

## 2018-07-17 NOTE — ADVANCED PRACTICE NURSE CONSULT - REASON FOR CONSULT
WOC nurse consult to assess patient's skin on coccyx and sacrum. Unable to assess patient, patient on dialysis.

## 2018-07-17 NOTE — PROGRESS NOTE ADULT - ASSESSMENT
67yoM with PMH congenital AS s/p repair at 11yo, sCHF (pre-op EF 20%), now s/p 2-v CABG, AV replacement, and repair of tricuspid and mitral valves (6/21), course complicated by cardiogenic shock due to cardiac tamponade s/p RTOR, multisystem organ failure requiring 3-pressor support, CVVHD, and blood product transfusion. Colorectal Surgery consulted for LGIB uncontrolled with endoscopic intervention.    -Patient is HD unstable and cannot safely undergo operative intervention.   -Would consider packing rectum with Kerlix to attempt to tamponade distal bleeding, however in setting of currently stable clot in the area, will hold off on manipulation for now. Should bleeding recommence, please call Surgery urgently to pack area.  -Would advise against Mesalamine MN   -Would advise against placing a suture in friable tissue at this time  -Will follow on Team 1C. Please call with any questions or concerns  -Discussed with Dr. Leija, CRS on call

## 2018-07-17 NOTE — PROGRESS NOTE ADULT - SUBJECTIVE AND OBJECTIVE BOX
Pt seen and examined at bedside. Per nursing, pt had one episode last night of BRBPR.    PERTINENT REVIEW OF SYSTEMS:  unable to assess as pt intubated and sedated    Allergies    penicillin (Rash)    Intolerances      MEDICATIONS:  MEDICATIONS  (STANDING):  artificial  tears Solution 1 Drop(s) Both EYES two times a day  aztreonam  IVPB 2000 milliGRAM(s) IV Intermittent every 12 hours  calcium gluconate IVPB 2 Gram(s) IV Intermittent once  chlorhexidine 0.12% Liquid 15 milliLiter(s) Swish and Spit every 12 hours  chlorhexidine 2% Cloths 1 Application(s) Topical daily  DAPTOmycin IVPB 350 milliGRAM(s) IV Intermittent every 48 hours  dexmedetomidine Infusion 0.5 MICROgram(s)/kG/Hr (7.375 mL/Hr) IV Continuous <Continuous>  dextrose 5%. 1000 milliLiter(s) (25 mL/Hr) IV Continuous <Continuous>  dextrose 50% Injectable 50 milliLiter(s) IV Push every 15 minutes  dextrose 50% Injectable 25 milliLiter(s) IV Push every 15 minutes  EPINEPHrine     Infusion 0.06 MICROgram(s)/kG/Min (13.275 mL/Hr) IV Continuous <Continuous>  folic acid 1 milliGRAM(s) Oral daily  insulin lispro (HumaLOG) corrective regimen sliding scale   SubCutaneous every 6 hours  mesalamine Suppository 1000 milliGRAM(s) Rectal at bedtime  micafungin IVPB 100 milliGRAM(s) IV Intermittent every 24 hours  micafungin IVPB      midodrine 10 milliGRAM(s) Oral every 8 hours  pantoprazole  Injectable 40 milliGRAM(s) IV Push every 12 hours  phenylephrine    Infusion 2 MICROgram(s)/kG/Min (21.975 mL/Hr) IV Continuous <Continuous>  propofol Infusion 10 MICROgram(s)/kG/Min (3.54 mL/Hr) IV Continuous <Continuous>  sodium chloride 0.9%. 1000 milliLiter(s) (10 mL/Hr) IV Continuous <Continuous>  vasopressin Infusion 0.017 Unit(s)/Min (1 mL/Hr) IV Continuous <Continuous>    MEDICATIONS  (PRN):  dextrose 40% Gel 15 Gram(s) Oral once PRN Blood Glucose LESS THAN 70 milliGRAM(s)/deciliter  fentaNYL    Injectable 25 MICROGram(s) IV Push every 4 hours PRN Severe Pain (7 - 10)  glucagon  Injectable 1 milliGRAM(s) IntraMuscular once PRN Glucose LESS THAN 70 milligrams/deciliter    Vital Signs Last 24 Hrs  T(C): 36.8 (17 Jul 2018 05:00), Max: 36.8 (17 Jul 2018 05:00)  T(F): 98.3 (17 Jul 2018 05:00), Max: 98.3 (17 Jul 2018 05:00)  HR: 90 (17 Jul 2018 10:08) (90 - 118)  BP: --  BP(mean): --  RR: 22 (17 Jul 2018 10:08) (18 - 26)  SpO2: 96% (17 Jul 2018 10:08) (92% - 99%)    07-16 @ 07:01  -  07-17 @ 07:00  --------------------------------------------------------  IN: 3520.5 mL / OUT: 2844 mL / NET: 676.5 mL    07-17 @ 07:01 - 07-17 @ 10:29  --------------------------------------------------------  IN: 271.7 mL / OUT: 215 mL / NET: 56.7 mL      PHYSICAL EXAM:    General: extremely ill appearing male;  HEENT: pupils sluggish, guaze in mouth with bright red blood  Gastrointestinal: Soft, non-tender non-distended; Normal bowel sounds; no appreciable organomegaly; two epigastric drains with blood in the catheters; no dark stools noted on bed pad;  Extremities: + peripheral edema   Neurological: intubated and sedated  Skin: jaundiced    LABS:                        10.4   11.2  )-----------( 92       ( 17 Jul 2018 10:12 )             30.0     07-17    135  |  90<L>  |  42<H>  ----------------------------<  106<H>  4.4   |  19<L>  |  0.74    Ca    10.3      17 Jul 2018 03:15  Phos  4.2     07-17  Mg     1.9     07-17    TPro  5.3<L>  /  Alb  4.4  /  TBili  57.2<H>  /  DBili  x   /  AST  472<H>  /  ALT  156<H>  /  AlkPhos  342<H>  07-17    PT/INR - ( 17 Jul 2018 10:12 )   PT: 15.2 sec;   INR: 1.36          PTT - ( 17 Jul 2018 10:12 )  PTT:33.8 sec                  Culture - Fungal, Blood (collected 17 Jul 2018 01:17)  Source: .Blood Blood  Preliminary Report (17 Jul 2018 07:26):    Testing in progress    Culture - Blood (collected 15 Jul 2018 10:28)  Source: .Blood Blood  Gram Stain (16 Jul 2018 08:53):    Aerobic and Anaerobic Bottle: Gram Positive Cocci in Pairs and Chains    Result called to and read back byCompa Pollard RN  07/16/2018 08:53:27    ***Blood Panel PCR results on this specimen are available    approximately 3 hours after the Gram stain result.***    Gram stain, PCR, and/or culture results may not always    correspond due to difference in methodologies.    ************************************************************    This PCR assay was performed using [employer].    The following targets are tested for: Enterococcus,    vancomycin resistant enterococci, Listeria monocytogenes,    coagulase negative staphylococci, S. aureus,    methicillin resistant S. aureus, Streptococcus agalactiae    (Group B), S. pneumoniae, S. pyogenes (Group A),    Acinetobacter baumannii, Enterobacter cloacae, E. coli,    Klebsiella oxytoca, K. pneumoniae, Proteus sp.,    Serratia marcescens, Haemophilus influenzae,    Neisseria meningitidis, Pseudomonas aeruginosa, Candida    albicans, C. glabrata, C krusei, C parapsilosis,    C. tropicalis and the KPC resistance gene.    "Due to technical problems, Proteus sp. will Not be reported as part of    the BCID panel until further notice"  Preliminary Report (17 Jul 2018 09:03):    Growth in aerobic and anaerobic bottles: Enterococcus species    Identification and susceptibility to follow.  Organism: Blood Culture PCR (16 Jul 2018 09:37)  Organism: Blood Culture PCR (16 Jul 2018 09:37)    Culture - Blood (collected 15 Jul 2018 10:28)  Source: .Blood Blood  Gram Stain (16 Jul 2018 08:55):    Aerobic and Anaerobic Bottle: Gram Positive Cocci in Pairs and Chains    Result called to and read back byCompa Pollard RN  07/16/2018 08:53:27  Preliminary Report (17 Jul 2018 09:06):    Growth in aerobic and anaerobic bottles: Enterococcus species    Identification and susceptibility to follow.      RADIOLOGY & ADDITIONAL STUDIES:

## 2018-07-17 NOTE — PROCEDURE NOTE - NSINDICATIONS_GEN_A_CORE
critical patient/monitoring purposes
dialysis/CRRT
critical illness
critical illness
dialysis/CRRT
venous access/critical illness
critical illness
critical illness/dialysis/CRRT

## 2018-07-17 NOTE — PROCEDURE NOTE - NSCVLACTUALSITE_VASC_A_CORE
left/internal jugular
internal jugular/right
left/internal jugular
right/internal jugular

## 2018-07-17 NOTE — PROGRESS NOTE ADULT - SUBJECTIVE AND OBJECTIVE BOX
Seen in the morning, still intubated, still requiring pressor support, yesterday IHD was done for 5 hours, switch to aquaphoresis with 150 ml/h initially than to 100 ml/h in the morning, UF for 24 hours around 2 liters, positive 670 ml in 24 hours     The renal service for [facility] due to ATN from the cardiogenic shock, and ongoing sepsis     Patient seen and examined at bedside.     artificial  tears Solution 1 Drop(s) two times a day  aztreonam  IVPB 2000 milliGRAM(s) every 12 hours  calcium gluconate IVPB 2 Gram(s) once  chlorhexidine 0.12% Liquid 15 milliLiter(s) every 12 hours  chlorhexidine 2% Cloths 1 Application(s) daily  DAPTOmycin IVPB 350 milliGRAM(s) every 48 hours  dexmedetomidine Infusion 0.5 MICROgram(s)/kG/Hr <Continuous>  dextrose 40% Gel 15 Gram(s) once PRN  dextrose 5%. 1000 milliLiter(s) <Continuous>  dextrose 50% Injectable 50 milliLiter(s) every 15 minutes  dextrose 50% Injectable 25 milliLiter(s) every 15 minutes  EPINEPHrine     Infusion 0.06 MICROgram(s)/kG/Min <Continuous>  fentaNYL    Injectable 25 MICROGram(s) every 4 hours PRN  folic acid 1 milliGRAM(s) daily  glucagon  Injectable 1 milliGRAM(s) once PRN  insulin lispro (HumaLOG) corrective regimen sliding scale   every 6 hours  mesalamine Suppository 1000 milliGRAM(s) at bedtime  micafungin IVPB 100 milliGRAM(s) every 24 hours  micafungin IVPB     midodrine 10 milliGRAM(s) every 8 hours  pantoprazole  Injectable 40 milliGRAM(s) every 12 hours  phenylephrine    Infusion 2 MICROgram(s)/kG/Min <Continuous>  propofol Infusion 10 MICROgram(s)/kG/Min <Continuous>  sodium chloride 0.9%. 1000 milliLiter(s) <Continuous>  vasopressin Infusion 0.017 Unit(s)/Min <Continuous>      Allergies    penicillin (Rash)    Intolerances        T(C): , Max: 37.1 (07-17-18 @ 10:00)  T(F): , Max: 98.8 (07-17-18 @ 10:00)  HR: 90 (07-17-18 @ 11:00)  BP: --  BP(mean): --  RR: 21 (07-17-18 @ 11:00)  SpO2: 95% (07-17-18 @ 11:00)  Wt(kg): --    07-16 @ 07:01  -  07-17 @ 07:00  --------------------------------------------------------  IN:    Albumin 25%: 100 mL    dexmedetomidine Infusion: 42.8 mL    Enteral Tube Flush: 450 mL    EPINEPHrine Infusion: 308.1 mL    Packed Red Blood Cells: 300 mL    phenylephrine   Infusion: 403.8 mL    Platelets - Single Donor: 610 mL    propofol Infusion: 114.8 mL    sodium chloride 0.9%.: 230 mL    Solution: 650 mL    vasopressin Infusion: 61 mL    Vital HN: 250 mL  Total IN: 3520.5 mL    OUT:    Chest Tube: 80 mL    Nasoenteral Tube: 400 mL    Other: 300 mL    Other: 2064 mL  Total OUT: 2844 mL    Total NET: 676.5 mL      07-17 @ 07:01  -  07-17 @ 11:31  --------------------------------------------------------  IN:    EPINEPHrine Infusion: 62 mL    phenylephrine   Infusion: 84 mL    propofol Infusion: 24.8 mL    sodium chloride 0.9%.: 40 mL    Solution: 100 mL    vasopressin Infusion: 14 mL    Vital HN: 156 mL  Total IN: 480.8 mL    OUT:    Chest Tube: 15 mL    Other: 306 mL  Total OUT: 321 mL    Total NET: 159.8 mL      Physical exam:   Intubated and sedated in the morning   ANASARCA   Jaundice   difficult to evaluate for JVD   Chest: s/p thoracotomy closed chest  multiple drainages from the chest   RRR, normal s1/s2, systolic murmur  Abdomen - swollen, not tender, not distended   Extremities 3+ peripheral edema  Hd cathter - on the left IJ  Desai           LABS:                        10.4   11.2  )-----------( 92       ( 17 Jul 2018 10:12 )             30.0     07-17    134<L>  |  90<L>  |  52<H>  ----------------------------<  140<H>  4.9   |  17<L>  |  0.87    Ca    10.0      17 Jul 2018 10:12  Phos  5.3     07-17  Mg     2.1     07-17    TPro  5.2<L>  /  Alb  4.1  /  TBili  58.1<H>  /  DBili  x   /  AST  517<H>  /  ALT  178<H>  /  AlkPhos  393<H>  07-17      PT/INR - ( 17 Jul 2018 10:12 )   PT: 15.2 sec;   INR: 1.36          PTT - ( 17 Jul 2018 10:12 )  PTT:33.8 sec          RADIOLOGY & ADDITIONAL STUDIES:

## 2018-07-17 NOTE — PROGRESS NOTE ADULT - ASSESSMENT
67 yr old male with a PMHx of congenital AS s/p repair at age 12 and CAD s/p stenting 8 yrs ago who was found to have 2V CAD, severe AS, MR and TR during pre-operative evaluation now s/p MV/TV repair and AVR w/ IABP and impella placement. Hospital course was complicated by cardiogenic shock requiring continued inotropic support, cardiac tamponade x 2, consumptive coaguloapthy, pre-renal azotemia with anasarca requiring CVVH, who presents with lower GI bleed    # melena/hematochezia   etiology is multifactorial with the following findings seen during EGD and cscope   - sp EGD 7/13/18 - esophagitis, and gastropathy,  sp repeat EGD 7/15/18 - mild esophagitis, linear gastric ulcer from NGT, and retained gastric content and enteral feeds suggesting gastric dysmotility  - sp flex sig 7/13/18 - which showed significant proctitis, sp colonoscopy 7/15/18 - with large rectal clot requiring manual disimpaction, anorectal oozing of unclear etiology, severe proctitis, old BRB throughout entire examined colon, difficulty intubating TI - but there was suggestion of small clot when TI visualized, no evidence of ischemic bowel seen  -please switch Canasa enemas to Canasa suppositories, as suppositories are less traumatic to mucosa  -c/w IV PPI  -given that bleeding has continued despite endoscopic management, if continues to bleed would recommend IR to evaluate for embolization    # transaminitis with hyperbilirubinemia  - pt with chronically up trending total bilirubin since admission; multifactorial in nature: could be 2/2 hemolysis, RBC and platelet consumption 2/2 shearing on impella, drug induced 2/2 ?meropenem, autoimmune hemolysis in the setting of multiple transfusions  - US shows bilary sludge and wall thickening 0.5 cm, no stones, suggesting acalculous cholecystitis. Surgery following. Pt is a poor candidate for surgery, but would consider consulting IR for percutaneous cholecystostomy drain  - need to rule out acute viral etiology: please send EBV and CMV IgM and IgG, hepatitis serologies- hep A IgM, anti-HCV; hep B labs show past infection  - check AMA for PBC  - please check repeat peripheral smear  - consider hematology consult for persistent hemolysis  - please avoid hepatotoxic agents  - will consider discussion with [facility] liver fellow for any further input 67 yr old male with a PMHx of congenital AS s/p repair at age 12 and CAD s/p stenting 8 yrs ago who was found to have 2V CAD, severe AS, MR and TR during pre-operative evaluation now s/p MV/TV repair and AVR w/ IABP and impella placement. Hospital course was complicated by cardiogenic shock requiring continued inotropic support, cardiac tamponade x 2, consumptive coaguloapthy, pre-renal azotemia with anasarca requiring CVVH, who presents with lower GI bleed    # melena/hematochezia   etiology is multifactorial with the following findings seen during EGD and cscope   - sp EGD 7/13/18 - esophagitis, and gastropathy,  sp repeat EGD 7/15/18 - mild esophagitis, linear gastric ulcer from NGT, and retained gastric content and enteral feeds suggesting gastric dysmotility  - sp flex sig 7/13/18 - which showed significant proctitis, sp colonoscopy 7/15/18 - with large rectal clot requiring manual disimpaction, anorectal oozing of unclear etiology, severe proctitis, old BRB throughout entire examined colon, difficulty intubating TI - but there was suggestion of small clot when TI visualized, no evidence of ischemic bowel seen  -please switch Canasa enemas to Canasa suppositories, as suppositories are less traumatic to mucosa  -c/w IV PPI  -given that bleeding has continued despite endoscopic management, if continues to bleed would recommend IR to evaluate for embolization    # transaminitis with hyperbilirubinemia  - pt with chronically up trending total bilirubin since admission; multifactorial in nature: could be 2/2 hemolysis, RBC and platelet consumption 2/2 shearing on impella, drug induced 2/2 ?meropenem, autoimmune hemolysis in the setting of multiple transfusions  - US shows bilary sludge and wall thickening 0.5 cm, no stones, suggesting acalculous cholecystitis. Surgery following. Pt is a poor candidate for surgery, but would consider consulting IR for percutaneous cholecystostomy drain  - need to rule out acute viral etiology: please send EBV and CMV IgM and IgG,   -hepatitis serologies labs negative for acute infection  - check AMA for PBC  - please avoid hepatotoxic agents  - will consider discussion with [facility] liver fellow for any further input 67 yr old male with a PMHx of congenital AS s/p repair at age 12 and CAD s/p stenting 8 yrs ago who was found to have 2V CAD, severe AS, MR and TR during pre-operative evaluation now s/p MV/TV repair and AVR w/ IABP and impella placement. Hospital course was complicated by cardiogenic shock requiring continued inotropic support, cardiac tamponade x 2, consumptive coaguloapthy, pre-renal azotemia with anasarca requiring CVVH, who presents with lower GI bleed    # melena/hematochezia   etiology is multifactorial with the following findings seen during EGD and cscope   - sp EGD 7/13/18 - esophagitis, and gastropathy,  sp repeat EGD 7/15/18 - mild esophagitis, linear gastric ulcer from NGT, and retained gastric content and enteral feeds suggesting gastric dysmotility  - sp flex sig 7/13/18 - which showed significant proctitis, sp colonoscopy 7/15/18 - with large rectal clot requiring manual disimpaction, anorectal oozing of unclear etiology, severe proctitis, old BRB throughout entire examined colon, difficulty intubating TI - but there was suggestion of small clot when TI visualized, no evidence of ischemic bowel seen  -please switch Canasa enemas to Canasa suppositories, as suppositories are less traumatic to mucosa  -c/w IV PPI  -given that bleeding has continued despite endoscopic management, if continues to bleed would recommend IR to evaluate for embolization    # transaminitis with hyperbilirubinemia  - pt with chronically up trending total bilirubin since admission; multifactorial in nature: could be 2/2 hemolysis, RBC and platelet consumption 2/2 shearing on impella, drug induced 2/2 ?meropenem, autoimmune hemolysis in the setting of multiple transfusions  - US shows bilary sludge and wall thickening 0.5 cm, no stones, suggesting acalculous cholecystitis. Surgery following. Pt is a poor candidate for surgery, but would consider consulting IR for percutaneous cholecystostomy drain  - need to rule out acute viral etiology: please send EBV and CMV IgM and IgG,   -hepatitis serologies labs negative for acute infection, hep p serologies show evidence of chronic infection, would recommend checking pcr to assess for reactivation  - check AMA for PBC  -Ceruloplasmin to r/o sarah  -iron panel to r/o hemochromatosis  -alpha 1 antitrypsin to r/o alpha 1 antitrypsin deficiency   -mitochondrial ab, smooth muscle antibody. microsomal ab to r/o autoimmune disorder  - please avoid hepatotoxic agents

## 2018-07-17 NOTE — PROGRESS NOTE ADULT - SUBJECTIVE AND OBJECTIVE BOX
Patient was seen and evaluated on dialysis.   Patient is tolerating the procedure well.   HR: 90 (07-17-18 @ 11:00)  BP: 110/67  Continue dialysis:   Dialyzer:  180       QB: 200       QD: 500  Goal UF no UF over 5 Hours     The patient seen during the SLED - maintaining BP around 110/70

## 2018-07-17 NOTE — PROGRESS NOTE ADULT - PROBLEM SELECTOR PLAN 5
- high anion metabolic acidosis  - lactatate 4.2  - mild also metabolic alkalosis   - the ultimate treatment of the lactic metabolic acidosis is the treatment of the cause in this particular case - the shock cardiogenic and septic. Very minimal role of IHD/SLED/CRRT in lactic clearance - high anion metabolic acidosis  - lactatate 4.2  - mild also metabolic alkalosis   - the ultimate treatment of the lactic metabolic acidosis is the treatment of the cause in this particular case - the shock cardiogenic and septic.

## 2018-07-17 NOTE — PROCEDURE NOTE - NSPROCDETAILS_GEN_ALL_CORE
guidewire recovered
location identified, draped/prepped, sterile technique used, needle inserted/introduced/positive blood return obtained via catheter/connected to a pressurized flush line/Seldinger technique/ultrasound guidance
guidewire recovered/sterile dressing applied/sterile technique, catheter placed/ultrasound guidance/lumen(s) aspirated and flushed
ultrasound guidance
guidewire recovered/sterile dressing applied/sterile technique, catheter placed/ultrasound guidance/lumen(s) aspirated and flushed
lumen(s) aspirated and flushed/guidewire recovered/sterile technique, catheter placed/ultrasound guidance/sterile dressing applied
sterile dressing applied/sterile technique, catheter placed/ultrasound guidance
sterile technique, catheter placed/lumen(s) aspirated and flushed/sterile dressing applied/ultrasound guidance/guidewire recovered
ultrasound guidance/guidewire recovered/lumen(s) aspirated and flushed/sterile dressing applied/sterile technique, catheter placed

## 2018-07-18 LAB
-  AMPICILLIN: SIGNIFICANT CHANGE UP
-  AMPICILLIN: SIGNIFICANT CHANGE UP
-  DAPTOMYCIN: SIGNIFICANT CHANGE UP
-  DAPTOMYCIN: SIGNIFICANT CHANGE UP
-  GENTAMICIN SYNERGY: SIGNIFICANT CHANGE UP
-  GENTAMICIN SYNERGY: SIGNIFICANT CHANGE UP
-  STREPTOMYCIN SYNERGY: SIGNIFICANT CHANGE UP
-  STREPTOMYCIN SYNERGY: SIGNIFICANT CHANGE UP
-  VANCOMYCIN: SIGNIFICANT CHANGE UP
-  VANCOMYCIN: SIGNIFICANT CHANGE UP
ALBUMIN SERPL ELPH-MCNC: 3.4 G/DL — SIGNIFICANT CHANGE UP (ref 3.3–5)
ALBUMIN SERPL ELPH-MCNC: 3.7 G/DL — SIGNIFICANT CHANGE UP (ref 3.3–5)
ALP SERPL-CCNC: 450 U/L — HIGH (ref 40–120)
ALP SERPL-CCNC: 505 U/L — HIGH (ref 40–120)
ALT FLD-CCNC: 132 U/L — HIGH (ref 10–45)
ALT FLD-CCNC: 164 U/L — HIGH (ref 10–45)
ANION GAP SERPL CALC-SCNC: 25 MMOL/L — HIGH (ref 5–17)
ANION GAP SERPL CALC-SCNC: 28 MMOL/L — HIGH (ref 5–17)
APTT BLD: 31.7 SEC — SIGNIFICANT CHANGE UP (ref 27.5–37.4)
APTT BLD: 32.5 SEC — SIGNIFICANT CHANGE UP (ref 27.5–37.4)
APTT BLD: 33.5 SEC — SIGNIFICANT CHANGE UP (ref 27.5–37.4)
APTT BLD: 33.6 SEC — SIGNIFICANT CHANGE UP (ref 27.5–37.4)
AST SERPL-CCNC: 299 U/L — HIGH (ref 10–40)
AST SERPL-CCNC: 402 U/L — HIGH (ref 10–40)
BILIRUB DIRECT SERPL-MCNC: >10 MG/DL — SIGNIFICANT CHANGE UP (ref 0–0.2)
BILIRUB INDIRECT FLD-MCNC: SIGNIFICANT CHANGE UP MG/DL (ref 0.2–1)
BILIRUB SERPL-MCNC: 56.6 MG/DL — HIGH (ref 0.2–1.2)
BILIRUB SERPL-MCNC: 58.7 MG/DL — HIGH (ref 0.2–1.2)
BUN SERPL-MCNC: 49 MG/DL — HIGH (ref 7–23)
BUN SERPL-MCNC: 49 MG/DL — HIGH (ref 7–23)
BUN SERPL-MCNC: 60 MG/DL — HIGH (ref 7–23)
BUN SERPL-MCNC: 74 MG/DL — HIGH (ref 7–23)
CALCIUM SERPL-MCNC: 10.2 MG/DL — SIGNIFICANT CHANGE UP (ref 8.4–10.5)
CALCIUM SERPL-MCNC: 10.3 MG/DL — SIGNIFICANT CHANGE UP (ref 8.4–10.5)
CALCIUM SERPL-MCNC: 10.3 MG/DL — SIGNIFICANT CHANGE UP (ref 8.4–10.5)
CALCIUM SERPL-MCNC: 9.8 MG/DL — SIGNIFICANT CHANGE UP (ref 8.4–10.5)
CHLORIDE SERPL-SCNC: 92 MMOL/L — LOW (ref 96–108)
CHLORIDE SERPL-SCNC: 92 MMOL/L — LOW (ref 96–108)
CHLORIDE SERPL-SCNC: 93 MMOL/L — LOW (ref 96–108)
CHLORIDE SERPL-SCNC: 94 MMOL/L — LOW (ref 96–108)
CO2 SERPL-SCNC: 15 MMOL/L — LOW (ref 22–31)
CO2 SERPL-SCNC: 16 MMOL/L — LOW (ref 22–31)
CO2 SERPL-SCNC: 17 MMOL/L — LOW (ref 22–31)
CO2 SERPL-SCNC: 17 MMOL/L — LOW (ref 22–31)
CREAT SERPL-MCNC: 0.78 MG/DL — SIGNIFICANT CHANGE UP (ref 0.5–1.3)
CREAT SERPL-MCNC: 0.84 MG/DL — SIGNIFICANT CHANGE UP (ref 0.5–1.3)
CREAT SERPL-MCNC: 1.01 MG/DL — SIGNIFICANT CHANGE UP (ref 0.5–1.3)
CREAT SERPL-MCNC: 1.19 MG/DL — SIGNIFICANT CHANGE UP (ref 0.5–1.3)
CULTURE RESULTS: SIGNIFICANT CHANGE UP
CULTURE RESULTS: SIGNIFICANT CHANGE UP
GAS PNL BLDA: SIGNIFICANT CHANGE UP
GLUCOSE BLDC GLUCOMTR-MCNC: 155 MG/DL — HIGH (ref 70–99)
GLUCOSE BLDC GLUCOMTR-MCNC: 161 MG/DL — HIGH (ref 70–99)
GLUCOSE BLDC GLUCOMTR-MCNC: 165 MG/DL — HIGH (ref 70–99)
GLUCOSE BLDC GLUCOMTR-MCNC: 169 MG/DL — HIGH (ref 70–99)
GLUCOSE SERPL-MCNC: 142 MG/DL — HIGH (ref 70–99)
GLUCOSE SERPL-MCNC: 159 MG/DL — HIGH (ref 70–99)
GLUCOSE SERPL-MCNC: 192 MG/DL — HIGH (ref 70–99)
GLUCOSE SERPL-MCNC: 221 MG/DL — HIGH (ref 70–99)
GRAM STN FLD: SIGNIFICANT CHANGE UP
GRAM STN FLD: SIGNIFICANT CHANGE UP
HCT VFR BLD CALC: 28.8 % — LOW (ref 39–50)
HCT VFR BLD CALC: 29.2 % — LOW (ref 39–50)
HCT VFR BLD CALC: 29.8 % — LOW (ref 39–50)
HCT VFR BLD CALC: 36.3 % — LOW (ref 39–50)
HGB BLD-MCNC: 10.1 G/DL — LOW (ref 13–17)
HGB BLD-MCNC: 10.2 G/DL — LOW (ref 13–17)
HGB BLD-MCNC: 10.3 G/DL — LOW (ref 13–17)
HGB BLD-MCNC: 12.7 G/DL — LOW (ref 13–17)
INR BLD: 1.27 — HIGH (ref 0.88–1.16)
INR BLD: 1.3 — HIGH (ref 0.88–1.16)
INR BLD: 1.32 — HIGH (ref 0.88–1.16)
INR BLD: 1.33 — HIGH (ref 0.88–1.16)
LACTATE SERPL-SCNC: 3.5 MMOL/L — HIGH (ref 0.5–2)
LACTATE SERPL-SCNC: 3.8 MMOL/L — HIGH (ref 0.5–2)
LACTATE SERPL-SCNC: 4.3 MMOL/L — CRITICAL HIGH (ref 0.5–2)
MAGNESIUM SERPL-MCNC: 2.1 MG/DL — SIGNIFICANT CHANGE UP (ref 1.6–2.6)
MAGNESIUM SERPL-MCNC: 2.2 MG/DL — SIGNIFICANT CHANGE UP (ref 1.6–2.6)
MCHC RBC-ENTMCNC: 29.4 PG — SIGNIFICANT CHANGE UP (ref 27–34)
MCHC RBC-ENTMCNC: 29.8 PG — SIGNIFICANT CHANGE UP (ref 27–34)
MCHC RBC-ENTMCNC: 30.2 PG — SIGNIFICANT CHANGE UP (ref 27–34)
MCHC RBC-ENTMCNC: 30.2 PG — SIGNIFICANT CHANGE UP (ref 27–34)
MCHC RBC-ENTMCNC: 34.2 G/DL — SIGNIFICANT CHANGE UP (ref 32–36)
MCHC RBC-ENTMCNC: 34.6 G/DL — SIGNIFICANT CHANGE UP (ref 32–36)
MCHC RBC-ENTMCNC: 35 G/DL — SIGNIFICANT CHANGE UP (ref 32–36)
MCHC RBC-ENTMCNC: 35.8 G/DL — SIGNIFICANT CHANGE UP (ref 32–36)
MCV RBC AUTO: 84.5 FL — SIGNIFICANT CHANGE UP (ref 80–100)
MCV RBC AUTO: 85.9 FL — SIGNIFICANT CHANGE UP (ref 80–100)
MCV RBC AUTO: 86.1 FL — SIGNIFICANT CHANGE UP (ref 80–100)
MCV RBC AUTO: 86.2 FL — SIGNIFICANT CHANGE UP (ref 80–100)
METHOD TYPE: SIGNIFICANT CHANGE UP
METHOD TYPE: SIGNIFICANT CHANGE UP
ORGANISM # SPEC MICROSCOPIC CNT: SIGNIFICANT CHANGE UP
PHOSPHATE SERPL-MCNC: 4.3 MG/DL — SIGNIFICANT CHANGE UP (ref 2.5–4.5)
PHOSPHATE SERPL-MCNC: 4.5 MG/DL — SIGNIFICANT CHANGE UP (ref 2.5–4.5)
PLATELET # BLD AUTO: 122 K/UL — LOW (ref 150–400)
PLATELET # BLD AUTO: 42 K/UL — LOW (ref 150–400)
PLATELET # BLD AUTO: 72 K/UL — LOW (ref 150–400)
PLATELET # BLD AUTO: 85 K/UL — LOW (ref 150–400)
POTASSIUM SERPL-MCNC: 4.1 MMOL/L — SIGNIFICANT CHANGE UP (ref 3.5–5.3)
POTASSIUM SERPL-MCNC: 4.3 MMOL/L — SIGNIFICANT CHANGE UP (ref 3.5–5.3)
POTASSIUM SERPL-MCNC: 4.4 MMOL/L — SIGNIFICANT CHANGE UP (ref 3.5–5.3)
POTASSIUM SERPL-MCNC: 4.4 MMOL/L — SIGNIFICANT CHANGE UP (ref 3.5–5.3)
POTASSIUM SERPL-SCNC: 4.1 MMOL/L — SIGNIFICANT CHANGE UP (ref 3.5–5.3)
POTASSIUM SERPL-SCNC: 4.3 MMOL/L — SIGNIFICANT CHANGE UP (ref 3.5–5.3)
POTASSIUM SERPL-SCNC: 4.4 MMOL/L — SIGNIFICANT CHANGE UP (ref 3.5–5.3)
POTASSIUM SERPL-SCNC: 4.4 MMOL/L — SIGNIFICANT CHANGE UP (ref 3.5–5.3)
PROT SERPL-MCNC: 5 G/DL — LOW (ref 6–8.3)
PROT SERPL-MCNC: 5.1 G/DL — LOW (ref 6–8.3)
PROTHROM AB SERPL-ACNC: 14.2 SEC — HIGH (ref 9.8–12.7)
PROTHROM AB SERPL-ACNC: 14.5 SEC — HIGH (ref 9.8–12.7)
PROTHROM AB SERPL-ACNC: 14.7 SEC — HIGH (ref 9.8–12.7)
PROTHROM AB SERPL-ACNC: 14.8 SEC — HIGH (ref 9.8–12.7)
RBC # BLD: 3.39 M/UL — LOW (ref 4.2–5.8)
RBC # BLD: 3.41 M/UL — LOW (ref 4.2–5.8)
RBC # BLD: 3.47 M/UL — LOW (ref 4.2–5.8)
RBC # BLD: 4.21 M/UL — SIGNIFICANT CHANGE UP (ref 4.2–5.8)
RBC # FLD: 20.7 % — HIGH (ref 10.3–16.9)
RBC # FLD: 20.9 % — HIGH (ref 10.3–16.9)
SODIUM SERPL-SCNC: 135 MMOL/L — SIGNIFICANT CHANGE UP (ref 135–145)
SODIUM SERPL-SCNC: 136 MMOL/L — SIGNIFICANT CHANGE UP (ref 135–145)
SODIUM SERPL-SCNC: 137 MMOL/L — SIGNIFICANT CHANGE UP (ref 135–145)
SODIUM SERPL-SCNC: 137 MMOL/L — SIGNIFICANT CHANGE UP (ref 135–145)
SPECIMEN SOURCE: SIGNIFICANT CHANGE UP
SPECIMEN SOURCE: SIGNIFICANT CHANGE UP
WBC # BLD: 11.6 K/UL — HIGH (ref 3.8–10.5)
WBC # BLD: 12.6 K/UL — HIGH (ref 3.8–10.5)
WBC # BLD: 12.8 K/UL — HIGH (ref 3.8–10.5)
WBC # BLD: 14 K/UL — HIGH (ref 3.8–10.5)
WBC # FLD AUTO: 11.6 K/UL — HIGH (ref 3.8–10.5)
WBC # FLD AUTO: 12.6 K/UL — HIGH (ref 3.8–10.5)
WBC # FLD AUTO: 12.8 K/UL — HIGH (ref 3.8–10.5)
WBC # FLD AUTO: 14 K/UL — HIGH (ref 3.8–10.5)

## 2018-07-18 PROCEDURE — 71045 X-RAY EXAM CHEST 1 VIEW: CPT | Mod: 26

## 2018-07-18 PROCEDURE — 99233 SBSQ HOSP IP/OBS HIGH 50: CPT

## 2018-07-18 PROCEDURE — 99292 CRITICAL CARE ADDL 30 MIN: CPT

## 2018-07-18 PROCEDURE — 90945 DIALYSIS ONE EVALUATION: CPT

## 2018-07-18 PROCEDURE — 99291 CRITICAL CARE FIRST HOUR: CPT

## 2018-07-18 RX ORDER — SODIUM BICARBONATE 1 MEQ/ML
50 SYRINGE (ML) INTRAVENOUS ONCE
Qty: 0 | Refills: 0 | Status: COMPLETED | OUTPATIENT
Start: 2018-07-18 | End: 2018-07-18

## 2018-07-18 RX ADMIN — MIDODRINE HYDROCHLORIDE 10 MILLIGRAM(S): 2.5 TABLET ORAL at 05:08

## 2018-07-18 RX ADMIN — MICAFUNGIN SODIUM 105 MILLIGRAM(S): 100 INJECTION, POWDER, LYOPHILIZED, FOR SOLUTION INTRAVENOUS at 09:34

## 2018-07-18 RX ADMIN — CHLORHEXIDINE GLUCONATE 15 MILLILITER(S): 213 SOLUTION TOPICAL at 05:08

## 2018-07-18 RX ADMIN — Medication 50 MILLIEQUIVALENT(S): at 22:00

## 2018-07-18 RX ADMIN — PANTOPRAZOLE SODIUM 40 MILLIGRAM(S): 20 TABLET, DELAYED RELEASE ORAL at 17:36

## 2018-07-18 RX ADMIN — Medication 1 MILLIGRAM(S): at 12:06

## 2018-07-18 RX ADMIN — Medication 100 MILLIGRAM(S): at 15:08

## 2018-07-18 RX ADMIN — EPINEPHRINE 13.28 MICROGRAM(S)/KG/MIN: 0.3 INJECTION INTRAMUSCULAR; SUBCUTANEOUS at 12:06

## 2018-07-18 RX ADMIN — Medication 2: at 17:37

## 2018-07-18 RX ADMIN — MIDODRINE HYDROCHLORIDE 10 MILLIGRAM(S): 2.5 TABLET ORAL at 14:05

## 2018-07-18 RX ADMIN — FENTANYL CITRATE 25 MICROGRAM(S): 50 INJECTION INTRAVENOUS at 15:27

## 2018-07-18 RX ADMIN — FENTANYL CITRATE 25 MICROGRAM(S): 50 INJECTION INTRAVENOUS at 00:00

## 2018-07-18 RX ADMIN — Medication 2: at 12:06

## 2018-07-18 RX ADMIN — Medication 2: at 06:46

## 2018-07-18 RX ADMIN — MIDODRINE HYDROCHLORIDE 10 MILLIGRAM(S): 2.5 TABLET ORAL at 21:07

## 2018-07-18 RX ADMIN — Medication 100 MILLIGRAM(S): at 05:08

## 2018-07-18 RX ADMIN — PANTOPRAZOLE SODIUM 40 MILLIGRAM(S): 20 TABLET, DELAYED RELEASE ORAL at 05:08

## 2018-07-18 RX ADMIN — Medication 1000 MILLIGRAM(S): at 21:07

## 2018-07-18 RX ADMIN — CHLORHEXIDINE GLUCONATE 15 MILLILITER(S): 213 SOLUTION TOPICAL at 17:37

## 2018-07-18 RX ADMIN — FENTANYL CITRATE 25 MICROGRAM(S): 50 INJECTION INTRAVENOUS at 15:07

## 2018-07-18 RX ADMIN — CHLORHEXIDINE GLUCONATE 1 APPLICATION(S): 213 SOLUTION TOPICAL at 12:05

## 2018-07-18 RX ADMIN — Medication 1 DROP(S): at 17:37

## 2018-07-18 RX ADMIN — DAPTOMYCIN 114 MILLIGRAM(S): 500 INJECTION, POWDER, LYOPHILIZED, FOR SOLUTION INTRAVENOUS at 21:07

## 2018-07-18 RX ADMIN — Medication 2: at 23:59

## 2018-07-18 RX ADMIN — Medication 1 DROP(S): at 05:08

## 2018-07-18 NOTE — PROGRESS NOTE ADULT - SUBJECTIVE AND OBJECTIVE BOX
Pt seen and examined at bedside. Per nursing no further BM overnight.    PERTINENT REVIEW OF SYSTEMS:  unable to assess     Allergies    penicillin (Rash)    Intolerances      MEDICATIONS:  MEDICATIONS  (STANDING):  artificial  tears Solution 1 Drop(s) Both EYES two times a day  aztreonam  IVPB 2000 milliGRAM(s) IV Intermittent every 12 hours  chlorhexidine 0.12% Liquid 15 milliLiter(s) Swish and Spit every 12 hours  chlorhexidine 2% Cloths 1 Application(s) Topical daily  DAPTOmycin IVPB 350 milliGRAM(s) IV Intermittent every 48 hours  dexmedetomidine Infusion 0.5 MICROgram(s)/kG/Hr (7.375 mL/Hr) IV Continuous <Continuous>  dextrose 5%. 1000 milliLiter(s) (25 mL/Hr) IV Continuous <Continuous>  dextrose 50% Injectable 50 milliLiter(s) IV Push every 15 minutes  dextrose 50% Injectable 25 milliLiter(s) IV Push every 15 minutes  EPINEPHrine     Infusion 0.06 MICROgram(s)/kG/Min (13.275 mL/Hr) IV Continuous <Continuous>  folic acid 1 milliGRAM(s) Oral daily  insulin lispro (HumaLOG) corrective regimen sliding scale   SubCutaneous every 6 hours  mesalamine Suppository 1000 milliGRAM(s) Rectal at bedtime  micafungin IVPB 100 milliGRAM(s) IV Intermittent every 24 hours  micafungin IVPB      midodrine 10 milliGRAM(s) Oral every 8 hours  pantoprazole  Injectable 40 milliGRAM(s) IV Push every 12 hours  phenylephrine    Infusion 2 MICROgram(s)/kG/Min (21.975 mL/Hr) IV Continuous <Continuous>  propofol Infusion 10 MICROgram(s)/kG/Min (3.54 mL/Hr) IV Continuous <Continuous>  sodium chloride 0.9%. 1000 milliLiter(s) (10 mL/Hr) IV Continuous <Continuous>  vasopressin Infusion 0.017 Unit(s)/Min (1 mL/Hr) IV Continuous <Continuous>    MEDICATIONS  (PRN):  dextrose 40% Gel 15 Gram(s) Oral once PRN Blood Glucose LESS THAN 70 milliGRAM(s)/deciliter  fentaNYL    Injectable 25 MICROGram(s) IV Push every 4 hours PRN Severe Pain (7 - 10)  glucagon  Injectable 1 milliGRAM(s) IntraMuscular once PRN Glucose LESS THAN 70 milligrams/deciliter    Vital Signs Last 24 Hrs  T(C): 37.2 (18 Jul 2018 04:00), Max: 37.2 (18 Jul 2018 00:00)  T(F): 99 (18 Jul 2018 04:00), Max: 99 (18 Jul 2018 04:00)  HR: 90 (18 Jul 2018 09:00) (90 - 95)  BP: --  BP(mean): --  RR: 20 (18 Jul 2018 09:00) (20 - 23)  SpO2: 97% (18 Jul 2018 09:00) (95% - 99%)    07-17 @ 07:01  -  07-18 @ 07:00  --------------------------------------------------------  IN: 3281.5 mL / OUT: 4912 mL / NET: -1630.5 mL    07-18 @ 07:01 - 07-18 @ 09:51  --------------------------------------------------------  IN: 323.7 mL / OUT: 484 mL / NET: -160.3 mL      PHYSICAL EXAM:    General: extremely ill appearing male;  HEENT: no blood noted in oral cavity  Gastrointestinal: Soft, non-tender non-distended; Normal bowel sounds; no appreciable organomegaly;   Extremities: + peripheral edema   Neurological: intubated and sedated  Skin: jaundiced  Rectal: sampson colored stool      LABS:                        10.3   14.0  )-----------( 122      ( 18 Jul 2018 04:53 )             28.8     07-18    137  |  93<L>  |  49<H>  ----------------------------<  192<H>  4.4   |  16<L>  |  0.84    Ca    10.3      18 Jul 2018 04:52  Phos  4.3     07-18  Mg     2.2     07-18    TPro  5.1<L>  /  Alb  3.7  /  TBili  58.7<H>  /  DBili  x   /  AST  402<H>  /  ALT  164<H>  /  AlkPhos  450<H>  07-18    PT/INR - ( 18 Jul 2018 04:53 )   PT: 14.2 sec;   INR: 1.27          PTT - ( 18 Jul 2018 04:53 )  PTT:33.6 sec                  Culture - Blood (collected 17 Jul 2018 12:15)  Source: .Blood Blood-Venous  Preliminary Report (18 Jul 2018 01:02):    No growth at 12 hours    Culture - Blood (collected 17 Jul 2018 12:15)  Source: .Blood Blood-Venous  Preliminary Report (18 Jul 2018 01:02):    No growth at 12 hours    Culture - Fungal, Blood (collected 17 Jul 2018 01:17)  Source: .Blood Blood  Preliminary Report (17 Jul 2018 07:26):    Testing in progress    Culture - Blood (collected 15 Jul 2018 10:28)  Source: .Blood Blood  Gram Stain (16 Jul 2018 08:53):    Aerobic and Anaerobic Bottle: Gram Positive Cocci in Pairs and Chains    Result called to and read back byCompa Pollard RN  07/16/2018 08:53:27    ***Blood Panel PCR results on this specimen are available    approximately 3 hours after the Gram stain result.***    Gram stain, PCR, and/or culture results may not always    correspond due to difference in methodologies.    ************************************************************    This PCR assay was performed using [employer].    The following targets are tested for: Enterococcus,    vancomycin resistant enterococci, Listeria monocytogenes,    coagulase negative staphylococci, S. aureus,    methicillin resistant S. aureus, Streptococcus agalactiae    (Group B), S. pneumoniae, S. pyogenes (Group A),    Acinetobacter baumannii, Enterobacter cloacae, E. coli,    Klebsiella oxytoca, K. pneumoniae, Proteus sp.,    Serratia marcescens, Haemophilus influenzae,    Neisseria meningitidis, Pseudomonas aeruginosa, Candida    albicans, C. glabrata, C krusei, C parapsilosis,    C. tropicalis and the KPC resistance gene.    "Due to technical problems, Proteus sp. will Not be reported as part of    the BCID panel until further notice"  Preliminary Report (17 Jul 2018 09:03):    Growth in aerobic and anaerobic bottles: Enterococcus species    Identification and susceptibility to follow.  Organism: Blood Culture PCR (16 Jul 2018 09:37)  Organism: Blood Culture PCR (16 Jul 2018 09:37)    Culture - Blood (collected 15 Jul 2018 10:28)  Source: .Blood Blood  Gram Stain (16 Jul 2018 08:55):    Aerobic and Anaerobic Bottle: Gram Positive Cocci in Pairs and Chains    Result called to and read back byCompa Pollard RN  07/16/2018 08:53:27  Preliminary Report (17 Jul 2018 09:06):    Growth in aerobic and anaerobic bottles: Enterococcus species    Identification and susceptibility to follow.      RADIOLOGY & ADDITIONAL STUDIES:

## 2018-07-18 NOTE — PROGRESS NOTE ADULT - PROBLEM SELECTOR PLAN 5
- high anion metabolic acidosis  - lactatate 4.2  - mild also metabolic alkalosis   - the ultimate treatment of the lactic metabolic acidosis is the treatment of the cause in this particular case - the shock cardiogenic and septic.

## 2018-07-18 NOTE — ADVANCED PRACTICE NURSE CONSULT - ASSESSMENT
67 year old patient with prolonged complicated hospital course with progressive decline requiring multiple supportive measures - (+)MODS. Pt with multiple lesions/abrasions over trunk, pitting edema, jaundiced. Left ear with DTI measuring approx 1.6x0.3 cm, DTI to sacrum measuring 6x9.5 cm, 20% of wound has begun to open. Pt being turned and repositioned every 2 hours with AirTap wedges for pressure relief.

## 2018-07-18 NOTE — PROGRESS NOTE ADULT - SUBJECTIVE AND OBJECTIVE BOX
Seen in the morning, still intubated, still requiring pressor support, yesterday on 7/17 IHD - SLED  was done for 5 hours, switch to aquaphoresis, 3 liter from aquaphoresis - negative 1.6     The renal service for [facility] due to ATN from the cardiogenic shock, and ongoing sepsis   Patient seen and examined at bedside.     artificial  tears Solution 1 Drop(s) two times a day  aztreonam  IVPB 2000 milliGRAM(s) every 12 hours  chlorhexidine 0.12% Liquid 15 milliLiter(s) every 12 hours  chlorhexidine 2% Cloths 1 Application(s) daily  DAPTOmycin IVPB 350 milliGRAM(s) every 48 hours  dexmedetomidine Infusion 0.5 MICROgram(s)/kG/Hr <Continuous>  dextrose 40% Gel 15 Gram(s) once PRN  dextrose 5%. 1000 milliLiter(s) <Continuous>  dextrose 50% Injectable 50 milliLiter(s) every 15 minutes  dextrose 50% Injectable 25 milliLiter(s) every 15 minutes  EPINEPHrine     Infusion 0.06 MICROgram(s)/kG/Min <Continuous>  fentaNYL    Injectable 25 MICROGram(s) every 4 hours PRN  folic acid 1 milliGRAM(s) daily  glucagon  Injectable 1 milliGRAM(s) once PRN  insulin lispro (HumaLOG) corrective regimen sliding scale   every 6 hours  mesalamine Suppository 1000 milliGRAM(s) at bedtime  micafungin IVPB 100 milliGRAM(s) every 24 hours  micafungin IVPB     midodrine 10 milliGRAM(s) every 8 hours  pantoprazole  Injectable 40 milliGRAM(s) every 12 hours  phenylephrine    Infusion 2 MICROgram(s)/kG/Min <Continuous>  propofol Infusion 10 MICROgram(s)/kG/Min <Continuous>  sodium chloride 0.9%. 1000 milliLiter(s) <Continuous>  vasopressin Infusion 0.017 Unit(s)/Min <Continuous>      Allergies    penicillin (Rash)    Intolerances        T(C): , Max: 37.2 (07-18-18 @ 00:00)  T(F): , Max: 99 (07-18-18 @ 04:00)  HR: 90 (07-18-18 @ 09:00)  BP: --  BP(mean): --  RR: 20 (07-18-18 @ 09:00)  SpO2: 97% (07-18-18 @ 09:00)  Wt(kg): --    07-17 @ 07:01  -  07-18 @ 07:00  --------------------------------------------------------  IN:    Enteral Tube Flush: 200 mL    EPINEPHrine Infusion: 398.4 mL    phenylephrine   Infusion: 454.3 mL    Platelets - Single Donor: 250 mL    propofol Infusion: 130.8 mL    sodium chloride 0.9%.: 240 mL    Solution: 550 mL    vasopressin Infusion: 62 mL    Vital HN: 996 mL  Total IN: 3281.5 mL    OUT:    Chest Tube: 65 mL    Nasoenteral Tube: 450 mL    Other: 3097 mL    Other: 1300 mL  Total OUT: 4912 mL    Total NET: -1630.5 mL      07-18 @ 07:01  -  07-18 @ 09:43  --------------------------------------------------------  IN:    EPINEPHrine Infusion: 44.3 mL    phenylephrine   Infusion: 39.9 mL    propofol Infusion: 19.5 mL    sodium chloride 0.9%.: 30 mL    Solution: 100 mL    vasopressin Infusion: 6 mL    Vital HN: 84 mL  Total IN: 323.7 mL    OUT:    Chest Tube: 10 mL    Nasoenteral Tube: 50 mL    Other: 424 mL  Total OUT: 484 mL    Total NET: -160.3 mL        Physical exam:   Intubated and sedated in the morning   ANASARCA   Jaundice   difficult to evaluate for JVD   Chest: s/p thoracotomy closed chest  multiple drainages from the chest   RRR, normal s1/s2, systolic murmur  Abdomen - swollen, not tender, not distended   Extremities 2+ peripheral edema  Hd cathter - on the left IJ  Desai         LABS:                        10.3   14.0  )-----------( 122      ( 18 Jul 2018 04:53 )             28.8     07-18    137  |  93<L>  |  49<H>  ----------------------------<  192<H>  4.4   |  16<L>  |  0.84    Ca    10.3      18 Jul 2018 04:52  Phos  4.3     07-18  Mg     2.2     07-18    TPro  5.1<L>  /  Alb  3.7  /  TBili  58.7<H>  /  DBili  x   /  AST  402<H>  /  ALT  164<H>  /  AlkPhos  450<H>  07-18      PT/INR - ( 18 Jul 2018 04:53 )   PT: 14.2 sec;   INR: 1.27          PTT - ( 18 Jul 2018 04:53 )  PTT:33.6 sec          RADIOLOGY & ADDITIONAL STUDIES:

## 2018-07-18 NOTE — PROGRESS NOTE ADULT - PROBLEM SELECTOR PLAN 3
- cardiogenic and possible component sepsis   - on aztreonam, on micafungin, on daptomycin  - dose of aztreonam is - loading dose 1-2 gr and than 500 mg every 12 hours  - daptomycin  is 6-8 mg/kg for 48 hours;   - micafungin - no adjustment with HD   - now with VRE from blood culture from 7/15; last blood culture - no growth

## 2018-07-18 NOTE — PROGRESS NOTE ADULT - ASSESSMENT
67 yr old male with a PMHx of congenital AS s/p repair at age 12 and CAD s/p stenting 8 yrs ago who was found to have 2V CAD, severe AS, MR and TR during pre-operative evaluation now s/p MV/TV repair and AVR w/ IABP and impella placement. Hospital course was complicated by cardiogenic shock requiring continued inotropic support, cardiac tamponade x 2, consumptive coaguloapthy, pre-renal azotemia with anasarca requiring CVVH, who presents with lower GI bleed, hyperbilirubinemia    # melena/hematochezia - resolving over past 24 hours, now with sampson colored stool  etiology of GI bleed is multifactorial with the following findings seen during EGD and cscope   - sp EGD 7/13/18 - esophagitis, and gastropathy,  sp repeat EGD 7/15/18 - mild esophagitis, linear gastric ulcer from NGT, and retained gastric content and enteral feeds suggesting gastric dysmotility  - sp flex sig 7/13/18 - which showed significant proctitis, sp colonoscopy 7/15/18 - with large rectal clot requiring manual disimpaction, anorectal oozing of unclear etiology, severe proctitis, old BRB throughout entire examined colon, difficulty intubating TI - but there was suggestion of small clot when TI visualized, no evidence of ischemic bowel seen  -please switch Canasa enemas to Canasa suppositories, as suppositories are less traumatic to mucosa  -c/w IV PPI  - if continues, please notify GI and consider IR to evaluate for embolization    # transaminitis with hyperbilirubinemia- bilirubin stable  - multifactorial in nature: could be 2/2 hemolysis, RBC and platelet consumption 2/2 shearing on impella, drug induced 2/2 ?meropenem, autoimmune hemolysis in the setting of multiple transfusions  - US shows bilary sludge and wall thickening 0.5 cm, no stones, suggesting acalculous cholecystitis. Surgery following. Pt is a poor candidate for surgery, but would consider consulting IR for percutaneous cholecystostomy drain  - need to rule out acute viral etiology: please send EBV and CMV IgM and IgG,   -hepatitis serologies labs negative for acute infection, hep p serologies show evidence of chronic infection, would recommend checking viral load to assess for reactivation  - check AMA for PBC  -Ceruloplasmin to r/o sarah  -iron panel to r/o hemochromatosis  -alpha 1 antitrypsin to r/o alpha 1 antitrypsin deficiency   -mitochondrial ab, smooth muscle antibody. microsomal ab to r/o autoimmune disorder  - please avoid hepatotoxic agents    Case discussed with primary team 67 yr old male with a PMHx of congenital AS s/p repair at age 12 and CAD s/p stenting 8 yrs ago who was found to have 2V CAD, severe AS, MR and TR during pre-operative evaluation now s/p MV/TV repair and AVR w/ IABP and impella placement. Hospital course was complicated by cardiogenic shock requiring continued inotropic support, cardiac tamponade x 2, consumptive coaguloapthy, pre-renal azotemia with anasarca requiring CVVH, who presents with lower GI bleed, hyperbilirubinemia    # melena/hematochezia - resolving over past 24 hours, now with sampson colored stool  etiology of GI bleed is multifactorial with the following findings seen during EGD and cscope   - sp EGD 7/13/18 - esophagitis, and gastropathy,  sp repeat EGD 7/15/18 - mild esophagitis, linear gastric ulcer from NGT, and retained gastric content and enteral feeds suggesting gastric dysmotility  - sp flex sig 7/13/18 - which showed significant proctitis, sp colonoscopy 7/15/18 - with large rectal clot requiring manual disimpaction, anorectal oozing of unclear etiology, severe proctitis, old BRB throughout entire examined colon, difficulty intubating TI - but there was suggestion of small clot when TI visualized, no evidence of ischemic bowel seen  -please switch Canasa enemas to Canasa suppositories, as suppositories are less traumatic to mucosa  -c/w IV PPI  - if continues, please notify GI and consider IR to evaluate for embolization    # transaminitis with hyperbilirubinemia- bilirubin stable  - multifactorial in nature: could be 2/2 hemolysis, RBC and platelet consumption 2/2 shearing on impella, drug induced 2/2 ?meropenem, autoimmune hemolysis in the setting of multiple transfusions  - US shows bilary sludge and wall thickening 0.5 cm, no stones, suggesting acalculous cholecystitis. Surgery following. Pt is a poor candidate for surgery, but would consider consulting IR for percutaneous cholecystostomy drain  - need to rule out acute viral etiology: please send EBV and CMV IgM and IgG, HSV Ig M  -hepatitis serologies labs negative for acute infection, hep p serologies show evidence of chronic infection, would recommend checking viral load to assess for reactivation  - check AMA for PBC  -Ceruloplasmin to r/o sarah  -iron panel to r/o hemochromatosis  -alpha 1 antitrypsin to r/o alpha 1 antitrypsin deficiency   -mitochondrial ab, smooth muscle antibody. microsomal ab to r/o autoimmune disorder  -Liver US w/ doppler to r/o protal vein thrombosis  - please avoid hepatotoxic agents    Case discussed with primary team

## 2018-07-18 NOTE — ADVANCED PRACTICE NURSE CONSULT - RECOMMEDATIONS
WOCN to follow up to complete assessment.
Recommend continuing to protect sacrum with foam dressing, turning and repositioning every 2 hours, small foam dressing over DTI to left ear. Spoke with GENE Anand and FLORIDALMA [place].

## 2018-07-18 NOTE — PROGRESS NOTE ADULT - ASSESSMENT
The patient is 67 year old male with PMH stated above, now s/p  Repair of AA, AVR, MV repair, TV repair, CABG x2, now in shock requiring pressor support - mutiple pressors. The patient with deterioration of the renal function in the setting of the shock - oliguric, no response from the diuretics. was started on CVVHD from 7/2 to 7/14 and stopped. On 7/17 - SLED was done with good tolerance and was switch to aquaphoresis, in the past 24 hours - negative 1.6 liters. The aquaphoresis still going in the morning - 200 ml/h, BP stable 110/70 on pressors. Talked to the intensivist - no need for IHD/SLED today, continue with aquaphoresis, tomorrow on 7/19 - we will re evaluate

## 2018-07-18 NOTE — PROGRESS NOTE ADULT - SUBJECTIVE AND OBJECTIVE BOX
INTERVAL HPI/OVERNIGHT EVENTS:    6/21 - O Reop AVR/ascending aortic replacement/ MV and TV repair; CABG x 2  Impella & IABP placement - IABP out 6/23; impella remains in place  EF 15%    Allergies: PCN - reaction unknown  Abx: Micafungin/Dapromycin/Aztreonam    68yo male with Hx congenital AV dz -  repair/replacement (11yo - [facility]) being evaluated for hernia repair.  Preop risk assessment performed  ECHO/Cath (Completed 06/2018.) EF 25%, severe AS, severe MR, severe TR, severe pHTN, and 2V CAD.   Patient with sxs progressive FLORENCE and dec exercise tolerance for several months    transferred to [facility] for assessment and intervention  To OR 6/21 - 12 UpRBC/8 FFP/4 platelet/FEIBA and Mary 7 given  6/22 - washout and chest closure performed  later in day/evening on 6/22 - inc pressor requirements; inc CVP; drop in UO --> tamponade  chest opened at bedside (Dr Benedict) with improvement - reportedly approx 1L blood noted right chest  repeat wash out performed 6/23 with removal of clot per d/w Dr Benedict - pseudotamponade  IABP and sheath removed - 6/23    serial attempts at closure, but reopened several times with ultimate closure 7/2  impella repositioned 7/3  prolonged complicated post-op course with deterioration  ARF requiring various forms of RRT; GI bleeding - endoscopic procedures performed over several days  last 7/15 - large clots in rectum - colorectal surgery following  fluctuating pressor and inotrope requirements  intercurrent VRE bacteremia - Blood Cx 7/15 - VRE (E faecium)  new lines placed 7/17    no acute events reported overnight  ongoing deterioration  pressor requirement ongoing - UF - 200 for now    ICU Vital Signs Last 24 Hrs  T(C): 37.1 (18 Jul 2018 10:00), Max: 37.2 (18 Jul 2018 00:00)  T(F): 98.8 (18 Jul 2018 10:00), Max: 99 (18 Jul 2018 04:00)  HR: 90 (18 Jul 2018 12:00) (90 - 90) paced  ABP: 104/58 (18 Jul 2018 12:00) (96/50 - 124/68)  ABP(mean): 74 (18 Jul 2018 12:00) (68 - 88)  SpO2: 95% (18 Jul 2018 12:00) (95% - 99%) vent Fi02 60%    Qtts:   Vaso 0.033  propofol  Dio 2   Epi 0.06    I&O's Summary    17 Jul 2018 07:01  -  18 Jul 2018 07:00  --------------------------------------------------------  IN: 3281.5 mL / OUT: 4912 mL / NET: -1630.5 mL    18 Jul 2018 07:01  -  18 Jul 2018 12:34  --------------------------------------------------------  IN: 593.1 mL / OUT: 1084 mL / NET: -490.9 mL    Vent settings: AC 18/520/60/5    Physical Exam    Heart  Lungs  Abd  Ext  Chest  Neuro  Skin    LABS:                        10.3   14.0  )-----------( 122      ( 18 Jul 2018 04:53 )             28.8     07-18    137  |  93<L>  |  49<H>  ----------------------------<  192<H>  4.4   |  16<L>  |  0.84    Ca    10.3      18 Jul 2018 04:52  Phos  4.3     07-18  Mg     2.2     07-18    TPro  5.1<L>  /  Alb  3.7  /  TBili  58.7<H>  /  DBili  x   /  AST  402<H>  /  ALT  164<H>  /  AlkPhos  450<H>  07-18    PT/INR - ( 18 Jul 2018 04:53 )   PT: 14.2 sec;   INR: 1.27     PTT - ( 18 Jul 2018 04:53 )  PTT:33.6 sec    ABG - ( 18 Jul 2018 04:36 ) 7.4/31/127/99    RADIOLOGY & ADDITIONAL STUDIES: reviewed  Blood Cx 7/17 x 2 sets - gram (+)cocci in pairs and chains; 1 set (-)to date  Blood Cx 7/15 - VRE (S) Daptomycin    I have spent/provided stated minutes of critical care time to this patient: 60 min INTERVAL HPI/OVERNIGHT EVENTS:    6/21 - O Reop AVR/ascending aortic replacement/ MV and TV repair; CABG x 2  Impella & IABP placement - IABP out 6/23; impella remains in place  EF 15%    Allergies: PCN - reaction unknown  Abx: Micafungin/Dapromycin/Aztreonam    68yo male with Hx congenital AV dz -  repair/replacement (11yo - [facility]) being evaluated for hernia repair.  Preop risk assessment performed  ECHO/Cath (Completed 06/2018.) EF 25%, severe AS, severe MR, severe TR, severe pHTN, and 2V CAD.   Patient with sxs progressive FLORENCE and dec exercise tolerance for several months    transferred to [facility] for assessment and intervention  To OR 6/21 - 12 UpRBC/8 FFP/4 platelet/FEIBA and Mary 7 given  6/22 - washout and chest closure performed  later in day/evening on 6/22 - inc pressor requirements; inc CVP; drop in UO --> tamponade  chest opened at bedside (Dr Benedict) with improvement - reportedly approx 1L blood noted right chest  repeat wash out performed 6/23 with removal of clot per d/w Dr Benedict - pseudotamponade  IABP and sheath removed - 6/23    serial attempts at closure, but reopened several times with ultimate closure 7/2  impella repositioned 7/3  prolonged complicated post-op course with deterioration  ARF requiring various forms of RRT; GI bleeding - endoscopic procedures performed over several days  last 7/15 - large clots in rectum - colorectal surgery following  fluctuating pressor and inotrope requirements  intercurrent VRE bacteremia - Blood Cx 7/15 - VRE (E faecium)  new lines placed 7/17    no acute events reported overnight  ongoing deterioration  pressor requirement ongoing - UF - 200 for now    ICU Vital Signs Last 24 Hrs  T(C): 37.1 (18 Jul 2018 10:00), Max: 37.2 (18 Jul 2018 00:00)  T(F): 98.8 (18 Jul 2018 10:00), Max: 99 (18 Jul 2018 04:00)  HR: 90 (18 Jul 2018 12:00) (90 - 90) paced  ABP: 104/58 (18 Jul 2018 12:00) (96/50 - 124/68)  ABP(mean): 74 (18 Jul 2018 12:00) (68 - 88)  SpO2: 95% (18 Jul 2018 12:00) (95% - 99%) vent Fi02 60%    Qtts:   Vaso 0.033  propofol  Dio 2   Epi 0.06    I&O's Summary    17 Jul 2018 07:01  -  18 Jul 2018 07:00  --------------------------------------------------------  IN: 3281.5 mL / OUT: 4912 mL / NET: -1630.5 mL    18 Jul 2018 07:01  -  18 Jul 2018 12:34  --------------------------------------------------------  IN: 593.1 mL / OUT: 1084 mL / NET: -490.9 mL    Vent settings: AC 18/520/60/5    Physical Exam    Heart - regular (-)rub/gallop  Lungs - dec BS laterally (-)wheeze  Abd - edema - pitting - areas of skin breakdown and necrosis (-)r/r/g  Ext - (+)extensive edema to abd -   Chest - vac in place - good seal - no palpable crepitus  Neuro - eyes open - pupils reactive  Skin - severe icterus    LABS:                        10.3   14.0  )-----------( 122      ( 18 Jul 2018 04:53 )             28.8     07-18    137  |  93<L>  |  49<H>  ----------------------------<  192<H>  4.4   |  16<L>  |  0.84    Ca    10.3      18 Jul 2018 04:52  Phos  4.3     07-18  Mg     2.2     07-18    TPro  5.1<L>  /  Alb  3.7  /  TBili  58.7<H>  /  DBili  x   /  AST  402<H>  /  ALT  164<H>  /  AlkPhos  450<H>  07-18    PT/INR - ( 18 Jul 2018 04:53 )   PT: 14.2 sec;   INR: 1.27     PTT - ( 18 Jul 2018 04:53 )  PTT:33.6 sec    ABG - ( 18 Jul 2018 04:36 ) 7.4/31/127/99    RADIOLOGY & ADDITIONAL STUDIES: reviewed  Blood Cx 7/17 x 2 sets - gram (+)cocci in pairs and chains; 1 set (-)to date  Blood Cx 7/15 - VRE (S) Daptomycin    Patient with prolonged complicated hospital course with progressive decline requiring multiple supportive measures - (+)MODS    1. CV  pressors as needed to maintain map 60  paced rhyhtm  elevated LA chronically/long term    2. Pulm   serial ABG to optimize oxygenation and ventilation    3. Renal   ARF - on RTT  anasarca - on UF now - plan SLED again in am    4. ID   extensive necrosis chest wall reported per CTS  vac changed 7/17  on culture directed and presumptive antifungal  lines changed 7/17 with cultures drawn at the time - (+) - gram stain c/w known enterococcus    5. GI  recent active GIB - GI following  counts holding and no jennifer melena reported overnight/today  monitor counts    ongoing supportive measures  family meeting to discuss prognosis and minimal chance of any meaningful recovery  likely have reached the point of medical futility   pain management    d/w staff, renal team and CTS    I have spent/provided stated minutes of critical care time to this patient: 60 min

## 2018-07-18 NOTE — PROGRESS NOTE ADULT - PROBLEM SELECTOR PLAN 1
- the oliguric EMILY due to ATN - most likely due to cardiogenic shock, can not exclude also a septic shock, requiring pressors - on vasopressin and phenylephrine, epinephrine, on midodrine   - oliguric in the morning - anuric   -  switch to IHD/SLED on 7/16 - two session with good tolerance; IHD/SLED today - we will re evaluate tomorrow - talked to intensivist   - electrolytes noted - acceptable - potassium 4.4   - the patient with overt fluid overload - anasarca - continue with aquaphoresis - currently 200 ml/h - BP stable;   - renal diet when he is not NPO  - hyperphosphatemia - resolved   - lactate still elevated -4.3   - in and out  - daily weight  - hyperbilirubinemia - also can cause EMILY - pigment nephropathy can not be excluded

## 2018-07-19 LAB
ADAMTS13 ACTIVITY: 69.9 % — SIGNIFICANT CHANGE UP
ALBUMIN SERPL ELPH-MCNC: 2.8 G/DL — LOW (ref 3.3–5)
ALBUMIN SERPL ELPH-MCNC: 3.3 G/DL — SIGNIFICANT CHANGE UP (ref 3.3–5)
ALP SERPL-CCNC: 472 U/L — HIGH (ref 40–120)
ALP SERPL-CCNC: 504 U/L — HIGH (ref 40–120)
ALT FLD-CCNC: 108 U/L — HIGH (ref 10–45)
ALT FLD-CCNC: 129 U/L — HIGH (ref 10–45)
ANION GAP SERPL CALC-SCNC: 27 MMOL/L — HIGH (ref 5–17)
ANION GAP SERPL CALC-SCNC: 30 MMOL/L — HIGH (ref 5–17)
APTT BLD: 33.6 SEC — SIGNIFICANT CHANGE UP (ref 27.5–37.4)
APTT BLD: 37.7 SEC — HIGH (ref 27.5–37.4)
AST SERPL-CCNC: 189 U/L — HIGH (ref 10–40)
AST SERPL-CCNC: 254 U/L — HIGH (ref 10–40)
BILIRUB DIRECT SERPL-MCNC: >10 MG/DL — SIGNIFICANT CHANGE UP (ref 0–0.2)
BILIRUB INDIRECT FLD-MCNC: SIGNIFICANT CHANGE UP MG/DL (ref 0.2–1)
BILIRUB SERPL-MCNC: 53.3 MG/DL — HIGH (ref 0.2–1.2)
BILIRUB SERPL-MCNC: 53.6 MG/DL — HIGH (ref 0.2–1.2)
BUN SERPL-MCNC: 76 MG/DL — HIGH (ref 7–23)
BUN SERPL-MCNC: 99 MG/DL — HIGH (ref 7–23)
CALCIUM SERPL-MCNC: 10.1 MG/DL — SIGNIFICANT CHANGE UP (ref 8.4–10.5)
CALCIUM SERPL-MCNC: 9.8 MG/DL — SIGNIFICANT CHANGE UP (ref 8.4–10.5)
CHLORIDE SERPL-SCNC: 91 MMOL/L — LOW (ref 96–108)
CHLORIDE SERPL-SCNC: 92 MMOL/L — LOW (ref 96–108)
CO2 SERPL-SCNC: 14 MMOL/L — LOW (ref 22–31)
CO2 SERPL-SCNC: 16 MMOL/L — LOW (ref 22–31)
CREAT SERPL-MCNC: 1.26 MG/DL — SIGNIFICANT CHANGE UP (ref 0.5–1.3)
CREAT SERPL-MCNC: 1.61 MG/DL — HIGH (ref 0.5–1.3)
GAS PNL BLDA: SIGNIFICANT CHANGE UP
GAS PNL BLDA: SIGNIFICANT CHANGE UP
GLUCOSE BLDC GLUCOMTR-MCNC: 141 MG/DL — HIGH (ref 70–99)
GLUCOSE BLDC GLUCOMTR-MCNC: 175 MG/DL — HIGH (ref 70–99)
GLUCOSE BLDC GLUCOMTR-MCNC: 63 MG/DL — LOW (ref 70–99)
GLUCOSE SERPL-MCNC: 166 MG/DL — HIGH (ref 70–99)
GLUCOSE SERPL-MCNC: 178 MG/DL — HIGH (ref 70–99)
HCT VFR BLD CALC: 27.8 % — LOW (ref 39–50)
HCT VFR BLD CALC: 29.1 % — LOW (ref 39–50)
HGB BLD-MCNC: 9.4 G/DL — LOW (ref 13–17)
HGB BLD-MCNC: 9.9 G/DL — LOW (ref 13–17)
INR BLD: 1.4 — HIGH (ref 0.88–1.16)
INR BLD: 1.4 — HIGH (ref 0.88–1.16)
LACTATE SERPL-SCNC: 3.6 MMOL/L — HIGH (ref 0.5–2)
LACTATE SERPL-SCNC: 4 MMOL/L — CRITICAL HIGH (ref 0.5–2)
MAGNESIUM SERPL-MCNC: 2.1 MG/DL — SIGNIFICANT CHANGE UP (ref 1.6–2.6)
MCHC RBC-ENTMCNC: 29.4 PG — SIGNIFICANT CHANGE UP (ref 27–34)
MCHC RBC-ENTMCNC: 29.6 PG — SIGNIFICANT CHANGE UP (ref 27–34)
MCHC RBC-ENTMCNC: 33.8 G/DL — SIGNIFICANT CHANGE UP (ref 32–36)
MCHC RBC-ENTMCNC: 34 G/DL — SIGNIFICANT CHANGE UP (ref 32–36)
MCV RBC AUTO: 86.4 FL — SIGNIFICANT CHANGE UP (ref 80–100)
MCV RBC AUTO: 87.4 FL — SIGNIFICANT CHANGE UP (ref 80–100)
PHOSPHATE SERPL-MCNC: 5 MG/DL — HIGH (ref 2.5–4.5)
PLATELET # BLD AUTO: 14 K/UL — CRITICAL LOW (ref 150–400)
PLATELET # BLD AUTO: 55 K/UL — LOW (ref 150–400)
POTASSIUM SERPL-MCNC: 4.4 MMOL/L — SIGNIFICANT CHANGE UP (ref 3.5–5.3)
POTASSIUM SERPL-MCNC: 4.8 MMOL/L — SIGNIFICANT CHANGE UP (ref 3.5–5.3)
POTASSIUM SERPL-SCNC: 4.4 MMOL/L — SIGNIFICANT CHANGE UP (ref 3.5–5.3)
POTASSIUM SERPL-SCNC: 4.8 MMOL/L — SIGNIFICANT CHANGE UP (ref 3.5–5.3)
PROT SERPL-MCNC: 4.8 G/DL — LOW (ref 6–8.3)
PROT SERPL-MCNC: 4.8 G/DL — LOW (ref 6–8.3)
PROTHROM AB SERPL-ACNC: 15.6 SEC — HIGH (ref 9.8–12.7)
PROTHROM AB SERPL-ACNC: 15.7 SEC — HIGH (ref 9.8–12.7)
RBC # BLD: 3.18 M/UL — LOW (ref 4.2–5.8)
RBC # BLD: 3.37 M/UL — LOW (ref 4.2–5.8)
RBC # FLD: 21.2 % — HIGH (ref 10.3–16.9)
RBC # FLD: 21.4 % — HIGH (ref 10.3–16.9)
SODIUM SERPL-SCNC: 135 MMOL/L — SIGNIFICANT CHANGE UP (ref 135–145)
SODIUM SERPL-SCNC: 135 MMOL/L — SIGNIFICANT CHANGE UP (ref 135–145)
WBC # BLD: 12.2 K/UL — HIGH (ref 3.8–10.5)
WBC # BLD: 12.5 K/UL — HIGH (ref 3.8–10.5)
WBC # FLD AUTO: 12.2 K/UL — HIGH (ref 3.8–10.5)
WBC # FLD AUTO: 12.5 K/UL — HIGH (ref 3.8–10.5)

## 2018-07-19 PROCEDURE — 99291 CRITICAL CARE FIRST HOUR: CPT

## 2018-07-19 PROCEDURE — 71045 X-RAY EXAM CHEST 1 VIEW: CPT | Mod: 26

## 2018-07-19 PROCEDURE — 99292 CRITICAL CARE ADDL 30 MIN: CPT

## 2018-07-19 PROCEDURE — 90945 DIALYSIS ONE EVALUATION: CPT

## 2018-07-19 PROCEDURE — 99233 SBSQ HOSP IP/OBS HIGH 50: CPT

## 2018-07-19 RX ORDER — SODIUM BICARBONATE 1 MEQ/ML
50 SYRINGE (ML) INTRAVENOUS ONCE
Qty: 0 | Refills: 0 | Status: COMPLETED | OUTPATIENT
Start: 2018-07-19 | End: 2018-07-19

## 2018-07-19 RX ORDER — FENTANYL CITRATE 50 UG/ML
100 INJECTION INTRAVENOUS ONCE
Qty: 0 | Refills: 0 | Status: DISCONTINUED | OUTPATIENT
Start: 2018-07-19 | End: 2018-07-19

## 2018-07-19 RX ADMIN — PHENYLEPHRINE HYDROCHLORIDE 21.98 MICROGRAM(S)/KG/MIN: 10 INJECTION INTRAVENOUS at 05:32

## 2018-07-19 RX ADMIN — MICAFUNGIN SODIUM 105 MILLIGRAM(S): 100 INJECTION, POWDER, LYOPHILIZED, FOR SOLUTION INTRAVENOUS at 09:59

## 2018-07-19 RX ADMIN — MIDODRINE HYDROCHLORIDE 10 MILLIGRAM(S): 2.5 TABLET ORAL at 14:58

## 2018-07-19 RX ADMIN — MIDODRINE HYDROCHLORIDE 10 MILLIGRAM(S): 2.5 TABLET ORAL at 21:45

## 2018-07-19 RX ADMIN — PANTOPRAZOLE SODIUM 40 MILLIGRAM(S): 20 TABLET, DELAYED RELEASE ORAL at 05:33

## 2018-07-19 RX ADMIN — Medication 1 DROP(S): at 18:11

## 2018-07-19 RX ADMIN — Medication 50 MILLIEQUIVALENT(S): at 21:45

## 2018-07-19 RX ADMIN — Medication 2: at 07:05

## 2018-07-19 RX ADMIN — PANTOPRAZOLE SODIUM 40 MILLIGRAM(S): 20 TABLET, DELAYED RELEASE ORAL at 18:11

## 2018-07-19 RX ADMIN — VASOPRESSIN 1 UNIT(S)/MIN: 20 INJECTION INTRAVENOUS at 05:33

## 2018-07-19 RX ADMIN — Medication 1 MILLIGRAM(S): at 12:58

## 2018-07-19 RX ADMIN — CHLORHEXIDINE GLUCONATE 1 APPLICATION(S): 213 SOLUTION TOPICAL at 14:59

## 2018-07-19 RX ADMIN — MIDODRINE HYDROCHLORIDE 10 MILLIGRAM(S): 2.5 TABLET ORAL at 05:33

## 2018-07-19 RX ADMIN — FENTANYL CITRATE 100 MICROGRAM(S): 50 INJECTION INTRAVENOUS at 10:30

## 2018-07-19 RX ADMIN — Medication 100 MILLIGRAM(S): at 05:32

## 2018-07-19 RX ADMIN — EPINEPHRINE 13.28 MICROGRAM(S)/KG/MIN: 0.3 INJECTION INTRAMUSCULAR; SUBCUTANEOUS at 05:33

## 2018-07-19 RX ADMIN — FENTANYL CITRATE 100 MICROGRAM(S): 50 INJECTION INTRAVENOUS at 14:59

## 2018-07-19 RX ADMIN — CHLORHEXIDINE GLUCONATE 15 MILLILITER(S): 213 SOLUTION TOPICAL at 19:44

## 2018-07-19 RX ADMIN — Medication 1 DROP(S): at 05:33

## 2018-07-19 RX ADMIN — Medication 100 MILLIGRAM(S): at 16:10

## 2018-07-19 RX ADMIN — Medication 1000 MILLIGRAM(S): at 21:46

## 2018-07-19 RX ADMIN — CHLORHEXIDINE GLUCONATE 15 MILLILITER(S): 213 SOLUTION TOPICAL at 05:33

## 2018-07-19 NOTE — PROGRESS NOTE ADULT - PROBLEM SELECTOR PLAN 3
- cardiogenic and possible component sepsis   - on aztreonam, on micafungin, on daptomycin  - dose of aztreonam is - loading dose 1-2 gr and than 500 mg every 12 hours  - daptomycin  is 6-8 mg/kg for 48 hours;   - micafungin - no adjustment with HD   - now with VRE from blood culture from 7/15; last blood culture - positive

## 2018-07-19 NOTE — PROGRESS NOTE ADULT - ASSESSMENT
67 yr old male with a PMHx of congenital AS s/p repair at age 12 and CAD s/p stenting 8 yrs ago who was found to have 2V CAD, severe AS, MR and TR during pre-operative evaluation now s/p MV/TV repair and AVR w/ IABP and impella placement. Hospital course was complicated by cardiogenic shock requiring continued inotropic support, cardiac tamponade x 2, consumptive coaguloapthy, pre-renal azotemia with anasarca requiring CVVH, who presents with lower GI bleed and hyperbilirubinemia    # GI bleed:  melena/hematochezia now resolved, hemoglobin stable. Etiology of GI bleed is multifactorial with the following findings seen during EGD and cscope   - sp EGD 7/13/18 - esophagitis, and gastropathy,  sp repeat EGD 7/15/18 - mild esophagitis, linear gastric ulcer from NGT, and retained gastric content and enteral feeds suggesting gastric dysmotility  - sp flex sig 7/13/18 - which showed significant proctitis, sp colonoscopy 7/15/18 - with large rectal clot requiring manual disimpaction, anorectal oozing of unclear etiology, severe proctitis, old BRB throughout entire examined colon, difficulty intubating TI - but there was suggestion of small clot when TI visualized, no evidence of ischemic bowel seen  -C/w Canasa suppositories  -c/w IV PPI  - if continues, please notify GI and consider IR to evaluate for embolization    # transaminitis with hyperbilirubinemia- bilirubin and liver enzymes downtrending today  - multifactorial in nature: could be 2/2 hemolysis, RBC and platelet consumption 2/2 shearing on impella, drug induced, autoimmune hemolysis in the setting of multiple transfusions  - US shows bilary sludge and wall thickening 0.5 cm, no stones, suggesting acalculous cholecystitis. Surgery following. 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 microsomal ab to r/o autoimmune disorder  -Liver US w/ doppler to r/o protal vein thrombosis  - please avoid hepatotoxic agents    Case discussed with primary team - they will put in above orders

## 2018-07-19 NOTE — CHART NOTE - NSCHARTNOTEFT_GEN_A_CORE
Admitting Diagnosis:   Patient is a 67y old  Male who presents with a chief complaint of AS (19 Jun 2018 00:06)      PAST MEDICAL & SURGICAL HISTORY:  No pertinent past medical history  No significant past surgical history      Current Nutrition Order:  NPO    GI Issues:   Unable to assess 2/2 vent     Pain:  Unable to assess 2/2 vent     Skin Integrity:  R femoral access  L leg EVH  MSI     Labs:   06-26    123<L>  |  88<L>  |  14  ----------------------------<  120<H>  3.5   |  22  |  0.75    Ca    9.2      26 Jun 2018 13:04  Phos  4.7     06-26  Mg     2.0     06-26    TPro  3.2<L>  /  Alb  2.1<L>  /  TBili  9.4<H>  /  DBili  x   /  AST  42<H>  /  ALT  14  /  AlkPhos  45  06-26    CAPILLARY BLOOD GLUCOSE      POCT Blood Glucose.: 125 mg/dL (26 Jun 2018 15:59)  POCT Blood Glucose.: 127 mg/dL (26 Jun 2018 15:16)  POCT Blood Glucose.: 135 mg/dL (26 Jun 2018 13:59)  POCT Blood Glucose.: 106 mg/dL (26 Jun 2018 06:00)  POCT Blood Glucose.: 93 mg/dL (26 Jun 2018 04:56)  POCT Blood Glucose.: 102 mg/dL (26 Jun 2018 03:50)  POCT Blood Glucose.: 61 mg/dL (26 Jun 2018 03:05)  POCT Blood Glucose.: 70 mg/dL (26 Jun 2018 02:15)  POCT Blood Glucose.: 71 mg/dL (26 Jun 2018 01:18)  POCT Blood Glucose.: 92 mg/dL (26 Jun 2018 00:15)  POCT Blood Glucose.: 97 mg/dL (25 Jun 2018 23:22)  POCT Blood Glucose.: 109 mg/dL (25 Jun 2018 22:15)  POCT Blood Glucose.: 114 mg/dL (25 Jun 2018 21:06)  POCT Blood Glucose.: 126 mg/dL (25 Jun 2018 19:58)  POCT Blood Glucose.: 157 mg/dL (25 Jun 2018 19:05)  POCT Blood Glucose.: 168 mg/dL (25 Jun 2018 17:58)  POCT Blood Glucose.: 137 mg/dL (25 Jun 2018 16:59)      Medications:  MEDICATIONS  (STANDING):  albumin human  5% IVPB 250 milliLiter(s) IV Intermittent once  albumin human  5% IVPB 250 milliLiter(s) IV Intermittent once  artificial  tears Solution 1 Drop(s) Both EYES two times a day  aztreonam  IVPB 1000 milliGRAM(s) IV Intermittent every 8 hours  chlorhexidine 0.12% Liquid 5 milliLiter(s) Swish and Spit every 4 hours  cisatracurium Infusion 3 MICROgram(s)/kG/Min (10.548 mL/Hr) IV Continuous <Continuous>  dexmedetomidine Infusion 0.3 MICROgram(s)/kG/Hr (4.395 mL/Hr) IV Continuous <Continuous>  dextrose 50% Injectable 50 milliLiter(s) IV Push every 15 minutes  dextrose 50% Injectable 25 milliLiter(s) IV Push every 15 minutes  dextrose 50% Injectable 25 milliLiter(s) IV Push once  DOBUTamine Infusion 5 MICROgram(s)/kG/Min (8.79 mL/Hr) IV Continuous <Continuous>  EPINEPHrine     Infusion 0.03 MICROgram(s)/kG/Min (6.593 mL/Hr) IV Continuous <Continuous>  Epinephrine 8 milliGRAM(s),D5W 250 milliLiter(s) 8 milliGRAM(s) (6.75 mL/Hr) IV Continuous <Continuous>  epoprostenol Infusion 2 NANOGram(s)/kG/Min (1.406 mL/Hr) IV Continuous <Continuous>  fentaNYL    Injectable 100 MICROGram(s) IV Push once  fentaNYL   Infusion. 0.5 MICROgram(s)/kG/Hr (2.93 mL/Hr) IV Continuous <Continuous>  fluconAZOLE IVPB 400 milliGRAM(s) IV Intermittent every 24 hours  furosemide Infusion 5 mG/Hr (2.5 mL/Hr) IV Continuous <Continuous>  Heparin 87668 Unit(s),Dextrose 5% 1000 milliLiter(s) 48514 Unit(s) (25 mL/Hr) IV Continuous <Continuous>  insulin Infusion 5 Unit(s)/Hr (5 mL/Hr) IV Continuous <Continuous>  metroNIDAZOLE  IVPB 500 milliGRAM(s) IV Intermittent every 8 hours  milrinone Infusion 0.5 MICROgram(s)/kG/Min (8.79 mL/Hr) IV Continuous <Continuous>  nitroglycerin  Infusion 5 MICROgram(s)/Min (1.5 mL/Hr) IV Continuous <Continuous>  norepinephrine Infusion 0.05 MICROgram(s)/kG/Min (5.494 mL/Hr) IV Continuous <Continuous>  pantoprazole  Injectable 40 milliGRAM(s) IV Push daily  phenylephrine    Infusion 0.5 MICROgram(s)/kG/Min (5.494 mL/Hr) IV Continuous <Continuous>  potassium chloride  20 mEq/100 mL IVPB 20 milliEquivalent(s) IV Intermittent once  propofol Infusion 5 MICROgram(s)/kG/Min (1.758 mL/Hr) IV Continuous <Continuous>  propofol Injectable 50 milliGRAM(s) IV Push once  sodium chloride 0.9%. 1000 milliLiter(s) (10 mL/Hr) IV Continuous <Continuous>  vasopressin Infusion 0.03 Unit(s)/Min (1.8 mL/Hr) IV Continuous <Continuous>    MEDICATIONS  (PRN):  potassium chloride  20 mEq/100 mL IVPB 20 milliEquivalent(s) IV Intermittent every 1 hour PRN K<4.0      Weight:  56.3kg (6/21)  58.6kg (6/24)    Weight Change:   Continue to trend wts     Estimated energy needs:   IBW 61.8kg used for EER and adjusted for vent/post-op. Fluids per team 2/2 CHF.   25-30kcal/kg (1545-1854kcal), 1.4-1.6g/kg (87-99g)    Subjective:   68y/o M s/p AVR, MV Repair, TV Repair, Ascending aortic replacement, CABGx2 (6/21); s/p chest closure (6/22); pt RTOR today and off floor at time of assessment. Consulted for EN recommendations. With continued intubation post-op, recommend to start Jevity 1.5 via NGT with goal rate of 42ml/hr x 24hr + 2x prostat; see order below. Monitor for s/s of intolerance with EN and maintain aspiration precautions. Will follow.     Previous Nutrition Diagnosis:  Inadequate energy intake RT NPO status AEB pt meeting 0% of EER    Active [X]  Resolved [   ]    If resolved, new PES:     Goal:  Restart EN within 24hrs as medically feasible     Recommendations:  1. Jevity 1.5 with goal rate of 42ml/hr x 24hr + 2x prostat (1008ml TV, 1712kcal, 94g, 766ml water). Additional fluid per MD. Start at 30ml and advance as tolerated by 10ml q4h until goal. Monitor for s/s of intolerance and maintain aspiration precautions.   2. Daily wts and fluids per team.   3. Monitor lytes and replete prn.     Education:   N/A    Risk Level: High [X] Moderate [   ] Low [   ]
Admitting Diagnosis:   Patient is a 67y old  Male who presents with a chief complaint of AS (19 Jun 2018 00:06)      PAST MEDICAL & SURGICAL HISTORY:  No pertinent past medical history  No significant past surgical history      Current Nutrition Order:  Nepro at 37ml/hr x 24hr + 2x prostat; infusing at 30ml    GI Issues:   Unable to assess 2/2 vent     Pain:  Unable to assess 2/2 vent     Skin Integrity:  R femoral access  L leg EVH  MSI-open chest       Labs:   07-05    137  |  97  |  29<H>  ----------------------------<  168<H>  3.3<L>   |  21<L>  |  0.72    Ca    9.7      05 Jul 2018 10:16  Phos  2.0     07-04  Mg     1.9     07-05    TPro  4.8<L>  /  Alb  3.5  /  TBili  27.7<H>  /  DBili  >10.0<H>  /  AST  83<H>  /  ALT  33  /  AlkPhos  91  07-05    CAPILLARY BLOOD GLUCOSE      POCT Blood Glucose.: 138 mg/dL (05 Jul 2018 10:12)  POCT Blood Glucose.: 172 mg/dL (05 Jul 2018 05:21)  POCT Blood Glucose.: 174 mg/dL (05 Jul 2018 03:57)  POCT Blood Glucose.: 173 mg/dL (05 Jul 2018 00:17)  POCT Blood Glucose.: 179 mg/dL (04 Jul 2018 22:01)      Medications:  MEDICATIONS  (STANDING):  artificial  tears Solution 1 Drop(s) Both EYES two times a day  aztreonam  IVPB 1000 milliGRAM(s) IV Intermittent every 8 hours  chlorhexidine 0.12% Liquid 15 milliLiter(s) Swish and Spit every 12 hours  cisatracurium Infusion 3 MICROgram(s)/kG/Min (10.548 mL/Hr) IV Continuous <Continuous>  dextrose 5%. 1000 milliLiter(s) (50 mL/Hr) IV Continuous <Continuous>  dextrose 50% Injectable 50 milliLiter(s) IV Push every 15 minutes  dextrose 50% Injectable 25 milliLiter(s) IV Push every 15 minutes  epinephrine 8 milliGRAM(s),dextrose 5% in water 250 milliLiter(s) 8 milliGRAM(s) (10.42 mL/Hr) IV Continuous <Continuous>  fentaNYL   Infusion. 0.5 MICROgram(s)/kG/Hr (2.93 mL/Hr) IV Continuous <Continuous>  fluconAZOLE IVPB 400 milliGRAM(s) IV Intermittent every 24 hours  heparin  Infusion 200 Unit(s)/Hr (2 mL/Hr) IV Continuous <Continuous>  Heparin 78350 Unit(s),Dextrose 5% 1000 milliLiter(s) 64179 Unit(s) (25 mL/Hr) IV Continuous <Continuous>  hydrocortisone sodium succinate Injectable 50 milliGRAM(s) IV Push every 8 hours  insulin lispro (HumaLOG) corrective regimen sliding scale   SubCutaneous every 6 hours  metroNIDAZOLE  IVPB 500 milliGRAM(s) IV Intermittent every 8 hours  milrinone Infusion 0.25 MICROgram(s)/kG/Min (4.395 mL/Hr) IV Continuous <Continuous>  pantoprazole  Injectable 40 milliGRAM(s) IV Push daily  phenylephrine    Infusion 2 MICROgram(s)/kG/Min (21.975 mL/Hr) IV Continuous <Continuous>  potassium chloride  20 mEq/100 mL IVPB 20 milliEquivalent(s) IV Intermittent once  propofol Infusion 5 MICROgram(s)/kG/Min (1.758 mL/Hr) IV Continuous <Continuous>  PureFlow Dialysate RFP-400 (K 2 / Ca 3) 5000 milliLiter(s) (1500 mL/Hr) CRRT <Continuous>  sodium chloride 0.9%. 1000 milliLiter(s) (10 mL/Hr) IV Continuous <Continuous>  vancomycin  IVPB 750 milliGRAM(s) IV Intermittent every 24 hours    MEDICATIONS  (PRN):  dextrose 40% Gel 15 Gram(s) Oral once PRN Blood Glucose LESS THAN 70 milliGRAM(s)/deciliter  glucagon  Injectable 1 milliGRAM(s) IntraMuscular once PRN Glucose LESS THAN 70 milligrams/deciliter      Weight:  56.3kg (6/21)  58.6kg (6/24)  59kg (7/4)    Weight Change:   Please trend daily wts 2/2 CVVHD      Estimated energy needs:   IBW 61.8kg used for EER and adjusted for vent/post-op/CVVHD. Fluids per team 2/2 CHF.   25-30kcal/kg (1545-1854kcal), 1.5-1.8g/kg (92-111g)    Subjective:   Pt s/p complex surgery on (6/21) with CABG x2, AVR, MV/TV repair, Impella insertion, IABP  -- open chest; Chest closed (6/22); Chest re-explored for tamponade (6/23); chest closure (6/26); chest re-explored by bedside for pseudo-tamponade (6/27); chest washout (6/30). S/p Epicardial RV Lead placement, Chest Closure (7/2). Pt remains intubated and sedated on CVVHD. Nimbex now off, propofol infusing at 10.6ml/hr; provides 279kcal from lipids. NGT in place with EN infusing at 30ml; goal rate 37ml. Discussed with RN to advance to goal and to be sure to provide prostat 2x/day. RN reports TF are tolerated, but pt without BM. RN reports that Xray was performed and it was normal. Discussed monitoring for abd D and s/s of TF intolerance; can consider promotility prn. Lactate noted to be trending upward. Please keep nutrition aligned with GOC. Will follow.     Previous Nutrition Diagnosis:  Inadequate energy intake RT NPO status AEB pt meeting 0% of EER    Active [   ]  Resolved [X]    If resolved, new PES:   Inadequate EN RT infusion rate AEB pt not meeting 100% of EER     Goal:  Pt to meet 100% of EER via EN within GOC     Recommendations:  1. Keep nutrition aligned with GOC.  2. Advance TF to Nepro with goal rate of 37ml/hr x 24hr + 2x prostat (888ml TV, 1798kcal, 102g, 645ml water). Additional fluid per MD. Monitor for s/s of intolerance and maintain aspiration precautions.   3. Daily wts and fluids per team.   4. Monitor lytes and replete prn.     *d/w RN*    Education:   Unable 2/2 vent     Risk Level: High [X] Moderate [   ] Low [   ].
Admitting Diagnosis:   Patient is a 67y old  Male who presents with a chief complaint of AS (19 Jun 2018 00:06)      PAST MEDICAL & SURGICAL HISTORY:  No pertinent past medical history  No significant past surgical history      Current Nutrition Order:  Nepro via NGT at 10ml/hr; trickle feeds started, but no again stopped for the OR     Current Nutrition Order:  Unable to assess 2/2 vent     Pain:  Unable to assess 2/2 vent     Skin Integrity:  R femoral access  L leg EVH  MSI-open chest     Labs:   07-02    132<L>  |  96  |  47<H>  ----------------------------<  122<H>  4.5   |  18<L>  |  1.61<H>    Ca    8.9      02 Jul 2018 03:27  Phos  2.9     07-02  Mg     2.0     07-02    TPro  4.7<L>  /  Alb  3.4  /  TBili  20.1<H>  /  DBili  x   /  AST  79<H>  /  ALT  28  /  AlkPhos  64  07-02    CAPILLARY BLOOD GLUCOSE      POCT Blood Glucose.: 84 mg/dL (02 Jul 2018 07:02)  POCT Blood Glucose.: 101 mg/dL (02 Jul 2018 05:10)  POCT Blood Glucose.: 142 mg/dL (02 Jul 2018 02:52)  POCT Blood Glucose.: 112 mg/dL (02 Jul 2018 01:53)  POCT Blood Glucose.: 107 mg/dL (02 Jul 2018 01:08)  POCT Blood Glucose.: 94 mg/dL (02 Jul 2018 00:23)  POCT Blood Glucose.: 72 mg/dL (01 Jul 2018 23:10)  POCT Blood Glucose.: 89 mg/dL (01 Jul 2018 22:28)  POCT Blood Glucose.: 85 mg/dL (01 Jul 2018 21:25)  POCT Blood Glucose.: 98 mg/dL (01 Jul 2018 19:31)  POCT Blood Glucose.: 105 mg/dL (01 Jul 2018 17:15)  POCT Blood Glucose.: 123 mg/dL (01 Jul 2018 14:47)  POCT Blood Glucose.: 114 mg/dL (01 Jul 2018 13:47)  POCT Blood Glucose.: 115 mg/dL (01 Jul 2018 11:47)      Medications:  MEDICATIONS  (STANDING):  albumin human 25% IVPB 50 milliLiter(s) IV Intermittent every 6 hours  artificial  tears Solution 1 Drop(s) Both EYES two times a day  aztreonam  IVPB 1000 milliGRAM(s) IV Intermittent every 8 hours  chlorhexidine 0.12% Liquid 15 milliLiter(s) Swish and Spit every 12 hours  cisatracurium Infusion 3 MICROgram(s)/kG/Min (10.548 mL/Hr) IV Continuous <Continuous>  dextrose 50% Injectable 50 milliLiter(s) IV Push every 15 minutes  dextrose 50% Injectable 25 milliLiter(s) IV Push every 15 minutes  epinephrine 8 milliGRAM(s),dextrose 5% in water 250 milliLiter(s) 8 milliGRAM(s) (10.42 mL/Hr) IV Continuous <Continuous>  fentaNYL   Infusion. 0.5 MICROgram(s)/kG/Hr (2.93 mL/Hr) IV Continuous <Continuous>  fluconAZOLE IVPB 400 milliGRAM(s) IV Intermittent every 24 hours  Heparin 75135 Unit(s),Dextrose 5% 1000 milliLiter(s) 23702 Unit(s) (25 mL/Hr) IV Continuous <Continuous>  hydrocortisone sodium succinate Injectable 50 milliGRAM(s) IV Push every 8 hours  insulin Infusion 5 Unit(s)/Hr (5 mL/Hr) IV Continuous <Continuous>  metroNIDAZOLE  IVPB 500 milliGRAM(s) IV Intermittent every 8 hours  milrinone Infusion 0.25 MICROgram(s)/kG/Min (4.395 mL/Hr) IV Continuous <Continuous>  pantoprazole  Injectable 40 milliGRAM(s) IV Push daily  phenylephrine    Infusion 2 MICROgram(s)/kG/Min (21.975 mL/Hr) IV Continuous <Continuous>  propofol Infusion 5 MICROgram(s)/kG/Min (1.758 mL/Hr) IV Continuous <Continuous>  PureFlow Dialysate RFP-400 (K 2 / Ca 3) 5000 milliLiter(s) (1500 mL/Hr) CRRT <Continuous>  sodium chloride 0.9%. 1000 milliLiter(s) (10 mL/Hr) IV Continuous <Continuous>  vancomycin  IVPB 1000 milliGRAM(s) IV Intermittent every 24 hours    MEDICATIONS  (PRN):      Weight:  56.3kg (6/21)  58.6kg (6/24)    Weight Change:   Continue to trend wts     Estimated energy needs:   IBW 61.8kg used for EER and adjusted for vent/post-op/CVVHD. Fluids per team 2/2 CHF.   25-30kcal/kg (1545-1854kcal), 1.5-1.8g/kg (92-111g)    Subjective:   Pt s/p complex surgery on (6/21) with CABG x2, AVR, MV/TV repair, Impella insertion, IABP  -- open chest; Chest closed (6/22); Chest re-explored for tamponade (6/23); chest closure (6/26); chest re-explored by bedside for pseudo-tamponade (6/27); chest washout (6/30). Pt off the floor again today for the OR for chest closure. Pt remains intubated, sedated, and paralyzed. CVVHD started per progress note. NGT replaced and pt was started on trickle feeds over the weekend; now again held 2/2 OR. Please restart TF post-op with hemodynamic stability/as medically appropriate. Keep nutrition aligned with GOC. Will follow.     Previous Nutrition Diagnosis:  Inadequate energy intake RT NPO status AEB pt meeting 0% of EER    Active [X- EN off for the OR]  Resolved [   ]    If resolved, new PES:     Goal:  Restart EN within 24hrs as medically feasible     Recommendations:  1. Keep nutrition aligned with GOC.  2. When appropriate, restart trickle feeds of Nepro via NGT at 10-20ml/hr. Advance rate as tolerated/medically appropriate by 10ml q4-6hr until goal rate of 37ml/hr x 24hr + 2x prostat (888ml TV, 1798kcal, 102g, 645ml water). Additional fluid per MD. Monitor for s/s of intolerance and maintain aspiration precautions.   3. Daily wts and fluids per team.   4. Monitor lytes and replete prn.     Education:   N/A- pt in the OR    Risk Level: High [X] Moderate [   ] Low [   ].
Admitting Diagnosis:   Patient is a 67y old  Male who presents with a chief complaint of AS (19 Jun 2018 00:06)      PAST MEDICAL & SURGICAL HISTORY:  No pertinent past medical history  No significant past surgical history      Current Nutrition Order:  Vital 1.5 via NGT at 40ml/hr x 24hr; infusing at ordered goal rate     GI Issues:   Unable to assess 2/2 vent     Pain:  Unable to assess 2/2 vent     Skin Integrity:  L leg EVH  MSI with VAC    Labs:   07-09    139  |  98  |  17  ----------------------------<  180<H>  3.6   |  23  |  0.35<L>    Ca    9.7      09 Jul 2018 01:17  Phos  2.2     07-08  Mg     2.0     07-09    TPro  4.8<L>  /  Alb  3.8  /  TBili  40.7<H>  /  DBili  >10.0<H>  /  AST  134<H>  /  ALT  60<H>  /  AlkPhos  145<H>  07-09    CAPILLARY BLOOD GLUCOSE      POCT Blood Glucose.: 170 mg/dL (09 Jul 2018 05:45)  POCT Blood Glucose.: 155 mg/dL (09 Jul 2018 00:06)  POCT Blood Glucose.: 165 mg/dL (08 Jul 2018 18:17)  POCT Blood Glucose.: 140 mg/dL (08 Jul 2018 12:47)      Medications:  MEDICATIONS  (STANDING):  artificial  tears Solution 1 Drop(s) Both EYES two times a day  chlorhexidine 0.12% Liquid 15 milliLiter(s) Swish and Spit every 12 hours  cisatracurium Infusion 3 MICROgram(s)/kG/Min (10.548 mL/Hr) IV Continuous <Continuous>  dexmedetomidine Infusion 0.5 MICROgram(s)/kG/Hr (7.375 mL/Hr) IV Continuous <Continuous>  dextrose 5%. 1000 milliLiter(s) (25 mL/Hr) IV Continuous <Continuous>  dextrose 50% Injectable 50 milliLiter(s) IV Push every 15 minutes  dextrose 50% Injectable 25 milliLiter(s) IV Push every 15 minutes  epinephrine 8 milliGRAM(s),dextrose 5% in water 250 milliLiter(s) 8 milliGRAM(s) (10.42 mL/Hr) IV Continuous <Continuous>  heparin  Infusion 200 Unit(s)/Hr (3 mL/Hr) IV Continuous <Continuous>  Heparin 05270 Unit(s),Dextrose 5% 1000 milliLiter(s) 58466 Unit(s) (25 mL/Hr) IV Continuous <Continuous>  hydrocortisone sodium succinate Injectable 50 milliGRAM(s) IV Push every 8 hours  insulin lispro (HumaLOG) corrective regimen sliding scale   SubCutaneous every 6 hours  meropenem  IVPB 1000 milliGRAM(s) IV Intermittent every 8 hours  metoclopramide Injectable 10 milliGRAM(s) IV Push every 8 hours  micafungin IVPB 100 milliGRAM(s) IV Intermittent every 24 hours  milrinone Infusion 0.25 MICROgram(s)/kG/Min (4.395 mL/Hr) IV Continuous <Continuous>  pantoprazole  Injectable 40 milliGRAM(s) IV Push daily  phenylephrine    Infusion 2 MICROgram(s)/kG/Min (21.975 mL/Hr) IV Continuous <Continuous>  propofol Infusion 5 MICROgram(s)/kG/Min (1.758 mL/Hr) IV Continuous <Continuous>  PureFlow Dialysate RFP-400 (K 2 / Ca 3) 5000 milliLiter(s) (1500 mL/Hr) CRRT <Continuous>  sodium bicarbonate  Infusion 0.254 mEq/kG/Hr (100 mL/Hr) IV Continuous <Continuous>  sodium chloride 0.9%. 1000 milliLiter(s) (10 mL/Hr) IV Continuous <Continuous>  vancomycin  IVPB 750 milliGRAM(s) IV Intermittent every 24 hours  vasopressin Infusion 0.017 Unit(s)/Min (1 mL/Hr) IV Continuous <Continuous>    MEDICATIONS  (PRN):  dextrose 40% Gel 15 Gram(s) Oral once PRN Blood Glucose LESS THAN 70 milliGRAM(s)/deciliter  glucagon  Injectable 1 milliGRAM(s) IntraMuscular once PRN Glucose LESS THAN 70 milligrams/deciliter  midazolam Injectable 2 milliGRAM(s) IV Push every 2 hours PRN agitation      Weight:  56.3kg (6/21)  58.6kg (6/24)  59kg (7/4)    Weight Change:   Please trend daily wts 2/2 CVVHD/clinical state      Estimated energy needs:   IBW 61.8kg used for EER and adjusted for vent/post-op/CVVHD. Fluids per team 2/2 CHF.   25-30kcal/kg (1545-1854kcal), 1.5-1.8g/kg (92-111g)    Subjective:   Pt s/p complex surgery on (6/21) with CABG x2, AVR, MV/TV repair, Impella insertion, IABP  -- open chest; Chest closed (6/22); Chest re-explored for tamponade (6/23); chest closure (6/26); chest re-explored by bedside for pseudo-tamponade (6/27); chest washout (6/30). S/p Epicardial RV Lead placement, Chest Closure (7/2). Pt remains intubated and sedated on CVVHD. Propofol weaned and pt now on precedex. TF infusing at ordered goal rate. TF currently not meeting estimated protein needs, recommend rate advancement and adding prostat BID; please see order below and entered for verification. TF regimen d/w PA/RN. Per RN, pt with 40-50ml residuals overnight and reported some TF formula in pt's mouth. TF were held for 1hr per PA and now restarted. If pt shows s/s of aspiration/TF regurgitation please hold feeds. + large BM this am. Keep nutrition aligned with GOC. Will continue to follow.    Previous Nutrition Diagnosis:  Inadequate EN RT infusion rate AEB pt not meeting 100% of EER     Active [X]  Resolved [  ]    If resolved, new PES:     Goal:  Pt to meet 100% of EER via EN within GOC     Recommendations:  1. Keep nutrition aligned with GOC.  2. Advance TF to Vital 1.5 with goal rate of 42ml/hr x 24hr + 2x prostat (1008ml TV, 1712kcal, 98g, 770ml water). Additional fluid per MD. Monitor for s/s of intolerance and maintain aspiration precautions.   3. Daily wts and fluids per team.   4. Monitor lytes and replete prn.     *d/w RN/PA; new TF regimen entered for verification and PA notified*    Education:   Unable 2/2 vent     Risk Level: High [X] Moderate [   ] Low [   ].
Admitting Diagnosis:   Patient is a 67y old  Male who presents with a chief complaint of AS (19 Jun 2018 00:06)      PAST MEDICAL & SURGICAL HISTORY:  No pertinent past medical history  No significant past surgical history      Current Nutrition Order:  Vital 1.5 via NGT at 42ml/hr x 24hr + 2x prostat; held since this morning 2/2 CT scan today per RN     GI Issues:   GIB; no more blood at present per RN  Unable to assess 2/2 vent     Pain:  Unable to assess 2/2 vent     Skin Integrity:  L leg EVH  MSI with VAC  R upper chest ASW  L clavicle PPM pocket   L upper thigh skin tear  Sacrum stage 2 PU    Labs:   07-16    138  |  97  |  57<H>  ----------------------------<  171<H>  4.7   |  16<L>  |  0.95    Ca    9.8      16 Jul 2018 02:56  Phos  4.4     07-16  Mg     2.0     07-16    TPro  5.0<L>  /  Alb  3.7  /  TBili  56.5<H>  /  DBili  x   /  AST  193<H>  /  ALT  99<H>  /  AlkPhos  273<H>  07-16    CAPILLARY BLOOD GLUCOSE      POCT Blood Glucose.: 150 mg/dL (16 Jul 2018 07:02)  POCT Blood Glucose.: 169 mg/dL (16 Jul 2018 00:07)  POCT Blood Glucose.: 125 mg/dL (15 Jul 2018 19:10)      Medications:  MEDICATIONS  (STANDING):  artificial  tears Solution 1 Drop(s) Both EYES two times a day  aztreonam  IVPB 2000 milliGRAM(s) IV Intermittent every 12 hours  chlorhexidine 0.12% Liquid 15 milliLiter(s) Swish and Spit every 12 hours  chlorhexidine 2% Cloths 1 Application(s) Topical daily  DAPTOmycin IVPB 350 milliGRAM(s) IV Intermittent every 48 hours  desmopressin IVPB 30 MICROGram(s) IV Intermittent once  dexmedetomidine Infusion 0.5 MICROgram(s)/kG/Hr (7.375 mL/Hr) IV Continuous <Continuous>  dextrose 5%. 1000 milliLiter(s) (25 mL/Hr) IV Continuous <Continuous>  dextrose 50% Injectable 50 milliLiter(s) IV Push every 15 minutes  dextrose 50% Injectable 25 milliLiter(s) IV Push every 15 minutes  EPINEPHrine     Infusion 0.06 MICROgram(s)/kG/Min (13.275 mL/Hr) IV Continuous <Continuous>  folic acid 1 milliGRAM(s) Oral daily  gentamicin   IVPB 250 milliGRAM(s) IV Intermittent once  hydrocortisone sodium succinate Injectable 50 milliGRAM(s) IV Push daily  insulin lispro (HumaLOG) corrective regimen sliding scale   SubCutaneous every 6 hours  lactulose Syrup 20 Gram(s) Oral every 6 hours  mesalamine Suppository 1000 milliGRAM(s) Rectal at bedtime  micafungin IVPB 100 milliGRAM(s) IV Intermittent once  micafungin IVPB      midodrine 10 milliGRAM(s) Oral every 8 hours  pantoprazole  Injectable 40 milliGRAM(s) IV Push every 12 hours  phenylephrine    Infusion 2 MICROgram(s)/kG/Min (21.975 mL/Hr) IV Continuous <Continuous>  propofol Infusion 10 MICROgram(s)/kG/Min (3.54 mL/Hr) IV Continuous <Continuous>  PureFlow Dialysate RFP-400 (K 2 / Ca 3) 5000 milliLiter(s) (1500 mL/Hr) CRRT <Continuous>  sodium chloride 0.9%. 1000 milliLiter(s) (10 mL/Hr) IV Continuous <Continuous>  ursodiol Suspension 300 milliGRAM(s) Oral every 12 hours  vasopressin Infusion 0.017 Unit(s)/Min (1 mL/Hr) IV Continuous <Continuous>    MEDICATIONS  (PRN):  dextrose 40% Gel 15 Gram(s) Oral once PRN Blood Glucose LESS THAN 70 milliGRAM(s)/deciliter  fentaNYL    Injectable 25 MICROGram(s) IV Push every 4 hours PRN Severe Pain (7 - 10)  glucagon  Injectable 1 milliGRAM(s) IntraMuscular once PRN Glucose LESS THAN 70 milligrams/deciliter      Weight:  56.3kg (6/21)  58.6kg (6/24)  59kg (7/4)    Weight Change:   Please trend daily wts; d/w RN    Estimated energy needs:   IBW 61.8kg used for EER and adjusted for vent/post-op/CVVHD. Fluids per team 2/2 CHF.   25-30kcal/kg (1545-1854kcal), 1.5-1.8g/kg (92-111g)    Subjective:   Pt s/p complex surgery on (6/21) with CABG x2, AVR, MV/TV repair, Impella insertion, IABP  -- open chest; Chest closed (6/22); Chest re-explored for tamponade (6/23); chest closure (6/26); chest re-explored by bedside for pseudo-tamponade (6/27); chest washout (6/30). S/p Epicardial RV Lead placement, Chest Closure (7/2). Impella removed (7/11). Pt now with GIB; no more blood at present per RN. RN reports that pt was receiving feeds up until this morning and now stopped for CT SCAN. TF were tolerated prior to being held. +BM. Pt remains intubated and is on aquaphoresis. Restart feeds once medically feasible and keep nutrition aligned with GOC. Will continue to follow.    Previous Nutrition Diagnosis:  Increased nutrient needs RT increased protein demand AEB multiple sx/healing/CVVHD    Active [X]  Resolved [   ]    If resolved, new PES:     Goal:  Pt to meet 100% of EER via EN within GOC     Recommendations:  1. Keep nutrition aligned with GOC.  2. Once appropriate, restart Vital 1.5 with goal rate of 42ml/hr x 24hr + 2x prostat (1008ml TV, 1712kcal, 98g, 770ml water). Additional fluid per MD. Monitor for s/s of intolerance and maintain aspiration precautions.   3. Daily wts and fluids per team.   4. Monitor lytes and replete prn.     *d/w RN*    Education:   Unable 2/2 vent     Risk Level: High [X] Moderate [   ] Low [   ].
Admitting Diagnosis:   Patient is a 67y old  Male who presents with a chief complaint of AS (19 Jun 2018 00:06)      PAST MEDICAL & SURGICAL HISTORY:  No pertinent past medical history  No significant past surgical history      Current Nutrition Order:  Vital 1.5 via NGT at 42ml/hr x 24hr + 2x prostat; running at goal    GI Issues:   Unable to assess 2/2 vent   RN denies current issues  +BMs    Pain:  Unable to assess 2/2 vent     Skin Integrity:  L leg EVH  MSI with VAC  R upper chest ASW  L clavicle PPM pocket   L upper thigh skin tear  Sacrum stage 2 PU      Labs:   07-19    135  |  92<L>  |  76<H>  ----------------------------<  178<H>  4.4   |  16<L>  |  1.26    Ca    10.1      19 Jul 2018 03:13  Phos  5.0     07-19  Mg     2.1     07-19    TPro  4.8<L>  /  Alb  3.3  /  TBili  53.6<H>  /  DBili  >10.0<H>  /  AST  254<H>  /  ALT  129<H>  /  AlkPhos  472<H>  07-19    CAPILLARY BLOOD GLUCOSE      POCT Blood Glucose.: 175 mg/dL (19 Jul 2018 07:00)  POCT Blood Glucose.: 169 mg/dL (18 Jul 2018 23:14)  POCT Blood Glucose.: 165 mg/dL (18 Jul 2018 17:19)  POCT Blood Glucose.: 161 mg/dL (18 Jul 2018 12:00)      Medications:  MEDICATIONS  (STANDING):  artificial  tears Solution 1 Drop(s) Both EYES two times a day  aztreonam  IVPB 2000 milliGRAM(s) IV Intermittent every 12 hours  chlorhexidine 0.12% Liquid 15 milliLiter(s) Swish and Spit every 12 hours  chlorhexidine 2% Cloths 1 Application(s) Topical daily  DAPTOmycin IVPB 350 milliGRAM(s) IV Intermittent every 48 hours  dexmedetomidine Infusion 0.5 MICROgram(s)/kG/Hr (7.375 mL/Hr) IV Continuous <Continuous>  dextrose 5%. 1000 milliLiter(s) (25 mL/Hr) IV Continuous <Continuous>  dextrose 50% Injectable 50 milliLiter(s) IV Push every 15 minutes  dextrose 50% Injectable 25 milliLiter(s) IV Push every 15 minutes  EPINEPHrine     Infusion 0.06 MICROgram(s)/kG/Min (13.275 mL/Hr) IV Continuous <Continuous>  fentaNYL    Injectable 100 MICROGram(s) IV Push once  folic acid 1 milliGRAM(s) Oral daily  insulin lispro (HumaLOG) corrective regimen sliding scale   SubCutaneous every 6 hours  mesalamine Suppository 1000 milliGRAM(s) Rectal at bedtime  micafungin IVPB 100 milliGRAM(s) IV Intermittent every 24 hours  micafungin IVPB      midodrine 10 milliGRAM(s) Oral every 8 hours  pantoprazole  Injectable 40 milliGRAM(s) IV Push every 12 hours  phenylephrine    Infusion 2 MICROgram(s)/kG/Min (21.975 mL/Hr) IV Continuous <Continuous>  propofol Infusion 10 MICROgram(s)/kG/Min (3.54 mL/Hr) IV Continuous <Continuous>  sodium chloride 0.9%. 1000 milliLiter(s) (10 mL/Hr) IV Continuous <Continuous>  vasopressin Infusion 0.017 Unit(s)/Min (1 mL/Hr) IV Continuous <Continuous>    MEDICATIONS  (PRN):  dextrose 40% Gel 15 Gram(s) Oral once PRN Blood Glucose LESS THAN 70 milliGRAM(s)/deciliter  fentaNYL    Injectable 25 MICROGram(s) IV Push every 4 hours PRN Severe Pain (7 - 10)  glucagon  Injectable 1 milliGRAM(s) IntraMuscular once PRN Glucose LESS THAN 70 milligrams/deciliter      Weight:  56.3kg (6/21)  58.6kg (6/24)  59kg (7/4)  68.8kg (7/17)    Weight Change:   wt changes noted, likely 2/2 fluid shifts  unsure of BW lost at present (feeds have been inconsistent 2/2 being turned on/off)    Estimated energy needs:   IBW 61.8kg used for EER and adjusted for vent/post-op/PU. Fluids per team 2/2 CHF.   25-30kcal/kg (1545-1854kcal), 1.5-1.8g/kg (92-111g)    Subjective:   Pt s/p complex surgery on (6/21) with CABG x2, AVR, MV/TV repair, Impella insertion, IABP  -- open chest; Chest closed (6/22); Chest re-explored for tamponade (6/23); chest closure (6/26); chest re-explored by bedside for pseudo-tamponade (6/27); chest washout (6/30). S/p Epicardial RV Lead placement, Chest Closure (7/2). Impella removed (7/11). Pt remains intubated and sedated. Propofol at 5.3ml, provides 140kcal from lipids. TF tolerated per RN. +BMs. Continues on aquaphoresis. Please keep nutrition aligned with GOC. Will continue to follow.    Previous Nutrition Diagnosis:  Increased nutrient needs RT increased protein demand AEB multiple sx/healing/CVVHD    Active [X]  Resolved [   ]    If resolved, new PES:     Goal:  Pt to meet 100% of EER via EN within GOC     Recommendations:  1. Keep nutrition aligned with GOC.  2. Continue Vital 1.5 with goal rate of 42ml/hr x 24hr + 2x prostat (1008ml TV, 1712kcal, 98g, 770ml water). Additional fluid per MD. Monitor for s/s of intolerance and maintain aspiration precautions.   3. Daily wts and fluids per team.   4. Monitor lytes and replete prn.     Education:   Unable 2/2 vent     Risk Level: High [X] Moderate [   ] Low [   ].
Admitting Diagnosis:   Patient is a 67y old  Male who presents with a chief complaint of AS (19 Jun 2018 00:06)      PAST MEDICAL & SURGICAL HISTORY:  No pertinent past medical history  No significant past surgical history      Current Nutrition Order:  Vital 1.5 via NGT at 42ml/hr x 24hr; infusing at ordered goal rate     GI Issues:   Unable to assess 2/2 vent     Pain:  Unable to assess 2/2 vent     Skin Integrity:  L leg EVH  MSI with VAC      Labs:   07-11    139  |  96  |  20  ----------------------------<  164<H>  3.7   |  23  |  0.41<L>    Ca    9.7      11 Jul 2018 13:35  Phos  2.0     07-11  Mg     1.9     07-11    TPro  5.0<L>  /  Alb  3.9  /  TBili  50.3<H>  /  DBili  >10.0<H>  /  AST  196<H>  /  ALT  84<H>  /  AlkPhos  215<H>  07-11    CAPILLARY BLOOD GLUCOSE      POCT Blood Glucose.: 241 mg/dL (11 Jul 2018 05:23)  POCT Blood Glucose.: 196 mg/dL (11 Jul 2018 00:04)  POCT Blood Glucose.: 161 mg/dL (10 Jul 2018 18:08)      Medications:  MEDICATIONS  (STANDING):  artificial  tears Solution 1 Drop(s) Both EYES two times a day  calcium gluconate IVPB 2 Gram(s) IV Intermittent once  chlorhexidine 0.12% Liquid 15 milliLiter(s) Swish and Spit every 12 hours  cisatracurium Infusion 3 MICROgram(s)/kG/Min (10.548 mL/Hr) IV Continuous <Continuous>  dexmedetomidine Infusion 0.5 MICROgram(s)/kG/Hr (7.375 mL/Hr) IV Continuous <Continuous>  dextrose 5%. 1000 milliLiter(s) (25 mL/Hr) IV Continuous <Continuous>  dextrose 50% Injectable 50 milliLiter(s) IV Push every 15 minutes  dextrose 50% Injectable 25 milliLiter(s) IV Push every 15 minutes  DOBUTamine Infusion 5 MICROgram(s)/kG/Min (8.85 mL/Hr) IV Continuous <Continuous>  heparin  Infusion 400 Unit(s)/Hr (4 mL/Hr) IV Continuous <Continuous>  Heparin 19754 Unit(s),Dextrose 5% 1000 milliLiter(s) 02205 Unit(s) (25 mL/Hr) IV Continuous <Continuous>  hydrocortisone sodium succinate Injectable 50 milliGRAM(s) IV Push every 8 hours  insulin lispro (HumaLOG) corrective regimen sliding scale   SubCutaneous every 6 hours  meropenem  IVPB 1000 milliGRAM(s) IV Intermittent every 8 hours  milrinone Infusion 0.25 MICROgram(s)/kG/Min (4.395 mL/Hr) IV Continuous <Continuous>  pantoprazole  Injectable 40 milliGRAM(s) IV Push daily  phenylephrine    Infusion 2 MICROgram(s)/kG/Min (21.975 mL/Hr) IV Continuous <Continuous>  PureFlow Dialysate RFP-400 (K 2 / Ca 3) 5000 milliLiter(s) (1500 mL/Hr) CRRT <Continuous>  sodium bicarbonate  Infusion 0.127 mEq/kG/Hr (50 mL/Hr) IV Continuous <Continuous>  sodium chloride 0.9%. 1000 milliLiter(s) (10 mL/Hr) IV Continuous <Continuous>  vancomycin  IVPB 750 milliGRAM(s) IV Intermittent every 24 hours  vasopressin Infusion 0.017 Unit(s)/Min (1 mL/Hr) IV Continuous <Continuous>    MEDICATIONS  (PRN):  dextrose 40% Gel 15 Gram(s) Oral once PRN Blood Glucose LESS THAN 70 milliGRAM(s)/deciliter  glucagon  Injectable 1 milliGRAM(s) IntraMuscular once PRN Glucose LESS THAN 70 milligrams/deciliter  midazolam Injectable 2 milliGRAM(s) IV Push every 2 hours PRN agitation      Weight:  56.3kg (6/21)  58.6kg (6/24)  59kg (7/4)    Weight Change:   Please trend daily wts 2/2 CVVHD/clinical state      Estimated energy needs:   IBW 61.8kg used for EER and adjusted for vent/post-op/CVVHD. Fluids per team 2/2 CHF.   25-30kcal/kg (1545-1854kcal), 1.5-1.8g/kg (92-111g)    Subjective:   Pt s/p complex surgery on (6/21) with CABG x2, AVR, MV/TV repair, Impella insertion, IABP  -- open chest; Chest closed (6/22); Chest re-explored for tamponade (6/23); chest closure (6/26); chest re-explored by bedside for pseudo-tamponade (6/27); chest washout (6/30). S/p Epicardial RV Lead placement, Chest Closure (7/2). Pt remains intubated and sedated on CVVHD. TF infusing at ordered goal rate. TF tolerated per RN and encouraged adding prostat BID. +BM per RN. Keep nutrition aligned with GOC. Will continue to follow.    Previous Nutrition Diagnosis:  Inadequate EN RT infusion rate AEB pt not meeting 100% of EER     Active [   ]  Resolved [X]    If resolved, new PES:   Increased nutrient needs RT increased protein demand AEB multiple sx/healing/CVVHD    Goal:  Pt to meet 100% of EER via EN within GOC     Recommendations:  1. Keep nutrition aligned with GOC.  2. Continue Vital 1.5 with goal rate of 42ml/hr x 24hr + 2x prostat (1008ml TV, 1712kcal, 98g, 770ml water). Additional fluid per MD. Monitor for s/s of intolerance and maintain aspiration precautions.   3. Daily wts and fluids per team.   4. Monitor lytes and replete prn.     Education:   Unable 2/2 vent     Risk Level: High [X] Moderate [   ] Low [   ].
[employer] pacemaker, A-sensed, BiV-paced, rate increased from 70 to 80 bpm per primary team's request.     Janki Castro  Cardiology Fellow
IABP in place on 1:3 this AM.  Placed on standby, pt remained HD stable.  Per Dr. Benedict, IABP and sheath were removed at bedside, direct digital pressure held for 60mins with no hematoma noted during or after the procedure.  Pt tolerated the procedure well.  Occlusive dressing applied.
INDICATION : Acute desaturation    PROCEDURE:  Emergent therapeutic Flexible bronchoscopy with BAL     FINDINGS : Thick old/dark blood plugging bilateral lung lower lobes. LLL friable mucosa.  No active bleeding.   Sample sent for culture    COMPLICATIONS : none    Post procedure CXR reviewed no pneumothorax.   Improved aeration of right lung
INDICATION : Acute desaturation    PROCEDURE:  Emergent therapeutic Flexible bronchoscopy with BAL     FINDINGS : Thick old/dark blood plugging bilateral lung lower lobes. LLL friable mucosa.  No active bleeding.   Sample sent for culture    COMPLICATIONS : none    Post procedure CXR reviewed no pneumothorax.   Improved aeration of right lung.
Infectious Diseases Anti-infective Approval Note    Medication: Meropenem  Dose: 1gm   Route: IV  Frequency: q8  Duration: 48 hours    Dose may be adjusted as needed for alterations in renal function.    *THIS IS NOT AN INFECTIOUS DISEASES CONSULTATION*
Infectious Diseases Anti-infective Approval Note    Medication: aztreonam  Dose:1 gm   Route: IV  Frequency:Q12  Duration: 7 days     Dose may be adjusted as needed for alterations in renal function.    *THIS IS NOT AN INFECTIOUS DISEASES CONSULTATION*
Infectious Diseases Anti-infective Approval Note    Medication: micafungin  Dose: 100  Route: iv  Frequency: q24  Duration: 48hrs    Dose may be adjusted as needed for alterations in renal function.    *THIS IS NOT AN INFECTIOUS DISEASES CONSULTATION*
MSI open down to bone.  Prior vac removed. No active bleeding  No evidence of infection.   No swelling/no erythema/no purulence.  Size approximately 20cm L x 5cm W x 2cm D.  Wound and wound edges cleansed.  New Wound Vac applied without incident.
Sternal Wound VAC change  Patient's wound VAC taken down.  Wound 18cm x 3cm.  Muscle edges appear necrotic.  Sternum exposed.    With sterile barrier, sternal wound VAC placed.  Connected to 125cm suction.  No air leak.  Patient tolerated procedure well.    No blood loss.  No complications.
Wound vac evaluated at bedside.  Dressing removed revealing necrotic underlying tissue with minimal viable tissue.  Wound excisionally debrided minimally at inferior margin of the wound with viable tissue underneath.  A vac dressing re-applied in sterile fashion after superficial area cleansed with chlorhexidine prep.  [id number] dressing sponge applied with seal achieved after connection to vac.      R anterior chest wall incision secured with staples.  Site cleansed with chlorhexidine prep and new dressing applied, secured with tegaderm.
Admitting Diagnosis:   Patient is a 67y old  Male who presents with a chief complaint of AS (19 Jun 2018 00:06)      PAST MEDICAL & SURGICAL HISTORY:  No pertinent past medical history  No significant past surgical history    Current Nutrition Order:  Jevity 1.2 via NGT at 40ml/hr x 24hr; not infusing    Current Nutrition Order:  Unable to assess 2/2 vent     Pain:  Unable to assess 2/2 vent     Skin Integrity:  R femoral access  L leg EVH  MSI       Labs:   06-29    124<L>  |  87<L>  |  49<H>  ----------------------------<  129<H>  4.8   |  20<L>  |  1.83<H>    Ca    9.3      29 Jun 2018 10:16  Phos  4.9     06-28  Mg     2.4     06-29    TPro  4.2<L>  /  Alb  2.6<L>  /  TBili  19.4<H>  /  DBili  >10.0<H>  /  AST  148<H>  /  ALT  <5<L>  /  AlkPhos  42  06-29    CAPILLARY BLOOD GLUCOSE      POCT Blood Glucose.: 116 mg/dL (29 Jun 2018 11:47)  POCT Blood Glucose.: 49 mg/dL (29 Jun 2018 11:44)  POCT Blood Glucose.: 137 mg/dL (29 Jun 2018 09:50)  POCT Blood Glucose.: 147 mg/dL (29 Jun 2018 07:43)  POCT Blood Glucose.: 139 mg/dL (29 Jun 2018 06:58)  POCT Blood Glucose.: 153 mg/dL (29 Jun 2018 06:03)  POCT Blood Glucose.: 170 mg/dL (29 Jun 2018 05:23)  POCT Blood Glucose.: 161 mg/dL (29 Jun 2018 04:09)  POCT Blood Glucose.: 181 mg/dL (29 Jun 2018 02:31)  POCT Blood Glucose.: 131 mg/dL (28 Jun 2018 22:43)  POCT Blood Glucose.: 93 mg/dL (28 Jun 2018 18:25)  POCT Blood Glucose.: 83 mg/dL (28 Jun 2018 16:27)  POCT Blood Glucose.: 143 mg/dL (28 Jun 2018 13:59)  POCT Blood Glucose.: 54 mg/dL (28 Jun 2018 13:39)  POCT Blood Glucose.: 56 mg/dL (28 Jun 2018 13:37)      Medications:  MEDICATIONS  (STANDING):  artificial  tears Solution 1 Drop(s) Both EYES two times a day  aztreonam  IVPB 1000 milliGRAM(s) IV Intermittent every 8 hours  chlorhexidine 0.12% Liquid 5 milliLiter(s) Swish and Spit every 4 hours  cisatracurium Infusion 3 MICROgram(s)/kG/Min (10.548 mL/Hr) IV Continuous <Continuous>  dextrose 50% Injectable 50 milliLiter(s) IV Push every 15 minutes  dextrose 50% Injectable 25 milliLiter(s) IV Push every 15 minutes  DOBUTamine Infusion 5 MICROgram(s)/kG/Min (8.79 mL/Hr) IV Continuous <Continuous>  epinephrine 8 milliGRAM(s),dextrose 5% in water 250 milliLiter(s) 8 milliGRAM(s) (10.42 mL/Hr) IV Continuous <Continuous>  fentaNYL   Infusion. 0.5 MICROgram(s)/kG/Hr (2.93 mL/Hr) IV Continuous <Continuous>  fluconAZOLE IVPB 400 milliGRAM(s) IV Intermittent every 24 hours  gentamicin   IVPB 80 milliGRAM(s) IV Intermittent once  Heparin 08313 Unit(s),Dextrose 5% 1000 milliLiter(s) 17440 Unit(s) (25 mL/Hr) IV Continuous <Continuous>  hydrocortisone sodium succinate Injectable 100 milliGRAM(s) IV Push every 8 hours  insulin Infusion 5 Unit(s)/Hr (5 mL/Hr) IV Continuous <Continuous>  metroNIDAZOLE  IVPB 500 milliGRAM(s) IV Intermittent every 8 hours  milrinone Infusion 0.5 MICROgram(s)/kG/Min (8.79 mL/Hr) IV Continuous <Continuous>  pantoprazole  Injectable 40 milliGRAM(s) IV Push daily  phenylephrine    Infusion 0.5 MICROgram(s)/kG/Min (5.494 mL/Hr) IV Continuous <Continuous>  propofol Infusion 5 MICROgram(s)/kG/Min (1.758 mL/Hr) IV Continuous <Continuous>  PureFlow Dialysate RFP-400 (K 2 / Ca 3) 5000 milliLiter(s) (1500 mL/Hr) CRRT <Continuous>  sodium chloride 0.9%. 1000 milliLiter(s) (10 mL/Hr) IV Continuous <Continuous>  vancomycin  IVPB 1000 milliGRAM(s) IV Intermittent every 24 hours  vasopressin Infusion 0.03 Unit(s)/Min (1.8 mL/Hr) IV Continuous <Continuous>    MEDICATIONS  (PRN):      Weight:  56.3kg (6/21)  58.6kg (6/24)    Weight Change:   Continue to trend wts     Estimated energy needs:   IBW 61.8kg used for EER and adjusted for vent/post-op. Fluids per team 2/2 CHF.   25-30kcal/kg (1545-1854kcal), 1.4-1.6g/kg (87-99g)    Subjective:   68y/o M s/p AVR, MV Repair, TV Repair, Ascending aortic replacement, CABGx2 (6/21); s/p chest closure (6/22). Pt remains intubated and sedated on pressor support and on aquaphoresis; CVVHD was not started. Propofol infusing at 8.4ml/hr; provides 221kcal from lipids. NGT in place with feeds ordered, but not infusing. Per d/w team, pt with poor prognosis and awaiting final GOC. Per MD note, family wishes to keep pt comfortable and do not want chest compressions or shock. Per RN, no plan to feed at present which is appropriate. Pending GOC, can re-initiate TF with hemodynamic stability/as medically appropriate for aggressive measures. Please keep nutrition aligned with GOC. Will follow.     Previous Nutrition Diagnosis:  Inadequate energy intake RT NPO status AEB pt meeting 0% of EER    Active [X]  Resolved [   ]    If resolved, new PES:     Goal:  Restart EN within 24hrs as medically feasible     Recommendations:  1. Keep nutrition aligned with GOC.  2. For aggressive measure; glucerna 1.5 with goal rate of 48ml/hr x 24hr (1152ml TV, 1728kcal, 95g, 874ml water). Additional fluid per MD. Start at 30ml and advance as tolerated by 10ml q4h until goal. Monitor for s/s of intolerance and maintain aspiration precautions.   3. Daily wts and fluids per team.   4. Monitor lytes and replete prn.     Education:   N/A    Risk Level: High [X] Moderate [   ] Low [   ].

## 2018-07-19 NOTE — PROGRESS NOTE ADULT - PROBLEM SELECTOR PLAN 1
- the oliguric EMILY due to ATN - most likely due to cardiogenic shock, can not exclude also a septic shock, requiring pressors - on vasopressin and phenylephrine, epinephrine, on midodrine - still BP around 96/70  - oliguric in the morning - anuric   -  switch to IHD/SLED on 7/16 - two session with good tolerance; IHD/SLED - no need today - talked to the intensivist, no need for clearance - electrolytes acceptable  - the primary team will readdress goals of care   - electrolytes noted - acceptable - potassium 4.4   - the patient with overt fluid overload  - better today - anasarca - continue with aquaphoresis - currently 150 ml/h - BP stable;   - renal diet when he is not NPO  - hyperphosphatemia - resolved - acceptable - 5.0   - lactate still elevated - around 4    - in and out  - daily weight  - hyperbilirubinemia - also can cause EMILY - pigment nephropathy can not be excluded

## 2018-07-19 NOTE — PROGRESS NOTE ADULT - SUBJECTIVE AND OBJECTIVE BOX
CTICU  CRITICAL  CARE  attending     Hand off received 					   Pertinent clinical, laboratory, radiographic, hemodynamic, echocardiographic, respiratory data, microbiologic data and chart were reviewed and analyzed frequently throughout the course of the day and night  Patient seen and examined with CTS/ SH attending at bedside  Pt is a 67y , Male, HEALTH ISSUES - PROBLEM Dx:  Metabolic acidosis: Metabolic acidosis  Hyperkalemia: Hyperkalemia  Jaundice: Jaundice  Thrombocytopenia: Thrombocytopenia  Penile swelling: Penile swelling  Shock: Shock  Anemia: Anemia  Hyponatremia: Hyponatremia  Acute kidney failure: Acute kidney failure  CAD (coronary artery disease): CAD (coronary artery disease)  Valvular disease: Valvular disease      , FAMILY HISTORY:  PAST MEDICAL & SURGICAL HISTORY:  No pertinent past medical history  No significant past surgical history    Patient is a 67y old  Male who presents with a chief complaint of AS (19 Jun 2018 00:06)      14 system review was unremarkable  acute changes include acute respiratory failure  Vital signs, hemodynamic and respiratory parameters were reviewed from the bedside nursing flowsheet.  ICU Vital Signs Last 24 Hrs  T(C): 36.5 (19 Jul 2018 05:00), Max: 37.1 (18 Jul 2018 20:43)  T(F): 97.7 (19 Jul 2018 05:00), Max: 98.7 (18 Jul 2018 20:43)  HR: 90 (19 Jul 2018 16:16) (90 - 90)  BP: --  BP(mean): --  ABP: 92/44 (19 Jul 2018 15:00) (86/42 - 120/60)  ABP(mean): 60 (19 Jul 2018 15:00) (56 - 82)  RR: 28 (19 Jul 2018 15:00) (18 - 28)  SpO2: 96% (19 Jul 2018 16:16) (95% - 100%)    Adult Advanced Hemodynamics Last 24 Hrs  CVP(mm Hg): 13 (19 Jul 2018 15:00) (12 - 26)  CVP(cm H2O): --  CO: --  CI: --  PA: --  PA(mean): --  PCWP: --  SVR: --  SVRI: --  PVR: --  PVRI: --, ABG - ( 19 Jul 2018 03:04 )  pH, Arterial: 7.32  pH, Blood: x     /  pCO2: 37    /  pO2: 117   / HCO3: 18    / Base Excess: -6.9  /  SaO2: 99                Mode: AC/ CMV (Assist Control/ Continuous Mandatory Ventilation)  RR (machine): 18  TV (machine): 520  FiO2: 60  PEEP: 5  ITime: 1.7  MAP: 18  PIP: 26    Intake and output was reviewed and the fluid balance was calculated  Daily     Daily   I&O's Summary    18 Jul 2018 07:01  -  19 Jul 2018 07:00  --------------------------------------------------------  IN: 2607.3 mL / OUT: 4367 mL / NET: -1759.7 mL    19 Jul 2018 07:01  -  19 Jul 2018 18:02  --------------------------------------------------------  IN: 390.5 mL / OUT: 750 mL / NET: -359.5 mL        All lines and drain sites were assessed  [facility]      POCT Blood Glucose.: 63 mg/dL (19 Jul 2018 14:48)    No acute change in mental status  Auscultation of the chest reveals equal bs  Abdomen is soft  Extremities are warm and well perfused  Wounds appear clean and unremarkable  Antibiotics are periop    labs  CBC Full  -  ( 19 Jul 2018 03:13 )  WBC Count : 12.5 K/uL  Hemoglobin : 9.9 g/dL  Hematocrit : 29.1 %  Platelet Count - Automated : 55 K/uL  Mean Cell Volume : 86.4 fL  Mean Cell Hemoglobin : 29.4 pg  Mean Cell Hemoglobin Concentration : 34.0 g/dL  Auto Neutrophil # : x  Auto Lymphocyte # : x  Auto Monocyte # : x  Auto Eosinophil # : x  Auto Basophil # : x  Auto Neutrophil % : x  Auto Lymphocyte % : x  Auto Monocyte % : x  Auto Eosinophil % : x  Auto Basophil % : x    07-19    135  |  92<L>  |  76<H>  ----------------------------<  178<H>  4.4   |  16<L>  |  1.26    Ca    10.1      19 Jul 2018 03:13  Phos  5.0     07-19  Mg     2.1     07-19    TPro  4.8<L>  /  Alb  3.3  /  TBili  53.6<H>  /  DBili  >10.0<H>  /  AST  254<H>  /  ALT  129<H>  /  AlkPhos  472<H>  07-19    PT/INR - ( 19 Jul 2018 03:13 )   PT: 15.6 sec;   INR: 1.40          PTT - ( 19 Jul 2018 03:13 )  PTT:33.6 sec  The current medications were reviewed   MEDICATIONS  (STANDING):  artificial  tears Solution 1 Drop(s) Both EYES two times a day  aztreonam  IVPB 2000 milliGRAM(s) IV Intermittent every 12 hours  chlorhexidine 0.12% Liquid 15 milliLiter(s) Swish and Spit every 12 hours  chlorhexidine 2% Cloths 1 Application(s) Topical daily  DAPTOmycin IVPB 350 milliGRAM(s) IV Intermittent every 48 hours  dexmedetomidine Infusion 0.5 MICROgram(s)/kG/Hr (7.375 mL/Hr) IV Continuous <Continuous>  dextrose 5%. 1000 milliLiter(s) (25 mL/Hr) IV Continuous <Continuous>  dextrose 50% Injectable 50 milliLiter(s) IV Push every 15 minutes  dextrose 50% Injectable 25 milliLiter(s) IV Push every 15 minutes  EPINEPHrine     Infusion 0.06 MICROgram(s)/kG/Min (13.275 mL/Hr) IV Continuous <Continuous>  folic acid 1 milliGRAM(s) Oral daily  insulin lispro (HumaLOG) corrective regimen sliding scale   SubCutaneous every 6 hours  mesalamine Suppository 1000 milliGRAM(s) Rectal at bedtime  micafungin IVPB 100 milliGRAM(s) IV Intermittent every 24 hours  micafungin IVPB      midodrine 10 milliGRAM(s) Oral every 8 hours  pantoprazole  Injectable 40 milliGRAM(s) IV Push every 12 hours  phenylephrine    Infusion 2 MICROgram(s)/kG/Min (21.975 mL/Hr) IV Continuous <Continuous>  propofol Infusion 10 MICROgram(s)/kG/Min (3.54 mL/Hr) IV Continuous <Continuous>  sodium chloride 0.9%. 1000 milliLiter(s) (10 mL/Hr) IV Continuous <Continuous>  vasopressin Infusion 0.017 Unit(s)/Min (1 mL/Hr) IV Continuous <Continuous>    MEDICATIONS  (PRN):  dextrose 40% Gel 15 Gram(s) Oral once PRN Blood Glucose LESS THAN 70 milliGRAM(s)/deciliter  fentaNYL    Injectable 25 MICROGram(s) IV Push every 4 hours PRN Severe Pain (7 - 10)  glucagon  Injectable 1 milliGRAM(s) IntraMuscular once PRN Glucose LESS THAN 70 milligrams/deciliter       PROBLEM LIST/ ASSESSMENT:  HEALTH ISSUES - PROBLEM Dx:  Metabolic acidosis: Metabolic acidosis  Hyperkalemia: Hyperkalemia  Jaundice: Jaundice  Thrombocytopenia: Thrombocytopenia  Penile swelling: Penile swelling  Shock: Shock  Anemia: Anemia  Hyponatremia: Hyponatremia  Acute kidney failure: Acute kidney failure  CAD (coronary artery disease): CAD (coronary artery disease)  Valvular disease: Valvular disease      ,   Patient is a 67y old  Male who presents with a chief complaint of AS (19 Jun 2018 00:06)     s/p cardiac surgery      My plan includes :  close hemodynamic, ventilatory and drain monitoring and management per post op routine    Monitor for arrhythmias and monitor parameters for organ perfusion  monitor neurologic status  Head of the bed should remain elevated to 45 deg .   chest PT and IS will be encouraged  monitor adequacy of oxygenation and ventilation and attempt to wean oxygen  Nutritional goals will be met using po eventually , ensure adequate caloric intake and montior the same  Stress ulcer and VTE prophylaxis will be achieved    Glycemic control is satisfactory  Electrolytes have been repleted as necessary and wound care has been carried out. Pain control has been achieved.   agressive physical therapy and early mobility and ambulation goals will be met   The family was updated about the course and plan  CRITICAL CARE TIME SPENT in evaluation and management, reassessments, review and interpretation of labs and x-rays, ventilator and hemodynamic management, formulating a plan and coordinating care: ___90____ MIN.  Time does not include procedural time.  CTICU ATTENDING     					    Clement Ahn MD

## 2018-07-19 NOTE — CHART NOTE - NSCHARTNOTESELECT_GEN_ALL_CORE
Nutrition Follow-Up Note/Nutrition Services
Bronchoscopy/Event Note
Device/Event Note
Event Note
Event Note/Bronchoscopy
IABP removal/Event Note
Nutrition Follow-Up Note/Nutrition Services
Nutrition Services/Nutrition Follow-Up Note
Wnd vac change/Event Note
Wound vac change and dressing change/Event Note

## 2018-07-19 NOTE — PROGRESS NOTE ADULT - NSHPATTENDINGPLANDISCUSS_GEN_ALL_CORE
CtU PA, attending, pt's family.
ICU staff and family.
housestaff
intensivist
CTU team

## 2018-07-19 NOTE — PROGRESS NOTE ADULT - ATTENDING COMMENTS
Evaluated on SLED, , no UF, planned for 5 hrs for worsening acidosis and hyperkalemia in the setting of shock, d/w CTU attending, prefer trial of IHD vs CVVHF 2/2 possible hemolytic anemia from dialyzer but also because of staffing issues, reasonable to do trial of SLED, uptitrating pressors to keep map >60, if can't keep map above goal we will stop and switch to CRRT. If tolerates, he will be transitioned to aquapheresis for volume control post HD pending vitals, uptitrating to 150-/hr UF as he was tolerating this for the last 24hrs, CVP 30's. Recommend rechecking hemolysis labs, uric acid, BUN/K 2 hrs post HD. If persistently acidoditc and hyperkalemia will need CRRT initiation instead of aquapheresis as he likely has ongoing cell lysis contributing to lyte dysfx. Rec. replacing HD catheter as he has gram + bacteremia on yesterday's blood cultures. Can redose DDAVP if he continues to have uremic mediated GI bleeding. Doubt hemolysis is 2/2 CRRT catheter, more likely from cardiac valve shearing and possible DIC, f/u heme recs for today.
Minimal inc in pressor support but tolerated IHD well
S/P Replacement of AA.                      AVR                      MV Repair                      TV repair                      CABG x 2.  Hemodynamics holding on multiple drips.  + Metabolic acidosi.  Good oxygenation.  WEAN as tolerated.
Pt seen and examined at bedside. Agree with note above.
Pt seen and examined at bedside. S/p copious blood clots passed per rectum therefore urgent bedside egd/cscope performed showing gastritis, proctitis and actively bleeding distal rectal lesion obscured by bleeding/location. Recommend CRC f/u.
Pt seen and examined at bedside. Agree with note above.
Pt seen and examined at bedside. Agree with note above.
Pt seen and examined at bedside. Bleeding has slowed down in volume today; received mesalamine topically. Bilirubin downtrending. Will continue to follow.
multiorgan failure, shock  remains on Impella,. mutiple pressors  tolerating CVVD   volume acceptable -- cont to aim for gently negative   lytes ok-- replete k  and phos and follow
Patient examined, fellow's hx and PE reviewed and confirmed. I find EMILY on CVVHD, hypotension. A/P reviewed and confirmed. Continue CVVHD. UF as tolerated. Keep MAP > 65. See full note.
Patient examined, fellow's hx and PE reviewed and confirmed. I find EMILY on CVVHD. A/P reviewed and confirmed. Continue CVVHD. Keep MAP > 65. See full note.
reviewed with CT surgery team- was tolerating aquapheresis  now to change to CVVHD as above
seen and evaluated while on CVVHD  to increase UF to 250 ml/hr  prefer dc bicarbonate drip- if drop on CO2- or increase lactate- will increase dialysate flow rate
seen and evaluated while on CVVHD  tolerating UF rate of 300 ml/hour  urine output scant  continue present RRT prescription  still on multiple pressors and inotropic agents and impella  reviewed with CTS team
seen and evaluated while on CVVHD  tolerating the procedure well.'  Access pressures acceptable  Qb good  adjusting UF goals as tolerated
seen and evaluated while on CVVHD  will try to increase UF rate today as more hemodynamically stable  and overt edema  reviewed with CTS team
seen and evaluated while on CVVHD-  was able to tolerated higher UF rate for net negative balance of 1.3 L  but then needed reduction in UF to 100 ml/hr ( from 250)  reduced inputs as well yesterday- by dc'ing bicarb drip-  serum CO2 remains in an acceptable range  c/w CVVHD prescription as above
seen and evaluated while on dialysis  tolerating the procedure adequately  able to get into negative balance yesterday- just basely  remains edematous  increasing UF- up to 200 ml/hr now- hemodynamically stable  to f/u and continue up titration of UF as tolerated
seen and evaluated while on CVVHD  tolerating the procedure well  will adjust UF as needed through out the day  c/w with present Qb and Qd
seen and evaluated while on CVVHD-  some drop in BP- reduced UF rate earlier  reviewed with CTS team  s/p bronchoscopy this AM as well
d/w CTU attending, after discussion with family are moving towards comfort care, will hold HD
Tolerating IHD with slow BFR over 5hrs to minimize hemodynamic instability, good clearance, K down. Aquapheresis 100-150/hr when not getting IHD for vol. overload, decreasing FiO2, asked nurse to concentrate IV drips to minimize input (3.5L in last 24hrs).  Antibiotics dosed for intermittent HD now rather than CVVHD.

## 2018-07-19 NOTE — PROGRESS NOTE ADULT - ASSESSMENT
The patient is 67 year old male with PMH stated above, now s/p  Repair of AA, AVR, MV repair, TV repair, CABG x2, now in shock requiring pressor support - mutiple pressors. The patient with deterioration of the renal function in the setting of the shock - oliguric, no response from the diuretics. was started on CVVHD from 7/2 to 7/14 and stopped. On 7/17 - SLED was done with good tolerance and was switch to aquaphoresi. In the past 24 hours aquaphoresis was continued - 4 liters off, and he is negative 1.7 liters. talked to the intensivist no IHD/SLED, no need for clearance - electrolytes acceptable, the team will talk to the family for DNR?DNI

## 2018-07-19 NOTE — PROGRESS NOTE ADULT - SUBJECTIVE AND OBJECTIVE BOX
Pt seen and examined at bedside. Per nursing, no bloody BM overnight.     PERTINENT REVIEW OF SYSTEMS:  unable to assess     Allergies    penicillin (Rash)    Intolerances      MEDICATIONS:  MEDICATIONS  (STANDING):  artificial  tears Solution 1 Drop(s) Both EYES two times a day  aztreonam  IVPB 2000 milliGRAM(s) IV Intermittent every 12 hours  chlorhexidine 0.12% Liquid 15 milliLiter(s) Swish and Spit every 12 hours  chlorhexidine 2% Cloths 1 Application(s) Topical daily  DAPTOmycin IVPB 350 milliGRAM(s) IV Intermittent every 48 hours  dexmedetomidine Infusion 0.5 MICROgram(s)/kG/Hr (7.375 mL/Hr) IV Continuous <Continuous>  dextrose 5%. 1000 milliLiter(s) (25 mL/Hr) IV Continuous <Continuous>  dextrose 50% Injectable 50 milliLiter(s) IV Push every 15 minutes  dextrose 50% Injectable 25 milliLiter(s) IV Push every 15 minutes  EPINEPHrine     Infusion 0.06 MICROgram(s)/kG/Min (13.275 mL/Hr) IV Continuous <Continuous>  folic acid 1 milliGRAM(s) Oral daily  insulin lispro (HumaLOG) corrective regimen sliding scale   SubCutaneous every 6 hours  mesalamine Suppository 1000 milliGRAM(s) Rectal at bedtime  micafungin IVPB 100 milliGRAM(s) IV Intermittent every 24 hours  micafungin IVPB      midodrine 10 milliGRAM(s) Oral every 8 hours  pantoprazole  Injectable 40 milliGRAM(s) IV Push every 12 hours  phenylephrine    Infusion 2 MICROgram(s)/kG/Min (21.975 mL/Hr) IV Continuous <Continuous>  propofol Infusion 10 MICROgram(s)/kG/Min (3.54 mL/Hr) IV Continuous <Continuous>  sodium chloride 0.9%. 1000 milliLiter(s) (10 mL/Hr) IV Continuous <Continuous>  vasopressin Infusion 0.017 Unit(s)/Min (1 mL/Hr) IV Continuous <Continuous>    MEDICATIONS  (PRN):  dextrose 40% Gel 15 Gram(s) Oral once PRN Blood Glucose LESS THAN 70 milliGRAM(s)/deciliter  fentaNYL    Injectable 25 MICROGram(s) IV Push every 4 hours PRN Severe Pain (7 - 10)  glucagon  Injectable 1 milliGRAM(s) IntraMuscular once PRN Glucose LESS THAN 70 milligrams/deciliter    Vital Signs Last 24 Hrs  T(C): 36.5 (19 Jul 2018 05:00), Max: 37.3 (18 Jul 2018 14:00)  T(F): 97.7 (19 Jul 2018 05:00), Max: 99.2 (18 Jul 2018 14:00)  HR: 90 (19 Jul 2018 08:00) (90 - 90)  BP: --  BP(mean): --  RR: 19 (19 Jul 2018 08:00) (19 - 22)  SpO2: 97% (19 Jul 2018 08:00) (95% - 100%)    07-18 @ 07:01  -  07-19 @ 07:00  --------------------------------------------------------  IN: 2607.3 mL / OUT: 4367 mL / NET: -1759.7 mL    07-19 @ 07:01 - 07-19 @ 08:24  --------------------------------------------------------  IN: 78.1 mL / OUT: 150 mL / NET: -71.9 mL      PHYSICAL EXAM:    General: extremely ill appearing male;  HEENT: no blood noted in oral cavity  Gastrointestinal: Soft, non-tender non-distended; Normal bowel sounds; no appreciable organomegaly;   Extremities: + peripheral edema   Neurological: intubated and sedated  Skin: jaundiced      LABS:                        9.9    12.5  )-----------( 55       ( 19 Jul 2018 03:13 )             29.1     07-19    135  |  92<L>  |  76<H>  ----------------------------<  178<H>  4.4   |  16<L>  |  1.26    Ca    10.1      19 Jul 2018 03:13  Phos  5.0     07-19  Mg     2.1     07-19    TPro  4.8<L>  /  Alb  3.3  /  TBili  53.6<H>  /  DBili  >10.0<H>  /  AST  254<H>  /  ALT  129<H>  /  AlkPhos  472<H>  07-19    PT/INR - ( 19 Jul 2018 03:13 )   PT: 15.6 sec;   INR: 1.40          PTT - ( 19 Jul 2018 03:13 )  PTT:33.6 sec                  Culture - Blood (collected 17 Jul 2018 12:15)  Source: .Blood Blood-Venous  Gram Stain (18 Jul 2018 10:33):    Aerobic and Anaerobic Bottle: Gram Positive Cocci in Pairs and Chains    Result called to and read back by_ ORLANDO Smith RN  07/18/2018 10:32:46  Preliminary Report (18 Jul 2018 10:33):    Culture in progress    Culture - Blood (collected 17 Jul 2018 12:15)  Source: .Blood Blood-Venous  Gram Stain (18 Jul 2018 10:34):    Aerobic and Anaerobic Bottle: Gram Positive Cocci in Pairs and Chains    Result called to and read back byCompa Smith RN  07/18/2018 10:32:46  Preliminary Report (18 Jul 2018 10:34):    Culture in progress    Culture - Fungal, Blood (collected 17 Jul 2018 01:17)  Source: .Blood Blood  Preliminary Report (17 Jul 2018 07:26):    Testing in progress      RADIOLOGY & ADDITIONAL STUDIES:

## 2018-07-20 LAB
-  AMPICILLIN: SIGNIFICANT CHANGE UP
-  AMPICILLIN: SIGNIFICANT CHANGE UP
-  DAPTOMYCIN: SIGNIFICANT CHANGE UP
-  DAPTOMYCIN: SIGNIFICANT CHANGE UP
-  GENTAMICIN SYNERGY: SIGNIFICANT CHANGE UP
-  GENTAMICIN SYNERGY: SIGNIFICANT CHANGE UP
-  STREPTOMYCIN SYNERGY: SIGNIFICANT CHANGE UP
-  STREPTOMYCIN SYNERGY: SIGNIFICANT CHANGE UP
-  VANCOMYCIN: SIGNIFICANT CHANGE UP
-  VANCOMYCIN: SIGNIFICANT CHANGE UP
ALBUMIN SERPL ELPH-MCNC: 2.4 G/DL — LOW (ref 3.3–5)
ALBUMIN SERPL ELPH-MCNC: 2.8 G/DL — LOW (ref 3.3–5)
ALP SERPL-CCNC: 403 U/L — HIGH (ref 40–120)
ALP SERPL-CCNC: 487 U/L — HIGH (ref 40–120)
ALT FLD-CCNC: 107 U/L — HIGH (ref 10–45)
ALT FLD-CCNC: 111 U/L — HIGH (ref 10–45)
ANION GAP SERPL CALC-SCNC: 33 MMOL/L — HIGH (ref 5–17)
ANION GAP SERPL CALC-SCNC: 40 MMOL/L — HIGH (ref 5–17)
APTT BLD: 39.7 SEC — HIGH (ref 27.5–37.4)
APTT BLD: 46.9 SEC — HIGH (ref 27.5–37.4)
AST SERPL-CCNC: 197 U/L — HIGH (ref 10–40)
AST SERPL-CCNC: 279 U/L — HIGH (ref 10–40)
BILIRUB SERPL-MCNC: 41.2 MG/DL — HIGH (ref 0.2–1.2)
BILIRUB SERPL-MCNC: 46.9 MG/DL — HIGH (ref 0.2–1.2)
BUN SERPL-MCNC: 107 MG/DL — HIGH (ref 7–23)
BUN SERPL-MCNC: 112 MG/DL — HIGH (ref 7–23)
CALCIUM SERPL-MCNC: 9.4 MG/DL — SIGNIFICANT CHANGE UP (ref 8.4–10.5)
CALCIUM SERPL-MCNC: 9.7 MG/DL — SIGNIFICANT CHANGE UP (ref 8.4–10.5)
CHLORIDE SERPL-SCNC: 89 MMOL/L — LOW (ref 96–108)
CHLORIDE SERPL-SCNC: 90 MMOL/L — LOW (ref 96–108)
CO2 SERPL-SCNC: 14 MMOL/L — LOW (ref 22–31)
CO2 SERPL-SCNC: 9 MMOL/L — CRITICAL LOW (ref 22–31)
CREAT SERPL-MCNC: 1.79 MG/DL — HIGH (ref 0.5–1.3)
CREAT SERPL-MCNC: 2 MG/DL — HIGH (ref 0.5–1.3)
CULTURE RESULTS: SIGNIFICANT CHANGE UP
CULTURE RESULTS: SIGNIFICANT CHANGE UP
GAS PNL BLDA: SIGNIFICANT CHANGE UP
GAS PNL BLDA: SIGNIFICANT CHANGE UP
GLUCOSE BLDC GLUCOMTR-MCNC: 117 MG/DL — HIGH (ref 70–99)
GLUCOSE BLDC GLUCOMTR-MCNC: 150 MG/DL — HIGH (ref 70–99)
GLUCOSE SERPL-MCNC: 102 MG/DL — HIGH (ref 70–99)
GLUCOSE SERPL-MCNC: 127 MG/DL — HIGH (ref 70–99)
GRAM STN FLD: SIGNIFICANT CHANGE UP
HCT VFR BLD CALC: 19.2 % — CRITICAL LOW (ref 39–50)
HCT VFR BLD CALC: 26.7 % — LOW (ref 39–50)
HGB BLD-MCNC: 6.2 G/DL — CRITICAL LOW (ref 13–17)
HGB BLD-MCNC: 8.9 G/DL — LOW (ref 13–17)
INR BLD: 1.45 — HIGH (ref 0.88–1.16)
INR BLD: 1.79 — HIGH (ref 0.88–1.16)
LACTATE SERPL-SCNC: 12.5 MMOL/L — CRITICAL HIGH (ref 0.5–2)
LACTATE SERPL-SCNC: 5.8 MMOL/L — CRITICAL HIGH (ref 0.5–2)
MAGNESIUM SERPL-MCNC: 2.3 MG/DL — SIGNIFICANT CHANGE UP (ref 1.6–2.6)
MAGNESIUM SERPL-MCNC: 2.6 MG/DL — SIGNIFICANT CHANGE UP (ref 1.6–2.6)
MCHC RBC-ENTMCNC: 29.3 PG — SIGNIFICANT CHANGE UP (ref 27–34)
MCHC RBC-ENTMCNC: 29.8 PG — SIGNIFICANT CHANGE UP (ref 27–34)
MCHC RBC-ENTMCNC: 32.3 G/DL — SIGNIFICANT CHANGE UP (ref 32–36)
MCHC RBC-ENTMCNC: 33.3 G/DL — SIGNIFICANT CHANGE UP (ref 32–36)
MCV RBC AUTO: 87.8 FL — SIGNIFICANT CHANGE UP (ref 80–100)
MCV RBC AUTO: 92.3 FL — SIGNIFICANT CHANGE UP (ref 80–100)
METHOD TYPE: SIGNIFICANT CHANGE UP
METHOD TYPE: SIGNIFICANT CHANGE UP
ORGANISM # SPEC MICROSCOPIC CNT: SIGNIFICANT CHANGE UP
PHOSPHATE SERPL-MCNC: 6.3 MG/DL — HIGH (ref 2.5–4.5)
PHOSPHATE SERPL-MCNC: 8.1 MG/DL — HIGH (ref 2.5–4.5)
PLATELET # BLD AUTO: 15 K/UL — CRITICAL LOW (ref 150–400)
PLATELET # BLD AUTO: 79 K/UL — LOW (ref 150–400)
POTASSIUM SERPL-MCNC: 5 MMOL/L — SIGNIFICANT CHANGE UP (ref 3.5–5.3)
POTASSIUM SERPL-MCNC: 5.9 MMOL/L — HIGH (ref 3.5–5.3)
POTASSIUM SERPL-SCNC: 5 MMOL/L — SIGNIFICANT CHANGE UP (ref 3.5–5.3)
POTASSIUM SERPL-SCNC: 5.9 MMOL/L — HIGH (ref 3.5–5.3)
PROT SERPL-MCNC: 4.2 G/DL — LOW (ref 6–8.3)
PROT SERPL-MCNC: 4.6 G/DL — LOW (ref 6–8.3)
PROTHROM AB SERPL-ACNC: 16.2 SEC — HIGH (ref 9.8–12.7)
PROTHROM AB SERPL-ACNC: 20.1 SEC — HIGH (ref 9.8–12.7)
RBC # BLD: 2.08 M/UL — LOW (ref 4.2–5.8)
RBC # BLD: 3.04 M/UL — LOW (ref 4.2–5.8)
RBC # FLD: 21.6 % — HIGH (ref 10.3–16.9)
RBC # FLD: 23 % — HIGH (ref 10.3–16.9)
SODIUM SERPL-SCNC: 137 MMOL/L — SIGNIFICANT CHANGE UP (ref 135–145)
SODIUM SERPL-SCNC: 138 MMOL/L — SIGNIFICANT CHANGE UP (ref 135–145)
SPECIMEN SOURCE: SIGNIFICANT CHANGE UP
SPECIMEN SOURCE: SIGNIFICANT CHANGE UP
WBC # BLD: 12.1 K/UL — HIGH (ref 3.8–10.5)
WBC # BLD: 16.7 K/UL — HIGH (ref 3.8–10.5)
WBC # FLD AUTO: 12.1 K/UL — HIGH (ref 3.8–10.5)
WBC # FLD AUTO: 16.7 K/UL — HIGH (ref 3.8–10.5)

## 2018-07-20 PROCEDURE — 87103 BLOOD FUNGUS CULTURE: CPT

## 2018-07-20 PROCEDURE — 82330 ASSAY OF CALCIUM: CPT

## 2018-07-20 PROCEDURE — 83880 ASSAY OF NATRIURETIC PEPTIDE: CPT

## 2018-07-20 PROCEDURE — 93005 ELECTROCARDIOGRAM TRACING: CPT

## 2018-07-20 PROCEDURE — 83050 HGB METHEMOGLOBIN QUAN: CPT

## 2018-07-20 PROCEDURE — 87340 HEPATITIS B SURFACE AG IA: CPT

## 2018-07-20 PROCEDURE — 85384 FIBRINOGEN ACTIVITY: CPT

## 2018-07-20 PROCEDURE — 82595 ASSAY OF CRYOGLOBULIN: CPT

## 2018-07-20 PROCEDURE — C1768: CPT

## 2018-07-20 PROCEDURE — 86901 BLOOD TYPING SEROLOGIC RH(D): CPT

## 2018-07-20 PROCEDURE — 95951: CPT

## 2018-07-20 PROCEDURE — 82150 ASSAY OF AMYLASE: CPT

## 2018-07-20 PROCEDURE — 85027 COMPLETE CBC AUTOMATED: CPT

## 2018-07-20 PROCEDURE — 85379 FIBRIN DEGRADATION QUANT: CPT

## 2018-07-20 PROCEDURE — 83690 ASSAY OF LIPASE: CPT

## 2018-07-20 PROCEDURE — 83615 LACTATE (LD) (LDH) ENZYME: CPT

## 2018-07-20 PROCEDURE — 93321 DOPPLER ECHO F-UP/LMTD STD: CPT

## 2018-07-20 PROCEDURE — 85018 HEMOGLOBIN: CPT

## 2018-07-20 PROCEDURE — 71275 CT ANGIOGRAPHY CHEST: CPT

## 2018-07-20 PROCEDURE — 82140 ASSAY OF AMMONIA: CPT

## 2018-07-20 PROCEDURE — 87150 DNA/RNA AMPLIFIED PROBE: CPT

## 2018-07-20 PROCEDURE — 86803 HEPATITIS C AB TEST: CPT

## 2018-07-20 PROCEDURE — 87106 FUNGI IDENTIFICATION YEAST: CPT

## 2018-07-20 PROCEDURE — 84100 ASSAY OF PHOSPHORUS: CPT

## 2018-07-20 PROCEDURE — 74018 RADEX ABDOMEN 1 VIEW: CPT

## 2018-07-20 PROCEDURE — 85652 RBC SED RATE AUTOMATED: CPT

## 2018-07-20 PROCEDURE — 76705 ECHO EXAM OF ABDOMEN: CPT

## 2018-07-20 PROCEDURE — P9016: CPT

## 2018-07-20 PROCEDURE — C1894: CPT

## 2018-07-20 PROCEDURE — 80076 HEPATIC FUNCTION PANEL: CPT

## 2018-07-20 PROCEDURE — 82248 BILIRUBIN DIRECT: CPT

## 2018-07-20 PROCEDURE — 76000 FLUOROSCOPY <1 HR PHYS/QHP: CPT

## 2018-07-20 PROCEDURE — 80074 ACUTE HEPATITIS PANEL: CPT

## 2018-07-20 PROCEDURE — 70450 CT HEAD/BRAIN W/O DYE: CPT

## 2018-07-20 PROCEDURE — 88305 TISSUE EXAM BY PATHOLOGIST: CPT

## 2018-07-20 PROCEDURE — P9045: CPT

## 2018-07-20 PROCEDURE — 83010 ASSAY OF HAPTOGLOBIN QUANT: CPT

## 2018-07-20 PROCEDURE — 82585 ASSAY OF CRYOFIBRINOGEN: CPT

## 2018-07-20 PROCEDURE — 71045 X-RAY EXAM CHEST 1 VIEW: CPT

## 2018-07-20 PROCEDURE — 85730 THROMBOPLASTIN TIME PARTIAL: CPT

## 2018-07-20 PROCEDURE — 99232 SBSQ HOSP IP/OBS MODERATE 35: CPT

## 2018-07-20 PROCEDURE — 94002 VENT MGMT INPAT INIT DAY: CPT

## 2018-07-20 PROCEDURE — 36415 COLL VENOUS BLD VENIPUNCTURE: CPT

## 2018-07-20 PROCEDURE — 85397 CLOTTING FUNCT ACTIVITY: CPT

## 2018-07-20 PROCEDURE — 86900 BLOOD TYPING SEROLOGIC ABO: CPT

## 2018-07-20 PROCEDURE — 86923 COMPATIBILITY TEST ELECTRIC: CPT

## 2018-07-20 PROCEDURE — 82803 BLOOD GASES ANY COMBINATION: CPT

## 2018-07-20 PROCEDURE — 83735 ASSAY OF MAGNESIUM: CPT

## 2018-07-20 PROCEDURE — P9012: CPT

## 2018-07-20 PROCEDURE — 85610 PROTHROMBIN TIME: CPT

## 2018-07-20 PROCEDURE — 93880 EXTRACRANIAL BILAT STUDY: CPT

## 2018-07-20 PROCEDURE — 87070 CULTURE OTHR SPECIMN AEROBIC: CPT

## 2018-07-20 PROCEDURE — P9017: CPT

## 2018-07-20 PROCEDURE — P9035: CPT

## 2018-07-20 PROCEDURE — C1769: CPT

## 2018-07-20 PROCEDURE — C1898: CPT

## 2018-07-20 PROCEDURE — 82977 ASSAY OF GGT: CPT

## 2018-07-20 PROCEDURE — 94799 UNLISTED PULMONARY SVC/PX: CPT

## 2018-07-20 PROCEDURE — C1882: CPT

## 2018-07-20 PROCEDURE — 80053 COMPREHEN METABOLIC PANEL: CPT

## 2018-07-20 PROCEDURE — 93306 TTE W/DOPPLER COMPLETE: CPT

## 2018-07-20 PROCEDURE — 85025 COMPLETE CBC W/AUTO DIFF WBC: CPT

## 2018-07-20 PROCEDURE — 94003 VENT MGMT INPAT SUBQ DAY: CPT

## 2018-07-20 PROCEDURE — 86880 COOMBS TEST DIRECT: CPT

## 2018-07-20 PROCEDURE — C1887: CPT

## 2018-07-20 PROCEDURE — P9047: CPT

## 2018-07-20 PROCEDURE — 84132 ASSAY OF SERUM POTASSIUM: CPT

## 2018-07-20 PROCEDURE — 71045 X-RAY EXAM CHEST 1 VIEW: CPT | Mod: 26

## 2018-07-20 PROCEDURE — 86850 RBC ANTIBODY SCREEN: CPT

## 2018-07-20 PROCEDURE — 87040 BLOOD CULTURE FOR BACTERIA: CPT

## 2018-07-20 PROCEDURE — 36430 TRANSFUSION BLD/BLD COMPNT: CPT

## 2018-07-20 PROCEDURE — 85045 AUTOMATED RETICULOCYTE COUNT: CPT

## 2018-07-20 PROCEDURE — 74174 CTA ABD&PLVS W/CONTRAST: CPT

## 2018-07-20 PROCEDURE — 86038 ANTINUCLEAR ANTIBODIES: CPT

## 2018-07-20 PROCEDURE — 86706 HEP B SURFACE ANTIBODY: CPT

## 2018-07-20 PROCEDURE — 80202 ASSAY OF VANCOMYCIN: CPT

## 2018-07-20 PROCEDURE — 83605 ASSAY OF LACTIC ACID: CPT

## 2018-07-20 PROCEDURE — 86704 HEP B CORE ANTIBODY TOTAL: CPT

## 2018-07-20 PROCEDURE — 84436 ASSAY OF TOTAL THYROXINE: CPT

## 2018-07-20 PROCEDURE — 90935 HEMODIALYSIS ONE EVALUATION: CPT

## 2018-07-20 PROCEDURE — 94150 VITAL CAPACITY TEST: CPT

## 2018-07-20 PROCEDURE — 82962 GLUCOSE BLOOD TEST: CPT

## 2018-07-20 PROCEDURE — 87186 SC STD MICRODIL/AGAR DIL: CPT

## 2018-07-20 PROCEDURE — 84443 ASSAY THYROID STIM HORMONE: CPT

## 2018-07-20 PROCEDURE — 80048 BASIC METABOLIC PNL TOTAL CA: CPT

## 2018-07-20 PROCEDURE — 86022 PLATELET ANTIBODIES: CPT

## 2018-07-20 PROCEDURE — 84480 ASSAY TRIIODOTHYRONINE (T3): CPT

## 2018-07-20 PROCEDURE — 83036 HEMOGLOBIN GLYCOSYLATED A1C: CPT

## 2018-07-20 PROCEDURE — 82533 TOTAL CORTISOL: CPT

## 2018-07-20 PROCEDURE — 84295 ASSAY OF SERUM SODIUM: CPT

## 2018-07-20 PROCEDURE — C1889: CPT

## 2018-07-20 PROCEDURE — 81001 URINALYSIS AUTO W/SCOPE: CPT

## 2018-07-20 PROCEDURE — 80061 LIPID PANEL: CPT

## 2018-07-20 RX ORDER — SODIUM BICARBONATE 1 MEQ/ML
50 SYRINGE (ML) INTRAVENOUS ONCE
Qty: 0 | Refills: 0 | Status: COMPLETED | OUTPATIENT
Start: 2018-07-20 | End: 2018-07-20

## 2018-07-20 RX ADMIN — Medication 50 MILLIEQUIVALENT(S): at 04:21

## 2018-07-20 RX ADMIN — Medication 1 DROP(S): at 07:34

## 2018-07-20 RX ADMIN — MIDODRINE HYDROCHLORIDE 10 MILLIGRAM(S): 2.5 TABLET ORAL at 07:34

## 2018-07-20 RX ADMIN — PANTOPRAZOLE SODIUM 40 MILLIGRAM(S): 20 TABLET, DELAYED RELEASE ORAL at 07:34

## 2018-07-20 RX ADMIN — Medication 50 MILLIEQUIVALENT(S): at 10:22

## 2018-07-20 RX ADMIN — CHLORHEXIDINE GLUCONATE 15 MILLILITER(S): 213 SOLUTION TOPICAL at 07:34

## 2018-07-20 RX ADMIN — CHLORHEXIDINE GLUCONATE 1 APPLICATION(S): 213 SOLUTION TOPICAL at 11:32

## 2018-07-20 RX ADMIN — Medication 100 MILLIGRAM(S): at 03:07

## 2018-07-20 RX ADMIN — Medication 50 MILLIEQUIVALENT(S): at 10:12

## 2018-07-20 RX ADMIN — MICAFUNGIN SODIUM 105 MILLIGRAM(S): 100 INJECTION, POWDER, LYOPHILIZED, FOR SOLUTION INTRAVENOUS at 09:11

## 2018-07-20 RX ADMIN — FENTANYL CITRATE 25 MICROGRAM(S): 50 INJECTION INTRAVENOUS at 00:37

## 2018-07-20 RX ADMIN — FENTANYL CITRATE 25 MICROGRAM(S): 50 INJECTION INTRAVENOUS at 00:52

## 2018-07-20 NOTE — PROGRESS NOTE ADULT - PROBLEM SELECTOR PLAN 2
- hemoglobin stable - no bleeding per rectum as per nurse - GI and surgery follows the case   - blood transfusion as per primary team   - no need for EPO at present
- hemoglobin stable - 10.3  - blood transfusion as per primary team   - no need for EPO at present
- resolved after started aquaphoresis, now on CVVHD
most likely DIC  Heme work-up in progress
- hemoglobin stable - 9.9  - blood transfusion as per primary team   - no need for EPO at present
- hemoglobin stable - no bleeding per rectum as per nurse - GI and surgery follows the case   - blood transfusion as per primary team   - no need for EPO at present
- hemoglobin stable - trending down - possible GI bleed - please follow up with GI   - blood transfusion as per primary team   - no need for EPO at present
- hemoglobin stable at 11.1 at present.  - - from the primary disease   - no need for EPO at present  - blood transfusion as per primary team.
- hypervolemic hyponatremia   - please check the serum osm   - from fluid overload   - try to concetrate the infusion   - on aquaphoresis, also possibility to be started on CVVHD.
- resolved after started aquaphoresis
- resolved after started aquaphoresis, now on CVVHD
- resolved now on CVVHD
- resolved now on CVVHD  - for the other electrolytes and acid base disorders see above
Heme following  probable consumption of platelets
CABGx2  hold bb at this moment due to hypotension.
CABG at this admission
- resolved now on CVVHD  - for the other electrolytes and acid base disorders see above

## 2018-07-20 NOTE — PROVIDER CONTACT NOTE (CRITICAL VALUE NOTIFICATION) - NAME OF MD/NP/PA/DO NOTIFIED:
MD Painter
Dr DAVEY Quintanilla
Dr. Hernandez
Dr. Quintanilla
Dr. Quintanilla
FLORIDALMA Weiss
MDs Marichuy/Jimy
Soc[place] LUJAN

## 2018-07-20 NOTE — PROGRESS NOTE ADULT - SUBJECTIVE AND OBJECTIVE BOX
Pt seen and examined at bedside. Per nursing, pt had a BM this morning with bright red blood clot.    PERTINENT REVIEW OF SYSTEMS:  unable to assess     Allergies    penicillin (Rash)    Intolerances      MEDICATIONS:  MEDICATIONS  (STANDING):  artificial  tears Solution 1 Drop(s) Both EYES two times a day  aztreonam  IVPB 2000 milliGRAM(s) IV Intermittent every 12 hours  chlorhexidine 0.12% Liquid 15 milliLiter(s) Swish and Spit every 12 hours  chlorhexidine 2% Cloths 1 Application(s) Topical daily  DAPTOmycin IVPB 350 milliGRAM(s) IV Intermittent every 48 hours  dexmedetomidine Infusion 0.5 MICROgram(s)/kG/Hr (7.375 mL/Hr) IV Continuous <Continuous>  dextrose 5%. 1000 milliLiter(s) (25 mL/Hr) IV Continuous <Continuous>  dextrose 50% Injectable 50 milliLiter(s) IV Push every 15 minutes  dextrose 50% Injectable 25 milliLiter(s) IV Push every 15 minutes  EPINEPHrine     Infusion 0.06 MICROgram(s)/kG/Min (13.275 mL/Hr) IV Continuous <Continuous>  folic acid 1 milliGRAM(s) Oral daily  insulin lispro (HumaLOG) corrective regimen sliding scale   SubCutaneous every 6 hours  mesalamine Suppository 1000 milliGRAM(s) Rectal at bedtime  micafungin IVPB 100 milliGRAM(s) IV Intermittent every 24 hours  micafungin IVPB      midodrine 10 milliGRAM(s) Oral every 8 hours  pantoprazole  Injectable 40 milliGRAM(s) IV Push every 12 hours  phenylephrine    Infusion 2 MICROgram(s)/kG/Min (21.975 mL/Hr) IV Continuous <Continuous>  propofol Infusion 10 MICROgram(s)/kG/Min (3.54 mL/Hr) IV Continuous <Continuous>  sodium chloride 0.9%. 1000 milliLiter(s) (10 mL/Hr) IV Continuous <Continuous>  vasopressin Infusion 0.017 Unit(s)/Min (1 mL/Hr) IV Continuous <Continuous>    MEDICATIONS  (PRN):  dextrose 40% Gel 15 Gram(s) Oral once PRN Blood Glucose LESS THAN 70 milliGRAM(s)/deciliter  fentaNYL    Injectable 25 MICROGram(s) IV Push every 4 hours PRN Severe Pain (7 - 10)  glucagon  Injectable 1 milliGRAM(s) IntraMuscular once PRN Glucose LESS THAN 70 milligrams/deciliter    Vital Signs Last 24 Hrs  T(C): 37.2 (20 Jul 2018 05:00), Max: 37.3 (20 Jul 2018 01:00)  T(F): 99 (20 Jul 2018 05:00), Max: 99.1 (20 Jul 2018 01:00)  HR: 90 (20 Jul 2018 08:00) (90 - 90)  BP: --  BP(mean): --  RR: 30 (20 Jul 2018 08:00) (18 - 34)  SpO2: 98% (20 Jul 2018 08:00) (91% - 98%)    07-19 @ 07:01  -  07-20 @ 07:00  --------------------------------------------------------  IN: 1985.3 mL / OUT: 1953 mL / NET: 32.3 mL    07-20 @ 07:01  -  07-20 @ 08:20  --------------------------------------------------------  IN: 80.3 mL / OUT: 0 mL / NET: 80.3 mL      PHYSICAL EXAM:    General: extremely ill appearing male;  HEENT: no blood noted in oral cavity  Gastrointestinal: Soft, non-tender non-distended; Normal bowel sounds; no appreciable organomegaly;   Extremities: + peripheral edema   Neurological: intubated and sedated  Skin: jaundiced    LABS:                        8.9    12.1  )-----------( 15       ( 20 Jul 2018 03:40 )             26.7     07-20    137  |  90<L>  |  107<H>  ----------------------------<  127<H>  5.0   |  14<L>  |  1.79<H>    Ca    9.7      20 Jul 2018 03:40  Phos  6.3     07-20  Mg     2.3     07-20    TPro  4.6<L>  /  Alb  2.8<L>  /  TBili  46.9<H>  /  DBili  x   /  AST  197<H>  /  ALT  107<H>  /  AlkPhos  487<H>  07-20    PT/INR - ( 20 Jul 2018 03:40 )   PT: 16.2 sec;   INR: 1.45          PTT - ( 20 Jul 2018 03:40 )  PTT:39.7 sec                  Culture - Blood (collected 17 Jul 2018 12:15)  Source: .Blood Blood-Venous  Gram Stain (18 Jul 2018 10:33):    Aerobic and Anaerobic Bottle: Gram Positive Cocci in Pairs and Chains    Result called to and read back byCompa Smith RN  07/18/2018 10:32:46  Preliminary Report (19 Jul 2018 14:43):    Growth in aerobic and anaerobic bottles: Enterococcus faecium    Susceptibility to follow.    Culture - Blood (collected 17 Jul 2018 12:15)  Source: .Blood Blood-Venous  Gram Stain (18 Jul 2018 10:34):    Aerobic and Anaerobic Bottle: Gram Positive Cocci in Pairs and Chains    Result called to and read back byCompa Smith RN  07/18/2018 10:32:46  Preliminary Report (19 Jul 2018 14:45):    Growth in aerobic and anaerobic bottles: Enterococcus faecium    Susceptibility to follow.      RADIOLOGY & ADDITIONAL STUDIES:

## 2018-07-20 NOTE — PROGRESS NOTE ADULT - PROBLEM SELECTOR PLAN 5
- high anion metabolic acidosis - getting worse   - lactatate 12.5  - mild also metabolic alkalosis   - the ultimate treatment of the lactic metabolic acidosis is the treatment of the cause in this particular case - the shock cardiogenic and septic.

## 2018-07-20 NOTE — PROVIDER CONTACT NOTE (CRITICAL VALUE NOTIFICATION) - TEST AND RESULT REPORTED:
Lactate 5.1
ABG pH = 7.23
Lactate: 12.5
Lactic 4.9
Platelets 14
lactate 4.5
lactate 5.3
serum lactate 6.5

## 2018-07-20 NOTE — PROGRESS NOTE ADULT - SUBJECTIVE AND OBJECTIVE BOX
critical care attending addendum    patient with ongoing deterioration    called to bedside - no obtainable BP (agus in); and no underlying cardiac rhythm - only pacer spikes seen    pupils fixed and dilated  no auscultation of cardiac sounds  on vent - no spontaneous breaths    magnet placed over implanted pacer    time of death - pronounced 2pm    family (son)/HCP Hernando made aware  CTS informed critical care attending addendum    patient with ongoing deterioration    called to bedside - no obtainable BP (agus in); and no underlying cardiac rhythm - only pacer spikes seen    pupils fixed and dilated  no auscultation of cardiac sounds  on vent - no spontaneous breaths    magnet placed over implanted pacer    time of death - pronounced 2pm    family (son)/HCP Hernando made aware  CTS informed    medical examiner called - case reviewed and refused - d/w Ms Peaks  admitting called and death certificate to be signed [id number]

## 2018-07-20 NOTE — PROVIDER CONTACT NOTE (CRITICAL VALUE NOTIFICATION) - RECOMMENDATIONS
continue to monitor
Continue sodium bicarb infusion.
Provider to assess patient
Provider to assess patient
monitor

## 2018-07-20 NOTE — PROVIDER CONTACT NOTE (CRITICAL VALUE NOTIFICATION) - SITUATION
FLORIDALMA vigil
Lab result Lac. 5.0
Patient s/p CTS
Pt s/p cardiac surgery
Pt. s/p CTS
Pts lactate is 6.5
persistent lactic acidosis

## 2018-07-20 NOTE — PROGRESS NOTE ADULT - PROBLEM SELECTOR PLAN 1
- the oliguric EMILY due to ATN - most likely due to cardiogenic shock, can not exclude also a septic shock, requiring pressors - on vasopressin and phenylephrine, epinephrine, on midodrine - still BP around 74/50 - as per nurse not increasing the doses of the pressors in the morning   - oliguric in the morning - anuric   -  switch to IHD/SLED on 7/16 - two session with good tolerance; IHD/SLED - no need today - talked to the intensivist, see above the discussion   - the primary team will readdress goals of care   - electrolytes noted - acceptable - potassium 5.9 - treat conservative - kayexalate is not a good choice - the patient with clot in the stools per nurse, can try insulin 5 units with D50%, can try albuterol - please follow up the potassium    - the patient with overt fluid overload  -see the discussion above for RRT  - renal diet when he is not NPO  - hyperphosphatemia - resolved - acceptable - 8.1  - lactate trending up to 12.1 - the primary team is giving bicarbonate pushes   - in and out  - daily weight  - hyperbilirubinemia - also can cause EMILY - pigment nephropathy can not be excluded

## 2018-07-20 NOTE — PROGRESS NOTE ADULT - ASSESSMENT
67 yr old male with a PMHx of congenital AS s/p repair at age 12 and CAD s/p stenting 8 yrs ago who was found to have 2V CAD, severe AS, MR and TR during pre-operative evaluation now s/p MV/TV repair and AVR w/ IABP and impella placement. Hospital course was complicated by cardiogenic shock requiring continued inotropic support, cardiac tamponade x 2, consumptive coaguloapthy, pre-renal azotemia with anasarca requiring CVVH, who presents with lower GI bleed and hyperbilirubinemia    # GI bleed:  episode of blood clot with BM today, hemoglobin stable. Etiology of GI bleed is multifactorial with the following findings seen during EGD and cscope   - sp EGD 7/13/18 - esophagitis, and gastropathy,  sp repeat EGD 7/15/18 - mild esophagitis, linear gastric ulcer from NGT, and retained gastric content and enteral feeds suggesting gastric dysmotility  - sp flex sig 7/13/18 - which showed significant proctitis, sp colonoscopy 7/15/18 - with large rectal clot requiring manual disimpaction, anorectal oozing of unclear etiology, severe proctitis, old BRB throughout entire examined colon, difficulty intubating TI - but there was suggestion of small clot when TI visualized, no evidence of ischemic bowel seen  -C/w Canasa suppositories  -c/w IV PPI  - if continues, please notify GI and consider IR to evaluate for embolization    # transaminitis with hyperbilirubinemia- bilirubin and liver enzymes downtrending today  - multifactorial in nature: could be 2/2 hemolysis, RBC and platelet consumption 2/2 shearing on impella, drug induced, autoimmune hemolysis in the setting of multiple transfusions  - US shows bilary sludge and wall thickening 0.5 cm, no stones, suggesting acalculous cholecystitis. Surgery following. 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 microsomal ab to r/o autoimmune disorder  -Liver US w/ doppler to r/o protal vein thrombosis  - please avoid hepatotoxic agents    Case discussed with primary team - they will put in above orders 67 yr old male with a PMHx of congenital AS s/p repair at age 12 and CAD s/p stenting 8 yrs ago who was found to have 2V CAD, severe AS, MR and TR during pre-operative evaluation now s/p MV/TV repair and AVR w/ IABP and impella placement. Hospital course was complicated by cardiogenic shock requiring continued inotropic support, cardiac tamponade x 2, consumptive coaguloapthy, pre-renal azotemia with anasarca requiring CVVH, who presents with lower GI bleed and hyperbilirubinemia    # GI bleed:  bloody BMs with drop in hemoglobin.  Bleeding continues despite endoscopic management. Discussion of comfort care are occurring. If family decides to have everything done and pt continues to bleed, we recommend CTA and IR consult for possible embolization.   Etiology of GI bleed is multifactorial with the following findings seen during EGD and cscope   -on Canasa suppositories for proctitis  -on IV PPI      # transaminitis with hyperbilirubinemia- bilirubin and liver enzymes downtrending today  - multifactorial in nature: could be 2/2 hemolysis, RBC and platelet consumption 2/2 shearing on impella, drug induced, autoimmune hemolysis in the setting of multiple transfusions    If in agreement with family wishes workup would include :   - US shows bilary sludge and wall thickening 0.5 cm, no stones, suggesting acalculous cholecystitis. Surgery following. 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 microsomal ab to r/o autoimmune disorder  -Liver US w/ doppler to r/o protal vein thrombosis  - please avoid hepatotoxic agents    Case discussed with primary team and Dr. Baxter

## 2018-07-20 NOTE — PROVIDER CONTACT NOTE (CRITICAL VALUE NOTIFICATION) - ASSESSMENT
pt on multiple drips with low blood pressure
Patient VS remain WDL with multiple gtts in place.
Patient tolerating ventilator and VS WDL with multiple gtts. in place
VSS on vasopressin and neosynephrine drips as documented.
patient remains the same

## 2018-07-20 NOTE — PROGRESS NOTE ADULT - PROBLEM SELECTOR PROBLEM 3
Anemia
Shock
Shock
Anemia
Jaundice
Shock

## 2018-07-20 NOTE — PROGRESS NOTE ADULT - PROBLEM SELECTOR PROBLEM 2
Anemia
Anemia
Hyponatremia
Thrombocytopenia
Anemia
Hyponatremia
Thrombocytopenia
CAD (coronary artery disease)
CAD (coronary artery disease)
Hyponatremia

## 2018-07-20 NOTE — DISCHARGE NOTE FOR THE EXPIRED PATIENT - NS PRELIMINARY CAUSE OF DEATH_RESTRICTED
Cardiopulmonary Arrest/Renal Failure/Sepsis/Multi-organ Dysfunction Syndrome/Respiratory Failure/Heart Failure

## 2018-07-20 NOTE — PROGRESS NOTE ADULT - SUBJECTIVE AND OBJECTIVE BOX
Seen in the morning, still intubated, still requiring pressor support, yesterday on 7/19 no HD, aquaphoresis - around 2 liters off, on hold in the morning aquaphoresis - BP 75/50     The renal service for EMILY due to ATN from the cardiogenic shock, and ongoing sepsis   Patient seen and examined at bedside.       artificial  tears Solution 1 Drop(s) two times a day  aztreonam  IVPB 2000 milliGRAM(s) every 12 hours  chlorhexidine 0.12% Liquid 15 milliLiter(s) every 12 hours  chlorhexidine 2% Cloths 1 Application(s) daily  DAPTOmycin IVPB 350 milliGRAM(s) every 48 hours  dexmedetomidine Infusion 0.5 MICROgram(s)/kG/Hr <Continuous>  dextrose 40% Gel 15 Gram(s) once PRN  dextrose 5%. 1000 milliLiter(s) <Continuous>  dextrose 50% Injectable 50 milliLiter(s) every 15 minutes  dextrose 50% Injectable 25 milliLiter(s) every 15 minutes  EPINEPHrine     Infusion 0.06 MICROgram(s)/kG/Min <Continuous>  fentaNYL    Injectable 25 MICROGram(s) every 4 hours PRN  folic acid 1 milliGRAM(s) daily  glucagon  Injectable 1 milliGRAM(s) once PRN  insulin lispro (HumaLOG) corrective regimen sliding scale   every 6 hours  mesalamine Suppository 1000 milliGRAM(s) at bedtime  micafungin IVPB 100 milliGRAM(s) every 24 hours  micafungin IVPB     midodrine 10 milliGRAM(s) every 8 hours  pantoprazole  Injectable 40 milliGRAM(s) every 12 hours  phenylephrine    Infusion 2 MICROgram(s)/kG/Min <Continuous>  propofol Infusion 10 MICROgram(s)/kG/Min <Continuous>  sodium bicarbonate  Injectable 50 milliEquivalent(s) once  sodium bicarbonate  Injectable 50 milliEquivalent(s) once  sodium chloride 0.9%. 1000 milliLiter(s) <Continuous>  vasopressin Infusion 0.017 Unit(s)/Min <Continuous>      Allergies    penicillin (Rash)    Intolerances        T(C): , Max: 37.3 (07-20-18 @ 01:00)  T(F): , Max: 99.1 (07-20-18 @ 01:00)  HR: 90 (07-20-18 @ 10:00)  BP: --  BP(mean): --  RR: 40 (07-20-18 @ 10:00)  SpO2: 95% (07-20-18 @ 10:00)  Wt(kg): --    07-19 @ 07:01  -  07-20 @ 07:00  --------------------------------------------------------  IN:    EPINEPHrine Infusion: 270 mL    phenylephrine   Infusion: 197.5 mL    propofol Infusion: 121.8 mL    sodium chloride 0.9%.: 240 mL    Solution: 100 mL    vasopressin Infusion: 48 mL    Vital HN: 1008 mL  Total IN: 1985.3 mL    OUT:    Chest Tube: 3 mL    Other: 1950 mL  Total OUT: 1953 mL    Total NET: 32.3 mL      07-20 @ 07:01  -  07-20 @ 10:13  --------------------------------------------------------  IN:    EPINEPHrine Infusion: 35.1 mL    phenylephrine   Infusion: 39 mL    propofol Infusion: 10.5 mL    sodium chloride 0.9%.: 30 mL    vasopressin Infusion: 6 mL    Vital HN: 126 mL  Total IN: 246.6 mL    OUT:  Total OUT: 0 mL    Total NET: 246.6 mL    Physical exam:   Intubated and sedated in the morning   ANASARCA   Jaundice   difficult to evaluate for JVD   Chest: s/p thoracotomy closed chest  multiple drainages from the chest   RRR, normal s1/s2, systolic murmur  Abdomen - swollen, not tender, not distended   Extremities 1+ peripheral edema  Hd cathter - on the right IJ  Desai           LABS:                        6.2    16.7  )-----------( 79       ( 20 Jul 2018 09:23 )             19.2     07-20    138  |  89<L>  |  112<H>  ----------------------------<  102<H>  5.9<H>   |  9<LL>  |  2.00<H>    Ca    9.4      20 Jul 2018 09:23  Phos  8.1     07-20  Mg     2.6     07-20    TPro  4.2<L>  /  Alb  2.4<L>  /  TBili  41.2<H>  /  DBili  x   /  AST  279<H>  /  ALT  111<H>  /  AlkPhos  403<H>  07-20      PT/INR - ( 20 Jul 2018 09:23 )   PT: 20.1 sec;   INR: 1.79          PTT - ( 20 Jul 2018 09:23 )  PTT:46.9 sec          RADIOLOGY & ADDITIONAL STUDIES:

## 2018-07-20 NOTE — PROGRESS NOTE ADULT - PROBLEM SELECTOR PROBLEM 4
Shock
Hyperkalemia
Shock

## 2018-07-20 NOTE — PROGRESS NOTE ADULT - SUBJECTIVE AND OBJECTIVE BOX
Critical Care attending    contacted by staff and lab with multiple critical levels    patient bacteremic/ARF/hypotensive with necrotic wounds and ongoing GIB rectum (despite multiple endoscopic procedures)    inc LA 12.5  Hg 6.2  K 5.9 HC03 9    HCP (son) Hernando called - informed of clinical deterioration this am and limited ability to intervene on above  informed him if he or other family members wish to be present - should come in now    HCP stated clearly that he did not want CPR/compressions and wished for his father to be comfortable and not in pain  directive heard by patient nurse also    DNR status ordered

## 2018-07-20 NOTE — PROGRESS NOTE ADULT - PROBLEM SELECTOR PROBLEM 1
Acute kidney failure
Valvular disease
Valvular disease
Acute kidney failure

## 2018-07-20 NOTE — PROGRESS NOTE ADULT - ASSESSMENT
The patient is 67 year old male with PMH stated above, now s/p  Repair of AA, AVR, MV repair, TV repair, CABG x2, now in shock requiring pressor support - mutiple pressors. The patient with deterioration of the renal function in the setting of the shock - oliguric, no response from the diuretics. was started on CVVHD from 7/2 to 7/14 and stopped. On 7/17 and 7/18 - SLED was done with good tolerance and was switch to aquaphoresis. On 7/19 - aquaphoresis was continued and was stopped overnight because the BP is dropping. In the morning no aquaphoresis - stopped/ on hold bp on the low side; talked to the intensivist and both agreed that not stable to proceed with RRT in this point. The team will have a discussion with the family about goals of care today. The renal service is always available if the plan changes and the patient is stable to get RRT

## 2018-07-22 LAB
-  AMPICILLIN: SIGNIFICANT CHANGE UP
-  AMPICILLIN: SIGNIFICANT CHANGE UP
-  DAPTOMYCIN: SIGNIFICANT CHANGE UP
-  DAPTOMYCIN: SIGNIFICANT CHANGE UP
-  GENTAMICIN SYNERGY: SIGNIFICANT CHANGE UP
-  GENTAMICIN SYNERGY: SIGNIFICANT CHANGE UP
-  STREPTOMYCIN SYNERGY: SIGNIFICANT CHANGE UP
-  STREPTOMYCIN SYNERGY: SIGNIFICANT CHANGE UP
-  VANCOMYCIN: SIGNIFICANT CHANGE UP
-  VANCOMYCIN: SIGNIFICANT CHANGE UP
CULTURE RESULTS: SIGNIFICANT CHANGE UP
CULTURE RESULTS: SIGNIFICANT CHANGE UP
METHOD TYPE: SIGNIFICANT CHANGE UP
METHOD TYPE: SIGNIFICANT CHANGE UP
ORGANISM # SPEC MICROSCOPIC CNT: SIGNIFICANT CHANGE UP
SPECIMEN SOURCE: SIGNIFICANT CHANGE UP
SPECIMEN SOURCE: SIGNIFICANT CHANGE UP

## 2018-08-02 DIAGNOSIS — I25.118 ATHEROSCLEROTIC HEART DISEASE OF NATIVE CORONARY ARTERY WITH OTHER FORMS OF ANGINA PECTORIS: ICD-10-CM

## 2018-08-02 DIAGNOSIS — K25.9 GASTRIC ULCER, UNSPECIFIED AS ACUTE OR CHRONIC, WITHOUT HEMORRHAGE OR PERFORATION: ICD-10-CM

## 2018-08-02 DIAGNOSIS — I35.0 NONRHEUMATIC AORTIC (VALVE) STENOSIS: ICD-10-CM
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
## 2018-08-02 DIAGNOSIS — K46.9 UNSPECIFIED ABDOMINAL HERNIA WITHOUT OBSTRUCTION OR GANGRENE: ICD-10-CM

## 2018-08-02 DIAGNOSIS — D68.9 COAGULATION DEFECT, UNSPECIFIED: ICD-10-CM

## 2018-08-02 DIAGNOSIS — K92.1 MELENA: ICD-10-CM

## 2018-08-02 DIAGNOSIS — D69.59 OTHER SECONDARY THROMBOCYTOPENIA: ICD-10-CM

## 2018-08-02 DIAGNOSIS — I44.2 ATRIOVENTRICULAR BLOCK, COMPLETE: ICD-10-CM

## 2018-08-02 DIAGNOSIS — I97.618 POSTPROCEDURAL HEMORRHAGE OF A CIRCULATORY SYSTEM ORGAN OR STRUCTURE FOLLOWING OTHER CIRCULATORY SYSTEM PROCEDURE: ICD-10-CM

## 2018-08-02 DIAGNOSIS — R57.0 CARDIOGENIC SHOCK: ICD-10-CM

## 2018-08-02 DIAGNOSIS — E87.2 ACIDOSIS: ICD-10-CM

## 2018-08-02 DIAGNOSIS — K20.9 ESOPHAGITIS, UNSPECIFIED: ICD-10-CM

## 2018-08-02 DIAGNOSIS — I50.23 ACUTE ON CHRONIC SYSTOLIC (CONGESTIVE) HEART FAILURE: ICD-10-CM
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
## 2018-08-02 DIAGNOSIS — I27.20 PULMONARY HYPERTENSION, UNSPECIFIED: ICD-10-CM

## 2018-08-02 DIAGNOSIS — N17.0 ACUTE KIDNEY FAILURE WITH TUBULAR NECROSIS: ICD-10-CM

## 2018-08-02 DIAGNOSIS — I36.1 NONRHEUMATIC TRICUSPID (VALVE) INSUFFICIENCY: ICD-10-CM

## 2018-08-02 DIAGNOSIS — I34.0 NONRHEUMATIC MITRAL (VALVE) INSUFFICIENCY: ICD-10-CM

## 2018-08-02 DIAGNOSIS — A41.9 SEPSIS, UNSPECIFIED ORGANISM: ICD-10-CM

## 2018-08-02 DIAGNOSIS — E87.1 HYPO-OSMOLALITY AND HYPONATREMIA: ICD-10-CM

## 2018-08-02 DIAGNOSIS — R18.8 OTHER ASCITES: ICD-10-CM

## 2018-08-02 DIAGNOSIS — I11.0 HYPERTENSIVE HEART DISEASE WITH HEART FAILURE: ICD-10-CM

## 2018-08-02 DIAGNOSIS — E87.6 HYPOKALEMIA: ICD-10-CM

## 2018-08-02 DIAGNOSIS — R65.21 SEVERE SEPSIS WITH SEPTIC SHOCK: ICD-10-CM

## 2018-08-02 DIAGNOSIS — J96.00 ACUTE RESPIRATORY FAILURE, UNSPECIFIED WHETHER WITH HYPOXIA OR HYPERCAPNIA: ICD-10-CM

## 2018-08-15 LAB
CULTURE RESULTS: SIGNIFICANT CHANGE UP
SPECIMEN SOURCE: SIGNIFICANT CHANGE UP

## 2019-01-27 NOTE — PROCEDURE NOTE - NSCVLATTEMPTSITEVASC_A_CORE
left/femoral vein
left/internal jugular
right/internal jugular
right/internal jugular
Attending Attestation (For Attendings USE Only)...

## 2019-12-11 NOTE — PROGRESS NOTE ADULT - PROBLEM SELECTOR PLAN 1
- the oliguric EMILY due to ATN - most likely due to cardiogenic shock, can not exclude also a septic shock, requiring pressors - epinephrine phenylephrine, on vassopresin  on milrinone   - oliguric in the morning - anuric   - the primary team started aquaphoresis now on 40 ml/h and UF of 200 ml/h with stable hemodynamic (on pressors)  - now is to be switch to CVVHD - if the patient is not able to tolerate it to be back on aquaphoresis (the patient with borderline potassium and woersening acidosis)  - the patient with overt fluid overload - anasarca   - we will start CVVHD with Uf of 75 ml/h and to be titrated up to 175 to 200 ml/h if the BP and hemodynamics can sustain it    - electrolytes noted - borderline potassium, lactate 4.7 trending up)  - in and outs   - daily weight  - renal diet when he is not NPO eyeglasses

## 2021-03-29 NOTE — PROVIDER CONTACT NOTE (CRITICAL VALUE NOTIFICATION) - ACTION/TREATMENT ORDERED:
1 albumin ordered, continue to monitor
Continue to monitor
PRBC ordered
Fall Risk
continue to monitor
2 amps bicarb given
Continue to closely monitor.
No action required at this time. Repeat lab work to be completed in am.
monitor

## 2021-09-25 NOTE — PRE-OP CHECKLIST - WAS PATIENT ON BETA BLOCKER?
Tip Dias MD your patient has been admitted to the [facility] in [place] on Admit Date: 9/25/2021 Admit Time: 1802. Please contact [phone] with any questions. Thank you.  
No
Yes
No

## 2024-04-26 NOTE — DISCHARGE NOTE FOR THE EXPIRED PATIENT - HOSPITAL COURSE
68yo male with Hx congenital AV dz -  repair/replacement (11yo - [facility]) being evaluated for hernia repair. Preop risk assessment performed. ECHO/Cath (Completed 2018.) EF 25%, severe AS, severe MR, severe TR, severe pHTN, and 2V CAD.  Patient with sxs progressive FLORENCE and dec exercise tolerance for several months. Transferred to [facility] for assessment and intervention.  To OR  -  UpRBC/8 FFP/4 platelet/FEIBA and Mary 7 given.  Arrived to CTICU with an open chest, impella support, and IABP.  On  washout and chest closure performed, later in day/evening on  showing signs of tamponade and chest opened at bedside with improvement. Repeat wash out performed  with removal of clot causing pseudotamponade, IABP and sheath removed intraop as well.  Serial attempts at closure, but reopened several times 2/2 tamponade with ultimate closure /.  Impella repositioned /3 2/2 suboptimal flows and high ionotrope and pressor requirements.  Overall prolonged and complicated post-op course with worsening MOF including ARF requiring various forms of RRT, continuous GI bleeding despite multiple endoscopic procedures, and VRE bacteremia.  Despite maximal medical therapies patient ultimately  on 18 at 2:00 pm.  Wilber DELGADO, notified and rejected case.
155.2

## 2025-01-09 NOTE — PROVIDER CONTACT NOTE (CRITICAL VALUE NOTIFICATION) - DATE AND TIME:
14-Jul-2018 18:06 Medication: Metoprolol, Lisinopril/HCTZ passed protocol.   Last office visit date: 11/20/2024  Next appointment scheduled?: Yes   Number of refills given: 4     14-Jul-2018 18:37

## 2025-06-25 NOTE — EEG REPORT - NS EEG TEXT BOX
Problem: Safety - Adult  Goal: Free from fall injury  Outcome: Completed     Problem: ABCDS Injury Assessment  Goal: Absence of physical injury  Outcome: Completed     Problem: ABCDS Injury Assessment  Goal: Absence of physical injury  Outcome: Completed      PRELIMINARY Inpatient Continuous Video Electroencephalography Report    Patient Name:	KATY BENSON  :	1950  MRN:	[id number]    Study Start Date/Time:  2018, 3:09:50 PM  Study End Date/Time:	    Referred by: Dr. Jason Potter    Brief Clinical History:  KATY BENSON is a 68 yo man with history of aortic stenosis, s/p surgery with protracted course and in cardiogenic shock, with limited consciousness.  Screening for seizure activity to explain cognitive dysfunction.    Diagnosis Code:   R56.9 convulsions/seizure  CPT: 63796 vEEG 12-24 hours    Acquisition Details:  Electroencephalography was acquired using a minimum of 21 channels on an [employer] system v 8.5.1 with electrode placement according to the standard International 10-20 system following ACNS (American Clinical Neurophysiology Society) guidelines for Long-Term Video EEG monitoring.  Anterior temporal T1 and T2 electrodes were utilized whenever possible. The [employer] automated spike & seizure detections were all reviewed in detail, in addition to extensive portions of raw EEG.    Day 1: 2018 @ 4:20:50 PM to next morning @ 0700  Pertinent medications:  Background   Symmetry: Symmetric.  Frequencies: Predominantly diffuse theta/delta.  Anterior-posterior (AP) gradient: Absent.  Posterior Dominant Rhythm: No clear PDR   Voltage:  Normal (most activity >20 uV).  Continuity: continuous,   Variability: Yes. 						  Reactivity: Yes.  Breach: No.  N2 sleep: Absent.    Epileptiform discharges:  No epileptiform discharges.  Abnormal periodic/rhythmic activity: No .  Events:  No electrographic seizures or paroxysmal clinical events.    Provocations: Hyperventilation and photic were not performed  Other findings: None.  ECG: Normal sinus rhythm    Daily summary and impression:  Moderate generalized appearing slowing suggestive of a similar degree of diffuse or multifocal  dysfunction.    There were no electrographic seizures, epileptiform discharges, or paroxysmal clinical events.    Read by:  Jason Plunkett MD